# Patient Record
Sex: FEMALE | Race: WHITE | NOT HISPANIC OR LATINO | Employment: OTHER | URBAN - METROPOLITAN AREA
[De-identification: names, ages, dates, MRNs, and addresses within clinical notes are randomized per-mention and may not be internally consistent; named-entity substitution may affect disease eponyms.]

---

## 2017-08-17 ENCOUNTER — OFFICE VISIT (OUTPATIENT)
Dept: RETAIL CLINIC | Facility: CLINIC | Age: 62
End: 2017-08-17

## 2017-08-17 VITALS
SYSTOLIC BLOOD PRESSURE: 150 MMHG | TEMPERATURE: 98.9 F | DIASTOLIC BLOOD PRESSURE: 86 MMHG | HEART RATE: 83 BPM | OXYGEN SATURATION: 98 %

## 2017-08-17 DIAGNOSIS — J01.00 ACUTE NON-RECURRENT MAXILLARY SINUSITIS: Primary | ICD-10-CM

## 2017-08-17 PROCEDURE — 99203 OFFICE O/P NEW LOW 30 MIN: CPT | Performed by: NURSE PRACTITIONER

## 2017-08-17 RX ORDER — ANASTROZOLE 1 MG/1
TABLET ORAL DAILY
COMMUNITY
End: 2018-12-14

## 2017-08-17 RX ORDER — GLIPIZIDE 5 MG/1
5 TABLET ORAL
COMMUNITY
End: 2023-03-25

## 2017-08-17 RX ORDER — AMOXICILLIN AND CLAVULANATE POTASSIUM 875; 125 MG/1; MG/1
1 TABLET, FILM COATED ORAL 2 TIMES DAILY
Qty: 20 TABLET | Refills: 0 | Status: SHIPPED | OUTPATIENT
Start: 2017-08-17 | End: 2017-08-27

## 2017-08-17 NOTE — PROGRESS NOTES
Subjective   Patient ID: Lyubov Middleton is a 62 y.o. female presents with   Chief Complaint   Patient presents with   • Cough   • URI     x 1.5 weeks   • Sore Throat   • Headache   • ear congestion       Cough   This is a new problem. The current episode started 1 to 4 weeks ago. The problem has been unchanged. The problem occurs constantly. The cough is productive of purulent sputum. Associated symptoms include headaches, nasal congestion, postnasal drip, rhinorrhea and a sore throat. Pertinent negatives include no chest pain, chills, ear congestion, ear pain, fever, hemoptysis, rash, shortness of breath, sweats or wheezing. Nothing aggravates the symptoms. Treatments tried: TYLENOL COLD AND SINUS. The treatment provided mild relief.   URI    Associated symptoms include coughing, headaches, rhinorrhea and a sore throat. Pertinent negatives include no chest pain, diarrhea, ear pain, nausea, rash, sneezing, vomiting or wheezing.   Sore Throat    Associated symptoms include coughing and headaches. Pertinent negatives include no diarrhea, ear discharge, ear pain, shortness of breath or vomiting.   Headache    Associated symptoms include coughing, rhinorrhea, sinus pressure and a sore throat. Pertinent negatives include no dizziness, ear pain, fever, nausea or vomiting.   PATIENT REPORTS SYMPTOMS X 7-10 DAYS.      No Known Allergies    The following portions of the patient's history were reviewed and updated as appropriate: allergies, current medications, past family history, past medical history, past social history, past surgical history and problem list.      Review of Systems   Constitutional: Positive for fatigue. Negative for chills and fever.   HENT: Positive for postnasal drip, rhinorrhea, sinus pressure and sore throat. Negative for ear discharge, ear pain and sneezing.    Respiratory: Positive for cough. Negative for hemoptysis, chest tightness, shortness of breath and wheezing.    Cardiovascular: Negative  for chest pain.   Gastrointestinal: Negative for diarrhea, nausea and vomiting.   Skin: Negative for rash.   Neurological: Positive for headaches. Negative for dizziness and light-headedness.       Objective     Vitals:    08/17/17 0836   BP: 150/86   Pulse: 83   Temp: 98.9 °F (37.2 °C)   SpO2: 98%         Physical Exam   Constitutional: She is oriented to person, place, and time. She appears well-developed and well-nourished. She does not appear ill. No distress.   HENT:   Head: Normocephalic.   Right Ear: Hearing, tympanic membrane, external ear and ear canal normal.   Left Ear: Hearing, tympanic membrane, external ear and ear canal normal.   Nose: Mucosal edema and rhinorrhea present. No sinus tenderness. Right sinus exhibits maxillary sinus tenderness. Right sinus exhibits no frontal sinus tenderness. Left sinus exhibits maxillary sinus tenderness. Left sinus exhibits no frontal sinus tenderness.   Mouth/Throat: Uvula is midline and mucous membranes are normal. No trismus in the jaw. No uvula swelling. Posterior oropharyngeal erythema present. Tonsils are 2+ on the right. Tonsils are 2+ on the left. No tonsillar exudate.   Eyes: Conjunctivae are normal.   Sclera white.   Neck: No tracheal deviation present.   Cardiovascular: Normal rate, regular rhythm, S1 normal, S2 normal and normal heart sounds.    Pulmonary/Chest: Effort normal and breath sounds normal. No accessory muscle usage. No respiratory distress.   Abdominal: Soft. Bowel sounds are normal. There is no hepatosplenomegaly. There is no tenderness.   Lymphadenopathy:     She has no cervical adenopathy.   Neurological: She is alert and oriented to person, place, and time.   Skin: Skin is warm and dry.   Vitals reviewed.        Lyubov was seen today for cough, uri, sore throat, headache and ear congestion.    Diagnoses and all orders for this visit:    Acute non-recurrent maxillary sinusitis  -     amoxicillin-clavulanate (AUGMENTIN) 875-125 MG per  tablet; Take 1 tablet by mouth 2 (Two) Times a Day for 10 days.      RX AS PRESCRIBED.  OTC FLONASE PER PACKAGE INSTRUCTIONS.  PUSH FLUIDS.  FOLLOW UP WITH PCP AS SCHEDULED NEXT WEEK.  TO URGENT CARE OR ER FOR WORSENING SYMPTOMS SUCH AS SHORTNESS OF BREATH, CHEST PAIN, HIGH FEVER OR THE LIKE.   Follow-up with Primary Care Physician in 48-72 hours if these symptoms worsen or fail to improve as anticipated. Patient verbalizes understanding.

## 2017-08-17 NOTE — PATIENT INSTRUCTIONS
Sinusitis, Adult  Sinusitis is soreness and inflammation of your sinuses. Sinuses are hollow spaces in the bones around your face. They are located:  · Around your eyes.  · In the middle of your forehead.  · Behind your nose.  · In your cheekbones.  Your sinuses and nasal passages are lined with a stringy fluid (mucus). Mucus normally drains out of your sinuses. When your nasal tissues get inflamed or swollen, the mucus can get trapped or blocked so air cannot flow through your sinuses. This lets bacteria, viruses, and funguses grow, and that leads to infection.  HOME CARE  Medicines  · Take, use, or apply over-the-counter and prescription medicines only as told by your doctor. These may include nasal sprays.  · If you were prescribed an antibiotic medicine, take it as told by your doctor. Do not stop taking the antibiotic even if you start to feel better.  Hydrate and Humidify  · Drink enough water to keep your pee (urine) clear or pale yellow.  · Use a cool mist humidifier to keep the humidity level in your home above 50%.  · Breathe in steam for 10-15 minutes, 3-4 times a day or as told by your doctor. You can do this in the bathroom while a hot shower is running.  · Try not to spend time in cool or dry air.  Rest  · Rest as much as possible.    · Sleep with your head raised (elevated).  · Make sure to get enough sleep each night.  General Instructions  · Put a warm, moist washcloth on your face 3-4 times a day or as told by your doctor. This will help with discomfort.  · Wash your hands often with soap and water. If there is no soap and water, use hand .  · Do not smoke. Avoid being around people who are smoking (secondhand smoke).  · Keep all follow-up visits as told by your doctor. This is important.  GET HELP IF:  · You have a fever.  · Your symptoms get worse.  · Your symptoms do not get better within 10 days.  GET HELP RIGHT AWAY IF:  · You have a very bad headache.  · You cannot stop throwing up  (vomiting).  · You have pain or swelling around your face or eyes.  · You have trouble seeing.  · You feel confused.  · Your neck is stiff.  · You have trouble breathing.     RX AS PRESCRIBED.  OTC FLONASE PER PACKAGE INSTRUCTIONS.  PUSH FLUIDS.  FOLLOW UP WITH PCP AS SCHEDULED NEXT WEEK.  TO URGENT CARE OR ER FOR WORSENING SYMPTOMS SUCH AS SHORTNESS OF BREATH, CHEST PAIN, HIGH FEVER OR THE LIKE.   This information is not intended to replace advice given to you by your health care provider. Make sure you discuss any questions you have with your health care provider.     Document Released: 06/05/2009 Document Revised: 04/10/2017 Document Reviewed: 10/12/2016  [employer] Interactive Patient Education ©2017 [employer]

## 2018-12-14 ENCOUNTER — OFFICE VISIT (OUTPATIENT)
Dept: RETAIL CLINIC | Facility: CLINIC | Age: 63
End: 2018-12-14

## 2018-12-14 VITALS
DIASTOLIC BLOOD PRESSURE: 72 MMHG | HEART RATE: 83 BPM | TEMPERATURE: 98.1 F | OXYGEN SATURATION: 96 % | SYSTOLIC BLOOD PRESSURE: 132 MMHG

## 2018-12-14 DIAGNOSIS — R31.29 OTHER MICROSCOPIC HEMATURIA: Primary | ICD-10-CM

## 2018-12-14 LAB
BILIRUB BLD-MCNC: NEGATIVE MG/DL
GLUCOSE UR STRIP-MCNC: NEGATIVE MG/DL
KETONES UR QL: NEGATIVE
LEUKOCYTE EST, POC: NEGATIVE
NITRITE UR-MCNC: NEGATIVE MG/ML
PH UR: 5.5 [PH] (ref 5–8)
PROT UR STRIP-MCNC: NEGATIVE MG/DL
RBC # UR STRIP: ABNORMAL /UL
SP GR UR: 1.03 (ref 1–1.03)
UROBILINOGEN UR QL: NORMAL

## 2018-12-14 PROCEDURE — 81003 URINALYSIS AUTO W/O SCOPE: CPT | Performed by: NURSE PRACTITIONER

## 2018-12-14 PROCEDURE — 99213 OFFICE O/P EST LOW 20 MIN: CPT | Performed by: NURSE PRACTITIONER

## 2018-12-14 RX ORDER — CITALOPRAM 10 MG/1
10 TABLET ORAL DAILY
Refills: 3 | Status: ON HOLD | COMMUNITY
Start: 2018-12-02

## 2018-12-14 RX ORDER — LISINOPRIL 20 MG/1
20 TABLET ORAL DAILY
Refills: 0 | Status: ON HOLD | COMMUNITY
Start: 2018-10-01 | End: 2020-12-17 | Stop reason: SDUPTHER

## 2018-12-14 RX ORDER — RIZATRIPTAN BENZOATE 10 MG/1
TABLET ORAL
Refills: 0 | COMMUNITY
Start: 2018-10-02 | End: 2023-03-25

## 2018-12-14 NOTE — PATIENT INSTRUCTIONS
Hematuria, Adult  Hematuria is blood in your urine. It can be caused by a bladder infection, kidney infection, prostate infection, kidney stone, or cancer of your urinary tract. Infections can usually be treated with medicine, and a kidney stone usually will pass through your urine. If neither of these is the cause of your hematuria, further workup to find out the reason may be needed.  It is very important that you tell your health care provider about any blood you see in your urine, even if the blood stops without treatment or happens without causing pain. Blood in your urine that happens and then stops and then happens again can be a symptom of a very serious condition. Also, pain is not a symptom in the initial stages of many urinary cancers.  Follow these instructions at home:  · Drink lots of fluid, 3-4 quarts a day. If you have been diagnosed with an infection, cranberry juice is especially recommended, in addition to large amounts of water.  · Avoid caffeine, tea, and carbonated beverages because they tend to irritate the bladder.  · Avoid alcohol because it may irritate the prostate.  · Take all medicines as directed by your health care provider.  · If you were prescribed an antibiotic medicine, finish it all even if you start to feel better.  · If you have been diagnosed with a kidney stone, follow your health care provider's instructions regarding straining your urine to catch the stone.  · Empty your bladder often. Avoid holding urine for long periods of time.  · After a bowel movement, women should cleanse front to back. Use each tissue only once.  · Empty your bladder before and after sexual intercourse if you are a female.  Contact a health care provider if:  · You develop back pain.  · You have a fever.  · You have a feeling of sickness in your stomach (nausea) or vomiting.  · Your symptoms are not better in 3 days. Return sooner if you are getting worse.  Get help right away if:  · You develop  severe vomiting and are unable to keep the medicine down.  · You develop severe back or abdominal pain despite taking your medicines.  · You begin passing a large amount of blood or clots in your urine.  · You feel extremely weak or faint, or you pass out.  This information is not intended to replace advice given to you by your health care provider. Make sure you discuss any questions you have with your health care provider.  Document Released: 12/18/2006 Document Revised: 05/25/2017 Document Reviewed: 08/18/2014  [employer] Interactive Patient Education © 2017 [employer]

## 2018-12-14 NOTE — PROGRESS NOTES
Subjective:     Lyubov Middleton is a 63 y.o.     Patient reports that she was seen in October at her PCP for a routine visit.  Her routine urinalysis showed that she had a UTI.  Although she is not having any symptoms after finishing her antibiotic and it being 2 months later, she is here today to make sure that her infection is gone.  She is reminded that this clinic is not a follow up clinic and she states that she cannot get in with her PCP again until 1/30/19.  She is still encouraged to follow up with her PCP from now on.        Urinary Tract Infection        Patient also reports that she is having pleurisy symptoms as well and is referred to an urgent care where she can be evaluated with a chest Xray today.    The following portions of the patient's history were reviewed and updated as appropriate: allergies, current medications, past family history, past medical history, past social history, past surgical history and problem list.      Review of Systems   Musculoskeletal: Positive for back pain (Patient reports that her pain is similar to when she had pleurisy).         Objective:    Vitals:    12/14/18 1021   BP: 132/72   BP Location: Right arm   Patient Position: Sitting   Cuff Size: Adult   Pulse: 83   Temp: 98.1 °F (36.7 °C)   SpO2: 96%       Physical Exam   Constitutional: She is oriented to person, place, and time. She appears well-developed and well-nourished.   HENT:   Head: Normocephalic and atraumatic.   Eyes: Conjunctivae are normal.   Cardiovascular: Normal rate, regular rhythm and normal heart sounds.   Pulmonary/Chest: Effort normal and breath sounds normal.   Abdominal: Soft. There is no tenderness. There is no CVA tenderness.   Neurological: She is alert and oriented to person, place, and time.   Skin: Skin is warm and dry.   Psychiatric: She has a normal mood and affect. Her behavior is normal. Judgment and thought content normal.   Vitals reviewed.        Lyubov was seen today for urinary  tract infection.    Diagnoses and all orders for this visit:    Other microscopic hematuria  -     POC Urinalysis Dipstick, Automated

## 2020-12-08 ENCOUNTER — HOSPITAL ENCOUNTER (INPATIENT)
Facility: HOSPITAL | Age: 65
LOS: 9 days | Discharge: HOME HEALTH | End: 2020-12-17
Attending: EMERGENCY MEDICINE | Admitting: HOSPITALIST

## 2020-12-08 ENCOUNTER — APPOINTMENT (OUTPATIENT)
Dept: CT IMAGING | Facility: HOSPITAL | Age: 65
End: 2020-12-08

## 2020-12-08 DIAGNOSIS — R73.9 HYPERGLYCEMIA: ICD-10-CM

## 2020-12-08 DIAGNOSIS — N15.1 RENAL ABSCESS: ICD-10-CM

## 2020-12-08 DIAGNOSIS — R10.30 LOWER ABDOMINAL PAIN: ICD-10-CM

## 2020-12-08 DIAGNOSIS — E87.6 HYPOKALEMIA: Primary | ICD-10-CM

## 2020-12-08 LAB
ALBUMIN SERPL-MCNC: 3.1 G/DL (ref 3.5–5.2)
ALBUMIN/GLOB SERPL: 0.8 G/DL
ALP SERPL-CCNC: 102 U/L (ref 39–117)
ALT SERPL W P-5'-P-CCNC: 6 U/L (ref 1–33)
ANION GAP SERPL CALCULATED.3IONS-SCNC: 11.8 MMOL/L (ref 5–15)
AST SERPL-CCNC: 10 U/L (ref 1–32)
B PARAPERT DNA SPEC QL NAA+PROBE: NOT DETECTED
B PERT DNA SPEC QL NAA+PROBE: NOT DETECTED
BACTERIA UR QL AUTO: ABNORMAL /HPF
BASOPHILS # BLD AUTO: 0.03 10*3/MM3 (ref 0–0.2)
BASOPHILS NFR BLD AUTO: 0.2 % (ref 0–1.5)
BILIRUB SERPL-MCNC: 0.4 MG/DL (ref 0–1.2)
BILIRUB UR QL STRIP: NEGATIVE
BUN SERPL-MCNC: 7 MG/DL (ref 8–23)
BUN/CREAT SERPL: 6 (ref 7–25)
C PNEUM DNA NPH QL NAA+NON-PROBE: NOT DETECTED
CALCIUM SPEC-SCNC: 7.9 MG/DL (ref 8.6–10.5)
CHLORIDE SERPL-SCNC: 86 MMOL/L (ref 98–107)
CLARITY UR: CLEAR
CO2 SERPL-SCNC: 33.2 MMOL/L (ref 22–29)
COLOR UR: YELLOW
CREAT SERPL-MCNC: 1.16 MG/DL (ref 0.57–1)
DEPRECATED RDW RBC AUTO: 37.2 FL (ref 37–54)
EOSINOPHIL # BLD AUTO: 0.04 10*3/MM3 (ref 0–0.4)
EOSINOPHIL NFR BLD AUTO: 0.3 % (ref 0.3–6.2)
ERYTHROCYTE [DISTWIDTH] IN BLOOD BY AUTOMATED COUNT: 12.5 % (ref 12.3–15.4)
FLUAV SUBTYP SPEC NAA+PROBE: NOT DETECTED
FLUBV RNA ISLT QL NAA+PROBE: NOT DETECTED
GFR SERPL CREATININE-BSD FRML MDRD: 47 ML/MIN/1.73
GLOBULIN UR ELPH-MCNC: 3.8 GM/DL
GLUCOSE BLDC GLUCOMTR-MCNC: 340 MG/DL (ref 70–130)
GLUCOSE SERPL-MCNC: 535 MG/DL (ref 65–99)
GLUCOSE UR STRIP-MCNC: ABNORMAL MG/DL
HADV DNA SPEC NAA+PROBE: NOT DETECTED
HCOV 229E RNA SPEC QL NAA+PROBE: NOT DETECTED
HCOV HKU1 RNA SPEC QL NAA+PROBE: NOT DETECTED
HCOV NL63 RNA SPEC QL NAA+PROBE: NOT DETECTED
HCOV OC43 RNA SPEC QL NAA+PROBE: NOT DETECTED
HCT VFR BLD AUTO: 30 % (ref 34–46.6)
HGB BLD-MCNC: 9.5 G/DL (ref 12–15.9)
HGB UR QL STRIP.AUTO: ABNORMAL
HMPV RNA NPH QL NAA+NON-PROBE: NOT DETECTED
HPIV1 RNA SPEC QL NAA+PROBE: NOT DETECTED
HPIV2 RNA SPEC QL NAA+PROBE: NOT DETECTED
HPIV3 RNA NPH QL NAA+PROBE: NOT DETECTED
HPIV4 P GENE NPH QL NAA+PROBE: NOT DETECTED
HYALINE CASTS UR QL AUTO: ABNORMAL /LPF
IMM GRANULOCYTES # BLD AUTO: 0.13 10*3/MM3 (ref 0–0.05)
IMM GRANULOCYTES NFR BLD AUTO: 0.8 % (ref 0–0.5)
KETONES UR QL STRIP: NEGATIVE
LEUKOCYTE ESTERASE UR QL STRIP.AUTO: NEGATIVE
LIPASE SERPL-CCNC: 26 U/L (ref 13–60)
LYMPHOCYTES # BLD AUTO: 1.01 10*3/MM3 (ref 0.7–3.1)
LYMPHOCYTES NFR BLD AUTO: 6.6 % (ref 19.6–45.3)
M PNEUMO IGG SER IA-ACNC: NOT DETECTED
MAGNESIUM SERPL-MCNC: 1 MG/DL (ref 1.6–2.4)
MCH RBC QN AUTO: 25.8 PG (ref 26.6–33)
MCHC RBC AUTO-ENTMCNC: 31.7 G/DL (ref 31.5–35.7)
MCV RBC AUTO: 81.5 FL (ref 79–97)
MONOCYTES # BLD AUTO: 0.91 10*3/MM3 (ref 0.1–0.9)
MONOCYTES NFR BLD AUTO: 5.9 % (ref 5–12)
NEUTROPHILS NFR BLD AUTO: 13.21 10*3/MM3 (ref 1.7–7)
NEUTROPHILS NFR BLD AUTO: 86.2 % (ref 42.7–76)
NITRITE UR QL STRIP: NEGATIVE
NRBC BLD AUTO-RTO: 0 /100 WBC (ref 0–0.2)
PH UR STRIP.AUTO: 6 [PH] (ref 5–8)
PLATELET # BLD AUTO: 380 10*3/MM3 (ref 140–450)
PMV BLD AUTO: 9.8 FL (ref 6–12)
POTASSIUM SERPL-SCNC: 2.1 MMOL/L (ref 3.5–5.2)
PROT SERPL-MCNC: 6.9 G/DL (ref 6–8.5)
PROT UR QL STRIP: ABNORMAL
RBC # BLD AUTO: 3.68 10*6/MM3 (ref 3.77–5.28)
RBC # UR: ABNORMAL /HPF
REF LAB TEST METHOD: ABNORMAL
RHINOVIRUS RNA SPEC NAA+PROBE: NOT DETECTED
RSV RNA NPH QL NAA+NON-PROBE: NOT DETECTED
SARS-COV-2 RNA NPH QL NAA+NON-PROBE: NOT DETECTED
SODIUM SERPL-SCNC: 131 MMOL/L (ref 136–145)
SP GR UR STRIP: 1.01 (ref 1–1.03)
SQUAMOUS #/AREA URNS HPF: ABNORMAL /HPF
UROBILINOGEN UR QL STRIP: ABNORMAL
WBC # BLD AUTO: 15.33 10*3/MM3 (ref 3.4–10.8)
WBC UR QL AUTO: ABNORMAL /HPF

## 2020-12-08 PROCEDURE — P9612 CATHETERIZE FOR URINE SPEC: HCPCS

## 2020-12-08 PROCEDURE — 25810000003 SODIUM CHLORIDE 0.9 % WITH KCL 20 MEQ 20-0.9 MEQ/L-% SOLUTION: Performed by: UROLOGY

## 2020-12-08 PROCEDURE — 83735 ASSAY OF MAGNESIUM: CPT | Performed by: INTERNAL MEDICINE

## 2020-12-08 PROCEDURE — 25010000002 CEFTRIAXONE PER 250 MG: Performed by: UROLOGY

## 2020-12-08 PROCEDURE — 85025 COMPLETE CBC W/AUTO DIFF WBC: CPT | Performed by: EMERGENCY MEDICINE

## 2020-12-08 PROCEDURE — 25810000003 SODIUM CHLORIDE 0.9 % WITH KCL 20 MEQ 20-0.9 MEQ/L-% SOLUTION: Performed by: INTERNAL MEDICINE

## 2020-12-08 PROCEDURE — 25010000002 CEFTRIAXONE PER 250 MG: Performed by: INTERNAL MEDICINE

## 2020-12-08 PROCEDURE — 99284 EMERGENCY DEPT VISIT MOD MDM: CPT

## 2020-12-08 PROCEDURE — G0378 HOSPITAL OBSERVATION PER HR: HCPCS

## 2020-12-08 PROCEDURE — 82962 GLUCOSE BLOOD TEST: CPT

## 2020-12-08 PROCEDURE — 25010000002 ENOXAPARIN PER 10 MG: Performed by: INTERNAL MEDICINE

## 2020-12-08 PROCEDURE — 83690 ASSAY OF LIPASE: CPT | Performed by: EMERGENCY MEDICINE

## 2020-12-08 PROCEDURE — 0202U NFCT DS 22 TRGT SARS-COV-2: CPT | Performed by: PHYSICIAN ASSISTANT

## 2020-12-08 PROCEDURE — 25010000002 IOPAMIDOL 61 % SOLUTION: Performed by: EMERGENCY MEDICINE

## 2020-12-08 PROCEDURE — 81001 URINALYSIS AUTO W/SCOPE: CPT | Performed by: EMERGENCY MEDICINE

## 2020-12-08 PROCEDURE — 74177 CT ABD & PELVIS W/CONTRAST: CPT

## 2020-12-08 PROCEDURE — 80053 COMPREHEN METABOLIC PANEL: CPT | Performed by: EMERGENCY MEDICINE

## 2020-12-08 RX ORDER — SODIUM CHLORIDE 0.9 % (FLUSH) 0.9 %
10 SYRINGE (ML) INJECTION AS NEEDED
Status: DISCONTINUED | OUTPATIENT
Start: 2020-12-08 | End: 2020-12-17 | Stop reason: HOSPADM

## 2020-12-08 RX ORDER — SODIUM CHLORIDE AND POTASSIUM CHLORIDE 150; 900 MG/100ML; MG/100ML
100 INJECTION, SOLUTION INTRAVENOUS CONTINUOUS
Status: DISCONTINUED | OUTPATIENT
Start: 2020-12-08 | End: 2020-12-13

## 2020-12-08 RX ORDER — POTASSIUM CHLORIDE 1.5 G/1.77G
40 POWDER, FOR SOLUTION ORAL AS NEEDED
Status: DISCONTINUED | OUTPATIENT
Start: 2020-12-08 | End: 2020-12-17 | Stop reason: HOSPADM

## 2020-12-08 RX ORDER — CITALOPRAM 10 MG/1
10 TABLET ORAL DAILY
Status: DISCONTINUED | OUTPATIENT
Start: 2020-12-09 | End: 2020-12-17 | Stop reason: HOSPADM

## 2020-12-08 RX ORDER — LISINOPRIL 20 MG/1
20 TABLET ORAL DAILY
Status: DISCONTINUED | OUTPATIENT
Start: 2020-12-09 | End: 2020-12-17 | Stop reason: HOSPADM

## 2020-12-08 RX ORDER — POTASSIUM CHLORIDE 750 MG/1
40 TABLET, FILM COATED, EXTENDED RELEASE ORAL AS NEEDED
Status: DISCONTINUED | OUTPATIENT
Start: 2020-12-08 | End: 2020-12-17 | Stop reason: HOSPADM

## 2020-12-08 RX ORDER — ACETAMINOPHEN 325 MG/1
650 TABLET ORAL EVERY 4 HOURS PRN
Status: DISCONTINUED | OUTPATIENT
Start: 2020-12-08 | End: 2020-12-17 | Stop reason: HOSPADM

## 2020-12-08 RX ORDER — ONDANSETRON 4 MG/1
4 TABLET, FILM COATED ORAL EVERY 6 HOURS PRN
Status: DISCONTINUED | OUTPATIENT
Start: 2020-12-08 | End: 2020-12-17 | Stop reason: HOSPADM

## 2020-12-08 RX ORDER — CEFTRIAXONE SODIUM 1 G/50ML
1 INJECTION, SOLUTION INTRAVENOUS EVERY 24 HOURS
Status: DISCONTINUED | OUTPATIENT
Start: 2020-12-08 | End: 2020-12-11

## 2020-12-08 RX ORDER — NITROGLYCERIN 0.4 MG/1
0.4 TABLET SUBLINGUAL
Status: DISCONTINUED | OUTPATIENT
Start: 2020-12-08 | End: 2020-12-17 | Stop reason: HOSPADM

## 2020-12-08 RX ORDER — POTASSIUM CHLORIDE 7.45 MG/ML
10 INJECTION INTRAVENOUS
Status: DISCONTINUED | OUTPATIENT
Start: 2020-12-08 | End: 2020-12-17 | Stop reason: HOSPADM

## 2020-12-08 RX ORDER — UREA 10 %
3 LOTION (ML) TOPICAL NIGHTLY PRN
Status: DISCONTINUED | OUTPATIENT
Start: 2020-12-08 | End: 2020-12-17 | Stop reason: HOSPADM

## 2020-12-08 RX ORDER — ONDANSETRON 2 MG/ML
4 INJECTION INTRAMUSCULAR; INTRAVENOUS EVERY 6 HOURS PRN
Status: DISCONTINUED | OUTPATIENT
Start: 2020-12-08 | End: 2020-12-17 | Stop reason: HOSPADM

## 2020-12-08 RX ADMIN — POTASSIUM CHLORIDE 40 MEQ: 750 TABLET, EXTENDED RELEASE ORAL at 16:52

## 2020-12-08 RX ADMIN — IOPAMIDOL 100 ML: 612 INJECTION, SOLUTION INTRAVENOUS at 18:33

## 2020-12-08 RX ADMIN — CEFTRIAXONE SODIUM 1 G: 1 INJECTION, SOLUTION INTRAVENOUS at 22:31

## 2020-12-08 RX ADMIN — SODIUM CHLORIDE 1000 ML: 9 INJECTION, SOLUTION INTRAVENOUS at 16:14

## 2020-12-08 RX ADMIN — POTASSIUM CHLORIDE AND SODIUM CHLORIDE 100 ML/HR: 900; 150 INJECTION, SOLUTION INTRAVENOUS at 22:30

## 2020-12-08 RX ADMIN — ENOXAPARIN SODIUM 40 MG: 40 INJECTION SUBCUTANEOUS at 22:30

## 2020-12-08 RX ADMIN — SODIUM CHLORIDE, PRESERVATIVE FREE 10 ML: 5 INJECTION INTRAVENOUS at 15:06

## 2020-12-08 NOTE — ED TRIAGE NOTES
Has abd pain and burn c voiding x a few weeks.  Took advil today.  She reports she's been losing her balance    Patient was placed in face mask during first look triage.  Patient was wearing a face mask throughout encounter.  I wore personal protective equipment throughout the encounter.  Hand hygiene was performed before and after patient encounter.

## 2020-12-08 NOTE — ED PROVIDER NOTES
EMERGENCY DEPARTMENT ENCOUNTER    Room Number: 37/37  Date seen: 12/8/2020  Time seen: 14:30 EST  PCP: Ana Huitron MD    Spoken Language:  English  Language interpretation services not needed     CHIEF COMPLAINT: Abdominal pain    Primary Care Provider: Ana Huitron MD    HPI: Lyubov Middleton is a 65 y.o. female with PMH of DM presenting to the ED for evaluation of abdominal pain. The history is being obtained by the patient and by review of the medical chart.  The patient states that she has had dysuria over the past 2 weeks with an onset of lower abdominal pain over the past week.  The patient states that she has had multiple UTIs in the past, and that the symptoms feel similar.  She denies any hematuria, but does complain of increased urinary frequency.  She complains of nausea but denies vomiting, fever, cough, sore throat, chest pain, shortness of breath, loss of taste or loss of smell.  She denies any recent sick contacts.  The patient has chronic diarrhea which is unchanged in nature.  Nothing makes the abdominal pain and dysuria better or worse.  She rates her current abdominal discomfort as 7/10.    MEDICAL RECORD REVIEW:  Reviewed in [employer].     PAST MEDICAL HISTORY  Past Medical History:   Diagnosis Date   • Cancer (CMS/HCC)     left breast removed 2012   • Type 2 diabetes mellitus (CMS/HCC)        PAST SURGICAL HISTORY  Past Surgical History:   Procedure Laterality Date   • ABDOMINAL SURGERY     • BREAST SURGERY Left 2012    removed due to cancer   • CHOLECYSTECTOMY     • HERNIA REPAIR      mesh removed       FAMILY HISTORY  Family History   Problem Relation Age of Onset   • Diabetes Mother    • Lung disease Father    • Cancer Father        SOCIAL HISTORY  Social History     Socioeconomic History   • Marital status: Unknown     Spouse name: Not on file   • Number of children: Not on file   • Years of education: Not on file   • Highest education level: Not on file   Tobacco Use   • Smoking status: Former  Smoker     Quit date: 1997     Years since quittin.3   • Smokeless tobacco: Never Used   Substance and Sexual Activity   • Alcohol use: No   • Drug use: No   • Sexual activity: Never       CURRENT MEDICATIONS  Prior to Admission medications    Medication Sig Start Date End Date Taking? Authorizing Provider   citalopram (CeleXA) 10 MG tablet Take 10 mg by mouth Daily. 18   Pancho Carpenter MD   glipiZIDE (GLUCOTROL) 5 MG tablet Take 5 mg by mouth 2 (Two) Times a Day Before Meals.    Pancho Carpenter MD   lisinopril (PRINIVIL,ZESTRIL) 20 MG tablet Take 20 mg by mouth Daily. 10/1/18   Pancho Carpenter MD   metFORMIN (GLUCOPHAGE) 500 MG tablet Take 500 mg by mouth 2 (Two) Times a Day With Meals.    Pancho Carpenter MD   rizatriptan (MAXALT) 10 MG tablet TAKE 1 TABLET BY MOUTH EVERY DAY AS NEEDED (MAX OF 2 PER 24 HOURS) 10/2/18   Pancho Carpenter MD       ALLERGIES  Patient has no known allergies.    REVIEW OF SYSTEMS  All systems reviewed and negative except for those discussed in HPI.     PHYSICAL EXAM  ED Triage Vitals [20 1412]   Temp Heart Rate Resp BP SpO2   99.6 °F (37.6 °C) 86 16 -- 96 %      Temp src Heart Rate Source Patient Position BP Location FiO2 (%)   Tympanic Monitor -- -- --       Physical Exam  Constitutional:       Appearance: Normal appearance.   HENT:      Head: Normocephalic and atraumatic.      Mouth/Throat:      Mouth: Mucous membranes are moist.   Eyes:      Extraocular Movements: Extraocular movements intact.      Pupils: Pupils are equal, round, and reactive to light.   Neck:      Musculoskeletal: Normal range of motion.   Cardiovascular:      Rate and Rhythm: Normal rate and regular rhythm.   Pulmonary:      Effort: Pulmonary effort is normal.      Breath sounds: Normal breath sounds.   Abdominal:      General: There is no distension.      Palpations: Abdomen is soft.      Tenderness: There is abdominal tenderness in the right lower quadrant,  suprapubic area and left lower quadrant.   Skin:     General: Skin is warm and dry.   Neurological:      General: No focal deficit present.      Mental Status: She is alert and oriented to person, place, and time.   Psychiatric:         Mood and Affect: Mood normal.         Behavior: Behavior normal.         Thought Content: Thought content normal.         Judgment: Judgment normal.         PROCEDURES  Procedures  None    LABS  Recent Results (from the past 24 hour(s))   Comprehensive Metabolic Panel    Collection Time: 12/08/20  3:06 PM    Specimen: Blood   Result Value Ref Range    Glucose 535 (C) 65 - 99 mg/dL    BUN 7 (L) 8 - 23 mg/dL    Creatinine 1.16 (H) 0.57 - 1.00 mg/dL    Sodium 131 (L) 136 - 145 mmol/L    Potassium 2.1 (C) 3.5 - 5.2 mmol/L    Chloride 86 (L) 98 - 107 mmol/L    CO2 33.2 (H) 22.0 - 29.0 mmol/L    Calcium 7.9 (L) 8.6 - 10.5 mg/dL    Total Protein 6.9 6.0 - 8.5 g/dL    Albumin 3.10 (L) 3.50 - 5.20 g/dL    ALT (SGPT) 6 1 - 33 U/L    AST (SGOT) 10 1 - 32 U/L    Alkaline Phosphatase 102 39 - 117 U/L    Total Bilirubin 0.4 0.0 - 1.2 mg/dL    eGFR Non African Amer 47 (L) >60 mL/min/1.73    Globulin 3.8 gm/dL    A/G Ratio 0.8 g/dL    BUN/Creatinine Ratio 6.0 (L) 7.0 - 25.0    Anion Gap 11.8 5.0 - 15.0 mmol/L   Lipase    Collection Time: 12/08/20  3:06 PM    Specimen: Blood   Result Value Ref Range    Lipase 26 13 - 60 U/L   Urinalysis With Microscopic If Indicated (No Culture) - Urine, Catheter    Collection Time: 12/08/20  3:06 PM    Specimen: Urine, Catheter   Result Value Ref Range    Color, UA Yellow Yellow, Straw    Appearance, UA Clear Clear    pH, UA 6.0 5.0 - 8.0    Specific Gravity, UA 1.010 1.005 - 1.030    Glucose, UA >=1000 mg/dL (3+) (A) Negative    Ketones, UA Negative Negative    Bilirubin, UA Negative Negative    Blood, UA Small (1+) (A) Negative    Protein, UA 30 mg/dL (1+) (A) Negative    Leuk Esterase, UA Negative Negative    Nitrite, UA Negative Negative    Urobilinogen, UA 0.2  E.U./dL 0.2 - 1.0 E.U./dL   CBC Auto Differential    Collection Time: 12/08/20  3:06 PM    Specimen: Blood   Result Value Ref Range    WBC 15.33 (H) 3.40 - 10.80 10*3/mm3    RBC 3.68 (L) 3.77 - 5.28 10*6/mm3    Hemoglobin 9.5 (L) 12.0 - 15.9 g/dL    Hematocrit 30.0 (L) 34.0 - 46.6 %    MCV 81.5 79.0 - 97.0 fL    MCH 25.8 (L) 26.6 - 33.0 pg    MCHC 31.7 31.5 - 35.7 g/dL    RDW 12.5 12.3 - 15.4 %    RDW-SD 37.2 37.0 - 54.0 fl    MPV 9.8 6.0 - 12.0 fL    Platelets 380 140 - 450 10*3/mm3    Neutrophil % 86.2 (H) 42.7 - 76.0 %    Lymphocyte % 6.6 (L) 19.6 - 45.3 %    Monocyte % 5.9 5.0 - 12.0 %    Eosinophil % 0.3 0.3 - 6.2 %    Basophil % 0.2 0.0 - 1.5 %    Immature Grans % 0.8 (H) 0.0 - 0.5 %    Neutrophils, Absolute 13.21 (H) 1.70 - 7.00 10*3/mm3    Lymphocytes, Absolute 1.01 0.70 - 3.10 10*3/mm3    Monocytes, Absolute 0.91 (H) 0.10 - 0.90 10*3/mm3    Eosinophils, Absolute 0.04 0.00 - 0.40 10*3/mm3    Basophils, Absolute 0.03 0.00 - 0.20 10*3/mm3    Immature Grans, Absolute 0.13 (H) 0.00 - 0.05 10*3/mm3    nRBC 0.0 0.0 - 0.2 /100 WBC       RADIOLOGY  CT Abdomen Pelvis With Contrast    (Results Pending)       MEDICATIONS GIVEN IN THE ER  Medications   sodium chloride 0.9 % flush 10 mL (10 mL Intravenous Given 12/8/20 1506)   potassium chloride (K-DUR,KLOR-CON) ER tablet 40 mEq (has no administration in time range)     Or   potassium chloride (KLOR-CON) packet 40 mEq (has no administration in time range)     Or   potassium chloride 10 mEq in 100 mL IVPB (has no administration in time range)   sodium chloride 0.9 % bolus 1,000 mL (1,000 mL Intravenous New Bag 12/8/20 1614)       MEDICAL DECISION MAKING, CONSULTS AND PROGRESS NOTES  All labs and all radiology studies were have been viewed and interpreted by me.  Discussion below represents my analysis of pertinent findings related to patient's condition, differential diagnosis, treatment plan and final disposition.    Differential diagnosis includes but is not limited  to:  - Hepatobiliary pathology such as cholecystitis, cholangitis, and symptomatic cholelithiasis  - Pancreatitis  - Dyspepsia  - Small bowel obstruction  - Appendicitis  - Diverticulitis  - UTI including pyelonephritis  - Ureteral stone  - Zoster  - Colitis, including infectious and ischemic  - Atypical ACS    PPE: The patient was placed in a face mask in first look. Patient was wearing facemask when I entered the room and throughout our encounter. I wore full protective equipment throughout this patient encounter including a face mask, eye shield and gloves. Hand hygiene was performed before donning protective equipment and after removal when leaving the room.    AS OF 16:50 EST VITALS:    BP - 156/84  HR - 76  TEMP - 99.6 °F (37.6 °C) (Tympanic)  O2 SATS - 94%    ED Course as of Dec 08 1650   Tue Dec 08, 2020   1640 I discussed the patient's overall care with Dr. Gonzalez with A.  She is agreeable to admission and requests a telemetry bed.    [AR]      ED Course User Index  [AR] Margaret Bernal PA     Based on the patient's lab findings and presenting symptoms, the doctor and I feel it is appropriate to admit the patient for further management, evaluation, and treatment.  I have discussed this with the admitting team.  I have also discussed this with the patient/family.  They are in agreement with admission.      DIAGNOSIS   Diagnosis Plan   1. Hypokalemia     2. Hyperglycemia     3. Lower abdominal pain         DISPOSITION  ED Disposition     ED Disposition Condition Comment    Decision to Admit  Level of Care: Telemetry [5]   Diagnosis: Hypokalemia [558538]   Admitting Physician: ANDREW GONZALEZ [[id number]]   Attending Physician: ANDREW GONZALEZ [[id number]]          Provider Attestation:  I personally reviewed the past medical history, past surgical history, social history, family history, current medications and allergies as they appear in the chart. I reviewed the patient's history, physical,  lab/imaging results and overall care with Dr. Michele who is in agreement with the patient's treatment plan.    EMR Dragon/Transcription disclaimer:  Some of this encounter note is an electronic transcription/translation of spoken language to printed text. The electronic translation of spoken language may permit erroneous, or at times, nonsensical words or phrases to be inadvertently transcribed; Although I have reviewed the note for such errors, some may still exist.    Provider note signed by:         Margaret Bernal PA  12/08/20 [id number]

## 2020-12-08 NOTE — ED PROVIDER NOTES
MD ATTESTATION NOTE    The NEREIDA and I have discussed this patient's history, physical exam, and treatment plan.  I have reviewed the documentation and personally had a face to face interaction with the patient. I affirm the documentation and agree with the treatment and plan.  The attached note describes my personal findings.      History  65-year-old female presents with lower abdominal pain over the past 1 week.  Patient has a history of non-insulin-dependent diabetes.  Primary care provider is Dr. Huitron.    Physical Exam  Vital Signs reviewed  Alert, Well Appearing in NAD  Abdomen-mild bilateral lower abdominal tenderness without rebound or guarding    Disposition  I discussed evaluation and treatment of this patient with ANSELMO Bernal.  Patient has several abnormalities that need to be addressed.  She is fairly hypokalemic and we will replace both IV and orally.  She has hyperglycemia with glucose greater than 500, will hold insulin until her potassium is improved.  Also having lower abdominal pain with an elevated white count, will get CT scan of the abdomen for further assessment.  Disposition-Will contact A about admission.     Mayur Michele MD  12/08/20 3540

## 2020-12-09 ENCOUNTER — ANESTHESIA (OUTPATIENT)
Dept: PERIOP | Facility: HOSPITAL | Age: 65
End: 2020-12-09

## 2020-12-09 ENCOUNTER — ANESTHESIA EVENT (OUTPATIENT)
Dept: PERIOP | Facility: HOSPITAL | Age: 65
End: 2020-12-09

## 2020-12-09 ENCOUNTER — APPOINTMENT (OUTPATIENT)
Dept: GENERAL RADIOLOGY | Facility: HOSPITAL | Age: 65
End: 2020-12-09

## 2020-12-09 ENCOUNTER — APPOINTMENT (OUTPATIENT)
Dept: CT IMAGING | Facility: HOSPITAL | Age: 65
End: 2020-12-09

## 2020-12-09 PROBLEM — N20.0 RENAL STONE: Status: ACTIVE | Noted: 2020-12-09

## 2020-12-09 LAB
ANION GAP SERPL CALCULATED.3IONS-SCNC: 14.8 MMOL/L (ref 5–15)
ANION GAP SERPL CALCULATED.3IONS-SCNC: 8.5 MMOL/L (ref 5–15)
APTT PPP: 33.7 SECONDS (ref 22.7–35.4)
BUN SERPL-MCNC: 5 MG/DL (ref 8–23)
BUN SERPL-MCNC: 6 MG/DL (ref 8–23)
BUN/CREAT SERPL: 5.2 (ref 7–25)
BUN/CREAT SERPL: 5.8 (ref 7–25)
CALCIUM SPEC-SCNC: 6.9 MG/DL (ref 8.6–10.5)
CALCIUM SPEC-SCNC: 8.2 MG/DL (ref 8.6–10.5)
CHLORIDE SERPL-SCNC: 95 MMOL/L (ref 98–107)
CHLORIDE SERPL-SCNC: 98 MMOL/L (ref 98–107)
CO2 SERPL-SCNC: 27.2 MMOL/L (ref 22–29)
CO2 SERPL-SCNC: 29.5 MMOL/L (ref 22–29)
CREAT SERPL-MCNC: 0.86 MG/DL (ref 0.57–1)
CREAT SERPL-MCNC: 1.15 MG/DL (ref 0.57–1)
D-LACTATE SERPL-SCNC: 0.9 MMOL/L (ref 0.5–2)
DEPRECATED RDW RBC AUTO: 38.1 FL (ref 37–54)
DEPRECATED RDW RBC AUTO: 40 FL (ref 37–54)
ERYTHROCYTE [DISTWIDTH] IN BLOOD BY AUTOMATED COUNT: 12.8 % (ref 12.3–15.4)
ERYTHROCYTE [DISTWIDTH] IN BLOOD BY AUTOMATED COUNT: 13 % (ref 12.3–15.4)
GFR SERPL CREATININE-BSD FRML MDRD: 48 ML/MIN/1.73
GFR SERPL CREATININE-BSD FRML MDRD: 66 ML/MIN/1.73
GLUCOSE BLDC GLUCOMTR-MCNC: 214 MG/DL (ref 70–130)
GLUCOSE BLDC GLUCOMTR-MCNC: 234 MG/DL (ref 70–130)
GLUCOSE BLDC GLUCOMTR-MCNC: 303 MG/DL (ref 70–130)
GLUCOSE BLDC GLUCOMTR-MCNC: 361 MG/DL (ref 70–130)
GLUCOSE SERPL-MCNC: 242 MG/DL (ref 65–99)
GLUCOSE SERPL-MCNC: 273 MG/DL (ref 65–99)
HBA1C MFR BLD: 13.7 % (ref 4.8–5.6)
HCT VFR BLD AUTO: 25.6 % (ref 34–46.6)
HCT VFR BLD AUTO: 33.8 % (ref 34–46.6)
HGB BLD-MCNC: 10.5 G/DL (ref 12–15.9)
HGB BLD-MCNC: 8.3 G/DL (ref 12–15.9)
INR PPP: 1.35 (ref 0.9–1.1)
MAGNESIUM SERPL-MCNC: 1.1 MG/DL (ref 1.6–2.4)
MCH RBC QN AUTO: 26.1 PG (ref 26.6–33)
MCH RBC QN AUTO: 26.3 PG (ref 26.6–33)
MCHC RBC AUTO-ENTMCNC: 31.1 G/DL (ref 31.5–35.7)
MCHC RBC AUTO-ENTMCNC: 32.4 G/DL (ref 31.5–35.7)
MCV RBC AUTO: 81 FL (ref 79–97)
MCV RBC AUTO: 83.9 FL (ref 79–97)
PLATELET # BLD AUTO: 318 10*3/MM3 (ref 140–450)
PLATELET # BLD AUTO: 487 10*3/MM3 (ref 140–450)
PMV BLD AUTO: 10 FL (ref 6–12)
PMV BLD AUTO: 10 FL (ref 6–12)
POTASSIUM SERPL-SCNC: 2.8 MMOL/L (ref 3.5–5.2)
POTASSIUM SERPL-SCNC: 3.2 MMOL/L (ref 3.5–5.2)
PROTHROMBIN TIME: 16.5 SECONDS (ref 11.7–14.2)
RBC # BLD AUTO: 3.16 10*6/MM3 (ref 3.77–5.28)
RBC # BLD AUTO: 4.03 10*6/MM3 (ref 3.77–5.28)
SODIUM SERPL-SCNC: 136 MMOL/L (ref 136–145)
SODIUM SERPL-SCNC: 137 MMOL/L (ref 136–145)
WBC # BLD AUTO: 12.99 10*3/MM3 (ref 3.4–10.8)
WBC # BLD AUTO: 14 10*3/MM3 (ref 3.4–10.8)

## 2020-12-09 PROCEDURE — 25010000002 ONDANSETRON PER 1 MG: Performed by: NURSE ANESTHETIST, CERTIFIED REGISTERED

## 2020-12-09 PROCEDURE — BT1D1ZZ FLUOROSCOPY OF RIGHT KIDNEY, URETER AND BLADDER USING LOW OSMOLAR CONTRAST: ICD-10-PCS | Performed by: UROLOGY

## 2020-12-09 PROCEDURE — 80048 BASIC METABOLIC PNL TOTAL CA: CPT | Performed by: INTERNAL MEDICINE

## 2020-12-09 PROCEDURE — 83735 ASSAY OF MAGNESIUM: CPT | Performed by: PSYCHIATRY & NEUROLOGY

## 2020-12-09 PROCEDURE — 97110 THERAPEUTIC EXERCISES: CPT

## 2020-12-09 PROCEDURE — 85027 COMPLETE CBC AUTOMATED: CPT | Performed by: INTERNAL MEDICINE

## 2020-12-09 PROCEDURE — C1758 CATHETER, URETERAL: HCPCS | Performed by: UROLOGY

## 2020-12-09 PROCEDURE — 83036 HEMOGLOBIN GLYCOSYLATED A1C: CPT | Performed by: HOSPITALIST

## 2020-12-09 PROCEDURE — 25010000002 CEFTRIAXONE PER 250 MG: Performed by: UROLOGY

## 2020-12-09 PROCEDURE — 25010000002 ONDANSETRON PER 1 MG: Performed by: INTERNAL MEDICINE

## 2020-12-09 PROCEDURE — 0T768DZ DILATION OF RIGHT URETER WITH INTRALUMINAL DEVICE, VIA NATURAL OR ARTIFICIAL OPENING ENDOSCOPIC: ICD-10-PCS | Performed by: UROLOGY

## 2020-12-09 PROCEDURE — 0 IOTHALAMATE 60 % SOLUTION: Performed by: UROLOGY

## 2020-12-09 PROCEDURE — C2617 STENT, NON-COR, TEM W/O DEL: HCPCS | Performed by: UROLOGY

## 2020-12-09 PROCEDURE — 25010000002 HYDROMORPHONE PER 4 MG: Performed by: NURSE ANESTHETIST, CERTIFIED REGISTERED

## 2020-12-09 PROCEDURE — 0TC08ZZ EXTIRPATION OF MATTER FROM RIGHT KIDNEY, VIA NATURAL OR ARTIFICIAL OPENING ENDOSCOPIC: ICD-10-PCS | Performed by: UROLOGY

## 2020-12-09 PROCEDURE — 85610 PROTHROMBIN TIME: CPT | Performed by: ANESTHESIOLOGY

## 2020-12-09 PROCEDURE — 25010000002 FENTANYL CITRATE (PF) 100 MCG/2ML SOLUTION: Performed by: NURSE ANESTHETIST, CERTIFIED REGISTERED

## 2020-12-09 PROCEDURE — 25010000002 LEVOFLOXACIN PER 250 MG: Performed by: NURSE ANESTHETIST, CERTIFIED REGISTERED

## 2020-12-09 PROCEDURE — 25010000002 ONDANSETRON PER 1 MG: Performed by: UROLOGY

## 2020-12-09 PROCEDURE — 25010000002 PROPOFOL 10 MG/ML EMULSION: Performed by: NURSE ANESTHETIST, CERTIFIED REGISTERED

## 2020-12-09 PROCEDURE — 80048 BASIC METABOLIC PNL TOTAL CA: CPT | Performed by: ANESTHESIOLOGY

## 2020-12-09 PROCEDURE — 25810000003 SODIUM CHLORIDE 0.9 % WITH KCL 20 MEQ 20-0.9 MEQ/L-% SOLUTION: Performed by: UROLOGY

## 2020-12-09 PROCEDURE — 70450 CT HEAD/BRAIN W/O DYE: CPT

## 2020-12-09 PROCEDURE — 63710000001 INSULIN LISPRO (HUMAN) PER 5 UNITS: Performed by: HOSPITALIST

## 2020-12-09 PROCEDURE — 83605 ASSAY OF LACTIC ACID: CPT | Performed by: NURSE PRACTITIONER

## 2020-12-09 PROCEDURE — 25810000003 SODIUM CHLORIDE 0.9 % WITH KCL 20 MEQ 20-0.9 MEQ/L-% SOLUTION: Performed by: INTERNAL MEDICINE

## 2020-12-09 PROCEDURE — 74420 UROGRAPHY RTRGR +-KUB: CPT

## 2020-12-09 PROCEDURE — 82962 GLUCOSE BLOOD TEST: CPT

## 2020-12-09 PROCEDURE — 85730 THROMBOPLASTIN TIME PARTIAL: CPT | Performed by: ANESTHESIOLOGY

## 2020-12-09 PROCEDURE — 97162 PT EVAL MOD COMPLEX 30 MIN: CPT

## 2020-12-09 PROCEDURE — C1769 GUIDE WIRE: HCPCS | Performed by: UROLOGY

## 2020-12-09 PROCEDURE — 87040 BLOOD CULTURE FOR BACTERIA: CPT | Performed by: UROLOGY

## 2020-12-09 PROCEDURE — 25010000002 MAGNESIUM SULFATE IN D5W 1G/100ML (PREMIX) 1-5 GM/100ML-% SOLUTION: Performed by: PSYCHIATRY & NEUROLOGY

## 2020-12-09 PROCEDURE — 99255 IP/OBS CONSLTJ NEW/EST HI 80: CPT | Performed by: PSYCHIATRY & NEUROLOGY

## 2020-12-09 PROCEDURE — 85027 COMPLETE CBC AUTOMATED: CPT | Performed by: ANESTHESIOLOGY

## 2020-12-09 PROCEDURE — 63710000001 INSULIN LISPRO (HUMAN) PER 5 UNITS: Performed by: UROLOGY

## 2020-12-09 DEVICE — URETERAL STENT
Type: IMPLANTABLE DEVICE | Status: FUNCTIONAL
Brand: PERCUFLEX™ PLUS

## 2020-12-09 RX ORDER — FENTANYL CITRATE 50 UG/ML
50 INJECTION, SOLUTION INTRAMUSCULAR; INTRAVENOUS
Status: DISCONTINUED | OUTPATIENT
Start: 2020-12-09 | End: 2020-12-09 | Stop reason: HOSPADM

## 2020-12-09 RX ORDER — DEXTROSE MONOHYDRATE 25 G/50ML
25 INJECTION, SOLUTION INTRAVENOUS
Status: DISCONTINUED | OUTPATIENT
Start: 2020-12-09 | End: 2020-12-17 | Stop reason: HOSPADM

## 2020-12-09 RX ORDER — PROPOFOL 10 MG/ML
VIAL (ML) INTRAVENOUS AS NEEDED
Status: DISCONTINUED | OUTPATIENT
Start: 2020-12-09 | End: 2020-12-09 | Stop reason: SURG

## 2020-12-09 RX ORDER — OXYCODONE AND ACETAMINOPHEN 7.5; 325 MG/1; MG/1
1 TABLET ORAL ONCE AS NEEDED
Status: DISCONTINUED | OUTPATIENT
Start: 2020-12-09 | End: 2020-12-09 | Stop reason: HOSPADM

## 2020-12-09 RX ORDER — MAGNESIUM HYDROXIDE 1200 MG/15ML
LIQUID ORAL AS NEEDED
Status: DISCONTINUED | OUTPATIENT
Start: 2020-12-09 | End: 2020-12-09 | Stop reason: HOSPADM

## 2020-12-09 RX ORDER — NALOXONE HCL 0.4 MG/ML
0.2 VIAL (ML) INJECTION AS NEEDED
Status: DISCONTINUED | OUTPATIENT
Start: 2020-12-09 | End: 2020-12-09 | Stop reason: HOSPADM

## 2020-12-09 RX ORDER — LIDOCAINE HYDROCHLORIDE 20 MG/ML
INJECTION, SOLUTION INFILTRATION; PERINEURAL AS NEEDED
Status: DISCONTINUED | OUTPATIENT
Start: 2020-12-09 | End: 2020-12-09 | Stop reason: SURG

## 2020-12-09 RX ORDER — PROMETHAZINE HYDROCHLORIDE 25 MG/1
25 SUPPOSITORY RECTAL ONCE AS NEEDED
Status: DISCONTINUED | OUTPATIENT
Start: 2020-12-09 | End: 2020-12-09 | Stop reason: HOSPADM

## 2020-12-09 RX ORDER — EPHEDRINE SULFATE 50 MG/ML
5 INJECTION, SOLUTION INTRAVENOUS ONCE AS NEEDED
Status: DISCONTINUED | OUTPATIENT
Start: 2020-12-09 | End: 2020-12-09 | Stop reason: HOSPADM

## 2020-12-09 RX ORDER — HYDROCODONE BITARTRATE AND ACETAMINOPHEN 7.5; 325 MG/1; MG/1
1 TABLET ORAL ONCE AS NEEDED
Status: DISCONTINUED | OUTPATIENT
Start: 2020-12-09 | End: 2020-12-09 | Stop reason: HOSPADM

## 2020-12-09 RX ORDER — MAGNESIUM SULFATE 1 G/100ML
4 INJECTION INTRAVENOUS ONCE
Status: COMPLETED | OUTPATIENT
Start: 2020-12-09 | End: 2020-12-09

## 2020-12-09 RX ORDER — SODIUM CHLORIDE 0.9 % (FLUSH) 0.9 %
3 SYRINGE (ML) INJECTION EVERY 12 HOURS SCHEDULED
Status: DISCONTINUED | OUTPATIENT
Start: 2020-12-09 | End: 2020-12-09 | Stop reason: HOSPADM

## 2020-12-09 RX ORDER — DIPHENHYDRAMINE HCL 25 MG
25 CAPSULE ORAL
Status: DISCONTINUED | OUTPATIENT
Start: 2020-12-09 | End: 2020-12-09 | Stop reason: HOSPADM

## 2020-12-09 RX ORDER — FLUMAZENIL 0.1 MG/ML
0.2 INJECTION INTRAVENOUS AS NEEDED
Status: DISCONTINUED | OUTPATIENT
Start: 2020-12-09 | End: 2020-12-09 | Stop reason: HOSPADM

## 2020-12-09 RX ORDER — ONDANSETRON 2 MG/ML
INJECTION INTRAMUSCULAR; INTRAVENOUS AS NEEDED
Status: DISCONTINUED | OUTPATIENT
Start: 2020-12-09 | End: 2020-12-09 | Stop reason: SURG

## 2020-12-09 RX ORDER — ONDANSETRON 2 MG/ML
4 INJECTION INTRAMUSCULAR; INTRAVENOUS ONCE AS NEEDED
Status: DISCONTINUED | OUTPATIENT
Start: 2020-12-09 | End: 2020-12-09 | Stop reason: HOSPADM

## 2020-12-09 RX ORDER — HYDROMORPHONE HCL 110MG/55ML
PATIENT CONTROLLED ANALGESIA SYRINGE INTRAVENOUS AS NEEDED
Status: DISCONTINUED | OUTPATIENT
Start: 2020-12-09 | End: 2020-12-09 | Stop reason: SURG

## 2020-12-09 RX ORDER — LEVOFLOXACIN 5 MG/ML
INJECTION, SOLUTION INTRAVENOUS AS NEEDED
Status: DISCONTINUED | OUTPATIENT
Start: 2020-12-09 | End: 2020-12-09 | Stop reason: SURG

## 2020-12-09 RX ORDER — HYDROMORPHONE HYDROCHLORIDE 1 MG/ML
0.5 INJECTION, SOLUTION INTRAMUSCULAR; INTRAVENOUS; SUBCUTANEOUS
Status: DISCONTINUED | OUTPATIENT
Start: 2020-12-09 | End: 2020-12-09 | Stop reason: HOSPADM

## 2020-12-09 RX ORDER — FAMOTIDINE 10 MG/ML
20 INJECTION, SOLUTION INTRAVENOUS ONCE
Status: COMPLETED | OUTPATIENT
Start: 2020-12-09 | End: 2020-12-09

## 2020-12-09 RX ORDER — PROMETHAZINE HYDROCHLORIDE 25 MG/1
25 TABLET ORAL ONCE AS NEEDED
Status: DISCONTINUED | OUTPATIENT
Start: 2020-12-09 | End: 2020-12-09 | Stop reason: HOSPADM

## 2020-12-09 RX ORDER — LABETALOL HYDROCHLORIDE 5 MG/ML
5 INJECTION, SOLUTION INTRAVENOUS
Status: DISCONTINUED | OUTPATIENT
Start: 2020-12-09 | End: 2020-12-09 | Stop reason: HOSPADM

## 2020-12-09 RX ORDER — SODIUM CHLORIDE 0.9 % (FLUSH) 0.9 %
3-10 SYRINGE (ML) INJECTION AS NEEDED
Status: DISCONTINUED | OUTPATIENT
Start: 2020-12-09 | End: 2020-12-09 | Stop reason: HOSPADM

## 2020-12-09 RX ORDER — NICOTINE POLACRILEX 4 MG
15 LOZENGE BUCCAL
Status: DISCONTINUED | OUTPATIENT
Start: 2020-12-09 | End: 2020-12-17 | Stop reason: HOSPADM

## 2020-12-09 RX ORDER — SODIUM CHLORIDE, SODIUM LACTATE, POTASSIUM CHLORIDE, CALCIUM CHLORIDE 600; 310; 30; 20 MG/100ML; MG/100ML; MG/100ML; MG/100ML
9 INJECTION, SOLUTION INTRAVENOUS CONTINUOUS
Status: DISCONTINUED | OUTPATIENT
Start: 2020-12-09 | End: 2020-12-10

## 2020-12-09 RX ORDER — LIDOCAINE HYDROCHLORIDE 10 MG/ML
0.5 INJECTION, SOLUTION EPIDURAL; INFILTRATION; INTRACAUDAL; PERINEURAL ONCE AS NEEDED
Status: DISCONTINUED | OUTPATIENT
Start: 2020-12-09 | End: 2020-12-09 | Stop reason: HOSPADM

## 2020-12-09 RX ORDER — DIPHENHYDRAMINE HYDROCHLORIDE 50 MG/ML
12.5 INJECTION INTRAMUSCULAR; INTRAVENOUS
Status: DISCONTINUED | OUTPATIENT
Start: 2020-12-09 | End: 2020-12-09 | Stop reason: HOSPADM

## 2020-12-09 RX ORDER — MIDAZOLAM HYDROCHLORIDE 1 MG/ML
1 INJECTION INTRAMUSCULAR; INTRAVENOUS
Status: DISCONTINUED | OUTPATIENT
Start: 2020-12-09 | End: 2020-12-09 | Stop reason: HOSPADM

## 2020-12-09 RX ORDER — FENTANYL CITRATE 50 UG/ML
INJECTION, SOLUTION INTRAMUSCULAR; INTRAVENOUS AS NEEDED
Status: DISCONTINUED | OUTPATIENT
Start: 2020-12-09 | End: 2020-12-09 | Stop reason: SURG

## 2020-12-09 RX ADMIN — POTASSIUM CHLORIDE AND SODIUM CHLORIDE 100 ML/HR: 900; 150 INJECTION, SOLUTION INTRAVENOUS at 09:25

## 2020-12-09 RX ADMIN — LIDOCAINE HYDROCHLORIDE 100 MG: 20 INJECTION, SOLUTION INFILTRATION; PERINEURAL at 13:14

## 2020-12-09 RX ADMIN — POTASSIUM CHLORIDE 40 MEQ: 750 TABLET, EXTENDED RELEASE ORAL at 08:07

## 2020-12-09 RX ADMIN — CEFTRIAXONE SODIUM 1 G: 1 INJECTION, SOLUTION INTRAVENOUS at 23:56

## 2020-12-09 RX ADMIN — FAMOTIDINE 20 MG: 10 INJECTION INTRAVENOUS at 12:44

## 2020-12-09 RX ADMIN — CITALOPRAM 10 MG: 10 TABLET, FILM COATED ORAL at 08:07

## 2020-12-09 RX ADMIN — ONDANSETRON 4 MG: 2 INJECTION INTRAMUSCULAR; INTRAVENOUS at 08:07

## 2020-12-09 RX ADMIN — PROPOFOL 50 MG: 10 INJECTION, EMULSION INTRAVENOUS at 13:15

## 2020-12-09 RX ADMIN — FENTANYL CITRATE 50 MCG: 50 INJECTION, SOLUTION INTRAMUSCULAR; INTRAVENOUS at 14:55

## 2020-12-09 RX ADMIN — HYDROMORPHONE HYDROCHLORIDE 0.5 MG: 2 INJECTION, SOLUTION INTRAMUSCULAR; INTRAVENOUS; SUBCUTANEOUS at 13:53

## 2020-12-09 RX ADMIN — POTASSIUM CHLORIDE 40 MEQ: 750 TABLET, EXTENDED RELEASE ORAL at 21:21

## 2020-12-09 RX ADMIN — MAGNESIUM SULFATE IN DEXTROSE 4 G: 10 INJECTION, SOLUTION INTRAVENOUS at 17:11

## 2020-12-09 RX ADMIN — PROPOFOL 150 MG: 10 INJECTION, EMULSION INTRAVENOUS at 13:14

## 2020-12-09 RX ADMIN — INSULIN LISPRO 7 UNITS: 100 INJECTION, SOLUTION INTRAVENOUS; SUBCUTANEOUS at 08:51

## 2020-12-09 RX ADMIN — HYDROMORPHONE HYDROCHLORIDE 0.5 MG: 2 INJECTION, SOLUTION INTRAMUSCULAR; INTRAVENOUS; SUBCUTANEOUS at 14:23

## 2020-12-09 RX ADMIN — FENTANYL CITRATE 50 MCG: 50 INJECTION INTRAMUSCULAR; INTRAVENOUS at 13:45

## 2020-12-09 RX ADMIN — SODIUM CHLORIDE, POTASSIUM CHLORIDE, SODIUM LACTATE AND CALCIUM CHLORIDE 9 ML/HR: 600; 310; 30; 20 INJECTION, SOLUTION INTRAVENOUS at 12:46

## 2020-12-09 RX ADMIN — FENTANYL CITRATE 50 MCG: 50 INJECTION INTRAMUSCULAR; INTRAVENOUS at 13:19

## 2020-12-09 RX ADMIN — ONDANSETRON HYDROCHLORIDE 4 MG: 2 SOLUTION INTRAMUSCULAR; INTRAVENOUS at 14:23

## 2020-12-09 RX ADMIN — LABETALOL HYDROCHLORIDE 5 MG: 5 INJECTION, SOLUTION INTRAVENOUS at 15:05

## 2020-12-09 RX ADMIN — POTASSIUM CHLORIDE 40 MEQ: 750 TABLET, EXTENDED RELEASE ORAL at 11:25

## 2020-12-09 RX ADMIN — LEVOFLOXACIN 500 MG: 500 INJECTION, SOLUTION INTRAVENOUS at 13:27

## 2020-12-09 RX ADMIN — LISINOPRIL 20 MG: 20 TABLET ORAL at 08:07

## 2020-12-09 NOTE — PROGRESS NOTES
Continued Stay Note  [facility]     Patient Name: Lyubov Middleton  MRN: [id number]  Today's Date: 12/9/2020    Admit Date: 12/8/2020    Discharge Plan     Row Name 12/09/20 0955       Plan    Plan  Plan home with family.   ZIGGY Hay RN    Patient/Family in Agreement with Plan  yes    Plan Comments  FACE SHEET VERIFIED.  Spoke with pt at bedside.  Pt's PCP is Dr. Ana Huitron.  Pt lives in a single story house with sons ( Ryan Middleton  [phone]) and Jose Carlos.  Pt is independent with ADLs.   Pt has a quad cane, glucometer, and grab bar for home use.  Pt gets prescriptions at [facility]  ([facility]).  Pt denies any issues affording medications.  Pt is not current with HH.   Pt has not been in SNF.  Pt denies any discharge needs.  Pt states family can transport her home at discharge.  Plan home with family.   ZIGGY Hay RN        Discharge Codes    No documentation.             Gretta Hay RN

## 2020-12-09 NOTE — PROGRESS NOTES
"Adult Nutrition  Assessment/PES    Patient Name:  Lyubov Middleton  YOB: 1955  MRN: [id number]  Admit Date:  12/8/2020    Assessment Date:  12/9/2020    Comments:  Nutrition assessment triggered by MST score of 4 per nurse admission screen.  Admitted with hypokalemia.  Abdominal pain, burning with urination.  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  Plans for cystoscopy with ureteral stent placement today.    Currently NPO for planned procedure.    RD to follow as diet is advanced.    Reason for Assessment     Row Name 12/09/20 1121          Reason for Assessment    Reason For Assessment  identified at risk by screening criteria     Diagnosis  cancer diagnosis/related complications;diabetes diagnosis/complications;renal disease;hematological/related complications;cardiac disease breast CA, DM2, kidney stone, anemia, HTN; adm with hypokalemia, abd pain, burning with urination     Identified At Risk by Screening Criteria  MST SCORE 2+;reduced oral intake over the last month;unintentional loss of 10 lbs or more in the past 2 mos         Nutrition/Diet History     Row Name 12/09/20 1122          Nutrition/Diet History    Typical Food/Fluid Intake  currently NPO, plans for cystoscopy with ureteral stent placement today         Anthropometrics     Row Name 12/09/20 1122 12/09/20 0620       Anthropometrics    Height  162.6 cm (64.02\")  --    Weight  --  68.5 kg (151 lb 1.6 oz)       Admit Weight    Admit Weight  -- 151# 12/9  --       Ideal Body Weight (IBW)    Ideal Body Weight (IBW) (kg)  55.04  --       Body Mass Index (BMI)    BMI Assessment  BMI 25-29.9: overweight 25.86  --        Labs/Tests/Procedures/Meds     Row Name 12/09/20 1123          Labs/Procedures/Meds    Lab Results Reviewed  reviewed, pertinent     Lab Results Comments  Gluc, K, BUN, Ca, HgbA1c 13.7, WBC, Hgb, Hct        Diagnostic Tests/Procedures    Diagnostic " "Test/Procedure Reviewed  reviewed, pertinent     Diagnostic Test/Procedures Comments  plans for cystoscopy with ureteral stent placement today        Medications    Pertinent Medications Reviewed  reviewed, pertinent     Pertinent Medications Comments  humalog, IVFs         Physical Findings     Row Name 12/09/20 1124          Physical Findings    Overall Physical Appearance  overweight     Skin  edema B=19, intact; mild edema         Estimated/Assessed Needs     Row Name 12/09/20 1131 12/09/20 1122       Calculation Measurements    Weight Used For Calculations  68.5 kg (151 lb 0.2 oz)  --    Height  --  162.6 cm (64.02\")       Estimated/Assessed Needs       KCAL/KG    KCAL/KG  20 Kcal/Kg (kcal);25 Kcal/Kg (kcal)  --    20 Kcal/Kg (kcal)  1370  --    25 Kcal/Kg (kcal)  1712.5  --       Protein Requirements    Weight Used For Protein Calculations  68.5 kg (151 lb 0.2 oz)  --    Est Protein Requirement Amount (gms/kg)  1.0 gm protein  --    Estimated Protein Requirements (gms/day)  68.5  --       Fluid Requirements    Fluid Requirements (mL/day)  1713  --        Nutrition Prescription Ordered     Row Name 12/09/20 1132          Nutrition Prescription PO    Current PO Diet  NPO         Problem/Interventions:  Problem 1     Row Name 12/09/20 1132          Nutrition Diagnoses Problem 1    Problem 1  Inadequate Intake/Infusion     Inadequate Intake Type  Oral     Macronutrient  Kcal;Protein     Etiology (related to)  MNT for Treatment/Condition     Signs/Symptoms (evidenced by)  NPO         Intervention Goal     Row Name 12/09/20 1132          Intervention Goal    General  Maintain nutrition;Disease management/therapy;Meet nutritional needs for age/condition     PO  Initiate feeding     Weight  No significant weight loss         Nutrition Intervention     Row Name 12/09/20 1132          Nutrition Intervention    RD/Tech Action  Follow Tx progress;Care plan reviewd;Await begin PO         Education/Evaluation     Row Name " 12/09/20 1132          Education    Education  Will Instruct as appropriate        Monitor/Evaluation    Monitor  Per protocol;I&O;Pertinent labs;Weight;Symptoms           Electronically signed by:  Juliann Mendez RD  12/09/20 11:33 EST

## 2020-12-09 NOTE — H&P
HISTORY AND PHYSICAL   [facility]        Patient Identification:  Name: Lyubov Middleton  Age: 65 y.o.  Sex: female  :  1955  MRN: [id number]                     Primary Care Physician: Ana Huitron MD    Chief Complaint:  65 year old female who presented to the emergency room with abdominal pain which started two weeks ago; she has had burning with urination and frequency so she thought that she has a uti which she has had in the past; she denies fever or chills; no sick contacts;   History of Present Illness:   As above    Past Medical History:  Past Medical History:   Diagnosis Date   • Cancer (CMS/HCC)     left breast removed    • Type 2 diabetes mellitus (CMS/HCC)      Past Surgical History:  Past Surgical History:   Procedure Laterality Date   • ABDOMINAL SURGERY     • BREAST SURGERY Left     removed due to cancer   • CHOLECYSTECTOMY     • HERNIA REPAIR      mesh removed      Home Meds:  Medications Prior to Admission   Medication Sig Dispense Refill Last Dose   • citalopram (CeleXA) 10 MG tablet Take 10 mg by mouth Daily.  3    • glipiZIDE (GLUCOTROL) 5 MG tablet Take 5 mg by mouth 2 (Two) Times a Day Before Meals.      • lisinopril (PRINIVIL,ZESTRIL) 20 MG tablet Take 20 mg by mouth Daily.  0    • metFORMIN (GLUCOPHAGE) 500 MG tablet Take 500 mg by mouth 2 (Two) Times a Day With Meals.      • rizatriptan (MAXALT) 10 MG tablet TAKE 1 TABLET BY MOUTH EVERY DAY AS NEEDED (MAX OF 2 PER 24 HOURS)  0        Allergies:  No Known Allergies  Immunizations:    There is no immunization history on file for this patient.  Social History:   Social History     Social History Narrative   • Not on file     Social History     Socioeconomic History   • Marital status: Unknown     Spouse name: Not on file   • Number of children: Not on file   • Years of education: Not on file   • Highest education level: Not on file   Tobacco Use   • Smoking status: Former Smoker     Quit date: 1997      "Years since quittin.3   • Smokeless tobacco: Never Used   Substance and Sexual Activity   • Alcohol use: No   • Drug use: No   • Sexual activity: Never       Family History:  Family History   Problem Relation Age of Onset   • Diabetes Mother    • Lung disease Father    • Cancer Father         Review of Systems  See history of present illness and past medical history.  Patient denies headache, dizziness, syncope, falls, trauma, change in vision, change in hearing, change in taste, changes in weight, changes in appetite, focal weakness, numbness, or paresthesia.  Patient denies chest pain, palpitations, dyspnea, orthopnea, PND, cough, sinus pressure, rhinorrhea, epistaxis, hemoptysis, nausea, vomiting,hematemesis, diarrhea, constipation or hematchezia.  Denies cold or heat intolerance, polydipsia, polyuria, polyphagia. Denies hematuria, pyuria, dysuria, hesitancy, frequency or urgency. Denies consumption of raw and under cooked meats foods or change in water source.  Denies fever, chills, sweats, night sweats.  Denies missing any routine medications. Remainder of ROS is negative.    Objective:  T Max 24 hrs: Temp (24hrs), Av.6 °F (37.6 °C), Min:99.6 °F (37.6 °C), Max:99.6 °F (37.6 °C)    Vitals Ranges:   Temp:  [99.6 °F (37.6 °C)] 99.6 °F (37.6 °C)  Heart Rate:  [76-86] 78  Resp:  [16] 16  BP: (145-165)/(73-85) 165/85      Exam:  /85   Pulse 78   Temp 99.6 °F (37.6 °C) (Tympanic)   Resp 16   Ht 162.6 cm (64\")   Wt 64.9 kg (143 lb)   SpO2 96%   BMI 24.55 kg/m²     General Appearance:    Alert, cooperative, no distress, appears stated age   Head:    Normocephalic, without obvious abnormality, atraumatic   Eyes:    PERRL, conjunctivae/corneas clear, EOM's intact, both eyes   Ears:    Normal external ear canals, both ears   Nose:   Nares normal, septum midline, mucosa normal, no drainage    or sinus tenderness   Throat:   Lips, mucosa, and tongue normal   Neck:   Supple, symmetrical, trachea midline, " no adenopathy;     thyroid:  no enlargement/tenderness/nodules; no carotid    bruit or JVD   Back:     Symmetric, no curvature, ROM normal, no CVA tenderness   Lungs:     Decreased breath sounds bilaterally, respirations unlabored   Chest Wall:    No tenderness or deformity    Heart:    Regular rate and rhythm, S1 and S2 normal, no murmur, rub   or gallop   Abdomen:     Soft, distended, bowel sounds active all four quadrants,     no masses, no hepatomegaly, no splenomegaly   Extremities:   Extremities normal, atraumatic, no cyanosis or edema   Pulses:   2+ and symmetric all extremities   Skin:   Skin color, texture, turgor normal, no rashes or lesions   Lymph nodes:   Cervical, supraclavicular, and axillary nodes normal   Neurologic:   CNII-XII intact, normal strength, sensation intact throughout      .    Data Review:  Labs in chart were reviewed.  WBC   Date Value Ref Range Status   12/08/2020 15.33 (H) 3.40 - 10.80 10*3/mm3 Final     Hemoglobin   Date Value Ref Range Status   12/08/2020 9.5 (L) 12.0 - 15.9 g/dL Final     Hematocrit   Date Value Ref Range Status   12/08/2020 30.0 (L) 34.0 - 46.6 % Final     Platelets   Date Value Ref Range Status   12/08/2020 380 140 - 450 10*3/mm3 Final     Sodium   Date Value Ref Range Status   12/08/2020 131 (L) 136 - 145 mmol/L Final     Potassium   Date Value Ref Range Status   12/08/2020 2.1 (C) 3.5 - 5.2 mmol/L Final     Chloride   Date Value Ref Range Status   12/08/2020 86 (L) 98 - 107 mmol/L Final     CO2   Date Value Ref Range Status   12/08/2020 33.2 (H) 22.0 - 29.0 mmol/L Final     BUN   Date Value Ref Range Status   12/08/2020 7 (L) 8 - 23 mg/dL Final     Creatinine   Date Value Ref Range Status   12/08/2020 1.16 (H) 0.57 - 1.00 mg/dL Final     Glucose   Date Value Ref Range Status   12/08/2020 535 (C) 65 - 99 mg/dL Final     Calcium   Date Value Ref Range Status   12/08/2020 7.9 (L) 8.6 - 10.5 mg/dL Final     AST (SGOT)   Date Value Ref Range Status   12/08/2020 10  1 - 32 U/L Final     ALT (SGPT)   Date Value Ref Range Status   12/08/2020 6 1 - 33 U/L Final     Alkaline Phosphatase   Date Value Ref Range Status   12/08/2020 102 39 - 117 U/L Final     No results found for: APTT, INR             Imaging Results (All)     Procedure Component Value Units Date/Time    CT Abdomen Pelvis With Contrast [[id number]] Collected: 12/08/20 1938     Updated: 12/08/20 1943    Narrative:      CT ABDOMEN AND PELVIS WITH IV CONTRAST     HISTORY: 65 year old female with abdominal pain, dysuria over the past 2  weeks. History of multiple UTIs.     TECHNIQUE:  CT includes axial imaging from the lung bases to the  trochanters with intravenous contrast and without use of oral contrast.  Data reconstructed in coronal and sagittal planes.     COMPARISON: None     FINDINGS: There is a large multilobular peripherally enhancing, complex  collection centered below the right kidney within the right abdomen.  This collection contacts the anterolateral right psoas muscle and  contacts and appears to partially invade the right lateral abdominal  musculature measuring 11 x 10.3 cm axial dimension and extending 12.5 cm  craniocaudal dimension. Between this collection and the right kidney  there is a greater degree of enhancement. This appears more solid in  appearance suspected to represent a mass. There is indentation of the  adjacent inferior, lateral right renal cortex. 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 Low-density appears to  arise from the renal cortex. There is no evidence for extension into the  renal pelvis or involvement of the urothelium. Delayed phase imaging  demonstrates no extension of contrast into this mass or the low-density  below the right kidney. There is a posterior right renal cyst that  measures approximately 3 cm diameter. Right renal calyceal/infundibular  stone measures 1 cm. There is also a 0.8 cm right renal stone and a 3 mm  right renal calyceal  stone. A 1 cm left renal low-density lesion is most  likely a cyst though difficult to definitively characterize.     There is stranding/inflammation surrounding this mass/collection within  the right abdomen and there is displacement of adjacent bowel loops. The  splenic size is normal. The adrenal glands appear normal. There is mild  diffuse fat infiltration of the liver. Pancreas appears within normal  limits. There are several subcentimeter retroperitoneal lymph nodes  without enlargement. Lung bases appear clear.     There is diastases of the rectus sheath with herniation of fat and bowel  loops into the right anterior abdomen and pelvic wall. Fluid-filled  small bowel loops are present which contain air-fluid levels and this  may be associated with a mild ileus. There is no dilatation to suggest  obstruction. Within the midline, infraumbilical abdominal wall just deep  to the rectus abdominis muscle curvature there is a low-density mass or  collection that measures 3.2 x 1.7 cm. This could represent a chronic  postsurgical collection. Bone windows demonstrate no osseous lesion.     FINDINGS:   1. Large multilobular right abdominal mass/collection measures  approximately 11 x 10.3 x 12.5 cm. This appears to arise from the right  kidney and at the upper margin of this lesion there is masslike  thickening suspicious for renal cell carcinoma with complex adjacent  collection or abscess with apparent invasion of the right lateral  abdominal wall. Urologic consultation recommended. Biopsy or attempt at  CT-guided drainage could be performed under CT guidance if indicated.  2. Right renal calyceal infundibular stones without hydronephrosis.  Right renal cysts. A 1 cm left renal low-density lesion is most likely a  cyst though difficult to definitely characterize.  3. 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 There is an infraumbilical  collection or  low-density lesion along the deep margin of the rectus  abdominis musculature. This could represent a small chronic seroma  associated with previous surgery.     Discussed with ANSELMO Sutton, in the emergency department 12/08/2020  at 7:10 PM.     Radiation dose reduction techniques were utilized, including automated  exposure control and exposure modulation based on body size.     This report was finalized on 12/8/2020 7:40 PM by Dr. Hawk Bonilla M.D.           Patient Active Problem List   Diagnosis Code   • Hypokalemia E87.6       Assessment:    Hypokalemia  renal mass  uti  Diabetes  Acute kidney injury  Leukocytosis  hyponatremia    Plan:  Will ask id to see her  Ask urology to see her as well  Continue antibiotics  Replace potassium and hydrate  Trend sodium and wbc  Monitor hgb  D.w patient    Pita Gonzalez MD  12/8/2020  20:14 EST

## 2020-12-09 NOTE — PAYOR COMM NOTE
"Lyubov Middleton (65 y.o. Female)     ATTN: INITIAL CLINICALS FOR INPATIENT ADMIT: [id number]    PT WAS IN OBSERVATION , STATUS CHANGED TO INPATIENT     PLEASE REPLY TO [facility] DEPT: ELVIA DALAL RN/CCP; [phone],  [phone]        Date of Birth Social Security Number Address Home Phone MRN    1955  [address] [id number] [phone] [id number]    Tenriism Marital Status          Unknown Unknown       Admission Date Admission Type Admitting Provider Attending Provider Department, Room/Bed    20 Emergency Pita Gonzalez MD Richards, Stephen J, MD [facility] MAIN OR, [facility] OR/MAIN OR    Discharge Date Discharge Disposition Discharge Destination                       Attending Provider: Harris Perez MD    Allergies: No Known Allergies    Isolation: None   Infection: None   Code Status: CPR    Ht: 162.6 cm (64.02\")   Wt: 68.5 kg (151 lb 1.6 oz)    Admission Cmt: None   Principal Problem: None                Active Insurance as of 2020     Primary Coverage     Payor Plan Insurance Group Employer/Plan Group    [employer] BLUE CROSS AN[employer] BLUE CROSS BLUE SHIELD PPO [id number]     Payor Plan Address Payor Plan Phone Number Payor Plan Fax Number Effective Dates    [address] [phone]  2020 - None Entered    [place] [address]       Subscriber Name Subscriber Birth Date Member ID       LYUBOV MIDDLETON 1955 [id number]                 Emergency Contacts      (Rel.) Home Phone Work Phone Mobile Phone    Keven Middleton Jr (Son) [phone] -- --               History & Physical      Pita Gonzalez MD at 20          HISTORY AND PHYSICAL   [facility]        Patient Identification:  Name: Lyubov Middleton  Age: 65 y.o.  Sex: female  :  1955  MRN: [id number]                     Primary Care Physician: Ana Huitron MD    Chief Complaint:  65 year old female who " presented to the emergency room with abdominal pain which started two weeks ago; she has had burning with urination and frequency so she thought that she has a uti which she has had in the past; she denies fever or chills; no sick contacts;   History of Present Illness:   As above    Past Medical History:  Past Medical History:   Diagnosis Date   • Cancer (CMS/HCC)     left breast removed    • Type 2 diabetes mellitus (CMS/HCC)      Past Surgical History:  Past Surgical History:   Procedure Laterality Date   • ABDOMINAL SURGERY     • BREAST SURGERY Left     removed due to cancer   • CHOLECYSTECTOMY     • HERNIA REPAIR      mesh removed      Home Meds:  Medications Prior to Admission   Medication Sig Dispense Refill Last Dose   • citalopram (CeleXA) 10 MG tablet Take 10 mg by mouth Daily.  3    • glipiZIDE (GLUCOTROL) 5 MG tablet Take 5 mg by mouth 2 (Two) Times a Day Before Meals.      • lisinopril (PRINIVIL,ZESTRIL) 20 MG tablet Take 20 mg by mouth Daily.  0    • metFORMIN (GLUCOPHAGE) 500 MG tablet Take 500 mg by mouth 2 (Two) Times a Day With Meals.      • rizatriptan (MAXALT) 10 MG tablet TAKE 1 TABLET BY MOUTH EVERY DAY AS NEEDED (MAX OF 2 PER 24 HOURS)  0        Allergies:  No Known Allergies  Immunizations:    There is no immunization history on file for this patient.  Social History:   Social History     Social History Narrative   • Not on file     Social History     Socioeconomic History   • Marital status: Unknown     Spouse name: Not on file   • Number of children: Not on file   • Years of education: Not on file   • Highest education level: Not on file   Tobacco Use   • Smoking status: Former Smoker     Quit date: 1997     Years since quittin.3   • Smokeless tobacco: Never Used   Substance and Sexual Activity   • Alcohol use: No   • Drug use: No   • Sexual activity: Never       Family History:  Family History   Problem Relation Age of Onset   • Diabetes Mother    • Lung disease Father   "  • Cancer Father         Review of Systems  See history of present illness and past medical history.  Patient denies headache, dizziness, syncope, falls, trauma, change in vision, change in hearing, change in taste, changes in weight, changes in appetite, focal weakness, numbness, or paresthesia.  Patient denies chest pain, palpitations, dyspnea, orthopnea, PND, cough, sinus pressure, rhinorrhea, epistaxis, hemoptysis, nausea, vomiting,hematemesis, diarrhea, constipation or hematchezia.  Denies cold or heat intolerance, polydipsia, polyuria, polyphagia. Denies hematuria, pyuria, dysuria, hesitancy, frequency or urgency. Denies consumption of raw and under cooked meats foods or change in water source.  Denies fever, chills, sweats, night sweats.  Denies missing any routine medications. Remainder of ROS is negative.    Objective:  T Max 24 hrs: Temp (24hrs), Av.6 °F (37.6 °C), Min:99.6 °F (37.6 °C), Max:99.6 °F (37.6 °C)    Vitals Ranges:   Temp:  [99.6 °F (37.6 °C)] 99.6 °F (37.6 °C)  Heart Rate:  [76-86] 78  Resp:  [16] 16  BP: (145-165)/(73-85) 165/85      Exam:  /85   Pulse 78   Temp 99.6 °F (37.6 °C) (Tympanic)   Resp 16   Ht 162.6 cm (64\")   Wt 64.9 kg (143 lb)   SpO2 96%   BMI 24.55 kg/m²     General Appearance:    Alert, cooperative, no distress, appears stated age   Head:    Normocephalic, without obvious abnormality, atraumatic   Eyes:    PERRL, conjunctivae/corneas clear, EOM's intact, both eyes   Ears:    Normal external ear canals, both ears   Nose:   Nares normal, septum midline, mucosa normal, no drainage    or sinus tenderness   Throat:   Lips, mucosa, and tongue normal   Neck:   Supple, symmetrical, trachea midline, no adenopathy;     thyroid:  no enlargement/tenderness/nodules; no carotid    bruit or JVD   Back:     Symmetric, no curvature, ROM normal, no CVA tenderness   Lungs:     Decreased breath sounds bilaterally, respirations unlabored   Chest Wall:    No tenderness or " deformity    Heart:    Regular rate and rhythm, S1 and S2 normal, no murmur, rub   or gallop   Abdomen:     Soft, distended, bowel sounds active all four quadrants,     no masses, no hepatomegaly, no splenomegaly   Extremities:   Extremities normal, atraumatic, no cyanosis or edema   Pulses:   2+ and symmetric all extremities   Skin:   Skin color, texture, turgor normal, no rashes or lesions   Lymph nodes:   Cervical, supraclavicular, and axillary nodes normal   Neurologic:   CNII-XII intact, normal strength, sensation intact throughout      .    Data Review:  Labs in chart were reviewed.  WBC   Date Value Ref Range Status   12/08/2020 15.33 (H) 3.40 - 10.80 10*3/mm3 Final     Hemoglobin   Date Value Ref Range Status   12/08/2020 9.5 (L) 12.0 - 15.9 g/dL Final     Hematocrit   Date Value Ref Range Status   12/08/2020 30.0 (L) 34.0 - 46.6 % Final     Platelets   Date Value Ref Range Status   12/08/2020 380 140 - 450 10*3/mm3 Final     Sodium   Date Value Ref Range Status   12/08/2020 131 (L) 136 - 145 mmol/L Final     Potassium   Date Value Ref Range Status   12/08/2020 2.1 (C) 3.5 - 5.2 mmol/L Final     Chloride   Date Value Ref Range Status   12/08/2020 86 (L) 98 - 107 mmol/L Final     CO2   Date Value Ref Range Status   12/08/2020 33.2 (H) 22.0 - 29.0 mmol/L Final     BUN   Date Value Ref Range Status   12/08/2020 7 (L) 8 - 23 mg/dL Final     Creatinine   Date Value Ref Range Status   12/08/2020 1.16 (H) 0.57 - 1.00 mg/dL Final     Glucose   Date Value Ref Range Status   12/08/2020 535 (C) 65 - 99 mg/dL Final     Calcium   Date Value Ref Range Status   12/08/2020 7.9 (L) 8.6 - 10.5 mg/dL Final     AST (SGOT)   Date Value Ref Range Status   12/08/2020 10 1 - 32 U/L Final     ALT (SGPT)   Date Value Ref Range Status   12/08/2020 6 1 - 33 U/L Final     Alkaline Phosphatase   Date Value Ref Range Status   12/08/2020 102 39 - 117 U/L Final     No results found for: APTT, INR             Imaging Results (All)      Procedure Component Value Units Date/Time    CT Abdomen Pelvis With Contrast [[id number]] Collected: 12/08/20 1938     Updated: 12/08/20 1943    Narrative:      CT ABDOMEN AND PELVIS WITH IV CONTRAST     HISTORY: 65 year old female with abdominal pain, dysuria over the past 2  weeks. History of multiple UTIs.     TECHNIQUE:  CT includes axial imaging from the lung bases to the  trochanters with intravenous contrast and without use of oral contrast.  Data reconstructed in coronal and sagittal planes.     COMPARISON: None     FINDINGS: There is a large multilobular peripherally enhancing, complex  collection centered below the right kidney within the right abdomen.  This collection contacts the anterolateral right psoas muscle and  contacts and appears to partially invade the right lateral abdominal  musculature measuring 11 x 10.3 cm axial dimension and extending 12.5 cm  craniocaudal dimension. Between this collection and the right kidney  there is a greater degree of enhancement. This appears more solid in  appearance suspected to represent a mass. There is indentation of the  adjacent inferior, lateral right renal cortex. 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 Low-density appears to  arise from the renal cortex. There is no evidence for extension into the  renal pelvis or involvement of the urothelium. Delayed phase imaging  demonstrates no extension of contrast into this mass or the low-density  below the right kidney. There is a posterior right renal cyst that  measures approximately 3 cm diameter. Right renal calyceal/infundibular  stone measures 1 cm. There is also a 0.8 cm right renal stone and a 3 mm  right renal calyceal stone. A 1 cm left renal low-density lesion is most  likely a cyst though difficult to definitively characterize.     There is stranding/inflammation surrounding this mass/collection within  the right abdomen and there is displacement of adjacent bowel loops.  The  splenic size is normal. The adrenal glands appear normal. There is mild  diffuse fat infiltration of the liver. Pancreas appears within normal  limits. There are several subcentimeter retroperitoneal lymph nodes  without enlargement. Lung bases appear clear.     There is diastases of the rectus sheath with herniation of fat and bowel  loops into the right anterior abdomen and pelvic wall. Fluid-filled  small bowel loops are present which contain air-fluid levels and this  may be associated with a mild ileus. There is no dilatation to suggest  obstruction. Within the midline, infraumbilical abdominal wall just deep  to the rectus abdominis muscle curvature there is a low-density mass or  collection that measures 3.2 x 1.7 cm. This could represent a chronic  postsurgical collection. Bone windows demonstrate no osseous lesion.     FINDINGS:   1. Large multilobular right abdominal mass/collection measures  approximately 11 x 10.3 x 12.5 cm. This appears to arise from the right  kidney and at the upper margin of this lesion there is masslike  thickening suspicious for renal cell carcinoma with complex adjacent  collection or abscess with apparent invasion of the right lateral  abdominal wall. Urologic consultation recommended. Biopsy or attempt at  CT-guided drainage could be performed under CT guidance if indicated.  2. Right renal calyceal infundibular stones without hydronephrosis.  Right renal cysts. A 1 cm left renal low-density lesion is most likely a  cyst though difficult to definitely characterize.  3. 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 There is an infraumbilical  collection or low-density lesion along the deep margin of the rectus  abdominis musculature. This could represent a small chronic seroma  associated with previous surgery.     Discussed with ANSELMO Sutton, in the emergency department 12/08/2020  at 7:10 PM.     Radiation  dose reduction techniques were utilized, including automated  exposure control and exposure modulation based on body size.     This report was finalized on 12/8/2020 7:40 PM by Dr. Hawk Bonilla M.D.           Patient Active Problem List   Diagnosis Code   • Hypokalemia E87.6       Assessment:    Hypokalemia  renal mass  uti  Diabetes  Acute kidney injury  Leukocytosis  hyponatremia    Plan:  Will ask id to see her  Ask urology to see her as well  Continue antibiotics  Replace potassium and hydrate  Trend sodium and wbc  Monitor hgb  D.w patient    Pita Gonzalez MD  12/8/2020  20:14 EST      Electronically signed by Pita Gonzalez MD at 12/08/20 2020          Emergency Department Notes      Degonda, Janet, RN at 12/08/20 1411        Has abd pain and burn c voiding x a few weeks.  Took advil today.  She reports she's been losing her balance    Patient was placed in face mask during first look triage.  Patient was wearing a face mask throughout encounter.  I wore personal protective equipment throughout the encounter.  Hand hygiene was performed before and after patient encounter.         Electronically signed by Degonda, Janet, RN at 12/08/20 1413     Margaret Bernal PA at 12/08/20 1430          EMERGENCY DEPARTMENT ENCOUNTER    Room Number: 37/37  Date seen: 12/8/2020  Time seen: 14:30 EST  PCP: Ana Huitron MD    Spoken Language:  English  Language interpretation services not needed     CHIEF COMPLAINT: Abdominal pain    Primary Care Provider: Ana Huitron MD    HPI: Lyubov Middleton is a 65 y.o. female with PMH of DM presenting to the ED for evaluation of abdominal pain. The history is being obtained by the patient and by review of the medical chart.  The patient states that she has had dysuria over the past 2 weeks with an onset of lower abdominal pain over the past week.  The patient states that she has had multiple UTIs in the past, and that the symptoms feel similar.  She denies any  hematuria, but does complain of increased urinary frequency.  She complains of nausea but denies vomiting, fever, cough, sore throat, chest pain, shortness of breath, loss of taste or loss of smell.  She denies any recent sick contacts.  The patient has chronic diarrhea which is unchanged in nature.  Nothing makes the abdominal pain and dysuria better or worse.  She rates her current abdominal discomfort as 7/10.    MEDICAL RECORD REVIEW:  Reviewed in [email or web address].     PAST MEDICAL HISTORY  Past Medical History:   Diagnosis Date   • Cancer (CMS/HCC)     left breast removed    • Type 2 diabetes mellitus (CMS/HCC)        PAST SURGICAL HISTORY  Past Surgical History:   Procedure Laterality Date   • ABDOMINAL SURGERY     • BREAST SURGERY Left 2012    removed due to cancer   • CHOLECYSTECTOMY     • HERNIA REPAIR      mesh removed       FAMILY HISTORY  Family History   Problem Relation Age of Onset   • Diabetes Mother    • Lung disease Father    • Cancer Father        SOCIAL HISTORY  Social History     Socioeconomic History   • Marital status: Unknown     Spouse name: Not on file   • Number of children: Not on file   • Years of education: Not on file   • Highest education level: Not on file   Tobacco Use   • Smoking status: Former Smoker     Quit date: 1997     Years since quittin.3   • Smokeless tobacco: Never Used   Substance and Sexual Activity   • Alcohol use: No   • Drug use: No   • Sexual activity: Never       CURRENT MEDICATIONS  Prior to Admission medications    Medication Sig Start Date End Date Taking? Authorizing Provider   citalopram (CeleXA) 10 MG tablet Take 10 mg by mouth Daily. 18   Pancho Carpenter MD   glipiZIDE (GLUCOTROL) 5 MG tablet Take 5 mg by mouth 2 (Two) Times a Day Before Meals.    Pancho Carpenter MD   lisinopril (PRINIVIL,ZESTRIL) 20 MG tablet Take 20 mg by mouth Daily. 10/1/18   Pancho Carpenter MD   metFORMIN (GLUCOPHAGE) 500 MG tablet Take 500 mg by mouth 2 (Two)  Times a Day With Meals.    ProviderPancho MD   rizatriptan (MAXALT) 10 MG tablet TAKE 1 TABLET BY MOUTH EVERY DAY AS NEEDED (MAX OF 2 PER 24 HOURS) 10/2/18   ProviderPancho MD       ALLERGIES  Patient has no known allergies.    REVIEW OF SYSTEMS  All systems reviewed and negative except for those discussed in HPI.     PHYSICAL EXAM  ED Triage Vitals [12/08/20 1412]   Temp Heart Rate Resp BP SpO2   99.6 °F (37.6 °C) 86 16 -- 96 %      Temp src Heart Rate Source Patient Position BP Location FiO2 (%)   Tympanic Monitor -- -- --       Physical Exam  Constitutional:       Appearance: Normal appearance.   HENT:      Head: Normocephalic and atraumatic.      Mouth/Throat:      Mouth: Mucous membranes are moist.   Eyes:      Extraocular Movements: Extraocular movements intact.      Pupils: Pupils are equal, round, and reactive to light.   Neck:      Musculoskeletal: Normal range of motion.   Cardiovascular:      Rate and Rhythm: Normal rate and regular rhythm.   Pulmonary:      Effort: Pulmonary effort is normal.      Breath sounds: Normal breath sounds.   Abdominal:      General: There is no distension.      Palpations: Abdomen is soft.      Tenderness: There is abdominal tenderness in the right lower quadrant, suprapubic area and left lower quadrant.   Skin:     General: Skin is warm and dry.   Neurological:      General: No focal deficit present.      Mental Status: She is alert and oriented to person, place, and time.   Psychiatric:         Mood and Affect: Mood normal.         Behavior: Behavior normal.         Thought Content: Thought content normal.         Judgment: Judgment normal.         PROCEDURES  Procedures  None    LABS  Recent Results (from the past 24 hour(s))   Comprehensive Metabolic Panel    Collection Time: 12/08/20  3:06 PM    Specimen: Blood   Result Value Ref Range    Glucose 535 (C) 65 - 99 mg/dL    BUN 7 (L) 8 - 23 mg/dL    Creatinine 1.16 (H) 0.57 - 1.00 mg/dL    Sodium 131 (L) 136 -  145 mmol/L    Potassium 2.1 (C) 3.5 - 5.2 mmol/L    Chloride 86 (L) 98 - 107 mmol/L    CO2 33.2 (H) 22.0 - 29.0 mmol/L    Calcium 7.9 (L) 8.6 - 10.5 mg/dL    Total Protein 6.9 6.0 - 8.5 g/dL    Albumin 3.10 (L) 3.50 - 5.20 g/dL    ALT (SGPT) 6 1 - 33 U/L    AST (SGOT) 10 1 - 32 U/L    Alkaline Phosphatase 102 39 - 117 U/L    Total Bilirubin 0.4 0.0 - 1.2 mg/dL    eGFR Non African Amer 47 (L) >60 mL/min/1.73    Globulin 3.8 gm/dL    A/G Ratio 0.8 g/dL    BUN/Creatinine Ratio 6.0 (L) 7.0 - 25.0    Anion Gap 11.8 5.0 - 15.0 mmol/L   Lipase    Collection Time: 12/08/20  3:06 PM    Specimen: Blood   Result Value Ref Range    Lipase 26 13 - 60 U/L   Urinalysis With Microscopic If Indicated (No Culture) - Urine, Catheter    Collection Time: 12/08/20  3:06 PM    Specimen: Urine, Catheter   Result Value Ref Range    Color, UA Yellow Yellow, Straw    Appearance, UA Clear Clear    pH, UA 6.0 5.0 - 8.0    Specific Gravity, UA 1.010 1.005 - 1.030    Glucose, UA >=1000 mg/dL (3+) (A) Negative    Ketones, UA Negative Negative    Bilirubin, UA Negative Negative    Blood, UA Small (1+) (A) Negative    Protein, UA 30 mg/dL (1+) (A) Negative    Leuk Esterase, UA Negative Negative    Nitrite, UA Negative Negative    Urobilinogen, UA 0.2 E.U./dL 0.2 - 1.0 E.U./dL   CBC Auto Differential    Collection Time: 12/08/20  3:06 PM    Specimen: Blood   Result Value Ref Range    WBC 15.33 (H) 3.40 - 10.80 10*3/mm3    RBC 3.68 (L) 3.77 - 5.28 10*6/mm3    Hemoglobin 9.5 (L) 12.0 - 15.9 g/dL    Hematocrit 30.0 (L) 34.0 - 46.6 %    MCV 81.5 79.0 - 97.0 fL    MCH 25.8 (L) 26.6 - 33.0 pg    MCHC 31.7 31.5 - 35.7 g/dL    RDW 12.5 12.3 - 15.4 %    RDW-SD 37.2 37.0 - 54.0 fl    MPV 9.8 6.0 - 12.0 fL    Platelets 380 140 - 450 10*3/mm3    Neutrophil % 86.2 (H) 42.7 - 76.0 %    Lymphocyte % 6.6 (L) 19.6 - 45.3 %    Monocyte % 5.9 5.0 - 12.0 %    Eosinophil % 0.3 0.3 - 6.2 %    Basophil % 0.2 0.0 - 1.5 %    Immature Grans % 0.8 (H) 0.0 - 0.5 %     Neutrophils, Absolute 13.21 (H) 1.70 - 7.00 10*3/mm3    Lymphocytes, Absolute 1.01 0.70 - 3.10 10*3/mm3    Monocytes, Absolute 0.91 (H) 0.10 - 0.90 10*3/mm3    Eosinophils, Absolute 0.04 0.00 - 0.40 10*3/mm3    Basophils, Absolute 0.03 0.00 - 0.20 10*3/mm3    Immature Grans, Absolute 0.13 (H) 0.00 - 0.05 10*3/mm3    nRBC 0.0 0.0 - 0.2 /100 WBC       RADIOLOGY  CT Abdomen Pelvis With Contrast    (Results Pending)       MEDICATIONS GIVEN IN THE ER  Medications   sodium chloride 0.9 % flush 10 mL (10 mL Intravenous Given 12/8/20 1506)   potassium chloride (K-DUR,KLOR-CON) ER tablet 40 mEq (has no administration in time range)     Or   potassium chloride (KLOR-CON) packet 40 mEq (has no administration in time range)     Or   potassium chloride 10 mEq in 100 mL IVPB (has no administration in time range)   sodium chloride 0.9 % bolus 1,000 mL (1,000 mL Intravenous New Bag 12/8/20 1614)       MEDICAL DECISION MAKING, CONSULTS AND PROGRESS NOTES  All labs and all radiology studies were have been viewed and interpreted by me.  Discussion below represents my analysis of pertinent findings related to patient's condition, differential diagnosis, treatment plan and final disposition.    Differential diagnosis includes but is not limited to:  - Hepatobiliary pathology such as cholecystitis, cholangitis, and symptomatic cholelithiasis  - Pancreatitis  - Dyspepsia  - Small bowel obstruction  - Appendicitis  - Diverticulitis  - UTI including pyelonephritis  - Ureteral stone  - Zoster  - Colitis, including infectious and ischemic  - Atypical ACS    PPE: The patient was placed in a face mask in first look. Patient was wearing facemask when I entered the room and throughout our encounter. I wore full protective equipment throughout this patient encounter including a face mask, eye shield and gloves. Hand hygiene was performed before donning protective equipment and after removal when leaving the room.    AS OF 16:50 EST VITALS:    BP -  156/84  HR - 76  TEMP - 99.6 °F (37.6 °C) (Tympanic)  O2 SATS - 94%    ED Course as of Dec 08 1650   Tue Dec 08, 2020   1640 I discussed the patient's overall care with Dr. Gonzalez with A.  She is agreeable to admission and requests a telemetry bed.    [AR]      ED Course User Index  [AR] Margaret Bernal PA     Based on the patient's lab findings and presenting symptoms, the doctor and I feel it is appropriate to admit the patient for further management, evaluation, and treatment.  I have discussed this with the admitting team.  I have also discussed this with the patient/family.  They are in agreement with admission.      DIAGNOSIS   Diagnosis Plan   1. Hypokalemia     2. Hyperglycemia     3. Lower abdominal pain         DISPOSITION  ED Disposition     ED Disposition Condition Comment    Decision to Admit  Level of Care: Telemetry [5]   Diagnosis: Hypokalemia [267663]   Admitting Physician: ANDREW GONZALEZ [[id number]]   Attending Physician: ANDREW GONZALEZ [[id number]]          Provider Attestation:  I personally reviewed the past medical history, past surgical history, social history, family history, current medications and allergies as they appear in the chart. I reviewed the patient's history, physical, lab/imaging results and overall care with Dr. Michele who is in agreement with the patient's treatment plan.    EMR Dragon/Transcription disclaimer:  Some of this encounter note is an electronic transcription/translation of spoken language to printed text. The electronic translation of spoken language may permit erroneous, or at times, nonsensical words or phrases to be inadvertently transcribed; Although I have reviewed the note for such errors, some may still exist.    Provider note signed by:         Margaret Bernal PA  12/08/20 1650      Electronically signed by Margaret Bernal PA at 12/08/20 1650     Mayur Michele MD at 12/08/20 1615        MD ATTESTATION NOTE    The NEREIDA and I have  discussed this patient's history, physical exam, and treatment plan.  I have reviewed the documentation and personally had a face to face interaction with the patient. I affirm the documentation and agree with the treatment and plan.  The attached note describes my personal findings.      History  65-year-old female presents with lower abdominal pain over the past 1 week.  Patient has a history of non-insulin-dependent diabetes.  Primary care provider is Dr. Huitron.    Physical Exam  Vital Signs reviewed  Alert, Well Appearing in NAD  Abdomen-mild bilateral lower abdominal tenderness without rebound or guarding    Disposition  I discussed evaluation and treatment of this patient with ANSELMO Bernal.  Patient has several abnormalities that need to be addressed.  She is fairly hypokalemic and we will replace both IV and orally.  She has hyperglycemia with glucose greater than 500, will hold insulin until her potassium is improved.  Also having lower abdominal pain with an elevated white count, will get CT scan of the abdomen for further assessment.  Disposition-Will contact A about admission.     Mayur Michele MD  12/08/20 1625      Electronically signed by Mayur Michele MD at 12/08/20 5914       {Outbreak/Travel/Exposure Documentation......;  Question Available Choices Patient Response   Outbreak Screen: Do you currently have a new onset of the following symptoms?        Fever/Chils, Cough, Shortness of air, Loss of taste or smell, No, Unknown  No (12/08/20 2239)   Outbreak Screen: In the last 14 days, have you had contact with anyone who is ill, has show any of the symptoms listed above and/or has been diagnosis with the 2019 Novel Coronavirus? 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  Yes, No, Unknown              No (12/08/20 2239)   Outbreak Screen: Who was  notified?    Free text  (not recorded)   Travel Screen: Have you traveled in the last month? If so, to what country have you traveled? If [place] what state? Yes, No, Unknown  List of all countries  List of all States No (12/08/20 2239)  (not recorded)  (not recorded)   Infection Risk: Do you currently have the following symptoms?  (If cough is selected, the Tuberculosis Screen is performed.) Cough, Fever, Rash, No No (12/08/20 2239)   Tuberculosis Screen: Do you have any of the following Tuberculosis Risks?  · Have you lived or spent time with anyone who had or may have TB?  · Have you lived in or visited any of the following areas for more than one month: [place], [place], [place], [place] or [place], [place] or [place]?  · Do you have HIV/AIDS?  · Have you lived in or worked in a nursing home, homeless shelter, correctional facility, or substance abuse treatment facility?   · No    If Yes do you have any of the following symptoms? Yes responses display to the right    If Yes, symptoms listed are:  Cough greater than or equal to 3 weeks, Loss of appetite, Unexplained weight loss, Night sweats, Bloody sputum or hemoptysis, Hoarseness, Fever, Fatigue, Chest pain, No No (12/08/20 2239)  (not recorded)   Exposure Screen: Have you been exposed to any of these contagious diseases in the last month? Measles, Chickenpox, Meningitis, Pertussis, Whooping Cough, No No (12/08/20 2239)       Vital Signs (last day)     Date/Time   Temp   Temp src   Pulse   Resp   BP   Patient Position   SpO2    12/09/20 0812   99.7 (37.6)   Oral   --   --   154/69   Lying   --    12/09/20 0448   --   Oral   83   20   162/75   Lying   --    12/08/20 2314   99.1 (37.3)   Oral   --   22   165/80   Lying   93    12/08/20 2032   --   --   88   24   170/78   Lying   --    12/08/20 1625   --   --   78   --   165/85   --   96    12/08/20 1555   --   --   76   --   156/84   --   94    12/08/20 1525   --   --   76   --   145/73   --   96     12/08/20 1455   --   --   --   --   159/84   --   --    12/08/20 1453   --   --   --   --   159/84   Lying   --    12/08/20 1412   99.6 (37.6)   Tympanic   86   16   --   --   96              Oxygen Therapy (last day)     Date/Time   SpO2   Device (Oxygen Therapy)   Flow (L/min)   Oxygen Concentration (%)   ETCO2 (mmHg)    12/09/20 0810   --   room air   --   --   --    12/08/20 2314   93   --   --   --   --    12/08/20 2035   --   room air   --   --   --    12/08/20 2032   --   room air   --   --   --    12/08/20 1625   96   --   --   --   --    12/08/20 1555   94   --   --   --   --    12/08/20 1525   96   --   --   --   --    12/08/20 1412   96   room air   --   --   --              Intake & Output (last day)       12/08 0701 - 12/09 0700 12/09 0701 - 12/10 0700    P.O.  0    IV Piggyback 1000     Total Intake(mL/kg) 1000 (14.6) 0 (0)    Net +1000 0          Urine Unmeasured Occurrence 2 x     Stool Unmeasured Occurrence 1 x         Lines, Drains & Airways    Active LDAs     Name:   Placement date:   Placement time:   Site:   Days:    Peripheral IV 12/08/20 1505 Right Antecubital   12/08/20    1505    Antecubital   less than 1    Peripheral IV 12/08/20 1931 Right;Lateral Arm   12/08/20    1931    Arm   less than 1    Supraglottic Airway 4   12/09/20    1316 created via procedure documentation     less than 1                Lab Results (last 48 hours)     Procedure Component Value Units Date/Time    Blood Culture - Blood, Arm, Right [[id number]] Collected: 12/09/20 1059    Specimen: Blood from Arm, Right Updated: 12/09/20 1216    Blood Culture - Blood, Arm, Right [[id number]] Collected: 12/09/20 1015    Specimen: Blood from Arm, Right Updated: 12/09/20 1215    Hemoglobin A1c [[id number]]  (Abnormal) Collected: 12/09/20 0534    Specimen: Blood Updated: 12/09/20 1114     Hemoglobin A1C 13.70 %     Narrative:      Hemoglobin A1C Ranges:    Increased Risk for Diabetes  5.7% to 6.4%  Diabetes                     >=  6.5%  Diabetic Goal                < 7.0%    POC Glucose Once [[id number]]  (Abnormal) Collected: 12/09/20 1109    Specimen: Blood Updated: 12/09/20 1110     Glucose 214 mg/dL     POC Glucose Once [[id number]]  (Abnormal) Collected: 12/09/20 0751    Specimen: Blood Updated: 12/09/20 0753     Glucose 303 mg/dL     Basic Metabolic Panel [[id number]]  (Abnormal) Collected: 12/09/20 0534    Specimen: Blood Updated: 12/09/20 0653     Glucose 273 mg/dL      BUN 5 mg/dL      Creatinine 0.86 mg/dL      Sodium 136 mmol/L      Potassium 3.2 mmol/L      Chloride 98 mmol/L      CO2 29.5 mmol/L      Calcium 6.9 mg/dL      eGFR Non African Amer 66 mL/min/1.73      BUN/Creatinine Ratio 5.8     Anion Gap 8.5 mmol/L     Narrative:      GFR Normal >60  Chronic Kidney Disease <60  Kidney Failure <15      Lactic Acid, Plasma [[id number]]  (Normal) Collected: 12/09/20 0534    Specimen: Blood Updated: 12/09/20 0601     Lactate 0.9 mmol/L     CBC (No Diff) [[id number]]  (Abnormal) Collected: 12/09/20 0534    Specimen: Blood Updated: 12/09/20 0556     WBC 14.00 10*3/mm3      RBC 3.16 10*6/mm3      Hemoglobin 8.3 g/dL      Hematocrit 25.6 %      MCV 81.0 fL      MCH 26.3 pg      MCHC 32.4 g/dL      RDW 12.8 %      RDW-SD 38.1 fl      MPV 10.0 fL      Platelets 318 10*3/mm3     Magnesium [[id number]]  (Abnormal) Collected: 12/08/20 1506    Specimen: Blood Updated: 12/08/20 2034     Magnesium 1.0 mg/dL     POC Glucose Once [[id number]]  (Abnormal) Collected: 12/08/20 2022    Specimen: Blood Updated: 12/08/20 2023     Glucose 340 mg/dL     COVID PRE-OP / PRE-PROCEDURE SCREENING ORDER (NO ISOLATION) - Swab, Nasopharynx [[id number]]  (Normal) Collected: 12/08/20 1658    Specimen: Swab from Nasopharynx Updated: 12/08/20 1836    Narrative:      The following orders were created for panel order COVID PRE-OP / PRE-PROCEDURE SCREENING ORDER (NO ISOLATION) - Swab, Nasopharynx.  Procedure                               Abnormality         Status                      ---------                               -----------         ------                     Respiratory Panel PCR w/...[[id number]]  Normal              Final result                 Please view results for these tests on the individual orders.    Respiratory Panel PCR w/COVID-19(SARS-CoV-2) TIP/BOBBY/ANDREY/PAD/COR/MAD/HUMPHREY In-House, NP Swab in UTM/VTM, 3-4 HR TAT - Swab, Nasopharynx [[id number]]  (Normal) Collected: 12/08/20 1658    Specimen: Swab from Nasopharynx Updated: 12/08/20 1836     ADENOVIRUS, PCR Not Detected     Coronavirus 229E Not Detected     Coronavirus HKU1 Not Detected     Coronavirus NL63 Not Detected     Coronavirus OC43 Not Detected     COVID19 Not Detected     Human Metapneumovirus Not Detected     Human Rhinovirus/Enterovirus Not Detected     Influenza A PCR Not Detected     Influenza B PCR Not Detected     Parainfluenza Virus 1 Not Detected     Parainfluenza Virus 2 Not Detected     Parainfluenza Virus 3 Not Detected     Parainfluenza Virus 4 Not Detected     RSV, PCR Not Detected     Bordetella pertussis pcr Not Detected     Bordetella parapertussis PCR Not Detected     Chlamydophila pneumoniae PCR Not Detected     Mycoplasma pneumo by PCR Not Detected    Narrative:      Fact sheet for providers: https://docs.[employer]/wp-content/uploads/GUN7370-0637-UT2.1-EUA-Provider-Fact-Sheet-3.pdf    Fact sheet for patients: https://docs.[employer]/wp-content/uploads/BPC6968-0287-IZ0.1-EUA-Patient-Fact-Sheet-1.pdf    Test performed by PCR.    Urinalysis, Microscopic Only - Urine, Catheter [[id number]]  (Abnormal) Collected: 12/08/20 1506    Specimen: Urine, Catheter Updated: 12/08/20 1653     RBC, UA 13-20 /HPF      WBC, UA 3-5 /HPF      Bacteria, UA Trace /HPF      Squamous Epithelial Cells, UA 3-6 /HPF      Hyaline Casts, UA 0-2 /LPF      Methodology Manual Light Microscopy    Comprehensive Metabolic Panel [[id number]]  (Abnormal) Collected: 12/08/20 1506    Specimen: Blood Updated: 12/08/20 1604      Glucose 535 mg/dL      BUN 7 mg/dL      Creatinine 1.16 mg/dL      Sodium 131 mmol/L      Potassium 2.1 mmol/L      Chloride 86 mmol/L      CO2 33.2 mmol/L      Calcium 7.9 mg/dL      Total Protein 6.9 g/dL      Albumin 3.10 g/dL      ALT (SGPT) 6 U/L      AST (SGOT) 10 U/L      Alkaline Phosphatase 102 U/L      Total Bilirubin 0.4 mg/dL      eGFR Non African Amer 47 mL/min/1.73      Globulin 3.8 gm/dL      A/G Ratio 0.8 g/dL      BUN/Creatinine Ratio 6.0     Anion Gap 11.8 mmol/L     Narrative:      GFR Normal >60  Chronic Kidney Disease <60  Kidney Failure <15      Lipase [[id number]]  (Normal) Collected: 12/08/20 1506    Specimen: Blood Updated: 12/08/20 1550     Lipase 26 U/L     Urinalysis With Microscopic If Indicated (No Culture) - Urine, Catheter [[id number]]  (Abnormal) Collected: 12/08/20 1506    Specimen: Urine, Catheter Updated: 12/08/20 1540     Color, UA Yellow     Appearance, UA Clear     pH, UA 6.0     Specific Gravity, UA 1.010     Glucose, UA >=1000 mg/dL (3+)     Ketones, UA Negative     Bilirubin, UA Negative     Blood, UA Small (1+)     Protein, UA 30 mg/dL (1+)     Leuk Esterase, UA Negative     Nitrite, UA Negative     Urobilinogen, UA 0.2 E.U./dL    CBC & Differential [[id number]]  (Abnormal) Collected: 12/08/20 1506    Specimen: Blood Updated: 12/08/20 1519    Narrative:      The following orders were created for panel order CBC & Differential.  Procedure                               Abnormality         Status                     ---------                               -----------         ------                     CBC Auto Differential[[id number]]        Abnormal            Final result                 Please view results for these tests on the individual orders.    CBC Auto Differential [[id number]]  (Abnormal) Collected: 12/08/20 1506    Specimen: Blood Updated: 12/08/20 1519     WBC 15.33 10*3/mm3      RBC 3.68 10*6/mm3      Hemoglobin 9.5 g/dL      Hematocrit 30.0 %      MCV 81.5 fL       MCH 25.8 pg      MCHC 31.7 g/dL      RDW 12.5 %      RDW-SD 37.2 fl      MPV 9.8 fL      Platelets 380 10*3/mm3      Neutrophil % 86.2 %      Lymphocyte % 6.6 %      Monocyte % 5.9 %      Eosinophil % 0.3 %      Basophil % 0.2 %      Immature Grans % 0.8 %      Neutrophils, Absolute 13.21 10*3/mm3      Lymphocytes, Absolute 1.01 10*3/mm3      Monocytes, Absolute 0.91 10*3/mm3      Eosinophils, Absolute 0.04 10*3/mm3      Basophils, Absolute 0.03 10*3/mm3      Immature Grans, Absolute 0.13 10*3/mm3      nRBC 0.0 /100 WBC           Imaging Results (Last 48 Hours)     Procedure Component Value Units Date/Time    CT Abdomen Pelvis With Contrast [[id number]] Collected: 12/08/20 1938     Updated: 12/08/20 1943    Narrative:      CT ABDOMEN AND PELVIS WITH IV CONTRAST     HISTORY: 65 year old female with abdominal pain, dysuria over the past 2  weeks. History of multiple UTIs.     TECHNIQUE:  CT includes axial imaging from the lung bases to the  trochanters with intravenous contrast and without use of oral contrast.  Data reconstructed in coronal and sagittal planes.     COMPARISON: None     FINDINGS: There is a large multilobular peripherally enhancing, complex  collection centered below the right kidney within the right abdomen.  This collection contacts the anterolateral right psoas muscle and  contacts and appears to partially invade the right lateral abdominal  musculature measuring 11 x 10.3 cm axial dimension and extending 12.5 cm  craniocaudal dimension. Between this collection and the right kidney  there is a greater degree of enhancement. This appears more solid in  appearance suspected to represent a mass. There is indentation of the  adjacent inferior, lateral right renal cortex. 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 Low-density appears to  arise from the renal cortex. There is no evidence for extension into the  renal pelvis or involvement of the urothelium. Delayed phase  imaging  demonstrates no extension of contrast into this mass or the low-density  below the right kidney. There is a posterior right renal cyst that  measures approximately 3 cm diameter. Right renal calyceal/infundibular  stone measures 1 cm. There is also a 0.8 cm right renal stone and a 3 mm  right renal calyceal stone. A 1 cm left renal low-density lesion is most  likely a cyst though difficult to definitively characterize.     There is stranding/inflammation surrounding this mass/collection within  the right abdomen and there is displacement of adjacent bowel loops. The  splenic size is normal. The adrenal glands appear normal. There is mild  diffuse fat infiltration of the liver. Pancreas appears within normal  limits. There are several subcentimeter retroperitoneal lymph nodes  without enlargement. Lung bases appear clear.     There is diastases of the rectus sheath with herniation of fat and bowel  loops into the right anterior abdomen and pelvic wall. Fluid-filled  small bowel loops are present which contain air-fluid levels and this  may be associated with a mild ileus. There is no dilatation to suggest  obstruction. Within the midline, infraumbilical abdominal wall just deep  to the rectus abdominis muscle curvature there is a low-density mass or  collection that measures 3.2 x 1.7 cm. This could represent a chronic  postsurgical collection. Bone windows demonstrate no osseous lesion.     FINDINGS:   1. Large multilobular right abdominal mass/collection measures  approximately 11 x 10.3 x 12.5 cm. This appears to arise from the right  kidney and at the upper margin of this lesion there is masslike  thickening suspicious for renal cell carcinoma with complex adjacent  collection or abscess with apparent invasion of the right lateral  abdominal wall. Urologic consultation recommended. Biopsy or attempt at  CT-guided drainage could be performed under CT guidance if indicated.  2. Right renal calyceal  infundibular stones without hydronephrosis.  Right renal cysts. A 1 cm left renal low-density lesion is most likely a  cyst though difficult to definitely characterize.  3. 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 There is an infraumbilical  collection or low-density lesion along the deep margin of the rectus  abdominis musculature. This could represent a small chronic seroma  associated with previous surgery.     Discussed with ANSELMO Sutton, in the emergency department 12/08/2020  at 7:10 PM.     Radiation dose reduction techniques were utilized, including automated  exposure control and exposure modulation based on body size.     This report was finalized on 12/8/2020 7:40 PM by Dr. Hawk Bonilla M.D.             Orders (last 48 hrs)      Start     Ordered    12/09/20 1326  iothalamate (CONRAY) injection  As Needed      12/09/20 1326    12/09/20 1326  sterile water irrigation solution  As Needed      12/09/20 1327    12/09/20 1325  lidocaine (XYLOCAINE) 2 % jelly  As Needed      12/09/20 1326    12/09/20 1320  Inpatient Admission  Once      12/09/20 1319    12/09/20 1246  FL Retrograde Pyelogram In OR  1 Time Imaging      12/09/20 1245    12/09/20 1233  sodium chloride 0.9 % flush 3 mL  Every 12 Hours Scheduled      12/09/20 1231    12/09/20 1233  lactated ringers infusion  Continuous      12/09/20 1231    12/09/20 1233  famotidine (PEPCID) injection 20 mg  Once      12/09/20 1231    12/09/20 1232  Insert Peripheral IV  Once      12/09/20 1231    12/09/20 1232  Saline Lock & Maintain IV Access  Continuous      12/09/20 1231    12/09/20 1232  May take Beta Blocker from home with sip of water.  Once     Comments: Only applicable to patients on home Beta Blocker    12/09/20 1231    12/09/20 1232  Pulse Oximetry, Continuous  Continuous      12/09/20 1231    12/09/20 1232  POC Glucose Once  Once     Comments: For all diabetic patients. Notify  Anesthesiologist for blood sugar greater than 180 or less than 60..      12/09/20 1231    12/09/20 1231  sodium chloride 0.9 % flush 3-10 mL  As Needed      12/09/20 1231    12/09/20 1231  lidocaine PF 1% (XYLOCAINE) injection 0.5 mL  Once As Needed      12/09/20 1231    12/09/20 1231  fentaNYL citrate (PF) (SUBLIMAZE) injection 50 mcg  Every 10 Minutes PRN      12/09/20 1231    12/09/20 1231  midazolam (VERSED) injection 1 mg  Every 10 Minutes PRN      12/09/20 1231    12/09/20 1231  Pulse Oximetry, Continuous  Continuous PRN,   Status:  Canceled      12/09/20 1231    12/09/20 1200  [MAR Hold]  influenza vac split quad (FLUZONE,FLUARIX,AFLURIA,FLULAVAL) injection 0.5 mL  Once     (MAR Hold since Wed 12/9/2020 at 1155.Hold Reason: Unreviewed Transfer Orders.)    12/08/20 2255    12/09/20 1111  Place Sequential Compression Device  Once      12/09/20 1110    12/09/20 1111  Maintain Sequential Compression Device  Continuous      12/09/20 1110    12/09/20 1111  POC Glucose Once  Once      12/09/20 1109    12/09/20 1110  Transfer Patient  Once      12/09/20 1110    12/09/20 1100  POC Glucose 4x Daily AC & at Bedtime  4 Times Daily Before Meals & at Bedtime     Comments: If bedtime blood glucose is greater than 350 mg/dl, call MD.      12/09/20 0814    12/09/20 1017  Blood Culture - Blood, Arm, Right  Once      12/09/20 0736    12/09/20 0900  [MAR Hold]  citalopram (CeleXA) tablet 10 mg  Daily     (MAR Hold since Wed 12/9/2020 at 1155.Hold Reason: Unreviewed Transfer Orders.)    12/08/20 2013 12/09/20 0900  lisinopril (PRINIVIL,ZESTRIL) tablet 20 mg  Daily      12/08/20 2013    12/09/20 0900  [MAR Hold]  insulin lispro (humaLOG) injection 0-9 Units  3 Times Daily Before Meals     (MAR Hold since Wed 12/9/2020 at 1155.Hold Reason: Unreviewed Transfer Orders.)    12/09/20 0814    12/09/20 0837  Hold tonight's dose of lovenox.  Nursing Communication  Once     Comments: Hold tonight's dose of lovenox.    12/09/20 0836     12/09/20 0815  Do NOT Hold Basal or Correction Scale Insulin When Patient is NPO, Hold Scheduled Mealtime (Bolus) Insulin if NPO  Continuous      12/09/20 0814 12/09/20 0815  Follow [facility] Hypoglycemia Standing Orders For Blood Glucose Less Than 70 mg/dL  Until Discontinued     Comments: ALERT PATIENT - NOT NPO & CAN SAFELY SWALLOW  Administer 4 oz Fruit Juice OR 4 oz Regular Soda OR 8 oz Milk OR 15-30 grams (1 tube) of Glucose Gel.  Recheck Blood Glucose Approximately 15 Minutes After Ingestion, Repeat Treatment & Continue to Recheck Blood Sugar Approximately Every 15 Minutes Until Blood Glucose is 70 or Higher.  Once Blood Glucose is 70 or Higher & if It Will Be More Than 60 Minutes Until Next Meal, Provide Appropriate Snack (Including Carbohydrate Food) Based on Meal Plan Order. Give Meal Tray As Soon As Possible.    PATIENT HAS IV ACCESS - UNRESPONSIVE, NPO OR UNABLE TO SAFELY SWALLOW  Administer 25g (50ml) D50W IV Push.  Recheck Blood Glucose Approximately 15 Minutes After Administration, if Blood Glucose Remains Less Than 70, Repeat Treatment   Recheck Blood Glucose Approximately 15 Minutes After 2nd Administration, if Blood Glucose Remains Less Than 70 After 2nd Dose of D50W, Contact Provider for Further Treatment Orders & Consider Adding IVF With D5W for Maintenance    PATIENT WITHOUT IV ACCESS - UNRESPONSIVE, NPO OR UNABLE TO SAFELY SWALLOW  Administer 1mg Glucagon SQ & Establish IV Access.  Turn Patient on Side - Nausea / Vomiting May Occur.  Recheck Blood Glucose Approximately 15 Minutes After Administration.  If Blood Glucose Remains Less Than 70, Administer 25g D50W IV Push (50ml).  Recheck Blood Glucose Approximately 15 Minutes After Administration of D50W, if Blood Glucose Remains Less Than 70, Contact Provider for Further Treatment Orders & Consider Adding IVF With D5 for Maintenance    Document Event & Patient Response to Interventions in EMR, Document Medications on MAR  Notify Provider if  Hypoglycemia Treatment Needed    12/09/20 0814    12/09/20 0815  Hemoglobin A1c  Once      12/09/20 0814    12/09/20 0814  [MAR Hold]  dextrose (GLUTOSE) oral gel 15 g  Every 15 Minutes PRN     (MAR Hold since Wed 12/9/2020 at 1155.Hold Reason: Unreviewed Transfer Orders.)    12/09/20 0814    12/09/20 0814  [MAR Hold]  dextrose (D50W) 25 g/ 50mL Intravenous Solution 25 g  Every 15 Minutes PRN     (MAR Hold since Wed 12/9/2020 at 1155.Hold Reason: Unreviewed Transfer Orders.)    12/09/20 0814    12/09/20 0814  [MAR Hold]  glucagon (human recombinant) (GLUCAGEN DIAGNOSTIC) injection 1 mg  Every 15 Minutes PRN     (MAR Hold since Wed 12/9/2020 at 1155.Hold Reason: Unreviewed Transfer Orders.)    12/09/20 0814    12/09/20 0813  CT Guided Abscess Drain Peritoneal  1 Time Imaging      12/09/20 0707    12/09/20 0753  POC Glucose Once  Once      12/09/20 0751    12/09/20 0740  Urine Culture - Urine, Urine, Catheter  Once,   Status:  Canceled      12/09/20 0739    12/09/20 0737  Blood Culture - Blood, Arm, Right  Once     Comments: From a different site than #1.      12/09/20 0736    12/09/20 0736  Urinalysis With Culture If Indicated - Urine, Catheter  Once,   Status:  Canceled      12/09/20 0736    12/09/20 0707  US Guided Abscess Drain Abdomen/Pelvis  1 Time Imaging,   Status:  Canceled      12/09/20 0707    12/09/20 0706  NPO Diet  Diet Effective Now      12/09/20 0705    12/09/20 0600  Basic Metabolic Panel  Morning Draw      12/08/20 2012 12/09/20 0600  CBC (No Diff)  Morning Draw      12/08/20 2012 12/09/20 0153  Lactic Acid, Plasma  Once      12/09/20 0152    12/09/20 0000  Vital Signs  Every 4 Hours     Comments: Per per hospital policy    12/08/20 2012 12/09/20 0000  PT Consult: Eval & Treat as tolerated  Once      12/08/20 2012 12/08/20 2100  sodium chloride 0.9 % with KCl 20 mEq/L infusion  Continuous      12/08/20 2013 12/08/20 2100  enoxaparin (LOVENOX) syringe 40 mg  Nightly,   Status:   Discontinued      12/08/20 2017 12/08/20 2100  cefTRIAXone (ROCEPHIN) IVPB 1 g  Every 24 Hours      12/08/20 2020 12/08/20 2023  POC Glucose Once  Once      12/08/20 2022 12/08/20 2020  Magnesium  STAT      12/08/20 2019 12/08/20 2015  Inpatient Urology Consult  Once     Specialty:  Urology  Provider:  Enio Sierra MD    12/08/20 2014 12/08/20 2014  Patient Currently On Electrolyte Replacement Protocol - Please Refer to MAR for Protocol Details  Misc Nursing Order (Specify)  Daily     Comments: Patient Currently On Electrolyte Replacement Protocol - Please Refer to MAR for Protocol Details    12/08/20 2013 12/08/20 2013  potassium chloride (K-DUR,KLOR-CON) ER tablet 40 mEq  As Needed      12/08/20 2013 12/08/20 2013  potassium chloride (KLOR-CON) packet 40 mEq  As Needed      12/08/20 2013 12/08/20 2013  potassium chloride 10 mEq in 100 mL IVPB  Every 1 Hour PRN      12/08/20 2013 12/08/20 2013  Daily Weights  Daily      12/08/20 2012 12/08/20 2013  Diet Regular; Cardiac, Consistent Carbohydrate, Renal  Diet Effective Now,   Status:  Canceled      12/08/20 2012 12/08/20 2013  Inpatient Infectious Diseases Consult  Once     Specialty:  Infectious Diseases  Provider:  Tricia Plunkett MD    12/08/20 2012 12/08/20 2012  Code Status and Medical Interventions:  Continuous      12/08/20 2012 12/08/20 2012  Cardiac Monitoring  Continuous,   Status:  Canceled      12/08/20 2012 12/08/20 2012  Telemetry - Maintain IV Access  Continuous,   Status:  Canceled      12/08/20 2012 12/08/20 2012  Continuous Cardiac Monitoring  Continuous      12/08/20 2012 12/08/20 2012  May Be Off Telemetry for Tests  Continuous      12/08/20 2012 12/08/20 2012  ACLS Protocol For Life Threatening Dysrhythmias (Unless Code Status Indicates Otherwise)  Continuous      12/08/20 2012 12/08/20 2012  Notify Provider if ACLS Protocol Activated  Until Discontinued      12/08/20 2012 12/08/20  2012  Strict Intake & Output  Every Shift      12/08/20 2012 12/08/20 2011  [MAR Hold]  ondansetron (ZOFRAN) tablet 4 mg  Every 6 Hours PRN     (MAR Hold since Wed 12/9/2020 at 1155.Hold Reason: Unreviewed Transfer Orders.)    12/08/20 2012 12/08/20 2011  [MAR Hold]  ondansetron (ZOFRAN) injection 4 mg  Every 6 Hours PRN     (MAR Hold since Wed 12/9/2020 at 1155.Hold Reason: Unreviewed Transfer Orders.)    12/08/20 2012 12/08/20 2011  [MAR Hold]  melatonin tablet 3 mg  Nightly PRN     (MAR Hold since Wed 12/9/2020 at 1155.Hold Reason: Unreviewed Transfer Orders.)    12/08/20 2012 12/08/20 2011  Pharmacy to Dose enoxaparin (LOVENOX)  Continuous PRN,   Status:  Discontinued      12/08/20 2012 12/08/20 2011  Telemetry - Pulse Oximetry  Continuous PRN,   Status:  Canceled     Comments: If Patient Develops Unresponsiveness, Acute Dyspnea, Cyanosis or Suspected Hypoxemia Start Continuous Pulse Ox Monitoring, Apply Oxygen & Notify Provider    12/08/20 2012 12/08/20 2011  [MAR Hold]  nitroglycerin (NITROSTAT) SL tablet 0.4 mg  Every 5 Minutes PRN     (MAR Hold since Wed 12/9/2020 at 1155.Hold Reason: Unreviewed Transfer Orders.)    12/08/20 2012 12/08/20 2011  [MAR Hold]  acetaminophen (TYLENOL) tablet 650 mg  Every 4 Hours PRN     (MAR Hold since Wed 12/9/2020 at 1155.Hold Reason: Unreviewed Transfer Orders.)    12/08/20 2012 12/08/20 1833  iopamidol (ISOVUE-300) 61 % injection 100 mL  Once in Imaging      12/08/20 1831    12/08/20 1643  Initiate Observation Status  Once      12/08/20 1642    12/08/20 1635  COVID PRE-OP / PRE-PROCEDURE SCREENING ORDER (NO ISOLATION) - Swab, Nasopharynx  Once      12/08/20 1634    12/08/20 1635  Respiratory Panel PCR w/COVID-19(SARS-CoV-2) TIP/BOBBY/ANDREY/PAD/COR/MAD/HUMPHREY In-House, NP Swab in UTM/VTM, 3-4 HR TAT - Swab, Nasopharynx  PROCEDURE ONCE      12/08/20 1634    12/08/20 1624  COVID PRE-OP / PRE-PROCEDURE SCREENING ORDER (NO ISOLATION) - Swab, Nasopharynx  Once,    Status:  Canceled      12/08/20 1623    12/08/20 1624  COVID-19,BIOTAP, NP/OP SWAB IN TRANSPORT MEDIA OR SALINE 24-36 HR TAT - Swab, Nasopharynx  PROCEDURE ONCE,   Status:  Canceled      12/08/20 1623    12/08/20 1621  [facility] (on-call MD unless specified) Details  Once     Specialty:  Hospitalist  Provider:  (Not yet assigned)    12/08/20 1620    12/08/20 1619  CT Abdomen Pelvis With Contrast  1 Time Imaging      12/08/20 1619    12/08/20 1618  potassium chloride (K-DUR,KLOR-CON) ER tablet 40 mEq  As Needed      12/08/20 1618    12/08/20 1618  potassium chloride (KLOR-CON) packet 40 mEq  As Needed      12/08/20 1618    12/08/20 1618  potassium chloride 10 mEq in 100 mL IVPB  Every 1 Hour PRN      12/08/20 1618    12/08/20 1607  sodium chloride 0.9 % bolus 1,000 mL  Once      12/08/20 1605    12/08/20 1607  Vital Signs  Per Hospital Policy     Comments: Please include temperature    12/08/20 1606    12/08/20 1534  Urinalysis, Microscopic Only - Urine, Catheter  Once      12/08/20 1533    12/08/20 1430  Straight cath  Once      12/08/20 1429    12/08/20 1422  Monitor Blood Pressure  Per Hospital Policy      12/08/20 1421    12/08/20 1421  Insert peripheral IV  Once      12/08/20 1421    12/08/20 1421  CBC & Differential  Once      12/08/20 1421    12/08/20 1421  Comprehensive Metabolic Panel  Once      12/08/20 1421    12/08/20 1421  Lipase  Once      12/08/20 1421    12/08/20 1421  Urinalysis With Microscopic If Indicated (No Culture) - Urine, Catheter  Once      12/08/20 1421    12/08/20 1421  CBC Auto Differential  PROCEDURE ONCE      12/08/20 1421    12/08/20 1420  [MAR Hold]  sodium chloride 0.9 % flush 10 mL  As Needed     (MAR Hold since Wed 12/9/2020 at 1155.Hold Reason: Unreviewed Transfer Orders.)    12/08/20 1421    12/04/20 0000  linagliptin (TRADJENTA) 5 MG tablet tablet  Daily      12/08/20 1219    Unscheduled  Magnesium  As Needed      12/08/20 1618    Unscheduled  Oxygen Therapy- Nasal Cannula; Titrate  for SPO2: 90% - 95%  Continuous PRN     Comments: If Patient Develops Unresponsiveness, Acute Dyspnea, Cyanosis or Suspected Hypoxemia Start Continuous Pulse Ox Monitoring, Apply Oxygen & Notify Provider    12/08/20 2012    Unscheduled  ECG 12 Lead  As Needed     Comments: Nurse to Release if Patient Expericences Acute Chest Pain or Dysrhythmias    12/08/20 2012    Unscheduled  Potassium  As Needed     Comments: For Ventricular Arrhythmias      12/08/20 2012    Unscheduled  Magnesium  As Needed     Comments: For Ventricular Arrhythmias      12/08/20 2012    Unscheduled  Troponin  As Needed     Comments: For Chest Pain      12/08/20 2012    Unscheduled  Blood Gas, Arterial -  As Needed     Comments: Per O2 PolicyNotify Physician      12/08/20 2012    Unscheduled  Up with assistance  As Needed      12/08/20 2012    Unscheduled  Magnesium  As Needed      12/08/20 2013    Unscheduled  Potassium  As Needed      12/08/20 2013    Unscheduled  Vital Signs - Per Anesthesia Protocol  As Needed      12/09/20 1231    Unscheduled  Oxygen Therapy- Nasal Cannula; Titrate for SPO2: Per Policy  Continuous PRN      12/09/20 1231    Unscheduled  POC Glucose PRN  As Needed     Comments: For all diabetic patients. Notify Anesthesiologist for blood sugar > 180.      12/09/20 1231    Pending  Inpatient Nutrition Consult  Once     Provider:  (Not yet assigned)    Pending    Pending  Inpatient Diabetes Educator Consult  Once     Provider:  (Not yet assigned)    Pending    Signed and Held  Obtain Informed Consent  Once      Signed and Held    Signed and Held  Body Fluid Culture - Body Fluid, Retroperitoneum  Once      Signed and Held    Signed and Held  Non-gynecologic Cytology  Once      Signed and Held    Signed and Held  Creatinine, Body Fluid - Body Fluid, Retroperitoneum  Once      Signed and Held    Signed and Held  Obtain Informed Consent  Once      Signed and Held    Signed and Held  Oxygen Therapy- Nasal Cannula; Titrate for SPO2:  per policy  Continuous PRN      Signed and Held    Signed and Held  Pulse Oximetry, Continuous  Continuous      Signed and Held    Signed and Held  POC Glucose Once  Once     Comments: Post op glucose check on all diabetic patients...Notify Anesthesia if blood sugar is less than 80 mg/dL or greater than 220 mg/dL.      Signed and Held    Signed and Held  Vital signs every 5 minutes for 15 minutes, every 15 minutes thereafter.  Once      Signed and Held    Signed and Held  Call Anesthesiologist for additional IV Fluid bolus for Hypotension/Tachycardia  Until Discontinued      Signed and Held    Signed and Held  Notify Anesthesia of Any Acute Changes in Patient Condition  Until Discontinued      Signed and Held    Signed and Held  Notify Anesthesia for Unrelieved Pain  Until Discontinued      Signed and Held    Signed and Held  HYDROcodone-acetaminophen (NORCO) 7.5-325 MG per tablet 1 tablet  Once As Needed      Signed and Held    Signed and Held  HYDROmorphone (DILAUDID) injection 0.5 mg  Every 5 Minutes PRN      Signed and Held    Signed and Held  oxyCODONE-acetaminophen (PERCOCET) 7.5-325 MG per tablet 1 tablet  Once As Needed      Signed and Held    Signed and Held  fentaNYL citrate (PF) (SUBLIMAZE) injection 50 mcg  Every 5 Minutes PRN      Signed and Held    Signed and Held  naloxone (NARCAN) injection 0.2 mg  As Needed      Signed and Held    Signed and Held  flumazenil (ROMAZICON) injection 0.2 mg  As Needed      Signed and Held    Signed and Held  ondansetron (ZOFRAN) injection 4 mg  Once As Needed      Signed and Held    Signed and Held  promethazine (PHENERGAN) suppository 25 mg  Once As Needed      Signed and Held    Signed and Held  promethazine (PHENERGAN) tablet 25 mg  Once As Needed      Signed and Held    Signed and Held  ePHEDrine injection 5 mg  Once As Needed      Signed and Held    Signed and Held  diphenhydrAMINE (BENADRYL) injection 12.5 mg  Every 15 Minutes PRN      Signed and Held    Signed  "and Held  diphenhydrAMINE (BENADRYL) capsule 25 mg  Every 30 Minutes PRN      Signed and Held    Signed and Held  Once DC criteria to floor met, follow surgeon's orders.  Until Discontinued      Signed and Held    Signed and Held  Discharge patient from PACU when discharge criteria is met.  Until Discontinued      Signed and Held    Signed and Held  labetalol (NORMODYNE,TRANDATE) injection 5 mg  Every 5 Minutes PRN      Signed and Held                   Physician Progress Notes (last 48 hours) (Notes from 20 1350 through 20 1350)      Tricia Plunkett MD at 20 1223            Infectious Diseases Progress Note    Tricia Plunkett MD     [facility]  [place]: 0 days  Patient Identification:  Name: Lyubov Middleton  Age: 65 y.o.  Sex: female  :  1955  MRN: [id number]         Primary Care Physician: Ana Huitron MD            Subjective: Feeling better than yesterday.  Right-sided flank pain is much improved.  Denies any fever and chills.  Interval History: See consultation note.    Objective:    Scheduled Meds:cefTRIAXone, 1 g, Intravenous, Q24H  [MAR Hold] citalopram, 10 mg, Oral, Daily  [MAR Hold] influenza vaccine, 0.5 mL, Intramuscular, Once  [MAR Hold] insulin lispro, 0-9 Units, Subcutaneous, TID AC  lisinopril, 20 mg, Oral, Daily      Continuous Infusions:sodium chloride 0.9 % with KCl 20 mEq, 100 mL/hr, Last Rate: 100 mL/hr (20 0925)        Vital signs in last 24 hours:  Temp:  [99.1 °F (37.3 °C)-99.7 °F (37.6 °C)] 99.7 °F (37.6 °C)  Heart Rate:  [76-88] 83  Resp:  [16-24] 20  BP: (145-170)/(69-85) 154/69    Intake/Output:    Intake/Output Summary (Last 24 hours) at 2020 1223  Last data filed at 2020 0812  Gross per 24 hour   Intake 1000 ml   Output --   Net 1000 ml       Exam:  /69 (BP Location: Right arm, Patient Position: Lying)   Pulse 83   Temp 99.7 °F (37.6 °C) (Oral)   Resp 20   Ht 162.6 cm (64.02\")   Wt 68.5 kg (151 lb 1.6 oz)   SpO2 93%   BMI " 25.92 kg/m²   Patient is examined using the personal protective equipment as per guidelines from infection control for this particular patient as enacted.  Hand washing was performed before and after patient interaction.  General Appearance:    Alert, cooperative, no distress, AAOx3                          Head:    Normocephalic, without obvious abnormality, atraumatic                           Eyes:    PERRL, conjunctivae/corneas clear, EOM's intact, both eyes                         Throat:   Lips, tongue, gums normal; oral mucosa pink and moist                           Neck:   Supple, symmetrical, trachea midline, no JVD                         Lungs:    Clear to auscultation bilaterally, respirations unlabored                 Chest Wall:    No tenderness or deformity                          Heart:    Regular rate and rhythm, S1 and S2 normal                  Abdomen:     Soft, non-tender, bowel sounds active decreased discomfort in the right flank area                 extremities:   Extremities normal, atraumatic, no cyanosis or edema                        Pulses:   Pulses palpable in all extremities                            Skin:   Skin is warm and dry,  no rashes or palpable lesions                  Neurologic: Mostly nonfocal       Data Review:    I reviewed the patient's new clinical results.  Results from last 7 days   Lab Units 12/09/20  0534 12/08/20  1506   WBC 10*3/mm3 14.00* 15.33*   HEMOGLOBIN g/dL 8.3* 9.5*   PLATELETS 10*3/mm3 318 380     Results from last 7 days   Lab Units 12/09/20  0534 12/08/20  1506   SODIUM mmol/L 136 131*   POTASSIUM mmol/L 3.2* 2.1*   CHLORIDE mmol/L 98 86*   CO2 mmol/L 29.5* 33.2*   BUN mg/dL 5* 7*   CREATININE mg/dL 0.86 1.16*   CALCIUM mg/dL 6.9* 7.9*   GLUCOSE mg/dL 273* 535*     Microbiology Results (last 10 days)     Procedure Component Value - Date/Time    COVID PRE-OP / PRE-PROCEDURE SCREENING ORDER (NO ISOLATION) - Swab, Nasopharynx [[id number]]  (Normal)  Collected: 12/08/20 1658    Lab Status: Final result Specimen: Swab from Nasopharynx Updated: 12/08/20 1836    Narrative:      The following orders were created for panel order COVID PRE-OP / PRE-PROCEDURE SCREENING ORDER (NO ISOLATION) - Swab, Nasopharynx.  Procedure                               Abnormality         Status                     ---------                               -----------         ------                     Respiratory Panel PCR w/...[[id number]]  Normal              Final result                 Please view results for these tests on the individual orders.    Respiratory Panel PCR w/COVID-19(SARS-CoV-2) TIP/BOBBY/ANDREY/PAD/COR/MAD/HUMPHREY In-House, NP Swab in UTM/VTM, 3-4 HR TAT - Swab, Nasopharynx [[id number]]  (Normal) Collected: 12/08/20 1658    Lab Status: Final result Specimen: Swab from Nasopharynx Updated: 12/08/20 1836     ADENOVIRUS, PCR Not Detected     Coronavirus 229E Not Detected     Coronavirus HKU1 Not Detected     Coronavirus NL63 Not Detected     Coronavirus OC43 Not Detected     COVID19 Not Detected     Human Metapneumovirus Not Detected     Human Rhinovirus/Enterovirus Not Detected     Influenza A PCR Not Detected     Influenza B PCR Not Detected     Parainfluenza Virus 1 Not Detected     Parainfluenza Virus 2 Not Detected     Parainfluenza Virus 3 Not Detected     Parainfluenza Virus 4 Not Detected     RSV, PCR Not Detected     Bordetella pertussis pcr Not Detected     Bordetella parapertussis PCR Not Detected     Chlamydophila pneumoniae PCR Not Detected     Mycoplasma pneumo by PCR Not Detected    Narrative:      Fact sheet for providers: https://docs.[employer]/wp-content/uploads/UXE6428-1788-LZ5.1-EUA-Provider-Fact-Sheet-3.pdf    Fact sheet for patients: https://docs.[employer]/wp-content/uploads/IRG2223-4829-UA1.1-EUA-Patient-Fact-Sheet-1.pdf    Test performed by PCR.              Assessment:  1-probable complicated pyelonephritis with complicated abscess versus  xanthogranulomatous pyelonephritis  2-evolving malignancy  3-diabetes-poorly controlled with hyperglycemia  4-anemia  5-electrolyte imbalance and renal insufficiency.  6-history of hypertension           Recommendations/Discussions:  · Continue IV ceftriaxone.  · Urology evaluation and plans for intervention noted.  Hopefully intraoperative samples will be sent for Gram stain and cultures.    · Her urinalysis upon admission was bland and blood cultures were not drawn before antibiotics were initiated.  · Value blood culture in the situation of improved patient after antibiotic treatment is unclear I think the most important issue would be to get the intraoperative sample and send it for culture to find pathogen against which antibiotic can be tailored to.  Tricia Plunkett MD  2020  12:23 EST    Much of this encounter note is an electronic transcription/translation of spoken language to printed text. The electronic translation of spoken language may permit erroneous, or at times, nonsensical words or phrases to be inadvertently transcribed; Although I have reviewed the note for such errors, some may still exist      Electronically signed by Tricia Plunkett MD at 20 1226     Harris Perez MD at 20 1000            [phone]   LOS: 0 days     Name: Lyubov Middleton  Age/Sex: 65 y.o. female  :  1955        PCP: Ana Huitron MD  Chief Complaint   Patient presents with   • Abdominal Pain   • Difficulty Urinating      Subjective   She is feeling okay today.  Denies any flank pain back pain fevers or chills.  No suprapubic tenderness either.  Appetite is fair she is feeling hungry but has not eaten today awaiting possible surgery this afternoon.  General: No Fever or Chills, Cardiac: No Chest Pain or Palpitations, Resp: No Cough or SOA, GI: No Nausea, Vomiting, or Diarrhea and Other: No bleeding    cefTRIAXone, 1 g, Intravenous, Q24H  [MAR Hold] citalopram, 10 mg, Oral, Daily  [MAR Hold]  influenza vaccine, 0.5 mL, Intramuscular, Once  [MAR Hold] insulin lispro, 0-9 Units, Subcutaneous, TID AC  lisinopril, 20 mg, Oral, Daily  sodium chloride, 3 mL, Intravenous, Q12H      lactated ringers, 9 mL/hr, Last Rate: 9 mL/hr (12/09/20 1246)  sodium chloride 0.9 % with KCl 20 mEq, 100 mL/hr, Last Rate: 100 mL/hr (12/09/20 0925)        Objective   Vital Signs  Temp:  [99.1 °F (37.3 °C)-99.7 °F (37.6 °C)] 99.7 °F (37.6 °C)  Heart Rate:  [76-88] 83  Resp:  [16-24] 20  BP: (145-170)/(69-85) 154/69  Body mass index is 25.92 kg/m².    Intake/Output Summary (Last 24 hours) at 12/9/2020 1310  Last data filed at 12/9/2020 0812  Gross per 24 hour   Intake 1000 ml   Output --   Net 1000 ml       Physical Exam  Vitals signs and nursing note reviewed.   Constitutional:       Appearance: Normal appearance.   HENT:      Head: Normocephalic and atraumatic.   Cardiovascular:      Rate and Rhythm: Normal rate and regular rhythm.   Pulmonary:      Effort: Pulmonary effort is normal.      Breath sounds: Normal breath sounds.   Abdominal:      General: Abdomen is flat.      Palpations: Abdomen is soft.   Skin:     General: Skin is warm and dry.   Neurological:      General: No focal deficit present.      Mental Status: She is alert and oriented to person, place, and time.           Results Review:       I reviewed the patient's new clinical results.  Results from last 7 days   Lab Units 12/09/20  0534 12/08/20  1506   WBC 10*3/mm3 14.00* 15.33*   HEMOGLOBIN g/dL 8.3* 9.5*   PLATELETS 10*3/mm3 318 380     Results from last 7 days   Lab Units 12/09/20  0534 12/08/20  1506   SODIUM mmol/L 136 131*   POTASSIUM mmol/L 3.2* 2.1*   CHLORIDE mmol/L 98 86*   CO2 mmol/L 29.5* 33.2*   BUN mg/dL 5* 7*   CREATININE mg/dL 0.86 1.16*   CALCIUM mg/dL 6.9* 7.9*   MAGNESIUM mg/dL  --  1.0*   Estimated Creatinine Clearance: 62.8 mL/min (by C-G formula based on SCr of 0.86 mg/dL).      Assessment/Plan   Active Hospital Problems    Diagnosis  POA   •  Hypokalemia [E87.6]  Yes      Resolved Hospital Problems   No resolved problems to display.       PLAN  This is a 65-year-old female with a history of multiple urinary tract infections in the past hypertension and diabetes who presents to the hospital with urinary frequency and dysuria is found to have significant hypokalemia as well as a possible perinephric mass versus fluid collection  -Plan for operative intervention with cystoscopy and stent placement today  -Blood and urine cultures are  -Remains on Rocephin infectious diseases following and assisting with antibiotic management  -Appreciate urology's recommendations reviewed with the patient at the bedside today  -Replace potassium aggressively today  -Renal function improving  -Transfer to [facility]      Disposition  To be determined      Harris Perez MD  [facility]  20  13:10 EST            Electronically signed by Harris Perez MD at 20 1313          Consult Notes (last 48 hours) (Notes from 20 1350 through 20 1350)      Rohan Dooley Jr., MD at 20 0657      Consult Orders    1. Inpatient Urology Consult [[id number]] ordered by Pita Gonzalez MD at 20                      FIRST UROLOGY CONSULT      Patient Identification:  NAME:  Lyubov Middleton  Age:  65 y.o.   Sex:  female   :  1955   MRN:  [id number]       Chief complaint: Right flank pain    History of present illness:  This is a 65 year old female with a history of urolithiasis who underwent bilateral ureteroscopy approximately 1 year ago with Dr. Nicola Gomez. Her stents were removed, and she was lost to follow up. She reports that 2 weeks ago she developed dull right flank pain. The pain worsened and was associated nausea. She presented to the ER. CT demonstrates bilateral nephrolithiasis with a large right UPJ stone. Adjacent to the right kidney is a large fluid collection with rim enhancement,  concerning for malignancy or abscess.     Of note, she underwent a CT abd/pelvis in August 2019 at [facility] with bilateral ureteral and renal stones but no mention of a right perinephric fluid collection or mass. Unfortunately the images are not currently available for review.      Past medical history:  Past Medical History:   Diagnosis Date   • Cancer (CMS/HCC)     left breast removed 2012   • Type 2 diabetes mellitus (CMS/HCC)        Past surgical history:  Past Surgical History:   Procedure Laterality Date   • ABDOMINAL SURGERY     • BREAST SURGERY Left 2012    removed due to cancer   • CHOLECYSTECTOMY     • HERNIA REPAIR      mesh removed       Allergies:  Patient has no known allergies.    Home medications:  Medications Prior to Admission   Medication Sig Dispense Refill Last Dose   • citalopram (CeleXA) 10 MG tablet Take 10 mg by mouth Daily.  3    • glipiZIDE (GLUCOTROL) 5 MG tablet Take 5 mg by mouth 2 (Two) Times a Day Before Meals.      • linagliptin (TRADJENTA) 5 MG tablet tablet Take 5 mg by mouth Daily.      • lisinopril (PRINIVIL,ZESTRIL) 20 MG tablet Take 20 mg by mouth Daily.  0    • rizatriptan (MAXALT) 10 MG tablet TAKE 1 TABLET BY MOUTH EVERY DAY AS NEEDED (MAX OF 2 PER 24 HOURS)  0    • metFORMIN (GLUCOPHAGE) 500 MG tablet Take 500 mg by mouth 2 (Two) Times a Day With Meals.           Hospital medications:  cefTRIAXone, 1 g, Intravenous, Q24H  citalopram, 10 mg, Oral, Daily  enoxaparin, 40 mg, Subcutaneous, Nightly  influenza vaccine, 0.5 mL, Intramuscular, Once  lisinopril, 20 mg, Oral, Daily      sodium chloride 0.9 % with KCl 20 mEq, 100 mL/hr, Last Rate: 100 mL/hr (12/09/20 0626)      •  acetaminophen  •  melatonin  •  nitroglycerin  •  ondansetron **OR** ondansetron  •  potassium chloride **OR** potassium chloride **OR** potassium chloride  •  potassium chloride **OR** potassium chloride **OR** potassium chloride  •  [COMPLETED] Insert peripheral IV **AND** sodium  chloride    Family history:  Family History   Problem Relation Age of Onset   • Diabetes Mother    • Lung disease Father    • Cancer Father        Social history:  Social History     Tobacco Use   • Smoking status: Former Smoker     Quit date: 1997     Years since quittin.3   • Smokeless tobacco: Never Used   Substance Use Topics   • Alcohol use: No   • Drug use: No       Review of systems:      Positive for:  As per HPI  Negative for:  Fevers, chills, blurry vision, headaches, sore throat, hearing loss, enlarged cervical lymph nodes, chest pain, shortness of breath, palpitations, wheezing, diarrhea, constipation, hematochezia, depressed mood, anxiety, rash, joint pain, weakness, numbness.    Objective:  TMax 24 hours:   Temp (24hrs), Av.4 °F (37.4 °C), Min:99.1 °F (37.3 °C), Max:99.6 °F (37.6 °C)      Vitals Ranges:   Temp:  [99.1 °F (37.3 °C)-99.6 °F (37.6 °C)] 99.1 °F (37.3 °C)  Heart Rate:  [76-88] 83  Resp:  [16-24] 20  BP: (145-170)/(73-85) 162/75    Intake/Output Last 3 shifts:  No intake/output data recorded.     Physical Exam:    General Appearance:    Alert, cooperative, NAD   HEENT:    No trauma, pupils reactive, hearing intact   Back:     Mild right CVA tenderness   Lungs:     Respirations unlabored, no wheezing    Heart:    RRR, intact peripheral pulses   Abdomen:     Soft, ND, mild RUQ and RLQ tenderness, no masses, no guarding   :    Pelvic not performed, bladder nondistended and nontender   Extremities:   No edema, no deformity   Lymphatic:   No neck or groin LAD   Skin:   No bleeding, bruising or rashes   Neuro/Psych:   Orientation intact, mood/affect pleasant, no focal findings       Results review:   I reviewed the patient's new clinical results.    Data review:  Lab Results (last 24 hours)     Procedure Component Value Units Date/Time    Basic Metabolic Panel [[id number]]  (Abnormal) Collected: 20    Specimen: Blood Updated: 20 06     Glucose 273 mg/dL      BUN 5  mg/dL      Creatinine 0.86 mg/dL      Sodium 136 mmol/L      Potassium 3.2 mmol/L      Chloride 98 mmol/L      CO2 29.5 mmol/L      Calcium 6.9 mg/dL      eGFR Non African Amer 66 mL/min/1.73      BUN/Creatinine Ratio 5.8     Anion Gap 8.5 mmol/L     Narrative:      GFR Normal >60  Chronic Kidney Disease <60  Kidney Failure <15      Lactic Acid, Plasma [[id number]]  (Normal) Collected: 12/09/20 0534    Specimen: Blood Updated: 12/09/20 0601     Lactate 0.9 mmol/L     CBC (No Diff) [[id number]]  (Abnormal) Collected: 12/09/20 0534    Specimen: Blood Updated: 12/09/20 0556     WBC 14.00 10*3/mm3      RBC 3.16 10*6/mm3      Hemoglobin 8.3 g/dL      Hematocrit 25.6 %      MCV 81.0 fL      MCH 26.3 pg      MCHC 32.4 g/dL      RDW 12.8 %      RDW-SD 38.1 fl      MPV 10.0 fL      Platelets 318 10*3/mm3     Magnesium [[id number]]  (Abnormal) Collected: 12/08/20 1506    Specimen: Blood Updated: 12/08/20 2034     Magnesium 1.0 mg/dL     POC Glucose Once [[id number]]  (Abnormal) Collected: 12/08/20 2022    Specimen: Blood Updated: 12/08/20 2023     Glucose 340 mg/dL     COVID PRE-OP / PRE-PROCEDURE SCREENING ORDER (NO ISOLATION) - Swab, Nasopharynx [[id number]]  (Normal) Collected: 12/08/20 1658    Specimen: Swab from Nasopharynx Updated: 12/08/20 1836    Narrative:      The following orders were created for panel order COVID PRE-OP / PRE-PROCEDURE SCREENING ORDER (NO ISOLATION) - Swab, Nasopharynx.  Procedure                               Abnormality         Status                     ---------                               -----------         ------                     Respiratory Panel PCR w/...[[id number]]  Normal              Final result                 Please view results for these tests on the individual orders.    Respiratory Panel PCR w/COVID-19(SARS-CoV-2) TIP/BOBBY/ANDREY/PAD/COR/MAD/HUMPHREY In-House, NP Swab in [facility], 3-4 HR TAT - Swab, Nasopharynx [[id number]]  (Normal) Collected: 12/08/20 1658    Specimen: Swab from  Nasopharynx Updated: 12/08/20 1836     ADENOVIRUS, PCR Not Detected     Coronavirus 229E Not Detected     Coronavirus HKU1 Not Detected     Coronavirus NL63 Not Detected     Coronavirus OC43 Not Detected     COVID19 Not Detected     Human Metapneumovirus Not Detected     Human Rhinovirus/Enterovirus Not Detected     Influenza A PCR Not Detected     Influenza B PCR Not Detected     Parainfluenza Virus 1 Not Detected     Parainfluenza Virus 2 Not Detected     Parainfluenza Virus 3 Not Detected     Parainfluenza Virus 4 Not Detected     RSV, PCR Not Detected     Bordetella pertussis pcr Not Detected     Bordetella parapertussis PCR Not Detected     Chlamydophila pneumoniae PCR Not Detected     Mycoplasma pneumo by PCR Not Detected    Narrative:      Fact sheet for providers: https://docs.[employer]/wp-content/uploads/JKV7093-8013-JW5.1-EUA-Provider-Fact-Sheet-3.pdf    Fact sheet for patients: https://docs.[employer]/wp-content/uploads/DXF9307-2067-RQ1.1-EUA-Patient-Fact-Sheet-1.pdf    Test performed by PCR.    Urinalysis, Microscopic Only - Urine, Catheter [[id number]]  (Abnormal) Collected: 12/08/20 1506    Specimen: Urine, Catheter Updated: 12/08/20 1653     RBC, UA 13-20 /HPF      WBC, UA 3-5 /HPF      Bacteria, UA Trace /HPF      Squamous Epithelial Cells, UA 3-6 /HPF      Hyaline Casts, UA 0-2 /LPF      Methodology Manual Light Microscopy    Comprehensive Metabolic Panel [[id number]]  (Abnormal) Collected: 12/08/20 1506    Specimen: Blood Updated: 12/08/20 1604     Glucose 535 mg/dL      BUN 7 mg/dL      Creatinine 1.16 mg/dL      Sodium 131 mmol/L      Potassium 2.1 mmol/L      Chloride 86 mmol/L      CO2 33.2 mmol/L      Calcium 7.9 mg/dL      Total Protein 6.9 g/dL      Albumin 3.10 g/dL      ALT (SGPT) 6 U/L      AST (SGOT) 10 U/L      Alkaline Phosphatase 102 U/L      Total Bilirubin 0.4 mg/dL      eGFR Non African Amer 47 mL/min/1.73      Globulin 3.8 gm/dL      A/G Ratio 0.8 g/dL      BUN/Creatinine  Ratio 6.0     Anion Gap 11.8 mmol/L     Narrative:      GFR Normal >60  Chronic Kidney Disease <60  Kidney Failure <15      Lipase [[id number]]  (Normal) Collected: 12/08/20 1506    Specimen: Blood Updated: 12/08/20 1550     Lipase 26 U/L     Urinalysis With Microscopic If Indicated (No Culture) - Urine, Catheter [[id number]]  (Abnormal) Collected: 12/08/20 1506    Specimen: Urine, Catheter Updated: 12/08/20 1540     Color, UA Yellow     Appearance, UA Clear     pH, UA 6.0     Specific Gravity, UA 1.010     Glucose, UA >=1000 mg/dL (3+)     Ketones, UA Negative     Bilirubin, UA Negative     Blood, UA Small (1+)     Protein, UA 30 mg/dL (1+)     Leuk Esterase, UA Negative     Nitrite, UA Negative     Urobilinogen, UA 0.2 E.U./dL    CBC & Differential [[id number]]  (Abnormal) Collected: 12/08/20 1506    Specimen: Blood Updated: 12/08/20 1519    Narrative:      The following orders were created for panel order CBC & Differential.  Procedure                               Abnormality         Status                     ---------                               -----------         ------                     CBC Auto Differential[[id number]]        Abnormal            Final result                 Please view results for these tests on the individual orders.    CBC Auto Differential [[id number]]  (Abnormal) Collected: 12/08/20 1506    Specimen: Blood Updated: 12/08/20 1519     WBC 15.33 10*3/mm3      RBC 3.68 10*6/mm3      Hemoglobin 9.5 g/dL      Hematocrit 30.0 %      MCV 81.5 fL      MCH 25.8 pg      MCHC 31.7 g/dL      RDW 12.5 %      RDW-SD 37.2 fl      MPV 9.8 fL      Platelets 380 10*3/mm3      Neutrophil % 86.2 %      Lymphocyte % 6.6 %      Monocyte % 5.9 %      Eosinophil % 0.3 %      Basophil % 0.2 %      Immature Grans % 0.8 %      Neutrophils, Absolute 13.21 10*3/mm3      Lymphocytes, Absolute 1.01 10*3/mm3      Monocytes, Absolute 0.91 10*3/mm3      Eosinophils, Absolute 0.04 10*3/mm3      Basophils, Absolute 0.03  10*3/mm3      Immature Grans, Absolute 0.13 10*3/mm3      nRBC 0.0 /100 WBC            Imaging:  Imaging Results (Last 24 Hours)     Procedure Component Value Units Date/Time    CT Abdomen Pelvis With Contrast [[id number]] Collected: 12/08/20 1938     Updated: 12/08/20 1943    Narrative:      CT ABDOMEN AND PELVIS WITH IV CONTRAST     HISTORY: 65 year old female with abdominal pain, dysuria over the past 2  weeks. History of multiple UTIs.     TECHNIQUE:  CT includes axial imaging from the lung bases to the  trochanters with intravenous contrast and without use of oral contrast.  Data reconstructed in coronal and sagittal planes.     COMPARISON: None     FINDINGS: There is a large multilobular peripherally enhancing, complex  collection centered below the right kidney within the right abdomen.  This collection contacts the anterolateral right psoas muscle and  contacts and appears to partially invade the right lateral abdominal  musculature measuring 11 x 10.3 cm axial dimension and extending 12.5 cm  craniocaudal dimension. Between this collection and the right kidney  there is a greater degree of enhancement. This appears more solid in  appearance suspected to represent a mass. There is indentation of the  adjacent inferior, lateral right renal cortex. 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 Low-density appears to  arise from the renal cortex. There is no evidence for extension into the  renal pelvis or involvement of the urothelium. Delayed phase imaging  demonstrates no extension of contrast into this mass or the low-density  below the right kidney. There is a posterior right renal cyst that  measures approximately 3 cm diameter. Right renal calyceal/infundibular  stone measures 1 cm. There is also a 0.8 cm right renal stone and a 3 mm  right renal calyceal stone. A 1 cm left renal low-density lesion is most  likely a cyst though difficult to definitively characterize.     There is  stranding/inflammation surrounding this mass/collection within  the right abdomen and there is displacement of adjacent bowel loops. The  splenic size is normal. The adrenal glands appear normal. There is mild  diffuse fat infiltration of the liver. Pancreas appears within normal  limits. There are several subcentimeter retroperitoneal lymph nodes  without enlargement. Lung bases appear clear.     There is diastases of the rectus sheath with herniation of fat and bowel  loops into the right anterior abdomen and pelvic wall. Fluid-filled  small bowel loops are present which contain air-fluid levels and this  may be associated with a mild ileus. There is no dilatation to suggest  obstruction. Within the midline, infraumbilical abdominal wall just deep  to the rectus abdominis muscle curvature there is a low-density mass or  collection that measures 3.2 x 1.7 cm. This could represent a chronic  postsurgical collection. Bone windows demonstrate no osseous lesion.     FINDINGS:   1. Large multilobular right abdominal mass/collection measures  approximately 11 x 10.3 x 12.5 cm. This appears to arise from the right  kidney and at the upper margin of this lesion there is masslike  thickening suspicious for renal cell carcinoma with complex adjacent  collection or abscess with apparent invasion of the right lateral  abdominal wall. Urologic consultation recommended. Biopsy or attempt at  CT-guided drainage could be performed under CT guidance if indicated.  2. Right renal calyceal infundibular stones without hydronephrosis.  Right renal cysts. A 1 cm left renal low-density lesion is most likely a  cyst though difficult to definitely characterize.  3. 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 There is an infraumbilical  collection or low-density lesion along the deep margin of the rectus  abdominis musculature. This could represent a small chronic  seroma  associated with previous surgery.     Discussed with ANSELMO Sutton, in the emergency department 2020  at 7:10 PM.     Radiation dose reduction techniques were utilized, including automated  exposure control and exposure modulation based on body size.     This report was finalized on 2020 7:40 PM by Dr. Hawk Bonilla M.D.                Assessment:       Hypokalemia    Nephrolithiasis  Right perinephric mass versus fluid collection    Plan:     - On my review of her CT scan, there is a large right upper pole infundibular calculus with likely calyceal rupture and perinephric urinoma/abscess. Malignancy is less likely  - recommend drain placement by interventional radiology  - will attempt cystoscopy and ureteral stent placement  - blood culture, urine culture  - continue ceftriaxone  - NPO    Roahn Dooley Jr., MD  20  06:57 EST        Electronically signed by Rohan Dooley Jr., MD at 20 0705     Tricia Plunkett MD at 20 2120      Consult Orders    1. Inpatient Infectious Diseases Consult [[id number]] ordered by Pita Gonzalez MD at 20               CONSULT NOTE    Infectious Diseases - Tricia Laura MD  [facility]       Patient Identification:  Name: Lyubov Middleton  Age: 65 y.o.  Sex: female  :  1955  MRN: [id number]             Date of Consultation: 2020      Primary Care Physician: Ana Huitron MD                               Requesting Physician: Dr. Buckner  Reason for Consultation: Fever and UTI    Impression: Patient is a 65-year-old female with past medical history remarkable for diabetes, history of breast surgery in 2012 for breast cancer, was in her usual state of health until couple weeks ago when started having abdominal discomfort mainly involving the right side of her abdomen associated with generalized abdominal discomfort sense of ill health and frequent urination.  Patient has history of  kidney stone with history of  intervention including right ureteroscopy and removal of left stent and lithotripsy in November 2020.  With the symptoms of pelvic she may be having another kidney stone or urinary tract infection resulting in presentation to the emergency room where work-up revealed low-grade temperature along with CT scan showing evidence of complicated right-sided abdominal mass adjacent to the right kidney concerning for either evolving malignancy or complicated abscess.  Her urinalysis as mentioned was essentially unremarkable or bland.  Patient was noted to have white blood cell count of 15,000.  Patient is being admitted for further care and empiric Rocephin has been initiated.  Patient is very pleasant but does not give the fine details about her history as one would like in this complicated situation.  This presentation of abdominal discomfort with frequency and urgency of urination in the context of previous history of recurrent kidney stones and  intervention and evidence of abnormal mass in the vicinity of right kidney is concerning for:  1-probable complicated pyelonephritis with complicated abscess versus xanthogranulomatous pyelonephritis  2-evolving malignancy  3-diabetes-poorly controlled with hyperglycemia  4-anemia  5-electrolyte imbalance and renal insufficiency.  6-history of hypertension        Recommendations/Discussions: At this juncture agree with the care plan consisting of IV Rocephin with aggressive management of metabolic abnormalities electrolyte imbalance while awaiting opinion from urology service.  Antibiotics have been already started so follow blood culture at this juncture is unclear but would recommend performing blood culture if she spikes temperature.  Monitor her hemodynamics and periodically assess for evolving septic parameters for which appropriate intervention can be provided.  Once the etiology of the right perinephric mass is established then further  decision about whether to continue antibiotics or her treatment will be directed towards evolving malignancy can be made.  Thank you Dr. Perry for letting me be the part of your patient care please see above impression and recommendations.        History of Present Illness:   Patient is a 65-year-old female with past medical history remarkable for diabetes, history of breast surgery in 2012 for breast cancer, was in her usual state of health until couple weeks ago when started having abdominal discomfort mainly involving the right side of her abdomen associated with generalized abdominal discomfort sense of ill health and frequent urination.  Patient has history of kidney stone with history of  intervention including right ureteroscopy and removal of left stent and lithotripsy in November 2020.  With the symptoms of pelvic she may be having another kidney stone or urinary tract infection resulting in presentation to the emergency room where work-up revealed low-grade temperature along with CT scan showing evidence of complicated right-sided abdominal mass adjacent to the right kidney concerning for either evolving malignancy or complicated abscess.  Her urinalysis as mentioned was essentially unremarkable or bland.  Patient was noted to have white blood cell count of 15,000.  Patient is being admitted for further care and empiric Rocephin has been initiated.  Patient is very pleasant but does not give the fine details about her history as one would like in this complicated situation.      Past Medical History:  Past Medical History:   Diagnosis Date   • Cancer (CMS/HCC)     left breast removed 2012   • Type 2 diabetes mellitus (CMS/HCC)      Past Surgical History:  Past Surgical History:   Procedure Laterality Date   • ABDOMINAL SURGERY     • BREAST SURGERY Left 2012    removed due to cancer   • CHOLECYSTECTOMY     • HERNIA REPAIR      mesh removed      Home Meds:  Medications Prior to Admission   Medication Sig  Dispense Refill Last Dose   • citalopram (CeleXA) 10 MG tablet Take 10 mg by mouth Daily.  3    • glipiZIDE (GLUCOTROL) 5 MG tablet Take 5 mg by mouth 2 (Two) Times a Day Before Meals.      • lisinopril (PRINIVIL,ZESTRIL) 20 MG tablet Take 20 mg by mouth Daily.  0    • metFORMIN (GLUCOPHAGE) 500 MG tablet Take 500 mg by mouth 2 (Two) Times a Day With Meals.      • rizatriptan (MAXALT) 10 MG tablet TAKE 1 TABLET BY MOUTH EVERY DAY AS NEEDED (MAX OF 2 PER 24 HOURS)  0      Current Meds:     Current Facility-Administered Medications:   •  acetaminophen (TYLENOL) tablet 650 mg, 650 mg, Oral, Q4H PRN, Pita Gonzalez MD  •  cefTRIAXone (ROCEPHIN) IVPB 1 g, 1 g, Intravenous, Q24H, Pita Gonzalez MD  •  [START ON 12/9/2020] citalopram (CeleXA) tablet 10 mg, 10 mg, Oral, Daily, Pita Gonzalez MD  •  enoxaparin (LOVENOX) syringe 40 mg, 40 mg, Subcutaneous, Nightly, Pita Gonzalez MD  •  [START ON 12/9/2020] lisinopril (PRINIVIL,ZESTRIL) tablet 20 mg, 20 mg, Oral, Daily, Pita Gonzalez MD  •  melatonin tablet 3 mg, 3 mg, Oral, Nightly PRN, Pita Gonzalez MD  •  nitroglycerin (NITROSTAT) SL tablet 0.4 mg, 0.4 mg, Sublingual, Q5 Min PRN, Pita Gonzalez MD  •  ondansetron (ZOFRAN) tablet 4 mg, 4 mg, Oral, Q6H PRN **OR** ondansetron (ZOFRAN) injection 4 mg, 4 mg, Intravenous, Q6H PRN, Pita Gonzalez MD  •  potassium chloride (K-DUR,KLOR-CON) ER tablet 40 mEq, 40 mEq, Oral, PRN, 40 mEq at 12/08/20 1652 **OR** potassium chloride (KLOR-CON) packet 40 mEq, 40 mEq, Oral, PRN **OR** potassium chloride 10 mEq in 100 mL IVPB, 10 mEq, Intravenous, Q1H PRN, Margaret Bernal PA  •  potassium chloride (K-DUR,KLOR-CON) ER tablet 40 mEq, 40 mEq, Oral, PRN **OR** potassium chloride (KLOR-CON) packet 40 mEq, 40 mEq, Oral, PRN **OR** potassium chloride 10 mEq in 100 mL IVPB, 10 mEq, Intravenous, Q1H PRN, Pita Gonzalez MD  •  [COMPLETED] Insert peripheral IV, , , Once  "**AND** sodium chloride 0.9 % flush 10 mL, 10 mL, Intravenous, PRN, Damien Acuna II, MD, 10 mL at 20 1506  •  sodium chloride 0.9 % with KCl 20 mEq/L infusion, 100 mL/hr, Intravenous, Continuous, Pita Gonzalez MD  Allergies:  No Known Allergies  Social History:   Social History     Tobacco Use   • Smoking status: Former Smoker     Quit date: 1997     Years since quittin.3   • Smokeless tobacco: Never Used   Substance Use Topics   • Alcohol use: No      Family History:  Family History   Problem Relation Age of Onset   • Diabetes Mother    • Lung disease Father    • Cancer Father           Review of Systems  See history of present illness and past medical history.  Constitutional: Remarkable for generalized weakness denies any systemic fever and chills but admits to have night sweats.  Cardiovascular: Remarkable for no chest pain or shortness of breath  GI: Remarkable for preserved appetite denies any nausea vomiting or diarrhea  : Remarkable for frequency and urgency but denies any burning in urination does admit to have flank pain and lower abdominal pain.  Musculoskeletal: Remarkable for right-sided back pain.  Neurological: Remarkable for no loss of consciousness or continence.    Vitals:   /78 (BP Location: Right arm, Patient Position: Lying)   Pulse 88   Temp 99.6 °F (37.6 °C) (Tympanic)   Resp 24   Ht 162.6 cm (64\")   Wt 64.9 kg (143 lb)   SpO2 96%   BMI 24.55 kg/m²   I/O:     Intake/Output Summary (Last 24 hours) at 2020  Last data filed at 2020  Gross per 24 hour   Intake 1000 ml   Output --   Net 1000 ml     Exam:  Patient is examined using the personal protective equipment as per guidelines from infection control for this particular patient as enacted.  Hand washing was performed before and after patient interaction.  General Appearance:    Alert, cooperative, pleasant female who does not appear to be toxic or in any distress, appears " stated age   Head:    Normocephalic, without obvious abnormality, atraumatic   Eyes:    PERRL, conjunctivae/corneas clear, EOM's intact, both eyes   Ears:    Normal external ear canals, both ears   Nose:   Nares normal, septum midline, mucosa normal, no drainage    or sinus tenderness   Throat:   Lips, tongue, gums normal; oral mucosa pink and moist   Neck:   Supple, symmetrical, trachea midline, no adenopathy;     thyroid:  no enlargement/tenderness/nodules; no carotid    bruit or JVD   Back:     Symmetric, no curvature, ROM normal, mild right CVA tenderness   Lungs:     Clear to auscultation bilaterally, respirations unlabored   Chest Wall:    No tenderness or deformity    Heart:    Regular rate and rhythm, S1 and S2 normal, no murmur, rub   or gallop   Abdomen:    Soft, mild generalized tenderness with prominent tenderness in the right side of her abdomen   Extremities:   Extremities normal, atraumatic, no cyanosis or edema   Pulses:   Pulses palpable in all extremities; symmetric all extremities   Skin:   Skin color normal, Skin is warm and dry,  no rashes or palpable lesions   Neurologic:   CNII-XII intact, motor strength grossly intact, sensation grossly intact to light touch, no focal deficits noted       Data Review:    I reviewed the patient's new clinical results.  Results from last 7 days   Lab Units 12/08/20  1506   WBC 10*3/mm3 15.33*   HEMOGLOBIN g/dL 9.5*   PLATELETS 10*3/mm3 380     Results from last 7 days   Lab Units 12/08/20  1506   SODIUM mmol/L 131*   POTASSIUM mmol/L 2.1*   CHLORIDE mmol/L 86*   CO2 mmol/L 33.2*   BUN mg/dL 7*   CREATININE mg/dL 1.16*   CALCIUM mg/dL 7.9*   GLUCOSE mg/dL 535*     Microbiology Results (last 10 days)     Procedure Component Value - Date/Time    COVID PRE-OP / PRE-PROCEDURE SCREENING ORDER (NO ISOLATION) - Swab, Nasopharynx [[id number]]  (Normal) Collected: 12/08/20 1658    Lab Status: Final result Specimen: Swab from Nasopharynx Updated: 12/08/20 6817     Narrative:      The following orders were created for panel order COVID PRE-OP / PRE-PROCEDURE SCREENING ORDER (NO ISOLATION) - Swab, Nasopharynx.  Procedure                               Abnormality         Status                     ---------                               -----------         ------                     Respiratory Panel PCR w/...[[id number]]  Normal              Final result                 Please view results for these tests on the individual orders.    Respiratory Panel PCR w/COVID-19(SARS-CoV-2) TIP/BOBBY/ANDREY/PAD/COR/MAD/HUMPHREY In-House, NP Swab in UTM/VTM, 3-4 HR TAT - Swab, Nasopharynx [[id number]]  (Normal) Collected: 12/08/20 1658    Lab Status: Final result Specimen: Swab from Nasopharynx Updated: 12/08/20 1836     ADENOVIRUS, PCR Not Detected     Coronavirus 229E Not Detected     Coronavirus HKU1 Not Detected     Coronavirus NL63 Not Detected     Coronavirus OC43 Not Detected     COVID19 Not Detected     Human Metapneumovirus Not Detected     Human Rhinovirus/Enterovirus Not Detected     Influenza A PCR Not Detected     Influenza B PCR Not Detected     Parainfluenza Virus 1 Not Detected     Parainfluenza Virus 2 Not Detected     Parainfluenza Virus 3 Not Detected     Parainfluenza Virus 4 Not Detected     RSV, PCR Not Detected     Bordetella pertussis pcr Not Detected     Bordetella parapertussis PCR Not Detected     Chlamydophila pneumoniae PCR Not Detected     Mycoplasma pneumo by PCR Not Detected    Narrative:      Fact sheet for providers: https://docs.[employer]/wp-content/uploads/WVV9059-3386-PW4.1-EUA-Provider-Fact-Sheet-3.pdf    Fact sheet for patients: https://docs.[employer]/wp-content/uploads/VEB7156-8764-NC9.1-EUA-Patient-Fact-Sheet-1.pdf    Test performed by PCR.            Assessment:  Active Hospital Problems    Diagnosis  POA   • Hypokalemia [E87.6]  Yes      Resolved Hospital Problems   No resolved problems to display.         Plan:  See above  Tricia Plunkett,  MD   12/8/2020  21:20 EST    Much of this encounter note is an electronic transcription/translation of spoken language to printed text. The electronic translation of spoken language may permit erroneous, or at times, nonsensical words or phrases to be inadvertently transcribed; Although I have reviewed the note for such errors, some may still exist      Electronically signed by Tricia Plunkett MD at 12/09/20 0629

## 2020-12-09 NOTE — PLAN OF CARE
Goal Outcome Evaluation:  Plan of Care Reviewed With: patient     Outcome Summary: Patient admitted from the ED with hypokalemia and elevated Blood sugars, and UTI, will continue to monitor closely.  Problem: Adult Inpatient Plan of Care  Goal: Absence of Hospital-Acquired Illness or Injury  Intervention: Identify and Manage Fall Risk  Recent Flowsheet Documentation  Taken 12/9/2020 0200 by Tete Cao, RN  Safety Promotion/Fall Prevention:   activity supervised   safety round/check completed  Taken 12/9/2020 0000 by Tete Cao RN  Safety Promotion/Fall Prevention:   activity supervised   safety round/check completed  Taken 12/8/2020 2200 by Tete Cao, RN  Safety Promotion/Fall Prevention: safety round/check completed  Taken 12/8/2020 2035 by Tete Cao, RN  Safety Promotion/Fall Prevention:   activity supervised   safety round/check completed

## 2020-12-09 NOTE — CONSULTS
FIRST UROLOGY CONSULT      Patient Identification:  NAME:  Lyubov Middleton  Age:  65 y.o.   Sex:  female   :  1955   MRN:  [id number]       Chief complaint: Right flank pain    History of present illness:  This is a 65 year old female with a history of urolithiasis who underwent bilateral ureteroscopy approximately 1 year ago with Dr. Nicola Gomez. Her stents were removed, and she was lost to follow up. She reports that 2 weeks ago she developed dull right flank pain. The pain worsened and was associated nausea. She presented to the ER. CT demonstrates bilateral nephrolithiasis with a large right UPJ stone. Adjacent to the right kidney is a large fluid collection with rim enhancement, concerning for malignancy or abscess.     Of note, she underwent a CT abd/pelvis in 2019 at [facility] with bilateral ureteral and renal stones but no mention of a right perinephric fluid collection or mass. Unfortunately the images are not currently available for review.      Past medical history:  Past Medical History:   Diagnosis Date   • Cancer (CMS/HCC)     left breast removed    • Type 2 diabetes mellitus (CMS/HCC)        Past surgical history:  Past Surgical History:   Procedure Laterality Date   • ABDOMINAL SURGERY     • BREAST SURGERY Left     removed due to cancer   • CHOLECYSTECTOMY     • HERNIA REPAIR      mesh removed       Allergies:  Patient has no known allergies.    Home medications:  Medications Prior to Admission   Medication Sig Dispense Refill Last Dose   • citalopram (CeleXA) 10 MG tablet Take 10 mg by mouth Daily.  3    • glipiZIDE (GLUCOTROL) 5 MG tablet Take 5 mg by mouth 2 (Two) Times a Day Before Meals.      • linagliptin (TRADJENTA) 5 MG tablet tablet Take 5 mg by mouth Daily.      • lisinopril (PRINIVIL,ZESTRIL) 20 MG tablet Take 20 mg by mouth Daily.  0    • rizatriptan (MAXALT) 10 MG tablet TAKE 1 TABLET BY MOUTH EVERY DAY AS NEEDED (MAX OF 2 PER 24  HOURS)  0    • metFORMIN (GLUCOPHAGE) 500 MG tablet Take 500 mg by mouth 2 (Two) Times a Day With Meals.           Hospital medications:  cefTRIAXone, 1 g, Intravenous, Q24H  citalopram, 10 mg, Oral, Daily  enoxaparin, 40 mg, Subcutaneous, Nightly  influenza vaccine, 0.5 mL, Intramuscular, Once  lisinopril, 20 mg, Oral, Daily      sodium chloride 0.9 % with KCl 20 mEq, 100 mL/hr, Last Rate: 100 mL/hr (20 06)      •  acetaminophen  •  melatonin  •  nitroglycerin  •  ondansetron **OR** ondansetron  •  potassium chloride **OR** potassium chloride **OR** potassium chloride  •  potassium chloride **OR** potassium chloride **OR** potassium chloride  •  [COMPLETED] Insert peripheral IV **AND** sodium chloride    Family history:  Family History   Problem Relation Age of Onset   • Diabetes Mother    • Lung disease Father    • Cancer Father        Social history:  Social History     Tobacco Use   • Smoking status: Former Smoker     Quit date: 1997     Years since quittin.3   • Smokeless tobacco: Never Used   Substance Use Topics   • Alcohol use: No   • Drug use: No       Review of systems:      Positive for:  As per HPI  Negative for:  Fevers, chills, blurry vision, headaches, sore throat, hearing loss, enlarged cervical lymph nodes, chest pain, shortness of breath, palpitations, wheezing, diarrhea, constipation, hematochezia, depressed mood, anxiety, rash, joint pain, weakness, numbness.    Objective:  TMax 24 hours:   Temp (24hrs), Av.4 °F (37.4 °C), Min:99.1 °F (37.3 °C), Max:99.6 °F (37.6 °C)      Vitals Ranges:   Temp:  [99.1 °F (37.3 °C)-99.6 °F (37.6 °C)] 99.1 °F (37.3 °C)  Heart Rate:  [76-88] 83  Resp:  [16-24] 20  BP: (145-170)/(73-85) 162/75    Intake/Output Last 3 shifts:  No intake/output data recorded.     Physical Exam:    General Appearance:    Alert, cooperative, NAD   HEENT:    No trauma, pupils reactive, hearing intact   Back:     Mild right CVA tenderness   Lungs:     Respirations  unlabored, no wheezing    Heart:    RRR, intact peripheral pulses   Abdomen:     Soft, ND, mild RUQ and RLQ tenderness, no masses, no guarding   :    Pelvic not performed, bladder nondistended and nontender   Extremities:   No edema, no deformity   Lymphatic:   No neck or groin LAD   Skin:   No bleeding, bruising or rashes   Neuro/Psych:   Orientation intact, mood/affect pleasant, no focal findings       Results review:   I reviewed the patient's new clinical results.    Data review:  Lab Results (last 24 hours)     Procedure Component Value Units Date/Time    Basic Metabolic Panel [[id number]]  (Abnormal) Collected: 12/09/20 0534    Specimen: Blood Updated: 12/09/20 0653     Glucose 273 mg/dL      BUN 5 mg/dL      Creatinine 0.86 mg/dL      Sodium 136 mmol/L      Potassium 3.2 mmol/L      Chloride 98 mmol/L      CO2 29.5 mmol/L      Calcium 6.9 mg/dL      eGFR Non African Amer 66 mL/min/1.73      BUN/Creatinine Ratio 5.8     Anion Gap 8.5 mmol/L     Narrative:      GFR Normal >60  Chronic Kidney Disease <60  Kidney Failure <15      Lactic Acid, Plasma [[id number]]  (Normal) Collected: 12/09/20 0534    Specimen: Blood Updated: 12/09/20 0601     Lactate 0.9 mmol/L     CBC (No Diff) [[id number]]  (Abnormal) Collected: 12/09/20 0534    Specimen: Blood Updated: 12/09/20 0556     WBC 14.00 10*3/mm3      RBC 3.16 10*6/mm3      Hemoglobin 8.3 g/dL      Hematocrit 25.6 %      MCV 81.0 fL      MCH 26.3 pg      MCHC 32.4 g/dL      RDW 12.8 %      RDW-SD 38.1 fl      MPV 10.0 fL      Platelets 318 10*3/mm3     Magnesium [[id number]]  (Abnormal) Collected: 12/08/20 1506    Specimen: Blood Updated: 12/08/20 2034     Magnesium 1.0 mg/dL     POC Glucose Once [[id number]]  (Abnormal) Collected: 12/08/20 2022    Specimen: Blood Updated: 12/08/20 2023     Glucose 340 mg/dL     COVID PRE-OP / PRE-PROCEDURE SCREENING ORDER (NO ISOLATION) - Swab, Nasopharynx [[id number]]  (Normal) Collected: 12/08/20 1658    Specimen: Swab from Nasopharynx  Updated: 12/08/20 1836    Narrative:      The following orders were created for panel order COVID PRE-OP / PRE-PROCEDURE SCREENING ORDER (NO ISOLATION) - Swab, Nasopharynx.  Procedure                               Abnormality         Status                     ---------                               -----------         ------                     Respiratory Panel PCR w/...[[id number]]  Normal              Final result                 Please view results for these tests on the individual orders.    Respiratory Panel PCR w/COVID-19(SARS-CoV-2) TIP/BOBBY/ANDREY/PAD/COR/MAD/HUMPHREY In-House, NP Swab in UTM/VTM, 3-4 HR TAT - Swab, Nasopharynx [[id number]]  (Normal) Collected: 12/08/20 1658    Specimen: Swab from Nasopharynx Updated: 12/08/20 1836     ADENOVIRUS, PCR Not Detected     Coronavirus 229E Not Detected     Coronavirus HKU1 Not Detected     Coronavirus NL63 Not Detected     Coronavirus OC43 Not Detected     COVID19 Not Detected     Human Metapneumovirus Not Detected     Human Rhinovirus/Enterovirus Not Detected     Influenza A PCR Not Detected     Influenza B PCR Not Detected     Parainfluenza Virus 1 Not Detected     Parainfluenza Virus 2 Not Detected     Parainfluenza Virus 3 Not Detected     Parainfluenza Virus 4 Not Detected     RSV, PCR Not Detected     Bordetella pertussis pcr Not Detected     Bordetella parapertussis PCR Not Detected     Chlamydophila pneumoniae PCR Not Detected     Mycoplasma pneumo by PCR Not Detected    Narrative:      Fact sheet for providers: https://docs.[employer]/wp-content/uploads/GZJ6642-4797-WU9.1-EUA-Provider-Fact-Sheet-3.pdf    Fact sheet for patients: https://docs.[employer]/wp-content/uploads/HJR7254-2514-HN1.1-EUA-Patient-Fact-Sheet-1.pdf    Test performed by PCR.    Urinalysis, Microscopic Only - Urine, Catheter [[id number]]  (Abnormal) Collected: 12/08/20 1506    Specimen: Urine, Catheter Updated: 12/08/20 1653     RBC, UA 13-20 /HPF      WBC, UA 3-5 /HPF      Bacteria, UA  Trace /HPF      Squamous Epithelial Cells, UA 3-6 /HPF      Hyaline Casts, UA 0-2 /LPF      Methodology Manual Light Microscopy    Comprehensive Metabolic Panel [[id number]]  (Abnormal) Collected: 12/08/20 1506    Specimen: Blood Updated: 12/08/20 1604     Glucose 535 mg/dL      BUN 7 mg/dL      Creatinine 1.16 mg/dL      Sodium 131 mmol/L      Potassium 2.1 mmol/L      Chloride 86 mmol/L      CO2 33.2 mmol/L      Calcium 7.9 mg/dL      Total Protein 6.9 g/dL      Albumin 3.10 g/dL      ALT (SGPT) 6 U/L      AST (SGOT) 10 U/L      Alkaline Phosphatase 102 U/L      Total Bilirubin 0.4 mg/dL      eGFR Non African Amer 47 mL/min/1.73      Globulin 3.8 gm/dL      A/G Ratio 0.8 g/dL      BUN/Creatinine Ratio 6.0     Anion Gap 11.8 mmol/L     Narrative:      GFR Normal >60  Chronic Kidney Disease <60  Kidney Failure <15      Lipase [[id number]]  (Normal) Collected: 12/08/20 1506    Specimen: Blood Updated: 12/08/20 1550     Lipase 26 U/L     Urinalysis With Microscopic If Indicated (No Culture) - Urine, Catheter [[id number]]  (Abnormal) Collected: 12/08/20 1506    Specimen: Urine, Catheter Updated: 12/08/20 1540     Color, UA Yellow     Appearance, UA Clear     pH, UA 6.0     Specific Gravity, UA 1.010     Glucose, UA >=1000 mg/dL (3+)     Ketones, UA Negative     Bilirubin, UA Negative     Blood, UA Small (1+)     Protein, UA 30 mg/dL (1+)     Leuk Esterase, UA Negative     Nitrite, UA Negative     Urobilinogen, UA 0.2 E.U./dL    CBC & Differential [[id number]]  (Abnormal) Collected: 12/08/20 1506    Specimen: Blood Updated: 12/08/20 1517    Narrative:      The following orders were created for panel order CBC & Differential.  Procedure                               Abnormality         Status                     ---------                               -----------         ------                     CBC Auto Differential[[id number]]        Abnormal            Final result                 Please view results for these tests on  the individual orders.    CBC Auto Differential [[id number]]  (Abnormal) Collected: 12/08/20 1506    Specimen: Blood Updated: 12/08/20 1519     WBC 15.33 10*3/mm3      RBC 3.68 10*6/mm3      Hemoglobin 9.5 g/dL      Hematocrit 30.0 %      MCV 81.5 fL      MCH 25.8 pg      MCHC 31.7 g/dL      RDW 12.5 %      RDW-SD 37.2 fl      MPV 9.8 fL      Platelets 380 10*3/mm3      Neutrophil % 86.2 %      Lymphocyte % 6.6 %      Monocyte % 5.9 %      Eosinophil % 0.3 %      Basophil % 0.2 %      Immature Grans % 0.8 %      Neutrophils, Absolute 13.21 10*3/mm3      Lymphocytes, Absolute 1.01 10*3/mm3      Monocytes, Absolute 0.91 10*3/mm3      Eosinophils, Absolute 0.04 10*3/mm3      Basophils, Absolute 0.03 10*3/mm3      Immature Grans, Absolute 0.13 10*3/mm3      nRBC 0.0 /100 WBC            Imaging:  Imaging Results (Last 24 Hours)     Procedure Component Value Units Date/Time    CT Abdomen Pelvis With Contrast [[id number]] Collected: 12/08/20 1938     Updated: 12/08/20 1943    Narrative:      CT ABDOMEN AND PELVIS WITH IV CONTRAST     HISTORY: 65 year old female with abdominal pain, dysuria over the past 2  weeks. History of multiple UTIs.     TECHNIQUE:  CT includes axial imaging from the lung bases to the  trochanters with intravenous contrast and without use of oral contrast.  Data reconstructed in coronal and sagittal planes.     COMPARISON: None     FINDINGS: There is a large multilobular peripherally enhancing, complex  collection centered below the right kidney within the right abdomen.  This collection contacts the anterolateral right psoas muscle and  contacts and appears to partially invade the right lateral abdominal  musculature measuring 11 x 10.3 cm axial dimension and extending 12.5 cm  craniocaudal dimension. Between this collection and the right kidney  there is a greater degree of enhancement. This appears more solid in  appearance suspected to represent a mass. There is indentation of the  adjacent  inferior, lateral right renal cortex. 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 Low-density appears to  arise from the renal cortex. There is no evidence for extension into the  renal pelvis or involvement of the urothelium. Delayed phase imaging  demonstrates no extension of contrast into this mass or the low-density  below the right kidney. There is a posterior right renal cyst that  measures approximately 3 cm diameter. Right renal calyceal/infundibular  stone measures 1 cm. There is also a 0.8 cm right renal stone and a 3 mm  right renal calyceal stone. A 1 cm left renal low-density lesion is most  likely a cyst though difficult to definitively characterize.     There is stranding/inflammation surrounding this mass/collection within  the right abdomen and there is displacement of adjacent bowel loops. The  splenic size is normal. The adrenal glands appear normal. There is mild  diffuse fat infiltration of the liver. Pancreas appears within normal  limits. There are several subcentimeter retroperitoneal lymph nodes  without enlargement. Lung bases appear clear.     There is diastases of the rectus sheath with herniation of fat and bowel  loops into the right anterior abdomen and pelvic wall. Fluid-filled  small bowel loops are present which contain air-fluid levels and this  may be associated with a mild ileus. There is no dilatation to suggest  obstruction. Within the midline, infraumbilical abdominal wall just deep  to the rectus abdominis muscle curvature there is a low-density mass or  collection that measures 3.2 x 1.7 cm. This could represent a chronic  postsurgical collection. Bone windows demonstrate no osseous lesion.     FINDINGS:   1. Large multilobular right abdominal mass/collection measures  approximately 11 x 10.3 x 12.5 cm. This appears to arise from the right  kidney and at the upper margin of this lesion there is masslike  thickening suspicious for renal cell carcinoma  with complex adjacent  collection or abscess with apparent invasion of the right lateral  abdominal wall. Urologic consultation recommended. Biopsy or attempt at  CT-guided drainage could be performed under CT guidance if indicated.  2. Right renal calyceal infundibular stones without hydronephrosis.  Right renal cysts. A 1 cm left renal low-density lesion is most likely a  cyst though difficult to definitely characterize.  3. 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 There is an infraumbilical  collection or low-density lesion along the deep margin of the rectus  abdominis musculature. This could represent a small chronic seroma  associated with previous surgery.     Discussed with ANSELMO Sutton, in the emergency department 12/08/2020  at 7:10 PM.     Radiation dose reduction techniques were utilized, including automated  exposure control and exposure modulation based on body size.     This report was finalized on 12/8/2020 7:40 PM by Dr. Hawk Bonilla M.D.                Assessment:       Hypokalemia    Nephrolithiasis  Right perinephric mass versus fluid collection    Plan:     - On my review of her CT scan, there is a large right upper pole infundibular calculus with likely calyceal rupture and perinephric urinoma/abscess. Malignancy is less likely  - recommend drain placement by interventional radiology  - will attempt cystoscopy and ureteral stent placement  - blood culture, urine culture  - continue ceftriaxone  - NPO    Rohan Dooley Jr., MD  12/09/20  06:57 EST

## 2020-12-09 NOTE — CONSULTS
Neurology Consult Note    Referring Provider: Dr. Hudson  Reason for Consultation: seizure like activity  Consult called: 15:34    History of present illness:    The patient is a 65 year old woman who underwent cystoscopy, urinary stent placement and lithotripsy today.    After arrival in PACU she was alert and oriented. Around 3 pm she became rigid, less responsive and developed sonorous respirations. Oxygen saturation was maintained.  Blood pressure increased to 212/105.    After 1-2 minutes, she relaxed and breathing normalized. Slowly she has appeared more alert but is still non verbal.    Patient received propofol, fentanyl and dilaudid during procedure.    She received one dose Levaquin at 1:30 pm    On my arrival to unit I ordered stat head CT.    Past Medical History  Past Medical History:   Diagnosis Date   • Cancer (CMS/HCC)     left breast removed    • Type 2 diabetes mellitus (CMS/HCC)        Past Surgical History  Past Surgical History:   Procedure Laterality Date   • ABDOMINAL SURGERY     • BREAST SURGERY Left 2012    removed due to cancer   • CHOLECYSTECTOMY     • HERNIA REPAIR      mesh removed       Family History  Family History   Problem Relation Age of Onset   • Diabetes Mother    • Lung disease Father    • Cancer Father        No Known Allergies    Social History  Social History     Socioeconomic History   • Marital status: Unknown     Spouse name: Not on file   • Number of children: Not on file   • Years of education: Not on file   • Highest education level: Not on file   Tobacco Use   • Smoking status: Former Smoker     Quit date: 1997     Years since quittin.3   • Smokeless tobacco: Never Used   Substance and Sexual Activity   • Alcohol use: No   • Drug use: No   • Sexual activity: Never       Review of Systems   Unable to perform ROS: Mental status change       Medications  Scheduled Meds:cefTRIAXone, 1 g, Intravenous, Q24H  [MAR Hold] citalopram, 10 mg, Oral, Daily  [MAR Hold]  influenza vaccine, 0.5 mL, Intramuscular, Once  [MAR Hold] insulin lispro, 0-9 Units, Subcutaneous, TID AC  lisinopril, 20 mg, Oral, Daily      Continuous Infusions:lactated ringers, 9 mL/hr, Last Rate: 9 mL/hr (12/09/20 1246)  sodium chloride 0.9 % with KCl 20 mEq, 100 mL/hr, Last Rate: 100 mL/hr (12/09/20 0925)      PRN Meds:.•  [MAR Hold] acetaminophen  •  [MAR Hold] dextrose  •  [MAR Hold] dextrose  •  diphenhydrAMINE  •  diphenhydrAMINE  •  ePHEDrine  •  fentanyl  •  flumazenil  •  [MAR Hold] glucagon (human recombinant)  •  HYDROcodone-acetaminophen  •  HYDROmorphone  •  labetalol  •  [MAR Hold] melatonin  •  naloxone  •  [MAR Hold] nitroglycerin  •  [MAR Hold] ondansetron **OR** [MAR Hold] ondansetron  •  ondansetron  •  oxyCODONE-acetaminophen  •  potassium chloride **OR** potassium chloride **OR** potassium chloride  •  potassium chloride **OR** potassium chloride **OR** potassium chloride  •  promethazine **OR** promethazine  •  [COMPLETED] Insert peripheral IV **AND** [MAR Hold] sodium chloride    Vital Signs   Temp:  [99.1 °F (37.3 °C)-102.5 °F (39.2 °C)] 102.5 °F (39.2 °C)  Heart Rate:  [] 81  Resp:  [16-24] 16  BP: (123-212)/() 123/71    Examination:  Constitutional: Well-groomed, well-nourished  HENT:  normal  Eyes: Normal conjunctiva  CVS:  Regular rate and rhythm.  No murmurs.  Good peripheral perfusion.   Resp :   Non labored respirations  Musculoskeletal:  No signs of peripheral edema, normal range, no deformities  Skin:  No rash, normal turgor  Neurologic:   Obtunded   Upward gaze preference with some roving eye movements   Does not follow commands   Moving all extremities   Pupils reactive   Plantar responses equivocal  Psychiatric: No restlessness    Results Review:  Results from last 7 days   Lab Units 12/09/20  1527 12/09/20  0534 12/08/20  1506   WBC 10*3/mm3 12.99* 14.00* 15.33*   HEMOGLOBIN g/dL 10.5* 8.3* 9.5*   HEMATOCRIT % 33.8* 25.6* 30.0*   PLATELETS 10*3/mm3 487* 318 380         Results from last 7 days   Lab Units 12/09/20  1527 12/09/20  0534 12/08/20  1506   SODIUM mmol/L 137 136 131*   POTASSIUM mmol/L 2.8* 3.2* 2.1*   CHLORIDE mmol/L 95* 98 86*   CO2 mmol/L 27.2 29.5* 33.2*   BUN mg/dL 6* 5* 7*   CREATININE mg/dL 1.15* 0.86 1.16*   CALCIUM mg/dL 8.2* 6.9* 7.9*   BILIRUBIN mg/dL  --   --  0.4   ALK PHOS U/L  --   --  102   ALT (SGPT) U/L  --   --  6   AST (SGOT) U/L  --   --  10   GLUCOSE mg/dL 242* 273* 535*      Magnesium  this AM  1.1     Head CT shows no acute abomalities with moderate atrophy and chronic angiopathic changes  Images reviewed independently    Medical Decision Making and Recommendations  New onset seizure activity  Likely secondary to hypomagnesemia  Seizure could also have been triggered by accelerated HTN  Seizure is a rare side effect of Levaquin as well  Patient has spiked a fever associated with this, but unlikely to have CNS infection; peripheral cultures have been ordered.  Consider NMS    Replace magnesium stat  If not returning to baseline or has further seizures will pursue brain MRI to assess for PRES.   Hold off on routine seizure medication at this time.  Check CPK    I discussed these findings and my recommendations with nursing staff. Nursing given my cell phone to reach me directly.    I used full protective equipment while examining this patient.  This included N95 face mask, gloves and protective eyewear.  I washed my hands before entering the room and immediately upon leaving the room.  Patient was wearing a surgical mask.    Vanessa Nicholas MD  12/09/20   4:30 pm

## 2020-12-09 NOTE — ANESTHESIA PREPROCEDURE EVALUATION
Anesthesia Evaluation     Patient summary reviewed and Nursing notes reviewed   NPO Solid Status: > 8 hours  NPO Liquid Status: > 8 hours           Airway   Mallampati: II  Neck ROM: full  No difficulty expected  Dental - normal exam     Pulmonary     breath sounds clear to auscultation  Cardiovascular     Rhythm: regular        Neuro/Psych  GI/Hepatic/Renal/Endo    (+)   diabetes mellitus,     Musculoskeletal     Abdominal    Substance History      OB/GYN          Other                        Anesthesia Plan    ASA 2     general     intravenous induction     Anesthetic plan, all risks, benefits, and alternatives have been provided, discussed and informed consent has been obtained with: patient.

## 2020-12-09 NOTE — OP NOTE
Operative Report     TIP [facility] OR    Patient: Lyubov Middleton  Age:      65 y.o.  :     1955  Sex:      female    Medical Record:  [id number]    Date of Operation/Procedure:  2020    Pre-op Diagnosis:   Right renal stone, perinephric fluid collection    Post-Op Diagnosis Codes:   Same    Pre-operative Diagnosis Free Text:  * No pre-op diagnosis entered *     Name of Operation/Procedure:  Procedure(s) and Anesthesia Type:     * CYSTOSCOPY, RIGHT RETROGRADE PYELOGRAM, URETEROSCOPY, LASER LITHOTRIPSY, RIGHT URETERAL STENT PLACEMENT - General    Findings/Complications:  No hydronephrosis. On retrograde pyelogram, two infundibula were identified, but it appeared that there was a portion of the kidney blocked by an infundibular stone. Thus, a flexible ureteroscope was passed into the right kidney. A small dimple was seen in the renal pelvis with visible calcification below the mucosa. I used a holmium laser to fragment the calcification and found a large burden of calcifications in a calyx. A 6x28 JJ stent was placed.    Description of procedure: The patient was taken to the OR and placed under GA in lithotomy position.  Prepped and draped in sterile fashion.  The 21 Fr cystoscope was introduced and pan-cystoscopy was performed.  No tumors or stones were seen. A Sensor wire was passed into the right ureter. A 5 Fr open ended catheter was passed into the ureter. A retorgrade pyelgoram was then performed with findings as above. The Sensor wire was replaced. A flexible ureteroscope was passed over the wire into the right kidney. The renal pelvis showed a small amount of calcification under the mucosa. These were lasered into multiple fragments. I was able to make it into the calyx and passed a Sensor wire through the scope. A 6 Fr x 28 cm JJ stent was passed into the kidney into the proper anatomic position.    Estimated Blood Loss: minimal    Specimens: * No orders in the log *    Fluids/Drains: none    Rohan  Doyle Dooley Jr., MD  12/9/2020  14:24 EST

## 2020-12-09 NOTE — CONSULTS
CONSULT NOTE    Infectious Diseases - Tricia Laura MD  [facility]       Patient Identification:  Name: Lyubov Middleton  Age: 65 y.o.  Sex: female  :  1955  MRN: [id number]             Date of Consultation: 2020      Primary Care Physician: Ana Huitron MD                               Requesting Physician: Dr. Buckner  Reason for Consultation: Fever and UTI    Impression: Patient is a 65-year-old female with past medical history remarkable for diabetes, history of breast surgery in  for breast cancer, was in her usual state of health until couple weeks ago when started having abdominal discomfort mainly involving the right side of her abdomen associated with generalized abdominal discomfort sense of ill health and frequent urination.  Patient has history of kidney stone with history of  intervention including right ureteroscopy and removal of left stent and lithotripsy in 2020.  With the symptoms of pelvic she may be having another kidney stone or urinary tract infection resulting in presentation to the emergency room where work-up revealed low-grade temperature along with CT scan showing evidence of complicated right-sided abdominal mass adjacent to the right kidney concerning for either evolving malignancy or complicated abscess.  Her urinalysis as mentioned was essentially unremarkable or bland.  Patient was noted to have white blood cell count of 15,000.  Patient is being admitted for further care and empiric Rocephin has been initiated.  Patient is very pleasant but does not give the fine details about her history as one would like in this complicated situation.  This presentation of abdominal discomfort with frequency and urgency of urination in the context of previous history of recurrent kidney stones and  intervention and evidence of abnormal mass in the vicinity of right kidney is concerning for:  1-probable complicated pyelonephritis with complicated  abscess versus xanthogranulomatous pyelonephritis  2-evolving malignancy  3-diabetes-poorly controlled with hyperglycemia  4-anemia  5-electrolyte imbalance and renal insufficiency.  6-history of hypertension        Recommendations/Discussions: At this juncture agree with the care plan consisting of IV Rocephin with aggressive management of metabolic abnormalities electrolyte imbalance while awaiting opinion from urology service.  Antibiotics have been already started so follow blood culture at this juncture is unclear but would recommend performing blood culture if she spikes temperature.  Monitor her hemodynamics and periodically assess for evolving septic parameters for which appropriate intervention can be provided.  Once the etiology of the right perinephric mass is established then further decision about whether to continue antibiotics or her treatment will be directed towards evolving malignancy can be made.  Thank you Dr. Perry for letting me be the part of your patient care please see above impression and recommendations.        History of Present Illness:   Patient is a 65-year-old female with past medical history remarkable for diabetes, history of breast surgery in 2012 for breast cancer, was in her usual state of health until couple weeks ago when started having abdominal discomfort mainly involving the right side of her abdomen associated with generalized abdominal discomfort sense of ill health and frequent urination.  Patient has history of kidney stone with history of  intervention including right ureteroscopy and removal of left stent and lithotripsy in November 2020.  With the symptoms of pelvic she may be having another kidney stone or urinary tract infection resulting in presentation to the emergency room where work-up revealed low-grade temperature along with CT scan showing evidence of complicated right-sided abdominal mass adjacent to the right kidney concerning for either evolving  malignancy or complicated abscess.  Her urinalysis as mentioned was essentially unremarkable or bland.  Patient was noted to have white blood cell count of 15,000.  Patient is being admitted for further care and empiric Rocephin has been initiated.  Patient is very pleasant but does not give the fine details about her history as one would like in this complicated situation.      Past Medical History:  Past Medical History:   Diagnosis Date   • Cancer (CMS/HCC)     left breast removed 2012   • Type 2 diabetes mellitus (CMS/HCC)      Past Surgical History:  Past Surgical History:   Procedure Laterality Date   • ABDOMINAL SURGERY     • BREAST SURGERY Left 2012    removed due to cancer   • CHOLECYSTECTOMY     • HERNIA REPAIR      mesh removed      Home Meds:  Medications Prior to Admission   Medication Sig Dispense Refill Last Dose   • citalopram (CeleXA) 10 MG tablet Take 10 mg by mouth Daily.  3    • glipiZIDE (GLUCOTROL) 5 MG tablet Take 5 mg by mouth 2 (Two) Times a Day Before Meals.      • lisinopril (PRINIVIL,ZESTRIL) 20 MG tablet Take 20 mg by mouth Daily.  0    • metFORMIN (GLUCOPHAGE) 500 MG tablet Take 500 mg by mouth 2 (Two) Times a Day With Meals.      • rizatriptan (MAXALT) 10 MG tablet TAKE 1 TABLET BY MOUTH EVERY DAY AS NEEDED (MAX OF 2 PER 24 HOURS)  0      Current Meds:     Current Facility-Administered Medications:   •  acetaminophen (TYLENOL) tablet 650 mg, 650 mg, Oral, Q4H PRN, Pita Gonzalez MD  •  cefTRIAXone (ROCEPHIN) IVPB 1 g, 1 g, Intravenous, Q24H, Pita Gonzalez MD  •  [START ON 12/9/2020] citalopram (CeleXA) tablet 10 mg, 10 mg, Oral, Daily, Pita Gonzalez MD  •  enoxaparin (LOVENOX) syringe 40 mg, 40 mg, Subcutaneous, Nightly, Pita Gonzalez MD  •  [START ON 12/9/2020] lisinopril (PRINIVIL,ZESTRIL) tablet 20 mg, 20 mg, Oral, Daily, Pita Gonzalez MD  •  melatonin tablet 3 mg, 3 mg, Oral, Nightly PRN, Pita Gonzalez MD  •  nitroglycerin  (NITROSTAT) SL tablet 0.4 mg, 0.4 mg, Sublingual, Q5 Min PRN, Pita Gonzalez MD  •  ondansetron (ZOFRAN) tablet 4 mg, 4 mg, Oral, Q6H PRN **OR** ondansetron (ZOFRAN) injection 4 mg, 4 mg, Intravenous, Q6H PRN, Pita Gonzalez MD  •  potassium chloride (K-DUR,KLOR-CON) ER tablet 40 mEq, 40 mEq, Oral, PRN, 40 mEq at 20 1652 **OR** potassium chloride (KLOR-CON) packet 40 mEq, 40 mEq, Oral, PRN **OR** potassium chloride 10 mEq in 100 mL IVPB, 10 mEq, Intravenous, Q1H PRN, Margaret Bernal PA  •  potassium chloride (K-DUR,KLOR-CON) ER tablet 40 mEq, 40 mEq, Oral, PRN **OR** potassium chloride (KLOR-CON) packet 40 mEq, 40 mEq, Oral, PRN **OR** potassium chloride 10 mEq in 100 mL IVPB, 10 mEq, Intravenous, Q1H PRN, Pita Gonzalez MD  •  [COMPLETED] Insert peripheral IV, , , Once **AND** sodium chloride 0.9 % flush 10 mL, 10 mL, Intravenous, PRN, Damien Acuna II, MD, 10 mL at 20 1506  •  sodium chloride 0.9 % with KCl 20 mEq/L infusion, 100 mL/hr, Intravenous, Continuous, Pita Gonzalez MD  Allergies:  No Known Allergies  Social History:   Social History     Tobacco Use   • Smoking status: Former Smoker     Quit date: 1997     Years since quittin.3   • Smokeless tobacco: Never Used   Substance Use Topics   • Alcohol use: No      Family History:  Family History   Problem Relation Age of Onset   • Diabetes Mother    • Lung disease Father    • Cancer Father           Review of Systems  See history of present illness and past medical history.  Constitutional: Remarkable for generalized weakness denies any systemic fever and chills but admits to have night sweats.  Cardiovascular: Remarkable for no chest pain or shortness of breath  GI: Remarkable for preserved appetite denies any nausea vomiting or diarrhea  : Remarkable for frequency and urgency but denies any burning in urination does admit to have flank pain and lower abdominal pain.  Musculoskeletal:  "Remarkable for right-sided back pain.  Neurological: Remarkable for no loss of consciousness or continence.    Vitals:   /78 (BP Location: Right arm, Patient Position: Lying)   Pulse 88   Temp 99.6 °F (37.6 °C) (Tympanic)   Resp 24   Ht 162.6 cm (64\")   Wt 64.9 kg (143 lb)   SpO2 96%   BMI 24.55 kg/m²   I/O:     Intake/Output Summary (Last 24 hours) at 12/8/2020 2120  Last data filed at 12/8/2020 1924  Gross per 24 hour   Intake 1000 ml   Output --   Net 1000 ml     Exam:  Patient is examined using the personal protective equipment as per guidelines from infection control for this particular patient as enacted.  Hand washing was performed before and after patient interaction.  General Appearance:    Alert, cooperative, pleasant female who does not appear to be toxic or in any distress, appears stated age   Head:    Normocephalic, without obvious abnormality, atraumatic   Eyes:    PERRL, conjunctivae/corneas clear, EOM's intact, both eyes   Ears:    Normal external ear canals, both ears   Nose:   Nares normal, septum midline, mucosa normal, no drainage    or sinus tenderness   Throat:   Lips, tongue, gums normal; oral mucosa pink and moist   Neck:   Supple, symmetrical, trachea midline, no adenopathy;     thyroid:  no enlargement/tenderness/nodules; no carotid    bruit or JVD   Back:     Symmetric, no curvature, ROM normal, mild right CVA tenderness   Lungs:     Clear to auscultation bilaterally, respirations unlabored   Chest Wall:    No tenderness or deformity    Heart:    Regular rate and rhythm, S1 and S2 normal, no murmur, rub   or gallop   Abdomen:    Soft, mild generalized tenderness with prominent tenderness in the right side of her abdomen   Extremities:   Extremities normal, atraumatic, no cyanosis or edema   Pulses:   Pulses palpable in all extremities; symmetric all extremities   Skin:   Skin color normal, Skin is warm and dry,  no rashes or palpable lesions   Neurologic:   CNII-XII intact, " motor strength grossly intact, sensation grossly intact to light touch, no focal deficits noted       Data Review:    I reviewed the patient's new clinical results.  Results from last 7 days   Lab Units 12/08/20  1506   WBC 10*3/mm3 15.33*   HEMOGLOBIN g/dL 9.5*   PLATELETS 10*3/mm3 380     Results from last 7 days   Lab Units 12/08/20  1506   SODIUM mmol/L 131*   POTASSIUM mmol/L 2.1*   CHLORIDE mmol/L 86*   CO2 mmol/L 33.2*   BUN mg/dL 7*   CREATININE mg/dL 1.16*   CALCIUM mg/dL 7.9*   GLUCOSE mg/dL 535*     Microbiology Results (last 10 days)     Procedure Component Value - Date/Time    COVID PRE-OP / PRE-PROCEDURE SCREENING ORDER (NO ISOLATION) - Swab, Nasopharynx [[id number]]  (Normal) Collected: 12/08/20 1658    Lab Status: Final result Specimen: Swab from Nasopharynx Updated: 12/08/20 1836    Narrative:      The following orders were created for panel order COVID PRE-OP / PRE-PROCEDURE SCREENING ORDER (NO ISOLATION) - Swab, Nasopharynx.  Procedure                               Abnormality         Status                     ---------                               -----------         ------                     Respiratory Panel PCR w/...[[id number]]  Normal              Final result                 Please view results for these tests on the individual orders.    Respiratory Panel PCR w/COVID-19(SARS-CoV-2) TIP/BOBBY/ANDREY/PAD/COR/MAD/HUMPHREY In-House, NP Swab in UTM/VTM, 3-4 HR TAT - Swab, Nasopharynx [[id number]]  (Normal) Collected: 12/08/20 1658    Lab Status: Final result Specimen: Swab from Nasopharynx Updated: 12/08/20 1836     ADENOVIRUS, PCR Not Detected     Coronavirus 229E Not Detected     Coronavirus HKU1 Not Detected     Coronavirus NL63 Not Detected     Coronavirus OC43 Not Detected     COVID19 Not Detected     Human Metapneumovirus Not Detected     Human Rhinovirus/Enterovirus Not Detected     Influenza A PCR Not Detected     Influenza B PCR Not Detected     Parainfluenza Virus 1 Not Detected      Parainfluenza Virus 2 Not Detected     Parainfluenza Virus 3 Not Detected     Parainfluenza Virus 4 Not Detected     RSV, PCR Not Detected     Bordetella pertussis pcr Not Detected     Bordetella parapertussis PCR Not Detected     Chlamydophila pneumoniae PCR Not Detected     Mycoplasma pneumo by PCR Not Detected    Narrative:      Fact sheet for providers: https://docs.[employer]/wp-content/uploads/ANS2314-5820-OS8.1-EUA-Provider-Fact-Sheet-3.pdf    Fact sheet for patients: https://docs.[employer]/wp-content/uploads/ZGL1699-9658-IY2.1-EUA-Patient-Fact-Sheet-1.pdf    Test performed by PCR.            Assessment:  Active Hospital Problems    Diagnosis  POA   • Hypokalemia [E87.6]  Yes      Resolved Hospital Problems   No resolved problems to display.         Plan:  See above  Tricia Plunkett MD   12/8/2020  21:20 EST    Much of this encounter note is an electronic transcription/translation of spoken language to printed text. The electronic translation of spoken language may permit erroneous, or at times, nonsensical words or phrases to be inadvertently transcribed; Although I have reviewed the note for such errors, some may still exist

## 2020-12-09 NOTE — PLAN OF CARE
Goal Outcome Evaluation:  Plan of Care Reviewed With: patient  Progress: improving  Outcome Summary: Pt doing well and agreeable to PT this am. Pt admitted yesterday from home with hypokalemia, UTI, and elevated blood sugar. Pt is normally independent with mobility and ADLs. She does report using quad cane at times. Today, pt with no complaints. SHe appears to be at her baseline mobility level. She ambulated over 150 ft with SBA without using AD. No unsteadiness noted. Pt may continue to ambulate with nsg as needed. PT will sign off.  Patient was intermittently wearing a face mask during this therapy encounter. Therapist used appropriate personal protective equipment including eye protection, mask, and gloves.  Mask used was standard procedure mask. Appropriate PPE was worn during the entire therapy session. Hand hygiene was completed before and after therapy session. Patient is not in enhanced droplet precautions.

## 2020-12-09 NOTE — THERAPY DISCHARGE NOTE
Patient Name: Lyubov Middleton  : 1955    MRN: [id number]                              Today's Date: 2020       Admit Date: 2020    Visit Dx:     ICD-10-CM ICD-9-CM   1. Hypokalemia  E87.6 276.8   2. Hyperglycemia  R73.9 790.29   3. Lower abdominal pain  R10.30 789.09     Patient Active Problem List   Diagnosis   • Hypokalemia     Past Medical History:   Diagnosis Date   • Cancer (CMS/HCC)     left breast removed    • Type 2 diabetes mellitus (CMS/HCC)      Past Surgical History:   Procedure Laterality Date   • ABDOMINAL SURGERY     • BREAST SURGERY Left     removed due to cancer   • CHOLECYSTECTOMY     • HERNIA REPAIR      mesh removed     General Information     Row Name 20 1040          Physical Therapy Time and Intention    Document Type  evaluation;discharge evaluation/summary  -EJ     Mode of Treatment  physical therapy  -EJ     Row Name 20 1040          General Information    Patient Profile Reviewed  yes  -EJ     Prior Level of Function  independent:;ADL's;all household mobility;community mobility uses quad cane  -EJ     Existing Precautions/Restrictions  no known precautions/restrictions  -EJ     Barriers to Rehab  none identified  -EJ     Row Name 20 1040          Living Environment    Lives With  child(sade), adult  -EJ     Row Name 20 1040          Home Main Entrance    Number of Stairs, Main Entrance  none  -EJ     Row Name 20 1040          Stairs Within Home, Primary    Number of Stairs, Within Home, Primary  none  -EJ     Row Name 20 1040          Cognition    Orientation Status (Cognition)  oriented x 3  -EJ       User Key  (r) = Recorded By, (t) = Taken By, (c) = Cosigned By    Initials Name Provider Type    EJ Paulina Man, PT Physical Therapist        Mobility     Row Name 20 1042          Bed Mobility    Bed Mobility  bed mobility (all) activities  -EJ     All Activities, [place] (Bed Mobility)  independent  -EJ     Row  Name 12/09/20 1042          Sit-Stand Transfer    Sit-Stand [place] (Transfers)  supervision  -EJ     Row Name 12/09/20 1042          Gait/Stairs (Locomotion)    [place] Level (Gait)  standby assist  -EJ     Distance in Feet (Gait)  150  -EJ     Deviations/Abnormal Patterns (Gait)  erickson decreased;stride length decreased  -EJ     Comment (Gait/Stairs)  did not use AD, no LOB or unsteadiness noted  -EJ       User Key  (r) = Recorded By, (t) = Taken By, (c) = Cosigned By    Initials Name Provider Type    Paulina Lucas, PT Physical Therapist        Obj/Interventions     Row Name 12/09/20 1042          Range of Motion Comprehensive    General Range of Motion  no range of motion deficits identified  -EJ     Row Name 12/09/20 1042          Strength Comprehensive (MMT)    General Manual Muscle Testing (MMT) Assessment  no strength deficits identified  -EJ       User Key  (r) = Recorded By, (t) = Taken By, (c) = Cosigned By    Initials Name Provider Type    Paulina Lucas PT Physical Therapist        Goals/Plan    No documentation.       Clinical Impression     Row Name 12/09/20 1042          Plan of Care Review    Plan of Care Reviewed With  patient  -EJ     Progress  improving  -EJ     Outcome Summary  Pt doing well and agreeable to PT this am. Pt admitted yesterday from home with hypokalemia, UTI, and elevated blood sugar. Pt is normally independent with mobility and ADLs. She does report using quad cane at times. Today, pt with no complaints. SHe appears to be at her baseline mobility level. She ambulated over 150 ft with SBA without using AD. No unsteadiness noted. Pt may continue to ambulate with nsg as needed. PT will sign off.  -EJ     Row Name 12/09/20 1042          Therapy Assessment/Plan (PT)    Patient/Family Therapy Goals Statement (PT)  return home  -EJ     Criteria for Skilled Interventions Met (PT)  no  -EJ     Row Name 12/09/20 1042          Positioning and Restraints     Pre-Treatment Position  in bed  -EJ     Post Treatment Position  bed  -EJ     In Bed  notified nsg;supine;call light within reach;encouraged to call for assist  -EJ       User Key  (r) = Recorded By, (t) = Taken By, (c) = Cosigned By    Initials Name Provider Type    Paulina Lucas, PT Physical Therapist        Outcome Measures     Row Name 12/09/20 1044          How much help from another person do you currently need...    Turning from your back to your side while in flat bed without using bedrails?  4  -EJ     Moving from lying on back to sitting on the side of a flat bed without bedrails?  4  -EJ     Moving to and from a bed to a chair (including a wheelchair)?  4  -EJ     Standing up from a chair using your arms (e.g., wheelchair, bedside chair)?  4  -EJ     Climbing 3-5 steps with a railing?  3  -EJ     To walk in hospital room?  4  -EJ     AM-PAC 6 Clicks Score (PT)  23  -EJ     Row Name 12/09/20 1044          Functional Assessment    Outcome Measure Options  AM-PAC 6 Clicks Basic Mobility (PT)  -EJ       User Key  (r) = Recorded By, (t) = Taken By, (c) = Cosigned By    Initials Name Provider Type    Paulina Lucas, PT Physical Therapist          PT Recommendation and Plan     Plan of Care Reviewed With: patient  Progress: improving  Outcome Summary: Pt doing well and agreeable to PT this am. Pt admitted yesterday from home with hypokalemia, UTI, and elevated blood sugar. Pt is normally independent with mobility and ADLs. She does report using quad cane at times. Today, pt with no complaints. SHe appears to be at her baseline mobility level. She ambulated over 150 ft with SBA without using AD. No unsteadiness noted. Pt may continue to ambulate with nsg as needed. PT will sign off.     Time Calculation:   PT Charges     Row Name 12/09/20 1045             Time Calculation    Start Time  1030  -EJ      Stop Time  1042  -EJ      Time Calculation (min)  12 min  -EJ      PT Received On  12/09/20  -EJ          Time Calculation- PT    Total Timed Code Minutes- PT  8 minute(s)  -CHANELLE        User Key  (r) = Recorded By, (t) = Taken By, (c) = Cosigned By    Initials Name Provider Type    Paulina Lucas, PT Physical Therapist        Therapy Charges for Today     Code Description Service Date Service Provider Modifiers Qty    80709140694 HC PT EVAL MOD COMPLEXITY 2 12/9/2020 Paulina Man, PT GP 1    41834886770 HC PT THER PROC EA 15 MIN 12/9/2020 Paulina Man, PT GP 1          PT G-Codes  Outcome Measure Options: AM-PAC 6 Clicks Basic Mobility (PT)  AM-PAC 6 Clicks Score (PT): 23    PT Discharge Summary  Anticipated Discharge Disposition (PT): home with assist  Reason for Discharge: Independent, At baseline function  Discharge Destination: Home with assist    Paulina Man, PT  12/9/2020

## 2020-12-09 NOTE — ANESTHESIA POSTPROCEDURE EVALUATION
"Patient: Lyubov Middleton    Procedure Summary     Date: 12/09/20 Room / Location: [facility] OR  / [facility] MAIN OR    Anesthesia Start: 1306 Anesthesia Stop: 1437    Procedure: CYSTOSCOPY, RIGHT RETROGRADE PYELOGRAM, URETEROSCOPY, LASER LITHOTRIPSY, RIGHT URETERAL STENT PLACEMENT (Right ) Diagnosis:     Surgeon: Rohan Dooley Jr., MD Provider: Enio Hudson MD    Anesthesia Type: general ASA Status: 2          Anesthesia Type: general    Vitals  Vitals Value Taken Time   /68 12/09/20 1625   Temp 39.2 °C (102.5 °F) 12/09/20 1538   Pulse 97 12/09/20 1631   Resp 16 12/09/20 1555   SpO2 94 % 12/09/20 1631   Vitals shown include unvalidated device data.        Post Anesthesia Care and Evaluation    Patient location during evaluation: bedside  Patient participation: complete - patient participated  Level of consciousness: sleepy but conscious  Pain score: 0  Pain management: adequate  Airway patency: patent  Anesthetic complications: No anesthetic complications    Cardiovascular status: acceptable  Respiratory status: acceptable  Hydration status: acceptable    Comments: /99   Pulse 102   Temp (!) 39.2 °C (102.5 °F) (Oral)   Resp 16   Ht 162.6 cm (64.02\")   Wt 68.5 kg (151 lb 1.6 oz)   SpO2 95%   BMI 25.92 kg/m²         "

## 2020-12-09 NOTE — ANESTHESIA PROCEDURE NOTES
Airway  Urgency: elective    Date/Time: 12/9/2020 1:16 PM    General Information and Staff    Patient location during procedure: OR  Anesthesiologist: Enio Hudson MD  CRNA: Mara Fletcher CRNA    Indications and Patient Condition    Preoxygenated: yes  Mask difficulty assessment: 0 - not attempted    Final Airway Details  Final airway type: supraglottic airway      Successful airway: classic  Size 4    Number of attempts at approach: 1    Additional Comments  Pt to OR and positioned self to OR table. Monitors applied. PreO2: SIVI and easy insertion LMA. ETCO2 +, Equal and bilateral chest rise

## 2020-12-09 NOTE — PROGRESS NOTES
Pharmacy Consult: Enoxaparin Dosing  Patient: Lyubov Middleton  Admission Date:   MRN: [id number]  : 1955    Pharmacy consulted to dose per Dr. Gonzalez  Indication: VTE ppx    65 y.o. female 64.9 kg (143 lb)    Estimated Creatinine Clearance: 50.2 mL/min (A) (by C-G formula based on SCr of 1.16 mg/dL (H)).  Body mass index is 24.55 kg/m².    Creatinine   Date Value Ref Range Status   2020 1.16 (H) 0.57 - 1.00 mg/dL Final     Platelets   Date Value Ref Range Status   2020 380 140 - 450 10*3/mm3 Final     No results found for: INR    PLAN:    Will start Enoxaparin 40 mg sq Q 24 hr.  Current dose of enoxaparin is appropriate based on age, weight, and renal function.    Thank you for the consult.    Erica Almeida [facility]

## 2020-12-09 NOTE — PROGRESS NOTES
[phone]   LOS: 0 days     Name: Lyubov Middleton  Age/Sex: 65 y.o. female  :  1955        PCP: Ana Huitron MD  Chief Complaint   Patient presents with   • Abdominal Pain   • Difficulty Urinating      Subjective   She is feeling okay today.  Denies any flank pain back pain fevers or chills.  No suprapubic tenderness either.  Appetite is fair she is feeling hungry but has not eaten today awaiting possible surgery this afternoon.  General: No Fever or Chills, Cardiac: No Chest Pain or Palpitations, Resp: No Cough or SOA, GI: No Nausea, Vomiting, or Diarrhea and Other: No bleeding    cefTRIAXone, 1 g, Intravenous, Q24H  [MAR Hold] citalopram, 10 mg, Oral, Daily  [MAR Hold] influenza vaccine, 0.5 mL, Intramuscular, Once  [MAR Hold] insulin lispro, 0-9 Units, Subcutaneous, TID AC  lisinopril, 20 mg, Oral, Daily  sodium chloride, 3 mL, Intravenous, Q12H      lactated ringers, 9 mL/hr, Last Rate: 9 mL/hr (20 1246)  sodium chloride 0.9 % with KCl 20 mEq, 100 mL/hr, Last Rate: 100 mL/hr (20 0925)        Objective   Vital Signs  Temp:  [99.1 °F (37.3 °C)-99.7 °F (37.6 °C)] 99.7 °F (37.6 °C)  Heart Rate:  [76-88] 83  Resp:  [16-24] 20  BP: (145-170)/(69-85) 154/69  Body mass index is 25.92 kg/m².    Intake/Output Summary (Last 24 hours) at 2020 1310  Last data filed at 2020 0812  Gross per 24 hour   Intake 1000 ml   Output --   Net 1000 ml       Physical Exam  Vitals signs and nursing note reviewed.   Constitutional:       Appearance: Normal appearance.   HENT:      Head: Normocephalic and atraumatic.   Cardiovascular:      Rate and Rhythm: Normal rate and regular rhythm.   Pulmonary:      Effort: Pulmonary effort is normal.      Breath sounds: Normal breath sounds.   Abdominal:      General: Abdomen is flat.      Palpations: Abdomen is soft.   Skin:     General: Skin is warm and dry.   Neurological:      General: No focal deficit present.      Mental Status: She is alert and oriented to  person, place, and time.           Results Review:       I reviewed the patient's new clinical results.  Results from last 7 days   Lab Units 12/09/20  0534 12/08/20  1506   WBC 10*3/mm3 14.00* 15.33*   HEMOGLOBIN g/dL 8.3* 9.5*   PLATELETS 10*3/mm3 318 380     Results from last 7 days   Lab Units 12/09/20  0534 12/08/20  1506   SODIUM mmol/L 136 131*   POTASSIUM mmol/L 3.2* 2.1*   CHLORIDE mmol/L 98 86*   CO2 mmol/L 29.5* 33.2*   BUN mg/dL 5* 7*   CREATININE mg/dL 0.86 1.16*   CALCIUM mg/dL 6.9* 7.9*   MAGNESIUM mg/dL  --  1.0*   Estimated Creatinine Clearance: 62.8 mL/min (by C-G formula based on SCr of 0.86 mg/dL).      Assessment/Plan   Active Hospital Problems    Diagnosis  POA   • Hypokalemia [E87.6]  Yes      Resolved Hospital Problems   No resolved problems to display.       PLAN  This is a 65-year-old female with a history of multiple urinary tract infections in the past hypertension and diabetes who presents to the hospital with urinary frequency and dysuria is found to have significant hypokalemia as well as a possible perinephric mass versus fluid collection  -Plan for operative intervention with cystoscopy and stent placement today  -Blood and urine cultures are  -Remains on Rocephin infectious diseases following and assisting with antibiotic management  -Appreciate urology's recommendations reviewed with the patient at the bedside today  -Replace potassium aggressively today  -Renal function improving  -[facility]      Disposition  To be determined      Harris Perez MD  [facility]  12/09/20  13:10 EST

## 2020-12-10 ENCOUNTER — APPOINTMENT (OUTPATIENT)
Dept: CT IMAGING | Facility: HOSPITAL | Age: 65
End: 2020-12-10

## 2020-12-10 PROBLEM — N15.1 RENAL ABSCESS: Status: ACTIVE | Noted: 2020-12-10

## 2020-12-10 PROBLEM — E83.51 HYPOCALCEMIA: Status: ACTIVE | Noted: 2020-12-10

## 2020-12-10 PROBLEM — E83.42 HYPOMAGNESEMIA: Status: ACTIVE | Noted: 2020-12-10

## 2020-12-10 PROBLEM — N18.31 STAGE 3A CHRONIC KIDNEY DISEASE: Status: ACTIVE | Noted: 2020-12-10

## 2020-12-10 LAB
ANION GAP SERPL CALCULATED.3IONS-SCNC: 11.4 MMOL/L (ref 5–15)
BUN SERPL-MCNC: 11 MG/DL (ref 8–23)
BUN/CREAT SERPL: 9 (ref 7–25)
CALCIUM SPEC-SCNC: 7.2 MG/DL (ref 8.6–10.5)
CHLORIDE SERPL-SCNC: 95 MMOL/L (ref 98–107)
CK SERPL-CCNC: 69 U/L (ref 20–180)
CO2 SERPL-SCNC: 26.6 MMOL/L (ref 22–29)
CREAT SERPL-MCNC: 1.22 MG/DL (ref 0.57–1)
DEPRECATED RDW RBC AUTO: 37.9 FL (ref 37–54)
ERYTHROCYTE [DISTWIDTH] IN BLOOD BY AUTOMATED COUNT: 12.8 % (ref 12.3–15.4)
GFR SERPL CREATININE-BSD FRML MDRD: 44 ML/MIN/1.73
GLUCOSE BLDC GLUCOMTR-MCNC: 261 MG/DL (ref 70–130)
GLUCOSE BLDC GLUCOMTR-MCNC: 273 MG/DL (ref 70–130)
GLUCOSE BLDC GLUCOMTR-MCNC: 399 MG/DL (ref 70–130)
GLUCOSE SERPL-MCNC: 397 MG/DL (ref 65–99)
HCT VFR BLD AUTO: 29.9 % (ref 34–46.6)
HGB BLD-MCNC: 9.4 G/DL (ref 12–15.9)
MAGNESIUM SERPL-MCNC: 1.9 MG/DL (ref 1.6–2.4)
MCH RBC QN AUTO: 25.7 PG (ref 26.6–33)
MCHC RBC AUTO-ENTMCNC: 31.4 G/DL (ref 31.5–35.7)
MCV RBC AUTO: 81.7 FL (ref 79–97)
PLATELET # BLD AUTO: 338 10*3/MM3 (ref 140–450)
PMV BLD AUTO: 10.2 FL (ref 6–12)
POTASSIUM SERPL-SCNC: 3.4 MMOL/L (ref 3.5–5.2)
RBC # BLD AUTO: 3.66 10*6/MM3 (ref 3.77–5.28)
SODIUM SERPL-SCNC: 133 MMOL/L (ref 136–145)
WBC # BLD AUTO: 17.19 10*3/MM3 (ref 3.4–10.8)

## 2020-12-10 PROCEDURE — 25810000003 SODIUM CHLORIDE 0.9 % WITH KCL 20 MEQ 20-0.9 MEQ/L-% SOLUTION: Performed by: UROLOGY

## 2020-12-10 PROCEDURE — 63710000001 INSULIN LISPRO (HUMAN) PER 5 UNITS: Performed by: UROLOGY

## 2020-12-10 PROCEDURE — 25010000002 CEFTRIAXONE PER 250 MG: Performed by: UROLOGY

## 2020-12-10 PROCEDURE — 82550 ASSAY OF CK (CPK): CPT | Performed by: PSYCHIATRY & NEUROLOGY

## 2020-12-10 PROCEDURE — 80048 BASIC METABOLIC PNL TOTAL CA: CPT | Performed by: HOSPITALIST

## 2020-12-10 PROCEDURE — 88312 SPECIAL STAINS GROUP 1: CPT | Performed by: UROLOGY

## 2020-12-10 PROCEDURE — 85027 COMPLETE CBC AUTOMATED: CPT | Performed by: HOSPITALIST

## 2020-12-10 PROCEDURE — 87205 SMEAR GRAM STAIN: CPT | Performed by: UROLOGY

## 2020-12-10 PROCEDURE — 49406 IMAGE CATH FLUID PERI/RETRO: CPT

## 2020-12-10 PROCEDURE — 63710000001 INSULIN GLARGINE PER 5 UNITS: Performed by: HOSPITALIST

## 2020-12-10 PROCEDURE — C1729 CATH, DRAINAGE: HCPCS

## 2020-12-10 PROCEDURE — 0W9G3ZX DRAINAGE OF PERITONEAL CAVITY, PERCUTANEOUS APPROACH, DIAGNOSTIC: ICD-10-PCS | Performed by: RADIOLOGY

## 2020-12-10 PROCEDURE — 84132 ASSAY OF SERUM POTASSIUM: CPT | Performed by: HOSPITALIST

## 2020-12-10 PROCEDURE — 87186 SC STD MICRODIL/AGAR DIL: CPT | Performed by: UROLOGY

## 2020-12-10 PROCEDURE — 75989 ABSCESS DRAINAGE UNDER X-RAY: CPT

## 2020-12-10 PROCEDURE — 87181 SC STD AGAR DILUTION PER AGT: CPT | Performed by: UROLOGY

## 2020-12-10 PROCEDURE — 82962 GLUCOSE BLOOD TEST: CPT

## 2020-12-10 PROCEDURE — 99232 SBSQ HOSP IP/OBS MODERATE 35: CPT | Performed by: PSYCHIATRY & NEUROLOGY

## 2020-12-10 PROCEDURE — 25010000003 MAGNESIUM SULFATE 4 GM/100ML SOLUTION: Performed by: HOSPITALIST

## 2020-12-10 PROCEDURE — 88112 CYTOPATH CELL ENHANCE TECH: CPT | Performed by: UROLOGY

## 2020-12-10 PROCEDURE — 25010000003 LIDOCAINE 1 % SOLUTION: Performed by: RADIOLOGY

## 2020-12-10 PROCEDURE — 82570 ASSAY OF URINE CREATININE: CPT | Performed by: HOSPITALIST

## 2020-12-10 PROCEDURE — 83735 ASSAY OF MAGNESIUM: CPT | Performed by: HOSPITALIST

## 2020-12-10 PROCEDURE — 88305 TISSUE EXAM BY PATHOLOGIST: CPT | Performed by: UROLOGY

## 2020-12-10 PROCEDURE — 87070 CULTURE OTHR SPECIMN AEROBIC: CPT | Performed by: UROLOGY

## 2020-12-10 RX ORDER — MAGNESIUM SULFATE HEPTAHYDRATE 40 MG/ML
2 INJECTION, SOLUTION INTRAVENOUS AS NEEDED
Status: DISCONTINUED | OUTPATIENT
Start: 2020-12-10 | End: 2020-12-17 | Stop reason: HOSPADM

## 2020-12-10 RX ORDER — MAGNESIUM SULFATE HEPTAHYDRATE 40 MG/ML
4 INJECTION, SOLUTION INTRAVENOUS AS NEEDED
Status: DISCONTINUED | OUTPATIENT
Start: 2020-12-10 | End: 2020-12-17 | Stop reason: HOSPADM

## 2020-12-10 RX ORDER — LIDOCAINE HYDROCHLORIDE 10 MG/ML
20 INJECTION, SOLUTION INFILTRATION; PERINEURAL ONCE
Status: COMPLETED | OUTPATIENT
Start: 2020-12-10 | End: 2020-12-10

## 2020-12-10 RX ORDER — INSULIN GLARGINE 100 [IU]/ML
5 INJECTION, SOLUTION SUBCUTANEOUS NIGHTLY
Status: DISCONTINUED | OUTPATIENT
Start: 2020-12-10 | End: 2020-12-11

## 2020-12-10 RX ADMIN — POTASSIUM CHLORIDE 40 MEQ: 750 TABLET, EXTENDED RELEASE ORAL at 01:37

## 2020-12-10 RX ADMIN — INSULIN LISPRO 6 UNITS: 100 INJECTION, SOLUTION INTRAVENOUS; SUBCUTANEOUS at 17:13

## 2020-12-10 RX ADMIN — MAGNESIUM SULFATE HEPTAHYDRATE 4 G: 40 INJECTION, SOLUTION INTRAVENOUS at 13:40

## 2020-12-10 RX ADMIN — MAGNESIUM OXIDE 400 MG (241.3 MG MAGNESIUM) TABLET 400 MG: TABLET at 20:55

## 2020-12-10 RX ADMIN — POTASSIUM CHLORIDE 40 MEQ: 750 TABLET, EXTENDED RELEASE ORAL at 18:08

## 2020-12-10 RX ADMIN — LISINOPRIL 20 MG: 20 TABLET ORAL at 13:14

## 2020-12-10 RX ADMIN — CITALOPRAM 10 MG: 10 TABLET, FILM COATED ORAL at 13:14

## 2020-12-10 RX ADMIN — LIDOCAINE HYDROCHLORIDE 20 ML: 10 INJECTION, SOLUTION INFILTRATION; PERINEURAL at 12:01

## 2020-12-10 RX ADMIN — INSULIN LISPRO 8 UNITS: 100 INJECTION, SOLUTION INTRAVENOUS; SUBCUTANEOUS at 08:12

## 2020-12-10 RX ADMIN — POTASSIUM CHLORIDE 40 MEQ: 750 TABLET, EXTENDED RELEASE ORAL at 13:14

## 2020-12-10 RX ADMIN — MAGNESIUM OXIDE 400 MG (241.3 MG MAGNESIUM) TABLET 400 MG: TABLET at 13:14

## 2020-12-10 RX ADMIN — POTASSIUM CHLORIDE 40 MEQ: 750 TABLET, EXTENDED RELEASE ORAL at 04:52

## 2020-12-10 RX ADMIN — CEFTRIAXONE SODIUM 1 G: 1 INJECTION, SOLUTION INTRAVENOUS at 20:55

## 2020-12-10 RX ADMIN — POTASSIUM CHLORIDE AND SODIUM CHLORIDE 100 ML/HR: 900; 150 INJECTION, SOLUTION INTRAVENOUS at 01:37

## 2020-12-10 RX ADMIN — POTASSIUM CHLORIDE AND SODIUM CHLORIDE 100 ML/HR: 900; 150 INJECTION, SOLUTION INTRAVENOUS at 18:08

## 2020-12-10 RX ADMIN — INSULIN GLARGINE 5 UNITS: 100 INJECTION, SOLUTION SUBCUTANEOUS at 20:55

## 2020-12-10 NOTE — PROGRESS NOTES
"  Infectious Diseases Progress Note    Tricia Plunkett MD     [facility]  [place]: 1 day  Patient Identification:  Name: Lyubov Middleton  Age: 65 y.o.  Sex: female  :  1955  MRN: [id number]         Primary Care Physician: Ana Huitron MD            Subjective: Doing slightly better than yesterday after the procedure she developed fever spike of 1 or 2.5.  Interval History: See consultation note.  Seizure type activity after the procedure and was seen by neurology service.  Objective:    Scheduled Meds:cefTRIAXone, 1 g, Intravenous, Q24H  citalopram, 10 mg, Oral, Daily  influenza vaccine, 0.5 mL, Intramuscular, Once  insulin lispro, 0-9 Units, Subcutaneous, TID AC  lisinopril, 20 mg, Oral, Daily      Continuous Infusions:lactated ringers, 9 mL/hr, Last Rate: 9 mL/hr (20 1246)  sodium chloride 0.9 % with KCl 20 mEq, 100 mL/hr, Last Rate: 100 mL/hr (12/10/20 0137)        Vital signs in last 24 hours:  Temp:  [97.2 °F (36.2 °C)-102.5 °F (39.2 °C)] 97.8 °F (36.6 °C)  Heart Rate:  [] 84  Resp:  [16-18] 18  BP: (108-212)/() 150/70    Intake/Output:    Intake/Output Summary (Last 24 hours) at 12/10/2020 0706  Last data filed at 2020 1920  Gross per 24 hour   Intake 530 ml   Output 410 ml   Net 120 ml       Exam:  /70 (BP Location: Right arm, Patient Position: Lying)   Pulse 84   Temp 97.8 °F (36.6 °C) (Oral)   Resp 18   Ht 162.6 cm (64.02\")   Wt 69.6 kg (153 lb 8 oz)   SpO2 99%   BMI 26.34 kg/m²   Patient is examined using the personal protective equipment as per guidelines from infection control for this particular patient as enacted.  Hand washing was performed before and after patient interaction.  General Appearance:  Comfortable does not appear to be in any acute distress sitting in the chair.                          Head:    Normocephalic, without obvious abnormality, atraumatic                           Eyes:    PERRL, conjunctivae/corneas clear, EOM's intact, " both eyes                         Throat:   Lips, tongue, gums normal; oral mucosa pink and moist                           Neck:   Supple, symmetrical, trachea midline, no JVD                         Lungs:    Clear to auscultation bilaterally, respirations unlabored                 Chest Wall:    No tenderness or deformity                          Heart:    Regular rate and rhythm, S1 and S2 normal                  Abdomen:     Soft, non-tender, bowel sounds active,decreased discomfort in the right flank area                 extremities:   Extremities normal, atraumatic, no cyanosis or edema                        Pulses:   Pulses palpable in all extremities                            Skin:   Skin is warm and dry,  no rashes or palpable lesions                  Neurologic: Mostly nonfocal       Data Review:    I reviewed the patient's new clinical results.  Results from last 7 days   Lab Units 12/10/20  0540 12/09/20  1527 12/09/20  0534 12/08/20  1506   WBC 10*3/mm3 17.19* 12.99* 14.00* 15.33*   HEMOGLOBIN g/dL 9.4* 10.5* 8.3* 9.5*   PLATELETS 10*3/mm3 338 487* 318 380     Results from last 7 days   Lab Units 12/09/20  1527 12/09/20  0534 12/08/20  1506   SODIUM mmol/L 137 136 131*   POTASSIUM mmol/L 2.8* 3.2* 2.1*   CHLORIDE mmol/L 95* 98 86*   CO2 mmol/L 27.2 29.5* 33.2*   BUN mg/dL 6* 5* 7*   CREATININE mg/dL 1.15* 0.86 1.16*   CALCIUM mg/dL 8.2* 6.9* 7.9*   GLUCOSE mg/dL 242* 273* 535*     Microbiology Results (last 10 days)     Procedure Component Value - Date/Time    COVID PRE-OP / PRE-PROCEDURE SCREENING ORDER (NO ISOLATION) - Swab, Nasopharynx [[id number]]  (Normal) Collected: 12/08/20 1658    Lab Status: Final result Specimen: Swab from Nasopharynx Updated: 12/08/20 1836    Narrative:      The following orders were created for panel order COVID PRE-OP / PRE-PROCEDURE SCREENING ORDER (NO ISOLATION) - Swab, Nasopharynx.  Procedure                               Abnormality         Status                      ---------                               -----------         ------                     Respiratory Panel PCR w/...[[id number]]  Normal              Final result                 Please view results for these tests on the individual orders.    Respiratory Panel PCR w/COVID-19(SARS-CoV-2) TIP/BOBBY/ANDREY/PAD/COR/MAD/HUMPHREY In-House, NP Swab in UTM/VTM, 3-4 HR TAT - Swab, Nasopharynx [[id number]]  (Normal) Collected: 12/08/20 1658    Lab Status: Final result Specimen: Swab from Nasopharynx Updated: 12/08/20 1836     ADENOVIRUS, PCR Not Detected     Coronavirus 229E Not Detected     Coronavirus HKU1 Not Detected     Coronavirus NL63 Not Detected     Coronavirus OC43 Not Detected     COVID19 Not Detected     Human Metapneumovirus Not Detected     Human Rhinovirus/Enterovirus Not Detected     Influenza A PCR Not Detected     Influenza B PCR Not Detected     Parainfluenza Virus 1 Not Detected     Parainfluenza Virus 2 Not Detected     Parainfluenza Virus 3 Not Detected     Parainfluenza Virus 4 Not Detected     RSV, PCR Not Detected     Bordetella pertussis pcr Not Detected     Bordetella parapertussis PCR Not Detected     Chlamydophila pneumoniae PCR Not Detected     Mycoplasma pneumo by PCR Not Detected    Narrative:      Fact sheet for providers: https://docs.[employer]/wp-content/uploads/KCS7488-5954-NX4.1-EUA-Provider-Fact-Sheet-3.pdf    Fact sheet for patients: https://docs.[employer]/wp-content/uploads/CLO0151-0950-GS6.1-EUA-Patient-Fact-Sheet-1.pdf    Test performed by PCR.              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  6-history of hypertension  7-new onset seizure type activity.           Recommendations/Discussions:  · Continue ceftriaxone.  · Plans for interventional radiology assisted drainage of  the right renal mass perinephric abscess with plans for drainage culture to be sent for Gram stain and culture.  Tricia Plunkett MD  12/10/2020  07:06 EST    Much of this encounter note is an electronic transcription/translation of spoken language to printed text. The electronic translation of spoken language may permit erroneous, or at times, nonsensical words or phrases to be inadvertently transcribed; Although I have reviewed the note for such errors, some may still exist

## 2020-12-10 NOTE — PLAN OF CARE
Goal Outcome Evaluation:  Plan of Care Reviewed With: patient  Progress: no change  Outcome Summary: Patient  arrived  to  the  floor  from  OR   early  this  shift.  Drowsy  but  easily  aroused.  Patient    being  treated for  low  Magnesium  and  Potassium. Electrolyte    replacement  completed.   IV  fluids  of  NS  with  20KCL  infusing  at  100ml/hr.  Patient  NPO  for  ultrasound  guided  biopsy  of   abdomen  and  plevis  abscess  this   morning.  Consent  obtained  and  in  the  chart.     Voiding  well  but  urine   bloody  r/t  stent placement.  Denies  any  pain.   Blood  cultures  drawn.   Neurology  to  see  this am.  SR on the  monitor.   Nursing  will  continue to monitor.

## 2020-12-10 NOTE — PLAN OF CARE
Problem: Fall Injury Risk  Goal: Absence of Fall and Fall-Related Injury  12/10/2020 1759 by Gavin Horowitz RN  Outcome: Ongoing, Progressing  12/10/2020 1759 by Gavin Horowitz RN  Outcome: Ongoing, Progressing  Intervention: Identify and Manage Contributors to Fall Injury Risk  Recent Flowsheet Documentation  Taken 12/10/2020 1600 by Gavin Horowitz RN  Medication Review/Management: medications reviewed  Taken 12/10/2020 1400 by Gavin Horowitz RN  Medication Review/Management: medications reviewed  Taken 12/10/2020 1000 by Gavin Horowitz RN  Medication Review/Management: medications reviewed  Taken 12/10/2020 0800 by Gavin Horowitz RN  Medication Review/Management: medications reviewed  Intervention: Promote Injury-Free Environment  Recent Flowsheet Documentation  Taken 12/10/2020 1600 by Gavin Horowitz RN  Safety Promotion/Fall Prevention:   assistive device/personal items within reach   clutter free environment maintained   mobility aid in reach   lighting adjusted   nonskid shoes/slippers when out of bed   room organization consistent   safety round/check completed  Taken 12/10/2020 1400 by Gavin Horowitz RN  Safety Promotion/Fall Prevention:   assistive device/personal items within reach   clutter free environment maintained   lighting adjusted   muscle strengthening facilitated   nonskid shoes/slippers when out of bed   room organization consistent   safety round/check completed  Taken 12/10/2020 1200 by Gavin Horowitz RN  Safety Promotion/Fall Prevention: patient off unit  Taken 12/10/2020 1000 by Gavin Horowitz RN  Safety Promotion/Fall Prevention:   assistive device/personal items within reach   clutter free environment maintained   mobility aid in reach   nonskid shoes/slippers when out of bed   room organization consistent   safety round/check completed   lighting adjusted  Taken 12/10/2020 0800 by Gavin Horowitz RN  Safety Promotion/Fall Prevention:   clutter free environment maintained   patient off  unit   room organization consistent   safety round/check completed   mobility aid in reach   nonskid shoes/slippers when out of bed   assistive device/personal items within reach     Problem: Adult Inpatient Plan of Care  Goal: Plan of Care Review  12/10/2020 1759 by Gavin Horowitz, NINA  Outcome: Ongoing, Progressing  Flowsheets (Taken 12/10/2020 1759)  Progress: no change  Plan of Care Reviewed With: patient  Outcome Summary: pt. is A&Ox4 with VSS on monitor. pt. remains on 2L NC. Pt. is recieving electrolyte replacement per protocol. Will need lab recheck during evening shift. pt. has drain on right side of abdomen and has had good output of karo colored fluid. will continue to monitor.  12/10/2020 1759 by Gavin Horowitz, NINA  Outcome: Ongoing, Progressing  Flowsheets  Taken 12/10/2020 1759  Progress: no change  Plan of Care Reviewed With: patient  Outcome Summary: pt. is A&Ox4 with VSS on monitor. pt. remains on 2L NC. Pt. is recieving electrolyte replacement per protocol. Will need lab recheck during evening shift. pt. has drain on right side of abdomen and has had good output of karo colored fluid. will continue to monitor.  Taken 12/10/2020 1758  Plan of Care Reviewed With: patient  Goal: Patient-Specific Goal (Individualized)  12/10/2020 1759 by Gavin Horowitz, NINA  Outcome: Ongoing, Progressing  12/10/2020 1759 by Gavin Horowitz, NINA  Outcome: Ongoing, Progressing  Goal: Absence of Hospital-Acquired Illness or Injury  12/10/2020 1759 by Gavin Horowitz, RN  Outcome: Ongoing, Progressing  12/10/2020 1759 by Gavin Horowitz, NINA  Outcome: Ongoing, Progressing  Intervention: Identify and Manage Fall Risk  Recent Flowsheet Documentation  Taken 12/10/2020 1600 by Gavin Horowitz, RN  Safety Promotion/Fall Prevention:   assistive device/personal items within reach   clutter free environment maintained   mobility aid in reach   lighting adjusted   nonskid shoes/slippers when out of bed   room organization consistent   safety  round/check completed  Taken 12/10/2020 1400 by Gavin Horowitz RN  Safety Promotion/Fall Prevention:   assistive device/personal items within reach   clutter free environment maintained   lighting adjusted   muscle strengthening facilitated   nonskid shoes/slippers when out of bed   room organization consistent   safety round/check completed  Taken 12/10/2020 1200 by Gavin Horowitz RN  Safety Promotion/Fall Prevention: patient off unit  Taken 12/10/2020 1000 by Gavin Horowitz RN  Safety Promotion/Fall Prevention:   assistive device/personal items within reach   clutter free environment maintained   mobility aid in reach   nonskid shoes/slippers when out of bed   room organization consistent   safety round/check completed   lighting adjusted  Taken 12/10/2020 0800 by Gavin Horowitz RN  Safety Promotion/Fall Prevention:   clutter free environment maintained   patient off unit   room organization consistent   safety round/check completed   mobility aid in reach   nonskid shoes/slippers when out of bed   assistive device/personal items within reach  Intervention: Prevent Skin Injury  Recent Flowsheet Documentation  Taken 12/10/2020 0845 by Gavin Horowitz RN  Body Position: position changed independently  Intervention: Prevent Infection  Recent Flowsheet Documentation  Taken 12/10/2020 1400 by Gavin Horowitz RN  Infection Prevention:   environmental surveillance performed   hand hygiene promoted   personal protective equipment utilized   rest/sleep promoted   single patient room provided  Taken 12/10/2020 0800 by Gavin Horowitz RN  Infection Prevention:   personal protective equipment utilized   hand hygiene promoted   equipment surfaces disinfected   single patient room provided   rest/sleep promoted  Goal: Optimal Comfort and Wellbeing  12/10/2020 1759 by Gavin Horowitz RN  Outcome: Ongoing, Progressing  12/10/2020 1759 by Gavin Horowitz RN  Outcome: Ongoing, Progressing  Goal: Readiness for Transition of  Care  12/10/2020 1759 by Gavin Horowitz RN  Outcome: Ongoing, Progressing  12/10/2020 1759 by Gavin Horowitz RN  Outcome: Ongoing, Progressing     Problem: Diabetes Comorbidity  Goal: Blood Glucose Level Within Desired Range  12/10/2020 1759 by Gavin Horowitz RN  Outcome: Ongoing, Progressing  12/10/2020 1759 by Gavin Horowitz RN  Outcome: Ongoing, Progressing     Problem: Electrolyte Imbalance  Goal: Electrolyte Balance  12/10/2020 1759 by Gavin Horowitz RN  Outcome: Ongoing, Progressing  12/10/2020 1759 by Gavin Horowitz RN  Outcome: Ongoing, Progressing     Problem: Seizure, Active Management  Goal: Absence of Seizure/Seizure-Related Injury  12/10/2020 1759 by Gavin Horowitz RN  Outcome: Ongoing, Progressing  12/10/2020 1759 by Gavin Horowitz RN  Outcome: Ongoing, Progressing   Goal Outcome Evaluation:  Plan of Care Reviewed With: patient  Progress: no change  Outcome Summary: pt. is A&Ox4 with VSS on monitor. pt. remains on 2L NC. Pt. is recieving electrolyte replacement per protocol. Will need lab recheck during evening shift. pt. has drain on right side of abdomen and has had good output of karo colored fluid. will continue to monitor.

## 2020-12-10 NOTE — PROGRESS NOTES
[facility] Progress [facility]    Patient Name: Lyubov Middleton  Age: 65 y.o.  Sex: female  MRN: [id number]  Unit/Room: E663/1  Admission Date: 12/8/2020  Hospital Day #: 1    CC: flank pain    Subjective     Tmax 102.5 yesterday afternoon, now afebrile. No nausea or emesis. Denies hematuria or irritative voiding symptoms. NPO for procedure. Continues to have RLQ pain, although controlled.    Objective     Vitals:   Vitals:    12/09/20 2041 12/09/20 2315 12/10/20 0237 12/10/20 0438   BP: 112/71 123/71  150/70   BP Location: Right arm Right arm  Right arm   Patient Position: Lying Lying  Lying   Pulse: 70 67  84   Resp: 18 18  18   Temp: 97.7 °F (36.5 °C) 97.2 °F (36.2 °C)  97.8 °F (36.6 °C)   TempSrc: Oral Oral  Oral   SpO2: 98% 98%  99%   Weight:   69.6 kg (153 lb 8 oz)    Height:         Temp:  [97.2 °F (36.2 °C)-102.5 °F (39.2 °C)] 97.8 °F (36.6 °C)  Heart Rate:  [] 84  Resp:  [16-18] 18  BP: (108-212)/() 150/70    I/Os:   I/O last 3 completed shifts:  In: 1300 [I.V.:300; IV Piggyback:1000]  Out: 10 [Blood:10]    Physical Exam     General: no acute distress, non-toxic  Head: normocephalic, atraumatic  Eyes: extraocular muscles intact, lids/lashes normal  ENT: oral mucosa moist, nares patent  Neck: supple, trachea midline  CV: regular rate, sinus rhythm  Resp: equal chest rise, non-labored breathing.  Abd: soft non-distended, mild RLQ tenderness, no CVAT  Ext: warm  Skin: no rashes, cyanosis or lesions  Neuro: no obvious gross motor or sensory deficits  Psych: mentation appropriate, affect normal    Labs     Recent Results (from the past 24 hour(s))   POC Glucose Once    Collection Time: 12/09/20  7:51 AM    Specimen: Blood   Result Value Ref Range    Glucose 303 (H) 70 - 130 mg/dL   POC Glucose Once    Collection Time: 12/09/20 11:09 AM    Specimen: Blood   Result Value Ref Range    Glucose 214 (H) 70 - 130 mg/dL   POC Glucose Once    Collection Time: 12/09/20  2:53 PM    Specimen: Blood   Result Value  Ref Range    Glucose 234 (H) 70 - 130 mg/dL   CBC (No Diff)    Collection Time: 12/09/20  3:27 PM    Specimen: Arm, Right; Blood   Result Value Ref Range    WBC 12.99 (H) 3.40 - 10.80 10*3/mm3    RBC 4.03 3.77 - 5.28 10*6/mm3    Hemoglobin 10.5 (L) 12.0 - 15.9 g/dL    Hematocrit 33.8 (L) 34.0 - 46.6 %    MCV 83.9 79.0 - 97.0 fL    MCH 26.1 (L) 26.6 - 33.0 pg    MCHC 31.1 (L) 31.5 - 35.7 g/dL    RDW 13.0 12.3 - 15.4 %    RDW-SD 40.0 37.0 - 54.0 fl    MPV 10.0 6.0 - 12.0 fL    Platelets 487 (H) 140 - 450 10*3/mm3   Basic Metabolic Panel    Collection Time: 12/09/20  3:27 PM    Specimen: Arm, Right; Blood   Result Value Ref Range    Glucose 242 (H) 65 - 99 mg/dL    BUN 6 (L) 8 - 23 mg/dL    Creatinine 1.15 (H) 0.57 - 1.00 mg/dL    Sodium 137 136 - 145 mmol/L    Potassium 2.8 (L) 3.5 - 5.2 mmol/L    Chloride 95 (L) 98 - 107 mmol/L    CO2 27.2 22.0 - 29.0 mmol/L    Calcium 8.2 (L) 8.6 - 10.5 mg/dL    eGFR Non African Amer 48 (L) >60 mL/min/1.73    BUN/Creatinine Ratio 5.2 (L) 7.0 - 25.0    Anion Gap 14.8 5.0 - 15.0 mmol/L   Protime-INR    Collection Time: 12/09/20  3:27 PM    Specimen: Arm, Right; Blood   Result Value Ref Range    Protime 16.5 (H) 11.7 - 14.2 Seconds    INR 1.35 (H) 0.90 - 1.10   aPTT    Collection Time: 12/09/20  3:27 PM    Specimen: Arm, Right; Blood   Result Value Ref Range    PTT 33.7 22.7 - 35.4 seconds   Magnesium    Collection Time: 12/09/20  3:27 PM    Specimen: Arm, Right; Blood   Result Value Ref Range    Magnesium 1.1 (L) 1.6 - 2.4 mg/dL   POC Glucose Once    Collection Time: 12/09/20  9:28 PM    Specimen: Blood   Result Value Ref Range    Glucose 361 (H) 70 - 130 mg/dL   CBC (No Diff)    Collection Time: 12/10/20  5:40 AM    Specimen: Blood   Result Value Ref Range    WBC 17.19 (H) 3.40 - 10.80 10*3/mm3    RBC 3.66 (L) 3.77 - 5.28 10*6/mm3    Hemoglobin 9.4 (L) 12.0 - 15.9 g/dL    Hematocrit 29.9 (L) 34.0 - 46.6 %    MCV 81.7 79.0 - 97.0 fL    MCH 25.7 (L) 26.6 - 33.0 pg    MCHC 31.4 (L) 31.5  - 35.7 g/dL    RDW 12.8 12.3 - 15.4 %    RDW-SD 37.9 37.0 - 54.0 fl    MPV 10.2 6.0 - 12.0 fL    Platelets 338 140 - 450 10*3/mm3   POC Glucose Once    Collection Time: 12/10/20  6:09 AM    Specimen: Blood   Result Value Ref Range    Glucose 399 (H) 70 - 130 mg/dL       Medications     Scheduled: cefTRIAXone, 1 g, Intravenous, Q24H  citalopram, 10 mg, Oral, Daily  influenza vaccine, 0.5 mL, Intramuscular, Once  insulin lispro, 0-9 Units, Subcutaneous, TID AC  lisinopril, 20 mg, Oral, Daily      Infusion: lactated ringers, 9 mL/hr, Last Rate: 9 mL/hr (12/09/20 1246)  sodium chloride 0.9 % with KCl 20 mEq, 100 mL/hr, Last Rate: 100 mL/hr (12/10/20 0137)      PRN: acetaminophen, 650 mg, Q4H PRN  dextrose, 25 g, Q15 Min PRN  dextrose, 15 g, Q15 Min PRN  glucagon (human recombinant), 1 mg, Q15 Min PRN  melatonin, 3 mg, Nightly PRN  nitroglycerin, 0.4 mg, Q5 Min PRN  ondansetron, 4 mg, Q6H PRN    Or  ondansetron, 4 mg, Q6H PRN  potassium chloride, 40 mEq, PRN    Or  potassium chloride, 40 mEq, PRN    Or  potassium chloride, 10 mEq, Q1H PRN  potassium chloride, 40 mEq, PRN    Or  potassium chloride, 40 mEq, PRN    Or  potassium chloride, 10 mEq, Q1H PRN  sodium chloride, 10 mL, PRN        Radiology     Ct Head Without Contrast    Result Date: 12/9/2020  Atrophy and moderate vascular calcification is noted. There is no evidence of acute infarction or hemorrhage. Further evaluation could be performed with MRI examination of the brain, particularly if the patient has a history of new onset seizure activity. The above information was called to the patient's nurse at the time of the dictation. The patient's nurse is to immediately relay the information to the clinical service.    Radiation dose reduction techniques were utilized, including automated exposure control and exposure modulation based on body size.  This report was finalized on 12/9/2020 8:26 PM by Dr. Harris Sylvester M.D.        Assessment & Plan     Patient Active  Problem List   Diagnosis   • Hypokalemia   • Renal stone       Code:   Code Status and Medical Interventions:   Ordered at: 12/08/20 2012     Code Status:    CPR     Medical Interventions (Level of Support Prior to Arrest):    Full     Diet: NPO Diet    1 Day Post-Op  Procedure(s) (LRB):  CYSTOSCOPY, RIGHT RETROGRADE PYELOGRAM, URETEROSCOPY, LASER LITHOTRIPSY, RIGHT URETERAL STENT PLACEMENT (Right)    Nephrolithiasis  Right perinephric mass versus fluid collection    S/p R URS LL stent 12/9    - NPO for procedure. IR for drain - send for Cr & Cx.  - Rocephin per primary. F/u Bcx - pending  - Cr wnl yesterday. WBC 17 (13).  - PRN pain control    Any changes will be made upon review and co-signature of this note by the attending surgeon of record.    Alireza Chaidez MD  Pager: [phone]  Date: 12/10/20  Time: 06:27 EST

## 2020-12-10 NOTE — NURSING NOTE
"Diabetes Education  Assessment/Teaching    Patient Name:  Lyubov Middleton  YOB: 1955  MRN: [id number]  Admit Date:  12/8/2020      Assessment Date:  12/10/2020    Most Recent Value   General Information    Referral From:  MD lin. Meet with 66 y/o at bedside to initiate insulin instructions ahead of dc.    Height  162.6 cm (64.02\")   Height Method  Stated   Weight  69.6 kg (153 lb 8 oz)   Weight Method  Standing scale   Diabetes History   What type of diabetes do you have?  Type 2   Length of Diabetes Diagnosis  10 + years   Do you test your blood sugar at home?  no, not in the last ~4 wks per pt.    Have you had high blood sugar? (>140mg/dl)  yes -current a1C >13%.   Education Preferences   Barriers to Learning  -- cultural. pt reports her BG is usually ~150s. Also, pt verbalizes concern re insulin cost/copay.    Assessment Topics   Taking Medication - Assessment  Needs education. anticipate pt to dc home new to Lantus/insulin per Dr Perez' note.   Reducing Risk - Assessment  Needs education   Healthy Coping - Assessment  Competent   DM Goals   Contact Plan  Follow-up medical care            Most Recent Value   DM Education Needs   Meter  Has own   Medication  Insulin, Administration, Pen. Instruct pt on Lantus name/scheduling and use of syringe vs insulin pen. Demo correct use of insulin pen.    Reducing Risks  -- advise pt of risk for infections with high BGs.    Healthy Coping  Appropriate   Discharge Plan  Follow-up with PCP   Motivation  Moderate   Teaching Method  Explanation, Discussion, Demonstration, Teach back   Patient Response  Needs reinforcement      Other Comments:    Electronically signed by:  Petty Conte, RN, BSN, CDE   12/10/20 18:18 EST  "

## 2020-12-10 NOTE — PROGRESS NOTES
Neurology Progress Note    Reason for visit  Follow up for possible seizure    Interval History  Patient reports no issues. She remembers that she had seizure after procedure.  She reports she has never had seizure in past.  No history of head injury. She reports no alcohol use.    Medications:  ceftriaxone  celexa  pepcid  humalog  lisinopril    Review of Systems:   Review of Systems   Respiratory: Negative.    Cardiovascular: Negative.    Gastrointestinal: Negative.    Musculoskeletal: Negative.    Neurological: Negative.    Psychiatric/Behavioral: Negative.        Vital Signs  Temp:  [97.2 °F (36.2 °C)-102.5 °F (39.2 °C)] 98.3 °F (36.8 °C)  Heart Rate:  [] 73  Resp:  [16-18] 16  BP: (108-212)/() 125/67    Physical Exam:  Constitutional:  Appears comfortable  HEENT:  normal  CVS:  Regular rate and rhythm.    Musculoskeletal:  No signs of peripheral edema  Neurologic:   Alert oriented and fluent   EOMF   VFF   Normal power and coordination  Psychiatric: no anxiety     Results Review:    Glucose 397  BUN  11  Creatinine 1.22  Sodium 133  Potassium 3.4  Calcium 7.2  Magnesium 1.9  CPK  69    WBC  17.19  Hgb  9.4  Platelets 338    Medical Decision Making and Recommendations  New onset seizure  No recurrence  Secondary to hypomagnesemia and possible medication for procedure  Exam no focal and benign today.  No indication for further imaging of head.  No indication for EEG  Keep magnesium at least 2.0, start.oral supplement.  Serum calcium level also low, may need to replace also, will defer to hospitalist.    I used full protective equipment while examining this patient.  This included N95 face mask, gloves and protective eyewear.  I washed my hands before entering the room and immediately upon leaving the room.  Patient was wearing a surgical mask.       Vanessa Nicholas MD  12/10/20  09:49 EST

## 2020-12-10 NOTE — PROGRESS NOTES
[phone]   LOS: 1 day     Name: Lyubov Middleton  Age/Sex: 65 y.o. female  :  1955        PCP: Ana Huitron MD  Chief Complaint   Patient presents with   • Abdominal Pain   • Difficulty Urinating      Subjective   She is felling ok today Ox3 and only complains of being hungry; After surgery she had complications likely related to a seizure.  Neurology evaluated the patient in PACU.  [place] to be related to Magnesium and this was replaced last night.  Tolerated cysto and stent placement  General: No Fever or Chills, Cardiac: No Chest Pain or Palpitations, Resp: No Cough or SOA, GI: No Nausea, Vomiting, or Diarrhea and Other: No bleeding    cefTRIAXone, 1 g, Intravenous, Q24H  citalopram, 10 mg, Oral, Daily  influenza vaccine, 0.5 mL, Intramuscular, Once  insulin lispro, 0-9 Units, Subcutaneous, TID AC  lisinopril, 20 mg, Oral, Daily      sodium chloride 0.9 % with KCl 20 mEq, 100 mL/hr, Last Rate: 100 mL/hr (12/10/20 0137)        Objective   Vital Signs  Temp:  [97.2 °F (36.2 °C)-102.5 °F (39.2 °C)] 98.3 °F (36.8 °C)  Heart Rate:  [] 73  Resp:  [16-18] 16  BP: (108-212)/() 125/67  Body mass index is 26.34 kg/m².    Intake/Output Summary (Last 24 hours) at 12/10/2020 0929  Last data filed at 2020 1920  Gross per 24 hour   Intake 530 ml   Output 410 ml   Net 120 ml       Physical Exam  Vitals signs and nursing note reviewed.   Constitutional:       Appearance: Normal appearance.   HENT:      Head: Normocephalic and atraumatic.   Cardiovascular:      Rate and Rhythm: Normal rate and regular rhythm.   Pulmonary:      Effort: Pulmonary effort is normal.      Breath sounds: Normal breath sounds.   Abdominal:      General: Bowel sounds are normal.      Palpations: Abdomen is soft.   Skin:     General: Skin is warm and dry.   Neurological:      Mental Status: She is alert.           Results Review:       I reviewed the patient's new clinical results.  Results from last 7 days   Lab Units  12/10/20  0540 12/09/20  1527 12/09/20  0534 12/08/20  1506   WBC 10*3/mm3 17.19* 12.99* 14.00* 15.33*   HEMOGLOBIN g/dL 9.4* 10.5* 8.3* 9.5*   PLATELETS 10*3/mm3 338 487* 318 380     Results from last 7 days   Lab Units 12/10/20  0540 12/09/20  1527 12/09/20  0534 12/08/20  1506   SODIUM mmol/L 133* 137 136 131*   POTASSIUM mmol/L 3.4* 2.8* 3.2* 2.1*   CHLORIDE mmol/L 95* 95* 98 86*   CO2 mmol/L 26.6 27.2 29.5* 33.2*   BUN mg/dL 11 6* 5* 7*   CREATININE mg/dL 1.22* 1.15* 0.86 1.16*   CALCIUM mg/dL 7.2* 8.2* 6.9* 7.9*   MAGNESIUM mg/dL 1.9 1.1*  --  1.0*   Estimated Creatinine Clearance: 44.6 mL/min (A) (by C-G formula based on SCr of 1.22 mg/dL (H)).      Assessment/Plan   Active Hospital Problems    Diagnosis  POA   • Hypomagnesemia [E83.42]  Unknown   • Renal abscess [N15.1]  Unknown   • Hypocalcemia [E83.51]  Unknown   • Stage 3a chronic kidney disease [N18.31]  Yes   • Type 2 diabetes mellitus (CMS/[facility]) [E11.9]  Yes   • Renal stone [N20.0]  Unknown   • Hypokalemia [E87.6]  Yes      Resolved Hospital Problems   No resolved problems to display.       PLAN  This is a 65-year-old female with a history of multiple urinary tract infections in the past hypertension and diabetes who presents to the hospital with urinary frequency and dysuria is found to have significant hypokalemia as well as a possible perinephric mass versus fluid collection  -WBC up but not unexpected, urology and ID following remains on empiric antibiotics  -Magnesium missed yesterday AM and unfortunately we weren't notified of the low level.  Up to 1.9 after 4gm replacement  -Now on full electrolyte protocol  -Uncontrolled DM 2, A1c is 13.  She will require insulin at [place].  Will initiate basal insulin and oral meds will be continued at DC.  Needs DE prior to DC  -plan for CT guided drain placement today await cultures and sensitivities  -Appreciate neurology, ID and urology    Disposition  TBD      Harris Perez MD  [facility]  12/10/20  09:29 EST

## 2020-12-10 NOTE — NURSING NOTE
Talked  To  Germain  In  Lab,  They  Will  Try and  Get  Somebody  To  Come  And  Do   Blood  Culture.  Still  Pending  For  The  Start  On  Antibiotics.

## 2020-12-11 PROBLEM — A41.9 SEPSIS (HCC): Status: ACTIVE | Noted: 2020-12-11

## 2020-12-11 LAB
ALBUMIN SERPL-MCNC: 2.2 G/DL (ref 3.5–5.2)
ANION GAP SERPL CALCULATED.3IONS-SCNC: 10.2 MMOL/L (ref 5–15)
BACTERIA UR QL AUTO: ABNORMAL /HPF
BASOPHILS # BLD AUTO: 0.06 10*3/MM3 (ref 0–0.2)
BASOPHILS NFR BLD AUTO: 0.4 % (ref 0–1.5)
BILIRUB UR QL STRIP: NEGATIVE
BUN SERPL-MCNC: 11 MG/DL (ref 8–23)
BUN/CREAT SERPL: 10.8 (ref 7–25)
CALCIUM SPEC-SCNC: 7.8 MG/DL (ref 8.6–10.5)
CHLORIDE SERPL-SCNC: 104 MMOL/L (ref 98–107)
CLARITY UR: ABNORMAL
CO2 SERPL-SCNC: 22.8 MMOL/L (ref 22–29)
COLOR UR: YELLOW
CREAT FLD-MCNC: 0.9 MG/DL
CREAT SERPL-MCNC: 1.02 MG/DL (ref 0.57–1)
CYTO UR: NORMAL
DEPRECATED RDW RBC AUTO: 39 FL (ref 37–54)
EOSINOPHIL # BLD AUTO: 0.14 10*3/MM3 (ref 0–0.4)
EOSINOPHIL NFR BLD AUTO: 0.9 % (ref 0.3–6.2)
ERYTHROCYTE [DISTWIDTH] IN BLOOD BY AUTOMATED COUNT: 12.8 % (ref 12.3–15.4)
GFR SERPL CREATININE-BSD FRML MDRD: 55 ML/MIN/1.73
GLUCOSE BLDC GLUCOMTR-MCNC: 181 MG/DL (ref 70–130)
GLUCOSE BLDC GLUCOMTR-MCNC: 182 MG/DL (ref 70–130)
GLUCOSE BLDC GLUCOMTR-MCNC: 251 MG/DL (ref 70–130)
GLUCOSE BLDC GLUCOMTR-MCNC: 353 MG/DL (ref 70–130)
GLUCOSE SERPL-MCNC: 247 MG/DL (ref 65–99)
GLUCOSE UR STRIP-MCNC: ABNORMAL MG/DL
HCT VFR BLD AUTO: 31 % (ref 34–46.6)
HGB BLD-MCNC: 9.4 G/DL (ref 12–15.9)
HGB UR QL STRIP.AUTO: ABNORMAL
HYALINE CASTS UR QL AUTO: ABNORMAL /LPF
IMM GRANULOCYTES # BLD AUTO: 0.29 10*3/MM3 (ref 0–0.05)
IMM GRANULOCYTES NFR BLD AUTO: 1.8 % (ref 0–0.5)
KETONES UR QL STRIP: NEGATIVE
LAB AP CASE REPORT: NORMAL
LAB AP SPECIAL STAINS: NORMAL
LEUKOCYTE ESTERASE UR QL STRIP.AUTO: ABNORMAL
LYMPHOCYTES # BLD AUTO: 0.49 10*3/MM3 (ref 0.7–3.1)
LYMPHOCYTES NFR BLD AUTO: 3.1 % (ref 19.6–45.3)
MAGNESIUM SERPL-MCNC: 2.5 MG/DL (ref 1.6–2.4)
MCH RBC QN AUTO: 25.3 PG (ref 26.6–33)
MCHC RBC AUTO-ENTMCNC: 30.3 G/DL (ref 31.5–35.7)
MCV RBC AUTO: 83.3 FL (ref 79–97)
MONOCYTES # BLD AUTO: 0.66 10*3/MM3 (ref 0.1–0.9)
MONOCYTES NFR BLD AUTO: 4.2 % (ref 5–12)
NEUTROPHILS NFR BLD AUTO: 14.16 10*3/MM3 (ref 1.7–7)
NEUTROPHILS NFR BLD AUTO: 89.6 % (ref 42.7–76)
NITRITE UR QL STRIP: NEGATIVE
NRBC BLD AUTO-RTO: 0 /100 WBC (ref 0–0.2)
PATH REPORT.FINAL DX SPEC: NORMAL
PATH REPORT.GROSS SPEC: NORMAL
PH UR STRIP.AUTO: <=5 [PH] (ref 5–8)
PHOSPHATE SERPL-MCNC: 1.5 MG/DL (ref 2.5–4.5)
PHOSPHATE SERPL-MCNC: 2 MG/DL (ref 2.5–4.5)
PLATELET # BLD AUTO: 368 10*3/MM3 (ref 140–450)
PMV BLD AUTO: 9.8 FL (ref 6–12)
POTASSIUM SERPL-SCNC: 3.9 MMOL/L (ref 3.5–5.2)
POTASSIUM SERPL-SCNC: 4.2 MMOL/L (ref 3.5–5.2)
PROCALCITONIN SERPL-MCNC: 8.89 NG/ML (ref 0–0.25)
PROT UR QL STRIP: ABNORMAL
RBC # BLD AUTO: 3.72 10*6/MM3 (ref 3.77–5.28)
RBC # UR: ABNORMAL /HPF
REF LAB TEST METHOD: ABNORMAL
REF LAB TEST RESULTS: NORMAL
RENAL EPI CELLS #/AREA URNS HPF: ABNORMAL /HPF
SODIUM SERPL-SCNC: 137 MMOL/L (ref 136–145)
SP GR UR STRIP: 1.01 (ref 1–1.03)
SQUAMOUS #/AREA URNS HPF: ABNORMAL /HPF
UROBILINOGEN UR QL STRIP: ABNORMAL
WBC # BLD AUTO: 15.8 10*3/MM3 (ref 3.4–10.8)
WBC UR QL AUTO: ABNORMAL /HPF
YEAST URNS QL MICRO: ABNORMAL /HPF

## 2020-12-11 PROCEDURE — 83735 ASSAY OF MAGNESIUM: CPT | Performed by: PSYCHIATRY & NEUROLOGY

## 2020-12-11 PROCEDURE — 85025 COMPLETE CBC W/AUTO DIFF WBC: CPT | Performed by: HOSPITALIST

## 2020-12-11 PROCEDURE — 63710000001 INSULIN GLARGINE PER 5 UNITS: Performed by: HOSPITALIST

## 2020-12-11 PROCEDURE — 87040 BLOOD CULTURE FOR BACTERIA: CPT | Performed by: HOSPITALIST

## 2020-12-11 PROCEDURE — 81001 URINALYSIS AUTO W/SCOPE: CPT | Performed by: UROLOGY

## 2020-12-11 PROCEDURE — 25810000003 SODIUM CHLORIDE 0.9 % WITH KCL 20 MEQ 20-0.9 MEQ/L-% SOLUTION: Performed by: UROLOGY

## 2020-12-11 PROCEDURE — 82962 GLUCOSE BLOOD TEST: CPT

## 2020-12-11 PROCEDURE — 80069 RENAL FUNCTION PANEL: CPT | Performed by: HOSPITALIST

## 2020-12-11 PROCEDURE — 87086 URINE CULTURE/COLONY COUNT: CPT | Performed by: UROLOGY

## 2020-12-11 PROCEDURE — 84100 ASSAY OF PHOSPHORUS: CPT | Performed by: HOSPITALIST

## 2020-12-11 PROCEDURE — 99233 SBSQ HOSP IP/OBS HIGH 50: CPT | Performed by: NURSE PRACTITIONER

## 2020-12-11 PROCEDURE — 63710000001 INSULIN LISPRO (HUMAN) PER 5 UNITS: Performed by: UROLOGY

## 2020-12-11 PROCEDURE — 25010000002 MEROPENEM PER 100 MG: Performed by: HOSPITALIST

## 2020-12-11 PROCEDURE — 82570 ASSAY OF URINE CREATININE: CPT | Performed by: STUDENT IN AN ORGANIZED HEALTH CARE EDUCATION/TRAINING PROGRAM

## 2020-12-11 PROCEDURE — 84145 PROCALCITONIN (PCT): CPT | Performed by: HOSPITALIST

## 2020-12-11 RX ORDER — INSULIN GLARGINE 100 [IU]/ML
10 INJECTION, SOLUTION SUBCUTANEOUS NIGHTLY
Status: DISCONTINUED | OUTPATIENT
Start: 2020-12-11 | End: 2020-12-12

## 2020-12-11 RX ORDER — LABETALOL HYDROCHLORIDE 5 MG/ML
10 INJECTION, SOLUTION INTRAVENOUS EVERY 6 HOURS PRN
Status: DISCONTINUED | OUTPATIENT
Start: 2020-12-11 | End: 2020-12-17 | Stop reason: HOSPADM

## 2020-12-11 RX ORDER — ACETAMINOPHEN 650 MG/1
650 SUPPOSITORY RECTAL EVERY 6 HOURS PRN
Status: DISCONTINUED | OUTPATIENT
Start: 2020-12-11 | End: 2020-12-17 | Stop reason: HOSPADM

## 2020-12-11 RX ADMIN — MAGNESIUM OXIDE 400 MG (241.3 MG MAGNESIUM) TABLET 400 MG: TABLET at 22:10

## 2020-12-11 RX ADMIN — POTASSIUM CHLORIDE AND SODIUM CHLORIDE 100 ML/HR: 900; 150 INJECTION, SOLUTION INTRAVENOUS at 05:38

## 2020-12-11 RX ADMIN — MEROPENEM 1 G: 1 INJECTION INTRAVENOUS at 17:51

## 2020-12-11 RX ADMIN — LISINOPRIL 20 MG: 20 TABLET ORAL at 09:02

## 2020-12-11 RX ADMIN — Medication 2 PACKET: at 13:45

## 2020-12-11 RX ADMIN — POTASSIUM CHLORIDE AND SODIUM CHLORIDE 100 ML/HR: 900; 150 INJECTION, SOLUTION INTRAVENOUS at 15:49

## 2020-12-11 RX ADMIN — INSULIN GLARGINE 10 UNITS: 100 INJECTION, SOLUTION SUBCUTANEOUS at 22:10

## 2020-12-11 RX ADMIN — INSULIN LISPRO 6 UNITS: 100 INJECTION, SOLUTION INTRAVENOUS; SUBCUTANEOUS at 09:02

## 2020-12-11 RX ADMIN — LABETALOL HYDROCHLORIDE 10 MG: 5 INJECTION, SOLUTION INTRAVENOUS at 16:06

## 2020-12-11 RX ADMIN — MAGNESIUM OXIDE 400 MG (241.3 MG MAGNESIUM) TABLET 400 MG: TABLET at 09:02

## 2020-12-11 RX ADMIN — INSULIN LISPRO 2 UNITS: 100 INJECTION, SOLUTION INTRAVENOUS; SUBCUTANEOUS at 17:51

## 2020-12-11 RX ADMIN — INSULIN LISPRO 8 UNITS: 100 INJECTION, SOLUTION INTRAVENOUS; SUBCUTANEOUS at 12:08

## 2020-12-11 RX ADMIN — CITALOPRAM 10 MG: 10 TABLET, FILM COATED ORAL at 09:02

## 2020-12-11 RX ADMIN — ACETAMINOPHEN 650 MG: 325 TABLET, FILM COATED ORAL at 15:18

## 2020-12-11 NOTE — PROGRESS NOTES
Continued Stay Note  [facility]     Patient Name: Lyubov Middleton  MRN: [id number]  Today's Date: 12/11/2020    Admit Date: 12/8/2020    Discharge Plan     Row Name 12/11/20 8372       Plan    Plan  Plan home with family.  ZIGGY Hay RN    Patient/Family in Agreement with Plan  yes    Plan Comments  Attempted to speak to pt at bedside,   RN at bedside.  Pt unable to stay awake to discuss HH.  Pt stating she does not want HH.  Will attempt to speak with pt later when more alert.  Plan home with family.   ZIGGY Hay RN        Discharge Codes    No documentation.             Gretta Hay, RN

## 2020-12-11 NOTE — PLAN OF CARE
Goal Outcome Evaluation:  Plan of Care Reviewed With: patient  Progress: no change  Outcome Summary: Patient   in  bed  resting at this  time.  Up  to  the  bedside  commode  couple  of  times.  Alert  and  oriented.  Started  on  Lantus  this  shift  for   hyperglycemia.    Tolerated  well.     Drain  to  right  side  with  serous  drainage noted.      Repeat   potassium  was   3.9.     Continue  IV  ROcephin  for  UTI.  Patient  complaining  of  burning  and   urine  sent  for  UA  with  culture.   SR  on  monitor.   Nursing  will  continue  to  monitor.

## 2020-12-11 NOTE — PROGRESS NOTES
[facility] Progress [facility]    Patient Name: Lyubov Middleton  Age: 65 y.o.  Sex: female  MRN: [id number]  Unit/Room: [facility]/1  Admission Date: 12/8/2020  Hospital Day #: 2    CC: septic stone    Subjective     Afebrile - last fever 102.5 on 12/9 afternoon. HDS. IR placed drain yesterday with purulent output.    Objective     Vitals:   Vitals:    12/10/20 1929 12/10/20 1946 12/10/20 2314 12/11/20 0424   BP: 112/55  152/80    BP Location: Right arm  Right arm    Patient Position: Lying  Lying    Pulse: 80  85    Resp: 18  18    Temp:  98.6 °F (37 °C) 98.6 °F (37 °C)    TempSrc:   Oral    SpO2: 96%  96%    Weight:    71.4 kg (157 lb 8 oz)   Height:         Temp:  [98.3 °F (36.8 °C)-98.6 °F (37 °C)] 98.6 °F (37 °C)  Heart Rate:  [73-85] 85  Resp:  [16-18] 18  BP: (112-152)/(55-80) 152/80    I/Os:   I/O last 3 completed shifts:  In: 530 [P.O.:30; I.V.:500]  Out: 675 [Urine:500; Drains:165; Blood:10]    Physical Exam     General: no acute distress, non-toxic  Head: normocephalic, atraumatic  Eyes: extraocular muscles intact, lids/lashes normal  ENT: oral mucosa moist, nares patent  Neck: supple, trachea midline  CV: regular rate, sinus rhythm  Resp: equal chest rise, non-labored breathing.  Abd: soft non-distended, mild RLQ tenderness, no CVAT, PAUL drain purulent  Ext: warm  Skin: no rashes, cyanosis or lesions  Neuro: no obvious gross motor or sensory deficits  Psych: mentation appropriate, affect normal    Labs     Recent Results (from the past 24 hour(s))   POC Glucose Once    Collection Time: 12/10/20  6:09 AM    Specimen: Blood   Result Value Ref Range    Glucose 399 (H) 70 - 130 mg/dL   Body Fluid Culture - Body Fluid, Retroperitoneum    Collection Time: 12/10/20 12:05 PM    Specimen: Retroperitoneum; Body Fluid   Result Value Ref Range    Gram Stain Many (4+) WBCs seen     Gram Stain No organisms seen    POC Glucose Once    Collection Time: 12/10/20  4:38 PM    Specimen: Blood   Result Value Ref Range    Glucose 261  (H) 70 - 130 mg/dL   POC Glucose Once    Collection Time: 12/10/20  8:54 PM    Specimen: Blood   Result Value Ref Range    Glucose 273 (H) 70 - 130 mg/dL   Potassium    Collection Time: 12/10/20 11:03 PM    Specimen: Blood   Result Value Ref Range    Potassium 3.9 3.5 - 5.2 mmol/L   Urinalysis With Culture If Indicated - Urine, Clean Catch    Collection Time: 12/11/20  1:49 AM    Specimen: Urine, Clean Catch   Result Value Ref Range    Color, UA Yellow Yellow, Straw    Appearance, UA Cloudy (A) Clear    pH, UA <=5.0 5.0 - 8.0    Specific Gravity, UA 1.012 1.005 - 1.030    Glucose,  mg/dL (Trace) (A) Negative    Ketones, UA Negative Negative    Bilirubin, UA Negative Negative    Blood, UA Large (3+) (A) Negative    Protein,  mg/dL (2+) (A) Negative    Leuk Esterase, UA Moderate (2+) (A) Negative    Nitrite, UA Negative Negative    Urobilinogen, UA 0.2 E.U./dL 0.2 - 1.0 E.U./dL   Urinalysis, Microscopic Only - Urine, Clean Catch    Collection Time: 12/11/20  1:49 AM    Specimen: Urine, Clean Catch   Result Value Ref Range    RBC, UA Too Numerous to Count (A) None Seen, 0-2 /HPF    WBC, UA Too Numerous to Count (A) None Seen, 0-2 /HPF    Bacteria, UA 1+ (A) None Seen /HPF    Squamous Epithelial Cells, UA 3-6 (A) None Seen, 0-2 /HPF    Renal Epithelial Cells, UA 13-20 (A) 0 - 2 /HPF    Yeast, UA Large/3+ Budding Yeast None Seen /HPF    Hyaline Casts, UA None Seen None Seen /LPF    Methodology Manual Light Microscopy      @LABRCNT(WBC:3,HGB:3,PLT:3)@  @LABRCNT(NA:3,K:3,CO2CT:3,BUN:3,CREATININE:3)@  @LABRCNT(AST:3,ALT:3,GGT:3,ALKPHOS:3,BILITOT:3)@  @LABRCNT(INR:3,)@  @LABRCNT(troponiniult:3)@    Medications     Scheduled: cefTRIAXone, 1 g, Intravenous, Q24H  citalopram, 10 mg, Oral, Daily  influenza vaccine, 0.5 mL, Intramuscular, Once  insulin glargine, 5 Units, Subcutaneous, Nightly  insulin lispro, 0-9 Units, Subcutaneous, TID AC  lisinopril, 20 mg, Oral, Daily  magnesium oxide, 400 mg, Oral,  BID      Infusion: sodium chloride 0.9 % with KCl 20 mEq, 100 mL/hr, Last Rate: 100 mL/hr (12/11/20 0538)      PRN: acetaminophen, 650 mg, Q4H PRN  calcium gluconate, 1 g, PRN    And  calcium gluconate IVPB, 6 g, PRN  dextrose, 25 g, Q15 Min PRN  dextrose, 15 g, Q15 Min PRN  glucagon (human recombinant), 1 mg, Q15 Min PRN  magnesium sulfate, 2 g, PRN    Or  magnesium sulfate, 2 g, PRN    Or  magnesium sulfate, 4 g, PRN  melatonin, 3 mg, Nightly PRN  nitroglycerin, 0.4 mg, Q5 Min PRN  ondansetron, 4 mg, Q6H PRN    Or  ondansetron, 4 mg, Q6H PRN  potassium & sodium phosphates, 2 packet, Q6H PRN    Or  potassium & sodium phosphates, 2 packet, Q6H PRN  potassium chloride, 40 mEq, PRN    Or  potassium chloride, 40 mEq, PRN    Or  potassium chloride, 10 mEq, Q1H PRN  potassium chloride, 40 mEq, PRN    Or  potassium chloride, 40 mEq, PRN    Or  potassium chloride, 10 mEq, Q1H PRN  sodium chloride, 10 mL, PRN        Radiology     Ct Head Without Contrast    Result Date: 12/9/2020  Atrophy and moderate vascular calcification is noted. There is no evidence of acute infarction or hemorrhage. Further evaluation could be performed with MRI examination of the brain, particularly if the patient has a history of new onset seizure activity. The above information was called to the patient's nurse at the time of the dictation. The patient's nurse is to immediately relay the information to the clinical service.    Radiation dose reduction techniques were utilized, including automated exposure control and exposure modulation based on body size.  This report was finalized on 12/9/2020 8:26 PM by Dr. Harris Sylvester M.D.        Assessment & Plan     Patient Active Problem List   Diagnosis   • Hypokalemia   • Renal stone   • Hypomagnesemia   • Renal abscess   • Hypocalcemia   • Stage 3a chronic kidney disease   • Type 2 diabetes mellitus (CMS/HCC)       Code:   Code Status and Medical Interventions:   Ordered at: 12/08/20 2012     Code  Status:    CPR     Medical Interventions (Level of Support Prior to Arrest):    Full     Diet: Diet Regular; GI Soft    2 Days Post-Op  Procedure(s) (LRB):  CYSTOSCOPY, RIGHT RETROGRADE PYELOGRAM, URETEROSCOPY, LASER LITHOTRIPSY, RIGHT URETERAL STENT PLACEMENT (Right)    Nephrolithiasis  Right perinephric fluid collection     S/p R URS LL stent 12/9  S/p IR drain 12/10     - Continue IR drain. Monitor output. F/u Cx & Cr level.  - Rocephin per primary. Bcx NGTD.  - F/u am labs  - PRN pain control. Future laser lithotripsy.    Any changes will be made upon review and co-signature of this note by the attending surgeon of record.    Alireza Chaidez MD  Pager: [phone]  Date: 12/11/20  Time: 05:58 EST

## 2020-12-11 NOTE — PROGRESS NOTES
"Pharmacokinetic Consult - Meropenem Dosing  Lyubov Middleton is a 65 y.o. female who is on day 1 of 7 pharmacy to dose Meropenem for sepsis per Dr. Jones' request.   Other antimicrobials: none, prior Ceftriaxone    Dr. Perez noted that he was called back to bedside for neurologic changes this afternoon.  History of multiple urinary tract infections.    12/9 had * CYSTOSCOPY, RIGHT RETROGRADE PYELOGRAM, URETEROSCOPY, LASER LITHOTRIPSY, RIGHT URETERAL STENT PLACEMENT - General  12/10 CT-GUIDED ASPIRATION AND CATHETER PLACEMENT INTO RIGHT ABDOMINAL ABSCESS   12/11 PCT 8.89    Relevant clinical data and objective history reviewed:  162.6 cm (64.02\")  71.4 kg (157 lb 8 oz)  Body mass index is 27.02 kg/m².     She has a past medical history of Cancer (CMS/[facility]) and Type 2 diabetes mellitus (CMS/[facility]).    Allergies as of 12/08/2020   • (No Known Allergies)     Vital Signs (last 24 hours)       12/10 0700  -  12/11 0659 12/11 0700  -  12/11 1708   Most Recent    Temp (°F) 98.3 -  98.6    98.6 -  101     99.2 (37.3)    Heart Rate 73 -  85    87 -  94     94    Resp 16 -  18      18     18    /55 -  152/80    153/77 -  166/90     153/77    SpO2 (%) 96 -  100    94 -  95     94        Estimated Creatinine Clearance: 54 mL/min (A) (by C-G formula based on SCr of 1.02 mg/dL (H)).  Results from last 7 days   Lab Units 12/11/20  0559 12/10/20  0540 12/09/20  1527   CREATININE mg/dL 1.02* 1.22* 1.15*     Results from last 7 days   Lab Units 12/11/20  0938 12/10/20  0540 12/09/20  1527   WBC 10*3/mm3 15.80* 17.19* 12.99*     Baseline culture/source/susceptibility:   Microbiology Results (last 21 days)    Collected Updated Procedure Result Status    12/11/2020 1530 12/11/2020 1541 Blood Culture - Blood, Hand, Right [[id number]]   Blood from Hand, Right    In process Component Value   No component results              12/11/2020 1530 12/11/2020 1540 Blood Culture - Blood, Arm, Right [[id number]]   Blood from Arm, Right    In " process Component Value   No component results              12/11/2020 0149 12/11/2020 0316 Urine Culture - Urine, Urine, Clean Catch [[id number]]   Urine, Clean Catch    In process Component Value   No component results              12/10/2020 1205 12/11/2020 0630 Body Fluid Culture - Body Fluid, Retroperitoneum [[id number]]   Body Fluid from Retroperitoneum    Preliminary result Component Value   Body Fluid Culture Growth present, too young to evaluate P   Gram Stain Many (4+) WBCs seen P    No organisms seen P              12/09/2020 2328 12/11/2020 0015 Blood Culture - Blood, Arm, Left [[id number]]   Blood from Arm, Left    Preliminary result Component Value   Blood Culture No growth at 24 hours P              12/09/2020 2328 12/11/2020 0015 Blood Culture - Blood, Arm, Right [[id number]]   Blood from Arm, Right    Preliminary result Component Value   Blood Culture No growth at 24 hours P              12/09/2020 1059 12/11/2020 1230 Blood Culture - Blood, Arm, Right [[id number]]   Blood from Arm, Right    Preliminary result Component Value   Blood Culture No growth at 2 days P              12/09/2020 1015 12/11/2020 1230 Blood Culture - Blood, Arm, Right [[id number]]   Blood from Arm, Right    Preliminary result Component Value   Blood Culture No growth at 2 days P              12/08/2020 1658 12/08/2020 1836 COVID PRE-OP / PRE-PROCEDURE SCREENING ORDER (NO ISOLATION) - Swab, Nasopharynx [[id number]]    Swab from Nasopharynx    Final result Component Value   No component results              12/08/2020 1658 12/08/2020 1836 Respiratory Panel PCR w/COVID-19(SARS-CoV-2) TIP/BOBBY/ANDREY/PAD/COR/MAD/HUMPHREY In-House, NP Swab in UTM/VTM, 3-4 HR TAT - Swab, Nasopharynx [[id number]]    Swab from Nasopharynx    Final result Component Value   ADENOVIRUS, PCR Not Detected   Coronavirus 229E Not Detected   Coronavirus HKU1 Not Detected   Coronavirus NL63 Not Detected   Coronavirus OC43 Not Detected   COVID19 Not Detected   Human  Metapneumovirus Not Detected   Human Rhinovirus/Enterovirus Not Detected   Influenza A PCR Not Detected   Influenza B PCR Not Detected   Parainfluenza Virus 1 Not Detected   Parainfluenza Virus 2 Not Detected   Parainfluenza Virus 3 Not Detected   Parainfluenza Virus 4 Not Detected   RSV, PCR Not Detected   Bordetella pertussis pcr Not Detected   Bordetella parapertussis PCR Not Detected   Chlamydophila pneumoniae PCR Not Detected   Mycoplasma pneumo by PCR Not Detected                Assessment/Plan  Meropenem 1gm IV q8h as per [facility] dosing guidelines as estimated CrCl >50mL/min at this time    Pharmacy will continue to follow daily while on Meropenem and adjust as needed.     Thanks, Calin Leonard, PharmD, BCPS

## 2020-12-11 NOTE — PROGRESS NOTES
"DOS: 2020  NAME: Lyubov Middleton   : 1955  PCP: Ana Huitron MD  Chief Complaint   Patient presents with   • Abdominal Pain   • Difficulty Urinating     Neurology    Subjective: Patient admits to feeling very drowsy today, no recent pain meds per MAR. No headache or SOA. C/O of abdominal pain. No clinical seizures overnight. Pt seen in follow up today, however the problem is new to the examiner.      Objective:  Vital signs: /86 (BP Location: Right arm, Patient Position: Lying)   Pulse 87   Temp 98.6 °F (37 °C) (Oral)   Resp 18   Ht 162.6 cm (64.02\")   Wt 71.4 kg (157 lb 8 oz)   SpO2 95%   BMI 27.02 kg/m²      General appearance: Well developed, well nourished, drowsy, and cooperative.  HEENT: Normocephalic.   Neck and spine:  Normal alignment. Neck supple.   Cardiac: Regular rate and rhythm. No murmurs.   Peripheral Vasculature: Peripheral pulses are equal and symmetric.  Chest Exam: Clear to auscultation bilaterally, no wheezes, no rhonchi.  Extremities: Normal, no edema.   Skin: No rashes or birthmarks.     Higher integrative function: Drowsy, requires frequent stimuilation to stay awake. Oriented to month/year, but not day. States she's at [facility]. Naming intact. No dysarthria.    CN II: Normal visual fields.   CN III IV VI: Extraocular movements are full without nystagmus. Pupils are equal, round, and reactive to light.   CN V: Normal facial sensation.  CN VII: Facial movements are symmetric, no weakness.   CN VIII: Auditory acuity is normal.   CN IX & X: Symmetric palatal movement.   CN XI: Sternocleidomastoid and trapezius are normal. No weakness.   CN XII: The tongue is midline. No atrophy or fasciculations.   Motor: Normal muscle strength, bulk, and tone in upper and lower extremities. No fasciculations, rigidity, spasticity or abnormal movements.   Sensation: Normal light touch.  Station and gait: Deferred.   Muscle stretch reflexes:    Plantar reflexes are flexor " bilaterally.   Coordination: Finger to nose test showed no dysmetria.    Scheduled Meds:cefTRIAXone, 1 g, Intravenous, Q24H  citalopram, 10 mg, Oral, Daily  influenza vaccine, 0.5 mL, Intramuscular, Once  insulin glargine, 5 Units, Subcutaneous, Nightly  insulin lispro, 0-9 Units, Subcutaneous, TID AC  lisinopril, 20 mg, Oral, Daily  magnesium oxide, 400 mg, Oral, BID      Continuous Infusions:sodium chloride 0.9 % with KCl 20 mEq, 100 mL/hr, Last Rate: 100 mL/hr (12/11/20 0538)      PRN Meds:.•  acetaminophen  •  calcium gluconate **AND** calcium gluconate IVPB **AND** Calcium  •  dextrose  •  dextrose  •  glucagon (human recombinant)  •  magnesium sulfate **OR** magnesium sulfate **OR** magnesium sulfate  •  melatonin  •  nitroglycerin  •  ondansetron **OR** ondansetron  •  potassium & sodium phosphates **OR** potassium & sodium phosphates  •  potassium chloride **OR** potassium chloride **OR** potassium chloride  •  potassium chloride **OR** potassium chloride **OR** potassium chloride  •  [COMPLETED] Insert peripheral IV **AND** sodium chloride    Laboratory results:  Lab Results   Component Value Date    GLUCOSE 397 (H) 12/10/2020    CALCIUM 7.2 (L) 12/10/2020     (L) 12/10/2020    K 3.9 12/10/2020    CO2 26.6 12/10/2020    CL 95 (L) 12/10/2020    BUN 11 12/10/2020    CREATININE 1.22 (H) 12/10/2020    EGFRIFNONA 44 (L) 12/10/2020    BCR 9.0 12/10/2020    ANIONGAP 11.4 12/10/2020     Lab Results   Component Value Date    WBC 17.19 (H) 12/10/2020    HGB 9.4 (L) 12/10/2020    HCT 29.9 (L) 12/10/2020    MCV 81.7 12/10/2020     12/10/2020     No results found for: CHOL  No results found for: HDL  No results found for: LDL  No results found for: TRIG  Results from last 7 days   Lab 12/09/20  0534   HEMOGLOBIN A1C 13.70*      Review and interpretation of imaging: CT head images viewed by me, no acute findings seen.  CT HEAD WITHOUT CONTRAST     HISTORY: Mental status changes. Seizure.     COMPARISON:  None.     FINDINGS: The study is hampered by patient motion. There is  mild-to-moderate atrophy.  Mild-to-moderate vascular calcification is  noted. There is no evidence of intracranial hemorrhage, hydrocephalus or  of abnormal extra-axial fluid. No focal area of decreased attenuation to  suggest acute infarction is identified. Moderate vascular calcification  is appreciated.     IMPRESSION:  Atrophy and moderate vascular calcification is noted. There  is no evidence of acute infarction or hemorrhage. Further evaluation  could be performed with MRI examination of the brain, particularly if  the patient has a history of new onset seizure activity. The above  information was called to the patient's nurse at the time of the  dictation. The patient's nurse is to immediately relay the information  to the clinical service.           Radiation dose reduction techniques were utilized, including automated  exposure control and exposure modulation based on body size.     This report was finalized on 12/9/2020 8:26 PM by Dr. Harris Sylvester M.D.       Impression:  This patient is a 65-year-old female with history of breast cancer status post left mastectomy 2012 and DMT2 who was admitted 12/8 with abdominal pain and dysuria, found to have bilateral nephrolithiasis with a large right UPJ stone as well as a large fluid collection adjacent to the right kidney concerning for malignancy or abscess. She underwent cystoscopy, urinary stent placement, and lithotripsy 12/9.      Neurology was consulted for new onset seizure.  After arrival to the PACU she was alert and oriented.  Around 3 PM she became rigid, less responsive, and developed sonorous respirations.  Oxygen saturation was maintained but blood pressure increased to 212/105.  After 1 to 2 minutes she relax and breathing was normalized and she slowly appeared to be more alert but remained nonverbal.  She had received propofol, fentanyl, and Dilaudid during the procedure and  received 1 dose of Levaquin at 1:30 PM.  She had 1 fever documented 102.5 on 12/9.    Work-up:  CT head: No acute findings.  Moderate small vessel disease noted.  Labs: Glucose 535 on admission, sodium 131, hemoglobin A1c 13.70%, white count 15 on admission, H&H 9.5/30, UA with 1+ bacteria, too numerous to count WBCs and RBCs, negative nitrites, 2+ leukocytes, culture pending.  Blood cultures pending.  Magnesium 1.0 on 12/8,2.5 today after replacement.  COVID-19 testing was negative on 12/8.  CK 69 on 12/10      Diagnoses:  · New onset seizure, felt most likely provoked/secondary to hypomagnesia however could have also been triggered by accelerated hypertension or as a rare side effect of Levaquin as well.  · Nephrolithiasis and right perinephric fluid collection s/p right URS LL stent 12/9 and IR drain 12/10      Plan:  Check EEG since drowsy today  Neurochecks/seizure precautions including no driving x 9- days  D/W Dr Nicholas. Will follow.     Addendum 1607: patient did not tolerate EEG earlier, was restless and somewhat agitated. RN messaged this afternoon to report restlessness, staring, SBP up to 170s, and temp 101. I examined patient, RN states more alert now, but patient is oriented to location and situation, drowsy, mildly dysarthric, not aphasic, visual fields intact, moving all extremities symmetrically against gravity without drift. Suspect change in mental status most likely related to fever, which is felt to be related to renal/abdominal issues. Do not feel she needs further head imaging or LP at this time. D/W Dr Nicholas. Will follow.

## 2020-12-11 NOTE — PLAN OF CARE
Goal Outcome Evaluation:  Plan of Care Reviewed With: patient  Progress: declining  Outcome Summary: pt. is in bed resting at this time. Pt. was very drowsy for most of the day, and continues to sleep between care, however is now easier to wake and is following commands. The pt. was febrile, hypertensive, and tachycardic today. tylenol was given which relieved her fever. blood cultures performed with results in chart. perscribed IV medications given STAT as orders came in from attending doctor. PRN medication given per order parameters for BP.   Problem: Fall Injury Risk  Goal: Absence of Fall and Fall-Related Injury  Outcome: Ongoing, Not Progressing  Intervention: Identify and Manage Contributors to Fall Injury Risk  Recent Flowsheet Documentation  Taken 12/11/2020 1800 by Gavin Horowitz RN  Medication Review/Management: medications reviewed  Taken 12/11/2020 1600 by Gavin Horowitz RN  Medication Review/Management: medications reviewed  Taken 12/11/2020 1200 by Gavin Horowitz RN  Medication Review/Management: medications reviewed  Taken 12/11/2020 1000 by Gavin Horowitz RN  Medication Review/Management: medications reviewed  Taken 12/11/2020 0800 by Gavin Horowitz RN  Medication Review/Management: medications reviewed  Intervention: Promote Injury-Free Environment  Recent Flowsheet Documentation  Taken 12/11/2020 1800 by Gavin Horowitz RN  Safety Promotion/Fall Prevention:   assistive device/personal items within reach   clutter free environment maintained   lighting adjusted   mobility aid in reach   nonskid shoes/slippers when out of bed   room organization consistent   safety round/check completed  Taken 12/11/2020 1600 by Gavin Horowitz, RN  Safety Promotion/Fall Prevention:   assistive device/personal items within reach   clutter free environment maintained   lighting adjusted   mobility aid in reach   nonskid shoes/slippers when out of bed   room organization consistent   safety round/check completed  Taken  12/11/2020 1400 by Gavin Horowitz RN  Safety Promotion/Fall Prevention: patient off unit  Taken 12/11/2020 1200 by Gavin Horowitz RN  Safety Promotion/Fall Prevention:   assistive device/personal items within reach   clutter free environment maintained   lighting adjusted   mobility aid in reach   nonskid shoes/slippers when out of bed   room organization consistent   safety round/check completed  Taken 12/11/2020 1000 by Gavin Horowitz RN  Safety Promotion/Fall Prevention:   assistive device/personal items within reach   clutter free environment maintained   lighting adjusted   mobility aid in reach   nonskid shoes/slippers when out of bed   room organization consistent   safety round/check completed  Taken 12/11/2020 0800 by Gavin Horowitz RN  Safety Promotion/Fall Prevention:   assistive device/personal items within reach   clutter free environment maintained   lighting adjusted   mobility aid in reach   nonskid shoes/slippers when out of bed   room organization consistent   safety round/check completed     Problem: Adult Inpatient Plan of Care  Goal: Plan of Care Review  Outcome: Ongoing, Not Progressing  Flowsheets (Taken 12/11/2020 1825)  Progress: declining  Plan of Care Reviewed With: patient  Outcome Summary: pt. is in bed resting at this time. Pt. was very drowsy for most of the day, and continues to sleep between care, however is now easier to wake and is following commands. The pt. was febrile, hypertensive, and tachycardic today. tylenol was give which relieved her fever. blood cultures performed with results in chart. perscribed IV medications given STAT as orders came in from attending doctor. Neuro was consulted and EEG was ordered, though pt. did not tolerate/comply with testing due to not following commands. Pt. VSS are currently stable at end of shift with perscribed fluids infusing. pt. has returned to being A&Ox3. Will continue to monitor.  Goal: Patient-Specific Goal (Individualized)  Outcome:  Ongoing, Not Progressing  Goal: Absence of Hospital-Acquired Illness or Injury  Outcome: Ongoing, Not Progressing  Intervention: Identify and Manage Fall Risk  Recent Flowsheet Documentation  Taken 12/11/2020 1800 by Gavin Horowitz RN  Safety Promotion/Fall Prevention:   assistive device/personal items within reach   clutter free environment maintained   lighting adjusted   mobility aid in reach   nonskid shoes/slippers when out of bed   room organization consistent   safety round/check completed  Taken 12/11/2020 1600 by Gavin Horowitz RN  Safety Promotion/Fall Prevention:   assistive device/personal items within reach   clutter free environment maintained   lighting adjusted   mobility aid in reach   nonskid shoes/slippers when out of bed   room organization consistent   safety round/check completed  Taken 12/11/2020 1400 by Gavin Horowitz, NINA  Safety Promotion/Fall Prevention: patient off unit  Taken 12/11/2020 1200 by Gavin Horowitz, NINA  Safety Promotion/Fall Prevention:   assistive device/personal items within reach   clutter free environment maintained   lighting adjusted   mobility aid in reach   nonskid shoes/slippers when out of bed   room organization consistent   safety round/check completed  Taken 12/11/2020 1000 by Gavin Horowitz RN  Safety Promotion/Fall Prevention:   assistive device/personal items within reach   clutter free environment maintained   lighting adjusted   mobility aid in reach   nonskid shoes/slippers when out of bed   room organization consistent   safety round/check completed  Taken 12/11/2020 0800 by Gavin Horowitz, NINA  Safety Promotion/Fall Prevention:   assistive device/personal items within reach   clutter free environment maintained   lighting adjusted   mobility aid in reach   nonskid shoes/slippers when out of bed   room organization consistent   safety round/check completed  Intervention: Prevent Skin Injury  Recent Flowsheet Documentation  Taken 12/11/2020 0800 by Gavin Horowitz,  RN  Body Position: position changed independently  Intervention: Prevent Infection  Recent Flowsheet Documentation  Taken 12/11/2020 1600 by Gavin Horowitz RN  Infection Prevention:   environmental surveillance performed   hand hygiene promoted   personal protective equipment utilized   rest/sleep promoted   single patient room provided  Taken 12/11/2020 1000 by Gavin Horowitz RN  Infection Prevention:   hand hygiene promoted   personal protective equipment utilized   rest/sleep promoted   environmental surveillance performed  Taken 12/11/2020 0800 by Gavin Horowitz RN  Infection Prevention:   environmental surveillance performed   hand hygiene promoted   personal protective equipment utilized   rest/sleep promoted   single patient room provided  Goal: Optimal Comfort and Wellbeing  Outcome: Ongoing, Not Progressing  Intervention: Provide Person-Centered Care  Recent Flowsheet Documentation  Taken 12/11/2020 0800 by Gavin Horowitz RN  Trust Relationship/Rapport:   choices provided   care explained   emotional support provided   empathic listening provided   questions answered   questions encouraged   reassurance provided   thoughts/feelings acknowledged  Goal: Readiness for Transition of Care  Outcome: Ongoing, Not Progressing     Problem: Diabetes Comorbidity  Goal: Blood Glucose Level Within Desired Range  Outcome: Ongoing, Not Progressing     Problem: Electrolyte Imbalance  Goal: Electrolyte Balance  Outcome: Ongoing, Not Progressing     Problem: Seizure, Active Management  Goal: Absence of Seizure/Seizure-Related Injury  Outcome: Ongoing, Not Progressing    Neuro was consulted and EEG was ordered, though pt. did not tolerate/comply with testing due to not following commands. Pt. VSS are currently stable at end of shift with perscribed fluids infusing. pt. has returned to being A&Ox3. Will continue to monitor.

## 2020-12-11 NOTE — PROGRESS NOTES
"  Infectious Diseases Progress Note    Tricia Plunkett MD     [facility]  [place]: 2 days  Patient Identification:  Name: Lyubov Middleton  Age: 65 y.o.  Sex: female  :  1955  MRN: [id number]         Primary Care Physician: Ana Huitron MD            Subjective: Appears ill and tired.  Interval History: See consultation note.  · Seizure type activity after the procedure and was seen by neurology service.  · Cystoscopy right retrograde pyelogram and ureteroscopy and laser lithotripsy and right ureteral stent placement on 2020  · Had percutaneous drainage of right perinephric fluid performed and drainage catheter in place on 12/10/2020  Objective:    Scheduled Meds:cefTRIAXone, 1 g, Intravenous, Q24H  citalopram, 10 mg, Oral, Daily  influenza vaccine, 0.5 mL, Intramuscular, Once  insulin glargine, 5 Units, Subcutaneous, Nightly  insulin lispro, 0-9 Units, Subcutaneous, TID AC  lisinopril, 20 mg, Oral, Daily  magnesium oxide, 400 mg, Oral, BID      Continuous Infusions:sodium chloride 0.9 % with KCl 20 mEq, 100 mL/hr, Last Rate: 100 mL/hr (20 0538)        Vital signs in last 24 hours:  Temp:  [98.6 °F (37 °C)] 98.6 °F (37 °C)  Heart Rate:  [80-87] 87  Resp:  [18] 18  BP: (112-159)/(55-86) 159/86    Intake/Output:    Intake/Output Summary (Last 24 hours) at 2020 1339  Last data filed at 2020 0921  Gross per 24 hour   Intake 420 ml   Output 695 ml   Net -275 ml       Exam:  /86 (BP Location: Right arm, Patient Position: Lying)   Pulse 87   Temp 98.6 °F (37 °C) (Oral)   Resp 18   Ht 162.6 cm (64.02\")   Wt 71.4 kg (157 lb 8 oz)   SpO2 95%   BMI 27.02 kg/m²   Patient is examined using the personal protective equipment as per guidelines from infection control for this particular patient as enacted.  Hand washing was performed before and after patient interaction.  General Appearance:  Comfortable does not appear to be in any acute distress sitting in the chair.             "              Head:    Normocephalic, without obvious abnormality, atraumatic                           Eyes:    PERRL, conjunctivae/corneas clear, EOM's intact, both eyes                         Throat:   Lips, tongue, gums normal; oral mucosa pink and moist                           Neck:   Supple, symmetrical, trachea midline, no JVD                         Lungs:    Clear to auscultation bilaterally, respirations unlabored                 Chest Wall:    No tenderness or deformity                          Heart:    Regular rate and rhythm, S1 and S2 normal                  Abdomen:     Soft, non-tender, bowel sounds active,decreased discomfort in the right flank area                 extremities:   Extremities normal, atraumatic, no cyanosis or edema                        Pulses:   Pulses palpable in all extremities                            Skin:   Skin is warm and dry,  no rashes or palpable lesions                  Neurologic: Mostly nonfocal       Data Review:    I reviewed the patient's new clinical results.  Results from last 7 days   Lab Units 12/11/20  0938 12/10/20  0540 12/09/20  1527 12/09/20  0534 12/08/20  1506   WBC 10*3/mm3 15.80* 17.19* 12.99* 14.00* 15.33*   HEMOGLOBIN g/dL 9.4* 9.4* 10.5* 8.3* 9.5*   PLATELETS 10*3/mm3 368 338 487* 318 380     Results from last 7 days   Lab Units 12/11/20  0559 12/10/20  2303 12/10/20  0540 12/09/20  1527 12/09/20  0534 12/08/20  1506   SODIUM mmol/L 137  --  133* 137 136 131*   POTASSIUM mmol/L 4.2 3.9 3.4* 2.8* 3.2* 2.1*   CHLORIDE mmol/L 104  --  95* 95* 98 86*   CO2 mmol/L 22.8  --  26.6 27.2 29.5* 33.2*   BUN mg/dL 11  --  11 6* 5* 7*   CREATININE mg/dL 1.02*  --  1.22* 1.15* 0.86 1.16*   CALCIUM mg/dL 7.8*  --  7.2* 8.2* 6.9* 7.9*   GLUCOSE mg/dL 247*  --  397* 242* 273* 535*     Microbiology Results (last 10 days)     Procedure Component Value - Date/Time    Body Fluid Culture - Body Fluid, Retroperitoneum [[id number]] Collected: 12/10/20 1205    Lab  Status: Preliminary result Specimen: Body Fluid from Retroperitoneum Updated: 12/11/20 0630     Body Fluid Culture Growth present, too young to evaluate     Gram Stain Many (4+) WBCs seen      No organisms seen    Blood Culture - Blood, Arm, Left [[id number]] Collected: 12/09/20 2328    Lab Status: Preliminary result Specimen: Blood from Arm, Left Updated: 12/11/20 0015     Blood Culture No growth at 24 hours    Blood Culture - Blood, Arm, Right [[id number]] Collected: 12/09/20 2328    Lab Status: Preliminary result Specimen: Blood from Arm, Right Updated: 12/11/20 0015     Blood Culture No growth at 24 hours    Blood Culture - Blood, Arm, Right [[id number]] Collected: 12/09/20 1059    Lab Status: Preliminary result Specimen: Blood from Arm, Right Updated: 12/11/20 1230     Blood Culture No growth at 2 days    Blood Culture - Blood, Arm, Right [[id number]] Collected: 12/09/20 1015    Lab Status: Preliminary result Specimen: Blood from Arm, Right Updated: 12/11/20 1230     Blood Culture No growth at 2 days    COVID PRE-OP / PRE-PROCEDURE SCREENING ORDER (NO ISOLATION) - Swab, Nasopharynx [[id number]]  (Normal) Collected: 12/08/20 1658    Lab Status: Final result Specimen: Swab from Nasopharynx Updated: 12/08/20 1836    Narrative:      The following orders were created for panel order COVID PRE-OP / PRE-PROCEDURE SCREENING ORDER (NO ISOLATION) - Swab, Nasopharynx.  Procedure                               Abnormality         Status                     ---------                               -----------         ------                     Respiratory Panel PCR w/...[[id number]]  Normal              Final result                 Please view results for these tests on the individual orders.    Respiratory Panel PCR w/COVID-19(SARS-CoV-2) TIP/BOBBY/ANDREY/PAD/COR/MAD/HUMPHREY In-House, NP Swab in [place], 3-4 HR TAT - Swab, Nasopharynx [[id number]]  (Normal) Collected: 12/08/20 1658    Lab Status: Final result Specimen: Swab from Nasopharynx  Updated: 12/08/20 1836     ADENOVIRUS, PCR Not Detected     Coronavirus 229E Not Detected     Coronavirus HKU1 Not Detected     Coronavirus NL63 Not Detected     Coronavirus OC43 Not Detected     COVID19 Not Detected     Human Metapneumovirus Not Detected     Human Rhinovirus/Enterovirus Not Detected     Influenza A PCR Not Detected     Influenza B PCR Not Detected     Parainfluenza Virus 1 Not Detected     Parainfluenza Virus 2 Not Detected     Parainfluenza Virus 3 Not Detected     Parainfluenza Virus 4 Not Detected     RSV, PCR Not Detected     Bordetella pertussis pcr Not Detected     Bordetella parapertussis PCR Not Detected     Chlamydophila pneumoniae PCR Not Detected     Mycoplasma pneumo by PCR Not Detected    Narrative:      Fact sheet for providers: https://docs.[employer]/wp-content/uploads/GUU3655-8863-DH4.1-EUA-Provider-Fact-Sheet-3.pdf    Fact sheet for patients: https://docs.[employer]/wp-content/uploads/ETU1388-2108-XH2.1-EUA-Patient-Fact-Sheet-1.pdf    Test performed by PCR.              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  6-history of hypertension  7-new onset seizure type activity.           Recommendations/Discussions:  · Discussed with Dr. Perez and plan is to broaden antibiotic spectrum while awaiting percutaneous drainage cultures to be available.  I think switching ceftriaxone to meropenem is reasonable.  · May eventually need definitive surgical intervention to address this potentially life-threatening septic process.    Tricia Plunkett MD  12/11/2020  13:39 EST    Much of this encounter note is an electronic transcription/translation of spoken language to printed text. The electronic translation of spoken language may permit erroneous, or at times, nonsensical words or  phrases to be inadvertently transcribed; Although I have reviewed the note for such errors, some may still exist

## 2020-12-11 NOTE — PROGRESS NOTES
Addendum:   Called back to bedside by nursing for neurologic changes. Throughout the afternoon she has become febrile tachycardic and has developed accelerated hypertension again. She was unable to complete her EEG secondary to encephalopathic symptoms. She could not be cooperative with exam. She was staring off into space and not really following commands. On urgent evaluation at the bedside she is alert and oriented x3 and is following commands now. I think we may be dealing with a sepsis syndrome. Her white blood cell count is up now she is febrile and tachycardic. Blood cultures have been ordered x2. We will also await a lactic acid and a procalcitonin. She is already on appropriate antibiotic therapy but may need to broaden if her lactic acid and pro calcitonin are both significantly elevated but it would not be out of the realm of possibility to develop some transient bacteremia especially after this drain was placed yesterday. Continue to monitor with seizure precautions. Will work to keep her systolic pressure less than 160 to prevent hypertensive encephalopathy and other hypertensive complications. As needed hydralazine is ordered plan discussed with nursing at the bedside. I also touch base with neurology to let them know about the neurologic changes that were conveyed to me. They also evaluated the patient at the bedside and she seems to be improving at this time. They will continue to follow along.    On exam she still looks pale and washed out. Respirations are even and nonlabored heart rates in the 90s and regular. Abdomen is soft and nontender. Drain site looks okay. No surrounding erythema or signs of a skin infection. Abdomen again is soft with good bowel sounds. She has no edema noted on exam.    35 minutes of critical care provided. Time includes preparation of documents, medical consultations, review of old records, ordering and review of labs/studies, discussion with staff and family and  direct bedside care. This time also excludes other care provided throughout the day. Critical care was necessary as patient was at high risk for life-threatening deterioration due to sepsis, CNS compromise, or possible metabolic crisis.      Harris Perez MD  16:01 EST

## 2020-12-11 NOTE — PROGRESS NOTES
[phone]   LOS: 2 days     Name: Lyubov Middleton  Age/Sex: 65 y.o. female  :  1955        PCP: Ana Huitron MD  Chief Complaint   Patient presents with   • Abdominal Pain   • Difficulty Urinating      Subjective   She is fairly sleepy today.  Denies new symptoms or complaints.  She feels a little nauseous but denies any vomiting.  General: No Fever or Chills, Cardiac: No Chest Pain or Palpitations, Resp: No Cough or SOA, GI: No Nausea, Vomiting, or Diarrhea and Other: No bleeding    cefTRIAXone, 1 g, Intravenous, Q24H  citalopram, 10 mg, Oral, Daily  influenza vaccine, 0.5 mL, Intramuscular, Once  insulin glargine, 5 Units, Subcutaneous, Nightly  insulin lispro, 0-9 Units, Subcutaneous, TID AC  lisinopril, 20 mg, Oral, Daily  magnesium oxide, 400 mg, Oral, BID      sodium chloride 0.9 % with KCl 20 mEq, 100 mL/hr, Last Rate: 100 mL/hr (20 0538)        Objective   Vital Signs  Temp:  [98.4 °F (36.9 °C)-98.6 °F (37 °C)] 98.6 °F (37 °C)  Heart Rate:  [80-87] 87  Resp:  [16-18] 18  BP: (112-159)/(55-86) 159/86  Body mass index is 27.02 kg/m².    Intake/Output Summary (Last 24 hours) at 2020 0907  Last data filed at 2020 0538  Gross per 24 hour   Intake 180 ml   Output 695 ml   Net -515 ml       Physical Exam  Vitals signs and nursing note reviewed.   Constitutional:       Comments: She looks pale and washed out.  She does not look good today.   HENT:      Head: Normocephalic and atraumatic.      Mouth/Throat:      Mouth: Mucous membranes are dry.   Cardiovascular:      Rate and Rhythm: Normal rate and regular rhythm.   Pulmonary:      Effort: Pulmonary effort is normal.      Breath sounds: Normal breath sounds.   Abdominal:      General: Bowel sounds are normal.      Palpations: Abdomen is soft.   Skin:     General: Skin is warm and dry.      Coloration: Skin is pale.   Neurological:      Mental Status: She is alert.           Results Review:       I reviewed the patient's new clinical  results.  Results from last 7 days   Lab Units 12/11/20  0938 12/10/20  0540 12/09/20  1527 12/09/20  0534 12/08/20  1506   WBC 10*3/mm3 15.80* 17.19* 12.99* 14.00* 15.33*   HEMOGLOBIN g/dL 9.4* 9.4* 10.5* 8.3* 9.5*   PLATELETS 10*3/mm3 368 338 487* 318 380     Results from last 7 days   Lab Units 12/11/20  0559 12/10/20  2303 12/10/20  0540 12/09/20  1527 12/09/20  0534 12/08/20  1506   SODIUM mmol/L 137  --  133* 137 136 131*   POTASSIUM mmol/L 4.2 3.9 3.4* 2.8* 3.2* 2.1*   CHLORIDE mmol/L 104  --  95* 95* 98 86*   CO2 mmol/L 22.8  --  26.6 27.2 29.5* 33.2*   BUN mg/dL 11  --  11 6* 5* 7*   CREATININE mg/dL 1.02*  --  1.22* 1.15* 0.86 1.16*   CALCIUM mg/dL 7.8*  --  7.2* 8.2* 6.9* 7.9*   MAGNESIUM mg/dL 2.5*  --  1.9 1.1*  --  1.0*   PHOSPHORUS mg/dL 1.5*  --   --   --   --   --    Estimated Creatinine Clearance: 45.2 mL/min (A) (by C-G formula based on SCr of 1.22 mg/dL (H)).      Assessment/Plan   Active Hospital Problems    Diagnosis  POA   • Hypomagnesemia [E83.42]  Unknown   • Renal abscess [N15.1]  Unknown   • Hypocalcemia [E83.51]  Unknown   • Stage 3a chronic kidney disease [N18.31]  Yes   • Type 2 diabetes mellitus (CMS/HCC) [E11.9]  Yes   • Renal stone [N20.0]  Unknown   • Hypokalemia [E87.6]  Yes      Resolved Hospital Problems   No resolved problems to display.       PLAN  This is a 65-year-old female with a history of multiple urinary tract infections in the past hypertension and diabetes who presents to the hospital with urinary frequency and dysuria is found to have significant hypokalemia as well as a possible perinephric mass versus fluid collection  -WBC up but not unexpected, urology and ID following remains on empiric antibiotics, cultures are pending  -Magnesium stable following replacement  -Now on full electrolyte protocol, plan to replace phosphorus today  -Uncontrolled DM 2, A1c is 13.  She will require insulin at DC.  Will increase basal insulin and continue to hold oral meds but will be  continued at DC.  Needs DE prior to DC  -plan for CT guided drain placement today await cultures and sensitivities  -Appreciate neurology, ID and urology      Disposition  TBD      Harris Perez MD  [facility]  12/11/20  09:07 EST

## 2020-12-12 LAB
ANION GAP SERPL CALCULATED.3IONS-SCNC: 9.9 MMOL/L (ref 5–15)
BACTERIA SPEC AEROBE CULT: ABNORMAL
BUN SERPL-MCNC: 8 MG/DL (ref 8–23)
BUN/CREAT SERPL: 7.9 (ref 7–25)
CALCIUM SPEC-SCNC: 7.8 MG/DL (ref 8.6–10.5)
CHLORIDE SERPL-SCNC: 103 MMOL/L (ref 98–107)
CO2 SERPL-SCNC: 22.1 MMOL/L (ref 22–29)
CREAT SERPL-MCNC: 1.01 MG/DL (ref 0.57–1)
D-LACTATE SERPL-SCNC: 0.9 MMOL/L (ref 0.5–2)
DEPRECATED RDW RBC AUTO: 37.8 FL (ref 37–54)
ERYTHROCYTE [DISTWIDTH] IN BLOOD BY AUTOMATED COUNT: 12.8 % (ref 12.3–15.4)
GFR SERPL CREATININE-BSD FRML MDRD: 55 ML/MIN/1.73
GLUCOSE BLDC GLUCOMTR-MCNC: 149 MG/DL (ref 70–130)
GLUCOSE BLDC GLUCOMTR-MCNC: 163 MG/DL (ref 70–130)
GLUCOSE BLDC GLUCOMTR-MCNC: 223 MG/DL (ref 70–130)
GLUCOSE SERPL-MCNC: 212 MG/DL (ref 65–99)
HCT VFR BLD AUTO: 25.7 % (ref 34–46.6)
HGB BLD-MCNC: 8.2 G/DL (ref 12–15.9)
MAGNESIUM SERPL-MCNC: 1.6 MG/DL (ref 1.6–2.4)
MCH RBC QN AUTO: 25.8 PG (ref 26.6–33)
MCHC RBC AUTO-ENTMCNC: 31.9 G/DL (ref 31.5–35.7)
MCV RBC AUTO: 80.8 FL (ref 79–97)
PHOSPHATE SERPL-MCNC: 2.4 MG/DL (ref 2.5–4.5)
PLATELET # BLD AUTO: 329 10*3/MM3 (ref 140–450)
PMV BLD AUTO: 9.6 FL (ref 6–12)
POTASSIUM SERPL-SCNC: 3.8 MMOL/L (ref 3.5–5.2)
RBC # BLD AUTO: 3.18 10*6/MM3 (ref 3.77–5.28)
SODIUM SERPL-SCNC: 135 MMOL/L (ref 136–145)
URATE SERPL-MCNC: 2 MG/DL (ref 2.4–5.7)
WBC # BLD AUTO: 14.49 10*3/MM3 (ref 3.4–10.8)

## 2020-12-12 PROCEDURE — 99232 SBSQ HOSP IP/OBS MODERATE 35: CPT | Performed by: PSYCHIATRY & NEUROLOGY

## 2020-12-12 PROCEDURE — 85027 COMPLETE CBC AUTOMATED: CPT | Performed by: HOSPITALIST

## 2020-12-12 PROCEDURE — 84550 ASSAY OF BLOOD/URIC ACID: CPT | Performed by: HOSPITALIST

## 2020-12-12 PROCEDURE — 80048 BASIC METABOLIC PNL TOTAL CA: CPT | Performed by: HOSPITALIST

## 2020-12-12 PROCEDURE — 83605 ASSAY OF LACTIC ACID: CPT | Performed by: HOSPITALIST

## 2020-12-12 PROCEDURE — 25010000002 MAGNESIUM SULFATE 2 GM/50ML SOLUTION: Performed by: PSYCHIATRY & NEUROLOGY

## 2020-12-12 PROCEDURE — 25010000002 ONDANSETRON PER 1 MG: Performed by: UROLOGY

## 2020-12-12 PROCEDURE — 25010000002 MEROPENEM PER 100 MG: Performed by: HOSPITALIST

## 2020-12-12 PROCEDURE — 63710000001 INSULIN GLARGINE PER 5 UNITS: Performed by: HOSPITALIST

## 2020-12-12 PROCEDURE — 82962 GLUCOSE BLOOD TEST: CPT

## 2020-12-12 PROCEDURE — 83735 ASSAY OF MAGNESIUM: CPT | Performed by: HOSPITALIST

## 2020-12-12 PROCEDURE — 63710000001 INSULIN LISPRO (HUMAN) PER 5 UNITS: Performed by: UROLOGY

## 2020-12-12 PROCEDURE — 25810000003 SODIUM CHLORIDE 0.9 % WITH KCL 20 MEQ 20-0.9 MEQ/L-% SOLUTION: Performed by: UROLOGY

## 2020-12-12 PROCEDURE — 84100 ASSAY OF PHOSPHORUS: CPT | Performed by: HOSPITALIST

## 2020-12-12 RX ORDER — CARVEDILOL 12.5 MG/1
12.5 TABLET ORAL 2 TIMES DAILY WITH MEALS
Status: DISCONTINUED | OUTPATIENT
Start: 2020-12-12 | End: 2020-12-17 | Stop reason: HOSPADM

## 2020-12-12 RX ORDER — MAGNESIUM SULFATE HEPTAHYDRATE 40 MG/ML
2 INJECTION, SOLUTION INTRAVENOUS ONCE
Status: COMPLETED | OUTPATIENT
Start: 2020-12-12 | End: 2020-12-12

## 2020-12-12 RX ORDER — INSULIN GLARGINE 100 [IU]/ML
20 INJECTION, SOLUTION SUBCUTANEOUS NIGHTLY
Status: DISCONTINUED | OUTPATIENT
Start: 2020-12-12 | End: 2020-12-15

## 2020-12-12 RX ADMIN — ONDANSETRON 4 MG: 2 INJECTION INTRAMUSCULAR; INTRAVENOUS at 13:48

## 2020-12-12 RX ADMIN — POTASSIUM CHLORIDE AND SODIUM CHLORIDE 100 ML/HR: 900; 150 INJECTION, SOLUTION INTRAVENOUS at 22:23

## 2020-12-12 RX ADMIN — INSULIN GLARGINE 20 UNITS: 100 INJECTION, SOLUTION SUBCUTANEOUS at 22:31

## 2020-12-12 RX ADMIN — POTASSIUM CHLORIDE AND SODIUM CHLORIDE 100 ML/HR: 900; 150 INJECTION, SOLUTION INTRAVENOUS at 03:07

## 2020-12-12 RX ADMIN — MEROPENEM 1 G: 1 INJECTION INTRAVENOUS at 10:08

## 2020-12-12 RX ADMIN — POTASSIUM CHLORIDE AND SODIUM CHLORIDE 100 ML/HR: 900; 150 INJECTION, SOLUTION INTRAVENOUS at 11:58

## 2020-12-12 RX ADMIN — CARVEDILOL 12.5 MG: 12.5 TABLET, FILM COATED ORAL at 11:58

## 2020-12-12 RX ADMIN — INSULIN LISPRO 4 UNITS: 100 INJECTION, SOLUTION INTRAVENOUS; SUBCUTANEOUS at 12:01

## 2020-12-12 RX ADMIN — MEROPENEM 1 G: 1 INJECTION INTRAVENOUS at 02:00

## 2020-12-12 RX ADMIN — INSULIN LISPRO 4 UNITS: 100 INJECTION, SOLUTION INTRAVENOUS; SUBCUTANEOUS at 08:52

## 2020-12-12 RX ADMIN — MEROPENEM 1 G: 1 INJECTION INTRAVENOUS at 18:37

## 2020-12-12 RX ADMIN — CITALOPRAM 10 MG: 10 TABLET, FILM COATED ORAL at 08:52

## 2020-12-12 RX ADMIN — MAGNESIUM OXIDE 400 MG (241.3 MG MAGNESIUM) TABLET 400 MG: TABLET at 08:52

## 2020-12-12 RX ADMIN — LISINOPRIL 20 MG: 20 TABLET ORAL at 08:52

## 2020-12-12 RX ADMIN — MAGNESIUM OXIDE 400 MG (241.3 MG MAGNESIUM) TABLET 400 MG: TABLET at 22:24

## 2020-12-12 RX ADMIN — MAGNESIUM SULFATE HEPTAHYDRATE 2 G: 40 INJECTION, SOLUTION INTRAVENOUS at 13:48

## 2020-12-12 NOTE — PROGRESS NOTES
Neurology Progress Note    Reason for visit  Follow up for seizure    Interval History  Patient has had no further seizures.  Mental status has improved overnight per nursing.      Medications:  Ceftriaxone  coreg  celexa  pepcid  humalog  Lisinopril  meropenem    Review of Systems:   Review of Systems   Respiratory: Negative.    Cardiovascular: Negative.    Gastrointestinal: Positive for nausea and vomiting.   Neurological: Negative.    Psychiatric/Behavioral: Negative.        Vital Signs  Temp:  [98.2 °F (36.8 °C)-101 °F (38.3 °C)] 98.5 °F (36.9 °C)  Heart Rate:  [90-95] 91  Resp:  [16-18] 17  BP: (128-180)/(77-95) 175/84    Physical Exam:  Constitutional:  Appears uncomfortable with emesis at end of visit  HEENT:  normal  CVS:  Regular rate and rhythm.    Musculoskeletal:  No signs of peripheral edema  Neurologic:   Alert oriented and fluent   EOMF   VFF   Normal power and coordination  Psychiatric: no anxiety     Results Review:    Glucose 212  BUN  8  Creatinine 1.01  Sodium 135  Potassium 3.8  Calcium 7.8  Magnesium 1.6  WBC  14.49  Hgb  8.2  Platelets 329    Urine culture   yeast  Retroperitoneal fluid culture Yeast    Blood cultures negative    Medical Decision Making and Recommendations  New onset seizure  Situational secondary to low magnesium and medications for procedure  No recurrence  Magnesium low normal, will replace with additional IV supplement as well as oral doses, recheck in AM  Has been started on meropenem which increases risk of additional seizure    Will continue to follow    I used full protective equipment while examining this patient.  This included N95 face mask, gloves and protective eyewear.  I washed my hands before entering the room and immediately upon leaving the room.  Patient was wearing a surgical mask.       Vanessa Nicholas MD  12/12/20  10:43 EST

## 2020-12-12 NOTE — PLAN OF CARE
Goal Outcome Evaluation:  Plan of Care Reviewed With: patient  Progress: no change  Outcome Summary: VSS. Telemetry monitor, SR. Confused, disoriented to time. Nausea, IV zofran given. Minimal PO intake. Very weak, q2 turn. IV fluids, IV mag given, IV merrem given as ordered. Will continue to monitor.

## 2020-12-12 NOTE — PROGRESS NOTES
"DOS: 2020  NAME: Lyubov Middleton   : 1955  PCP: Ana Huitron MD  Chief Complaint   Patient presents with   • Abdominal Pain   • Difficulty Urinating   Patient seen in follow-up today; new to me          Subjective: Patient reports that she feels somewhat better today; nausea since improved. She denies any other complaints and/or concerns on my exam.    No family at bedside.    Objective:  Vital signs: /84 (BP Location: Right arm, Patient Position: Lying)   Pulse 95   Temp 98.5 °F (36.9 °C) (Oral)   Resp 17   Ht 162.6 cm (64.02\")   Wt 73.4 kg (161 lb 14.4 oz)   SpO2 91%   BMI 27.78 kg/m²       HEENT: Normocephalic, atraumatic   COR: RRR  Resp: Even and unlabored  Extremities: Equal pulses, nondistal embolization    Neurological:   MS: AO. Language normal. No neglect. Higher integrative function normal  CN: II-XII normal  Motor: 5/5, normal tone  Reflexes: Toes downgoing   Sensory: Intact  Coordination: Normal    Laboratory results:  Lab Results   Component Value Date    GLUCOSE 212 (H) 2020    CALCIUM 7.8 (L) 2020     (L) 2020    K 3.8 2020    CO2 22.1 2020     2020    BUN 8 2020    CREATININE 1.01 (H) 2020    EGFRIFNONA 55 (L) 2020    BCR 7.9 2020    ANIONGAP 9.9 2020     Lab Results   Component Value Date    WBC 14.49 (H) 2020    HGB 8.2 (L) 2020    HCT 25.7 (L) 2020    MCV 80.8 2020     2020     No results found for: CHOL  No results found for: HDL  No results found for: LDL  No results found for: TRIG  Results from last 7 days   Lab 20  0534   HEMOGLOBIN A1C 13.70*          Blood Culture   Date Value Ref Range Status   2020 No growth at 2 days  Preliminary   2020 No growth at 2 days  Preliminary   2020 No growth at 2 days  Preliminary   2020 No growth at 2 days  Preliminary       Review and interpretation of imaging:  CT HEAD WITHOUT " CONTRAST     HISTORY: Mental status changes. Seizure.     COMPARISON: None.     FINDINGS: The study is hampered by patient motion. There is  mild-to-moderate atrophy.  Mild-to-moderate vascular calcification is  noted. There is no evidence of intracranial hemorrhage, hydrocephalus or  of abnormal extra-axial fluid. No focal area of decreased attenuation to  suggest acute infarction is identified. Moderate vascular calcification  is appreciated.     IMPRESSION:  Atrophy and moderate vascular calcification is noted. There  is no evidence of acute infarction or hemorrhage. Further evaluation  could be performed with MRI examination of the brain, particularly if  the patient has a history of new onset seizure activity. The above  information was called to the patient's nurse at the time of the  dictation. The patient's nurse is to immediately relay the information  to the clinical service.           Radiation dose reduction techniques were utilized, including automated  exposure control and exposure modulation based on body size.     This report was finalized on 12/9/2020 8:26 PM by Dr. Harris Sylvester M.D.    Impression:  1. New onset seizure activity; etiology thought to be secondary to hypomagnesemia (1.1) versus accelerated HTN and/or Levaquin (although rare).  2.  Nephrolithiasis with right perinephritic fluid collection status post URS/stent placement 12/9 and IR drain 12/10.      Comment: CT the head shows no acute abnormalities with evidence of moderate atrophy and chronic angiopathic changes.    Work-up:  CT head: No acute findings.  Moderate small vessel disease noted.  Labs: Glucose 535 on admission, sodium 131, hemoglobin A1c 13.70%, white count 15 on admission, H&H 9.5/30, UA with 1+ bacteria, too numerous to count WBCs and RBCs, negative nitrites, 2+ leukocytes, culture pending.  Blood cultures pending.  Magnesium 1.1 after event; today 1.8.  COVID-19 testing was negative on 12/8.  CK 69 on 12/10.           Plan:  Continue Mag Ox as written   No further neurology workup indicated. We will sign off and see again upon request.    Seizure Precautions: Patient is not to drive for at least 3 months until cleared by a physician, operate heavy machinery, take tub baths, swim unattended, climb heights, or perform any other activities that can bring harm to himself/herself or others during an episode of altered awareness.      Tania Coto, APRN

## 2020-12-12 NOTE — PROGRESS NOTES
CC: I am tired    S/p R urs/ll/stent 12/9  Abscess S/p IR drain 12/10    Patient lethargic, resting in bed  NAD  Soft abdomen  PAUL 15cc, slight blood, yellow overall  Voiding spontaneously    WBC 14.49 (15.8)  Hgb 8.2 (9.4)  Cr 1.01    Blood clt no results from 12/11  UCS 12/12 - yeast    Plan:  Defer to ID for UCS treatment    Outpatient stone treatment in future  Will be d/c home with drain

## 2020-12-12 NOTE — PROGRESS NOTES
[phone]   LOS: 3 days     Name: Lyubov Middleton  Age/Sex: 65 y.o. female  :  1955        PCP: Ana Huitron MD  Chief Complaint   Patient presents with   • Abdominal Pain   • Difficulty Urinating      Subjective   She is fairly sleepy today.  Denies new symptoms or complaints.  She feels a little nauseous but denies any vomiting.  General: No Fever or Chills, Cardiac: No Chest Pain or Palpitations, Resp: No Cough or SOA, GI: No Nausea, Vomiting, or Diarrhea and Other: No bleeding    citalopram, 10 mg, Oral, Daily  influenza vaccine, 0.5 mL, Intramuscular, Once  insulin glargine, 10 Units, Subcutaneous, Nightly  insulin lispro, 0-9 Units, Subcutaneous, TID AC  lisinopril, 20 mg, Oral, Daily  magnesium oxide, 400 mg, Oral, BID  magnesium sulfate, 2 g, Intravenous, Once  meropenem, 1 g, Intravenous, Q8H      Pharmacy Consult - Pharmacy to dose,   sodium chloride 0.9 % with KCl 20 mEq, 100 mL/hr, Last Rate: 100 mL/hr (20 0307)        Objective   Vital Signs  Temp:  [98.2 °F (36.8 °C)-101 °F (38.3 °C)] 98.5 °F (36.9 °C)  Heart Rate:  [90-95] 91  Resp:  [16-18] 17  BP: (128-180)/(77-95) 175/84  Body mass index is 27.78 kg/m².    Intake/Output Summary (Last 24 hours) at 2020 1026  Last data filed at 2020 0628  Gross per 24 hour   Intake 0 ml   Output 15 ml   Net -15 ml       Physical Exam  Vitals signs and nursing note reviewed.   Constitutional:       Comments: She looks pale and washed out.  She does not look good today.   HENT:      Head: Normocephalic and atraumatic.      Mouth/Throat:      Mouth: Mucous membranes are dry.   Cardiovascular:      Rate and Rhythm: Normal rate and regular rhythm.   Pulmonary:      Effort: Pulmonary effort is normal.      Breath sounds: Normal breath sounds.   Abdominal:      General: Bowel sounds are normal.      Palpations: Abdomen is soft.   Skin:     General: Skin is warm and dry.      Coloration: Skin is pale.   Neurological:      Mental Status: She  is alert.           Results Review:       I reviewed the patient's new clinical results.  Results from last 7 days   Lab Units 12/12/20  0550 12/11/20  0938 12/10/20  0540 12/09/20  1527 12/09/20  0534 12/08/20  1506   WBC 10*3/mm3 14.49* 15.80* 17.19* 12.99* 14.00* 15.33*   HEMOGLOBIN g/dL 8.2* 9.4* 9.4* 10.5* 8.3* 9.5*   PLATELETS 10*3/mm3 329 368 338 487* 318 380     Results from last 7 days   Lab Units 12/12/20  0550 12/11/20  2113 12/11/20  0559 12/10/20  2303 12/10/20  0540 12/09/20  1527 12/09/20  0534 12/08/20  1506   SODIUM mmol/L 135*  --  137  --  133* 137 136 131*   POTASSIUM mmol/L 3.8  --  4.2 3.9 3.4* 2.8* 3.2* 2.1*   CHLORIDE mmol/L 103  --  104  --  95* 95* 98 86*   CO2 mmol/L 22.1  --  22.8  --  26.6 27.2 29.5* 33.2*   BUN mg/dL 8  --  11  --  11 6* 5* 7*   CREATININE mg/dL 1.01*  --  1.02*  --  1.22* 1.15* 0.86 1.16*   CALCIUM mg/dL 7.8*  --  7.8*  --  7.2* 8.2* 6.9* 7.9*   MAGNESIUM mg/dL 1.6  --  2.5*  --  1.9 1.1*  --  1.0*   PHOSPHORUS mg/dL  --  2.0* 1.5*  --   --   --   --   --    Estimated Creatinine Clearance: 55.3 mL/min (A) (by C-G formula based on SCr of 1.01 mg/dL (H)).    Lab Results   Component Value Date    HGBA1C 13.70 (H) 12/09/2020     Glucose   Date/Time Value Ref Range Status   12/11/2020 2131 182 (H) 70 - 130 mg/dL Final   12/11/2020 1707 181 (H) 70 - 130 mg/dL Final   12/11/2020 1127 353 (H) 70 - 130 mg/dL Final   12/11/2020 0628 251 (H) 70 - 130 mg/dL Final   12/10/2020 2054 273 (H) 70 - 130 mg/dL Final   12/10/2020 1638 261 (H) 70 - 130 mg/dL Final   12/10/2020 0609 399 (H) 70 - 130 mg/dL Final   12/09/2020 2128 361 (H) 70 - 130 mg/dL Final      Assessment/Plan   Active Hospital Problems    Diagnosis  POA   • Sepsis (CMS/HCC) [A41.9]  No   • Hypomagnesemia [E83.42]  Unknown   • Renal abscess [N15.1]  Unknown   • Hypocalcemia [E83.51]  Unknown   • Stage 3a chronic kidney disease [N18.31]  Yes   • Type 2 diabetes mellitus (CMS/HCC) [E11.9]  Yes   • Renal stone [N20.0]   Unknown   • Hypokalemia [E87.6]  Yes      Resolved Hospital Problems   No resolved problems to display.       PLAN  This is a 65-year-old female with a history of multiple urinary tract infections in the past hypertension and diabetes who presents to the hospital with urinary frequency and dysuria is found to have significant hypokalemia as well as a possible perinephric mass versus fluid collection  -WBC up but not unexpected, urology and ID following remains on empiric antibiotics, broadened to Merrem yesterday.  Culture from fluid and urin growing yest.  Should we add coverage for this?  I was just in the urine would likely ignore but given how long and drawn out this issue is may need to consider.  Will discuss with ID when they round  -Magnesium down planned replacement  -Now on full electrolyte protocol, plan to replace phosphorus today  -Continue seizure precautions, Neuro changes yesterday more likely related to sepsis, appreciate neuro input, may also be a component of hypertensive encephalopathy prn hydralazine to keep sys <160.  Add Coreg today   -Uncontrolled DM 2, A1c is 13.  She will require insulin at DC.  Will increase basal insulin and continue to hold oral meds but will be continued at DC.  Needs DE prior to DC  -given consistent elevations will increase basal insulin further today  -drain in place, only 15cc of output recorded yesterday.  Around 1.5L total out from drain  -Appreciate neurology, ID and urology assistance    Disposition  TBD      Harris Perez MD  [facility]  12/12/20  10:26 EST

## 2020-12-13 LAB
ALBUMIN SERPL-MCNC: 2.1 G/DL (ref 3.5–5.2)
ANION GAP SERPL CALCULATED.3IONS-SCNC: 9.6 MMOL/L (ref 5–15)
BUN SERPL-MCNC: 10 MG/DL (ref 8–23)
BUN/CREAT SERPL: 9 (ref 7–25)
CALCIUM SPEC-SCNC: 7.8 MG/DL (ref 8.6–10.5)
CHLORIDE SERPL-SCNC: 104 MMOL/L (ref 98–107)
CO2 SERPL-SCNC: 22.4 MMOL/L (ref 22–29)
CREAT SERPL-MCNC: 1.11 MG/DL (ref 0.57–1)
DEPRECATED RDW RBC AUTO: 39.6 FL (ref 37–54)
ERYTHROCYTE [DISTWIDTH] IN BLOOD BY AUTOMATED COUNT: 13.1 % (ref 12.3–15.4)
GFR SERPL CREATININE-BSD FRML MDRD: 49 ML/MIN/1.73
GLUCOSE BLDC GLUCOMTR-MCNC: 104 MG/DL (ref 70–130)
GLUCOSE BLDC GLUCOMTR-MCNC: 133 MG/DL (ref 70–130)
GLUCOSE BLDC GLUCOMTR-MCNC: 172 MG/DL (ref 70–130)
GLUCOSE BLDC GLUCOMTR-MCNC: 178 MG/DL (ref 70–130)
GLUCOSE SERPL-MCNC: 163 MG/DL (ref 65–99)
HCT VFR BLD AUTO: 30.9 % (ref 34–46.6)
HGB BLD-MCNC: 9.6 G/DL (ref 12–15.9)
MAGNESIUM SERPL-MCNC: 1.8 MG/DL (ref 1.6–2.4)
MCH RBC QN AUTO: 25.4 PG (ref 26.6–33)
MCHC RBC AUTO-ENTMCNC: 31.1 G/DL (ref 31.5–35.7)
MCV RBC AUTO: 81.7 FL (ref 79–97)
PHOSPHATE SERPL-MCNC: 2.9 MG/DL (ref 2.5–4.5)
PLATELET # BLD AUTO: 346 10*3/MM3 (ref 140–450)
PMV BLD AUTO: 9.4 FL (ref 6–12)
POTASSIUM SERPL-SCNC: 3.5 MMOL/L (ref 3.5–5.2)
RBC # BLD AUTO: 3.78 10*6/MM3 (ref 3.77–5.28)
SODIUM SERPL-SCNC: 136 MMOL/L (ref 136–145)
URATE SERPL-MCNC: 2.2 MG/DL (ref 2.4–5.7)
WBC # BLD AUTO: 16.86 10*3/MM3 (ref 3.4–10.8)

## 2020-12-13 PROCEDURE — 80069 RENAL FUNCTION PANEL: CPT | Performed by: HOSPITALIST

## 2020-12-13 PROCEDURE — 97161 PT EVAL LOW COMPLEX 20 MIN: CPT

## 2020-12-13 PROCEDURE — 83735 ASSAY OF MAGNESIUM: CPT | Performed by: HOSPITALIST

## 2020-12-13 PROCEDURE — 84550 ASSAY OF BLOOD/URIC ACID: CPT | Performed by: HOSPITALIST

## 2020-12-13 PROCEDURE — 63710000001 INSULIN LISPRO (HUMAN) PER 5 UNITS: Performed by: UROLOGY

## 2020-12-13 PROCEDURE — 82962 GLUCOSE BLOOD TEST: CPT

## 2020-12-13 PROCEDURE — 63710000001 INSULIN GLARGINE PER 5 UNITS: Performed by: HOSPITALIST

## 2020-12-13 PROCEDURE — 97116 GAIT TRAINING THERAPY: CPT

## 2020-12-13 PROCEDURE — 25010000002 MEROPENEM PER 100 MG: Performed by: HOSPITALIST

## 2020-12-13 PROCEDURE — 25010000002 MICAFUNGIN SODIUM 100 MG RECONSTITUTED SOLUTION: Performed by: INTERNAL MEDICINE

## 2020-12-13 PROCEDURE — 25010000002 ONDANSETRON PER 1 MG: Performed by: UROLOGY

## 2020-12-13 PROCEDURE — 85027 COMPLETE CBC AUTOMATED: CPT | Performed by: HOSPITALIST

## 2020-12-13 PROCEDURE — 25810000003 SODIUM CHLORIDE 0.9 % WITH KCL 20 MEQ 20-0.9 MEQ/L-% SOLUTION: Performed by: UROLOGY

## 2020-12-13 PROCEDURE — 99232 SBSQ HOSP IP/OBS MODERATE 35: CPT | Performed by: NURSE PRACTITIONER

## 2020-12-13 RX ORDER — LOPERAMIDE HYDROCHLORIDE 2 MG/1
2 CAPSULE ORAL ONCE
Status: COMPLETED | OUTPATIENT
Start: 2020-12-13 | End: 2020-12-13

## 2020-12-13 RX ADMIN — MEROPENEM 1 G: 1 INJECTION INTRAVENOUS at 09:57

## 2020-12-13 RX ADMIN — MICAFUNGIN SODIUM 100 MG: 100 INJECTION, POWDER, LYOPHILIZED, FOR SOLUTION INTRAVENOUS at 02:21

## 2020-12-13 RX ADMIN — POTASSIUM CHLORIDE 40 MEQ: 750 TABLET, EXTENDED RELEASE ORAL at 18:11

## 2020-12-13 RX ADMIN — POTASSIUM CHLORIDE AND SODIUM CHLORIDE 100 ML/HR: 900; 150 INJECTION, SOLUTION INTRAVENOUS at 08:42

## 2020-12-13 RX ADMIN — CARVEDILOL 12.5 MG: 12.5 TABLET, FILM COATED ORAL at 18:06

## 2020-12-13 RX ADMIN — LABETALOL HYDROCHLORIDE 10 MG: 5 INJECTION, SOLUTION INTRAVENOUS at 08:37

## 2020-12-13 RX ADMIN — INSULIN LISPRO 2 UNITS: 100 INJECTION, SOLUTION INTRAVENOUS; SUBCUTANEOUS at 08:34

## 2020-12-13 RX ADMIN — LABETALOL HYDROCHLORIDE 10 MG: 5 INJECTION, SOLUTION INTRAVENOUS at 14:21

## 2020-12-13 RX ADMIN — CARVEDILOL 12.5 MG: 12.5 TABLET, FILM COATED ORAL at 08:32

## 2020-12-13 RX ADMIN — ONDANSETRON 4 MG: 2 INJECTION INTRAMUSCULAR; INTRAVENOUS at 08:41

## 2020-12-13 RX ADMIN — MEROPENEM 1 G: 1 INJECTION INTRAVENOUS at 18:06

## 2020-12-13 RX ADMIN — INSULIN GLARGINE 20 UNITS: 100 INJECTION, SOLUTION SUBCUTANEOUS at 22:31

## 2020-12-13 RX ADMIN — MAGNESIUM OXIDE 400 MG (241.3 MG MAGNESIUM) TABLET 400 MG: TABLET at 08:32

## 2020-12-13 RX ADMIN — MAGNESIUM OXIDE 400 MG (241.3 MG MAGNESIUM) TABLET 400 MG: TABLET at 22:31

## 2020-12-13 RX ADMIN — POTASSIUM CHLORIDE 40 MEQ: 750 TABLET, EXTENDED RELEASE ORAL at 22:32

## 2020-12-13 RX ADMIN — CITALOPRAM 10 MG: 10 TABLET, FILM COATED ORAL at 08:32

## 2020-12-13 RX ADMIN — MEROPENEM 1 G: 1 INJECTION INTRAVENOUS at 02:23

## 2020-12-13 RX ADMIN — LISINOPRIL 20 MG: 20 TABLET ORAL at 08:32

## 2020-12-13 RX ADMIN — LOPERAMIDE HYDROCHLORIDE 2 MG: 2 CAPSULE ORAL at 15:22

## 2020-12-13 RX ADMIN — INSULIN LISPRO 2 UNITS: 100 INJECTION, SOLUTION INTRAVENOUS; SUBCUTANEOUS at 12:59

## 2020-12-13 NOTE — PROGRESS NOTES
"  Infectious Diseases Progress Note    Tricia Plunkett MD     [facility]  [place]: 3 days  Patient Identification:  Name: Lyubov Middleton  Age: 65 y.o.  Sex: female  :  1955  MRN: [id number]         Primary Care Physician: Ana Huitron MD            Subjective: Feels tired and weak..  Interval History: See consultation note.  · Seizure type activity after the procedure and was seen by neurology service.  · Cystoscopy right retrograde pyelogram and ureteroscopy and laser lithotripsy and right ureteral stent placement on 2020  · Had percutaneous drainage of right perinephric fluid performed and drainage catheter in place on 12/10/2020  Objective:    Scheduled Meds:carvedilol, 12.5 mg, Oral, BID With Meals  citalopram, 10 mg, Oral, Daily  influenza vaccine, 0.5 mL, Intramuscular, Once  insulin glargine, 20 Units, Subcutaneous, Nightly  insulin lispro, 0-9 Units, Subcutaneous, TID AC  lisinopril, 20 mg, Oral, Daily  magnesium oxide, 400 mg, Oral, BID  meropenem, 1 g, Intravenous, Q8H  micafungin (MYCAMINE) IV, 100 mg, Intravenous, Q24H      Continuous Infusions:sodium chloride 0.9 % with KCl 20 mEq, 100 mL/hr, Last Rate: 100 mL/hr (203)        Vital signs in last 24 hours:  Temp:  [98.1 °F (36.7 °C)-99.3 °F (37.4 °C)] 98.5 °F (36.9 °C)  Heart Rate:  [72-98] 77  Resp:  [16-18] 18  BP: (167-188)/(80-95) 188/93    Intake/Output:    Intake/Output Summary (Last 24 hours) at 2020 2242  Last data filed at 2020 1842  Gross per 24 hour   Intake 300 ml   Output 25 ml   Net 275 ml       Exam:  BP (!) 188/93 (BP Location: Right arm, Patient Position: Lying) Comment: informed nurse  Pulse 77   Temp 98.5 °F (36.9 °C) (Oral)   Resp 18   Ht 162.6 cm (64.02\")   Wt 73.4 kg (161 lb 14.4 oz)   SpO2 99%   BMI 27.78 kg/m²   Patient is examined using the personal protective equipment as per guidelines from infection control for this particular patient as enacted.  Hand washing was performed " before and after patient interaction.  General Appearance:  Comfortable does not appear to be in any acute distress sitting in the chair.                          Head:    Normocephalic, without obvious abnormality, atraumatic                           Eyes:    PERRL, conjunctivae/corneas clear, EOM's intact, both eyes                         Throat:   Lips, tongue, gums normal; oral mucosa pink and moist                           Neck:   Supple, symmetrical, trachea midline, no JVD                         Lungs:    Clear to auscultation bilaterally, respirations unlabored                 Chest Wall:    No tenderness or deformity                          Heart:    Regular rate and rhythm, S1 and S2 normal                  Abdomen:     Soft, non-tender, bowel sounds active,decreased discomfort in the right flank area                 extremities:   Extremities normal, atraumatic, no cyanosis or edema                        Pulses:   Pulses palpable in all extremities                            Skin:   Skin is warm and dry,  no rashes or palpable lesions                  Neurologic: Mostly nonfocal       Data Review:    I reviewed the patient's new clinical results.  Results from last 7 days   Lab Units 12/12/20  0550 12/11/20  0938 12/10/20  0540 12/09/20  1527 12/09/20  0534 12/08/20  1506   WBC 10*3/mm3 14.49* 15.80* 17.19* 12.99* 14.00* 15.33*   HEMOGLOBIN g/dL 8.2* 9.4* 9.4* 10.5* 8.3* 9.5*   PLATELETS 10*3/mm3 329 368 338 487* 318 380     Results from last 7 days   Lab Units 12/12/20  0550 12/11/20  0559 12/10/20  2303 12/10/20  0540 12/09/20  1527 12/09/20  0534 12/08/20  1506   SODIUM mmol/L 135* 137  --  133* 137 136 131*   POTASSIUM mmol/L 3.8 4.2 3.9 3.4* 2.8* 3.2* 2.1*   CHLORIDE mmol/L 103 104  --  95* 95* 98 86*   CO2 mmol/L 22.1 22.8  --  26.6 27.2 29.5* 33.2*   BUN mg/dL 8 11  --  11 6* 5* 7*   CREATININE mg/dL 1.01* 1.02*  --  1.22* 1.15* 0.86 1.16*   CALCIUM mg/dL 7.8* 7.8*  --  7.2* 8.2* 6.9* 7.9*    GLUCOSE mg/dL 212* 247*  --  397* 242* 273* 535*     Microbiology Results (last 10 days)     Procedure Component Value - Date/Time    Blood Culture - Blood, Hand, Right [[id number]] Collected: 12/11/20 1530    Lab Status: Preliminary result Specimen: Blood from Hand, Right Updated: 12/12/20 1545     Blood Culture No growth at 24 hours    Blood Culture - Blood, Arm, Right [[id number]] Collected: 12/11/20 1530    Lab Status: Preliminary result Specimen: Blood from Arm, Right Updated: 12/12/20 1545     Blood Culture No growth at 24 hours    Urine Culture - Urine, Urine, Clean Catch [[id number]]  (Abnormal) Collected: 12/11/20 0149    Lab Status: Final result Specimen: Urine, Clean Catch Updated: 12/12/20 0943     Urine Culture Yeast isolated    Body Fluid Culture - Body Fluid, Retroperitoneum [[id number]]  (Abnormal) Collected: 12/10/20 1205    Lab Status: Preliminary result Specimen: Body Fluid from Retroperitoneum Updated: 12/12/20 0907     Body Fluid Culture Light growth (2+) Yeast isolated     Gram Stain Many (4+) WBCs seen      No organisms seen    Blood Culture - Blood, Arm, Left [[id number]] Collected: 12/09/20 2328    Lab Status: Preliminary result Specimen: Blood from Arm, Left Updated: 12/12/20 0015     Blood Culture No growth at 2 days    Blood Culture - Blood, Arm, Right [[id number]] Collected: 12/09/20 2328    Lab Status: Preliminary result Specimen: Blood from Arm, Right Updated: 12/12/20 0015     Blood Culture No growth at 2 days    Blood Culture - Blood, Arm, Right [[id number]] Collected: 12/09/20 1059    Lab Status: Preliminary result Specimen: Blood from Arm, Right Updated: 12/12/20 1230     Blood Culture No growth at 3 days    Blood Culture - Blood, Arm, Right [[id number]] Collected: 12/09/20 1015    Lab Status: Preliminary result Specimen: Blood from Arm, Right Updated: 12/12/20 1230     Blood Culture No growth at 3 days    COVID PRE-OP / PRE-PROCEDURE SCREENING ORDER (NO ISOLATION) - Swab, Nasopharynx  [[id number]]  (Normal) Collected: 12/08/20 1658    Lab Status: Final result Specimen: Swab from Nasopharynx Updated: 12/08/20 1836    Narrative:      The following orders were created for panel order COVID PRE-OP / PRE-PROCEDURE SCREENING ORDER (NO ISOLATION) - Swab, Nasopharynx.  Procedure                               Abnormality         Status                     ---------                               -----------         ------                     Respiratory Panel PCR w/...[[id number]]  Normal              Final result                 Please view results for these tests on the individual orders.    Respiratory Panel PCR w/COVID-19(SARS-CoV-2) TIP/BOBBY/ANDREY/PAD/COR/MAD/HUMPHREY In-House, NP Swab in UTM/VTM, 3-4 HR TAT - Swab, Nasopharynx [[id number]]  (Normal) Collected: 12/08/20 1658    Lab Status: Final result Specimen: Swab from Nasopharynx Updated: 12/08/20 1836     ADENOVIRUS, PCR Not Detected     Coronavirus 229E Not Detected     Coronavirus HKU1 Not Detected     Coronavirus NL63 Not Detected     Coronavirus OC43 Not Detected     COVID19 Not Detected     Human Metapneumovirus Not Detected     Human Rhinovirus/Enterovirus Not Detected     Influenza A PCR Not Detected     Influenza B PCR Not Detected     Parainfluenza Virus 1 Not Detected     Parainfluenza Virus 2 Not Detected     Parainfluenza Virus 3 Not Detected     Parainfluenza Virus 4 Not Detected     RSV, PCR Not Detected     Bordetella pertussis pcr Not Detected     Bordetella parapertussis PCR Not Detected     Chlamydophila pneumoniae PCR Not Detected     Mycoplasma pneumo by PCR Not Detected    Narrative:      Fact sheet for providers: https://docs.[employer]/wp-content/uploads/EAO7609-7647-SN9.1-EUA-Provider-Fact-Sheet-3.pdf    Fact sheet for patients: https://docs.[employer]/wp-content/uploads/BMY1213-7704-NQ7.1-EUA-Patient-Fact-Sheet-1.pdf    Test performed by PCR.              Assessment:  1-probable complicated pyelonephritis with complicated  abscess versus xanthogranulomatous pyelonephritis  2-evolving malignancy  3-diabetes-poorly controlled with hyperglycemia  4-anemia  5-electrolyte imbalance and renal insufficiency.  6-history of hypertension  7-new onset seizure type activity.           Recommendations/Discussions:  · Yeast is significant and will add micafungin to her regimen until the final culture is available.  · We will need to discuss with urology service about intent of definitive intervention regarding the right perinephric mass/abscess to avoid her being metabolically depleted because of the persistent focus of infection which may not be able to amendable to medical treatment only.    Tricia Plunkett MD  12/12/2020  22:42 EST    Much of this encounter note is an electronic transcription/translation of spoken language to printed text. The electronic translation of spoken language may permit erroneous, or at times, nonsensical words or phrases to be inadvertently transcribed; Although I have reviewed the note for such errors, some may still exist

## 2020-12-13 NOTE — PROGRESS NOTES
[phone]   LOS: 4 days     Name: Lyubov Middleton  Age/Sex: 65 y.o. female  :  1955        PCP: Ana Huitron MD  Chief Complaint   Patient presents with   • Abdominal Pain   • Difficulty Urinating      Subjective   She is fairly sleepy today.  Denies new symptoms or complaints.  She feels a little nauseous but denies any vomiting.    General: No Fever or Chills, Cardiac: No Chest Pain or Palpitations, Resp: No Cough or SOA, GI: No Nausea, Vomiting, or Diarrhea and Other: No bleeding    carvedilol, 12.5 mg, Oral, BID With Meals  citalopram, 10 mg, Oral, Daily  influenza vaccine, 0.5 mL, Intramuscular, Once  insulin glargine, 20 Units, Subcutaneous, Nightly  insulin lispro, 0-9 Units, Subcutaneous, TID AC  lisinopril, 20 mg, Oral, Daily  magnesium oxide, 400 mg, Oral, BID  meropenem, 1 g, Intravenous, Q8H  micafungin (MYCAMINE) IV, 100 mg, Intravenous, Q24H      sodium chloride 0.9 % with KCl 20 mEq, 100 mL/hr, Last Rate: 100 mL/hr (20 0842)        Objective   Vital Signs  Temp:  [98.1 °F (36.7 °C)-99 °F (37.2 °C)] 98.4 °F (36.9 °C)  Heart Rate:  [72-98] 77  Resp:  [18] 18  BP: (173-195)/(80-94) 190/94  Body mass index is 29.56 kg/m².    Intake/Output Summary (Last 24 hours) at 2020 0942  Last data filed at 2020 0939  Gross per 24 hour   Intake 320 ml   Output 25 ml   Net 295 ml       Physical Exam  Vitals signs and nursing note reviewed.   Constitutional:       Comments: She looks pale and washed out.  She does not look good today.   HENT:      Head: Normocephalic and atraumatic.      Mouth/Throat:      Mouth: Mucous membranes are dry.   Cardiovascular:      Rate and Rhythm: Normal rate and regular rhythm.   Pulmonary:      Effort: Pulmonary effort is normal.      Breath sounds: Normal breath sounds.   Abdominal:      General: Bowel sounds are normal.      Palpations: Abdomen is soft.   Skin:     General: Skin is warm and dry.      Coloration: Skin is pale.   Neurological:      Mental  Status: She is alert.           Results Review:       I reviewed the patient's new clinical results.  Results from last 7 days   Lab Units 12/13/20  0535 12/12/20  0550 12/11/20  0938 12/10/20  0540 12/09/20  1527 12/09/20  0534 12/08/20  1506   WBC 10*3/mm3 16.86* 14.49* 15.80* 17.19* 12.99* 14.00* 15.33*   HEMOGLOBIN g/dL 9.6* 8.2* 9.4* 9.4* 10.5* 8.3* 9.5*   PLATELETS 10*3/mm3 346 329 368 338 487* 318 380     Results from last 7 days   Lab Units 12/13/20  0535 12/12/20  0550 12/11/20  2113 12/11/20  0559 12/10/20  2303 12/10/20  0540 12/09/20  1527 12/09/20  0534 12/08/20  1506   SODIUM mmol/L 136 135*  --  137  --  133* 137 136 131*   POTASSIUM mmol/L 3.5 3.8  --  4.2 3.9 3.4* 2.8* 3.2* 2.1*   CHLORIDE mmol/L 104 103  --  104  --  95* 95* 98 86*   CO2 mmol/L 22.4 22.1  --  22.8  --  26.6 27.2 29.5* 33.2*   BUN mg/dL 10 8  --  11  --  11 6* 5* 7*   CREATININE mg/dL 1.11* 1.01*  --  1.02*  --  1.22* 1.15* 0.86 1.16*   CALCIUM mg/dL 7.8* 7.8*  --  7.8*  --  7.2* 8.2* 6.9* 7.9*   MAGNESIUM mg/dL 1.8 1.6  --  2.5*  --  1.9 1.1*  --  1.0*   PHOSPHORUS mg/dL 2.9 2.4* 2.0* 1.5*  --   --   --   --   --    Estimated Creatinine Clearance: 51.8 mL/min (A) (by C-G formula based on SCr of 1.11 mg/dL (H)).    Lab Results   Component Value Date    HGBA1C 13.70 (H) 12/09/2020     Glucose   Date/Time Value Ref Range Status   12/13/2020 0609 172 (H) 70 - 130 mg/dL Final   12/12/2020 2112 163 (H) 70 - 130 mg/dL Final   12/12/2020 1638 149 (H) 70 - 130 mg/dL Final   12/12/2020 1125 223 (H) 70 - 130 mg/dL Final   12/11/2020 2131 182 (H) 70 - 130 mg/dL Final   12/11/2020 1707 181 (H) 70 - 130 mg/dL Final   12/11/2020 1127 353 (H) 70 - 130 mg/dL Final   12/11/2020 0628 251 (H) 70 - 130 mg/dL Final      Assessment/Plan   Active Hospital Problems    Diagnosis  POA   • Sepsis (CMS/[facility]) [A41.9]  No   • Hypomagnesemia [E83.42]  Unknown   • Renal abscess [N15.1]  Unknown   • Hypocalcemia [E83.51]  Unknown   • Stage 3a chronic kidney disease  [N18.31]  Yes   • Type 2 diabetes mellitus (CMS/[facility]) [E11.9]  Yes   • Renal stone [N20.0]  Unknown   • Hypokalemia [E87.6]  Yes      Resolved Hospital Problems   No resolved problems to display.       PLAN  This is a 65-year-old female with a history of multiple urinary tract infections in the past hypertension and diabetes who presents to the hospital with urinary frequency and dysuria is found to have significant hypokalemia as well as a possible perinephric mass versus fluid collection  -Now on meropenem and micafungin.  Infectious disease managing antibiotic therapy  -Magnesium down planned replacement  -Now on full electrolyte protocol, plan to replace phosphorus today  -Continue seizure precautions, Neuro changes yesterday more likely related to sepsis, appreciate neuro input, may also be a component of hypertensive encephalopathy prn hydralazine to keep sys <160.  Coreg added yesterday continue as needed blood pressure medications.  DC IV fluids today  -Uncontrolled DM 2, A1c is 13.  She will require insulin at DC.  Will increase basal insulin and continue to hold oral meds but will be continued at DC.  Needs DE prior to DC  -Blood sugars are acceptable today continue current basal insulin  -drain in place, only 25cc of output recorded yesterday.  Around 1.5L total out from drain  -Appreciate neurology, ID and urology assistance    Disposition  TBD      Harris Perez MD  [facility]  12/13/20  09:42 EST

## 2020-12-13 NOTE — PLAN OF CARE
Goal Outcome Evaluation:  Plan of Care Reviewed With: patient  Progress: improving  Outcome Summary: VSS. Telemetry monitor, SR. Confused to time. Up with assist, encouraging to sit in chair and use bathroom. Better PO intake. IV fluids stopped. Diarrhea, imodium given. Will continue to monitor.

## 2020-12-13 NOTE — THERAPY EVALUATION
Patient Name: Lyubov Middleton  : 1955    MRN: [id number]                              Today's Date: 2020       Admit Date: 2020    Visit Dx:     ICD-10-CM ICD-9-CM   1. Hypokalemia  E87.6 276.8   2. Hyperglycemia  R73.9 790.29   3. Lower abdominal pain  R10.30 789.09     Patient Active Problem List   Diagnosis   • Hypokalemia   • Renal stone   • Hypomagnesemia   • Renal abscess   • Hypocalcemia   • Stage 3a chronic kidney disease   • Type 2 diabetes mellitus (CMS/HCC)   • Sepsis (CMS/HCC)     Past Medical History:   Diagnosis Date   • Cancer (CMS/HCC)     left breast removed    • Type 2 diabetes mellitus (CMS/HCC)      Past Surgical History:   Procedure Laterality Date   • ABDOMINAL SURGERY     • BREAST SURGERY Left     removed due to cancer   • CHOLECYSTECTOMY     • CYSTOSCOPY W/ URETERAL STENT PLACEMENT Right 2020    Procedure: CYSTOSCOPY, RIGHT RETROGRADE PYELOGRAM, URETEROSCOPY, LASER LITHOTRIPSY, RIGHT URETERAL STENT PLACEMENT;  Surgeon: Rohan Dooley Jr., MD;  Location: [facility];  Service: Urology;  Laterality: Right;   • HERNIA REPAIR      mesh removed     General Information     Row Name 20 1520          Physical Therapy Time and Intention    Document Type  evaluation  -AE     Mode of Treatment  individual therapy;physical therapy  -AE     Row Name 20 1520          General Information    Patient Profile Reviewed  yes  -AE     Prior Level of Function  independent:  -AE     Existing Precautions/Restrictions  fall  -AE     Barriers to Rehab  none identified  -AE     Row Name 20 1520          Living Environment    Lives With  child(sade), adult  -AE     Row Name 20 1520          Home Main Entrance    Number of Stairs, Main Entrance  one  -AE     Row Name 20 1520          Stairs Within Home, Primary    Number of Stairs, Within Home, Primary  none  -AE     Row Name 20 1520          Cognition    Orientation Status (Cognition)  oriented  x 3  -AE     Row Name 12/13/20 1520          Safety Issues, Functional Mobility    Safety Issues Affecting Function (Mobility)  insight into deficits/self-awareness  -AE     Impairments Affecting Function (Mobility)  endurance/activity tolerance  -AE       User Key  (r) = Recorded By, (t) = Taken By, (c) = Cosigned By    Initials Name Provider Type    AE Hayley Moreno PT Physical Therapist        Mobility     Row Name 12/13/20 1521          Bed Mobility    Bed Mobility  bed mobility (all) activities  -AE     All Activities, Cherry (Bed Mobility)  contact guard assist  -AE     Assistive Device (Bed Mobility)  bed rails;head of bed elevated  -AE     Comment (Bed Mobility)  HOB elevated, use of bed rails to L side of bed  -AE     Row Name 12/13/20 1521          Transfers    Comment (Transfers)  CGA all transfers with FWW  -AE     Row Name 12/13/20 1521          Bed-Chair Transfer    Bed-Chair Cherry (Transfers)  contact guard  -AE     Assistive Device (Bed-Chair Transfers)  walker, front-wheeled  -AE     Row Name 12/13/20 1521          Sit-Stand Transfer    Sit-Stand Cherry (Transfers)  contact guard  -AE     Assistive Device (Sit-Stand Transfers)  walker, front-wheeled  -AE     Row Name 12/13/20 1521          Gait/Stairs (Locomotion)    Cherry Level (Gait)  contact guard  -AE     Assistive Device (Gait)  walker, front-wheeled  -AE     Distance in Feet (Gait)  150ft  -AE     Deviations/Abnormal Patterns (Gait)  erickson decreased;stride length decreased  -AE     Bilateral Gait Deviations  forward flexed posture;heel strike decreased  -AE       User Key  (r) = Recorded By, (t) = Taken By, (c) = Cosigned By    Initials Name Provider Type    AE Hayley Moreno PT Physical Therapist        Obj/Interventions     Row Name 12/13/20 1522          Range of Motion Comprehensive    Comment, General Range of Motion  BLE WFL  -AE     Row Name 12/13/20 1522          Strength Comprehensive (MMT)     Comment, General Manual Muscle Testing (MMT) Assessment  Generalized BLE MMT grossly 4+/5  -AE       User Key  (r) = Recorded By, (t) = Taken By, (c) = Cosigned By    Initials Name Provider Type    AE Hayley Moreno PT Physical Therapist        Goals/Plan     Row Name 12/13/20 1523          Transfer Goal 1 (PT)    Activity/Assistive Device (Transfer Goal 1, PT)  transfers, all  -AE     [place] Level/Cues Needed (Transfer Goal 1, PT)  supervision required  -AE     Time Frame (Transfer Goal 1, PT)  1 week  -AE     Progress/Outcome (Transfer Goal 1, PT)  continuing progress toward goal  -AE     Row Name 12/13/20 1523          Gait Training Goal 1 (PT)    Activity/Assistive Device (Gait Training Goal 1, PT)  gait (walking locomotion);assistive device use  -AE     [place] Level (Gait Training Goal 1, PT)  standby assist  -AE     Distance (Gait Training Goal 1, PT)  160ft  -AE     Time Frame (Gait Training Goal 1, PT)  1 week  -AE     Progress/Outcome (Gait Training Goal 1, PT)  continuing progress toward goal  -AE       User Key  (r) = Recorded By, (t) = Taken By, (c) = Cosigned By    Initials Name Provider Type    AE Hayley Moreno PT Physical Therapist        Clinical Impression     Row Name 12/13/20 1522          Pain    Additional Documentation  Pain Scale: Numbers Pre/Post-Treatment (Group)  -AE     [facility] Name 12/13/20 1522          Pain Scale: Numbers Pre/Post-Treatment    Pretreatment Pain Rating  0/10 - no pain  -AE     Posttreatment Pain Rating  0/10 - no pain  -AE     Pain Intervention(s)  Repositioned;Ambulation/increased activity;Rest  -AE     Row Name 12/13/20 1524          Plan of Care Review    Plan of Care Reviewed With  patient  -AE     Row Name 12/13/20 1521          Therapy Assessment/Plan (PT)    Patient/Family Therapy Goals Statement (PT)  Pt plans to return home  -AE     Rehab Potential (PT)  good, to achieve stated therapy goals  -AE     Criteria for Skilled Interventions Met (PT)   yes;meets criteria  -AE     Row Name 12/13/20 1522          Positioning and Restraints    Pre-Treatment Position  in bed  -AE     Post Treatment Position  bed  -AE     In Bed  supine;call light within reach;encouraged to call for assist;exit alarm on  -AE       User Key  (r) = Recorded By, (t) = Taken By, (c) = Cosigned By    Initials Name Provider Type    Hayley Ng PT Physical Therapist        Outcome Measures     Row Name 12/13/20 1524          How much help from another person do you currently need...    Turning from your back to your side while in flat bed without using bedrails?  4  -AE     Moving from lying on back to sitting on the side of a flat bed without bedrails?  3  -AE     Moving to and from a bed to a chair (including a wheelchair)?  3  -AE     Standing up from a chair using your arms (e.g., wheelchair, bedside chair)?  3  -AE     Climbing 3-5 steps with a railing?  3  -AE     To walk in hospital room?  3  -AE     AM-PAC 6 Clicks Score (PT)  19  -AE     Row Name 12/13/20 1524          Functional Assessment    Outcome Measure Options  AM-PAC 6 Clicks Basic Mobility (PT)  -AE       User Key  (r) = Recorded By, (t) = Taken By, (c) = Cosigned By    Initials Name Provider Type    Hayley Ng PT Physical Therapist          PT Recommendation and Plan  Planned Therapy Interventions (PT): balance training, bed mobility training, gait training, home exercise program, motor coordination training, neuromuscular re-education, patient/family education, postural re-education, ROM (range of motion), stair training, strengthening, stretching, transfer training  Plan of Care Reviewed With: patient     Time Calculation:   PT Charges     Row Name 12/13/20 1531             Time Calculation    Start Time  1430  -AE      Stop Time  1500  -AE      Time Calculation (min)  30 min  -AE      PT Received On  12/13/20  -AE      PT - Next Appointment  12/14/20  -AE      PT Goal Re-Cert Due Date  12/20/20  -AE          Time Calculation- PT    Total Timed Code Minutes- PT  20 minute(s)  -AE        User Key  (r) = Recorded By, (t) = Taken By, (c) = Cosigned By    Initials Name Provider Type    AE Hayley Moreno, RICH Physical Therapist        Therapy Charges for Today     Code Description Service Date Service Provider Modifiers Qty    58566559604 HC PT EVAL LOW COMPLEXITY 2 12/13/2020 Hayley Moreno, PT GP 1    20984314118 HC GAIT TRAINING EA 15 MIN 12/13/2020 Hayley Moreno, PT GP 1    [id number]  PT THER SUPP EA 15 MIN 12/13/2020 Hayley Moreno, PT GP 2          PT G-Codes  Outcome Measure Options: AM-PAC 6 Clicks Basic Mobility (PT)  AM-PAC 6 Clicks Score (PT): 19    Hayley Moreno PT  12/13/2020

## 2020-12-13 NOTE — PROGRESS NOTES
S/p R urs/ll/stent 12/9  Abscess S/p IR drain 12/10    CC: I feel better    Patient alert and oriented x3  NAD  Soft abdomen  Drain, sanguinous  Wearing a brief, incontinent    Afebrile, BP elevated  Cr 1.11  WBC 16.8  H6b 9.6    Plan:  Micafungin per ID  Continue Merrem  Will follow    Keep drain  Outpatient treatment for stone in future

## 2020-12-13 NOTE — PLAN OF CARE
"Pt is a 65-year-old female with a history of multiple urinary tract infections in the past hypertension and diabetes who presents to the hospital with urinary frequency and dysuria is found to have significant hypokalemia as well as a possible perinephric mass versus fluid collection. PT signed off on patient 12/9/20 due to being at independent baseline requiring no AD to ambulate 150ft around nursing station. Re-consulted due to functional decline, RN states patient has been total care, incontinent in bed, requiring turns q2 hrs.  Patient A&O x 4 during evaluation, states she lives with sons in [facility] with 1 step to enter. Reports being independent at baseline, however used a quad cane ~2x/week whenever she felt \"weak\". Pt states she was able to walk longer distance such as throughout grocery stores and out to the mailbox.  Today patient was CGA for bed mobility, transfers, and gait up to 150ft. Pt did have mild balance deficits and poor navigation of FWW often running into objects. Educated patient on importance of mobility, being up in chair for all meals, and ambulating 2-3x/day with RN staff. Pt verbalized understanding. DC recs include home with assist and HHPT to follow, may need FWW for DC.   Problem: Adult Inpatient Plan of Care  Goal: Plan of Care Review  Outcome: Ongoing, Progressing  Flowsheets (Taken 12/13/2020 1522)  Plan of Care Reviewed With: patient   Goal Outcome Evaluation:  Plan of Care Reviewed With: patient  Progress: no change     "

## 2020-12-14 ENCOUNTER — APPOINTMENT (OUTPATIENT)
Dept: CT IMAGING | Facility: HOSPITAL | Age: 65
End: 2020-12-14

## 2020-12-14 LAB
ALBUMIN SERPL-MCNC: 2 G/DL (ref 3.5–5.2)
ANION GAP SERPL CALCULATED.3IONS-SCNC: 7.6 MMOL/L (ref 5–15)
BACTERIA SPEC AEROBE CULT: NORMAL
BACTERIA SPEC AEROBE CULT: NORMAL
BUN SERPL-MCNC: 10 MG/DL (ref 8–23)
BUN/CREAT SERPL: 9.8 (ref 7–25)
C DIFF TOX GENS STL QL NAA+PROBE: NEGATIVE
CALCIUM SPEC-SCNC: 7.7 MG/DL (ref 8.6–10.5)
CHLORIDE SERPL-SCNC: 105 MMOL/L (ref 98–107)
CO2 SERPL-SCNC: 23.4 MMOL/L (ref 22–29)
CREAT SERPL-MCNC: 1.02 MG/DL (ref 0.57–1)
DEPRECATED RDW RBC AUTO: 38.8 FL (ref 37–54)
ERYTHROCYTE [DISTWIDTH] IN BLOOD BY AUTOMATED COUNT: 13.2 % (ref 12.3–15.4)
FERRITIN SERPL-MCNC: 649 NG/ML (ref 13–150)
GFR SERPL CREATININE-BSD FRML MDRD: 55 ML/MIN/1.73
GLUCOSE BLDC GLUCOMTR-MCNC: 105 MG/DL (ref 70–130)
GLUCOSE BLDC GLUCOMTR-MCNC: 114 MG/DL (ref 70–130)
GLUCOSE BLDC GLUCOMTR-MCNC: 163 MG/DL (ref 70–130)
GLUCOSE BLDC GLUCOMTR-MCNC: 96 MG/DL (ref 70–130)
GLUCOSE SERPL-MCNC: 119 MG/DL (ref 65–99)
HCT VFR BLD AUTO: 25.2 % (ref 34–46.6)
HGB BLD-MCNC: 8.1 G/DL (ref 12–15.9)
IRON 24H UR-MRATE: 16 MCG/DL (ref 37–145)
IRON SATN MFR SERPL: 11 % (ref 20–50)
MAGNESIUM SERPL-MCNC: 1.6 MG/DL (ref 1.6–2.4)
MCH RBC QN AUTO: 26 PG (ref 26.6–33)
MCHC RBC AUTO-ENTMCNC: 32.1 G/DL (ref 31.5–35.7)
MCV RBC AUTO: 80.8 FL (ref 79–97)
PHOSPHATE SERPL-MCNC: 2.8 MG/DL (ref 2.5–4.5)
PLATELET # BLD AUTO: 285 10*3/MM3 (ref 140–450)
PMV BLD AUTO: 9.8 FL (ref 6–12)
POTASSIUM SERPL-SCNC: 4 MMOL/L (ref 3.5–5.2)
RBC # BLD AUTO: 3.12 10*6/MM3 (ref 3.77–5.28)
SODIUM SERPL-SCNC: 136 MMOL/L (ref 136–145)
TIBC SERPL-MCNC: 145 MCG/DL (ref 298–536)
TRANSFERRIN SERPL-MCNC: 97 MG/DL (ref 200–360)
URATE SERPL-MCNC: 2.4 MG/DL (ref 2.4–5.7)
WBC # BLD AUTO: 14.44 10*3/MM3 (ref 3.4–10.8)

## 2020-12-14 PROCEDURE — 74178 CT ABD&PLV WO CNTR FLWD CNTR: CPT

## 2020-12-14 PROCEDURE — 83735 ASSAY OF MAGNESIUM: CPT | Performed by: HOSPITALIST

## 2020-12-14 PROCEDURE — 25010000002 MAGNESIUM SULFATE 2 GM/50ML SOLUTION: Performed by: HOSPITALIST

## 2020-12-14 PROCEDURE — 80069 RENAL FUNCTION PANEL: CPT | Performed by: HOSPITALIST

## 2020-12-14 PROCEDURE — 85027 COMPLETE CBC AUTOMATED: CPT | Performed by: HOSPITALIST

## 2020-12-14 PROCEDURE — 63710000001 INSULIN LISPRO (HUMAN) PER 5 UNITS: Performed by: UROLOGY

## 2020-12-14 PROCEDURE — 25010000002 IOPAMIDOL 61 % SOLUTION: Performed by: STUDENT IN AN ORGANIZED HEALTH CARE EDUCATION/TRAINING PROGRAM

## 2020-12-14 PROCEDURE — 82728 ASSAY OF FERRITIN: CPT | Performed by: STUDENT IN AN ORGANIZED HEALTH CARE EDUCATION/TRAINING PROGRAM

## 2020-12-14 PROCEDURE — 63710000001 INSULIN GLARGINE PER 5 UNITS: Performed by: HOSPITALIST

## 2020-12-14 PROCEDURE — 87493 C DIFF AMPLIFIED PROBE: CPT | Performed by: STUDENT IN AN ORGANIZED HEALTH CARE EDUCATION/TRAINING PROGRAM

## 2020-12-14 PROCEDURE — 84466 ASSAY OF TRANSFERRIN: CPT | Performed by: STUDENT IN AN ORGANIZED HEALTH CARE EDUCATION/TRAINING PROGRAM

## 2020-12-14 PROCEDURE — 25010000002 MEROPENEM PER 100 MG: Performed by: HOSPITALIST

## 2020-12-14 PROCEDURE — 83540 ASSAY OF IRON: CPT | Performed by: STUDENT IN AN ORGANIZED HEALTH CARE EDUCATION/TRAINING PROGRAM

## 2020-12-14 PROCEDURE — 25010000002 MICAFUNGIN SODIUM 100 MG RECONSTITUTED SOLUTION: Performed by: INTERNAL MEDICINE

## 2020-12-14 PROCEDURE — 82962 GLUCOSE BLOOD TEST: CPT

## 2020-12-14 PROCEDURE — 84550 ASSAY OF BLOOD/URIC ACID: CPT | Performed by: HOSPITALIST

## 2020-12-14 RX ADMIN — MAGNESIUM OXIDE 400 MG (241.3 MG MAGNESIUM) TABLET 400 MG: TABLET at 21:19

## 2020-12-14 RX ADMIN — LISINOPRIL 20 MG: 20 TABLET ORAL at 08:30

## 2020-12-14 RX ADMIN — IOPAMIDOL 85 ML: 612 INJECTION, SOLUTION INTRAVENOUS at 09:45

## 2020-12-14 RX ADMIN — MICAFUNGIN SODIUM 100 MG: 100 INJECTION, POWDER, LYOPHILIZED, FOR SOLUTION INTRAVENOUS at 00:39

## 2020-12-14 RX ADMIN — MEROPENEM 1 G: 1 INJECTION INTRAVENOUS at 09:49

## 2020-12-14 RX ADMIN — CARVEDILOL 12.5 MG: 12.5 TABLET, FILM COATED ORAL at 17:43

## 2020-12-14 RX ADMIN — MEROPENEM 1 G: 1 INJECTION INTRAVENOUS at 17:44

## 2020-12-14 RX ADMIN — MICAFUNGIN SODIUM 100 MG: 100 INJECTION, POWDER, LYOPHILIZED, FOR SOLUTION INTRAVENOUS at 23:51

## 2020-12-14 RX ADMIN — MAGNESIUM SULFATE HEPTAHYDRATE 4 G: 40 INJECTION, SOLUTION INTRAVENOUS at 13:24

## 2020-12-14 RX ADMIN — INSULIN LISPRO 2 UNITS: 100 INJECTION, SOLUTION INTRAVENOUS; SUBCUTANEOUS at 12:30

## 2020-12-14 RX ADMIN — INSULIN GLARGINE 20 UNITS: 100 INJECTION, SOLUTION SUBCUTANEOUS at 21:19

## 2020-12-14 RX ADMIN — MAGNESIUM OXIDE 400 MG (241.3 MG MAGNESIUM) TABLET 400 MG: TABLET at 08:30

## 2020-12-14 RX ADMIN — CARVEDILOL 12.5 MG: 12.5 TABLET, FILM COATED ORAL at 08:30

## 2020-12-14 RX ADMIN — CITALOPRAM 10 MG: 10 TABLET, FILM COATED ORAL at 08:31

## 2020-12-14 RX ADMIN — MEROPENEM 1 G: 1 INJECTION INTRAVENOUS at 01:59

## 2020-12-14 NOTE — PROGRESS NOTES
Name: Lyubov Middleton ADMIT: 2020   : 1955  PCP: Ana Huitron MD    MRN: [id number] LOS: 5 days   AGE/SEX: 65 y.o. female  ROOM: [facility]     Subjective   Subjective   No events overnight. Pt without complaint. Only 10 cc out from drain yesterday       Objective   Objective   Vital Signs  Temp:  [97.6 °F (36.4 °C)-98.7 °F (37.1 °C)] 97.6 °F (36.4 °C)  Heart Rate:  [64-68] 65  Resp:  [18] 18  BP: (135-178)/(65-89) 145/76  SpO2:  [94 %-97 %] 97 %  on   ;   Device (Oxygen Therapy): room air  Body mass index is 30.3 kg/m².  Physical Exam  Constitutional:       General: She is not in acute distress.  HENT:      Head: Normocephalic and atraumatic.   Eyes:      General: No scleral icterus.     Extraocular Movements: Extraocular movements intact.   Cardiovascular:      Rate and Rhythm: Normal rate and regular rhythm.      Heart sounds: Normal heart sounds. No murmur. No friction rub. No gallop.    Pulmonary:      Effort: Pulmonary effort is normal.      Breath sounds: Normal breath sounds.   Abdominal:      General: Bowel sounds are normal. There is no distension.      Palpations: Abdomen is soft.      Tenderness: There is no abdominal tenderness. There is no guarding or rebound.      Comments: Right lower quadrant drain   Musculoskeletal:         General: No swelling.      Right lower leg: No edema.      Left lower leg: No edema.   Neurological:      Mental Status: She is alert and oriented to person, place, and time.   Psychiatric:         Mood and Affect: Mood normal.         Behavior: Behavior normal.         Results Review     I reviewed the patient's new clinical results.  Results from last 7 days   Lab Units 20  0546 20  0535 20  0550 20  0938   WBC 10*3/mm3 14.44* 16.86* 14.49* 15.80*   HEMOGLOBIN g/dL 8.1* 9.6* 8.2* 9.4*   PLATELETS 10*3/mm3 285 346 329 368     Results from last 7 days   Lab Units 20  0546 20  0535 20  0550 20  0559   SODIUM mmol/L  136 136 135* 137   POTASSIUM mmol/L 4.0 3.5 3.8 4.2   CHLORIDE mmol/L 105 104 103 104   CO2 mmol/L 23.4 22.4 22.1 22.8   BUN mg/dL 10 10 8 11   CREATININE mg/dL 1.02* 1.11* 1.01* 1.02*   GLUCOSE mg/dL 119* 163* 212* 247*   Estimated Creatinine Clearance: 57.1 mL/min (A) (by C-G formula based on SCr of 1.02 mg/dL (H)).  Results from last 7 days   Lab Units 12/14/20  0546 12/13/20  0535 12/11/20  0559 12/08/20  1506   ALBUMIN g/dL 2.00* 2.10* 2.20* 3.10*   BILIRUBIN mg/dL  --   --   --  0.4   ALK PHOS U/L  --   --   --  102   AST (SGOT) U/L  --   --   --  10   ALT (SGPT) U/L  --   --   --  6     Results from last 7 days   Lab Units 12/14/20  0546 12/13/20  0535 12/12/20  0550 12/11/20  2113 12/11/20  0559  12/08/20  1506   CALCIUM mg/dL 7.7* 7.8* 7.8*  --  7.8*   < > 7.9*   ALBUMIN g/dL 2.00* 2.10*  --   --  2.20*  --  3.10*   MAGNESIUM mg/dL 1.6 1.8 1.6  --  2.5*   < > 1.0*   PHOSPHORUS mg/dL 2.8 2.9 2.4* 2.0* 1.5*   < >  --     < > = values in this interval not displayed.     Results from last 7 days   Lab Units 12/12/20  0550 12/11/20  1530 12/09/20  0534   PROCALCITONIN ng/mL  --  8.89*  --    LACTATE mmol/L 0.9  --  0.9     COVID19   Date Value Ref Range Status   12/08/2020 Not Detected Not Detected - Ref. Range Final     Glucose   Date/Time Value Ref Range Status   12/14/2020 1131 163 (H) 70 - 130 mg/dL Final   12/14/2020 0604 114 70 - 130 mg/dL Final   12/13/2020 2013 133 (H) 70 - 130 mg/dL Final   12/13/2020 1651 104 70 - 130 mg/dL Final   12/13/2020 1104 178 (H) 70 - 130 mg/dL Final   12/13/2020 0609 172 (H) 70 - 130 mg/dL Final   12/12/2020 2112 163 (H) 70 - 130 mg/dL Final       CT Abdomen Pelvis With & Without Contrast  Narrative: CT ABDOMEN AND PELVIS WITH AND WITHOUT CONTRAST     HISTORY: Follow-up perinephric fluid collection drainage.     TECHNIQUE: Noncontrast axial images of the abdomen and pelvis were  obtained followed by administration of intravenous contrast and imaging  of the abdomen and pelvis  in the nephrographic and delayed phase.  Coronal and sagittal reformats were obtained.     COMPARISON: CT abdomen and pelvis from 12/08/2020.     FINDINGS:A percutaneous drain is seen within a previously demonstrated  right perinephric fluid collection with near complete resolution of the  fluid component. Enhancing soft tissue component/wall is seen along the  lateral aspect of the mid and lower pole of the kidney. There is also a  right ureteric stent appropriately placed with distal tip within the  bladder and proximally within the left anterior collecting system and a  malrotated kidney. There was previously a large calculus in this region  measuring up to 1 cm that is possibly smaller and displaced now  measuring 7 mm. There are also nonobstructing calculi within the lower  pole of the left kidney, image 69. Extensive perinephric stranding is  demonstrated. On the delayed images, no extravasation of contrast is  identified.     There are bilateral small layering pleural effusions with bibasilar  atelectasis. Liver and spleen are unchanged. Pancreas is normal without  ductal dilatation. Common bile duct is dilated, that likely represents  benign biliary ectasia. Bilateral adrenal glands are normal. There are  small retroperitoneal lymph nodes identified with the largest lymph node  at the level of the aortic bifurcation measuring up to 1.3 cm in short  axis dimension. This has grown in the interim from prior imaging where  it measured 8 mm. The urinary bladder is partially distended, small  amount of nondependent air likely related to catheterization. The uterus  is anteverted. No abnormal adnexal mass is seen. There is a large  right-sided ventral hernia containing small and large bowel loops. There  is an ileocolic anastomosis present. Air-fluid levels are seen  throughout the colon most suggestive of a mild ileus. Again demonstrated  is a small low-density region along the left rectus abdominis in  the  paramidline region measuring 2.5 x 1.8 cm, unchanged from prior imaging  and may represent a chronic postsurgical collection/seroma. Attention on  follow-up is recommended.     Impression: 1. Interim placement of a right ureteral stent, as well as a  percutaneous drain within a right perinephric collection with near  complete resolution of the collection. Enhancing soft tissue seen along  the lateral aspect of the mid and lower pole and anterior to the right  kidney may represent enhancing granulation tissue. Multiple mildly  prominent retroperitoneal lymph nodes at least a couple of which have  enlarged in the interim from prior imaging, the largest measuring 1.3 cm  at the level of the bifurcation. These may be reactive however continued  follow-up is recommended to rule out malignancy.  2. Mild colonic ileus.   3. Bilateral small layering pleural effusions with bibasilar  atelectasis.     Radiation dose reduction techniques were utilized, including automated  exposure control and exposure modulation based on body size.          Scheduled Medications  carvedilol, 12.5 mg, Oral, BID With Meals  citalopram, 10 mg, Oral, Daily  influenza vaccine, 0.5 mL, Intramuscular, Once  insulin glargine, 20 Units, Subcutaneous, Nightly  insulin lispro, 0-9 Units, Subcutaneous, TID AC  lisinopril, 20 mg, Oral, Daily  magnesium oxide, 400 mg, Oral, BID  meropenem, 1 g, Intravenous, Q8H  micafungin (MYCAMINE) IV, 100 mg, Intravenous, Q24H    Infusions   Diet  Diet Regular; GI Soft       Assessment/Plan     Active Hospital Problems    Diagnosis  POA   • Sepsis (CMS/HCC) [A41.9]  No   • Hypomagnesemia [E83.42]  Unknown   • Renal abscess [N15.1]  Unknown   • Hypocalcemia [E83.51]  Unknown   • Stage 3a chronic kidney disease [N18.31]  Yes   • Type 2 diabetes mellitus (CMS/HCC) [E11.9]  Yes   • Renal stone [N20.0]  Unknown   • Hypokalemia [E87.6]  Yes      Resolved Hospital Problems   No resolved problems to display.     Pt is new  to me. Notes, labs, imaging reviewed.    65 y.o. female admitted with Renal abscess. Has history of multiple UTIs in the past. Presented with urinary frequency and dysuria and found to have complicated pyleonephritis with fungal abscess vs. renal mass. Also had significant hypokalemia and hypomagnesemia.  Course has been complicated by new seizure most likely from hypomagnesemia. Underwent cystoscopy and right retrograde pyelogram and ureteroscopy and laser lithotripsy and right ureteral stent placement on 12/9. Had CT guided percutaneous drainage of right perinephric fluid on 12/10 with drain left in place.    Complicated pyelonephritis with fungal abscess, poa-ID and urology are consulted. On Micafungin and merem per ID. Urology obtaining CT abd/pelvis to reevaluate abscess/mass.     New onset seizure activity most likely from hypomagnesemia, poa-neurology was consulted. No new antiepileptics recommended. Only magnesium replacement.     Hypokalemia, poa    Hypomagnesemia, poa-mag to be replaced today    Hypophosphatemia, poa    Anemia, poa-check b12, folate, iron studies    DM2, uncontrolled on glipizide and metformin at home-a1c is 13. Started on basal insulin here (and should go home on insulin) and oral meds are held currently, but will be restarted at discharge. Needs diabetic education prior to discharge. Glucose adequately controlled today.  History of left breast cancer s/p mastectomy in 2012  Essential hypertension on lisinopril at home. Coreg added here.  Migraines on prn rizatriptan at home    · SCDs for DVT prophylaxis.  · Full code.  · Discussed with patient.      Bunny Hannah MD  [facility]  12/14/20  13:18 EST    Patient was wearing facemask when I entered the room and throughout our encounter.  I wore protective equipment throughout this patient encounter including a face mask, gloves and protective eyewear.  Hand hygiene was performed before donning protective equipment and  after removal when leaving the room.

## 2020-12-14 NOTE — PLAN OF CARE
Goal Outcome Evaluation:  Plan of Care Reviewed With: patient  Progress: no change  Outcome Summary: Pt denies pain and SOA. A&O to self, place, and situation. Medications administered per orders. UP with assist to BSC. Pt's having episodes of incontinence. BM this shift. VSS. No s/s of distress at this time. Will continue to monitor.

## 2020-12-14 NOTE — PLAN OF CARE
Goal Outcome Evaluation:  Plan of Care Reviewed With: patient  Progress: improving  Outcome Summary: VSS. Telemetry monitor, SR. Alert and oriented x4, room air. Up with standby assist. Diarrhea, c diff negative. PAUL drain. Magnesium replaced. Will continue to monitor.

## 2020-12-14 NOTE — PROGRESS NOTES
Continued Stay Note  [facility]     Patient Name: Lyubov Middleton  MRN: [id number]  Today's Date: 12/14/2020    Admit Date: 12/8/2020    Discharge Plan     Row Name 12/14/20 1159       Plan    Plan  Plan home with family.   ZIGGY Hay RN    Provided Post Acute Provider List?  Yes    Post Acute Provider List  Home Health    Provided Post Acute Provider Quality & Resource List?  Yes    Post Acute Provider Quality and Resource List  Home Health    Delivered To  Patient    Method of Delivery  In person    Patient/Family in Agreement with Plan  yes    Plan Comments  Spoke to pt at bedside.   Pt provided a HH list and CMS Compare for HH agencies. Follow for HH agency choice.   Plan home with family and HH if needed.   ZIGGY Hay RN        Discharge Codes    No documentation.             Gretta Hay RN

## 2020-12-14 NOTE — NURSING NOTE
"Diabetes Education  Assessment/Teaching    Patient Name:  Lyubov Middleton  YOB: 1955  MRN: [id number]  Admit Date:  12/8/2020      Assessment Date:  12/14/2020    Most Recent Value   General Information    Referral From:  MD order. Attempt to f/u with 66 y/o at bedside to reinforce insulin teaching. Pt defers at this time.    Height  162.6 cm (64.02\")   Height Method  Stated   Weight  80.1 kg (176 lb 9.6 oz)   Weight Method  Bed scale   Diabetes History   What type of diabetes do you have?  Type 2   Length of Diabetes Diagnosis  10 + years   Do you test your blood sugar at home?  no   Have you had high blood sugar? (>140mg/dl)  yes -current a1C >13%.   Education Preferences   Barriers to Learning  -- acuity. pt continues w/PAUL drain.    Assessment Topics   Taking Medication - Assessment  Needs education. anticipate pt to dc home new to Lantus/insulin per MD notes.   Reducing Risk - Assessment  Needs education   Healthy Coping - Assessment  Competent   DM Goals   Contact Plan  Follow-up medical care with PCP.            Most Recent Value   DM Education Needs   Meter  Has own   Medication  Lantus Insulin   Healthy Coping  Appropriate   Discharge Plan  Follow-up with PCP   Motivation  Moderate   Teaching Method  Explanation, Discussion   Patient Response  Needs reinforcement      Other Comments:  Ask pt if she has been able to give own insulin injection w/RN. Pt has not, and she does not want to practice today. Pt agrees to try tomorrow.   Electronically signed by:  Petty Conte, RN,  BSN, CDE  12/14/20 16:20 EST  "

## 2020-12-14 NOTE — PROGRESS NOTES
"   LOS: 5 days   Patient Care Team:  Ana Huitron MD as PCP - General (Family Medicine)      Subjective   Interval History: No pain, no fevers overnight.     Objective     ROS   12 POINT NEG ROS PERTINENT IN HPI      Vital Signs  Temp:  [98 °F (36.7 °C)-98.7 °F (37.1 °C)] 98.3 °F (36.8 °C)  Heart Rate:  [65-77] 68  Resp:  [18] 18  BP: (135-195)/(60-94) 135/65      Intake/Output Summary (Last 24 hours) at 12/14/2020 0653  Last data filed at 12/13/2020 1859  Gross per 24 hour   Intake 540 ml   Output 10 ml   Net 530 ml       Flowsheet Rows      First Filed Value   Admission Height  162.6 cm (64\") Documented at 12/08/2020 1453   Admission Weight  64.9 kg (143 lb) Documented at 12/08/2020 1453          Physical Exam:     General aakash: alert, cooperative, oriented  Abd soft, nondistended  Skin:  Skin color, texture, turgor, normal no rashes        Results Review:     I reviewed the patient's new clinical results.  Lab Results (all)     Procedure Component Value Units Date/Time    Body Fluid Culture - Body Fluid, Retroperitoneum [[id number]]  (Abnormal)  (Susceptibility) Collected: 12/10/20 1205    Specimen: Body Fluid from Retroperitoneum Updated: 12/14/20 0643     Body Fluid Culture Light growth (2+) Candida glabrata     Gram Stain Many (4+) WBCs seen      No organisms seen    Susceptibility      Candida glabrata     MARKO     Micafungin Susceptible                    CBC (No Diff) [[id number]]  (Abnormal) Collected: 12/14/20 0546    Specimen: Blood Updated: 12/14/20 0642     WBC 14.44 10*3/mm3      RBC 3.12 10*6/mm3      Hemoglobin 8.1 g/dL      Hematocrit 25.2 %      MCV 80.8 fL      MCH 26.0 pg      MCHC 32.1 g/dL      RDW 13.2 %      RDW-SD 38.8 fl      MPV 9.8 fL      Platelets 285 10*3/mm3     Renal Function Panel [[id number]] Collected: 12/14/20 0546    Specimen: Blood Updated: 12/14/20 0637    Uric Acid [[id number]] Collected: 12/14/20 0546    Specimen: Blood Updated: 12/14/20 0637    Magnesium [[id number]] Collected: " 12/14/20 0546    Specimen: Blood Updated: 12/14/20 0637    POC Glucose Once [[id number]]  (Normal) Collected: 12/14/20 0604    Specimen: Blood Updated: 12/14/20 0610     Glucose 114 mg/dL     Blood Culture - Blood, Arm, Left [[id number]] Collected: 12/09/20 2328    Specimen: Blood from Arm, Left Updated: 12/14/20 0015     Blood Culture No growth at 4 days    Blood Culture - Blood, Arm, Right [[id number]] Collected: 12/09/20 2328    Specimen: Blood from Arm, Right Updated: 12/14/20 0015     Blood Culture No growth at 4 days    POC Glucose Once [[id number]]  (Abnormal) Collected: 12/13/20 2013    Specimen: Blood Updated: 12/13/20 2015     Glucose 133 mg/dL     POC Glucose Once [[id number]]  (Normal) Collected: 12/13/20 1651    Specimen: Blood Updated: 12/13/20 1653     Glucose 104 mg/dL     Blood Culture - Blood, Hand, Right [[id number]] Collected: 12/11/20 1530    Specimen: Blood from Hand, Right Updated: 12/13/20 1545     Blood Culture No growth at 2 days    Blood Culture - Blood, Arm, Right [[id number]] Collected: 12/11/20 1530    Specimen: Blood from Arm, Right Updated: 12/13/20 1545     Blood Culture No growth at 2 days    Blood Culture - Blood, Arm, Right [[id number]] Collected: 12/09/20 1059    Specimen: Blood from Arm, Right Updated: 12/13/20 1216     Blood Culture No growth at 4 days    Blood Culture - Blood, Arm, Right [[id number]] Collected: 12/09/20 1015    Specimen: Blood from Arm, Right Updated: 12/13/20 1216     Blood Culture No growth at 4 days    POC Glucose Once [[id number]]  (Abnormal) Collected: 12/13/20 1104    Specimen: Blood Updated: 12/13/20 1107     Glucose 178 mg/dL     Renal Function Panel [[id number]]  (Abnormal) Collected: 12/13/20 0535    Specimen: Blood Updated: 12/13/20 0634     Glucose 163 mg/dL      BUN 10 mg/dL      Creatinine 1.11 mg/dL      Sodium 136 mmol/L      Potassium 3.5 mmol/L      Chloride 104 mmol/L      CO2 22.4 mmol/L      Calcium 7.8 mg/dL      Albumin 2.10 g/dL      Phosphorus  2.9 mg/dL      Anion Gap 9.6 mmol/L      BUN/Creatinine Ratio 9.0     eGFR Non African Amer 49 mL/min/1.73     Narrative:      GFR Normal >60  Chronic Kidney Disease <60  Kidney Failure <15      Uric Acid [[id number]]  (Abnormal) Collected: 12/13/20 0535    Specimen: Blood Updated: 12/13/20 0632     Uric Acid 2.2 mg/dL     Magnesium [[id number]]  (Normal) Collected: 12/13/20 0535    Specimen: Blood Updated: 12/13/20 0632     Magnesium 1.8 mg/dL     CBC (No Diff) [[id number]]  (Abnormal) Collected: 12/13/20 0535    Specimen: Blood Updated: 12/13/20 0631     WBC 16.86 10*3/mm3      RBC 3.78 10*6/mm3      Hemoglobin 9.6 g/dL      Hematocrit 30.9 %      MCV 81.7 fL      MCH 25.4 pg      MCHC 31.1 g/dL      RDW 13.1 %      RDW-SD 39.6 fl      MPV 9.4 fL      Platelets 346 10*3/mm3     POC Glucose Once [[id number]]  (Abnormal) Collected: 12/13/20 0609    Specimen: Blood Updated: 12/13/20 0611     Glucose 172 mg/dL     POC Glucose Once [[id number]]  (Abnormal) Collected: 12/12/20 2112    Specimen: Blood Updated: 12/12/20 2113     Glucose 163 mg/dL     POC Glucose Once [[id number]]  (Abnormal) Collected: 12/12/20 1638    Specimen: Blood Updated: 12/12/20 1640     Glucose 149 mg/dL     POC Glucose Once [[id number]]  (Abnormal) Collected: 12/12/20 1125    Specimen: Blood Updated: 12/12/20 1132     Glucose 223 mg/dL     Phosphorus [[id number]]  (Abnormal) Collected: 12/12/20 0550    Specimen: Blood Updated: 12/12/20 1041     Phosphorus 2.4 mg/dL     Urine Culture - Urine, Urine, Clean Catch [[id number]]  (Abnormal) Collected: 12/11/20 0149    Specimen: Urine, Clean Catch Updated: 12/12/20 0943     Urine Culture Yeast isolated    Magnesium [[id number]]  (Normal) Collected: 12/12/20 0550    Specimen: Blood Updated: 12/12/20 0709     Magnesium 1.6 mg/dL     Basic Metabolic Panel [[id number]]  (Abnormal) Collected: 12/12/20 0550    Specimen: Blood Updated: 12/12/20 0652     Glucose 212 mg/dL      BUN 8 mg/dL      Creatinine 1.01 mg/dL       Sodium 135 mmol/L      Potassium 3.8 mmol/L      Chloride 103 mmol/L      CO2 22.1 mmol/L      Calcium 7.8 mg/dL      eGFR Non African Amer 55 mL/min/1.73      BUN/Creatinine Ratio 7.9     Anion Gap 9.9 mmol/L     Narrative:      GFR Normal >60  Chronic Kidney Disease <60  Kidney Failure <15      Uric Acid [[id number]]  (Abnormal) Collected: 12/12/20 0550    Specimen: Blood Updated: 12/12/20 0652     Uric Acid 2.0 mg/dL     Lactic Acid, Plasma [[id number]]  (Normal) Collected: 12/12/20 0550    Specimen: Blood Updated: 12/12/20 0651     Lactate 0.9 mmol/L     CBC (No Diff) [[id number]]  (Abnormal) Collected: 12/12/20 0550    Specimen: Blood Updated: 12/12/20 0635     WBC 14.49 10*3/mm3      RBC 3.18 10*6/mm3      Hemoglobin 8.2 g/dL      Hematocrit 25.7 %      MCV 80.8 fL      MCH 25.8 pg      MCHC 31.9 g/dL      RDW 12.8 %      RDW-SD 37.8 fl      MPV 9.6 fL      Platelets 329 10*3/mm3     Phosphorus [[id number]]  (Abnormal) Collected: 12/11/20 2113    Specimen: Blood Updated: 12/11/20 2150     Phosphorus 2.0 mg/dL     POC Glucose Once [[id number]]  (Abnormal) Collected: 12/11/20 2131    Specimen: Blood Updated: 12/11/20 2133     Glucose 182 mg/dL     POC Glucose Once [[id number]]  (Abnormal) Collected: 12/11/20 1707    Specimen: Blood Updated: 12/11/20 1708     Glucose 181 mg/dL     Procalcitonin [[id number]]  (Abnormal) Collected: 12/11/20 1530    Specimen: Blood Updated: 12/11/20 1628     Procalcitonin 8.89 ng/mL     Narrative:      As a Marker for Sepsis (Non-Neonates):   1. <0.5 ng/mL represents a low risk of severe sepsis and/or septic shock.  1. >2 ng/mL represents a high risk of severe sepsis and/or septic shock.    As a Marker for Lower Respiratory Tract Infections that require antibiotic therapy:  PCT on Admission     Antibiotic Therapy             6-12 Hrs later  > 0.5                Strongly Recommended            >0.25 - <0.5         Recommended  0.1 - 0.25           Discouraged                    "Remeasure/reassess PCT  <0.1                 Strongly Discouraged          Remeasure/reassess PCT      As 28 day mortality risk marker: \"Change in Procalcitonin Result\" (> 80 % or <=80 %) if Day 0 (or Day 1) and Day 4 values are available. Refer to [email or web address]-pct-calculator.[email or web address]/   Change in PCT <=80 %   A decrease of PCT levels below or equal to 80 % defines a positive change in PCT test result representing a higher risk for 28-day all-cause mortality of patients diagnosed with severe sepsis or septic shock.  Change in PCT > 80 %   A decrease of PCT levels of more than 80 % defines a negative change in PCT result representing a lower risk for 28-day all-cause mortality of patients diagnosed with severe sepsis or septic shock.                Results may be falsely decreased if patient taking Biotin.     Non-gynecologic Cytology [[id number]] Collected: 12/10/20 1205    Specimen: Body Fluid from Retroperitoneum Updated: 12/11/20 1583     Case Report --     Non-gynecologic Cytology                          Case: [id number]                                  Authorizing Provider:  Rohan Dooley Jr.,  Collected:           12/10/2020 12:05 PM                                 MD                                                                           Ordering Location:     [facility]  Received:            12/10/2020 02:15 PM                                 [address]                                                                       Pathologist:           Lindy Britton MD                                                    Specimen:    Retroperitoneum, Right perinephric fluid from abscess drain                                 Final Diagnosis --     1. Perinephric Fluid, Right, Abscess Drain:   A. Negative for malignant cells.   B. Acute inflammation, histiocytes and scattered fungal yeast.       Gross Description --     1 syringe received containing a total of 2 ml of cloudy, white fluid. 1 " thin prep & cellblock. No remaining fluid.       Microscopic Description --     Histiocytes with degenerative changes, and acute inflammation.    Screened by BRISA Lyons.         Special Stains --     Utilizing appropriate controls, GMS and PAS stains are performed on sections from the cell block. GMS and PAS highlight scattered fungal yeast forms. Definitive speciation is not able performed at this time, recommend correlation with fungal culture.      Creatinine, Body Fluid - Body Fluid, Retroperitoneum [[id number]] Collected: 12/11/20 1339    Specimen: Body Fluid from Retroperitoneum Updated: 12/11/20 1443     Creatinine, Fluid 0.9 mg/dL     Narrative:      No Reference Ranges Established.  This test was developed, it performance characteristics determined and judged suitable for clinical purposes by [facility].  It has not been cleared or approved by the FDA.  The laboratory is regulated under CLIA as qualified to perform high-complexity testing.     POC Glucose Once [[id number]]  (Abnormal) Collected: 12/11/20 1127    Specimen: Blood Updated: 12/11/20 1134     Glucose 353 mg/dL     CBC & Differential [[id number]]  (Abnormal) Collected: 12/11/20 0938    Specimen: Blood Updated: 12/11/20 0951    Narrative:      The following orders were created for panel order CBC & Differential.  Procedure                               Abnormality         Status                     ---------                               -----------         ------                     CBC Auto Differential[[id number]]        Abnormal            Final result                 Please view results for these tests on the individual orders.    CBC Auto Differential [[id number]]  (Abnormal) Collected: 12/11/20 0938    Specimen: Blood Updated: 12/11/20 0951     WBC 15.80 10*3/mm3      RBC 3.72 10*6/mm3      Hemoglobin 9.4 g/dL      Hematocrit 31.0 %      MCV 83.3 fL      MCH 25.3 pg      MCHC 30.3 g/dL      RDW 12.8 %      RDW-SD 39.0 fl       MPV 9.8 fL      Platelets 368 10*3/mm3      Neutrophil % 89.6 %      Lymphocyte % 3.1 %      Monocyte % 4.2 %      Eosinophil % 0.9 %      Basophil % 0.4 %      Immature Grans % 1.8 %      Neutrophils, Absolute 14.16 10*3/mm3      Lymphocytes, Absolute 0.49 10*3/mm3      Monocytes, Absolute 0.66 10*3/mm3      Eosinophils, Absolute 0.14 10*3/mm3      Basophils, Absolute 0.06 10*3/mm3      Immature Grans, Absolute 0.29 10*3/mm3      nRBC 0.0 /100 WBC     Renal Function Panel [[id number]]  (Abnormal) Collected: 12/11/20 0559    Specimen: Blood Updated: 12/11/20 0947     Glucose 247 mg/dL      BUN 11 mg/dL      Creatinine 1.02 mg/dL      Sodium 137 mmol/L      Potassium 4.2 mmol/L      Chloride 104 mmol/L      CO2 22.8 mmol/L      Calcium 7.8 mg/dL      Albumin 2.20 g/dL      Phosphorus 1.5 mg/dL      Anion Gap 10.2 mmol/L      BUN/Creatinine Ratio 10.8     eGFR Non African Amer 55 mL/min/1.73     Narrative:      GFR Normal >60  Chronic Kidney Disease <60  Kidney Failure <15      Magnesium [[id number]]  (Abnormal) Collected: 12/11/20 0559    Specimen: Blood Updated: 12/11/20 0729     Magnesium 2.5 mg/dL     POC Glucose Once [[id number]]  (Abnormal) Collected: 12/11/20 0628    Specimen: Blood Updated: 12/11/20 0629     Glucose 251 mg/dL     Fluid Creatinine - Miscellaneous Test [[id number]] Collected: 12/10/20 1339    Specimen: Blood from Retroperitoneum Updated: 12/11/20 0615     Miscellaneous Lab Test Result See attached report    Narrative:      See attached report from [facility]       Urinalysis With Culture If Indicated - Urine, Clean Catch [[id number]]  (Abnormal) Collected: 12/11/20 0149    Specimen: Urine, Clean Catch Updated: 12/11/20 0316     Color, UA Yellow     Appearance, UA Cloudy     pH, UA <=5.0     Specific Gravity, UA 1.012     Glucose,  mg/dL (Trace)     Ketones, UA Negative     Bilirubin, UA Negative     Blood, UA Large (3+)     Protein,  mg/dL (2+)     Leuk Esterase, UA Moderate (2+)      Nitrite, UA Negative     Urobilinogen, UA 0.2 E.U./dL    Urinalysis, Microscopic Only - Urine, Clean Catch [[id number]]  (Abnormal) Collected: 12/11/20 0149    Specimen: Urine, Clean Catch Updated: 12/11/20 0316     RBC, UA Too Numerous to Count /HPF      WBC, UA Too Numerous to Count /HPF      Bacteria, UA 1+ /HPF      Squamous Epithelial Cells, UA 3-6 /HPF      Renal Epithelial Cells, UA 13-20 /HPF      Yeast, UA Large/3+ Budding Yeast /HPF      Hyaline Casts, UA None Seen /LPF      Methodology Manual Light Microscopy    Potassium [[id number]]  (Normal) Collected: 12/10/20 2303    Specimen: Blood Updated: 12/11/20 0002     Potassium 3.9 mmol/L     POC Glucose Once [[id number]]  (Abnormal) Collected: 12/10/20 2054    Specimen: Blood Updated: 12/10/20 2056     Glucose 273 mg/dL     POC Glucose Once [[id number]]  (Abnormal) Collected: 12/10/20 1638    Specimen: Blood Updated: 12/10/20 1640     Glucose 261 mg/dL     Basic Metabolic Panel [[id number]]  (Abnormal) Collected: 12/10/20 0540    Specimen: Blood Updated: 12/10/20 0715     Glucose 397 mg/dL      BUN 11 mg/dL      Creatinine 1.22 mg/dL      Sodium 133 mmol/L      Potassium 3.4 mmol/L      Chloride 95 mmol/L      CO2 26.6 mmol/L      Calcium 7.2 mg/dL      eGFR Non African Amer 44 mL/min/1.73      BUN/Creatinine Ratio 9.0     Anion Gap 11.4 mmol/L     Narrative:      GFR Normal >60  Chronic Kidney Disease <60  Kidney Failure <15      Magnesium [[id number]]  (Normal) Collected: 12/10/20 0540    Specimen: Blood Updated: 12/10/20 0715     Magnesium 1.9 mg/dL     CK [[id number]]  (Normal) Collected: 12/10/20 0540    Specimen: Blood Updated: 12/10/20 0654     Creatine Kinase 69 U/L     CBC (No Diff) [[id number]]  (Abnormal) Collected: 12/10/20 0540    Specimen: Blood Updated: 12/10/20 0626     WBC 17.19 10*3/mm3      RBC 3.66 10*6/mm3      Hemoglobin 9.4 g/dL      Hematocrit 29.9 %      MCV 81.7 fL      MCH 25.7 pg      MCHC 31.4 g/dL      RDW 12.8 %      RDW-SD 37.9 fl       MPV 10.2 fL      Platelets 338 10*3/mm3     POC Glucose Once [[id number]]  (Abnormal) Collected: 12/10/20 0609    Specimen: Blood Updated: 12/10/20 0610     Glucose 399 mg/dL     POC Glucose Once [[id number]]  (Abnormal) Collected: 12/09/20 2128    Specimen: Blood Updated: 12/09/20 2129     Glucose 361 mg/dL     Magnesium [[id number]]  (Abnormal) Collected: 12/09/20 1527    Specimen: Blood from Arm, Right Updated: 12/09/20 1640     Magnesium 1.1 mg/dL     Basic Metabolic Panel [[id number]]  (Abnormal) Collected: 12/09/20 1527    Specimen: Blood from Arm, Right Updated: 12/09/20 1609     Glucose 242 mg/dL      BUN 6 mg/dL      Creatinine 1.15 mg/dL      Sodium 137 mmol/L      Potassium 2.8 mmol/L      Chloride 95 mmol/L      CO2 27.2 mmol/L      Calcium 8.2 mg/dL      eGFR Non African Amer 48 mL/min/1.73      BUN/Creatinine Ratio 5.2     Anion Gap 14.8 mmol/L     Narrative:      GFR Normal >60  Chronic Kidney Disease <60  Kidney Failure <15      Protime-INR [[id number]]  (Abnormal) Collected: 12/09/20 1527    Specimen: Blood from Arm, Right Updated: 12/09/20 1602     Protime 16.5 Seconds      INR 1.35    aPTT [[id number]]  (Normal) Collected: 12/09/20 1527    Specimen: Blood from Arm, Right Updated: 12/09/20 1602     PTT 33.7 seconds     CBC (No Diff) [[id number]]  (Abnormal) Collected: 12/09/20 1527    Specimen: Blood from Arm, Right Updated: 12/09/20 1602     WBC 12.99 10*3/mm3      RBC 4.03 10*6/mm3      Hemoglobin 10.5 g/dL      Hematocrit 33.8 %      MCV 83.9 fL      MCH 26.1 pg      MCHC 31.1 g/dL      RDW 13.0 %      RDW-SD 40.0 fl      MPV 10.0 fL      Platelets 487 10*3/mm3     POC Glucose Once [[id number]]  (Abnormal) Collected: 12/09/20 1453    Specimen: Blood Updated: 12/09/20 1455     Glucose 234 mg/dL     Hemoglobin A1c [[id number]]  (Abnormal) Collected: 12/09/20 0534    Specimen: Blood Updated: 12/09/20 1114     Hemoglobin A1C 13.70 %     Narrative:      Hemoglobin A1C Ranges:    Increased Risk for Diabetes   5.7% to 6.4%  Diabetes                     >= 6.5%  Diabetic Goal                < 7.0%    POC Glucose Once [[id number]]  (Abnormal) Collected: 12/09/20 1109    Specimen: Blood Updated: 12/09/20 1110     Glucose 214 mg/dL     POC Glucose Once [[id number]]  (Abnormal) Collected: 12/09/20 0751    Specimen: Blood Updated: 12/09/20 0753     Glucose 303 mg/dL     Basic Metabolic Panel [[id number]]  (Abnormal) Collected: 12/09/20 0534    Specimen: Blood Updated: 12/09/20 0653     Glucose 273 mg/dL      BUN 5 mg/dL      Creatinine 0.86 mg/dL      Sodium 136 mmol/L      Potassium 3.2 mmol/L      Chloride 98 mmol/L      CO2 29.5 mmol/L      Calcium 6.9 mg/dL      eGFR Non African Amer 66 mL/min/1.73      BUN/Creatinine Ratio 5.8     Anion Gap 8.5 mmol/L     Narrative:      GFR Normal >60  Chronic Kidney Disease <60  Kidney Failure <15      Lactic Acid, Plasma [[id number]]  (Normal) Collected: 12/09/20 0534    Specimen: Blood Updated: 12/09/20 0601     Lactate 0.9 mmol/L     CBC (No Diff) [[id number]]  (Abnormal) Collected: 12/09/20 0534    Specimen: Blood Updated: 12/09/20 0556     WBC 14.00 10*3/mm3      RBC 3.16 10*6/mm3      Hemoglobin 8.3 g/dL      Hematocrit 25.6 %      MCV 81.0 fL      MCH 26.3 pg      MCHC 32.4 g/dL      RDW 12.8 %      RDW-SD 38.1 fl      MPV 10.0 fL      Platelets 318 10*3/mm3     Magnesium [[id number]]  (Abnormal) Collected: 12/08/20 1506    Specimen: Blood Updated: 12/08/20 2034     Magnesium 1.0 mg/dL     POC Glucose Once [[id number]]  (Abnormal) Collected: 12/08/20 2022    Specimen: Blood Updated: 12/08/20 2023     Glucose 340 mg/dL     COVID PRE-OP / PRE-PROCEDURE SCREENING ORDER (NO ISOLATION) - Swab, Nasopharynx [[id number]]  (Normal) Collected: 12/08/20 1658    Specimen: Swab from Nasopharynx Updated: 12/08/20 1836    Narrative:      The following orders were created for panel order COVID PRE-OP / PRE-PROCEDURE SCREENING ORDER (NO ISOLATION) - Swab, Nasopharynx.  Procedure                                Abnormality         Status                     ---------                               -----------         ------                     Respiratory Panel PCR w/...[[id number]]  Normal              Final result                 Please view results for these tests on the individual orders.    Respiratory Panel PCR w/COVID-19(SARS-CoV-2) TIP/BOBBY/ANDREY/PAD/COR/MAD/HUMPHREY In-House, NP Swab in UTM/VTM, 3-4 HR TAT - Swab, Nasopharynx [[id number]]  (Normal) Collected: 12/08/20 1658    Specimen: Swab from Nasopharynx Updated: 12/08/20 1836     ADENOVIRUS, PCR Not Detected     Coronavirus 229E Not Detected     Coronavirus HKU1 Not Detected     Coronavirus NL63 Not Detected     Coronavirus OC43 Not Detected     COVID19 Not Detected     Human Metapneumovirus Not Detected     Human Rhinovirus/Enterovirus Not Detected     Influenza A PCR Not Detected     Influenza B PCR Not Detected     Parainfluenza Virus 1 Not Detected     Parainfluenza Virus 2 Not Detected     Parainfluenza Virus 3 Not Detected     Parainfluenza Virus 4 Not Detected     RSV, PCR Not Detected     Bordetella pertussis pcr Not Detected     Bordetella parapertussis PCR Not Detected     Chlamydophila pneumoniae PCR Not Detected     Mycoplasma pneumo by PCR Not Detected    Narrative:      Fact sheet for providers: https://docs.[employer]/wp-content/uploads/XFV9231-1140-ZP1.1-EUA-Provider-Fact-Sheet-3.pdf    Fact sheet for patients: https://docs.[employer]/wp-content/uploads/ICI0278-9009-JV2.1-EUA-Patient-Fact-Sheet-1.pdf    Test performed by PCR.    Urinalysis, Microscopic Only - Urine, Catheter [[id number]]  (Abnormal) Collected: 12/08/20 1506    Specimen: Urine, Catheter Updated: 12/08/20 1653     RBC, UA 13-20 /HPF      WBC, UA 3-5 /HPF      Bacteria, UA Trace /HPF      Squamous Epithelial Cells, UA 3-6 /HPF      Hyaline Casts, UA 0-2 /LPF      Methodology Manual Light Microscopy    Comprehensive Metabolic Panel [[id number]]  (Abnormal) Collected: 12/08/20 1506     Specimen: Blood Updated: 12/08/20 1604     Glucose 535 mg/dL      BUN 7 mg/dL      Creatinine 1.16 mg/dL      Sodium 131 mmol/L      Potassium 2.1 mmol/L      Chloride 86 mmol/L      CO2 33.2 mmol/L      Calcium 7.9 mg/dL      Total Protein 6.9 g/dL      Albumin 3.10 g/dL      ALT (SGPT) 6 U/L      AST (SGOT) 10 U/L      Alkaline Phosphatase 102 U/L      Total Bilirubin 0.4 mg/dL      eGFR Non African Amer 47 mL/min/1.73      Globulin 3.8 gm/dL      A/G Ratio 0.8 g/dL      BUN/Creatinine Ratio 6.0     Anion Gap 11.8 mmol/L     Narrative:      GFR Normal >60  Chronic Kidney Disease <60  Kidney Failure <15      Lipase [[id number]]  (Normal) Collected: 12/08/20 1506    Specimen: Blood Updated: 12/08/20 1550     Lipase 26 U/L     Urinalysis With Microscopic If Indicated (No Culture) - Urine, Catheter [[id number]]  (Abnormal) Collected: 12/08/20 1506    Specimen: Urine, Catheter Updated: 12/08/20 1540     Color, UA Yellow     Appearance, UA Clear     pH, UA 6.0     Specific Gravity, UA 1.010     Glucose, UA >=1000 mg/dL (3+)     Ketones, UA Negative     Bilirubin, UA Negative     Blood, UA Small (1+)     Protein, UA 30 mg/dL (1+)     Leuk Esterase, UA Negative     Nitrite, UA Negative     Urobilinogen, UA 0.2 E.U./dL    CBC & Differential [[id number]]  (Abnormal) Collected: 12/08/20 1506    Specimen: Blood Updated: 12/08/20 1519    Narrative:      The following orders were created for panel order CBC & Differential.  Procedure                               Abnormality         Status                     ---------                               -----------         ------                     CBC Auto Differential[[id number]]        Abnormal            Final result                 Please view results for these tests on the individual orders.    CBC Auto Differential [[id number]]  (Abnormal) Collected: 12/08/20 1506    Specimen: Blood Updated: 12/08/20 1519     WBC 15.33 10*3/mm3      RBC 3.68 10*6/mm3      Hemoglobin 9.5 g/dL       Hematocrit 30.0 %      MCV 81.5 fL      MCH 25.8 pg      MCHC 31.7 g/dL      RDW 12.5 %      RDW-SD 37.2 fl      MPV 9.8 fL      Platelets 380 10*3/mm3      Neutrophil % 86.2 %      Lymphocyte % 6.6 %      Monocyte % 5.9 %      Eosinophil % 0.3 %      Basophil % 0.2 %      Immature Grans % 0.8 %      Neutrophils, Absolute 13.21 10*3/mm3      Lymphocytes, Absolute 1.01 10*3/mm3      Monocytes, Absolute 0.91 10*3/mm3      Eosinophils, Absolute 0.04 10*3/mm3      Basophils, Absolute 0.03 10*3/mm3      Immature Grans, Absolute 0.13 10*3/mm3      nRBC 0.0 /100 WBC           Imaging Results (All)     Procedure Component Value Units Date/Time    CT Guided Abscess Drain Peritoneal [[id number]] Collected: 12/10/20 1444     Updated: 12/10/20 1509    Narrative:      CT-GUIDED ASPIRATION AND CATHETER PLACEMENT INTO RIGHT ABDOMINAL ABSCESS  12/10/2020     HISTORY: Right perinephric fluid collection.     After signed informed consent was obtained patient was prepped and  draped in the supine position. Lidocaine was used for local anesthesia.     18-gauge needle was introduced into the heterogeneous fluid collection  in the right perinephric region. Purulent fluid was visualized.     0.18 wire was passed and this was followed by placement of an 8 Tajik  pigtail catheter into the fluid collection. Approximately 320 cc of  purulent fluid was removed. Confirmatory images were obtained.     The catheter was left in place and connected to suction bulb drainage. A  fluid sample was sent to the laboratory for evaluation.     Patient tolerated the procedure well with no complications.     Radiation dose reduction techniques were utilized, including automated  exposure control and exposure modulation based on body size.     This report was finalized on 12/10/2020 3:06 PM by Dr. Sahil Fabian M.D.       CT Head Without Contrast [[id number]] Collected: 12/09/20 1717     Updated: 12/09/20 2029    Narrative:      CT HEAD WITHOUT CONTRAST      HISTORY: Mental status changes. Seizure.     COMPARISON: None.     FINDINGS: The study is hampered by patient motion. There is  mild-to-moderate atrophy.  Mild-to-moderate vascular calcification is  noted. There is no evidence of intracranial hemorrhage, hydrocephalus or  of abnormal extra-axial fluid. No focal area of decreased attenuation to  suggest acute infarction is identified. Moderate vascular calcification  is appreciated.       Impression:      Atrophy and moderate vascular calcification is noted. There  is no evidence of acute infarction or hemorrhage. Further evaluation  could be performed with MRI examination of the brain, particularly if  the patient has a history of new onset seizure activity. The above  information was called to the patient's nurse at the time of the  dictation. The patient's nurse is to immediately relay the information  to the clinical service.           Radiation dose reduction techniques were utilized, including automated  exposure control and exposure modulation based on body size.     This report was finalized on 12/9/2020 8:26 PM by Dr. Harris Sylvester M.D.       FL Retrograde Pyelogram In OR [[id number]] Collected: 12/09/20 1521     Updated: 12/09/20 1529    Narrative:      RETROGRADE PYELOGRAM     HISTORY: Right ureteral stone and large right perinephric fluid  collection extending inferiorly noted on yesterday's CT scan.     FINDINGS: Imaging in the operating room shows contrast partially filling  the right ureter and intrarenal collecting system and no definite  obstructing stone is identified. There is some distortion of the  intrarenal collecting system in part related to a large perinephric  fluid collection and soft tissue changes as noted on yesterday's CT  scan. The CT scan appearance also raises the concern of underlying  neoplasm and should be correlated with the operative findings as well as  follow-up CT scan. Please also see the operative report.     6 images  were obtained and the fluoroscopy time measures 9 seconds.     This report was finalized on 12/9/2020 3:26 PM by Dr. Tam Perez M.D.       CT Abdomen Pelvis With Contrast [[id number]] Collected: 12/08/20 1938     Updated: 12/08/20 1943    Narrative:      CT ABDOMEN AND PELVIS WITH IV CONTRAST     HISTORY: 65 year old female with abdominal pain, dysuria over the past 2  weeks. History of multiple UTIs.     TECHNIQUE:  CT includes axial imaging from the lung bases to the  trochanters with intravenous contrast and without use of oral contrast.  Data reconstructed in coronal and sagittal planes.     COMPARISON: None     FINDINGS: There is a large multilobular peripherally enhancing, complex  collection centered below the right kidney within the right abdomen.  This collection contacts the anterolateral right psoas muscle and  contacts and appears to partially invade the right lateral abdominal  musculature measuring 11 x 10.3 cm axial dimension and extending 12.5 cm  craniocaudal dimension. Between this collection and the right kidney  there is a greater degree of enhancement. This appears more solid in  appearance suspected to represent a mass. There is indentation of the  adjacent inferior, lateral right renal cortex. 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 Low-density appears to  arise from the renal cortex. There is no evidence for extension into the  renal pelvis or involvement of the urothelium. Delayed phase imaging  demonstrates no extension of contrast into this mass or the low-density  below the right kidney. There is a posterior right renal cyst that  measures approximately 3 cm diameter. Right renal calyceal/infundibular  stone measures 1 cm. There is also a 0.8 cm right renal stone and a 3 mm  right renal calyceal stone. A 1 cm left renal low-density lesion is most  likely a cyst though difficult to definitively characterize.     There is stranding/inflammation surrounding  this mass/collection within  the right abdomen and there is displacement of adjacent bowel loops. The  splenic size is normal. The adrenal glands appear normal. There is mild  diffuse fat infiltration of the liver. Pancreas appears within normal  limits. There are several subcentimeter retroperitoneal lymph nodes  without enlargement. Lung bases appear clear.     There is diastases of the rectus sheath with herniation of fat and bowel  loops into the right anterior abdomen and pelvic wall. Fluid-filled  small bowel loops are present which contain air-fluid levels and this  may be associated with a mild ileus. There is no dilatation to suggest  obstruction. Within the midline, infraumbilical abdominal wall just deep  to the rectus abdominis muscle curvature there is a low-density mass or  collection that measures 3.2 x 1.7 cm. This could represent a chronic  postsurgical collection. Bone windows demonstrate no osseous lesion.     FINDINGS:   1. Large multilobular right abdominal mass/collection measures  approximately 11 x 10.3 x 12.5 cm. This appears to arise from the right  kidney and at the upper margin of this lesion there is masslike  thickening suspicious for renal cell carcinoma with complex adjacent  collection or abscess with apparent invasion of the right lateral  abdominal wall. Urologic consultation recommended. Biopsy or attempt at  CT-guided drainage could be performed under CT guidance if indicated.  2. Right renal calyceal infundibular stones without hydronephrosis.  Right renal cysts. A 1 cm left renal low-density lesion is most likely a  cyst though difficult to definitely characterize.  3. 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 There is an infraumbilical  collection or low-density lesion along the deep margin of the rectus  abdominis musculature. This could represent a small chronic seroma  associated with previous surgery.      Discussed with ANSELMO Sutton, in the emergency department 12/08/2020  at 7:10 PM.     Radiation dose reduction techniques were utilized, including automated  exposure control and exposure modulation based on body size.     This report was finalized on 12/8/2020 7:40 PM by Dr. Hawk Bonilla M.D.             Medication Review:   Current Facility-Administered Medications   Medication Dose Route Frequency Provider Last Rate Last Admin   • acetaminophen (TYLENOL) suppository 650 mg  650 mg Rectal Q6H PRN Harris Perez MD       • acetaminophen (TYLENOL) tablet 650 mg  650 mg Oral Q4H PRN Rohan Dooley Jr., MD   650 mg at 12/11/20 1518   • calcium gluconate 1 g/100 mL (10 mg/mL) NS IVPB (VTB)  1 g Intravenous PRN Harris Perez MD        And   • calcium gluconate 6 g in sodium chloride 0.9 % 500 mL IVPB  6 g Intravenous PRN Harris Perez MD       • carvedilol (COREG) tablet 12.5 mg  12.5 mg Oral BID With Meals Harris Perez MD   12.5 mg at 12/13/20 1806   • citalopram (CeleXA) tablet 10 mg  10 mg Oral Daily Rohan Dooley Jr., MD   10 mg at 12/13/20 0832   • dextrose (D50W) 25 g/ 50mL Intravenous Solution 25 g  25 g Intravenous Q15 Min PRN Rohan Dooley Jr., MD       • dextrose (GLUTOSE) oral gel 15 g  15 g Oral Q15 Min PRN Rohan Dooley Jr., MD       • glucagon (human recombinant) (GLUCAGEN DIAGNOSTIC) injection 1 mg  1 mg Subcutaneous Q15 Min PRN Rohan Dooley Jr., MD       • influenza vac split quad (FLUZONE,FLUARIX,AFLURIA,FLULAVAL) injection 0.5 mL  0.5 mL Intramuscular Once Rohan Dooley Jr., MD       • insulin glargine (LANTUS) injection 20 Units  20 Units Subcutaneous Nightly Harris Perez MD   20 Units at 12/13/20 2231   • insulin lispro (humaLOG) injection 0-9 Units  0-9 Units Subcutaneous TID AC Rohan Dooley Jr., MD   2 Units at 12/13/20 1259   • labetalol (NORMODYNE,TRANDATE) injection 10 mg  10 mg Intravenous Q6H PRN  Harris Perez MD   10 mg at 12/13/20 1421   • lisinopril (PRINIVIL,ZESTRIL) tablet 20 mg  20 mg Oral Daily Rohan Dooley Jr., MD   20 mg at 12/13/20 0832   • magnesium oxide (MAG-OX) tablet 400 mg  400 mg Oral BID Vanessa Nicholas MD   400 mg at 12/13/20 2231   • Magnesium Sulfate 2 gram Bolus, followed by 8 gram infusion (total Mg dose 10 grams)- Mg less than or equal to 1mg/dL  2 g Intravenous PRN Harris Perez MD        Or   • Magnesium Sulfate 2 gram / 50mL Infusion (GIVE X 3 BAGS TO EQUAL 6GM TOTAL DOSE) - Mg 1.1 - 1.5 mg/dl  2 g Intravenous PRN Harris Perez MD        Or   • Magnesium Sulfate 4 gram infusion- Mg 1.6-1.9 mg/dL  4 g Intravenous PRN Harris Perez MD 25 mL/hr at 12/10/20 1340 4 g at 12/10/20 1340   • melatonin tablet 3 mg  3 mg Oral Nightly PRN Rohan Dooley Jr., MD       • meropenem (MERREM) 1 g/100 mL 0.9% NS VTB (mbp)  1 g Intravenous Q8H Harris Perez MD   1 g at 12/14/20 0159   • micafungin 100 mg/100 mL 0.9% NS IVPB (mbp)  100 mg Intravenous Q24H Tricia Plunkett MD   100 mg at 12/14/20 0039   • nitroglycerin (NITROSTAT) SL tablet 0.4 mg  0.4 mg Sublingual Q5 Min PRN Rohan Dooley Jr., MD       • ondansetron (ZOFRAN) tablet 4 mg  4 mg Oral Q6H PRN Rohan Dooley Jr., MD        Or   • ondansetron (ZOFRAN) injection 4 mg  4 mg Intravenous Q6H PRN Rohan Dooley Jr., MD   4 mg at 12/13/20 0841   • potassium & sodium phosphates (PHOS-NAK) 280-160-250 MG packet - for Phosphorus less than 1.25 mg/dL  2 packet Oral Q6H PRN Harris Perez MD        Or   • potassium & sodium phosphates (PHOS-NAK) 280-160-250 MG packet - for Phosphorus 1.25 - 2.5 mg/dL  2 packet Oral Q6H PRN Harris Perez MD   2 packet at 12/11/20 1345   • potassium chloride (K-DUR,KLOR-CON) ER tablet 40 mEq  40 mEq Oral PRN Rohan Dooley Jr., MD   40 mEq at 12/13/20 2232    Or   • potassium chloride (KLOR-CON) packet 40 mEq  40 mEq Oral PRN  Rohan Dooley Jr., MD        Or   • potassium chloride 10 mEq in 100 mL IVPB  10 mEq Intravenous Q1H PRN Rohan Dooley Jr., MD       • potassium chloride (K-DUR,KLOR-CON) ER tablet 40 mEq  40 mEq Oral PRN Rohan Dooley Jr., MD   40 mEq at 12/10/20 1808    Or   • potassium chloride (KLOR-CON) packet 40 mEq  40 mEq Oral PRN Rohan Dooley Jr., MD        Or   • potassium chloride 10 mEq in 100 mL IVPB  10 mEq Intravenous Q1H PRN Rohan Dooley Jr., MD       • sodium chloride 0.9 % flush 10 mL  10 mL Intravenous PRN Rohan Dooley Jr., MD   10 mL at 12/08/20 1506       Current Facility-Administered Medications:   •  acetaminophen (TYLENOL) suppository 650 mg, 650 mg, Rectal, Q6H PRN, Harris Perez MD  •  acetaminophen (TYLENOL) tablet 650 mg, 650 mg, Oral, Q4H PRN, Rohan Dooley Jr., MD, 650 mg at 12/11/20 1518  •  calcium gluconate 1 g/100 mL (10 mg/mL) NS IVPB (VTB), 1 g, Intravenous, PRN **AND** calcium gluconate 6 g in sodium chloride 0.9 % 500 mL IVPB, 6 g, Intravenous, PRN **AND** Calcium, , , PRN, Harris Perez MD  •  carvedilol (COREG) tablet 12.5 mg, 12.5 mg, Oral, BID With Meals, Harris Perez MD, 12.5 mg at 12/13/20 1806  •  citalopram (CeleXA) tablet 10 mg, 10 mg, Oral, Daily, Rohan Dooley Jr., MD, 10 mg at 12/13/20 0832  •  dextrose (D50W) 25 g/ 50mL Intravenous Solution 25 g, 25 g, Intravenous, Q15 Min PRN, Rohan Dooley Jr., MD  •  dextrose (GLUTOSE) oral gel 15 g, 15 g, Oral, Q15 Min PRN, Rohan Dooley Jr., MD  •  glucagon (human recombinant) (GLUCAGEN DIAGNOSTIC) injection 1 mg, 1 mg, Subcutaneous, Q15 Min PRN, Rohan Dooley Jr., MD  •  influenza vac split quad (FLUZONE,FLUARIX,AFLURIA,FLULAVAL) injection 0.5 mL, 0.5 mL, Intramuscular, Once, Rohan Dooley Jr., MD  •  insulin glargine (LANTUS) injection 20 Units, 20 Units, Subcutaneous, Nightly, Harris Perez MD, 20 Units at 12/13/20 8049  •   insulin lispro (humaLOG) injection 0-9 Units, 0-9 Units, Subcutaneous, TID AC, Rohan Dooley Jr., MD, 2 Units at 12/13/20 1259  •  labetalol (NORMODYNE,TRANDATE) injection 10 mg, 10 mg, Intravenous, Q6H PRN, Harris Perez MD, 10 mg at 12/13/20 1421  •  lisinopril (PRINIVIL,ZESTRIL) tablet 20 mg, 20 mg, Oral, Daily, Rohan Dooley Jr., MD, 20 mg at 12/13/20 0832  •  magnesium oxide (MAG-OX) tablet 400 mg, 400 mg, Oral, BID, Vanessa Nicholas MD, 400 mg at 12/13/20 2231  •  Magnesium Sulfate 2 gram Bolus, followed by 8 gram infusion (total Mg dose 10 grams)- Mg less than or equal to 1mg/dL, 2 g, Intravenous, PRN **OR** Magnesium Sulfate 2 gram / 50mL Infusion (GIVE X 3 BAGS TO EQUAL 6GM TOTAL DOSE) - Mg 1.1 - 1.5 mg/dl, 2 g, Intravenous, PRN **OR** Magnesium Sulfate 4 gram infusion- Mg 1.6-1.9 mg/dL, 4 g, Intravenous, PRN, Harris Perez MD, Last Rate: 25 mL/hr at 12/10/20 1340, 4 g at 12/10/20 1340  •  melatonin tablet 3 mg, 3 mg, Oral, Nightly PRN, Rohan Dooley Jr., MD  •  meropenem (MERREM) 1 g/100 mL 0.9% NS VTB (mbp), 1 g, Intravenous, Q8H, Harris Perez MD, 1 g at 12/14/20 0159  •  micafungin 100 mg/100 mL 0.9% NS IVPB (mbp), 100 mg, Intravenous, Q24H, Tricia Plunkett MD, 100 mg at 12/14/20 0039  •  nitroglycerin (NITROSTAT) SL tablet 0.4 mg, 0.4 mg, Sublingual, Q5 Min PRN, Rohan Dooley Jr., MD  •  ondansetron (ZOFRAN) tablet 4 mg, 4 mg, Oral, Q6H PRN **OR** ondansetron (ZOFRAN) injection 4 mg, 4 mg, Intravenous, Q6H PRN, Rohan Dooley Jr., MD, 4 mg at 12/13/20 0841  •  potassium & sodium phosphates (PHOS-NAK) 280-160-250 MG packet - for Phosphorus less than 1.25 mg/dL, 2 packet, Oral, Q6H PRN **OR** potassium & sodium phosphates (PHOS-NAK) 280-160-250 MG packet - for Phosphorus 1.25 - 2.5 mg/dL, 2 packet, Oral, Q6H PRN, Harris Perez MD, 2 packet at 12/11/20 1345  •  potassium chloride (K-DUR,KLOR-CON) ER tablet 40 mEq, 40 mEq, Oral, PRN, 40 mEq at  12/13/20 2232 **OR** potassium chloride (KLOR-CON) packet 40 mEq, 40 mEq, Oral, PRN **OR** potassium chloride 10 mEq in 100 mL IVPB, 10 mEq, Intravenous, Q1H PRN, Rohan Dooley Jr., MD  •  potassium chloride (K-DUR,KLOR-CON) ER tablet 40 mEq, 40 mEq, Oral, PRN, 40 mEq at 12/10/20 1808 **OR** potassium chloride (KLOR-CON) packet 40 mEq, 40 mEq, Oral, PRN **OR** potassium chloride 10 mEq in 100 mL IVPB, 10 mEq, Intravenous, Q1H PRN, Rohan Dooley Jr., MD  •  [COMPLETED] Insert peripheral IV, , , Once **AND** sodium chloride 0.9 % flush 10 mL, 10 mL, Intravenous, PRN, Rohan Dooley Jr., MD, 10 mL at 12/08/20 1506  Medications Discontinued During This Encounter   Medication Reason   • Pharmacy to Dose enoxaparin (LOVENOX)    • enoxaparin (LOVENOX) syringe 40 mg    • sterile water irrigation solution Patient Discharge   • iothalamate (CONRAY) injection Patient Discharge   • lidocaine (XYLOCAINE) 2 % jelly Patient Discharge   • sodium chloride 0.9 % flush 3 mL Patient Transfer   • sodium chloride 0.9 % flush 3-10 mL Patient Transfer   • lidocaine PF 1% (XYLOCAINE) injection 0.5 mL Patient Transfer   • fentaNYL citrate (PF) (SUBLIMAZE) injection 50 mcg Patient Transfer   • midazolam (VERSED) injection 1 mg Patient Transfer   • HYDROcodone-acetaminophen (NORCO) 7.5-325 MG per tablet 1 tablet Patient Transfer   • HYDROmorphone (DILAUDID) injection 0.5 mg Patient Transfer   • oxyCODONE-acetaminophen (PERCOCET) 7.5-325 MG per tablet 1 tablet Patient Transfer   • fentaNYL citrate (PF) (SUBLIMAZE) injection 50 mcg Patient Transfer   • naloxone (NARCAN) injection 0.2 mg Patient Transfer   • flumazenil (ROMAZICON) injection 0.2 mg Patient Transfer   • ondansetron (ZOFRAN) injection 4 mg Patient Transfer   • promethazine (PHENERGAN) suppository 25 mg Patient Transfer   • promethazine (PHENERGAN) tablet 25 mg Patient Transfer   • ePHEDrine injection 5 mg Patient Transfer   • diphenhydrAMINE (BENADRYL) injection  12.5 mg Patient Transfer   • diphenhydrAMINE (BENADRYL) capsule 25 mg Patient Transfer   • labetalol (NORMODYNE,TRANDATE) injection 5 mg Patient Transfer   • lactated ringers infusion    • insulin glargine (LANTUS) injection 5 Units    • cefTRIAXone (ROCEPHIN) IVPB 1 g    • insulin glargine (LANTUS) injection 10 Units    • Pharmacy Consult - Pharmacy to dose    • sodium chloride 0.9 % with KCl 20 mEq/L infusion        Assessment/Plan   Pyelonephritis      Hypokalemia    Renal stone    Hypomagnesemia    Renal abscess    Hypocalcemia    Stage 3a chronic kidney disease    Type 2 diabetes mellitus (CMS/HCC)    Sepsis (CMS/HCC)        Plan   - continue perinephric drain  - recommend repeat CT scan to ensure adequate drainage  - if her infection fails to resolve with adequate drainage and antimicrobials, may require radical nephrectomy    Rohan Dooley Jr., MD  12/14/20  06:53 EST

## 2020-12-14 NOTE — PROGRESS NOTES
"  Infectious Diseases Progress Note    Tricia Plunkett MD     [facility]  [place]: 4 days  Patient Identification:  Name: Lyubov Middleton  Age: 65 y.o.  Sex: female  :  1955  MRN: [id number]         Primary Care Physician: Ana Huitron MD            Subjective: feeling better  Interval History: See consultation note.  · Seizure type activity after the procedure and was seen by neurology service.  · Cystoscopy right retrograde pyelogram and ureteroscopy and laser lithotripsy and right ureteral stent placement on 2020  · Had percutaneous drainage of right perinephric fluid performed and drainage catheter in place on 12/10/2020  Objective:    Scheduled Meds:carvedilol, 12.5 mg, Oral, BID With Meals  citalopram, 10 mg, Oral, Daily  influenza vaccine, 0.5 mL, Intramuscular, Once  insulin glargine, 20 Units, Subcutaneous, Nightly  insulin lispro, 0-9 Units, Subcutaneous, TID AC  lisinopril, 20 mg, Oral, Daily  magnesium oxide, 400 mg, Oral, BID  meropenem, 1 g, Intravenous, Q8H  micafungin (MYCAMINE) IV, 100 mg, Intravenous, Q24H      Continuous Infusions:     Vital signs in last 24 hours:  Temp:  [98 °F (36.7 °C)-99 °F (37.2 °C)] 98 °F (36.7 °C)  Heart Rate:  [65-79] 67  Resp:  [18] 18  BP: (158-195)/(60-94) 158/83    Intake/Output:    Intake/Output Summary (Last 24 hours) at 20208  Last data filed at 2020 1859  Gross per 24 hour   Intake 540 ml   Output 10 ml   Net 530 ml       Exam:  /83 (BP Location: Right arm, Patient Position: Lying)   Pulse 67   Temp 98 °F (36.7 °C) (Oral)   Resp 18   Ht 162.6 cm (64.02\")   Wt 78.2 kg (172 lb 4.8 oz)   SpO2 94%   BMI 29.56 kg/m²   Patient is examined using the personal protective equipment as per guidelines from infection control for this particular patient as enacted.  Hand washing was performed before and after patient interaction.  General Appearance:  Comfortable does not appear to be in any acute distress sitting in the " chair.                          Head:    Normocephalic, without obvious abnormality, atraumatic                           Eyes:    PERRL, conjunctivae/corneas clear, EOM's intact, both eyes                         Throat:   Lips, tongue, gums normal; oral mucosa pink and moist                           Neck:   Supple, symmetrical, trachea midline, no JVD                         Lungs:    Clear to auscultation bilaterally, respirations unlabored                 Chest Wall:    No tenderness or deformity                          Heart:    Regular rate and rhythm, S1 and S2 normal                  Abdomen:     Soft, non-tender, bowel sounds active,decreased discomfort in the right flank area                 extremities:   Extremities normal, atraumatic, no cyanosis or edema                        Pulses:   Pulses palpable in all extremities                            Skin:   Skin is warm and dry,  no rashes or palpable lesions                  Neurologic: Mostly nonfocal       Data Review:    I reviewed the patient's new clinical results.  Results from last 7 days   Lab Units 12/13/20  0535 12/12/20  0550 12/11/20  0938 12/10/20  0540 12/09/20  1527 12/09/20  0534 12/08/20  1506   WBC 10*3/mm3 16.86* 14.49* 15.80* 17.19* 12.99* 14.00* 15.33*   HEMOGLOBIN g/dL 9.6* 8.2* 9.4* 9.4* 10.5* 8.3* 9.5*   PLATELETS 10*3/mm3 346 329 368 338 487* 318 380     Results from last 7 days   Lab Units 12/13/20  0535 12/12/20  0550 12/11/20  0559 12/10/20  2303 12/10/20  0540 12/09/20  1527 12/09/20  0534 12/08/20  1506   SODIUM mmol/L 136 135* 137  --  133* 137 136 131*   POTASSIUM mmol/L 3.5 3.8 4.2 3.9 3.4* 2.8* 3.2* 2.1*   CHLORIDE mmol/L 104 103 104  --  95* 95* 98 86*   CO2 mmol/L 22.4 22.1 22.8  --  26.6 27.2 29.5* 33.2*   BUN mg/dL 10 8 11  --  11 6* 5* 7*   CREATININE mg/dL 1.11* 1.01* 1.02*  --  1.22* 1.15* 0.86 1.16*   CALCIUM mg/dL 7.8* 7.8* 7.8*  --  7.2* 8.2* 6.9* 7.9*   GLUCOSE mg/dL 163* 212* 247*  --  397* 242* 273* 535*      Microbiology Results (last 10 days)     Procedure Component Value - Date/Time    Blood Culture - Blood, Hand, Right [[id number]] Collected: 12/11/20 1530    Lab Status: Preliminary result Specimen: Blood from Hand, Right Updated: 12/13/20 1545     Blood Culture No growth at 2 days    Blood Culture - Blood, Arm, Right [[id number]] Collected: 12/11/20 1530    Lab Status: Preliminary result Specimen: Blood from Arm, Right Updated: 12/13/20 1545     Blood Culture No growth at 2 days    Urine Culture - Urine, Urine, Clean Catch [[id number]]  (Abnormal) Collected: 12/11/20 0149    Lab Status: Final result Specimen: Urine, Clean Catch Updated: 12/12/20 0943     Urine Culture Yeast isolated    Body Fluid Culture - Body Fluid, Retroperitoneum [[id number]]  (Abnormal)  (Susceptibility) Collected: 12/10/20 1205    Lab Status: Preliminary result Specimen: Body Fluid from Retroperitoneum Updated: 12/13/20 0947     Body Fluid Culture Light growth (2+) Candida glabrata     Gram Stain Many (4+) WBCs seen      No organisms seen    Susceptibility      Candida glabrata     MARKO     Micafungin Susceptible                    Blood Culture - Blood, Arm, Left [[id number]] Collected: 12/09/20 2328    Lab Status: Preliminary result Specimen: Blood from Arm, Left Updated: 12/13/20 0015     Blood Culture No growth at 3 days    Blood Culture - Blood, Arm, Right [[id number]] Collected: 12/09/20 2328    Lab Status: Preliminary result Specimen: Blood from Arm, Right Updated: 12/13/20 0015     Blood Culture No growth at 3 days    Blood Culture - Blood, Arm, Right [[id number]] Collected: 12/09/20 1059    Lab Status: Preliminary result Specimen: Blood from Arm, Right Updated: 12/13/20 1216     Blood Culture No growth at 4 days    Blood Culture - Blood, Arm, Right [[id number]] Collected: 12/09/20 1015    Lab Status: Preliminary result Specimen: Blood from Arm, Right Updated: 12/13/20 1216     Blood Culture No growth at 4 days    COVID PRE-OP /  PRE-PROCEDURE SCREENING ORDER (NO ISOLATION) - Swab, Nasopharynx [[id number]]  (Normal) Collected: 12/08/20 1658    Lab Status: Final result Specimen: Swab from Nasopharynx Updated: 12/08/20 1836    Narrative:      The following orders were created for panel order COVID PRE-OP / PRE-PROCEDURE SCREENING ORDER (NO ISOLATION) - Swab, Nasopharynx.  Procedure                               Abnormality         Status                     ---------                               -----------         ------                     Respiratory Panel PCR w/...[[id number]]  Normal              Final result                 Please view results for these tests on the individual orders.    Respiratory Panel PCR w/COVID-19(SARS-CoV-2) TIP/BOBBY/ANDREY/PAD/COR/MAD/HUMPHREY In-House, NP Swab in UTM/VTM, 3-4 HR TAT - Swab, Nasopharynx [[id number]]  (Normal) Collected: 12/08/20 1658    Lab Status: Final result Specimen: Swab from Nasopharynx Updated: 12/08/20 1836     ADENOVIRUS, PCR Not Detected     Coronavirus 229E Not Detected     Coronavirus HKU1 Not Detected     Coronavirus NL63 Not Detected     Coronavirus OC43 Not Detected     COVID19 Not Detected     Human Metapneumovirus Not Detected     Human Rhinovirus/Enterovirus Not Detected     Influenza A PCR Not Detected     Influenza B PCR Not Detected     Parainfluenza Virus 1 Not Detected     Parainfluenza Virus 2 Not Detected     Parainfluenza Virus 3 Not Detected     Parainfluenza Virus 4 Not Detected     RSV, PCR Not Detected     Bordetella pertussis pcr Not Detected     Bordetella parapertussis PCR Not Detected     Chlamydophila pneumoniae PCR Not Detected     Mycoplasma pneumo by PCR Not Detected    Narrative:      Fact sheet for providers: https://docs.[employer]/wp-content/uploads/ZWJ4809-6312-II0.1-EUA-Provider-Fact-Sheet-3.pdf    Fact sheet for patients: https://docs.[employer]/wp-content/uploads/GRX6121-9754-QH9.1-EUA-Patient-Fact-Sheet-1.pdf    Test performed by PCR.               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  6-history of hypertension  7-new onset seizure type activity.           Recommendations/Discussions:  · Continue present treatment  · We will need to discuss with urology service about intent of definitive intervention regarding the right perinephric mass/abscess to avoid her being metabolically depleted because of the persistent focus of infection which may not be able to amendable to medical treatment only.    Tricia Plunkett MD  12/13/2020  21:38 EST    Much of this encounter note is an electronic transcription/translation of spoken language to printed text. The electronic translation of spoken language may permit erroneous, or at times, nonsensical words or phrases to be inadvertently transcribed; Although I have reviewed the note for such errors, some may still exist

## 2020-12-15 LAB
ANION GAP SERPL CALCULATED.3IONS-SCNC: 8.7 MMOL/L (ref 5–15)
BACTERIA FLD CULT: ABNORMAL
BACTERIA SPEC AEROBE CULT: NORMAL
BACTERIA SPEC AEROBE CULT: NORMAL
BUN SERPL-MCNC: 9 MG/DL (ref 8–23)
BUN/CREAT SERPL: 8.6 (ref 7–25)
CALCIUM SPEC-SCNC: 8.2 MG/DL (ref 8.6–10.5)
CHLORIDE SERPL-SCNC: 104 MMOL/L (ref 98–107)
CO2 SERPL-SCNC: 24.3 MMOL/L (ref 22–29)
CREAT SERPL-MCNC: 1.05 MG/DL (ref 0.57–1)
DEPRECATED RDW RBC AUTO: 38.1 FL (ref 37–54)
ERYTHROCYTE [DISTWIDTH] IN BLOOD BY AUTOMATED COUNT: 13.1 % (ref 12.3–15.4)
FOLATE SERPL-MCNC: 6.2 NG/ML (ref 4.78–24.2)
GFR SERPL CREATININE-BSD FRML MDRD: 53 ML/MIN/1.73
GLUCOSE BLDC GLUCOMTR-MCNC: 116 MG/DL (ref 70–130)
GLUCOSE BLDC GLUCOMTR-MCNC: 155 MG/DL (ref 70–130)
GLUCOSE BLDC GLUCOMTR-MCNC: 167 MG/DL (ref 70–130)
GLUCOSE BLDC GLUCOMTR-MCNC: 75 MG/DL (ref 70–130)
GLUCOSE SERPL-MCNC: 73 MG/DL (ref 65–99)
GRAM STN SPEC: ABNORMAL
GRAM STN SPEC: ABNORMAL
HCT VFR BLD AUTO: 29.5 % (ref 34–46.6)
HGB BLD-MCNC: 9.2 G/DL (ref 12–15.9)
MAGNESIUM SERPL-MCNC: 2 MG/DL (ref 1.6–2.4)
MCH RBC QN AUTO: 25.1 PG (ref 26.6–33)
MCHC RBC AUTO-ENTMCNC: 31.2 G/DL (ref 31.5–35.7)
MCV RBC AUTO: 80.4 FL (ref 79–97)
PHOSPHATE SERPL-MCNC: 3.1 MG/DL (ref 2.5–4.5)
PLATELET # BLD AUTO: 397 10*3/MM3 (ref 140–450)
PMV BLD AUTO: 9.7 FL (ref 6–12)
POTASSIUM SERPL-SCNC: 3.7 MMOL/L (ref 3.5–5.2)
RBC # BLD AUTO: 3.67 10*6/MM3 (ref 3.77–5.28)
SODIUM SERPL-SCNC: 137 MMOL/L (ref 136–145)
VIT B12 BLD-MCNC: 1252 PG/ML (ref 211–946)
WBC # BLD AUTO: 15.26 10*3/MM3 (ref 3.4–10.8)

## 2020-12-15 PROCEDURE — 25010000002 ONDANSETRON PER 1 MG: Performed by: UROLOGY

## 2020-12-15 PROCEDURE — 85027 COMPLETE CBC AUTOMATED: CPT | Performed by: STUDENT IN AN ORGANIZED HEALTH CARE EDUCATION/TRAINING PROGRAM

## 2020-12-15 PROCEDURE — 82962 GLUCOSE BLOOD TEST: CPT

## 2020-12-15 PROCEDURE — 84100 ASSAY OF PHOSPHORUS: CPT | Performed by: STUDENT IN AN ORGANIZED HEALTH CARE EDUCATION/TRAINING PROGRAM

## 2020-12-15 PROCEDURE — 80048 BASIC METABOLIC PNL TOTAL CA: CPT | Performed by: STUDENT IN AN ORGANIZED HEALTH CARE EDUCATION/TRAINING PROGRAM

## 2020-12-15 PROCEDURE — 63710000001 INSULIN GLARGINE PER 5 UNITS: Performed by: STUDENT IN AN ORGANIZED HEALTH CARE EDUCATION/TRAINING PROGRAM

## 2020-12-15 PROCEDURE — C1751 CATH, INF, PER/CENT/MIDLINE: HCPCS

## 2020-12-15 PROCEDURE — 82607 VITAMIN B-12: CPT | Performed by: STUDENT IN AN ORGANIZED HEALTH CARE EDUCATION/TRAINING PROGRAM

## 2020-12-15 PROCEDURE — 25010000002 MEROPENEM PER 100 MG: Performed by: HOSPITALIST

## 2020-12-15 PROCEDURE — 63710000001 INSULIN LISPRO (HUMAN) PER 5 UNITS: Performed by: UROLOGY

## 2020-12-15 PROCEDURE — 02HV33Z INSERTION OF INFUSION DEVICE INTO SUPERIOR VENA CAVA, PERCUTANEOUS APPROACH: ICD-10-PCS | Performed by: STUDENT IN AN ORGANIZED HEALTH CARE EDUCATION/TRAINING PROGRAM

## 2020-12-15 PROCEDURE — 97530 THERAPEUTIC ACTIVITIES: CPT

## 2020-12-15 PROCEDURE — 83735 ASSAY OF MAGNESIUM: CPT | Performed by: INTERNAL MEDICINE

## 2020-12-15 PROCEDURE — 82746 ASSAY OF FOLIC ACID SERUM: CPT | Performed by: STUDENT IN AN ORGANIZED HEALTH CARE EDUCATION/TRAINING PROGRAM

## 2020-12-15 RX ORDER — INSULIN GLARGINE 100 [IU]/ML
16 INJECTION, SOLUTION SUBCUTANEOUS NIGHTLY
Status: DISCONTINUED | OUTPATIENT
Start: 2020-12-15 | End: 2020-12-17 | Stop reason: HOSPADM

## 2020-12-15 RX ORDER — SODIUM CHLORIDE 0.9 % (FLUSH) 0.9 %
10 SYRINGE (ML) INJECTION EVERY 12 HOURS SCHEDULED
Status: DISCONTINUED | OUTPATIENT
Start: 2020-12-15 | End: 2020-12-17 | Stop reason: HOSPADM

## 2020-12-15 RX ORDER — SODIUM CHLORIDE 0.9 % (FLUSH) 0.9 %
10 SYRINGE (ML) INJECTION AS NEEDED
Status: DISCONTINUED | OUTPATIENT
Start: 2020-12-15 | End: 2020-12-17 | Stop reason: HOSPADM

## 2020-12-15 RX ORDER — SODIUM CHLORIDE 0.9 % (FLUSH) 0.9 %
20 SYRINGE (ML) INJECTION AS NEEDED
Status: DISCONTINUED | OUTPATIENT
Start: 2020-12-15 | End: 2020-12-17 | Stop reason: HOSPADM

## 2020-12-15 RX ADMIN — LABETALOL HYDROCHLORIDE 10 MG: 5 INJECTION, SOLUTION INTRAVENOUS at 20:37

## 2020-12-15 RX ADMIN — LISINOPRIL 20 MG: 20 TABLET ORAL at 08:26

## 2020-12-15 RX ADMIN — MAGNESIUM OXIDE 400 MG (241.3 MG MAGNESIUM) TABLET 400 MG: TABLET at 08:26

## 2020-12-15 RX ADMIN — MAGNESIUM OXIDE 400 MG (241.3 MG MAGNESIUM) TABLET 400 MG: TABLET at 20:38

## 2020-12-15 RX ADMIN — SODIUM CHLORIDE, PRESERVATIVE FREE 10 ML: 5 INJECTION INTRAVENOUS at 20:38

## 2020-12-15 RX ADMIN — ONDANSETRON 4 MG: 2 INJECTION INTRAMUSCULAR; INTRAVENOUS at 20:37

## 2020-12-15 RX ADMIN — CITALOPRAM 10 MG: 10 TABLET, FILM COATED ORAL at 08:26

## 2020-12-15 RX ADMIN — CARVEDILOL 12.5 MG: 12.5 TABLET, FILM COATED ORAL at 17:12

## 2020-12-15 RX ADMIN — INSULIN GLARGINE 16 UNITS: 100 INJECTION, SOLUTION SUBCUTANEOUS at 20:45

## 2020-12-15 RX ADMIN — SODIUM CHLORIDE, PRESERVATIVE FREE 10 ML: 5 INJECTION INTRAVENOUS at 08:26

## 2020-12-15 RX ADMIN — CARVEDILOL 12.5 MG: 12.5 TABLET, FILM COATED ORAL at 08:26

## 2020-12-15 RX ADMIN — INSULIN LISPRO 2 UNITS: 100 INJECTION, SOLUTION INTRAVENOUS; SUBCUTANEOUS at 17:12

## 2020-12-15 RX ADMIN — ONDANSETRON 4 MG: 2 INJECTION INTRAMUSCULAR; INTRAVENOUS at 12:18

## 2020-12-15 RX ADMIN — MEROPENEM 1 G: 1 INJECTION INTRAVENOUS at 02:40

## 2020-12-15 NOTE — DISCHARGE PLACEMENT REQUEST
"Lyubov Middleton (65 y.o. Female)     Date of Birth Social Security Number Address Home Phone MRN    1955  [address] [address] [phone] [id number]    [employer] Marital Status          Unknown Unknown       Admission Date Admission Type Admitting Provider Attending Provider Department, Room/Bed    12/8/20 Emergency Harris Perez MD Snyder, Perry, MD [address], [address]/1    Discharge Date Discharge Disposition Discharge Destination                       Attending Provider: Bunny Hannah MD    Allergies: No Known Allergies    Isolation: Spore   Infection: C.difficile (rule out) (12/14/20)   Code Status: CPR    Ht: 162.6 cm (64.02\")   Wt: 75.1 kg (165 lb 9.6 oz)    Admission Cmt: None   Principal Problem: Renal abscess [N15.1]                 Active Insurance as of 12/8/2020     Primary Coverage     Payor Plan Insurance Group Employer/Plan Group    [id number]     Payor Plan Address Payor Plan Phone Number Payor Plan Fax Number Effective Dates    [address] [phone]  7/1/2020 - None Entered    [facility] [id number]       Subscriber Name Subscriber Birth Date Member ID       LYUBOV MIDDLETON 1955 [id number]                 Emergency Contacts      (Rel.) Home Phone Work Phone Mobile Phone    Emi GroveKeven (Son) [phone] -- --              "

## 2020-12-15 NOTE — SIGNIFICANT NOTE
12/15/20 1640   PICC Single Lumen 12/15/20 Right Brachial   Placement Date/Time: 12/15/20 1626   Hand Hygiene Completed: Yes  Size (Fr): 4  Description (optional): (c) power picc  Length (cm): (c) 41 cm  Orientation: Right  Location: Brachial  Site Prep: Chlorhexidine isopropyl alcohol  All 5 Sterile Barriers ...   Site Assessment Clean;Dry;Intact   #1 Lumen Status Blood return noted;Capped;Flushed;Normal saline locked   Length alesia (cm) 41 cm  (1 cm external)   Line Care Connections checked and tightened   Extremity Circumference (cm) 28 cm   Dressing Type Border Dressing;Securing device;Antimicrobial dressing/disc   Dressing Status Dry;Clean;Intact   Dressing Intervention New dressing   Liquid Adhesive Applied   Dressing Change Due 12/22/20   Indication/Daily Review of Necessity blood sampling;intravenous medication therapy   PICC Line tip in SVC per 3cg technology

## 2020-12-15 NOTE — PROGRESS NOTES
Continued Stay Note  [facility]     Patient Name: Lyubov Middleton  MRN: [id number]  Today's Date: 12/15/2020    Admit Date: 12/8/2020    Discharge Plan     Row Name 12/15/20 1045       Plan    Plan  Pan home with [facility] and family if accepted.   ZIGGY Hay RN    Patient/Family in Agreement with Plan  yes    Plan Comments  Spoke to pt at bedside.  Pt choice for HH is [facility].  Sena  ( 0813) called with referral.   Pt will need  4 weeks of IV Mycamine at 100 mg every 24 starting 12/13/2020.  Check weekly CBC CMP and call abnormal results to Dr. Plunkett at [phone].   Plan home with [facility] and family if accepted.   ZIGGY Hay RN        Discharge Codes    No documentation.             Gretta Hay, RN

## 2020-12-15 NOTE — THERAPY TREATMENT NOTE
Patient Name: Lyubov Middleton  : 1955    MRN: [id number]                              Today's Date: 12/15/2020       Admit Date: 2020    Visit Dx:     ICD-10-CM ICD-9-CM   1. Hypokalemia  E87.6 276.8   2. Hyperglycemia  R73.9 790.29   3. Lower abdominal pain  R10.30 789.09     Patient Active Problem List   Diagnosis   • Hypokalemia   • Renal stone   • Hypomagnesemia   • Renal abscess   • Hypocalcemia   • Stage 3a chronic kidney disease   • Type 2 diabetes mellitus (CMS/HCC)   • Sepsis (CMS/HCC)     Past Medical History:   Diagnosis Date   • Cancer (CMS/HCC)     left breast removed    • Type 2 diabetes mellitus (CMS/HCC)      Past Surgical History:   Procedure Laterality Date   • ABDOMINAL SURGERY     • BREAST SURGERY Left     removed due to cancer   • CHOLECYSTECTOMY     • CYSTOSCOPY W/ URETERAL STENT PLACEMENT Right 2020    Procedure: CYSTOSCOPY, RIGHT RETROGRADE PYELOGRAM, URETEROSCOPY, LASER LITHOTRIPSY, RIGHT URETERAL STENT PLACEMENT;  Surgeon: Rohan Dooley Jr., MD;  Location: [facility];  Service: Urology;  Laterality: Right;   • HERNIA REPAIR      mesh removed     General Information     Row Name 12/15/20 1530          Physical Therapy Time and Intention    Document Type  therapy note (daily note)  -PH     Mode of Treatment  physical therapy  -     Row Name 12/15/20 1530          Safety Issues, Functional Mobility    Impairments Affecting Function (Mobility)  endurance/activity tolerance;balance  -PH       User Key  (r) = Recorded By, (t) = Taken By, (c) = Cosigned By    Initials Name Provider Type    PH Tete Antunez PTA Physical Therapy Assistant        Mobility     Row Name 12/15/20 1530          Bed Mobility    Bed Mobility  bed mobility (all) activities  -PH     All Activities, [place] (Bed Mobility)  standby assist;verbal cues  -PH     Assistive Device (Bed Mobility)  bed rails;head of bed elevated  -PH     Row Name 12/15/20 1530          Sit-Stand  Transfer    Sit-Stand Rock (Transfers)  contact guard;verbal cues  -PH     Assistive Device (Sit-Stand Transfers)  other (see comments) no AD  -PH     Row Name 12/15/20 1530          Gait/Stairs (Locomotion)    Rock Level (Gait)  contact guard;verbal cues  -PH     Assistive Device (Gait)  walker, front-wheeled;other (see comments) no AD  -PH     Distance in Feet (Gait)  180' w/ fww then 180' w/o fww and mild unsteadiness noted  -PH     Deviations/Abnormal Patterns (Gait)  erickson decreased;stride length decreased  -PH     Bilateral Gait Deviations  forward flexed posture;heel strike decreased  -PH     Comment (Gait/Stairs)  Pt occasionally takes a side step to correct ball w/ mild unsteadiness  -PH       User Key  (r) = Recorded By, (t) = Taken By, (c) = Cosigned By    Initials Name Provider Type    PH Tete Antunez PTA Physical Therapy Assistant        Obj/Interventions     Row Name 12/15/20 1534          Motor Skills    Therapeutic Exercise  other (see comments) BLE HEP: AP, LAQ, seated march; all x 10 reps  -PH       User Key  (r) = Recorded By, (t) = Taken By, (c) = Cosigned By    Initials Name Provider Type    PH Tete Antunez, LONG Physical Therapy Assistant        Goals/Plan    No documentation.       Clinical Impression     Row Name 12/15/20 7204          Pain    Additional Documentation  Pain Scale: Numbers Pre/Post-Treatment (Group)  -PH     Row Name 12/15/20 1534          Pain Scale: Numbers Pre/Post-Treatment    Pretreatment Pain Rating  0/10 - no pain  -PH     Posttreatment Pain Rating  0/10 - no pain  -PH     Row Name 12/15/20 3064          Plan of Care Review    Plan of Care Reviewed With  patient  -PH     Progress  improving  -PH     Outcome Summary  Pt improving w/ amb of 180' w/ fww then 180' w/ no AD req CGA. Pt exhibits occasional unsteadiness w/ side steps to correct / no LOB. Pt will prog as pt alpesh.  -PH     Row Name 12/15/20 7649          Positioning and  Restraints    Pre-Treatment Position  in bed  -PH     Post Treatment Position  bed  -PH     In Bed  fowlers;call light within reach;encouraged to call for assist;exit alarm on  -PH       User Key  (r) = Recorded By, (t) = Taken By, (c) = Cosigned By    Initials Name Provider Type     Tete Antunez PTA Physical Therapy Assistant        Outcome Measures     Row Name 12/15/20 1535          How much help from another person do you currently need...    Turning from your back to your side while in flat bed without using bedrails?  4  -PH     Moving from lying on back to sitting on the side of a flat bed without bedrails?  4  -PH     Moving to and from a bed to a chair (including a wheelchair)?  4  -PH     Standing up from a chair using your arms (e.g., wheelchair, bedside chair)?  4  -PH     Climbing 3-5 steps with a railing?  3  -PH     To walk in hospital room?  3  -PH     AM-PAC 6 Clicks Score (PT)  22  -PH     Row Name 12/15/20 1538 12/15/20 1535       Functional Assessment    Outcome Measure Options  AM-PAC 6 Clicks Basic Mobility (PT)  -PH  AM-PAC 6 Clicks Basic Mobility (PT)  -PH      User Key  (r) = Recorded By, (t) = Taken By, (c) = Cosigned By    Initials Name Provider Type     Tete Antunez PTA Physical Therapy Assistant        Physical Therapy Education                 Title: PT OT SLP Therapies (Done)     Topic: Physical Therapy (Done)     Point: Mobility training (Done)     Learning Progress Summary           Patient Acceptance, E,D, VU,DU by  at 12/15/2020 1536                   Point: Home exercise program (Done)     Learning Progress Summary           Patient Acceptance, E,D, VU,DU by  at 12/15/2020 1536                   Point: Body mechanics (Done)     Learning Progress Summary           Patient Acceptance, E,D, VU,DU by  at 12/15/2020 1536                   Point: Precautions (Done)     Learning Progress Summary           Patient Acceptance, E,D, VU,DU by  at 12/15/2020  1536                               User Key     Initials Effective Dates Name Provider Type Discipline     08/20/19 -  Tete Antunez PTA Physical Therapy Assistant PT              PT Recommendation and Plan     Plan of Care Reviewed With: patient  Progress: improving  Outcome Summary: Pt improving w/ amb of 180' w/ fww then 180' w/ no AD req CGA. Pt exhibits occasional unsteadiness w/ side steps to correct / no LOB. Pt will prog as pt alpesh.     Time Calculation:   PT Charges     Row Name 12/15/20 1537             Time Calculation    Start Time  1356  -PH      Stop Time  1414  -PH      Time Calculation (min)  18 min  -PH      PT Received On  12/15/20  -PH      PT - Next Appointment  12/17/20  -PH        User Key  (r) = Recorded By, (t) = Taken By, (c) = Cosigned By    Initials Name Provider Type     Tete Antunez PTA Physical Therapy Assistant        Therapy Charges for Today     Code Description Service Date Service Provider Modifiers Qty    [id number] HC PT THERAPEUTIC ACT EA 15 MIN 12/15/2020 Tete Antunez PTA GP 1          PT G-Codes  Outcome Measure Options: AM-PAC 6 Clicks Basic Mobility (PT)  AM-PAC 6 Clicks Score (PT): 22    Tete Antunez PTA  12/15/2020

## 2020-12-15 NOTE — PROGRESS NOTES
"  Infectious Diseases Progress Note    Tricia Plunkett MD     [facility]  Los: 5 days  Patient Identification:  Name: Lyubov Middleton  Age: 65 y.o.  Sex: female  :  1955  MRN: [id number]         Primary Care Physician: Ana Huitron MD            Subjective:feeling ok  Interval History: See consultation note.  · Seizure type activity after the procedure and was seen by neurology service.  · Cystoscopy right retrograde pyelogram and ureteroscopy and laser lithotripsy and right ureteral stent placement on 2020  · Had percutaneous drainage of right perinephric fluid performed and drainage catheter in place on 12/10/2020  Objective:    Scheduled Meds:carvedilol, 12.5 mg, Oral, BID With Meals  citalopram, 10 mg, Oral, Daily  influenza vaccine, 0.5 mL, Intramuscular, Once  insulin glargine, 20 Units, Subcutaneous, Nightly  insulin lispro, 0-9 Units, Subcutaneous, TID AC  lisinopril, 20 mg, Oral, Daily  magnesium oxide, 400 mg, Oral, BID  meropenem, 1 g, Intravenous, Q8H  micafungin (MYCAMINE) IV, 100 mg, Intravenous, Q24H      Continuous Infusions:     Vital signs in last 24 hours:  Temp:  [97.4 °F (36.3 °C)-98.5 °F (36.9 °C)] 97.4 °F (36.3 °C)  Heart Rate:  [61-68] 61  Resp:  [16-18] 16  BP: (135-157)/(65-88) 147/76    Intake/Output:    Intake/Output Summary (Last 24 hours) at 2020 7245  Last data filed at 2020 1848  Gross per 24 hour   Intake 350 ml   Output 20 ml   Net 330 ml       Exam:  /76 (BP Location: Right arm, Patient Position: Lying)   Pulse 61   Temp 97.4 °F (36.3 °C) (Oral)   Resp 16   Ht 162.6 cm (64.02\")   Wt 80.1 kg (176 lb 9.6 oz)   SpO2 96%   BMI 30.30 kg/m²   Patient is examined using the personal protective equipment as per guidelines from infection control for this particular patient as enacted.  Hand washing was performed before and after patient interaction.  General Appearance:  Comfortable does not appear to be in any acute distress sitting in the " chair.                          Head:    Normocephalic, without obvious abnormality, atraumatic                           Eyes:    PERRL, conjunctivae/corneas clear, EOM's intact, both eyes                         Throat:   Lips, tongue, gums normal; oral mucosa pink and moist                           Neck:   Supple, symmetrical, trachea midline, no JVD                         Lungs:    Clear to auscultation bilaterally, respirations unlabored                 Chest Wall:    No tenderness or deformity                          Heart:    Regular rate and rhythm, S1 and S2 normal                  Abdomen:     Soft, non-tender, bowel sounds active,decreased discomfort in the right flank area                 extremities:   Extremities normal, atraumatic, no cyanosis or edema                        Pulses:   Pulses palpable in all extremities                            Skin:   Skin is warm and dry,  no rashes or palpable lesions                  Neurologic: Mostly nonfocal       Data Review:    I reviewed the patient's new clinical results.  Results from last 7 days   Lab Units 12/14/20  0546 12/13/20  0535 12/12/20  0550 12/11/20  0938 12/10/20  0540 12/09/20  1527 12/09/20  0534   WBC 10*3/mm3 14.44* 16.86* 14.49* 15.80* 17.19* 12.99* 14.00*   HEMOGLOBIN g/dL 8.1* 9.6* 8.2* 9.4* 9.4* 10.5* 8.3*   PLATELETS 10*3/mm3 285 346 329 368 338 487* 318     Results from last 7 days   Lab Units 12/14/20  0546 12/13/20  0535 12/12/20  0550 12/11/20  0559 12/10/20  2303 12/10/20  0540 12/09/20  1527 12/09/20  0534   SODIUM mmol/L 136 136 135* 137  --  133* 137 136   POTASSIUM mmol/L 4.0 3.5 3.8 4.2 3.9 3.4* 2.8* 3.2*   CHLORIDE mmol/L 105 104 103 104  --  95* 95* 98   CO2 mmol/L 23.4 22.4 22.1 22.8  --  26.6 27.2 29.5*   BUN mg/dL 10 10 8 11  --  11 6* 5*   CREATININE mg/dL 1.02* 1.11* 1.01* 1.02*  --  1.22* 1.15* 0.86   CALCIUM mg/dL 7.7* 7.8* 7.8* 7.8*  --  7.2* 8.2* 6.9*   GLUCOSE mg/dL 119* 163* 212* 247*  --  397* 242* 273*      Microbiology Results (last 10 days)     Procedure Component Value - Date/Time    Clostridium Difficile Toxin - Stool, Per Rectum [[id number]]  (Normal) Collected: 12/14/20 1625    Lab Status: Final result Specimen: Stool from Per Rectum Updated: 12/14/20 1749    Narrative:      The following orders were created for panel order Clostridium Difficile Toxin - Stool, Per Rectum.  Procedure                               Abnormality         Status                     ---------                               -----------         ------                     Clostridium Difficile To...[[id number]]  Normal              Final result                 Please view results for these tests on the individual orders.    Clostridium Difficile Toxin, PCR - Stool, Per Rectum [[id number]]  (Normal) Collected: 12/14/20 1625    Lab Status: Final result Specimen: Stool from Per Rectum Updated: 12/14/20 1749     C. Difficile Toxins by PCR Negative    Blood Culture - Blood, Hand, Right [[id number]] Collected: 12/11/20 1530    Lab Status: Preliminary result Specimen: Blood from Hand, Right Updated: 12/14/20 1545     Blood Culture No growth at 3 days    Blood Culture - Blood, Arm, Right [[id number]] Collected: 12/11/20 1530    Lab Status: Preliminary result Specimen: Blood from Arm, Right Updated: 12/14/20 1545     Blood Culture No growth at 3 days    Urine Culture - Urine, Urine, Clean Catch [[id number]]  (Abnormal) Collected: 12/11/20 0149    Lab Status: Final result Specimen: Urine, Clean Catch Updated: 12/12/20 0943     Urine Culture Yeast isolated    Body Fluid Culture - Body Fluid, Retroperitoneum [[id number]]  (Abnormal)  (Susceptibility) Collected: 12/10/20 1205    Lab Status: Preliminary result Specimen: Body Fluid from Retroperitoneum Updated: 12/14/20 0643     Body Fluid Culture Light growth (2+) Candida glabrata     Gram Stain Many (4+) WBCs seen      No organisms seen    Susceptibility      Candida glabrata     MARKO     Micafungin  Susceptible                    Blood Culture - Blood, Arm, Left [[id number]] Collected: 12/09/20 2328    Lab Status: Preliminary result Specimen: Blood from Arm, Left Updated: 12/14/20 0015     Blood Culture No growth at 4 days    Blood Culture - Blood, Arm, Right [[id number]] Collected: 12/09/20 2328    Lab Status: Preliminary result Specimen: Blood from Arm, Right Updated: 12/14/20 0015     Blood Culture No growth at 4 days    Blood Culture - Blood, Arm, Right [[id number]] Collected: 12/09/20 1059    Lab Status: Final result Specimen: Blood from Arm, Right Updated: 12/14/20 1230     Blood Culture No growth at 5 days    Blood Culture - Blood, Arm, Right [[id number]] Collected: 12/09/20 1015    Lab Status: Final result Specimen: Blood from Arm, Right Updated: 12/14/20 1230     Blood Culture No growth at 5 days    COVID PRE-OP / PRE-PROCEDURE SCREENING ORDER (NO ISOLATION) - Swab, Nasopharynx [[id number]]  (Normal) Collected: 12/08/20 1658    Lab Status: Final result Specimen: Swab from Nasopharynx Updated: 12/08/20 1836    Narrative:      The following orders were created for panel order COVID PRE-OP / PRE-PROCEDURE SCREENING ORDER (NO ISOLATION) - Swab, Nasopharynx.  Procedure                               Abnormality         Status                     ---------                               -----------         ------                     Respiratory Panel PCR w/...[[id number]]  Normal              Final result                 Please view results for these tests on the individual orders.    Respiratory Panel PCR w/COVID-19(SARS-CoV-2) TIP/BOBBY/ANDREY/PAD/COR/MAD/HUMPHREY In-House, NP Swab in UTM/VTM, 3-4 HR TAT - Swab, Nasopharynx [[id number]]  (Normal) Collected: 12/08/20 1658    Lab Status: Final result Specimen: Swab from Nasopharynx Updated: 12/08/20 1836     ADENOVIRUS, PCR Not Detected     Coronavirus 229E Not Detected     Coronavirus HKU1 Not Detected     Coronavirus NL63 Not Detected     Coronavirus OC43 Not Detected      COVID19 Not Detected     Human Metapneumovirus Not Detected     Human Rhinovirus/Enterovirus Not Detected     Influenza A PCR Not Detected     Influenza B PCR Not Detected     Parainfluenza Virus 1 Not Detected     Parainfluenza Virus 2 Not Detected     Parainfluenza Virus 3 Not Detected     Parainfluenza Virus 4 Not Detected     RSV, PCR Not Detected     Bordetella pertussis pcr Not Detected     Bordetella parapertussis PCR Not Detected     Chlamydophila pneumoniae PCR Not Detected     Mycoplasma pneumo by PCR Not Detected    Narrative:      Fact sheet for providers: https://docs.[employer]/wp-content/uploads/EXX0764-7496-XG6.1-EUA-Provider-Fact-Sheet-3.pdf    Fact sheet for patients: https://docs.[employer]/wp-content/uploads/ZJW8100-1516-KU9.1-EUA-Patient-Fact-Sheet-1.pdf    Test performed by PCR.              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  6-history of hypertension  7-new onset seizure type activity.           Recommendations/Discussions:  · Continue present treatment  · We will need to discuss with urology service about intent of definitive intervention regarding the right perinephric mass/abscess to avoid her being metabolically depleted because of the persistent focus of infection which may not be able to amendable to medical treatment only.    Tricia Plunkett MD  12/14/2020  23:55 EST    Much of this encounter note is an electronic transcription/translation of spoken language to printed text. The electronic translation of spoken language may permit erroneous, or at times, nonsensical words or phrases to be inadvertently transcribed; Although I have reviewed the note for such errors, some may still exist

## 2020-12-15 NOTE — PROGRESS NOTES
"  Infectious Diseases Progress Note    Tricia Plunkett MD     [facility]  Los: 6 days  Patient Identification:  Name: Lyubov Middleton  Age: 65 y.o.  Sex: female  :  1955  MRN: [id number]         Primary Care Physician: Ana Huitron MD            Subjective: Feeling much better.  Interval History: See consultation note.  · Seizure type activity after the procedure and was seen by neurology service.  · Cystoscopy right retrograde pyelogram and ureteroscopy and laser lithotripsy and right ureteral stent placement on 2020  · Had percutaneous drainage of right perinephric fluid performed and drainage catheter in place on 12/10/2020  Objective:    Scheduled Meds:carvedilol, 12.5 mg, Oral, BID With Meals  citalopram, 10 mg, Oral, Daily  influenza vaccine, 0.5 mL, Intramuscular, Once  insulin glargine, 20 Units, Subcutaneous, Nightly  insulin lispro, 0-9 Units, Subcutaneous, TID AC  lisinopril, 20 mg, Oral, Daily  magnesium oxide, 400 mg, Oral, BID  micafungin (MYCAMINE) IV, 100 mg, Intravenous, Q24H      Continuous Infusions:     Vital signs in last 24 hours:  Temp:  [97.4 °F (36.3 °C)-98.5 °F (36.9 °C)] 97.9 °F (36.6 °C)  Heart Rate:  [56-67] 56  Resp:  [16-18] 18  BP: (140-160)/(76-88) 160/81    Intake/Output:    Intake/Output Summary (Last 24 hours) at 12/15/2020 1034  Last data filed at 12/15/2020 0621  Gross per 24 hour   Intake 480 ml   Output 28 ml   Net 452 ml       Exam:  /81 (BP Location: Right arm, Patient Position: Lying)   Pulse 56   Temp 97.9 °F (36.6 °C) (Oral)   Resp 18   Ht 162.6 cm (64.02\")   Wt 75.1 kg (165 lb 9.6 oz)   SpO2 96%   BMI 28.41 kg/m²   Patient is examined using the personal protective equipment as per guidelines from infection control for this particular patient as enacted.  Hand washing was performed before and after patient interaction.  General Appearance:  Comfortable does not appear to be in any acute distress sitting in the chair.                 "          Head:    Normocephalic, without obvious abnormality, atraumatic                           Eyes:    PERRL, conjunctivae/corneas clear, EOM's intact, both eyes                         Throat:   Lips, tongue, gums normal; oral mucosa pink and moist                           Neck:   Supple, symmetrical, trachea midline, no JVD                         Lungs:    Clear to auscultation bilaterally, respirations unlabored                 Chest Wall:    No tenderness or deformity                          Heart:    Regular rate and rhythm, S1 and S2 normal                  Abdomen:     Soft, non-tender, bowel sounds active,decreased discomfort in the right flank area                 extremities:   Extremities normal, atraumatic, no cyanosis or edema                        Pulses:   Pulses palpable in all extremities                            Skin:   Skin is warm and dry,  no rashes or palpable lesions                  Neurologic: Mostly nonfocal       Data Review:    I reviewed the patient's new clinical results.  Results from last 7 days   Lab Units 12/15/20  0633 12/14/20  0546 12/13/20  0535 12/12/20  0550 12/11/20  0938 12/10/20  0540 12/09/20  1527   WBC 10*3/mm3 15.26* 14.44* 16.86* 14.49* 15.80* 17.19* 12.99*   HEMOGLOBIN g/dL 9.2* 8.1* 9.6* 8.2* 9.4* 9.4* 10.5*   PLATELETS 10*3/mm3 397 285 346 329 368 338 487*     Results from last 7 days   Lab Units 12/15/20  0633 12/14/20  0546 12/13/20  0535 12/12/20  0550 12/11/20  0559 12/10/20  2303 12/10/20  0540 12/09/20  1527   SODIUM mmol/L 137 136 136 135* 137  --  133* 137   POTASSIUM mmol/L 3.7 4.0 3.5 3.8 4.2 3.9 3.4* 2.8*   CHLORIDE mmol/L 104 105 104 103 104  --  95* 95*   CO2 mmol/L 24.3 23.4 22.4 22.1 22.8  --  26.6 27.2   BUN mg/dL 9 10 10 8 11  --  11 6*   CREATININE mg/dL 1.05* 1.02* 1.11* 1.01* 1.02*  --  1.22* 1.15*   CALCIUM mg/dL 8.2* 7.7* 7.8* 7.8* 7.8*  --  7.2* 8.2*   GLUCOSE mg/dL 73 119* 163* 212* 247*  --  397* 242*     Microbiology Results  (last 10 days)     Procedure Component Value - Date/Time    Clostridium Difficile Toxin - Stool, Per Rectum [[id number]]  (Normal) Collected: 12/14/20 1625    Lab Status: Final result Specimen: Stool from Per Rectum Updated: 12/14/20 1749    Narrative:      The following orders were created for panel order Clostridium Difficile Toxin - Stool, Per Rectum.  Procedure                               Abnormality         Status                     ---------                               -----------         ------                     Clostridium Difficile To...[[id number]]  Normal              Final result                 Please view results for these tests on the individual orders.    Clostridium Difficile Toxin, PCR - Stool, Per Rectum [[id number]]  (Normal) Collected: 12/14/20 1625    Lab Status: Final result Specimen: Stool from Per Rectum Updated: 12/14/20 1749     C. Difficile Toxins by PCR Negative    Blood Culture - Blood, Hand, Right [[id number]] Collected: 12/11/20 1530    Lab Status: Preliminary result Specimen: Blood from Hand, Right Updated: 12/14/20 1545     Blood Culture No growth at 3 days    Blood Culture - Blood, Arm, Right [[id number]] Collected: 12/11/20 1530    Lab Status: Preliminary result Specimen: Blood from Arm, Right Updated: 12/14/20 1545     Blood Culture No growth at 3 days    Urine Culture - Urine, Urine, Clean Catch [[id number]]  (Abnormal) Collected: 12/11/20 0149    Lab Status: Final result Specimen: Urine, Clean Catch Updated: 12/12/20 0943     Urine Culture Yeast isolated    Body Fluid Culture - Body Fluid, Retroperitoneum [[id number]]  (Abnormal)  (Susceptibility) Collected: 12/10/20 1205    Lab Status: Final result Specimen: Body Fluid from Retroperitoneum Updated: 12/15/20 0637     Body Fluid Culture Light growth (2+) Candida glabrata     Gram Stain Many (4+) WBCs seen      No organisms seen    Susceptibility      Candida glabrata     MARKO Not Specified     Fluconazole  Susceptible      Micafungin Susceptible                       Blood Culture - Blood, Arm, Left [[id number]] Collected: 12/09/20 2328    Lab Status: Final result Specimen: Blood from Arm, Left Updated: 12/15/20 0015     Blood Culture No growth at 5 days    Blood Culture - Blood, Arm, Right [[id number]] Collected: 12/09/20 2328    Lab Status: Final result Specimen: Blood from Arm, Right Updated: 12/15/20 0015     Blood Culture No growth at 5 days    Blood Culture - Blood, Arm, Right [[id number]] Collected: 12/09/20 1059    Lab Status: Final result Specimen: Blood from Arm, Right Updated: 12/14/20 1230     Blood Culture No growth at 5 days    Blood Culture - Blood, Arm, Right [[id number]] Collected: 12/09/20 1015    Lab Status: Final result Specimen: Blood from Arm, Right Updated: 12/14/20 1230     Blood Culture No growth at 5 days    COVID PRE-OP / PRE-PROCEDURE SCREENING ORDER (NO ISOLATION) - Swab, Nasopharynx [[id number]]  (Normal) Collected: 12/08/20 1658    Lab Status: Final result Specimen: Swab from Nasopharynx Updated: 12/08/20 1836    Narrative:      The following orders were created for panel order COVID PRE-OP / PRE-PROCEDURE SCREENING ORDER (NO ISOLATION) - Swab, Nasopharynx.  Procedure                               Abnormality         Status                     ---------                               -----------         ------                     Respiratory Panel PCR w/...[[id number]]  Normal              Final result                 Please view results for these tests on the individual orders.    Respiratory Panel PCR w/COVID-19(SARS-CoV-2) TIP/BOBBY/ANDREY/PAD/COR/MAD/HUMPHREY In-House, NP Swab in UTM/VTM, 3-4 HR TAT - Swab, Nasopharynx [[id number]]  (Normal) Collected: 12/08/20 1658    Lab Status: Final result Specimen: Swab from Nasopharynx Updated: 12/08/20 1836     ADENOVIRUS, PCR Not Detected     Coronavirus 229E Not Detected     Coronavirus HKU1 Not Detected     Coronavirus NL63 Not Detected     Coronavirus OC43 Not Detected      COVID19 Not Detected     Human Metapneumovirus Not Detected     Human Rhinovirus/Enterovirus Not Detected     Influenza A PCR Not Detected     Influenza B PCR Not Detected     Parainfluenza Virus 1 Not Detected     Parainfluenza Virus 2 Not Detected     Parainfluenza Virus 3 Not Detected     Parainfluenza Virus 4 Not Detected     RSV, PCR Not Detected     Bordetella pertussis pcr Not Detected     Bordetella parapertussis PCR Not Detected     Chlamydophila pneumoniae PCR Not Detected     Mycoplasma pneumo by PCR Not Detected    Narrative:      Fact sheet for providers: https://docs.[employer]/wp-content/uploads/VNC3455-2542-IG5.1-EUA-Provider-Fact-Sheet-3.pdf    Fact sheet for patients: https://docs.[employer]/wp-content/uploads/ZPF4557-0740-AL1.1-EUA-Patient-Fact-Sheet-1.pdf    Test performed by PCR.        Ct Abdomen Pelvis With & Without Contrast    Result Date: 12/14/2020  1. Interim placement of a right ureteral stent, as well as a percutaneous drain within a right perinephric collection with near complete resolution of the collection. Enhancing soft tissue seen along the lateral aspect of the mid and lower pole and anterior to the right kidney may represent enhancing granulation tissue. Multiple mildly prominent retroperitoneal lymph nodes at least a couple of which have enlarged in the interim from prior imaging, the largest measuring 1.3 cm at the level of the bifurcation. These may be reactive however continued follow-up is recommended to rule out malignancy. 2. Mild colonic ileus. 3. Bilateral small layering pleural effusions with bibasilar atelectasis.  Radiation dose reduction techniques were utilized, including automated exposure control and exposure modulation based on body size.       Ct Head Without Contrast    Result Date: 12/9/2020  Atrophy and moderate vascular calcification is noted. There is no evidence of acute infarction or hemorrhage. Further evaluation could be performed with MRI  examination of the brain, particularly if the patient has a history of new onset seizure activity. The above information was called to the patient's nurse at the time of the dictation. The patient's nurse is to immediately relay the information to the clinical service.    Radiation dose reduction techniques were utilized, including automated exposure control and exposure modulation based on body size.  This report was finalized on 12/9/2020 8:26 PM by Dr. Harris Sylvester M.D.            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  CT scan after percutaneous drainage showed significant improvement.  2-evolving malignancy  3-diabetes-poorly controlled with hyperglycemia  4-anemia  5-electrolyte imbalance and renal insufficiency.  6-history of hypertension  7-new onset seizure type activity.           Recommendations/Discussions:  · Urology services recommendations after the CT scan of the abdomen pelvis performed last evening reviewed.  · DC meropenem  · Arrange for PICC line  · Would recommend 4 weeks of IV Mycamine with periodic assessments with every 2-week CT scans to document progression and improvement.  · Plans for intervention in terms of ureteroscopy with laser lithotripsy in 2 weeks per urology noted.  · See CCP orders.    Tricia Plunkett MD  12/15/2020  10:34 EST    Much of this encounter note is an electronic transcription/translation of spoken language to printed text. The electronic translation of spoken language may permit erroneous, or at times, nonsensical words or phrases to be inadvertently transcribed; Although I have reviewed the note for such errors, some may still exist

## 2020-12-15 NOTE — PROGRESS NOTES
"Adult Nutrition  Assessment/PES    Patient Name:  Lyubov Middleton  YOB: 1955  MRN: [id number]  Admit Date:  12/8/2020    Assessment Date:  12/15/2020    Comments:  Nutrition follow up.  S/p cystoscopy with ureteral stent placement 12/9.  S/p drainage of R perinephric fluid 12/10.  Needs 4 weeks of IV mycamine.  Plans for home with HH.    Eating well, % at meals.    RD to continue to follow.    RD working remotely due to covid-19 pandemic. Assessment completed based on review of electronic medical record. RD may be reached via secure chat or email.     Reason for Assessment     Row Name 12/15/20 1214          Reason for Assessment    Reason For Assessment  follow-up protocol         Nutrition/Diet History     Row Name 12/15/20 1214          Nutrition/Diet History    Typical Food/Fluid Intake  eating % at meals         Anthropometrics     Row Name 12/15/20 1214          Anthropometrics    Height  162.6 cm (64.02\")        Admit Weight    Admit Weight  -- 165# 12/15        Ideal Body Weight (IBW)    Ideal Body Weight (IBW) (kg)  55.04        Body Mass Index (BMI)    BMI Assessment  BMI 25-29.9: overweight 28.36         Labs/Tests/Procedures/Meds     Row Name 12/15/20 1215          Labs/Procedures/Meds    Lab Results Reviewed  reviewed, pertinent     Lab Results Comments  Creat, Ca, GFR, WBC, Hgb, Hct        Diagnostic Tests/Procedures    Diagnostic Test/Procedure Reviewed  reviewed, pertinent     Diagnostic Test/Procedures Comments  s/p cystoscopy with ureteral stent placement 12/9; s/p drainage of R perinephric fluid 12/10        Medications    Pertinent Medications Reviewed  reviewed, pertinent     Pertinent Medications Comments  lantus, humalog, MagOx         Physical Findings     Row Name 12/15/20 1217          Physical Findings    Overall Physical Appearance  overweight     Gastrointestinal  diarrhea     Tubes  other (see comments) PAUL drain     Skin  edema;other (see comments) B=19, " "scab, bruised; trace edema         Estimated/Assessed Needs     Row Name 12/15/20 1214          Calculation Measurements    Height  162.6 cm (64.02\")         Nutrition Prescription Ordered     Row Name 12/15/20 1220          Nutrition Prescription PO    Current PO Diet  Regular     Common Modifiers  GI Soft/[place]         Evaluation of Received Nutrient/Fluid Intake     Row Name 12/15/20 1220          PO Evaluation    Number of Meals  2     % PO Intake           Problem/Interventions:    Problem 2     Row Name 12/15/20 1221          Nutrition Diagnoses Problem 2    Problem 2  Nutrition Appropriate for Condition at this Time     Etiology (related to)  MNT for Treatment/Condition     Signs/Symptoms (evidenced by)  PO Intake     Percent (%) intake recorded  88 %     Over number of meals  2         Intervention Goal     Row Name 12/15/20 1221          Intervention Goal    General  Maintain nutrition;Reduce/improve symptoms;Disease management/therapy     PO  Maintain intake     Weight  No significant weight loss         Nutrition Intervention     Row Name 12/15/20 1222          Nutrition Intervention    RD/Tech Action  Follow Tx progress;Care plan reviewd         Education/Evaluation     Row Name 12/15/20 1222          Education    Education  Will Instruct as appropriate        Monitor/Evaluation    Monitor  Per protocol;PO intake;Pertinent labs;Weight;Skin status;Symptoms           Electronically signed by:  Juliann Mendez RD  12/15/20 12:22 EST  "

## 2020-12-15 NOTE — PLAN OF CARE
Goal Outcome Evaluation:  Plan of Care Reviewed With: patient  Progress: no change  Outcome Summary: Patient is alert ad oriented. VSS. Denies pain. Up with standby assist. IV micafungin and merrem given. Drain with output as charted. Magnesium to be redrawn this AM. No acute events overnight. Will continue to monitor.

## 2020-12-15 NOTE — PLAN OF CARE
Problem: Fall Injury Risk  Goal: Absence of Fall and Fall-Related Injury  Outcome: Ongoing, Progressing  Intervention: Promote Injury-Free Environment  Recent Flowsheet Documentation  Taken 12/15/2020 1600 by Radha Parkinson RN  Safety Promotion/Fall Prevention:   fall prevention program maintained   safety round/check completed  Taken 12/15/2020 1410 by Radha Parkinson RN  Safety Promotion/Fall Prevention:   fall prevention program maintained   safety round/check completed  Taken 12/15/2020 1200 by Radha Parkinson RN  Safety Promotion/Fall Prevention:   fall prevention program maintained   safety round/check completed     Problem: Adult Inpatient Plan of Care  Goal: Plan of Care Review  Outcome: Ongoing, Progressing  Flowsheets (Taken 12/15/2020 1756)  Outcome Summary: PICC line placed. Drain CDI. IV abx per order. Denies pain or nausea.  Resting at present.  Goal: Absence of Hospital-Acquired Illness or Injury  Intervention: Identify and Manage Fall Risk  Recent Flowsheet Documentation  Taken 12/15/2020 1600 by Radha Parkinson RN  Safety Promotion/Fall Prevention:   fall prevention program maintained   safety round/check completed  Taken 12/15/2020 1410 by Radha Parkinson RN  Safety Promotion/Fall Prevention:   fall prevention program maintained   safety round/check completed  Taken 12/15/2020 1200 by Radha Parkinson RN  Safety Promotion/Fall Prevention:   fall prevention program maintained   safety round/check completed     Problem: Diabetes Comorbidity  Goal: Blood Glucose Level Within Desired Range  Outcome: Ongoing, Progressing     Problem: Electrolyte Imbalance  Goal: Electrolyte Balance  Outcome: Ongoing, Progressing     Problem: Seizure, Active Management  Goal: Absence of Seizure/Seizure-Related Injury  Outcome: Ongoing, Progressing     Problem: Skin Injury Risk Increased  Goal: Skin Health and Integrity  Outcome: Ongoing, Progressing  Intervention: Optimize Skin Protection  Recent  Flowsheet Documentation  Taken 12/15/2020 1410 by Radha Parkinson, RN  Pressure Reduction Techniques:   frequent weight shift encouraged   weight shift assistance provided  Pressure Reduction Devices: alternating pressure pump (ADD)   Goal Outcome Evaluation:  Plan of Care Reviewed With: patient  Progress: improving  Outcome Summary: PICC line placed. Drain CDI. IV abx per order. Denies pain or nausea.  Resting at present.

## 2020-12-15 NOTE — PROGRESS NOTES
Perinephric fungal abscess, obstructed infundibulum now s/p lithotripsy to overcome the blockage of the infundibulum and stent placement    CT findings reviewed. Marked decrease in the size of the perinephric abscess. Obstructed calyx now open to drainage with stent in good position.    Plan:  - recommend antimicrobial treatment for fungal infection per ID  - no indication for nephrectomy at this time  - will plan ureteroscopy and laser lithotripsy in 2 weeks

## 2020-12-15 NOTE — PROGRESS NOTES
Name: Lyubov Middleton ADMIT: 2020   : 1955  PCP: Ana Huitron MD    MRN: [id number] LOS: 6 days   AGE/SEX: 65 y.o. female  ROOM: [facility]     Subjective   Subjective     No events overnight. Pt feels well today. 28 cc from drain yesterday. CT doesn't show a mass, so urology  Recommends abx/antifungals per ID with ureteroscopy and laser lithotripsy in 2 weeks.       Objective   Objective   Vital Signs  Temp:  [97.4 °F (36.3 °C)-98.5 °F (36.9 °C)] 97.9 °F (36.6 °C)  Heart Rate:  [56-67] 56  Resp:  [16-18] 18  BP: (140-160)/(76-88) 160/81  SpO2:  [95 %-96 %] 96 %  on   ;   Device (Oxygen Therapy): room air  Body mass index is 28.41 kg/m².  Physical Exam  Constitutional:       General: She is not in acute distress.  HENT:      Head: Normocephalic and atraumatic.   Cardiovascular:      Rate and Rhythm: Normal rate and regular rhythm.      Heart sounds: Normal heart sounds. No murmur. No friction rub. No gallop.    Pulmonary:      Effort: Pulmonary effort is normal.      Breath sounds: Normal breath sounds.   Abdominal:      General: Bowel sounds are normal. There is no distension.      Palpations: Abdomen is soft.      Tenderness: There is no abdominal tenderness. There is no guarding or rebound.      Comments: Right lower quadrant drain   Musculoskeletal:         General: No swelling.      Right lower leg: No edema.      Left lower leg: No edema.   Neurological:      Mental Status: She is alert.   Psychiatric:         Mood and Affect: Mood normal.         Behavior: Behavior normal.         Results Review     I reviewed the patient's new clinical results.  Results from last 7 days   Lab Units 12/15/20  0633 20  0546 20  0535 20  0550   WBC 10*3/mm3 15.26* 14.44* 16.86* 14.49*   HEMOGLOBIN g/dL 9.2* 8.1* 9.6* 8.2*   PLATELETS 10*3/mm3 397 285 346 329     Results from last 7 days   Lab Units 12/15/20  0633 20  0546 20  0535 20  0550   SODIUM mmol/L 137 136 136 135*    POTASSIUM mmol/L 3.7 4.0 3.5 3.8   CHLORIDE mmol/L 104 105 104 103   CO2 mmol/L 24.3 23.4 22.4 22.1   BUN mg/dL 9 10 10 8   CREATININE mg/dL 1.05* 1.02* 1.11* 1.01*   GLUCOSE mg/dL 73 119* 163* 212*   Estimated Creatinine Clearance: 53.7 mL/min (A) (by C-G formula based on SCr of 1.05 mg/dL (H)).  Results from last 7 days   Lab Units 12/14/20  0546 12/13/20  0535 12/11/20  0559 12/08/20  1506   ALBUMIN g/dL 2.00* 2.10* 2.20* 3.10*   BILIRUBIN mg/dL  --   --   --  0.4   ALK PHOS U/L  --   --   --  102   AST (SGOT) U/L  --   --   --  10   ALT (SGPT) U/L  --   --   --  6     Results from last 7 days   Lab Units 12/15/20  0633 12/14/20  0546 12/13/20  0535 12/12/20  0550  12/11/20  0559  12/08/20  1506   CALCIUM mg/dL 8.2* 7.7* 7.8* 7.8*  --  7.8*   < > 7.9*   ALBUMIN g/dL  --  2.00* 2.10*  --   --  2.20*  --  3.10*   MAGNESIUM mg/dL 2.0 1.6 1.8 1.6  --  2.5*   < > 1.0*   PHOSPHORUS mg/dL 3.1 2.8 2.9 2.4*   < > 1.5*  --   --     < > = values in this interval not displayed.     Results from last 7 days   Lab Units 12/12/20  0550 12/11/20  1530 12/09/20  0534   PROCALCITONIN ng/mL  --  8.89*  --    LACTATE mmol/L 0.9  --  0.9     COVID19   Date Value Ref Range Status   12/08/2020 Not Detected Not Detected - Ref. Range Final     Glucose   Date/Time Value Ref Range Status   12/15/2020 1137 116 70 - 130 mg/dL Final   12/15/2020 0619 75 70 - 130 mg/dL Final   12/14/2020 2044 105 70 - 130 mg/dL Final   12/14/2020 1651 96 70 - 130 mg/dL Final   12/14/2020 1131 163 (H) 70 - 130 mg/dL Final   12/14/2020 0604 114 70 - 130 mg/dL Final   12/13/2020 2013 133 (H) 70 - 130 mg/dL Final       CT Abdomen Pelvis With & Without Contrast  Narrative: CT ABDOMEN AND PELVIS WITH AND WITHOUT CONTRAST     HISTORY: Follow-up perinephric fluid collection drainage.     TECHNIQUE: Noncontrast axial images of the abdomen and pelvis were  obtained followed by administration of intravenous contrast and imaging  of the abdomen and pelvis in the  nephrographic and delayed phase.  Coronal and sagittal reformats were obtained.     COMPARISON: CT abdomen and pelvis from 12/08/2020.     FINDINGS:A percutaneous drain is seen within a previously demonstrated  right perinephric fluid collection with near complete resolution of the  fluid component. Enhancing soft tissue component/wall is seen along the  lateral aspect of the mid and lower pole of the kidney. There is also a  right ureteric stent appropriately placed with distal tip within the  bladder and proximally within the left anterior collecting system and a  malrotated kidney. There was previously a large calculus in this region  measuring up to 1 cm that is possibly smaller and displaced now  measuring 7 mm. There are also nonobstructing calculi within the lower  pole of the left kidney, image 69. Extensive perinephric stranding is  demonstrated. On the delayed images, no extravasation of contrast is  identified.     There are bilateral small layering pleural effusions with bibasilar  atelectasis. Liver and spleen are unchanged. Pancreas is normal without  ductal dilatation. Common bile duct is dilated, that likely represents  benign biliary ectasia. Bilateral adrenal glands are normal. There are  small retroperitoneal lymph nodes identified with the largest lymph node  at the level of the aortic bifurcation measuring up to 1.3 cm in short  axis dimension. This has grown in the interim from prior imaging where  it measured 8 mm. The urinary bladder is partially distended, small  amount of nondependent air likely related to catheterization. The uterus  is anteverted. No abnormal adnexal mass is seen. There is a large  right-sided ventral hernia containing small and large bowel loops. There  is an ileocolic anastomosis present. Air-fluid levels are seen  throughout the colon most suggestive of a mild ileus. Again demonstrated  is a small low-density region along the left rectus abdominis in the  paramidline  region measuring 2.5 x 1.8 cm, unchanged from prior imaging  and may represent a chronic postsurgical collection/seroma. Attention on  follow-up is recommended.     Impression: 1. Interim placement of a right ureteral stent, as well as a  percutaneous drain within a right perinephric collection with near  complete resolution of the collection. Enhancing soft tissue seen along  the lateral aspect of the mid and lower pole and anterior to the right  kidney may represent enhancing granulation tissue. Multiple mildly  prominent retroperitoneal lymph nodes at least a couple of which have  enlarged in the interim from prior imaging, the largest measuring 1.3 cm  at the level of the bifurcation. These may be reactive however continued  follow-up is recommended to rule out malignancy.  2. Mild colonic ileus.   3. Bilateral small layering pleural effusions with bibasilar  atelectasis.     Radiation dose reduction techniques were utilized, including automated  exposure control and exposure modulation based on body size.          Scheduled Medications  carvedilol, 12.5 mg, Oral, BID With Meals  citalopram, 10 mg, Oral, Daily  influenza vaccine, 0.5 mL, Intramuscular, Once  insulin glargine, 20 Units, Subcutaneous, Nightly  insulin lispro, 0-9 Units, Subcutaneous, TID AC  lisinopril, 20 mg, Oral, Daily  magnesium oxide, 400 mg, Oral, BID  micafungin (MYCAMINE) IV, 100 mg, Intravenous, Q24H    Infusions   Diet  Diet Regular; GI Soft       Assessment/Plan     Active Hospital Problems    Diagnosis  POA   • **Renal abscess [N15.1]  Yes   • Sepsis (CMS/HCC) [A41.9]  No   • Hypomagnesemia [E83.42]  Yes   • Hypocalcemia [E83.51]  Yes   • Stage 3a chronic kidney disease [N18.31]  Yes   • Type 2 diabetes mellitus (CMS/HCC) [E11.9]  Yes   • Renal stone [N20.0]  Yes   • Hypokalemia [E87.6]  Yes      Resolved Hospital Problems   No resolved problems to display.     65 y.o. female admitted with Renal abscess. Has history of multiple UTIs in  the past. Presented with urinary frequency and dysuria and found to have complicated pyleonephritis with fungal abscess vs. renal mass. Also had significant hypokalemia and hypomagnesemia.  Course has been complicated by new seizure most likely from hypomagnesemia. Underwent cystoscopy and right retrograde pyelogram and ureteroscopy and laser lithotripsy and right ureteral stent placement on 12/9. Had CT guided percutaneous drainage of right perinephric fluid on 12/10 with drain left in place.    Complicated pyelonephritis with fungal abscess, poa-ID and urology are consulted. Repeat CT abd/pelvis shows near complete resolution of the abscess, so urology recommends antimicrobial treatment with plan for ureteroscopy and laser lithotripsy in 2 weeks. ID has narrowed antibiotics to micafungin alone and recommends 4 weeks of iv therapy with every 2 week CT scans to document progression and improvement.    New onset seizure activity most likely from hypomagnesemia, poa-neurology was consulted. No new antiepileptics recommended. On magnesium replacement.     Hypokalemia, poa    Hypomagnesemia, poa    Hypophosphatemia, poa    DM2, uncontrolled on glipizide and metformin at home-a1c is 13. Started on basal insulin here (and should go home on insulin) and oral meds are held currently, but will be restarted at discharge. Needs diabetic education prior to discharge. Will consult diabetic educator today. Glucose control is a little too tight here, so will decrease basal insulin by 20%.  Anemia of chronic disease  History of left breast cancer s/p mastectomy in 2012  Essential hypertension on lisinopril at home. Coreg added here.  Migraines on prn rizatriptan at home    · SCDs for DVT prophylaxis.  · Full code.  · Discussed with patient.    Okay for discharge once has met with diabetic educator and outpatient CT scans and iv antimicrobials have been set up.       Bunny Hannah MD  [facility]  12/15/20  14:19 EST    Patient was wearing facemask when I entered the room and throughout our encounter.  I wore protective equipment throughout this patient encounter including a face mask, gloves and protective eyewear.  Hand hygiene was performed before donning protective equipment and after removal when leaving the room.

## 2020-12-16 PROBLEM — E83.42 HYPOMAGNESEMIA: Status: RESOLVED | Noted: 2020-12-10 | Resolved: 2020-12-16

## 2020-12-16 PROBLEM — A41.9 SEPSIS (HCC): Status: RESOLVED | Noted: 2020-12-11 | Resolved: 2020-12-16

## 2020-12-16 LAB
ANION GAP SERPL CALCULATED.3IONS-SCNC: 6.7 MMOL/L (ref 5–15)
BACTERIA SPEC AEROBE CULT: NORMAL
BACTERIA SPEC AEROBE CULT: NORMAL
BUN SERPL-MCNC: 6 MG/DL (ref 8–23)
BUN/CREAT SERPL: 6.1 (ref 7–25)
CALCIUM SPEC-SCNC: 7.9 MG/DL (ref 8.6–10.5)
CHLORIDE SERPL-SCNC: 104 MMOL/L (ref 98–107)
CO2 SERPL-SCNC: 27.3 MMOL/L (ref 22–29)
CREAT SERPL-MCNC: 0.99 MG/DL (ref 0.57–1)
DEPRECATED RDW RBC AUTO: 38.2 FL (ref 37–54)
ERYTHROCYTE [DISTWIDTH] IN BLOOD BY AUTOMATED COUNT: 13.3 % (ref 12.3–15.4)
GFR SERPL CREATININE-BSD FRML MDRD: 56 ML/MIN/1.73
GLUCOSE BLDC GLUCOMTR-MCNC: 119 MG/DL (ref 70–130)
GLUCOSE BLDC GLUCOMTR-MCNC: 122 MG/DL (ref 70–130)
GLUCOSE BLDC GLUCOMTR-MCNC: 132 MG/DL (ref 70–130)
GLUCOSE BLDC GLUCOMTR-MCNC: 188 MG/DL (ref 70–130)
GLUCOSE SERPL-MCNC: 70 MG/DL (ref 65–99)
HCT VFR BLD AUTO: 27.3 % (ref 34–46.6)
HGB BLD-MCNC: 8.8 G/DL (ref 12–15.9)
MCH RBC QN AUTO: 25.9 PG (ref 26.6–33)
MCHC RBC AUTO-ENTMCNC: 32.2 G/DL (ref 31.5–35.7)
MCV RBC AUTO: 80.3 FL (ref 79–97)
PLATELET # BLD AUTO: 375 10*3/MM3 (ref 140–450)
PMV BLD AUTO: 9.4 FL (ref 6–12)
POTASSIUM SERPL-SCNC: 3.3 MMOL/L (ref 3.5–5.2)
POTASSIUM SERPL-SCNC: 4 MMOL/L (ref 3.5–5.2)
RBC # BLD AUTO: 3.4 10*6/MM3 (ref 3.77–5.28)
SODIUM SERPL-SCNC: 138 MMOL/L (ref 136–145)
WBC # BLD AUTO: 14.3 10*3/MM3 (ref 3.4–10.8)

## 2020-12-16 PROCEDURE — 25010000002 MICAFUNGIN SODIUM 100 MG RECONSTITUTED SOLUTION: Performed by: INTERNAL MEDICINE

## 2020-12-16 PROCEDURE — 84132 ASSAY OF SERUM POTASSIUM: CPT | Performed by: STUDENT IN AN ORGANIZED HEALTH CARE EDUCATION/TRAINING PROGRAM

## 2020-12-16 PROCEDURE — 82962 GLUCOSE BLOOD TEST: CPT

## 2020-12-16 PROCEDURE — 85027 COMPLETE CBC AUTOMATED: CPT | Performed by: STUDENT IN AN ORGANIZED HEALTH CARE EDUCATION/TRAINING PROGRAM

## 2020-12-16 PROCEDURE — 80048 BASIC METABOLIC PNL TOTAL CA: CPT | Performed by: STUDENT IN AN ORGANIZED HEALTH CARE EDUCATION/TRAINING PROGRAM

## 2020-12-16 PROCEDURE — 63710000001 INSULIN GLARGINE PER 5 UNITS: Performed by: STUDENT IN AN ORGANIZED HEALTH CARE EDUCATION/TRAINING PROGRAM

## 2020-12-16 PROCEDURE — 63710000001 INSULIN LISPRO (HUMAN) PER 5 UNITS: Performed by: UROLOGY

## 2020-12-16 RX ORDER — CARVEDILOL 12.5 MG/1
12.5 TABLET ORAL 2 TIMES DAILY WITH MEALS
Qty: 60 TABLET | Refills: 0 | OUTPATIENT
Start: 2020-12-16

## 2020-12-16 RX ORDER — HYDRALAZINE HYDROCHLORIDE 25 MG/1
25 TABLET, FILM COATED ORAL ONCE
Status: COMPLETED | OUTPATIENT
Start: 2020-12-16 | End: 2020-12-16

## 2020-12-16 RX ORDER — ACETAMINOPHEN 325 MG/1
650 TABLET ORAL EVERY 4 HOURS PRN
Start: 2020-12-16

## 2020-12-16 RX ORDER — BLOOD SUGAR DIAGNOSTIC
1 STRIP MISCELLANEOUS NIGHTLY
Qty: 30 EACH | Refills: 0 | OUTPATIENT
Start: 2020-12-16

## 2020-12-16 RX ADMIN — POTASSIUM CHLORIDE 40 MEQ: 750 TABLET, EXTENDED RELEASE ORAL at 09:19

## 2020-12-16 RX ADMIN — LISINOPRIL 20 MG: 20 TABLET ORAL at 09:19

## 2020-12-16 RX ADMIN — LABETALOL HYDROCHLORIDE 10 MG: 5 INJECTION, SOLUTION INTRAVENOUS at 20:38

## 2020-12-16 RX ADMIN — POTASSIUM CHLORIDE 40 MEQ: 750 TABLET, EXTENDED RELEASE ORAL at 14:30

## 2020-12-16 RX ADMIN — MAGNESIUM OXIDE 400 MG (241.3 MG MAGNESIUM) TABLET 400 MG: TABLET at 09:19

## 2020-12-16 RX ADMIN — CITALOPRAM 10 MG: 10 TABLET, FILM COATED ORAL at 09:19

## 2020-12-16 RX ADMIN — MICAFUNGIN SODIUM 100 MG: 100 INJECTION, POWDER, LYOPHILIZED, FOR SOLUTION INTRAVENOUS at 17:26

## 2020-12-16 RX ADMIN — CARVEDILOL 12.5 MG: 12.5 TABLET, FILM COATED ORAL at 17:24

## 2020-12-16 RX ADMIN — INSULIN LISPRO 2 UNITS: 100 INJECTION, SOLUTION INTRAVENOUS; SUBCUTANEOUS at 17:24

## 2020-12-16 RX ADMIN — HYDRALAZINE HYDROCHLORIDE 25 MG: 25 TABLET, FILM COATED ORAL at 05:49

## 2020-12-16 RX ADMIN — MAGNESIUM OXIDE 400 MG (241.3 MG MAGNESIUM) TABLET 400 MG: TABLET at 20:38

## 2020-12-16 RX ADMIN — LABETALOL HYDROCHLORIDE 10 MG: 5 INJECTION, SOLUTION INTRAVENOUS at 04:18

## 2020-12-16 RX ADMIN — INSULIN GLARGINE 16 UNITS: 100 INJECTION, SOLUTION SUBCUTANEOUS at 20:39

## 2020-12-16 RX ADMIN — CARVEDILOL 12.5 MG: 12.5 TABLET, FILM COATED ORAL at 09:19

## 2020-12-16 RX ADMIN — MICAFUNGIN SODIUM 100 MG: 100 INJECTION, POWDER, LYOPHILIZED, FOR SOLUTION INTRAVENOUS at 00:02

## 2020-12-16 NOTE — DISCHARGE SUMMARY
Patient Name: Lyubov Middleton  : 1955  MRN: [id number]    Date of Admission: 2020  Date of Discharge:  2020  Primary Care Physician: Ana Huitron MD      Chief Complaint:   Abdominal Pain and Difficulty Urinating      Discharge Diagnoses     Active Hospital Problems    Diagnosis  POA   • **Renal abscess [N15.1]  Yes   • Sepsis (CMS/HCC) [A41.9]  No   • Hypomagnesemia [E83.42]  Yes   • Hypocalcemia [E83.51]  Yes   • Stage 3a chronic kidney disease [N18.31]  Yes   • Type 2 diabetes mellitus (CMS/HCC) [E11.9]  Yes   • Renal stone [N20.0]  Yes   • Hypokalemia [E87.6]  Yes      Resolved Hospital Problems   No resolved problems to display.        Hospital Course     Ms. Middleton is a 65 y.o. female former smoker with a history of CKD3, DM2, breast cancer and frequent UTIs who presented to [facility] initially complaining of abdominal pain as well as urinary symptoms.  Please see the admitting history and physical for further details.  She was found to have complicated pyelonephritis with fungal abscess versus renal mass. She also had several electrolyte abnormalities with low potassium and magnesium. She was seen in consultation by Urology as well as infectious disease.   Urology was consulted and she underwent cystoscopy and right retrograde pyelogram and ureteroscopy and laser lithotripsy with right ureteral stent placement on  with Dr. Dooley as CT scan on admission showed large right upper pole infundibular calculus with likely calyceal rupture and perinephric urinoma/abscess. She also had a drain placed by IR on 12/10. She was treated with antibiotics and repeat CT imaging from  showed near complete resolution of the abscess. Urology has recommended to continue the percutaneous drain and plans for ureteroscopy and laser lithotripsy in 2 weeks.    Unfortunately, after having her procedure on  she had a new onset of seizure activity and Neurology was consulted. CT  head showed no acute findings and her symptoms were felt secondary to low magnesium. She is now on schedule magnesium replacement and Neurology has signed off with no further work up planned. She will have her potassium replaced prior to discharge as it is at 3.3. Otherwise, her electrolyte abnormalities have resolved by the day of discharge. Seizure Precautions: Patient is not to drive for at least 3 months until cleared by a physician, operate heavy machinery, take tub baths, swim unattended, climb heights, or perform any other activities that can bring harm to himself/herself or others during an episode of altered awareness.   For antibiotic coverage, Infectious disease followed. Operative cultures were positive for candida glabrata with no evidence of gram negative rods. Meropenem was discontinued and IV mycamine (100 mg daily from 12/13/20) will be continued for 4 weeks with periodic assessments with every 2 week CT scans to document progression/improvement. PICC line was placed yesterday and Dr. Plunkett would like weekly CBC, CMP with abnormal results called to him at [phone]. Home health is being set up for this reason.   The patient did have some elevated BP readings overnight. However, this occurred after Bps had to be taken on her lower extremities due to her PICC line and prior breast cancer. Coreg was already added for BP control during the stay and her last BP was 163/87 on the left leg. Given accuracy is in question, would recommend continued lisinopril and Coreg at discharge with follow up with her PCP in 1 week for monitoring.    On admission, she was found to have an A1c of 13 with prior use of glipizide and metformin. She was started on basal insulin here and her sugars have improved. She will need to be sent home on Lantus at 16 units nightly. Would recommend holding her oral agents with glipizide (to avoid hypoglycemia) as well as metformin given recent contrast and need for repeat CT scans in  2 weeks with contrast. The diabetes educator has met with her and she will need to check her sugars ACHS at home. She should follow up with her PCP in 1-2 weeks for further monitoring and keep a log of her sugars home.     Day of Discharge     HPI:   On the day of discharge, she has tolerated a diet and denies any pain, nausea or vomiting. She denies any chest pain, dyspnea, cough, fever or chills. She will go home with her PICC line and home health for further care. She needs to follow up with ID as well as Urology as advised above.    Physical Exam:  Temp:  [97.6 °F (36.4 °C)-98.3 °F (36.8 °C)] 97.7 °F (36.5 °C)  Heart Rate:  [53-66] 61  Resp:  [18] 18  BP: (157-210)/(69-95) 191/82  Body mass index is 27.9 kg/m².     Physical Exam  Vitals signs and nursing note reviewed.   Constitutional:       General: She is not in acute distress.     Appearance: Normal appearance. She is not toxic-appearing.   HENT:      Head: Normocephalic and atraumatic.      Right Ear: External ear normal.      Left Ear: External ear normal.      Nose: Nose normal.   Eyes:      General:         Right eye: No discharge.         Left eye: No discharge.      Conjunctiva/sclera: Conjunctivae normal.   Neck:      Musculoskeletal: Normal range of motion and neck supple.   Cardiovascular:      Rate and Rhythm: Normal rate and regular rhythm.      Pulses: Normal pulses.      Heart sounds: Normal heart sounds.   Pulmonary:      Effort: Pulmonary effort is normal. No respiratory distress.      Breath sounds: Normal breath sounds.   Abdominal:      General: Bowel sounds are normal. There is no distension.      Palpations: Abdomen is soft.      Tenderness: There is no abdominal tenderness.   Musculoskeletal: Normal range of motion.         General: No swelling.   Skin:     General: Skin is warm and dry.      Findings: No bruising.      Comments: Right sided abdominal drain in place   Neurological:      Mental Status: She is alert and oriented to person,  place, and time.      Sensory: No sensory deficit.      Motor: Weakness present.      Coordination: Coordination normal.   Psychiatric:         Mood and Affect: Mood normal.         Behavior: Behavior normal.       Consultants     Consult Orders (all) (From admission, onward)     Start     Ordered    12/15/20 1037  IV TEAM - Consult for PICC Line (IV Team to Determine Number of Lumens)  Once     Provider:  (Not yet assigned)    12/15/20 1037    12/10/20 1206  Inpatient Diabetes Educator Consult  Once     Provider:  (Not yet assigned)    12/10/20 1205    12/09/20 1652  Inpatient Neurology Consult General  STAT     Specialty:  Neurology  Provider:  Vanessa Nicholas MD    12/09/20 1652    12/08/20 2015  Inpatient Urology Consult  Once     Specialty:  Urology  Provider:  Enio Sierra MD    12/08/20 2014 12/08/20 2013  Inpatient Infectious Diseases Consult  Once     Specialty:  Infectious Diseases  Provider:  Tricia Plunkett MD    12/08/20 2012                Procedures     CYSTOSCOPY, RIGHT RETROGRADE PYELOGRAM, URETEROSCOPY, LASER LITHOTRIPSY, RIGHT URETERAL STENT PLACEMENT    Imaging Results (All)     Procedure Component Value Units Date/Time    CT Abdomen Pelvis With & Without Contrast [[id number]] Collected: 12/14/20 0935     Updated: 12/15/20 1448    Narrative:      CT ABDOMEN AND PELVIS WITH AND WITHOUT CONTRAST     HISTORY: Follow-up perinephric fluid collection drainage.     TECHNIQUE: Noncontrast axial images of the abdomen and pelvis were  obtained followed by administration of intravenous contrast and imaging  of the abdomen and pelvis in the nephrographic and delayed phase.  Coronal and sagittal reformats were obtained.     COMPARISON: CT abdomen and pelvis from 12/08/2020.     Findings: a percutaneous drain is seen within a previously demonstrated  right perinephric fluid collection with near complete resolution of the  fluid component. Enhancing soft tissue component/wall is seen along  the  lateral aspect of the mid and lower pole of the kidney. There is also a  right ureteric stent appropriately placed with distal tip within the  bladder and proximally within the left anterior collecting system and a  malrotated kidney. There was previously a large calculus in this region  measuring up to 1 cm that is possibly smaller and displaced now  measuring 7 mm. There are also nonobstructing calculi within the lower  pole of the left kidney, image 69. Extensive perinephric stranding is  demonstrated. On the delayed images, no extravasation of contrast is  identified.     There are bilateral small layering pleural effusions with bibasilar  atelectasis. Liver and spleen are unchanged. Pancreas is normal without  ductal dilatation. Common bile duct is dilated, that likely represents  benign biliary ectasia. Bilateral adrenal glands are normal. There are  small retroperitoneal lymph nodes identified with the largest lymph node  at the level of the aortic bifurcation measuring up to 1.3 cm in short  axis dimension. This has grown in the interim from prior imaging where  it measured 8 mm. The urinary bladder is partially distended, small  amount of nondependent air likely related to catheterization. The uterus  is anteverted. No abnormal adnexal mass is seen. There is a large  right-sided ventral hernia containing small and large bowel loops. There  is an ileocolic anastomosis present. Air-fluid levels are seen  throughout the colon most suggestive of a mild ileus. Again demonstrated  is a small low-density region along the left rectus abdominis in the  paramidline region measuring 2.5 x 1.8 cm, unchanged from prior imaging  and may represent a chronic postsurgical collection/seroma. Attention on  follow-up is recommended.       Impression:      1. Interim placement of a right ureteral stent, as well as a  percutaneous drain within a right perinephric collection with near  complete resolution of the collection.  Enhancing soft tissue seen along  the lateral aspect of the mid and lower pole and anterior to the right  kidney may represent enhancing granulation tissue. Multiple mildly  prominent retroperitoneal lymph nodes at least a couple of which have  enlarged in the interim from prior imaging, the largest measuring 1.3 cm  at the level of the bifurcation. These may be reactive however continued  follow-up is recommended to rule out malignancy.  2. Mild colonic ileus.   3. Bilateral small layering pleural effusions with bibasilar  atelectasis.     Radiation dose reduction techniques were utilized, including automated  exposure control and exposure modulation based on body size.     This report was finalized on 12/15/2020 2:45 PM by Dr. Brianne Amadro M.D.       CT Guided Abscess Drain Peritoneal [[id number]] Collected: 12/10/20 1444     Updated: 12/10/20 1509    Narrative:      CT-GUIDED ASPIRATION AND CATHETER PLACEMENT INTO RIGHT ABDOMINAL ABSCESS  12/10/2020     HISTORY: Right perinephric fluid collection.     After signed informed consent was obtained patient was prepped and  draped in the supine position. Lidocaine was used for local anesthesia.     18-gauge needle was introduced into the heterogeneous fluid collection  in the right perinephric region. Purulent fluid was visualized.     0.18 wire was passed and this was followed by placement of an 8 [place]  pigtail catheter into the fluid collection. Approximately 320 cc of  purulent fluid was removed. Confirmatory images were obtained.     The catheter was left in place and connected to suction bulb drainage. A  fluid sample was sent to the laboratory for evaluation.     Patient tolerated the procedure well with no complications.     Radiation dose reduction techniques were utilized, including automated  exposure control and exposure modulation based on body size.     This report was finalized on 12/10/2020 3:06 PM by Dr. Sahil Fabian M.D.       CT Head  Without Contrast [[id number]] Collected: 12/09/20 1717     Updated: 12/09/20 2029    Narrative:      CT HEAD WITHOUT CONTRAST     HISTORY: Mental status changes. Seizure.     COMPARISON: None.     FINDINGS: The study is hampered by patient motion. There is  mild-to-moderate atrophy.  Mild-to-moderate vascular calcification is  noted. There is no evidence of intracranial hemorrhage, hydrocephalus or  of abnormal extra-axial fluid. No focal area of decreased attenuation to  suggest acute infarction is identified. Moderate vascular calcification  is appreciated.       Impression:      Atrophy and moderate vascular calcification is noted. There  is no evidence of acute infarction or hemorrhage. Further evaluation  could be performed with MRI examination of the brain, particularly if  the patient has a history of new onset seizure activity. The above  information was called to the patient's nurse at the time of the  dictation. The patient's nurse is to immediately relay the information  to the clinical service.           Radiation dose reduction techniques were utilized, including automated  exposure control and exposure modulation based on body size.     This report was finalized on 12/9/2020 8:26 PM by Dr. Harris Sylvester M.D.       FL Retrograde Pyelogram In OR [[id number]] Collected: 12/09/20 1521     Updated: 12/09/20 1529    Narrative:      RETROGRADE PYELOGRAM     HISTORY: Right ureteral stone and large right perinephric fluid  collection extending inferiorly noted on yesterday's CT scan.     FINDINGS: Imaging in the operating room shows contrast partially filling  the right ureter and intrarenal collecting system and no definite  obstructing stone is identified. There is some distortion of the  intrarenal collecting system in part related to a large perinephric  fluid collection and soft tissue changes as noted on yesterday's CT  scan. The CT scan appearance also raises the concern of underlying  neoplasm and  should be correlated with the operative findings as well as  follow-up CT scan. Please also see the operative report.     6 images were obtained and the fluoroscopy time measures 9 seconds.     This report was finalized on 12/9/2020 3:26 PM by Dr. Tam Perez M.D.       CT Abdomen Pelvis With Contrast [[id number]] Collected: 12/08/20 1938     Updated: 12/08/20 1943    Narrative:      CT ABDOMEN AND PELVIS WITH IV CONTRAST     HISTORY: 65 year old female with abdominal pain, dysuria over the past 2  weeks. History of multiple UTIs.     TECHNIQUE:  CT includes axial imaging from the lung bases to the  trochanters with intravenous contrast and without use of oral contrast.  Data reconstructed in coronal and sagittal planes.     COMPARISON: None     FINDINGS: There is a large multilobular peripherally enhancing, complex  collection centered below the right kidney within the right abdomen.  This collection contacts the anterolateral right psoas muscle and  contacts and appears to partially invade the right lateral abdominal  musculature measuring 11 x 10.3 cm axial dimension and extending 12.5 cm  craniocaudal dimension. Between this collection and the right kidney  there is a greater degree of enhancement. This appears more solid in  appearance suspected to represent a mass. There is indentation of the  adjacent inferior, lateral right renal cortex. 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 Low-density appears to  arise from the renal cortex. There is no evidence for extension into the  renal pelvis or involvement of the urothelium. Delayed phase imaging  demonstrates no extension of contrast into this mass or the low-density  below the right kidney. There is a posterior right renal cyst that  measures approximately 3 cm diameter. Right renal calyceal/infundibular  stone measures 1 cm. There is also a 0.8 cm right renal stone and a 3 mm  right renal calyceal stone. A 1 cm left renal  low-density lesion is most  likely a cyst though difficult to definitively characterize.     There is stranding/inflammation surrounding this mass/collection within  the right abdomen and there is displacement of adjacent bowel loops. The  splenic size is normal. The adrenal glands appear normal. There is mild  diffuse fat infiltration of the liver. Pancreas appears within normal  limits. There are several subcentimeter retroperitoneal lymph nodes  without enlargement. Lung bases appear clear.     There is diastases of the rectus sheath with herniation of fat and bowel  loops into the right anterior abdomen and pelvic wall. Fluid-filled  small bowel loops are present which contain air-fluid levels and this  may be associated with a mild ileus. There is no dilatation to suggest  obstruction. Within the midline, infraumbilical abdominal wall just deep  to the rectus abdominis muscle curvature there is a low-density mass or  collection that measures 3.2 x 1.7 cm. This could represent a chronic  postsurgical collection. Bone windows demonstrate no osseous lesion.     FINDINGS:   1. Large multilobular right abdominal mass/collection measures  approximately 11 x 10.3 x 12.5 cm. This appears to arise from the right  kidney and at the upper margin of this lesion there is masslike  thickening suspicious for renal cell carcinoma with complex adjacent  collection or abscess with apparent invasion of the right lateral  abdominal wall. Urologic consultation recommended. Biopsy or attempt at  CT-guided drainage could be performed under CT guidance if indicated.  2. Right renal calyceal infundibular stones without hydronephrosis.  Right renal cysts. A 1 cm left renal low-density lesion is most likely a  cyst though difficult to definitely characterize.  3. 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 There is an infraumbilical  collection or low-density lesion along the  deep margin of the rectus  abdominis musculature. This could represent a small chronic seroma  associated with previous surgery.     Discussed with ANSELMO Sutton, in the emergency department 12/08/2020  at 7:10 PM.     Radiation dose reduction techniques were utilized, including automated  exposure control and exposure modulation based on body size.     This report was finalized on 12/8/2020 7:40 PM by Dr. Hawk Bonilla M.D.                     Pertinent Labs     Results from last 7 days   Lab Units 12/16/20  0425 12/15/20  0633 12/14/20  0546 12/13/20  0535   WBC 10*3/mm3 14.30* 15.26* 14.44* 16.86*   HEMOGLOBIN g/dL 8.8* 9.2* 8.1* 9.6*   PLATELETS 10*3/mm3 375 397 285 346     Results from last 7 days   Lab Units 12/16/20  0425 12/15/20  0633 12/14/20  0546 12/13/20  0535   SODIUM mmol/L 138 137 136 136   POTASSIUM mmol/L 3.3* 3.7 4.0 3.5   CHLORIDE mmol/L 104 104 105 104   CO2 mmol/L 27.3 24.3 23.4 22.4   BUN mg/dL 6* 9 10 10   CREATININE mg/dL 0.99 1.05* 1.02* 1.11*   GLUCOSE mg/dL 70 73 119* 163*   Estimated Creatinine Clearance: 56.5 mL/min (by C-G formula based on SCr of 0.99 mg/dL).  Results from last 7 days   Lab Units 12/14/20 0546 12/13/20  0535 12/11/20  0559   ALBUMIN g/dL 2.00* 2.10* 2.20*     Results from last 7 days   Lab Units 12/16/20  0425 12/15/20  0633 12/14/20  0546 12/13/20  0535 12/12/20  0550  12/11/20  0559   CALCIUM mg/dL 7.9* 8.2* 7.7* 7.8* 7.8*  --  7.8*   ALBUMIN g/dL  --   --  2.00* 2.10*  --   --  2.20*   MAGNESIUM mg/dL  --  2.0 1.6 1.8 1.6  --  2.5*   PHOSPHORUS mg/dL  --  3.1 2.8 2.9 2.4*   < > 1.5*    < > = values in this interval not displayed.       Results from last 7 days   Lab Units 12/10/20  0540   CK TOTAL U/L 69     Results from last 7 days   Lab Units 12/14/20  0546   URIC ACID mg/dL 2.4         Invalid input(s): LDLCALC  Results from last 7 days   Lab Units 12/11/20  1530 12/11/20  0149 12/10/20  1205 12/09/20  2328 12/09/20  1059   BLOODCX  No growth at 4 days   No growth at 4 days  --   --  No growth at 5 days  No growth at 5 days No growth at 5 days   BODYFLDCX   --   --  Light growth (2+) Candida glabrata*  --   --    URINECX   --  Yeast isolated*  --   --   --        Test Results Pending at Discharge   None  Pending Labs     Order Current Status    Blood Culture - Blood, Arm, Right Preliminary result    Blood Culture - Blood, Hand, Right Preliminary result          Discharge Details        Discharge Medications      ASK your doctor about these medications      Instructions Start Date   citalopram 10 MG tablet  Commonly known as: CeleXA   10 mg, Oral, Daily      glipizide 5 MG tablet  Commonly known as: GLUCOTROL   5 mg, Oral, 2 Times Daily Before Meals      linagliptin 5 MG tablet tablet  Commonly known as: TRADJENTA   5 mg, Oral, Daily      lisinopril 20 MG tablet  Commonly known as: PRINIVIL,ZESTRIL   20 mg, Oral, Daily      metFORMIN 500 MG tablet  Commonly known as: GLUCOPHAGE   500 mg, Oral, 2 Times Daily With Meals      rizatriptan 10 MG tablet  Commonly known as: MAXALT   TAKE 1 TABLET BY MOUTH EVERY DAY AS NEEDED (MAX OF 2 PER 24 HOURS)             No Known Allergies      Discharge Disposition:      Discharge Diet:  Diet Order   Procedures   • Diet Regular; GI Soft       Discharge Activity:       CODE STATUS:    Code Status and Medical Interventions:   Ordered at: 12/08/20 2012     Code Status:    CPR     Medical Interventions (Level of Support Prior to Arrest):    Full       No future appointments.    Time Spent on Discharge:  Greater than 30 minutes      Electronically signed by BECKY Adames, 12/16/2020, 10:34 EST

## 2020-12-16 NOTE — NURSING NOTE
Pt's BP elevated during shift. PRN labetalol administered x2, HR over 60 each administration. BP with no improvement. RN called and spoke with BECKY Hill, see orders. RN explained that pt;s BP can only be taken on her bilateral legs d/t PICC in the right arm and history of left mastectomy. Will continue to monitor.

## 2020-12-16 NOTE — NURSING NOTE
"Diabetes Education  Assessment/Teaching    Patient Name:  Lyubov Middleton  YOB: 1955  MRN: [id number]  Admit Date:  12/8/2020      Assessment Date:  12/16/2020    Most Recent Value   General Information    Referral From:  MD lin. [facility]. Second f/u with 64 y/o at bedside to continue insulin teaching.    Height  162.6 cm (64.02\")   Height Method  Stated   Weight  73.8 kg (162 lb 9.6 oz)   Weight Method  Standing scale   Diabetes History   What type of diabetes do you have?  Type 2   Length of Diabetes Diagnosis  10 + years   Do you test your blood sugar at home?  no   Have you had high blood sugar? (>140mg/dl)  yes -a1C on admission >13%.   Education Preferences   Barriers to Learning  -- pt initially appeared frustrated or angry stating noone has talked with her about taking insulin at home. Attempt to explain to pt that there are multiple names/brands of long acting insulin. \"I don't care what kind of insulin,\" it is pt snapped.    Assessment Topics   Taking Medication - Assessment  Needs education. anticipate pt to dc new to Lantus/insulin per MD notes.    Problem Solving - Assessment  Needs education   Healthy Coping - Assessment  Competent   DM Goals   Contact Plan  Follow-up medical care with primary care.             Most Recent Value   DM Education Needs   Meter  Has own   Frequency of Testing  2 times a day [or frequency per MD.]   Medication  Insulin, Administration with syringe. review with pt Lantus name/axn/frequency and how to draw up insulin from a vial.    Problem Solving  Hypoglycemia, Symptoms, Treatment   Healthy Coping  Appropriate   Discharge Plan  Home, Follow-up with MD   Motivation  Moderate   Teaching Method  Explanation, Discussion, Demonstration, Handouts, Teach back   Patient Response  Needs reinforcement. Pt returned correct demo for insulin injection (with NS.)      Other Comments:  Talk with bedside RN about DM ed progress and need for reinforcement.   Electronically " signed by:  Petty Conte, RN, BSN, CDE  12/16/20 12:41 EST

## 2020-12-16 NOTE — PROGRESS NOTES
"   LOS: 7 days   Patient Care Team:  Ana Huitron MD as PCP - General (Family Medicine)      Subjective   Interval History: Comfortable.     Objective     ROS   12 POINT NEG ROS PERTINENT IN HPI      Vital Signs  Temp:  [97.6 °F (36.4 °C)-98.3 °F (36.8 °C)] 97.7 °F (36.5 °C)  Heart Rate:  [53-66] 59  Resp:  [18] 18  BP: (157-197)/(69-95) 197/90      Intake/Output Summary (Last 24 hours) at 12/16/2020 0659  Last data filed at 12/16/2020 0004  Gross per 24 hour   Intake 1160 ml   Output 5 ml   Net 1155 ml       Flowsheet Rows      First Filed Value   Admission Height  162.6 cm (64\") Documented at 12/08/2020 1453   Admission Weight  64.9 kg (143 lb) Documented at 12/08/2020 1453          Physical Exam:     General aakash: alert, cooperative, oriented  Scant drainage from retroperitoneal drain  Skin:  Skin color, texture, turgor, normal no rashes        Results Review:     I reviewed the patient's new clinical results.  Lab Results (all)     Procedure Component Value Units Date/Time    POC Glucose Once [[id number]]  (Normal) Collected: 12/16/20 0610    Specimen: Blood Updated: 12/16/20 0611     Glucose 122 mg/dL     Basic Metabolic Panel [[id number]]  (Abnormal) Collected: 12/16/20 0425    Specimen: Blood Updated: 12/16/20 0519     Glucose 70 mg/dL      BUN 6 mg/dL      Creatinine 0.99 mg/dL      Sodium 138 mmol/L      Potassium 3.3 mmol/L      Chloride 104 mmol/L      CO2 27.3 mmol/L      Calcium 7.9 mg/dL      eGFR Non African Amer 56 mL/min/1.73      BUN/Creatinine Ratio 6.1     Anion Gap 6.7 mmol/L     Narrative:      GFR Normal >60  Chronic Kidney Disease <60  Kidney Failure <15      CBC (No Diff) [[id number]]  (Abnormal) Collected: 12/16/20 0425    Specimen: Blood Updated: 12/16/20 0452     WBC 14.30 10*3/mm3      RBC 3.40 10*6/mm3      Hemoglobin 8.8 g/dL      Hematocrit 27.3 %      MCV 80.3 fL      MCH 25.9 pg      MCHC 32.2 g/dL      RDW 13.3 %      RDW-SD 38.2 fl      MPV 9.4 fL      Platelets 375 10*3/mm3     POC " Glucose Once [[id number]]  (Abnormal) Collected: 12/15/20 2033    Specimen: Blood Updated: 12/15/20 2034     Glucose 155 mg/dL     POC Glucose Once [[id number]]  (Abnormal) Collected: 12/15/20 1703    Specimen: Blood Updated: 12/15/20 1706     Glucose 167 mg/dL     Blood Culture - Blood, Hand, Right [[id number]] Collected: 12/11/20 1530    Specimen: Blood from Hand, Right Updated: 12/15/20 1545     Blood Culture No growth at 4 days    Blood Culture - Blood, Arm, Right [[id number]] Collected: 12/11/20 1530    Specimen: Blood from Arm, Right Updated: 12/15/20 1545     Blood Culture No growth at 4 days    POC Glucose Once [[id number]]  (Normal) Collected: 12/15/20 1137    Specimen: Blood Updated: 12/15/20 1153     Glucose 116 mg/dL     Vitamin B12 [[id number]]  (Abnormal) Collected: 12/15/20 0633    Specimen: Blood Updated: 12/15/20 0755     Vitamin B-12 1,252 pg/mL     Narrative:      Results may be falsely increased if patient taking Biotin.      Folate [[id number]]  (Normal) Collected: 12/15/20 0633    Specimen: Blood Updated: 12/15/20 0755     Folate 6.20 ng/mL     Narrative:      Results may be falsely increased if patient taking Biotin.      Magnesium [[id number]]  (Normal) Collected: 12/15/20 0633    Specimen: Blood Updated: 12/15/20 0753     Magnesium 2.0 mg/dL     Basic Metabolic Panel [[id number]]  (Abnormal) Collected: 12/15/20 0633    Specimen: Blood Updated: 12/15/20 0741     Glucose 73 mg/dL      BUN 9 mg/dL      Creatinine 1.05 mg/dL      Sodium 137 mmol/L      Potassium 3.7 mmol/L      Chloride 104 mmol/L      CO2 24.3 mmol/L      Calcium 8.2 mg/dL      eGFR Non African Amer 53 mL/min/1.73      BUN/Creatinine Ratio 8.6     Anion Gap 8.7 mmol/L     Narrative:      GFR Normal >60  Chronic Kidney Disease <60  Kidney Failure <15      Phosphorus [[id number]]  (Normal) Collected: 12/15/20 0633    Specimen: Blood Updated: 12/15/20 0741     Phosphorus 3.1 mg/dL     CBC (No Diff) [[id number]]  (Abnormal) Collected:  12/15/20 0633    Specimen: Blood Updated: 12/15/20 0727     WBC 15.26 10*3/mm3      RBC 3.67 10*6/mm3      Hemoglobin 9.2 g/dL      Hematocrit 29.5 %      MCV 80.4 fL      MCH 25.1 pg      MCHC 31.2 g/dL      RDW 13.1 %      RDW-SD 38.1 fl      MPV 9.7 fL      Platelets 397 10*3/mm3     Body Fluid Culture - Body Fluid, Retroperitoneum [[id number]]  (Abnormal)  (Susceptibility) Collected: 12/10/20 1205    Specimen: Body Fluid from Retroperitoneum Updated: 12/15/20 0637     Body Fluid Culture Light growth (2+) Candida glabrata     Gram Stain Many (4+) WBCs seen      No organisms seen    Susceptibility      Candida glabrata     MARKO Not Specified     Fluconazole  Susceptible     Micafungin Susceptible                       POC Glucose Once [[id number]]  (Normal) Collected: 12/15/20 0619    Specimen: Blood Updated: 12/15/20 0622     Glucose 75 mg/dL     Blood Culture - Blood, Arm, Left [[id number]] Collected: 12/09/20 2328    Specimen: Blood from Arm, Left Updated: 12/15/20 0015     Blood Culture No growth at 5 days    Blood Culture - Blood, Arm, Right [[id number]] Collected: 12/09/20 2328    Specimen: Blood from Arm, Right Updated: 12/15/20 0015     Blood Culture No growth at 5 days    POC Glucose Once [[id number]]  (Normal) Collected: 12/14/20 2044    Specimen: Blood Updated: 12/14/20 2046     Glucose 105 mg/dL     Clostridium Difficile Toxin - Stool, Per Rectum [[id number]]  (Normal) Collected: 12/14/20 1625    Specimen: Stool from Per Rectum Updated: 12/14/20 1749    Narrative:      The following orders were created for panel order Clostridium Difficile Toxin - Stool, Per Rectum.  Procedure                               Abnormality         Status                     ---------                               -----------         ------                     Clostridium Difficile To...[[id number]]  Normal              Final result                 Please view results for these tests on the individual orders.    Clostridium  Difficile Toxin, PCR - Stool, Per Rectum [[id number]]  (Normal) Collected: 12/14/20 1625    Specimen: Stool from Per Rectum Updated: 12/14/20 1749     C. Difficile Toxins by PCR Negative    POC Glucose Once [[id number]]  (Normal) Collected: 12/14/20 1651    Specimen: Blood Updated: 12/14/20 1654     Glucose 96 mg/dL     Iron Profile [[id number]]  (Abnormal) Collected: 12/14/20 0546    Specimen: Blood Updated: 12/14/20 1422     Iron 16 mcg/dL      Iron Saturation 11 %      Transferrin 97 mg/dL      TIBC 145 mcg/dL     Ferritin [[id number]]  (Abnormal) Collected: 12/14/20 0546    Specimen: Blood Updated: 12/14/20 1416     Ferritin 649.00 ng/mL     Narrative:      Results may be falsely decreased if patient taking Biotin.      Blood Culture - Blood, Arm, Right [[id number]] Collected: 12/09/20 1059    Specimen: Blood from Arm, Right Updated: 12/14/20 1230     Blood Culture No growth at 5 days    Blood Culture - Blood, Arm, Right [[id number]] Collected: 12/09/20 1015    Specimen: Blood from Arm, Right Updated: 12/14/20 1230     Blood Culture No growth at 5 days    POC Glucose Once [[id number]]  (Abnormal) Collected: 12/14/20 1131    Specimen: Blood Updated: 12/14/20 1142     Glucose 163 mg/dL     Renal Function Panel [[id number]]  (Abnormal) Collected: 12/14/20 0546    Specimen: Blood Updated: 12/14/20 0712     Glucose 119 mg/dL      BUN 10 mg/dL      Creatinine 1.02 mg/dL      Sodium 136 mmol/L      Potassium 4.0 mmol/L      Chloride 105 mmol/L      CO2 23.4 mmol/L      Calcium 7.7 mg/dL      Albumin 2.00 g/dL      Phosphorus 2.8 mg/dL      Anion Gap 7.6 mmol/L      BUN/Creatinine Ratio 9.8     eGFR Non African Amer 55 mL/min/1.73     Narrative:      GFR Normal >60  Chronic Kidney Disease <60  Kidney Failure <15      Uric Acid [[id number]]  (Normal) Collected: 12/14/20 0546    Specimen: Blood Updated: 12/14/20 0712     Uric Acid 2.4 mg/dL     Magnesium [[id number]]  (Normal) Collected: 12/14/20 0546    Specimen: Blood Updated:  12/14/20 0712     Magnesium 1.6 mg/dL     CBC (No Diff) [[id number]]  (Abnormal) Collected: 12/14/20 0546    Specimen: Blood Updated: 12/14/20 0642     WBC 14.44 10*3/mm3      RBC 3.12 10*6/mm3      Hemoglobin 8.1 g/dL      Hematocrit 25.2 %      MCV 80.8 fL      MCH 26.0 pg      MCHC 32.1 g/dL      RDW 13.2 %      RDW-SD 38.8 fl      MPV 9.8 fL      Platelets 285 10*3/mm3     POC Glucose Once [[id number]]  (Normal) Collected: 12/14/20 0604    Specimen: Blood Updated: 12/14/20 0610     Glucose 114 mg/dL     POC Glucose Once [[id number]]  (Abnormal) Collected: 12/13/20 2013    Specimen: Blood Updated: 12/13/20 2015     Glucose 133 mg/dL     POC Glucose Once [[id number]]  (Normal) Collected: 12/13/20 1651    Specimen: Blood Updated: 12/13/20 1653     Glucose 104 mg/dL     POC Glucose Once [[id number]]  (Abnormal) Collected: 12/13/20 1104    Specimen: Blood Updated: 12/13/20 1107     Glucose 178 mg/dL     Renal Function Panel [[id number]]  (Abnormal) Collected: 12/13/20 0535    Specimen: Blood Updated: 12/13/20 0634     Glucose 163 mg/dL      BUN 10 mg/dL      Creatinine 1.11 mg/dL      Sodium 136 mmol/L      Potassium 3.5 mmol/L      Chloride 104 mmol/L      CO2 22.4 mmol/L      Calcium 7.8 mg/dL      Albumin 2.10 g/dL      Phosphorus 2.9 mg/dL      Anion Gap 9.6 mmol/L      BUN/Creatinine Ratio 9.0     eGFR Non African Amer 49 mL/min/1.73     Narrative:      GFR Normal >60  Chronic Kidney Disease <60  Kidney Failure <15      Uric Acid [[id number]]  (Abnormal) Collected: 12/13/20 0535    Specimen: Blood Updated: 12/13/20 0632     Uric Acid 2.2 mg/dL     Magnesium [[id number]]  (Normal) Collected: 12/13/20 0535    Specimen: Blood Updated: 12/13/20 0632     Magnesium 1.8 mg/dL     CBC (No Diff) [[id number]]  (Abnormal) Collected: 12/13/20 0535    Specimen: Blood Updated: 12/13/20 0631     WBC 16.86 10*3/mm3      RBC 3.78 10*6/mm3      Hemoglobin 9.6 g/dL      Hematocrit 30.9 %      MCV 81.7 fL      MCH 25.4 pg      MCHC 31.1  g/dL      RDW 13.1 %      RDW-SD 39.6 fl      MPV 9.4 fL      Platelets 346 10*3/mm3     POC Glucose Once [[id number]]  (Abnormal) Collected: 12/13/20 0609    Specimen: Blood Updated: 12/13/20 0611     Glucose 172 mg/dL     POC Glucose Once [[id number]]  (Abnormal) Collected: 12/12/20 2112    Specimen: Blood Updated: 12/12/20 2113     Glucose 163 mg/dL     POC Glucose Once [[id number]]  (Abnormal) Collected: 12/12/20 1638    Specimen: Blood Updated: 12/12/20 1640     Glucose 149 mg/dL     POC Glucose Once [[id number]]  (Abnormal) Collected: 12/12/20 1125    Specimen: Blood Updated: 12/12/20 1132     Glucose 223 mg/dL     Phosphorus [[id number]]  (Abnormal) Collected: 12/12/20 0550    Specimen: Blood Updated: 12/12/20 1041     Phosphorus 2.4 mg/dL     Urine Culture - Urine, Urine, Clean Catch [[id number]]  (Abnormal) Collected: 12/11/20 0149    Specimen: Urine, Clean Catch Updated: 12/12/20 0943     Urine Culture Yeast isolated    Magnesium [[id number]]  (Normal) Collected: 12/12/20 0550    Specimen: Blood Updated: 12/12/20 0709     Magnesium 1.6 mg/dL     Basic Metabolic Panel [[id number]]  (Abnormal) Collected: 12/12/20 0550    Specimen: Blood Updated: 12/12/20 0652     Glucose 212 mg/dL      BUN 8 mg/dL      Creatinine 1.01 mg/dL      Sodium 135 mmol/L      Potassium 3.8 mmol/L      Chloride 103 mmol/L      CO2 22.1 mmol/L      Calcium 7.8 mg/dL      eGFR Non African Amer 55 mL/min/1.73      BUN/Creatinine Ratio 7.9     Anion Gap 9.9 mmol/L     Narrative:      GFR Normal >60  Chronic Kidney Disease <60  Kidney Failure <15      Uric Acid [[id number]]  (Abnormal) Collected: 12/12/20 0550    Specimen: Blood Updated: 12/12/20 0652     Uric Acid 2.0 mg/dL     Lactic Acid, Plasma [[id number]]  (Normal) Collected: 12/12/20 0550    Specimen: Blood Updated: 12/12/20 0651     Lactate 0.9 mmol/L     CBC (No Diff) [[id number]]  (Abnormal) Collected: 12/12/20 0550    Specimen: Blood Updated: 12/12/20 0635     WBC 14.49 10*3/mm3      RBC  "3.18 10*6/mm3      Hemoglobin 8.2 g/dL      Hematocrit 25.7 %      MCV 80.8 fL      MCH 25.8 pg      MCHC 31.9 g/dL      RDW 12.8 %      RDW-SD 37.8 fl      MPV 9.6 fL      Platelets 329 10*3/mm3     Phosphorus [[id number]]  (Abnormal) Collected: 12/11/20 2113    Specimen: Blood Updated: 12/11/20 2150     Phosphorus 2.0 mg/dL     POC Glucose Once [[id number]]  (Abnormal) Collected: 12/11/20 2131    Specimen: Blood Updated: 12/11/20 2133     Glucose 182 mg/dL     POC Glucose Once [[id number]]  (Abnormal) Collected: 12/11/20 1707    Specimen: Blood Updated: 12/11/20 1708     Glucose 181 mg/dL     Procalcitonin [[id number]]  (Abnormal) Collected: 12/11/20 1530    Specimen: Blood Updated: 12/11/20 1628     Procalcitonin 8.89 ng/mL     Narrative:      As a Marker for Sepsis (Non-Neonates):   1. <0.5 ng/mL represents a low risk of severe sepsis and/or septic shock.  1. >2 ng/mL represents a high risk of severe sepsis and/or septic shock.    As a Marker for Lower Respiratory Tract Infections that require antibiotic therapy:  PCT on Admission     Antibiotic Therapy             6-12 Hrs later  > 0.5                Strongly Recommended            >0.25 - <0.5         Recommended  0.1 - 0.25           Discouraged                   Remeasure/reassess PCT  <0.1                 Strongly Discouraged          Remeasure/reassess PCT      As 28 day mortality risk marker: \"Change in Procalcitonin Result\" (> 80 % or <=80 %) if Day 0 (or Day 1) and Day 4 values are available. Refer to [email or web address]   Change in PCT <=80 %   A decrease of PCT levels below or equal to 80 % defines a positive change in PCT test result representing a higher risk for 28-day all-cause mortality of patients diagnosed with severe sepsis or septic shock.  Change in PCT > 80 %   A decrease of PCT levels of more than 80 % defines a negative change in PCT result representing a lower risk for 28-day all-cause mortality of patients diagnosed with " severe sepsis or septic shock.                Results may be falsely decreased if patient taking Biotin.     Non-gynecologic Cytology [[id number]] Collected: 12/10/20 1205    Specimen: Body Fluid from Retroperitoneum Updated: 12/11/20 1443     Case Report --     Non-gynecologic Cytology                          Case: [id number]                                  Authorizing Provider:  Rohan Dooley Jr.,  Collected:           12/10/2020 12:05 PM                                 MD                                                                           Ordering Location:     [facility]  Received:            12/10/2020 02:15 PM                                 [address]                                                                       Pathologist:           Lindy Britton MD                                                    Specimen:    Retroperitoneum, Right perinephric fluid from abscess drain                                 Final Diagnosis --     1. Perinephric Fluid, Right, Abscess Drain:   A. Negative for malignant cells.   B. Acute inflammation, histiocytes and scattered fungal yeast.       Gross Description --     1 syringe received containing a total of 2 ml of cloudy, white fluid. 1 thin prep & cellblock. No remaining fluid.       Microscopic Description --     Histiocytes with degenerative changes, and acute inflammation.    Screened by BRISA Lyons.         Special Stains --     Utilizing appropriate controls, GMS and PAS stains are performed on sections from the cell block. GMS and PAS highlight scattered fungal yeast forms. Definitive speciation is not able performed at this time, recommend correlation with fungal culture.      Creatinine, Body Fluid - Body Fluid, Retroperitoneum [[id number]] Collected: 12/11/20 1339    Specimen: Body Fluid from Retroperitoneum Updated: 12/11/20 1443     Creatinine, Fluid 0.9 mg/dL     Narrative:      No Reference Ranges Established.  This test was  developed, it performance characteristics determined and judged suitable for clinical purposes by [facility].  It has not been cleared or approved by the FDA.  The laboratory is regulated under CLIA as qualified to perform high-complexity testing.     POC Glucose Once [[id number]]  (Abnormal) Collected: 12/11/20 1127    Specimen: Blood Updated: 12/11/20 1134     Glucose 353 mg/dL     CBC & Differential [[id number]]  (Abnormal) Collected: 12/11/20 0938    Specimen: Blood Updated: 12/11/20 0951    Narrative:      The following orders were created for panel order CBC & Differential.  Procedure                               Abnormality         Status                     ---------                               -----------         ------                     CBC Auto Differential[[id number]]        Abnormal            Final result                 Please view results for these tests on the individual orders.    CBC Auto Differential [[id number]]  (Abnormal) Collected: 12/11/20 0938    Specimen: Blood Updated: 12/11/20 0951     WBC 15.80 10*3/mm3      RBC 3.72 10*6/mm3      Hemoglobin 9.4 g/dL      Hematocrit 31.0 %      MCV 83.3 fL      MCH 25.3 pg      MCHC 30.3 g/dL      RDW 12.8 %      RDW-SD 39.0 fl      MPV 9.8 fL      Platelets 368 10*3/mm3      Neutrophil % 89.6 %      Lymphocyte % 3.1 %      Monocyte % 4.2 %      Eosinophil % 0.9 %      Basophil % 0.4 %      Immature Grans % 1.8 %      Neutrophils, Absolute 14.16 10*3/mm3      Lymphocytes, Absolute 0.49 10*3/mm3      Monocytes, Absolute 0.66 10*3/mm3      Eosinophils, Absolute 0.14 10*3/mm3      Basophils, Absolute 0.06 10*3/mm3      Immature Grans, Absolute 0.29 10*3/mm3      nRBC 0.0 /100 WBC     Renal Function Panel [[id number]]  (Abnormal) Collected: 12/11/20 0559    Specimen: Blood Updated: 12/11/20 0947     Glucose 247 mg/dL      BUN 11 mg/dL      Creatinine 1.02 mg/dL      Sodium 137 mmol/L      Potassium 4.2 mmol/L      Chloride 104  mmol/L      CO2 22.8 mmol/L      Calcium 7.8 mg/dL      Albumin 2.20 g/dL      Phosphorus 1.5 mg/dL      Anion Gap 10.2 mmol/L      BUN/Creatinine Ratio 10.8     eGFR Non African Amer 55 mL/min/1.73     Narrative:      GFR Normal >60  Chronic Kidney Disease <60  Kidney Failure <15      Magnesium [[id number]]  (Abnormal) Collected: 12/11/20 0559    Specimen: Blood Updated: 12/11/20 0729     Magnesium 2.5 mg/dL     POC Glucose Once [[id number]]  (Abnormal) Collected: 12/11/20 0628    Specimen: Blood Updated: 12/11/20 0629     Glucose 251 mg/dL     Fluid Creatinine - Miscellaneous Test [[id number]] Collected: 12/10/20 1339    Specimen: Blood from Retroperitoneum Updated: 12/11/20 0615     Miscellaneous Lab Test Result See attached report    Narrative:      See attached report from [facility]       Urinalysis With Culture If Indicated - Urine, Clean Catch [[id number]]  (Abnormal) Collected: 12/11/20 0149    Specimen: Urine, Clean Catch Updated: 12/11/20 0316     Color, UA Yellow     Appearance, UA Cloudy     pH, UA <=5.0     Specific Gravity, UA 1.012     Glucose,  mg/dL (Trace)     Ketones, UA Negative     Bilirubin, UA Negative     Blood, UA Large (3+)     Protein,  mg/dL (2+)     Leuk Esterase, UA Moderate (2+)     Nitrite, UA Negative     Urobilinogen, UA 0.2 E.U./dL    Urinalysis, Microscopic Only - Urine, Clean Catch [[id number]]  (Abnormal) Collected: 12/11/20 0149    Specimen: Urine, Clean Catch Updated: 12/11/20 0316     RBC, UA Too Numerous to Count /HPF      WBC, UA Too Numerous to Count /HPF      Bacteria, UA 1+ /HPF      Squamous Epithelial Cells, UA 3-6 /HPF      Renal Epithelial Cells, UA 13-20 /HPF      Yeast, UA Large/3+ Budding Yeast /HPF      Hyaline Casts, UA None Seen /LPF      Methodology Manual Light Microscopy    Potassium [[id number]]  (Normal) Collected: 12/10/20 2303    Specimen: Blood Updated: 12/11/20 0002     Potassium 3.9 mmol/L     POC Glucose Once [[id number]]  (Abnormal)  Collected: 12/10/20 2054    Specimen: Blood Updated: 12/10/20 2056     Glucose 273 mg/dL     POC Glucose Once [[id number]]  (Abnormal) Collected: 12/10/20 1638    Specimen: Blood Updated: 12/10/20 1640     Glucose 261 mg/dL     Basic Metabolic Panel [[id number]]  (Abnormal) Collected: 12/10/20 0540    Specimen: Blood Updated: 12/10/20 0715     Glucose 397 mg/dL      BUN 11 mg/dL      Creatinine 1.22 mg/dL      Sodium 133 mmol/L      Potassium 3.4 mmol/L      Chloride 95 mmol/L      CO2 26.6 mmol/L      Calcium 7.2 mg/dL      eGFR Non African Amer 44 mL/min/1.73      BUN/Creatinine Ratio 9.0     Anion Gap 11.4 mmol/L     Narrative:      GFR Normal >60  Chronic Kidney Disease <60  Kidney Failure <15      Magnesium [[id number]]  (Normal) Collected: 12/10/20 0540    Specimen: Blood Updated: 12/10/20 0715     Magnesium 1.9 mg/dL     CK [[id number]]  (Normal) Collected: 12/10/20 0540    Specimen: Blood Updated: 12/10/20 0654     Creatine Kinase 69 U/L     CBC (No Diff) [[id number]]  (Abnormal) Collected: 12/10/20 0540    Specimen: Blood Updated: 12/10/20 0626     WBC 17.19 10*3/mm3      RBC 3.66 10*6/mm3      Hemoglobin 9.4 g/dL      Hematocrit 29.9 %      MCV 81.7 fL      MCH 25.7 pg      MCHC 31.4 g/dL      RDW 12.8 %      RDW-SD 37.9 fl      MPV 10.2 fL      Platelets 338 10*3/mm3     POC Glucose Once [[id number]]  (Abnormal) Collected: 12/10/20 0609    Specimen: Blood Updated: 12/10/20 0610     Glucose 399 mg/dL     POC Glucose Once [[id number]]  (Abnormal) Collected: 12/09/20 2128    Specimen: Blood Updated: 12/09/20 2129     Glucose 361 mg/dL     Magnesium [[id number]]  (Abnormal) Collected: 12/09/20 1527    Specimen: Blood from Arm, Right Updated: 12/09/20 1640     Magnesium 1.1 mg/dL     Basic Metabolic Panel [[id number]]  (Abnormal) Collected: 12/09/20 1527    Specimen: Blood from Arm, Right Updated: 12/09/20 1609     Glucose 242 mg/dL      BUN 6 mg/dL      Creatinine 1.15 mg/dL      Sodium 137 mmol/L      Potassium 2.8  mmol/L      Chloride 95 mmol/L      CO2 27.2 mmol/L      Calcium 8.2 mg/dL      eGFR Non African Amer 48 mL/min/1.73      BUN/Creatinine Ratio 5.2     Anion Gap 14.8 mmol/L     Narrative:      GFR Normal >60  Chronic Kidney Disease <60  Kidney Failure <15      Protime-INR [[id number]]  (Abnormal) Collected: 12/09/20 1527    Specimen: Blood from Arm, Right Updated: 12/09/20 1602     Protime 16.5 Seconds      INR 1.35    aPTT [[id number]]  (Normal) Collected: 12/09/20 1527    Specimen: Blood from Arm, Right Updated: 12/09/20 1602     PTT 33.7 seconds     CBC (No Diff) [[id number]]  (Abnormal) Collected: 12/09/20 1527    Specimen: Blood from Arm, Right Updated: 12/09/20 1602     WBC 12.99 10*3/mm3      RBC 4.03 10*6/mm3      Hemoglobin 10.5 g/dL      Hematocrit 33.8 %      MCV 83.9 fL      MCH 26.1 pg      MCHC 31.1 g/dL      RDW 13.0 %      RDW-SD 40.0 fl      MPV 10.0 fL      Platelets 487 10*3/mm3     POC Glucose Once [[id number]]  (Abnormal) Collected: 12/09/20 1453    Specimen: Blood Updated: 12/09/20 1455     Glucose 234 mg/dL     Hemoglobin A1c [[id number]]  (Abnormal) Collected: 12/09/20 0534    Specimen: Blood Updated: 12/09/20 1114     Hemoglobin A1C 13.70 %     Narrative:      Hemoglobin A1C Ranges:    Increased Risk for Diabetes  5.7% to 6.4%  Diabetes                     >= 6.5%  Diabetic Goal                < 7.0%    POC Glucose Once [[id number]]  (Abnormal) Collected: 12/09/20 1109    Specimen: Blood Updated: 12/09/20 1110     Glucose 214 mg/dL     POC Glucose Once [[id number]]  (Abnormal) Collected: 12/09/20 0751    Specimen: Blood Updated: 12/09/20 0753     Glucose 303 mg/dL     Basic Metabolic Panel [[id number]]  (Abnormal) Collected: 12/09/20 0534    Specimen: Blood Updated: 12/09/20 0653     Glucose 273 mg/dL      BUN 5 mg/dL      Creatinine 0.86 mg/dL      Sodium 136 mmol/L      Potassium 3.2 mmol/L      Chloride 98 mmol/L      CO2 29.5 mmol/L      Calcium 6.9 mg/dL      eGFR Non  Amer 66  mL/min/1.73      BUN/Creatinine Ratio 5.8     Anion Gap 8.5 mmol/L     Narrative:      GFR Normal >60  Chronic Kidney Disease <60  Kidney Failure <15      Lactic Acid, Plasma [[id number]]  (Normal) Collected: 12/09/20 0534    Specimen: Blood Updated: 12/09/20 0601     Lactate 0.9 mmol/L     CBC (No Diff) [[id number]]  (Abnormal) Collected: 12/09/20 0534    Specimen: Blood Updated: 12/09/20 0556     WBC 14.00 10*3/mm3      RBC 3.16 10*6/mm3      Hemoglobin 8.3 g/dL      Hematocrit 25.6 %      MCV 81.0 fL      MCH 26.3 pg      MCHC 32.4 g/dL      RDW 12.8 %      RDW-SD 38.1 fl      MPV 10.0 fL      Platelets 318 10*3/mm3     Magnesium [[id number]]  (Abnormal) Collected: 12/08/20 1506    Specimen: Blood Updated: 12/08/20 2034     Magnesium 1.0 mg/dL     POC Glucose Once [[id number]]  (Abnormal) Collected: 12/08/20 2022    Specimen: Blood Updated: 12/08/20 2023     Glucose 340 mg/dL     COVID PRE-OP / PRE-PROCEDURE SCREENING ORDER (NO ISOLATION) - Swab, Nasopharynx [[id number]]  (Normal) Collected: 12/08/20 1658    Specimen: Swab from Nasopharynx Updated: 12/08/20 1836    Narrative:      The following orders were created for panel order COVID PRE-OP / PRE-PROCEDURE SCREENING ORDER (NO ISOLATION) - Swab, Nasopharynx.  Procedure                               Abnormality         Status                     ---------                               -----------         ------                     Respiratory Panel PCR w/...[[id number]]  Normal              Final result                 Please view results for these tests on the individual orders.    Respiratory Panel PCR w/COVID-19(SARS-CoV-2) TIP/BOBBY/ANDREY/PAD/COR/MAD/HUMPHREY In-House, NP Swab in [facility], 3-4 HR TAT - Swab, Nasopharynx [[id number]]  (Normal) Collected: 12/08/20 1658    Specimen: Swab from Nasopharynx Updated: 12/08/20 1836     ADENOVIRUS, PCR Not Detected     Coronavirus 229E Not Detected     Coronavirus HKU1 Not Detected     Coronavirus NL63 Not Detected     Coronavirus  OC43 Not Detected     COVID19 Not Detected     Human Metapneumovirus Not Detected     Human Rhinovirus/Enterovirus Not Detected     Influenza A PCR Not Detected     Influenza B PCR Not Detected     Parainfluenza Virus 1 Not Detected     Parainfluenza Virus 2 Not Detected     Parainfluenza Virus 3 Not Detected     Parainfluenza Virus 4 Not Detected     RSV, PCR Not Detected     Bordetella pertussis pcr Not Detected     Bordetella parapertussis PCR Not Detected     Chlamydophila pneumoniae PCR Not Detected     Mycoplasma pneumo by PCR Not Detected    Narrative:      Fact sheet for providers: https://docs.[employer]/wp-content/uploads/ERO0049-7311-WT1.1-EUA-Provider-Fact-Sheet-3.pdf    Fact sheet for patients: https://docs.[employer]/wp-content/uploads/VUV2718-5065-DY1.1-EUA-Patient-Fact-Sheet-1.pdf    Test performed by PCR.    Urinalysis, Microscopic Only - Urine, Catheter [[id number]]  (Abnormal) Collected: 12/08/20 1506    Specimen: Urine, Catheter Updated: 12/08/20 1653     RBC, UA 13-20 /HPF      WBC, UA 3-5 /HPF      Bacteria, UA Trace /HPF      Squamous Epithelial Cells, UA 3-6 /HPF      Hyaline Casts, UA 0-2 /LPF      Methodology Manual Light Microscopy    Comprehensive Metabolic Panel [[id number]]  (Abnormal) Collected: 12/08/20 1506    Specimen: Blood Updated: 12/08/20 1604     Glucose 535 mg/dL      BUN 7 mg/dL      Creatinine 1.16 mg/dL      Sodium 131 mmol/L      Potassium 2.1 mmol/L      Chloride 86 mmol/L      CO2 33.2 mmol/L      Calcium 7.9 mg/dL      Total Protein 6.9 g/dL      Albumin 3.10 g/dL      ALT (SGPT) 6 U/L      AST (SGOT) 10 U/L      Alkaline Phosphatase 102 U/L      Total Bilirubin 0.4 mg/dL      eGFR Non African Amer 47 mL/min/1.73      Globulin 3.8 gm/dL      A/G Ratio 0.8 g/dL      BUN/Creatinine Ratio 6.0     Anion Gap 11.8 mmol/L     Narrative:      GFR Normal >60  Chronic Kidney Disease <60  Kidney Failure <15      Lipase [[id number]]  (Normal) Collected: 12/08/20 1507     Specimen: Blood Updated: 12/08/20 1550     Lipase 26 U/L     Urinalysis With Microscopic If Indicated (No Culture) - Urine, Catheter [[id number]]  (Abnormal) Collected: 12/08/20 1506    Specimen: Urine, Catheter Updated: 12/08/20 1540     Color, UA Yellow     Appearance, UA Clear     pH, UA 6.0     Specific Gravity, UA 1.010     Glucose, UA >=1000 mg/dL (3+)     Ketones, UA Negative     Bilirubin, UA Negative     Blood, UA Small (1+)     Protein, UA 30 mg/dL (1+)     Leuk Esterase, UA Negative     Nitrite, UA Negative     Urobilinogen, UA 0.2 E.U./dL    CBC & Differential [[id number]]  (Abnormal) Collected: 12/08/20 1506    Specimen: Blood Updated: 12/08/20 1519    Narrative:      The following orders were created for panel order CBC & Differential.  Procedure                               Abnormality         Status                     ---------                               -----------         ------                     CBC Auto Differential[[id number]]        Abnormal            Final result                 Please view results for these tests on the individual orders.    CBC Auto Differential [[id number]]  (Abnormal) Collected: 12/08/20 1506    Specimen: Blood Updated: 12/08/20 1519     WBC 15.33 10*3/mm3      RBC 3.68 10*6/mm3      Hemoglobin 9.5 g/dL      Hematocrit 30.0 %      MCV 81.5 fL      MCH 25.8 pg      MCHC 31.7 g/dL      RDW 12.5 %      RDW-SD 37.2 fl      MPV 9.8 fL      Platelets 380 10*3/mm3      Neutrophil % 86.2 %      Lymphocyte % 6.6 %      Monocyte % 5.9 %      Eosinophil % 0.3 %      Basophil % 0.2 %      Immature Grans % 0.8 %      Neutrophils, Absolute 13.21 10*3/mm3      Lymphocytes, Absolute 1.01 10*3/mm3      Monocytes, Absolute 0.91 10*3/mm3      Eosinophils, Absolute 0.04 10*3/mm3      Basophils, Absolute 0.03 10*3/mm3      Immature Grans, Absolute 0.13 10*3/mm3      nRBC 0.0 /100 WBC           Imaging Results (All)     Procedure Component Value Units Date/Time    CT Abdomen Pelvis With &  Without Contrast [[id number]] Collected: 12/14/20 0935     Updated: 12/15/20 1448    Narrative:      CT ABDOMEN AND PELVIS WITH AND WITHOUT CONTRAST     HISTORY: Follow-up perinephric fluid collection drainage.     TECHNIQUE: Noncontrast axial images of the abdomen and pelvis were  obtained followed by administration of intravenous contrast and imaging  of the abdomen and pelvis in the nephrographic and delayed phase.  Coronal and sagittal reformats were obtained.     COMPARISON: CT abdomen and pelvis from 12/08/2020.     Findings: a percutaneous drain is seen within a previously demonstrated  right perinephric fluid collection with near complete resolution of the  fluid component. Enhancing soft tissue component/wall is seen along the  lateral aspect of the mid and lower pole of the kidney. There is also a  right ureteric stent appropriately placed with distal tip within the  bladder and proximally within the left anterior collecting system and a  malrotated kidney. There was previously a large calculus in this region  measuring up to 1 cm that is possibly smaller and displaced now  measuring 7 mm. There are also nonobstructing calculi within the lower  pole of the left kidney, image 69. Extensive perinephric stranding is  demonstrated. On the delayed images, no extravasation of contrast is  identified.     There are bilateral small layering pleural effusions with bibasilar  atelectasis. Liver and spleen are unchanged. Pancreas is normal without  ductal dilatation. Common bile duct is dilated, that likely represents  benign biliary ectasia. Bilateral adrenal glands are normal. There are  small retroperitoneal lymph nodes identified with the largest lymph node  at the level of the aortic bifurcation measuring up to 1.3 cm in short  axis dimension. This has grown in the interim from prior imaging where  it measured 8 mm. The urinary bladder is partially distended, small  amount of nondependent air likely related to  catheterization. The uterus  is anteverted. No abnormal adnexal mass is seen. There is a large  right-sided ventral hernia containing small and large bowel loops. There  is an ileocolic anastomosis present. Air-fluid levels are seen  throughout the colon most suggestive of a mild ileus. Again demonstrated  is a small low-density region along the left rectus abdominis in the  paramidline region measuring 2.5 x 1.8 cm, unchanged from prior imaging  and may represent a chronic postsurgical collection/seroma. Attention on  follow-up is recommended.       Impression:      1. Interim placement of a right ureteral stent, as well as a  percutaneous drain within a right perinephric collection with near  complete resolution of the collection. Enhancing soft tissue seen along  the lateral aspect of the mid and lower pole and anterior to the right  kidney may represent enhancing granulation tissue. Multiple mildly  prominent retroperitoneal lymph nodes at least a couple of which have  enlarged in the interim from prior imaging, the largest measuring 1.3 cm  at the level of the bifurcation. These may be reactive however continued  follow-up is recommended to rule out malignancy.  2. Mild colonic ileus.   3. Bilateral small layering pleural effusions with bibasilar  atelectasis.     Radiation dose reduction techniques were utilized, including automated  exposure control and exposure modulation based on body size.     This report was finalized on 12/15/2020 2:45 PM by Dr. Brianne Amador M.D.       CT Guided Abscess Drain Peritoneal [[id number]] Collected: 12/10/20 1444     Updated: 12/10/20 1509    Narrative:      CT-GUIDED ASPIRATION AND CATHETER PLACEMENT INTO RIGHT ABDOMINAL ABSCESS  12/10/2020     HISTORY: Right perinephric fluid collection.     After signed informed consent was obtained patient was prepped and  draped in the supine position. Lidocaine was used for local anesthesia.     18-gauge needle was introduced into the  heterogeneous fluid collection  in the right perinephric region. Purulent fluid was visualized.     0.18 wire was passed and this was followed by placement of an 8 Danish  pigtail catheter into the fluid collection. Approximately 320 cc of  purulent fluid was removed. Confirmatory images were obtained.     The catheter was left in place and connected to suction bulb drainage. A  fluid sample was sent to the laboratory for evaluation.     Patient tolerated the procedure well with no complications.     Radiation dose reduction techniques were utilized, including automated  exposure control and exposure modulation based on body size.     This report was finalized on 12/10/2020 3:06 PM by Dr. Sahil Fabian M.D.       CT Head Without Contrast [[id number]] Collected: 12/09/20 1717     Updated: 12/09/20 2029    Narrative:      CT HEAD WITHOUT CONTRAST     HISTORY: Mental status changes. Seizure.     COMPARISON: None.     FINDINGS: The study is hampered by patient motion. There is  mild-to-moderate atrophy.  Mild-to-moderate vascular calcification is  noted. There is no evidence of intracranial hemorrhage, hydrocephalus or  of abnormal extra-axial fluid. No focal area of decreased attenuation to  suggest acute infarction is identified. Moderate vascular calcification  is appreciated.       Impression:      Atrophy and moderate vascular calcification is noted. There  is no evidence of acute infarction or hemorrhage. Further evaluation  could be performed with MRI examination of the brain, particularly if  the patient has a history of new onset seizure activity. The above  information was called to the patient's nurse at the time of the  dictation. The patient's nurse is to immediately relay the information  to the clinical service.           Radiation dose reduction techniques were utilized, including automated  exposure control and exposure modulation based on body size.     This report was finalized on 12/9/2020 8:26  PM by Dr. Harris Sylvester M.D.       FL Retrograde Pyelogram In OR [[id number]] Collected: 12/09/20 1521     Updated: 12/09/20 1529    Narrative:      RETROGRADE PYELOGRAM     HISTORY: Right ureteral stone and large right perinephric fluid  collection extending inferiorly noted on yesterday's CT scan.     FINDINGS: Imaging in the operating room shows contrast partially filling  the right ureter and intrarenal collecting system and no definite  obstructing stone is identified. There is some distortion of the  intrarenal collecting system in part related to a large perinephric  fluid collection and soft tissue changes as noted on yesterday's CT  scan. The CT scan appearance also raises the concern of underlying  neoplasm and should be correlated with the operative findings as well as  follow-up CT scan. Please also see the operative report.     6 images were obtained and the fluoroscopy time measures 9 seconds.     This report was finalized on 12/9/2020 3:26 PM by Dr. Tam Perez M.D.       CT Abdomen Pelvis With Contrast [[id number]] Collected: 12/08/20 1938     Updated: 12/08/20 1943    Narrative:      CT ABDOMEN AND PELVIS WITH IV CONTRAST     HISTORY: 65 year old female with abdominal pain, dysuria over the past 2  weeks. History of multiple UTIs.     TECHNIQUE:  CT includes axial imaging from the lung bases to the  trochanters with intravenous contrast and without use of oral contrast.  Data reconstructed in coronal and sagittal planes.     COMPARISON: None     FINDINGS: There is a large multilobular peripherally enhancing, complex  collection centered below the right kidney within the right abdomen.  This collection contacts the anterolateral right psoas muscle and  contacts and appears to partially invade the right lateral abdominal  musculature measuring 11 x 10.3 cm axial dimension and extending 12.5 cm  craniocaudal dimension. Between this collection and the right kidney  there is a greater degree of  enhancement. This appears more solid in  appearance suspected to represent a mass. There is indentation of the  adjacent inferior, lateral right renal cortex. 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 Low-density appears to  arise from the renal cortex. There is no evidence for extension into the  renal pelvis or involvement of the urothelium. Delayed phase imaging  demonstrates no extension of contrast into this mass or the low-density  below the right kidney. There is a posterior right renal cyst that  measures approximately 3 cm diameter. Right renal calyceal/infundibular  stone measures 1 cm. There is also a 0.8 cm right renal stone and a 3 mm  right renal calyceal stone. A 1 cm left renal low-density lesion is most  likely a cyst though difficult to definitively characterize.     There is stranding/inflammation surrounding this mass/collection within  the right abdomen and there is displacement of adjacent bowel loops. The  splenic size is normal. The adrenal glands appear normal. There is mild  diffuse fat infiltration of the liver. Pancreas appears within normal  limits. There are several subcentimeter retroperitoneal lymph nodes  without enlargement. Lung bases appear clear.     There is diastases of the rectus sheath with herniation of fat and bowel  loops into the right anterior abdomen and pelvic wall. Fluid-filled  small bowel loops are present which contain air-fluid levels and this  may be associated with a mild ileus. There is no dilatation to suggest  obstruction. Within the midline, infraumbilical abdominal wall just deep  to the rectus abdominis muscle curvature there is a low-density mass or  collection that measures 3.2 x 1.7 cm. This could represent a chronic  postsurgical collection. Bone windows demonstrate no osseous lesion.     FINDINGS:   1. Large multilobular right abdominal mass/collection measures  approximately 11 x 10.3 x 12.5 cm. This appears to arise from the  right  kidney and at the upper margin of this lesion there is masslike  thickening suspicious for renal cell carcinoma with complex adjacent  collection or abscess with apparent invasion of the right lateral  abdominal wall. Urologic consultation recommended. Biopsy or attempt at  CT-guided drainage could be performed under CT guidance if indicated.  2. Right renal calyceal infundibular stones without hydronephrosis.  Right renal cysts. A 1 cm left renal low-density lesion is most likely a  cyst though difficult to definitely characterize.  3. 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 There is an infraumbilical  collection or low-density lesion along the deep margin of the rectus  abdominis musculature. This could represent a small chronic seroma  associated with previous surgery.     Discussed with ANSELMO Sutton, in the emergency department 12/08/2020  at 7:10 PM.     Radiation dose reduction techniques were utilized, including automated  exposure control and exposure modulation based on body size.     This report was finalized on 12/8/2020 7:40 PM by Dr. Hawk Bonilla M.D.             Medication Review:   Current Facility-Administered Medications   Medication Dose Route Frequency Provider Last Rate Last Admin   • acetaminophen (TYLENOL) suppository 650 mg  650 mg Rectal Q6H PRN Harris Perez MD       • acetaminophen (TYLENOL) tablet 650 mg  650 mg Oral Q4H PRN Rohan Dooley Jr., MD   650 mg at 12/11/20 1518   • calcium gluconate 1 g/100 mL (10 mg/mL) NS IVPB (VTB)  1 g Intravenous PRN Harris Perez MD        And   • calcium gluconate 6 g in sodium chloride 0.9 % 500 mL IVPB  6 g Intravenous PRN Harris Perez MD       • carvedilol (COREG) tablet 12.5 mg  12.5 mg Oral BID With Meals Harris Perez MD   12.5 mg at 12/15/20 1712   • citalopram (CeleXA) tablet 10 mg  10 mg Oral Daily Rohan Dooley Jr., MD    10 mg at 12/15/20 0826   • dextrose (D50W) 25 g/ 50mL Intravenous Solution 25 g  25 g Intravenous Q15 Min PRN Rohan Dooley Jr., MD       • dextrose (GLUTOSE) oral gel 15 g  15 g Oral Q15 Min PRN Rohan Dooley Jr., MD       • glucagon (human recombinant) (GLUCAGEN DIAGNOSTIC) injection 1 mg  1 mg Subcutaneous Q15 Min PRN Rohan Dooley Jr., MD       • influenza vac split quad (FLUZONE,FLUARIX,AFLURIA,FLULAVAL) injection 0.5 mL  0.5 mL Intramuscular Once Rohan Dooley Jr., MD       • insulin glargine (LANTUS) injection 16 Units  16 Units Subcutaneous Nightly Bunny Hannah MD   16 Units at 12/15/20 2045   • insulin lispro (humaLOG) injection 0-9 Units  0-9 Units Subcutaneous TID AC Rohan Dooley Jr., MD   2 Units at 12/15/20 1712   • labetalol (NORMODYNE,TRANDATE) injection 10 mg  10 mg Intravenous Q6H PRN Harris Perez MD   10 mg at 12/16/20 0418   • lisinopril (PRINIVIL,ZESTRIL) tablet 20 mg  20 mg Oral Daily Rohan Dooley Jr., MD   20 mg at 12/15/20 0826   • magnesium oxide (MAG-OX) tablet 400 mg  400 mg Oral BID Vanessa Nicholas MD   400 mg at 12/15/20 2038   • Magnesium Sulfate 2 gram Bolus, followed by 8 gram infusion (total Mg dose 10 grams)- Mg less than or equal to 1mg/dL  2 g Intravenous PRN Harris Perez MD        Or   • Magnesium Sulfate 2 gram / 50mL Infusion (GIVE X 3 BAGS TO EQUAL 6GM TOTAL DOSE) - Mg 1.1 - 1.5 mg/dl  2 g Intravenous PRN Harris Perez MD        Or   • Magnesium Sulfate 4 gram infusion- Mg 1.6-1.9 mg/dL  4 g Intravenous PRN Harris Perez MD 25 mL/hr at 12/14/20 1324 4 g at 12/14/20 1324   • melatonin tablet 3 mg  3 mg Oral Nightly PRN Rohan Dooley Jr., MD       • micafungin 100 mg/100 mL 0.9% NS IVPB (mbp)  100 mg Intravenous Q24H Tricia Plunkett MD   100 mg at 12/16/20 0002   • nitroglycerin (NITROSTAT) SL tablet 0.4 mg  0.4 mg Sublingual Q5 Min PRN Rohan Dooley Jr., MD       • ondansetron (ZOFRAN)  tablet 4 mg  4 mg Oral Q6H PRN Rohan Dooley Jr., MD        Or   • ondansetron (ZOFRAN) injection 4 mg  4 mg Intravenous Q6H PRN Rohan Dooley Jr., MD   4 mg at 12/15/20 2037   • potassium & sodium phosphates (PHOS-NAK) 280-160-250 MG packet - for Phosphorus less than 1.25 mg/dL  2 packet Oral Q6H PRN Harris Perez MD        Or   • potassium & sodium phosphates (PHOS-NAK) 280-160-250 MG packet - for Phosphorus 1.25 - 2.5 mg/dL  2 packet Oral Q6H PRN Harris Perez MD   2 packet at 12/11/20 1345   • potassium chloride (K-DUR,KLOR-CON) ER tablet 40 mEq  40 mEq Oral PRN Rohan Dooley Jr., MD   40 mEq at 12/13/20 2232    Or   • potassium chloride (KLOR-CON) packet 40 mEq  40 mEq Oral PRN Rohan Dooley Jr., MD        Or   • potassium chloride 10 mEq in 100 mL IVPB  10 mEq Intravenous Q1H PRN Rohan Dooley Jr., MD       • potassium chloride (K-DUR,KLOR-CON) ER tablet 40 mEq  40 mEq Oral PRN Rohan Dooley Jr., MD   40 mEq at 12/10/20 1808    Or   • potassium chloride (KLOR-CON) packet 40 mEq  40 mEq Oral PRN Rohan Dooley Jr., MD        Or   • potassium chloride 10 mEq in 100 mL IVPB  10 mEq Intravenous Q1H PRN Rohan Dooley Jr., MD       • sodium chloride 0.9 % flush 10 mL  10 mL Intravenous PRN Rohan Dooley Jr., MD   10 mL at 12/15/20 0826   • sodium chloride 0.9 % flush 10 mL  10 mL Intravenous Q12H Tricia Plunkett MD   10 mL at 12/15/20 2038   • sodium chloride 0.9 % flush 10 mL  10 mL Intravenous PRN Tricia Plunkett MD       • sodium chloride 0.9 % flush 20 mL  20 mL Intravenous PRN Tricia Plunkett MD           Current Facility-Administered Medications:   •  acetaminophen (TYLENOL) suppository 650 mg, 650 mg, Rectal, Q6H PRN, Harris Perez MD  •  acetaminophen (TYLENOL) tablet 650 mg, 650 mg, Oral, Q4H PRN, Rohan Dooley Jr., MD, 650 mg at 12/11/20 1518  •  calcium gluconate 1 g/100 mL (10 mg/mL) NS IVPB (VTB), 1 g, Intravenous, PRN  **AND** calcium gluconate 6 g in sodium chloride 0.9 % 500 mL IVPB, 6 g, Intravenous, PRN **AND** Calcium, , , PRN, Harris Perez MD  •  carvedilol (COREG) tablet 12.5 mg, 12.5 mg, Oral, BID With Meals, Harris Perez MD, 12.5 mg at 12/15/20 1712  •  citalopram (CeleXA) tablet 10 mg, 10 mg, Oral, Daily, Rohan Dooley Jr., MD, 10 mg at 12/15/20 0826  •  dextrose (D50W) 25 g/ 50mL Intravenous Solution 25 g, 25 g, Intravenous, Q15 Min PRN, Rohan Dooley Jr., MD  •  dextrose (GLUTOSE) oral gel 15 g, 15 g, Oral, Q15 Min PRN, Rohan Dooley Jr., MD  •  glucagon (human recombinant) (GLUCAGEN DIAGNOSTIC) injection 1 mg, 1 mg, Subcutaneous, Q15 Min PRN, Rohan Dooley Jr., MD  •  influenza vac split quad (FLUZONE,FLUARIX,AFLURIA,FLULAVAL) injection 0.5 mL, 0.5 mL, Intramuscular, Once, Rohan Dooley Jr., MD  •  insulin glargine (LANTUS) injection 16 Units, 16 Units, Subcutaneous, Nightly, Bunny Hannah MD, 16 Units at 12/15/20 2045  •  insulin lispro (humaLOG) injection 0-9 Units, 0-9 Units, Subcutaneous, TID AC, Rohan Dooley Jr., MD, 2 Units at 12/15/20 1712  •  labetalol (NORMODYNE,TRANDATE) injection 10 mg, 10 mg, Intravenous, Q6H PRN, Harris Perez MD, 10 mg at 12/16/20 0418  •  lisinopril (PRINIVIL,ZESTRIL) tablet 20 mg, 20 mg, Oral, Daily, Rohan Dooley Jr., MD, 20 mg at 12/15/20 0826  •  magnesium oxide (MAG-OX) tablet 400 mg, 400 mg, Oral, BID, Vanessa Nicholas MD, 400 mg at 12/15/20 2038  •  Magnesium Sulfate 2 gram Bolus, followed by 8 gram infusion (total Mg dose 10 grams)- Mg less than or equal to 1mg/dL, 2 g, Intravenous, PRN **OR** Magnesium Sulfate 2 gram / 50mL Infusion (GIVE X 3 BAGS TO EQUAL 6GM TOTAL DOSE) - Mg 1.1 - 1.5 mg/dl, 2 g, Intravenous, PRN **OR** Magnesium Sulfate 4 gram infusion- Mg 1.6-1.9 mg/dL, 4 g, Intravenous, PRN, Harris Perez MD, Last Rate: 25 mL/hr at 12/14/20 1324, 4 g at 12/14/20 1324  •  melatonin tablet 3  mg, 3 mg, Oral, Nightly PRN, Rohan Dooley Jr., MD  •  micafungin 100 mg/100 mL 0.9% NS IVPB (mbp), 100 mg, Intravenous, Q24H, Tricia Plunkett MD, 100 mg at 12/16/20 0002  •  nitroglycerin (NITROSTAT) SL tablet 0.4 mg, 0.4 mg, Sublingual, Q5 Min PRN, Rohan Dooley Jr., MD  •  ondansetron (ZOFRAN) tablet 4 mg, 4 mg, Oral, Q6H PRN **OR** ondansetron (ZOFRAN) injection 4 mg, 4 mg, Intravenous, Q6H PRN, Rohan Dooley Jr., MD, 4 mg at 12/15/20 2037  •  potassium & sodium phosphates (PHOS-NAK) 280-160-250 MG packet - for Phosphorus less than 1.25 mg/dL, 2 packet, Oral, Q6H PRN **OR** potassium & sodium phosphates (PHOS-NAK) 280-160-250 MG packet - for Phosphorus 1.25 - 2.5 mg/dL, 2 packet, Oral, Q6H PRN, Harris Perez MD, 2 packet at 12/11/20 1345  •  potassium chloride (K-DUR,KLOR-CON) ER tablet 40 mEq, 40 mEq, Oral, PRN, 40 mEq at 12/13/20 2232 **OR** potassium chloride (KLOR-CON) packet 40 mEq, 40 mEq, Oral, PRN **OR** potassium chloride 10 mEq in 100 mL IVPB, 10 mEq, Intravenous, Q1H PRN, Rohan Dooley Jr., MD  •  potassium chloride (K-DUR,KLOR-CON) ER tablet 40 mEq, 40 mEq, Oral, PRN, 40 mEq at 12/10/20 1808 **OR** potassium chloride (KLOR-CON) packet 40 mEq, 40 mEq, Oral, PRN **OR** potassium chloride 10 mEq in 100 mL IVPB, 10 mEq, Intravenous, Q1H PRN, Rohan Dooley Jr., MD  •  [COMPLETED] Insert peripheral IV, , , Once **AND** sodium chloride 0.9 % flush 10 mL, 10 mL, Intravenous, PRN, Rohan Dooley Jr., MD, 10 mL at 12/15/20 0826  •  sodium chloride 0.9 % flush 10 mL, 10 mL, Intravenous, Q12H, Tricia Plunkett MD, 10 mL at 12/15/20 2038  •  sodium chloride 0.9 % flush 10 mL, 10 mL, Intravenous, PRN, Tricia Plunkett MD  •  sodium chloride 0.9 % flush 20 mL, 20 mL, Intravenous, PRN, Tricia Plunkett MD  Medications Discontinued During This Encounter   Medication Reason   • Pharmacy to Dose enoxaparin (LOVENOX)    • enoxaparin (LOVENOX) syringe 40 mg    • sterile water  irrigation solution Patient Discharge   • iothalamate (CONRAY) injection Patient Discharge   • lidocaine (XYLOCAINE) 2 % jelly Patient Discharge   • sodium chloride 0.9 % flush 3 mL Patient Transfer   • sodium chloride 0.9 % flush 3-10 mL Patient Transfer   • lidocaine PF 1% (XYLOCAINE) injection 0.5 mL Patient Transfer   • fentaNYL citrate (PF) (SUBLIMAZE) injection 50 mcg Patient Transfer   • midazolam (VERSED) injection 1 mg Patient Transfer   • HYDROcodone-acetaminophen (NORCO) 7.5-325 MG per tablet 1 tablet Patient Transfer   • HYDROmorphone (DILAUDID) injection 0.5 mg Patient Transfer   • oxyCODONE-acetaminophen (PERCOCET) 7.5-325 MG per tablet 1 tablet Patient Transfer   • fentaNYL citrate (PF) (SUBLIMAZE) injection 50 mcg Patient Transfer   • naloxone (NARCAN) injection 0.2 mg Patient Transfer   • flumazenil (ROMAZICON) injection 0.2 mg Patient Transfer   • ondansetron (ZOFRAN) injection 4 mg Patient Transfer   • promethazine (PHENERGAN) suppository 25 mg Patient Transfer   • promethazine (PHENERGAN) tablet 25 mg Patient Transfer   • ePHEDrine injection 5 mg Patient Transfer   • diphenhydrAMINE (BENADRYL) injection 12.5 mg Patient Transfer   • diphenhydrAMINE (BENADRYL) capsule 25 mg Patient Transfer   • labetalol (NORMODYNE,TRANDATE) injection 5 mg Patient Transfer   • lactated ringers infusion    • insulin glargine (LANTUS) injection 5 Units    • cefTRIAXone (ROCEPHIN) IVPB 1 g    • insulin glargine (LANTUS) injection 10 Units    • Pharmacy Consult - Pharmacy to dose    • sodium chloride 0.9 % with KCl 20 mEq/L infusion    • meropenem (MERREM) 1 g/100 mL 0.9% NS VTB (mbp)    • insulin glargine (LANTUS) injection 20 Units        Assessment/Plan         Renal abscess    Hypokalemia    Renal stone    Hypomagnesemia    Hypocalcemia    Stage 3a chronic kidney disease    Type 2 diabetes mellitus (CMS/HCC)    Sepsis (CMS/HCC)        Plan   - continue percutaneous drain  - antifungal x 4 weeks via PICC line  -  improved glucose control  - plan for uscope and laser lithotripsy in 2 weeks    Rohan Dooley Jr., MD  12/16/20  06:59 EST

## 2020-12-16 NOTE — PLAN OF CARE
Goal Outcome Evaluation:  Plan of Care Reviewed With: patient  Progress: no change  Outcome Summary: Pt denies pain and SOA. Pt medicated for nausea once with relief. Pt states the nausea if from moving around. PRN BP medication administered for elevated BP, asymptomatic. Self-turning in bed. Up wtih assist to the bathroom. Rest of VSS. No s/s of distress at this time. Will continue to monitor.

## 2020-12-16 NOTE — PLAN OF CARE
Problem: Fall Injury Risk  Goal: Absence of Fall and Fall-Related Injury  Outcome: Ongoing, Progressing  Intervention: Promote Injury-Free Environment  Recent Flowsheet Documentation  Taken 12/16/2020 1614 by Radha Parkinson RN  Safety Promotion/Fall Prevention:   fall prevention program maintained   safety round/check completed  Taken 12/16/2020 1430 by Radha Parkinson RN  Safety Promotion/Fall Prevention:   fall prevention program maintained   safety round/check completed  Taken 12/16/2020 1214 by Radha Parkinson RN  Safety Promotion/Fall Prevention:   fall prevention program maintained   safety round/check completed  Taken 12/16/2020 1035 by Radha Parkinson RN  Safety Promotion/Fall Prevention:   fall prevention program maintained   safety round/check completed  Taken 12/16/2020 0840 by Radha Parkinson RN  Safety Promotion/Fall Prevention:   fall prevention program maintained   safety round/check completed     Problem: Adult Inpatient Plan of Care  Goal: Plan of Care Review  Outcome: Ongoing, Progressing  Flowsheets (Taken 12/16/2020 1624)  Plan of Care Reviewed With: patient  Outcome Summary: Denies pain. R flank drain CDI. PICC to RUE CDI. Vinayak diet. Loose stools. K replaced. Prob d/c in am.  Goal: Absence of Hospital-Acquired Illness or Injury  Intervention: Identify and Manage Fall Risk  Recent Flowsheet Documentation  Taken 12/16/2020 1614 by Radha Parkinson RN  Safety Promotion/Fall Prevention:   fall prevention program maintained   safety round/check completed  Taken 12/16/2020 1430 by Radha Parkinson RN  Safety Promotion/Fall Prevention:   fall prevention program maintained   safety round/check completed  Taken 12/16/2020 1214 by Radha Parkinson RN  Safety Promotion/Fall Prevention:   fall prevention program maintained   safety round/check completed  Taken 12/16/2020 1035 by Radha Parkinson RN  Safety Promotion/Fall Prevention:   fall prevention program maintained   safety  round/check completed  Taken 12/16/2020 0840 by Radha Parkinson, RN  Safety Promotion/Fall Prevention:   fall prevention program maintained   safety round/check completed     Problem: Diabetes Comorbidity  Goal: Blood Glucose Level Within Desired Range  Outcome: Ongoing, Progressing     Problem: Electrolyte Imbalance  Goal: Electrolyte Balance  Outcome: Ongoing, Progressing     Problem: Seizure, Active Management  Goal: Absence of Seizure/Seizure-Related Injury  Outcome: Ongoing, Progressing     Problem: Skin Injury Risk Increased  Goal: Skin Health and Integrity  Outcome: Ongoing, Progressing  Intervention: Optimize Skin Protection  Recent Flowsheet Documentation  Taken 12/16/2020 0840 by Radha Parkinson, RN  Pressure Reduction Techniques:   frequent weight shift encouraged   weight shift assistance provided   Goal Outcome Evaluation:  Plan of Care Reviewed With: patient  Progress: no change  Outcome Summary: Denies pain. R flank drain CDI. PICC to RUE CDI. Vinayak diet. Loose stools. K replaced. Prob d/c in am.

## 2020-12-16 NOTE — PROGRESS NOTES
Name: Lyubov Middleton ADMIT: 2020   : 1955  PCP: Ana Huitron MD    MRN: [id number] LOS: 7 days   AGE/SEX: 65 y.o. female  ROOM: [facility]     Subjective   Subjective   Resting in bed. Denies any new complaints. Ready to go home. Tolerating a diet. No nausea or vomiting. No dysuria. No chest pain or dyspnea.      Objective   Objective   Vital Signs  Temp:  [97.6 °F (36.4 °C)-98.3 °F (36.8 °C)] 97.7 °F (36.5 °C)  Heart Rate:  [53-66] 61  Resp:  [18] 18  BP: (157-210)/(69-95) 191/82  SpO2:  [92 %-97 %] 97 %  on   ;   Device (Oxygen Therapy): room air  Body mass index is 27.9 kg/m².     Physical Exam  Vitals signs and nursing note reviewed.   Constitutional:       General: She is not in acute distress.     Appearance: Normal appearance. She is not toxic-appearing.   HENT:      Head: Normocephalic and atraumatic.      Right Ear: External ear normal.      Left Ear: External ear normal.      Nose: Nose normal.   Eyes:      General:         Right eye: No discharge.         Left eye: No discharge.      Conjunctiva/sclera: Conjunctivae normal.   Neck:      Musculoskeletal: Normal range of motion and neck supple.   Cardiovascular:      Rate and Rhythm: Normal rate and regular rhythm.      Pulses: Normal pulses.      Heart sounds: Normal heart sounds.   Pulmonary:      Effort: Pulmonary effort is normal. No respiratory distress.      Breath sounds: Normal breath sounds.   Abdominal:      General: Bowel sounds are normal. There is no distension.      Palpations: Abdomen is soft.      Tenderness: There is no abdominal tenderness.   Musculoskeletal: Normal range of motion.         General: No swelling.   Skin:     General: Skin is warm and dry.      Findings: No bruising.      Comments: Right sided abdominal drain in place   Neurological:      Mental Status: She is alert and oriented to person, place, and time.      Sensory: No sensory deficit.      Motor: Weakness present.      Coordination: Coordination normal.    Psychiatric:         Mood and Affect: Mood normal.         Behavior: Behavior normal.        Results Review:       I reviewed the patient's new clinical results.  Results from last 7 days   Lab Units 12/16/20  0425 12/15/20  0633 12/14/20  0546 12/13/20  0535   WBC 10*3/mm3 14.30* 15.26* 14.44* 16.86*   HEMOGLOBIN g/dL 8.8* 9.2* 8.1* 9.6*   PLATELETS 10*3/mm3 375 397 285 346     Results from last 7 days   Lab Units 12/16/20  0425 12/15/20  0633 12/14/20  0546 12/13/20  0535   SODIUM mmol/L 138 137 136 136   POTASSIUM mmol/L 3.3* 3.7 4.0 3.5   CHLORIDE mmol/L 104 104 105 104   CO2 mmol/L 27.3 24.3 23.4 22.4   BUN mg/dL 6* 9 10 10   CREATININE mg/dL 0.99 1.05* 1.02* 1.11*   GLUCOSE mg/dL 70 73 119* 163*   Estimated Creatinine Clearance: 56.5 mL/min (by C-G formula based on SCr of 0.99 mg/dL).  Results from last 7 days   Lab Units 12/14/20  0546 12/13/20  0535 12/11/20  0559   ALBUMIN g/dL 2.00* 2.10* 2.20*     Results from last 7 days   Lab Units 12/16/20  0425 12/15/20  0633 12/14/20  0546 12/13/20  0535 12/12/20  0550  12/11/20  0559   CALCIUM mg/dL 7.9* 8.2* 7.7* 7.8* 7.8*  --  7.8*   ALBUMIN g/dL  --   --  2.00* 2.10*  --   --  2.20*   MAGNESIUM mg/dL  --  2.0 1.6 1.8 1.6  --  2.5*   PHOSPHORUS mg/dL  --  3.1 2.8 2.9 2.4*   < > 1.5*    < > = values in this interval not displayed.     Results from last 7 days   Lab Units 12/12/20  0550 12/11/20  1530   PROCALCITONIN ng/mL  --  8.89*   LACTATE mmol/L 0.9  --      Glucose   Date/Time Value Ref Range Status   12/16/2020 1133 132 (H) 70 - 130 mg/dL Final   12/16/2020 0610 122 70 - 130 mg/dL Final   12/15/2020 2033 155 (H) 70 - 130 mg/dL Final   12/15/2020 1703 167 (H) 70 - 130 mg/dL Final   12/15/2020 1137 116 70 - 130 mg/dL Final   12/15/2020 0619 75 70 - 130 mg/dL Final   12/14/2020 2044 105 70 - 130 mg/dL Final       carvedilol, 12.5 mg, Oral, BID With Meals  citalopram, 10 mg, Oral, Daily  influenza vaccine, 0.5 mL, Intramuscular, Once  insulin glargine, 16  Units, Subcutaneous, Nightly  insulin lispro, 0-9 Units, Subcutaneous, TID AC  lisinopril, 20 mg, Oral, Daily  magnesium oxide, 400 mg, Oral, BID  micafungin (MYCAMINE) IV, 100 mg, Intravenous, Q24H  sodium chloride, 10 mL, Intravenous, Q12H       Diet Regular; GI Soft       Assessment/Plan     Active Hospital Problems    Diagnosis  POA   • **Renal abscess [N15.1]  Yes   • Hypocalcemia [E83.51]  Yes   • Stage 3a chronic kidney disease [N18.31]  Yes   • Type 2 diabetes mellitus (CMS/HCC) [E11.9]  Yes   • Renal stone [N20.0]  Yes   • Hypokalemia [E87.6]  Yes      Resolved Hospital Problems    Diagnosis Date Resolved POA   • Sepsis (CMS/HCC) [A41.9] 12/16/2020 No   • Hypomagnesemia [E83.42] 12/16/2020 Yes     65 y.o. female admitted with Renal abscess. Has history of multiple UTIs in the past. Presented with urinary frequency and dysuria and found to have complicated pyleonephritis with fungal abscess vs. renal mass. Also had significant hypokalemia and hypomagnesemia.  Course has been complicated by new seizure most likely from hypomagnesemia. Underwent cystoscopy and right retrograde pyelogram and ureteroscopy and laser lithotripsy and right ureteral stent placement on 12/9. Had CT guided percutaneous drainage of right perinephric fluid on 12/10 with drain left in place.     Complicated pyelonephritis with fungal abscess, poa-ID and urology are consulted. Repeat CT abd/pelvis shows near complete resolution of the abscess, so urology recommends antimicrobial treatment with plan for ureteroscopy and laser lithotripsy in 2 weeks. ID has narrowed antibiotics to micafungin alone and recommends 4 weeks of iv therapy with every 2 week CT scans to document progression and improvement. Drain still in place- continue at discharge? Nursing to clarify with Urology.     New onset seizure activity most likely from hypomagnesemia, poa-neurology was consulted. No new antiepileptics recommended. On magnesium replacement.       Hypokalemia, poa- replacement protocol ordered.     Hypomagnesemia, poa- resolved     Hypophosphatemia, poa- resolved     DM2, uncontrolled on glipizide and metformin at home-a1c is 13. Started on basal insulin here (and should go home on insulin) and oral meds are held currently and may  Need to continue to hold given lower trends today. DM educator has seen. Lantus reduced yesterday by 20%. Fasting sugar this morning 122. Will keep the same today.    Anemia of chronic disease- hemoglobin stable.    History of left breast cancer s/p mastectomy in 2012    Essential hypertension on lisinopril at home. Coreg added here. BP elevated overnight in the 190s/90s. However, this is due to BP cuff readings on lower extremities given no BP in arm with prior breast cancer and PICC line. Prior readings were controlled when taken on arms. Repeat while in room showed /87 on LLE. Will continue to monitor and adjust as needed.     Migraines on prn rizatriptan at home     · SCDs for DVT prophylaxis.  · Full code.  · Discussed with patient, nursing staff and Dr. Hannah.     Plans for discharge home tomorrow with HH.      BECKY Adames  [facility]  12/16/20  14:28 EST

## 2020-12-16 NOTE — PROGRESS NOTES
Continued Stay Note  [facility]     Patient Name: Lyubov Middleton  MRN: [id number]  Today's Date: 12/16/2020    Admit Date: 12/8/2020    Discharge Plan     Row Name 12/16/20 1409       Plan    Plan  Plan home with [facility] .  ZIGGY Hay RN    Patient/Family in Agreement with Plan  yes    Plan Comments Pt will need  4 weeks of IV Mycamine at 100 mg every 24 starting 12/13/2020.  Check weekly CBC CMP and call abnormal results to Dr. Plunkett at [phone].   Per Sena ( [facility])  pt's cost for antibiotics- Mycamine is the rest of her deductible of $250 and $40/day.  Discussed with pt at bedside and pt is agreeable to paying the cost for the antibiotic.   Sena  ( [phone]) with [facility] notified.   Plan home with [facility].   ZIGGY Hay RN        Discharge Codes    No documentation.             Gretta Hay, RN

## 2020-12-16 NOTE — PROGRESS NOTES
"  Infectious Diseases Progress Note    Tricia Plunkett MD     [facility]  [place]: 7 days  Patient Identification:  Name: Lyubov Middleton  Age: 65 y.o.  Sex: female  :  1955  MRN: [id number]         Primary Care Physician: Ana Huitron MD            Subjective: Doing well feeling better..  Interval History: See consultation note.  · Seizure type activity after the procedure and was seen by neurology service.  · Cystoscopy right retrograde pyelogram and ureteroscopy and laser lithotripsy and right ureteral stent placement on 2020  · Had percutaneous drainage of right perinephric fluid performed and drainage catheter in place on 12/10/2020  Objective:    Scheduled Meds:carvedilol, 12.5 mg, Oral, BID With Meals  citalopram, 10 mg, Oral, Daily  influenza vaccine, 0.5 mL, Intramuscular, Once  insulin glargine, 16 Units, Subcutaneous, Nightly  insulin lispro, 0-9 Units, Subcutaneous, TID AC  lisinopril, 20 mg, Oral, Daily  magnesium oxide, 400 mg, Oral, BID  micafungin (MYCAMINE) IV, 100 mg, Intravenous, Q24H  sodium chloride, 10 mL, Intravenous, Q12H      Continuous Infusions:     Vital signs in last 24 hours:  Temp:  [97.6 °F (36.4 °C)-98.3 °F (36.8 °C)] 97.7 °F (36.5 °C)  Heart Rate:  [53-66] 57  Resp:  [18] 18  BP: (157-210)/(69-95) 210/95    Intake/Output:    Intake/Output Summary (Last 24 hours) at 2020 0831  Last data filed at 2020 0004  Gross per 24 hour   Intake 880 ml   Output 5 ml   Net 875 ml       Exam:  BP (!) 210/95 (BP Location: Left leg, Patient Position: Lying)   Pulse 57   Temp 97.7 °F (36.5 °C) (Oral)   Resp 18   Ht 162.6 cm (64.02\")   Wt 73.8 kg (162 lb 9.6 oz)   SpO2 97%   BMI 27.90 kg/m²   Patient is examined using the personal protective equipment as per guidelines from infection control for this particular patient as enacted.  Hand washing was performed before and after patient interaction.  General Appearance:  Appears well has better color                "           Head:    Normocephalic, without obvious abnormality, atraumatic                           Eyes:    PERRL, conjunctivae/corneas clear, EOM's intact, both eyes                         Throat:   Lips, tongue, gums normal; oral mucosa pink and moist                           Neck:   Supple, symmetrical, trachea midline, no JVD                         Lungs:    Clear to auscultation bilaterally, respirations unlabored                 Chest Wall:    No tenderness or deformity                          Heart:    Regular rate and rhythm, S1 and S2 normal                  Abdomen:     Soft, non-tender, bowel sounds active,decreased discomfort in the right flank area                 extremities:   Extremities normal, atraumatic, no cyanosis or edema, PICC line in place                        Pulses:   Pulses palpable in all extremities                            Skin:   Skin is warm and dry,  no rashes or palpable lesions                  Neurologic: Mostly nonfocal       Data Review:    I reviewed the patient's new clinical results.  Results from last 7 days   Lab Units 12/16/20  0425 12/15/20  0633 12/14/20  0546 12/13/20  0535 12/12/20  0550 12/11/20  0938 12/10/20  0540   WBC 10*3/mm3 14.30* 15.26* 14.44* 16.86* 14.49* 15.80* 17.19*   HEMOGLOBIN g/dL 8.8* 9.2* 8.1* 9.6* 8.2* 9.4* 9.4*   PLATELETS 10*3/mm3 375 397 285 346 329 368 338     Results from last 7 days   Lab Units 12/16/20  0425 12/15/20  0633 12/14/20  0546 12/13/20  0535 12/12/20  0550 12/11/20  0559 12/10/20  2303 12/10/20  0540   SODIUM mmol/L 138 137 136 136 135* 137  --  133*   POTASSIUM mmol/L 3.3* 3.7 4.0 3.5 3.8 4.2 3.9 3.4*   CHLORIDE mmol/L 104 104 105 104 103 104  --  95*   CO2 mmol/L 27.3 24.3 23.4 22.4 22.1 22.8  --  26.6   BUN mg/dL 6* 9 10 10 8 11  --  11   CREATININE mg/dL 0.99 1.05* 1.02* 1.11* 1.01* 1.02*  --  1.22*   CALCIUM mg/dL 7.9* 8.2* 7.7* 7.8* 7.8* 7.8*  --  7.2*   GLUCOSE mg/dL 70 73 119* 163* 212* 247*  --  397*      Microbiology Results (last 10 days)     Procedure Component Value - Date/Time    Clostridium Difficile Toxin - Stool, Per Rectum [[id number]]  (Normal) Collected: 12/14/20 1625    Lab Status: Final result Specimen: Stool from Per Rectum Updated: 12/14/20 1749    Narrative:      The following orders were created for panel order Clostridium Difficile Toxin - Stool, Per Rectum.  Procedure                               Abnormality         Status                     ---------                               -----------         ------                     Clostridium Difficile To...[[id number]]  Normal              Final result                 Please view results for these tests on the individual orders.    Clostridium Difficile Toxin, PCR - Stool, Per Rectum [[id number]]  (Normal) Collected: 12/14/20 1625    Lab Status: Final result Specimen: Stool from Per Rectum Updated: 12/14/20 1749     C. Difficile Toxins by PCR Negative    Blood Culture - Blood, Hand, Right [[id number]] Collected: 12/11/20 1530    Lab Status: Preliminary result Specimen: Blood from Hand, Right Updated: 12/15/20 1545     Blood Culture No growth at 4 days    Blood Culture - Blood, Arm, Right [[id number]] Collected: 12/11/20 1530    Lab Status: Preliminary result Specimen: Blood from Arm, Right Updated: 12/15/20 1545     Blood Culture No growth at 4 days    Urine Culture - Urine, Urine, Clean Catch [[id number]]  (Abnormal) Collected: 12/11/20 0149    Lab Status: Final result Specimen: Urine, Clean Catch Updated: 12/12/20 0943     Urine Culture Yeast isolated    Body Fluid Culture - Body Fluid, Retroperitoneum [[id number]]  (Abnormal)  (Susceptibility) Collected: 12/10/20 1205    Lab Status: Final result Specimen: Body Fluid from Retroperitoneum Updated: 12/15/20 0637     Body Fluid Culture Light growth (2+) Candida glabrata     Gram Stain Many (4+) WBCs seen      No organisms seen    Susceptibility      Candida glabrata     MARKO Not Specified     Fluconazole   Susceptible     Micafungin Susceptible                       Blood Culture - Blood, Arm, Left [[id number]] Collected: 12/09/20 2328    Lab Status: Final result Specimen: Blood from Arm, Left Updated: 12/15/20 0015     Blood Culture No growth at 5 days    Blood Culture - Blood, Arm, Right [[id number]] Collected: 12/09/20 2328    Lab Status: Final result Specimen: Blood from Arm, Right Updated: 12/15/20 0015     Blood Culture No growth at 5 days    Blood Culture - Blood, Arm, Right [[id number]] Collected: 12/09/20 1059    Lab Status: Final result Specimen: Blood from Arm, Right Updated: 12/14/20 1230     Blood Culture No growth at 5 days    Blood Culture - Blood, Arm, Right [[id number]] Collected: 12/09/20 1015    Lab Status: Final result Specimen: Blood from Arm, Right Updated: 12/14/20 1230     Blood Culture No growth at 5 days    COVID PRE-OP / PRE-PROCEDURE SCREENING ORDER (NO ISOLATION) - Swab, Nasopharynx [[id number]]  (Normal) Collected: 12/08/20 1658    Lab Status: Final result Specimen: Swab from Nasopharynx Updated: 12/08/20 1836    Narrative:      The following orders were created for panel order COVID PRE-OP / PRE-PROCEDURE SCREENING ORDER (NO ISOLATION) - Swab, Nasopharynx.  Procedure                               Abnormality         Status                     ---------                               -----------         ------                     Respiratory Panel PCR w/...[[id number]]  Normal              Final result                 Please view results for these tests on the individual orders.    Respiratory Panel PCR w/COVID-19(SARS-CoV-2) TIP/BOBBY/ANDREY/PAD/COR/MAD/HUMPHREY In-House, NP Swab in UTM/VTM, 3-4 HR TAT - Swab, Nasopharynx [[id number]]  (Normal) Collected: 12/08/20 1658    Lab Status: Final result Specimen: Swab from Nasopharynx Updated: 12/08/20 1836     ADENOVIRUS, PCR Not Detected     Coronavirus 229E Not Detected     Coronavirus HKU1 Not Detected     Coronavirus NL63 Not Detected     Coronavirus OC43  Not Detected     COVID19 Not Detected     Human Metapneumovirus Not Detected     Human Rhinovirus/Enterovirus Not Detected     Influenza A PCR Not Detected     Influenza B PCR Not Detected     Parainfluenza Virus 1 Not Detected     Parainfluenza Virus 2 Not Detected     Parainfluenza Virus 3 Not Detected     Parainfluenza Virus 4 Not Detected     RSV, PCR Not Detected     Bordetella pertussis pcr Not Detected     Bordetella parapertussis PCR Not Detected     Chlamydophila pneumoniae PCR Not Detected     Mycoplasma pneumo by PCR Not Detected    Narrative:      Fact sheet for providers: https://docs.[employer]/wp-content/uploads/RIQ4482-6852-XT9.1-EUA-Provider-Fact-Sheet-3.pdf    Fact sheet for patients: https://docs.[employer]/wp-content/uploads/XFF5380-1718-YD2.1-EUA-Patient-Fact-Sheet-1.pdf    Test performed by PCR.        Ct Abdomen Pelvis With & Without Contrast    Result Date: 12/15/2020  1. Interim placement of a right ureteral stent, as well as a percutaneous drain within a right perinephric collection with near complete resolution of the collection. Enhancing soft tissue seen along the lateral aspect of the mid and lower pole and anterior to the right kidney may represent enhancing granulation tissue. Multiple mildly prominent retroperitoneal lymph nodes at least a couple of which have enlarged in the interim from prior imaging, the largest measuring 1.3 cm at the level of the bifurcation. These may be reactive however continued follow-up is recommended to rule out malignancy. 2. Mild colonic ileus. 3. Bilateral small layering pleural effusions with bibasilar atelectasis.  Radiation dose reduction techniques were utilized, including automated exposure control and exposure modulation based on body size.  This report was finalized on 12/15/2020 2:45 PM by Dr. Brianne Amador M.D.      Ct Head Without Contrast    Result Date: 12/9/2020  Atrophy and moderate vascular calcification is noted. There is no  evidence of acute infarction or hemorrhage. Further evaluation could be performed with MRI examination of the brain, particularly if the patient has a history of new onset seizure activity. The above information was called to the patient's nurse at the time of the dictation. The patient's nurse is to immediately relay the information to the clinical service.    Radiation dose reduction techniques were utilized, including automated exposure control and exposure modulation based on body size.  This report was finalized on 12/9/2020 8:26 PM by Dr. Harris Sylvester M.D.            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  CT scan after percutaneous drainage showed significant improvement.  2-evolving malignancy  3-diabetes-poorly controlled with hyperglycemia  4-anemia  5-electrolyte imbalance and renal insufficiency.  6-history of hypertension  7-new onset seizure type activity.           Recommendations/Discussions:  · Urology services recommendations after the CT scan of the abdomen pelvis performed last evening reviewed.  · Would recommend 4 weeks of IV Mycamine with periodic assessments with every 2-week CT scans to document progression and improvement.  · Plans for intervention in terms of ureteroscopy with laser lithotripsy in 2 weeks per urology noted.  · See CCP orders.    Tricia Plunkett MD  12/16/2020  08:31 EST    Much of this encounter note is an electronic transcription/translation of spoken language to printed text. The electronic translation of spoken language may permit erroneous, or at times, nonsensical words or phrases to be inadvertently transcribed; Although I have reviewed the note for such errors, some may still exist

## 2020-12-17 VITALS
TEMPERATURE: 98.1 F | SYSTOLIC BLOOD PRESSURE: 160 MMHG | DIASTOLIC BLOOD PRESSURE: 80 MMHG | HEART RATE: 72 BPM | OXYGEN SATURATION: 98 % | BODY MASS INDEX: 27.66 KG/M2 | HEIGHT: 64 IN | WEIGHT: 162.04 LBS | RESPIRATION RATE: 16 BRPM

## 2020-12-17 PROBLEM — E83.51 HYPOCALCEMIA: Status: RESOLVED | Noted: 2020-12-10 | Resolved: 2020-12-17

## 2020-12-17 PROBLEM — E87.6 HYPOKALEMIA: Status: RESOLVED | Noted: 2020-12-08 | Resolved: 2020-12-17

## 2020-12-17 LAB
ANION GAP SERPL CALCULATED.3IONS-SCNC: 5.4 MMOL/L (ref 5–15)
BUN SERPL-MCNC: 6 MG/DL (ref 8–23)
BUN/CREAT SERPL: 6.9 (ref 7–25)
CALCIUM SPEC-SCNC: 8.4 MG/DL (ref 8.6–10.5)
CHLORIDE SERPL-SCNC: 104 MMOL/L (ref 98–107)
CO2 SERPL-SCNC: 30.6 MMOL/L (ref 22–29)
CREAT SERPL-MCNC: 0.87 MG/DL (ref 0.57–1)
DEPRECATED RDW RBC AUTO: 37.1 FL (ref 37–54)
ERYTHROCYTE [DISTWIDTH] IN BLOOD BY AUTOMATED COUNT: 13.1 % (ref 12.3–15.4)
GFR SERPL CREATININE-BSD FRML MDRD: 66 ML/MIN/1.73
GLUCOSE BLDC GLUCOMTR-MCNC: 120 MG/DL (ref 70–130)
GLUCOSE BLDC GLUCOMTR-MCNC: 218 MG/DL (ref 70–130)
GLUCOSE BLDC GLUCOMTR-MCNC: 82 MG/DL (ref 70–130)
GLUCOSE SERPL-MCNC: 84 MG/DL (ref 65–99)
HCT VFR BLD AUTO: 29.6 % (ref 34–46.6)
HGB BLD-MCNC: 9.2 G/DL (ref 12–15.9)
MCH RBC QN AUTO: 24.7 PG (ref 26.6–33)
MCHC RBC AUTO-ENTMCNC: 31.1 G/DL (ref 31.5–35.7)
MCV RBC AUTO: 79.4 FL (ref 79–97)
PLATELET # BLD AUTO: 322 10*3/MM3 (ref 140–450)
PMV BLD AUTO: 9.3 FL (ref 6–12)
POTASSIUM SERPL-SCNC: 4 MMOL/L (ref 3.5–5.2)
RBC # BLD AUTO: 3.73 10*6/MM3 (ref 3.77–5.28)
SODIUM SERPL-SCNC: 140 MMOL/L (ref 136–145)
WBC # BLD AUTO: 12.63 10*3/MM3 (ref 3.4–10.8)

## 2020-12-17 PROCEDURE — 25010000002 MICAFUNGIN SODIUM 100 MG RECONSTITUTED SOLUTION: Performed by: INTERNAL MEDICINE

## 2020-12-17 PROCEDURE — 80048 BASIC METABOLIC PNL TOTAL CA: CPT | Performed by: NURSE PRACTITIONER

## 2020-12-17 PROCEDURE — 85027 COMPLETE CBC AUTOMATED: CPT | Performed by: NURSE PRACTITIONER

## 2020-12-17 PROCEDURE — 63710000001 INSULIN LISPRO (HUMAN) PER 5 UNITS: Performed by: UROLOGY

## 2020-12-17 PROCEDURE — 82962 GLUCOSE BLOOD TEST: CPT

## 2020-12-17 RX ORDER — INSULIN GLARGINE 100 [IU]/ML
16 INJECTION, SOLUTION SUBCUTANEOUS NIGHTLY
Qty: 4.96 ML | Refills: 0 | Status: SHIPPED | OUTPATIENT
Start: 2020-12-17 | End: 2023-03-25

## 2020-12-17 RX ORDER — BLOOD SUGAR DIAGNOSTIC
STRIP MISCELLANEOUS
Qty: 100 EACH | Refills: 12 | Status: SHIPPED | OUTPATIENT
Start: 2020-12-17 | End: 2023-03-25

## 2020-12-17 RX ORDER — CALCIUM CARB/VITAMIN D3/VIT K1 500-100-40
TABLET,CHEWABLE ORAL
Qty: 100 EACH | Refills: 1 | Status: SHIPPED | OUTPATIENT
Start: 2020-12-17 | End: 2023-03-25

## 2020-12-17 RX ORDER — LISINOPRIL 20 MG/1
40 TABLET ORAL DAILY
Qty: 62 TABLET | Refills: 0 | Status: SHIPPED | OUTPATIENT
Start: 2020-12-17 | End: 2023-03-25

## 2020-12-17 RX ORDER — LANCETS 30 GAUGE
EACH MISCELLANEOUS
Qty: 100 EACH | Refills: 12 | Status: SHIPPED | OUTPATIENT
Start: 2020-12-17 | End: 2023-03-25

## 2020-12-17 RX ORDER — CARVEDILOL 12.5 MG/1
12.5 TABLET ORAL 2 TIMES DAILY WITH MEALS
Qty: 62 TABLET | Refills: 0 | Status: SHIPPED | OUTPATIENT
Start: 2020-12-17 | End: 2023-03-25

## 2020-12-17 RX ORDER — BLOOD-GLUCOSE METER
KIT MISCELLANEOUS
Qty: 1 EACH | Refills: 0 | Status: SHIPPED | OUTPATIENT
Start: 2020-12-17 | End: 2023-03-25

## 2020-12-17 RX ADMIN — MAGNESIUM OXIDE 400 MG (241.3 MG MAGNESIUM) TABLET 400 MG: TABLET at 09:29

## 2020-12-17 RX ADMIN — SODIUM CHLORIDE, PRESERVATIVE FREE 10 ML: 5 INJECTION INTRAVENOUS at 09:30

## 2020-12-17 RX ADMIN — CITALOPRAM 10 MG: 10 TABLET, FILM COATED ORAL at 09:29

## 2020-12-17 RX ADMIN — LABETALOL HYDROCHLORIDE 10 MG: 5 INJECTION, SOLUTION INTRAVENOUS at 06:30

## 2020-12-17 RX ADMIN — CARVEDILOL 12.5 MG: 12.5 TABLET, FILM COATED ORAL at 09:29

## 2020-12-17 RX ADMIN — LISINOPRIL 20 MG: 20 TABLET ORAL at 09:29

## 2020-12-17 RX ADMIN — MICAFUNGIN SODIUM 100 MG: 100 INJECTION, POWDER, LYOPHILIZED, FOR SOLUTION INTRAVENOUS at 12:22

## 2020-12-17 RX ADMIN — CARVEDILOL 12.5 MG: 12.5 TABLET, FILM COATED ORAL at 17:15

## 2020-12-17 RX ADMIN — INSULIN LISPRO 4 UNITS: 100 INJECTION, SOLUTION INTRAVENOUS; SUBCUTANEOUS at 17:15

## 2020-12-17 NOTE — NURSING NOTE
"Diabetes Education  Assessment/Teaching    Patient Name:  Lyubov Middleton  YOB: 1955  MRN: [id number]  Admit Date:  12/8/2020      Assessment Date:  12/17/2020    Most Recent Value   General Information    Referral From:  MD order. Briefly f/u with 66 y/o at bedside to assess pt for questions r/t insulin teaching.    Height  162.6 cm (64.02\")   Height Method  Stated   Weight  73.5 kg (162 lb 0.6 oz)   Weight Method  Standing scale   Diabetes History   What type of diabetes do you have?  Type 2   Length of Diabetes Diagnosis  10 + years   Do you test your blood sugar at home?  no   Have you had high blood sugar? (>140mg/dl)  yes   Education Preferences   Barriers to Learning     Assessment Topics   Taking Medication - Assessment  Needs education/Review. anticipate pt to dc new to Lantus/insulin per MD notes. Pt reports she has successfully self-administered her insulin today x2.   Healthy Coping - Assessment  Competent   DM Goals   Contact Plan  Follow-up medical care with primary care.            Most Recent Value   DM Education Needs   Meter  Has own   Medication  Provide pt co-pay card for Lantus and explain how to activate it. Encourage pt to activate card prior to picking up rx at pharmacy.    Healthy Coping  Appropriate   Discharge Plan  Home -with home health for IV abx.    Teaching Method  Explanation, Discussion   Patient Response  Verbalized understanding      Other Comments:    Electronically signed by:  Petty Conte, RN, BSN, CDE   12/17/20 17:36 EST  "

## 2020-12-17 NOTE — PLAN OF CARE
Goal Outcome Evaluation:  Plan of Care Reviewed With: patient  Progress: improving  Outcome Summary: VSS. Telemetry monitor, SR. Alert and oriented x4, room air, up ad cody. PICC line dressing c/d/i. Awaiting medications to be discharged.

## 2020-12-17 NOTE — PLAN OF CARE
Goal Outcome Evaluation:  Plan of Care Reviewed With: patient  Progress: improving  Outcome Summary: Patient is alert and oriented. BPs still elevated overnight- BPs having to be taken in legs. NP aware of BP trends since we began taking them in lower extremities. PRN labetolol given. Patient up with assistance to BR. Self-administered insulin last night. No acute distress. overnight. Probable d/c home today. Will continue to monitor.

## 2020-12-17 NOTE — PROGRESS NOTES
Case Management Discharge Note      Final Note: Pt discharged home with [facility] and [facility].   ZIGGY Hya RN    Provided Post Acute Provider List?: Yes  Post Acute Provider List: Home Health  Provided Post Acute Provider Quality & Resource List?: Yes  Post Acute Provider Quality and Resource List: Home Health  Delivered To: Patient  Method of Delivery: In person    Selected Continued Care - Admitted Since 12/8/2020     Destination    No services have been selected for the patient.              Durable Medical Equipment    No services have been selected for the patient.              Dialysis/Infusion Coordination complete    Service Provider Selected Services Address Phone Fax Patient Preferred    [facility] [address], [facility] [address] [phone] --          Home Medical Care Coordination complete    Service Provider Selected Services Address Phone Fax Patient Preferred    [facility] [place]  [facility] [address] [id number] [phone] --          Therapy    No services have been selected for the patient.              Community Resources    No services have been selected for the patient.                  Transportation Services  Private: Car    Final Discharge Disposition Code: 06 - home with home health care

## 2020-12-17 NOTE — DISCHARGE SUMMARY
Patient Name: Lyubov Middleton  : 1955  MRN: [id number]    Date of Admission: 2020  Date of Discharge:  2020  Primary Care Physician: Ana Huitron MD      Chief Complaint:   Abdominal Pain and Difficulty Urinating      Discharge Diagnoses     Active Hospital Problems    Diagnosis  POA   • **Renal abscess [N15.1]  Yes   • Stage 3a chronic kidney disease [N18.31]  Yes   • Type 2 diabetes mellitus (CMS/HCC) [E11.9]  Yes   • Renal stone [N20.0]  Yes      Resolved Hospital Problems    Diagnosis Date Resolved POA   • Sepsis (CMS/HCC) [A41.9] 2020 No   • Hypomagnesemia [E83.42] 2020 Yes   • Hypocalcemia [E83.51] 2020 Yes   • Hypokalemia [E87.6] 2020 Yes        Hospital Course     Ms. Middleton is a 65 y.o. female former smoker with a history of CKD3, DM2, breast cancer and frequent UTIs who presented to [facility] initially complaining of abdominal pain as well as urinary symptoms.  Please see the admitting history and physical for further details.  She was found to have complicated pyelonephritis with fungal abscess versus renal mass. She also had several electrolyte abnormalities with low potassium and magnesium. She was seen in consultation by Urology, neurology, as well as infectious disease.              Urology was consulted and she underwent cystoscopy and right retrograde pyelogram and ureteroscopy and laser lithotripsy with right ureteral stent placement on  with Dr. oDoley as CT scan on admission showed large right upper pole infundibular calculus with likely calyceal rupture and perinephric urinoma/abscess. She also had a drain placed by IR on 12/10. She was treated with antibiotics and repeat CT imaging from  showed near complete resolution of the abscess. Urology has recommended to continue the percutaneous drain and plans for ureteroscopy and laser lithotripsy in 2 weeks.               Unfortunately, after having her procedure on  she  had a new onset of seizure activity and Neurology was consulted. CT head showed no acute findings and her symptoms were felt secondary to low magnesium. She is now on schedule magnesium replacement and Neurology has signed off with no further work up planned.  Otherwise, her electrolyte abnormalities have resolved by the day of discharge. Seizure Precautions: Patient is not to drive for at least 3 months until cleared by a physician, operate heavy machinery, take tub baths, swim unattended, climb heights, or perform any other activities that can bring harm to himself/herself or others during an episode of altered awareness.               For antibiotic coverage, Infectious disease followed. Operative cultures were positive for candida glabrata with no evidence of gram negative rods. Meropenem was discontinued and IV mycamine (100 mg daily from 12/13/20) will be continued for 4 weeks with periodic assessments with every 2 week CT scans to document progression/improvement. PICC line was placed yesterday and Dr. Plunkett would like weekly CBC, CMP with abnormal results called to him at [phone]. Home health is being set up for this reason.   A couple of days prior to discharge, pt's blood pressure readings were very elevated, however this occurred after blood pressure was being taken on lower extremities due to her PICC line and prior breast cancer. Blood pressure readings taking in upper extremities consistently ranged from 140s-160s systolic. Coreg had already been added during the day for blood pressure and could not be titrated further due to HR in the 50s/60s, so her lisinopril was increased on the day of discharge. She will need follow up labs to monitor her creatinine and potassium.               On admission, she was found to have an A1c of 13 with prior use of glipizide, linagliptin, and metformin. She was started on basal insulin here and her sugars have improved. She was discharged on lantus 16 units  nightly in addition to her oral medications which she can continue. The diabetes educator has met with her and she will need to check her sugars ACHS at home. She should follow up with her PCP in 1-2 weeks for further monitoring and keep a log of her sugars home.     Day of Discharge     Subjective:  Pt doing very well this morning with no complaints. She's excited about leaving. She has minimal drainage from her PAUL drain.     Physical Exam:  Temp:  [98 °F (36.7 °C)-98.1 °F (36.7 °C)] 98.1 °F (36.7 °C)  Heart Rate:  [53-68] 62  Resp:  [16] 16  BP: (181-204)/(82-97) 189/85  Body mass index is 27.8 kg/m².  Physical Exam  Constitutional:       General: She is not in acute distress.  HENT:      Head: Normocephalic and atraumatic.   Cardiovascular:      Rate and Rhythm: Normal rate and regular rhythm.      Heart sounds: Normal heart sounds. No murmur. No friction rub. No gallop.    Pulmonary:      Effort: Pulmonary effort is normal.      Breath sounds: Normal breath sounds.   Abdominal:      General: Bowel sounds are normal. There is no distension.      Palpations: Abdomen is soft.      Tenderness: There is no abdominal tenderness. There is no guarding.      Hernia: No hernia is present.   Neurological:      Mental Status: She is alert.   Psychiatric:         Mood and Affect: Mood normal.         Behavior: Behavior normal.         Consultants     Consult Orders (all) (From admission, onward)     Start     Ordered    12/15/20 1429  Inpatient Diabetes Educator Consult  Once     Comments: Not on insulin at home, but a1c is 13. Started on basal here. Need's teaching on administration, monitoring, etc.   Provider:  (Not yet assigned)    12/15/20 1428    12/15/20 1037  IV TEAM - Consult for PICC Line (IV Team to Determine Number of Lumens)  Once     Provider:  (Not yet assigned)    12/15/20 1037    12/15/20 1037  Inpatient Case Management  Consult  Once     Comments: Please arrange for 4 weeks of IV Mycamine at  100 mg every 24 starting 12/13/2020.  Check weekly CBC CMP and call abnormal results to Dr. Plunkett at [phone].    Diagnosis: Rhina glabrata right perinephric abscess with with calculus obstruction and pyelonephritis status post percutaneous drainage.   Provider:  (Not yet assigned)    12/15/20 1037    12/10/20 1206  Inpatient Diabetes Educator Consult  Once     Provider:  (Not yet assigned)    12/10/20 1205    12/09/20 1652  Inpatient Neurology Consult General  STAT     Specialty:  Neurology  Provider:  Vanessa Nicholas MD    12/09/20 1652    12/08/20 2015  Inpatient Urology Consult  Once     Specialty:  Urology  Provider:  Enio Sierra MD    12/08/20 2014 12/08/20 2013  Inpatient Infectious Diseases Consult  Once     Specialty:  Infectious Diseases  Provider:  Tricia Plunkett MD    12/08/20 2012 12/08/20 1621  [facility] (on-call MD unless specified) Details  Once     Specialty:  Hospitalist  Provider:  (Not yet assigned)    12/08/20 1620              Procedures     Imaging Results (All)     Procedure Component Value Units Date/Time    CT Abdomen Pelvis With & Without Contrast [[id number]] Collected: 12/14/20 0935     Updated: 12/15/20 1448    Narrative:      CT ABDOMEN AND PELVIS WITH AND WITHOUT CONTRAST     HISTORY: Follow-up perinephric fluid collection drainage.     TECHNIQUE: Noncontrast axial images of the abdomen and pelvis were  obtained followed by administration of intravenous contrast and imaging  of the abdomen and pelvis in the nephrographic and delayed phase.  Coronal and sagittal reformats were obtained.     COMPARISON: CT abdomen and pelvis from 12/08/2020.     Findings: a percutaneous drain is seen within a previously demonstrated  right perinephric fluid collection with near complete resolution of the  fluid component. Enhancing soft tissue component/wall is seen along the  lateral aspect of the mid and lower pole of the kidney. There is also a  right ureteric stent appropriately  placed with distal tip within the  bladder and proximally within the left anterior collecting system and a  malrotated kidney. There was previously a large calculus in this region  measuring up to 1 cm that is possibly smaller and displaced now  measuring 7 mm. There are also nonobstructing calculi within the lower  pole of the left kidney, image 69. Extensive perinephric stranding is  demonstrated. On the delayed images, no extravasation of contrast is  identified.     There are bilateral small layering pleural effusions with bibasilar  atelectasis. Liver and spleen are unchanged. Pancreas is normal without  ductal dilatation. Common bile duct is dilated, that likely represents  benign biliary ectasia. Bilateral adrenal glands are normal. There are  small retroperitoneal lymph nodes identified with the largest lymph node  at the level of the aortic bifurcation measuring up to 1.3 cm in short  axis dimension. This has grown in the interim from prior imaging where  it measured 8 mm. The urinary bladder is partially distended, small  amount of nondependent air likely related to catheterization. The uterus  is anteverted. No abnormal adnexal mass is seen. There is a large  right-sided ventral hernia containing small and large bowel loops. There  is an ileocolic anastomosis present. Air-fluid levels are seen  throughout the colon most suggestive of a mild ileus. Again demonstrated  is a small low-density region along the left rectus abdominis in the  paramidline region measuring 2.5 x 1.8 cm, unchanged from prior imaging  and may represent a chronic postsurgical collection/seroma. Attention on  follow-up is recommended.       Impression:      1. Interim placement of a right ureteral stent, as well as a  percutaneous drain within a right perinephric collection with near  complete resolution of the collection. Enhancing soft tissue seen along  the lateral aspect of the mid and lower pole and anterior to the right  kidney  may represent enhancing granulation tissue. Multiple mildly  prominent retroperitoneal lymph nodes at least a couple of which have  enlarged in the interim from prior imaging, the largest measuring 1.3 cm  at the level of the bifurcation. These may be reactive however continued  follow-up is recommended to rule out malignancy.  2. Mild colonic ileus.   3. Bilateral small layering pleural effusions with bibasilar  atelectasis.     Radiation dose reduction techniques were utilized, including automated  exposure control and exposure modulation based on body size.     This report was finalized on 12/15/2020 2:45 PM by Dr. Brianne Amador M.D.       CT Guided Abscess Drain Peritoneal [[id number]] Collected: 12/10/20 1444     Updated: 12/10/20 1509    Narrative:      CT-GUIDED ASPIRATION AND CATHETER PLACEMENT INTO RIGHT ABDOMINAL ABSCESS  12/10/2020     HISTORY: Right perinephric fluid collection.     After signed informed consent was obtained patient was prepped and  draped in the supine position. Lidocaine was used for local anesthesia.     18-gauge needle was introduced into the heterogeneous fluid collection  in the right perinephric region. Purulent fluid was visualized.     0.18 wire was passed and this was followed by placement of an 8 [place]  pigtail catheter into the fluid collection. Approximately 320 cc of  purulent fluid was removed. Confirmatory images were obtained.     The catheter was left in place and connected to suction bulb drainage. A  fluid sample was sent to the laboratory for evaluation.     Patient tolerated the procedure well with no complications.     Radiation dose reduction techniques were utilized, including automated  exposure control and exposure modulation based on body size.     This report was finalized on 12/10/2020 3:06 PM by Dr. Sahil Fabian M.D.       CT Head Without Contrast [[id number]] Collected: 12/09/20 1717     Updated: 12/09/20 2029    Narrative:      CT HEAD WITHOUT  CONTRAST     HISTORY: Mental status changes. Seizure.     COMPARISON: None.     FINDINGS: The study is hampered by patient motion. There is  mild-to-moderate atrophy.  Mild-to-moderate vascular calcification is  noted. There is no evidence of intracranial hemorrhage, hydrocephalus or  of abnormal extra-axial fluid. No focal area of decreased attenuation to  suggest acute infarction is identified. Moderate vascular calcification  is appreciated.       Impression:      Atrophy and moderate vascular calcification is noted. There  is no evidence of acute infarction or hemorrhage. Further evaluation  could be performed with MRI examination of the brain, particularly if  the patient has a history of new onset seizure activity. The above  information was called to the patient's nurse at the time of the  dictation. The patient's nurse is to immediately relay the information  to the clinical service.           Radiation dose reduction techniques were utilized, including automated  exposure control and exposure modulation based on body size.     This report was finalized on 12/9/2020 8:26 PM by Dr. Harris Sylvester M.D.       FL Retrograde Pyelogram In OR [[id number]] Collected: 12/09/20 1521     Updated: 12/09/20 1529    Narrative:      RETROGRADE PYELOGRAM     HISTORY: Right ureteral stone and large right perinephric fluid  collection extending inferiorly noted on yesterday's CT scan.     FINDINGS: Imaging in the operating room shows contrast partially filling  the right ureter and intrarenal collecting system and no definite  obstructing stone is identified. There is some distortion of the  intrarenal collecting system in part related to a large perinephric  fluid collection and soft tissue changes as noted on yesterday's CT  scan. The CT scan appearance also raises the concern of underlying  neoplasm and should be correlated with the operative findings as well as  follow-up CT scan. Please also see the operative report.      6 images were obtained and the fluoroscopy time measures 9 seconds.     This report was finalized on 12/9/2020 3:26 PM by Dr. Tam Perez M.D.       CT Abdomen Pelvis With Contrast [[id number]] Collected: 12/08/20 1938     Updated: 12/08/20 1943    Narrative:      CT ABDOMEN AND PELVIS WITH IV CONTRAST     HISTORY: 65 year old female with abdominal pain, dysuria over the past 2  weeks. History of multiple UTIs.     TECHNIQUE:  CT includes axial imaging from the lung bases to the  trochanters with intravenous contrast and without use of oral contrast.  Data reconstructed in coronal and sagittal planes.     COMPARISON: None     FINDINGS: There is a large multilobular peripherally enhancing, complex  collection centered below the right kidney within the right abdomen.  This collection contacts the anterolateral right psoas muscle and  contacts and appears to partially invade the right lateral abdominal  musculature measuring 11 x 10.3 cm axial dimension and extending 12.5 cm  craniocaudal dimension. Between this collection and the right kidney  there is a greater degree of enhancement. This appears more solid in  appearance suspected to represent a mass. There is indentation of the  adjacent inferior, lateral right renal cortex. 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 Low-density appears to  arise from the renal cortex. There is no evidence for extension into the  renal pelvis or involvement of the urothelium. Delayed phase imaging  demonstrates no extension of contrast into this mass or the low-density  below the right kidney. There is a posterior right renal cyst that  measures approximately 3 cm diameter. Right renal calyceal/infundibular  stone measures 1 cm. There is also a 0.8 cm right renal stone and a 3 mm  right renal calyceal stone. A 1 cm left renal low-density lesion is most  likely a cyst though difficult to definitively characterize.     There is stranding/inflammation  surrounding this mass/collection within  the right abdomen and there is displacement of adjacent bowel loops. The  splenic size is normal. The adrenal glands appear normal. There is mild  diffuse fat infiltration of the liver. Pancreas appears within normal  limits. There are several subcentimeter retroperitoneal lymph nodes  without enlargement. Lung bases appear clear.     There is diastases of the rectus sheath with herniation of fat and bowel  loops into the right anterior abdomen and pelvic wall. Fluid-filled  small bowel loops are present which contain air-fluid levels and this  may be associated with a mild ileus. There is no dilatation to suggest  obstruction. Within the midline, infraumbilical abdominal wall just deep  to the rectus abdominis muscle curvature there is a low-density mass or  collection that measures 3.2 x 1.7 cm. This could represent a chronic  postsurgical collection. Bone windows demonstrate no osseous lesion.     FINDINGS:   1. Large multilobular right abdominal mass/collection measures  approximately 11 x 10.3 x 12.5 cm. This appears to arise from the right  kidney and at the upper margin of this lesion there is masslike  thickening suspicious for renal cell carcinoma with complex adjacent  collection or abscess with apparent invasion of the right lateral  abdominal wall. Urologic consultation recommended. Biopsy or attempt at  CT-guided drainage could be performed under CT guidance if indicated.  2. Right renal calyceal infundibular stones without hydronephrosis.  Right renal cysts. A 1 cm left renal low-density lesion is most likely a  cyst though difficult to definitely characterize.  3. 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 There is an infraumbilical  collection or low-density lesion along the deep margin of the rectus  abdominis musculature. This could represent a small chronic seroma  associated with previous  surgery.     Discussed with ANSELMO Sutton, in the emergency department 12/08/2020  at 7:10 PM.     Radiation dose reduction techniques were utilized, including automated  exposure control and exposure modulation based on body size.     This report was finalized on 12/8/2020 7:40 PM by Dr. Hawk Bonilla M.D.             Pertinent Labs     Results from last 7 days   Lab Units 12/17/20  0625 12/16/20 0425 12/15/20  0633 12/14/20  0546   WBC 10*3/mm3 12.63* 14.30* 15.26* 14.44*   HEMOGLOBIN g/dL 9.2* 8.8* 9.2* 8.1*   PLATELETS 10*3/mm3 322 375 397 285     Results from last 7 days   Lab Units 12/17/20 0625 12/16/20  1835 12/16/20  0425 12/15/20  0633 12/14/20  0546   SODIUM mmol/L 140  --  138 137 136   POTASSIUM mmol/L 4.0 4.0 3.3* 3.7 4.0   CHLORIDE mmol/L 104  --  104 104 105   CO2 mmol/L 30.6*  --  27.3 24.3 23.4   BUN mg/dL 6*  --  6* 9 10   CREATININE mg/dL 0.87  --  0.99 1.05* 1.02*   GLUCOSE mg/dL 84  --  70 73 119*   Estimated Creatinine Clearance: 64.1 mL/min (by C-G formula based on SCr of 0.87 mg/dL).  Results from last 7 days   Lab Units 12/14/20  0546 12/13/20  0535 12/11/20  0559   ALBUMIN g/dL 2.00* 2.10* 2.20*     Results from last 7 days   Lab Units 12/17/20  0625 12/16/20  0425 12/15/20  0633 12/14/20  0546 12/13/20  0535 12/12/20  0550  12/11/20  0559   CALCIUM mg/dL 8.4* 7.9* 8.2* 7.7* 7.8* 7.8*  --  7.8*   ALBUMIN g/dL  --   --   --  2.00* 2.10*  --   --  2.20*   MAGNESIUM mg/dL  --   --  2.0 1.6 1.8 1.6  --  2.5*   PHOSPHORUS mg/dL  --   --  3.1 2.8 2.9 2.4*   < > 1.5*    < > = values in this interval not displayed.         Results from last 7 days   Lab Units 12/14/20  0546   URIC ACID mg/dL 2.4         Invalid input(s): LDLCALC  Results from last 7 days   Lab Units 12/11/20  1530 12/11/20  0149   BLOODCX  No growth at 5 days  No growth at 5 days  --    URINECX   --  Yeast isolated*       Test Results Pending at Discharge       Discharge Details        Discharge Medications      New  "Medications      Instructions Start Date   carvedilol 12.5 MG tablet  Commonly known as: COREG   12.5 mg, Oral, 2 Times Daily With Meals      insulin glargine 100 UNIT/ML injection  Commonly known as: LANTUS   16 Units, Subcutaneous, Nightly      Insulin Syringe 31G X 5/16\" 0.3 ML misc   Use to inject insulin SQ daily      magnesium oxide 400 tablet tablet  Commonly known as: MAG-OX   400 mg, Oral, 2 Times Daily      micafungin  Commonly known as: MYCAMINE   100 mg, Intravenous, Every 24 Hours   Start Date: December 18, 2020        Changes to Medications      Instructions Start Date   lisinopril 20 MG tablet  Commonly known as: PRINIVIL,ZESTRIL  What changed: how much to take   40 mg, Oral, Daily         Continue These Medications      Instructions Start Date   citalopram 10 MG tablet  Commonly known as: CeleXA   10 mg, Oral, Daily      glipizide 5 MG tablet  Commonly known as: GLUCOTROL   5 mg, Oral, 2 Times Daily Before Meals      linagliptin 5 MG tablet tablet  Commonly known as: TRADJENTA   5 mg, Oral, Daily      metFORMIN 500 MG tablet  Commonly known as: GLUCOPHAGE   500 mg, Oral, 2 Times Daily With Meals      rizatriptan 10 MG tablet  Commonly known as: MAXALT   TAKE 1 TABLET BY MOUTH EVERY DAY AS NEEDED (MAX OF 2 PER 24 HOURS)             No Known Allergies      Discharge Disposition:  Home or Self Care    Discharge Diet:  Diet Order   Procedures   • Diet Regular; GI Soft       Discharge Activity:   Activity Instructions     Driving Restrictions      Type of Restriction:  Driving  Bathing  Other       Driving Restrictions: No Driving    Explain Other Restrictions: Seizure Precautions: Patient is not to drive for at least 3 months until cleared by a physician, operate heavy machinery, take tub baths, swim unattended, climb heights, or perform any other activities that can bring harm to himself/herself or others dur    Bathing Restrictions: No Tub Bath          CODE STATUS:    Code Status and Medical " Interventions:   Ordered at: 12/08/20 2012     Code Status:    CPR     Medical Interventions (Level of Support Prior to Arrest):    Full       No future appointments.  Additional Instructions for the Follow-ups that You Need to Schedule     Ambulatory Referral to Home Health   As directed      Face to Face Visit Date: 12/17/2020    Follow-up provider for Plan of Care?: I treated the patient in an acute care facility and will not continue treatment after discharge.    Follow-up provider: FLAVIA CANTU [[id number]]    Reason/Clinical Findings: debility. iv antibiotics, indwelling renal abscess drain    Describe mobility limitations that make leaving home difficult: long hosptialization, indwelling PAUL drain    Nursing/Therapeutic Services Requested: Skilled Nursing Physical Therapy    Skilled nursing orders: Medication education Infusion therapy PICC line care/instruction    Frequency: 1 Week 1         Call MD With Problems / Concerns   As directed      Call your primary doctor if you're experiencing worsening abdominal pain, nausea, vomiting, increased or purulent drainage from your abdominal drain, fevers, sweats, chills    Order Comments: Call your primary doctor if you're experiencing worsening abdominal pain, nausea, vomiting, increased or purulent drainage from your abdominal drain, fevers, sweats, chills          Discharge Follow-up with PCP   As directed       Currently Documented PCP:    Flavia Cantu MD    PCP Phone Number:    [phone]     Follow Up Details: within a week of discharge         Discharge Follow-up with Specialty: infectious disease; 2 Weeks   As directed      Specialty: infectious disease    Follow Up: 2 Weeks    Follow Up Details: monitoring course of micafungin for perinephric abscess         Discharge Follow-up with Specified Provider: neurology; 3 Months   As directed      To: neurology    Follow Up: 3 Months    Follow Up Details: new onset seizure while in the hospital attributed to  hypomagnesemia         Discharge Follow-up with Specified Provider: urology; 2 Weeks   As directed      To: urology    Follow Up: 2 Weeks    Follow Up Details: plan for uscope and laser lithotripsy in 2 weeks         CT Abdomen Pelvis Without Contrast  (Every 2 Weeks)  Dec 17, 2021      Will Oral Contrast be needed for this procedure?: No            Contact information for follow-up providers     Flavia Cantu MD .    Specialty: Family Medicine  Why: within a week of discharge  Contact information:  [address]  [facility] [id number] [phone]                   Contact information for after-discharge care     Dialysis/Infusion     [facility] .    Service: Infusion and IV Therapy  Contact information:  [address]  Formerly [facility] [address] [phone]                 Home Medical Care     [facility] .    Service: Home Health Services  Contact information:  [address]  [place] [address] [phone]                             Additional Instructions for the Follow-ups that You Need to Schedule     Ambulatory Referral to Home Health   As directed      Face to Face Visit Date: 12/17/2020    Follow-up provider for Plan of Care?: I treated the patient in an acute care facility and will not continue treatment after discharge.    Follow-up provider: FLAVIA CANTU [[id number]]    Reason/Clinical Findings: debility. iv antibiotics, indwelling renal abscess drain    Describe mobility limitations that make leaving home difficult: long hosptialization, indwelling PAUL drain    Nursing/Therapeutic Services Requested: Skilled Nursing Physical Therapy    Skilled nursing orders: Medication education Infusion therapy PICC line care/instruction    Frequency: 1 Week 1         Call MD With Problems / Concerns   As directed      Call your primary doctor if you're experiencing worsening abdominal pain, nausea, vomiting, increased or purulent drainage from  your abdominal drain, fevers, sweats, chills    Order Comments: Call your primary doctor if you're experiencing worsening abdominal pain, nausea, vomiting, increased or purulent drainage from your abdominal drain, fevers, sweats, chills          Discharge Follow-up with PCP   As directed       Currently Documented PCP:    Ana Huitron MD    PCP Phone Number:    [phone]     Follow Up Details: within a week of discharge         Discharge Follow-up with Specialty: infectious disease; 2 Weeks   As directed      Specialty: infectious disease    Follow Up: 2 Weeks    Follow Up Details: monitoring course of micafungin for perinephric abscess         Discharge Follow-up with Specified Provider: neurology; 3 Months   As directed      To: neurology    Follow Up: 3 Months    Follow Up Details: new onset seizure while in the hospital attributed to hypomagnesemia         Discharge Follow-up with Specified Provider: urology; 2 Weeks   As directed      To: urology    Follow Up: 2 Weeks    Follow Up Details: plan for uscope and laser lithotripsy in 2 weeks         CT Abdomen Pelvis Without Contrast  (Every 2 Weeks)  Dec 17, 2021      Will Oral Contrast be needed for this procedure?: No           Time Spent on Discharge:  Greater than 30 minutes      Bunny Hannah MD  [facility]  12/17/20  14:59 EST

## 2020-12-17 NOTE — PROGRESS NOTES
"  Infectious Diseases Progress Note    Tricia Plunkett MD     [facility]  [place]: 8 days  Patient Identification:  Name: Lyubov Middleton  Age: 65 y.o.  Sex: female  :  1955  MRN: [id number]         Primary Care Physician: Ana Huitron MD            Subjective:feeling better  Interval History: See consultation note.  · Seizure type activity after the procedure and was seen by neurology service.  · Cystoscopy right retrograde pyelogram and ureteroscopy and laser lithotripsy and right ureteral stent placement on 2020  · Had percutaneous drainage of right perinephric fluid performed and drainage catheter in place on 12/10/2020  Objective:    Scheduled Meds:carvedilol, 12.5 mg, Oral, BID With Meals  citalopram, 10 mg, Oral, Daily  influenza vaccine, 0.5 mL, Intramuscular, Once  insulin glargine, 16 Units, Subcutaneous, Nightly  insulin lispro, 0-9 Units, Subcutaneous, TID AC  lisinopril, 20 mg, Oral, Daily  magnesium oxide, 400 mg, Oral, BID  micafungin (MYCAMINE) IV, 100 mg, Intravenous, Q24H  sodium chloride, 10 mL, Intravenous, Q12H      Continuous Infusions:     Vital signs in last 24 hours:  Temp:  [98 °F (36.7 °C)-98.1 °F (36.7 °C)] 98.1 °F (36.7 °C)  Heart Rate:  [53-72] 72  Resp:  [16] 16  BP: (160-204)/(80-97) 160/80    Intake/Output:  No intake or output data in the 24 hours ending 20 1734    Exam:  /80 (BP Location: Right leg, Patient Position: Lying)   Pulse 72   Temp 98.1 °F (36.7 °C) (Oral)   Resp 16   Ht 162.6 cm (64.02\")   Wt 73.5 kg (162 lb 0.6 oz)   SpO2 98%   BMI 27.80 kg/m²   Patient is examined using the personal protective equipment as per guidelines from infection control for this particular patient as enacted.  Hand washing was performed before and after patient interaction.  General Appearance:  Appears well has better color                          Head:    Normocephalic, without obvious abnormality, atraumatic                           Eyes:    PERRL, " conjunctivae/corneas clear, EOM's intact, both eyes                         Throat:   Lips, tongue, gums normal; oral mucosa pink and moist                           Neck:   Supple, symmetrical, trachea midline, no JVD                         Lungs:    Clear to auscultation bilaterally, respirations unlabored                 Chest Wall:    No tenderness or deformity                          Heart:    Regular rate and rhythm, S1 and S2 normal                  Abdomen:     Soft, non-tender, bowel sounds active,decreased discomfort in the right flank area                 extremities:   Extremities normal, atraumatic, no cyanosis or edema, PICC line in place                        Pulses:   Pulses palpable in all extremities                            Skin:   Skin is warm and dry,  no rashes or palpable lesions                  Neurologic: Mostly nonfocal       Data Review:    I reviewed the patient's new clinical results.  Results from last 7 days   Lab Units 12/17/20  0625 12/16/20  0425 12/15/20  0633 12/14/20  0546 12/13/20  0535 12/12/20  0550 12/11/20  0938   WBC 10*3/mm3 12.63* 14.30* 15.26* 14.44* 16.86* 14.49* 15.80*   HEMOGLOBIN g/dL 9.2* 8.8* 9.2* 8.1* 9.6* 8.2* 9.4*   PLATELETS 10*3/mm3 322 375 397 285 346 329 368     Results from last 7 days   Lab Units 12/17/20  0625 12/16/20  1835 12/16/20  0425 12/15/20  0633 12/14/20  0546 12/13/20  0535 12/12/20  0550 12/11/20  0559   SODIUM mmol/L 140  --  138 137 136 136 135* 137   POTASSIUM mmol/L 4.0 4.0 3.3* 3.7 4.0 3.5 3.8 4.2   CHLORIDE mmol/L 104  --  104 104 105 104 103 104   CO2 mmol/L 30.6*  --  27.3 24.3 23.4 22.4 22.1 22.8   BUN mg/dL 6*  --  6* 9 10 10 8 11   CREATININE mg/dL 0.87  --  0.99 1.05* 1.02* 1.11* 1.01* 1.02*   CALCIUM mg/dL 8.4*  --  7.9* 8.2* 7.7* 7.8* 7.8* 7.8*   GLUCOSE mg/dL 84  --  70 73 119* 163* 212* 247*     Microbiology Results (last 10 days)     Procedure Component Value - Date/Time    Clostridium Difficile Toxin - Stool, Per Rectum  [[id number]]  (Normal) Collected: 12/14/20 1625    Lab Status: Final result Specimen: Stool from Per Rectum Updated: 12/14/20 1749    Narrative:      The following orders were created for panel order Clostridium Difficile Toxin - Stool, Per Rectum.  Procedure                               Abnormality         Status                     ---------                               -----------         ------                     Clostridium Difficile To...[[id number]]  Normal              Final result                 Please view results for these tests on the individual orders.    Clostridium Difficile Toxin, PCR - Stool, Per Rectum [[id number]]  (Normal) Collected: 12/14/20 1625    Lab Status: Final result Specimen: Stool from Per Rectum Updated: 12/14/20 1749     C. Difficile Toxins by PCR Negative    Blood Culture - Blood, Hand, Right [[id number]] Collected: 12/11/20 1530    Lab Status: Final result Specimen: Blood from Hand, Right Updated: 12/16/20 1546     Blood Culture No growth at 5 days    Blood Culture - Blood, Arm, Right [[id number]] Collected: 12/11/20 1530    Lab Status: Final result Specimen: Blood from Arm, Right Updated: 12/16/20 1546     Blood Culture No growth at 5 days    Urine Culture - Urine, Urine, Clean Catch [[id number]]  (Abnormal) Collected: 12/11/20 0149    Lab Status: Final result Specimen: Urine, Clean Catch Updated: 12/12/20 0943     Urine Culture Yeast isolated    Body Fluid Culture - Body Fluid, Retroperitoneum [[id number]]  (Abnormal)  (Susceptibility) Collected: 12/10/20 1205    Lab Status: Final result Specimen: Body Fluid from Retroperitoneum Updated: 12/15/20 0637     Body Fluid Culture Light growth (2+) Candida glabrata     Gram Stain Many (4+) WBCs seen      No organisms seen    Susceptibility      Candida glabrata     MARKO Not Specified     Fluconazole  Susceptible     Micafungin Susceptible                       Blood Culture - Blood, Arm, Left [[id number]] Collected: 12/09/20 2328    Lab  Status: Final result Specimen: Blood from Arm, Left Updated: 12/15/20 0015     Blood Culture No growth at 5 days    Blood Culture - Blood, Arm, Right [[id number]] Collected: 12/09/20 2328    Lab Status: Final result Specimen: Blood from Arm, Right Updated: 12/15/20 0015     Blood Culture No growth at 5 days    Blood Culture - Blood, Arm, Right [[id number]] Collected: 12/09/20 1059    Lab Status: Final result Specimen: Blood from Arm, Right Updated: 12/14/20 1230     Blood Culture No growth at 5 days    Blood Culture - Blood, Arm, Right [[id number]] Collected: 12/09/20 1015    Lab Status: Final result Specimen: Blood from Arm, Right Updated: 12/14/20 1230     Blood Culture No growth at 5 days    COVID PRE-OP / PRE-PROCEDURE SCREENING ORDER (NO ISOLATION) - Swab, Nasopharynx [[id number]]  (Normal) Collected: 12/08/20 1658    Lab Status: Final result Specimen: Swab from Nasopharynx Updated: 12/08/20 1836    Narrative:      The following orders were created for panel order COVID PRE-OP / PRE-PROCEDURE SCREENING ORDER (NO ISOLATION) - Swab, Nasopharynx.  Procedure                               Abnormality         Status                     ---------                               -----------         ------                     Respiratory Panel PCR w/...[[id number]]  Normal              Final result                 Please view results for these tests on the individual orders.    Respiratory Panel PCR w/COVID-19(SARS-CoV-2) TIP/BOBBY/ANDREY/PAD/COR/MAD/HUMPHREY In-House, NP Swab in UTM/[facility], 3-4 HR TAT - Swab, Nasopharynx [[id number]]  (Normal) Collected: 12/08/20 1658    Lab Status: Final result Specimen: Swab from Nasopharynx Updated: 12/08/20 1836     ADENOVIRUS, PCR Not Detected     Coronavirus 229E Not Detected     Coronavirus HKU1 Not Detected     Coronavirus NL63 Not Detected     Coronavirus OC43 Not Detected     COVID19 Not Detected     Human Metapneumovirus Not Detected     Human Rhinovirus/Enterovirus Not Detected     Influenza A  PCR Not Detected     Influenza B PCR Not Detected     Parainfluenza Virus 1 Not Detected     Parainfluenza Virus 2 Not Detected     Parainfluenza Virus 3 Not Detected     Parainfluenza Virus 4 Not Detected     RSV, PCR Not Detected     Bordetella pertussis pcr Not Detected     Bordetella parapertussis PCR Not Detected     Chlamydophila pneumoniae PCR Not Detected     Mycoplasma pneumo by PCR Not Detected    Narrative:      Fact sheet for providers: https://docs."[employer]/wp-content/uploads/DIM4707-7486-TG7.1-EUA-Provider-Fact-Sheet-3.pdf    Fact sheet for patients: https://docs."[employer]/wp-content/uploads/UHK7327-9488-MJ6.1-EUA-Patient-Fact-Sheet-1.pdf    Test performed by PCR.        Ct Abdomen Pelvis With & Without Contrast    Result Date: 12/15/2020  1. Interim placement of a right ureteral stent, as well as a percutaneous drain within a right perinephric collection with near complete resolution of the collection. Enhancing soft tissue seen along the lateral aspect of the mid and lower pole and anterior to the right kidney may represent enhancing granulation tissue. Multiple mildly prominent retroperitoneal lymph nodes at least a couple of which have enlarged in the interim from prior imaging, the largest measuring 1.3 cm at the level of the bifurcation. These may be reactive however continued follow-up is recommended to rule out malignancy. 2. Mild colonic ileus. 3. Bilateral small layering pleural effusions with bibasilar atelectasis.  Radiation dose reduction techniques were utilized, including automated exposure control and exposure modulation based on body size.  This report was finalized on 12/15/2020 2:45 PM by Dr. Brianne Amador M.D.      Ct Head Without Contrast    Result Date: 12/9/2020  Atrophy and moderate vascular calcification is noted. There is no evidence of acute infarction or hemorrhage. Further evaluation could be performed with MRI examination of the brain, particularly if the  patient has a history of new onset seizure activity. The above information was called to the patient's nurse at the time of the dictation. The patient's nurse is to immediately relay the information to the clinical service.    Radiation dose reduction techniques were utilized, including automated exposure control and exposure modulation based on body size.  This report was finalized on 12/9/2020 8:26 PM by Dr. Harris Sylvester M.D.            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  CT scan after percutaneous drainage showed significant improvement.  2-evolving malignancy  3-diabetes-poorly controlled with hyperglycemia  4-anemia  5-electrolyte imbalance and renal insufficiency.  6-history of hypertension  7-new onset seizure type activity.           Recommendations/Discussions:  · Urology services recommendations after the CT scan of the abdomen pelvis performed last evening reviewed.  · Would recommend 4 weeks of IV Mycamine with periodic assessments with every 2-week CT scans to document progression and improvement.  · Plans for intervention in terms of ureteroscopy with laser lithotripsy in 2 weeks per urology noted.  · See CCP orders.    Tricia Plunkett MD  12/17/2020  17:34 EST    Much of this encounter note is an electronic transcription/translation of spoken language to printed text. The electronic translation of spoken language may permit erroneous, or at times, nonsensical words or phrases to be inadvertently transcribed; Although I have reviewed the note for such errors, some may still exist  Delayed entry

## 2020-12-17 NOTE — PROGRESS NOTES
[facility] Progress [facility]    Patient Name: Lyubov Middleton  Age: 65 y.o.  Sex: female  MRN: [id number]  Unit/Room: E6/  Admission Date: 12/8/2020  Hospital Day #: 8    CC: renal abscess    Subjective     Afebrile HDS hyperTN. Pain well controlled. Voiding with no issues.    Objective     Vitals:   Vitals:    12/16/20 2355 12/17/20 0315 12/17/20 0610 12/17/20 0630   BP: (!) 187/97 (!) 194/97  (!) 204/97   BP Location: Right leg Right leg     Patient Position: Lying Lying     Pulse: 61 53  61   Resp: 16 16     Temp: 98 °F (36.7 °C) 98.1 °F (36.7 °C)     TempSrc: Oral Oral     SpO2: 96% 95%     Weight:   73.5 kg (162 lb 0.6 oz)    Height:         Temp:  [97.8 °F (36.6 °C)-98.1 °F (36.7 °C)] 98.1 °F (36.7 °C)  Heart Rate:  [53-68] 61  Resp:  [16-18] 16  BP: (181-210)/(70-97) 204/97    I/Os:   I/O last 3 completed shifts:  In: -   Out: 5 [Drains:5]    Physical Exam     General: no acute distress, non-toxic  Head: normocephalic, atraumatic  Eyes: extraocular muscles intact, lids/lashes normal  ENT: oral mucosa moist, nares patent  Neck: supple, trachea midline  CV: regular rate, sinus rhythm  Resp: equal chest rise, non-labored breathing.  Abd: soft non-distended, non-tender, drain serous no purulence  Ext: warm, no edema.  Skin: no rashes, cyanosis or lesions  Neuro: no obvious gross motor or sensory deficits  Psych: mentation appropriate, affect normal    Labs     Recent Results (from the past 24 hour(s))   POC Glucose Once    Collection Time: 12/16/20 11:33 AM    Specimen: Blood   Result Value Ref Range    Glucose 132 (H) 70 - 130 mg/dL   POC Glucose Once    Collection Time: 12/16/20  4:11 PM    Specimen: Blood   Result Value Ref Range    Glucose 188 (H) 70 - 130 mg/dL   Potassium    Collection Time: 12/16/20  6:35 PM    Specimen: Blood   Result Value Ref Range    Potassium 4.0 3.5 - 5.2 mmol/L   POC Glucose Once    Collection Time: 12/16/20  8:28 PM    Specimen: Blood   Result Value Ref Range    Glucose 119 70 -  130 mg/dL   POC Glucose Once    Collection Time: 12/17/20  5:58 AM    Specimen: Blood   Result Value Ref Range    Glucose 82 70 - 130 mg/dL   Basic Metabolic Panel    Collection Time: 12/17/20  6:25 AM    Specimen: Blood   Result Value Ref Range    Glucose 84 65 - 99 mg/dL    BUN 6 (L) 8 - 23 mg/dL    Creatinine 0.87 0.57 - 1.00 mg/dL    Sodium 140 136 - 145 mmol/L    Potassium 4.0 3.5 - 5.2 mmol/L    Chloride 104 98 - 107 mmol/L    CO2 30.6 (H) 22.0 - 29.0 mmol/L    Calcium 8.4 (L) 8.6 - 10.5 mg/dL    eGFR Non African Amer 66 >60 mL/min/1.73    BUN/Creatinine Ratio 6.9 (L) 7.0 - 25.0    Anion Gap 5.4 5.0 - 15.0 mmol/L   CBC (No Diff)    Collection Time: 12/17/20  6:25 AM    Specimen: Blood   Result Value Ref Range    WBC 12.63 (H) 3.40 - 10.80 10*3/mm3    RBC 3.73 (L) 3.77 - 5.28 10*6/mm3    Hemoglobin 9.2 (L) 12.0 - 15.9 g/dL    Hematocrit 29.6 (L) 34.0 - 46.6 %    MCV 79.4 79.0 - 97.0 fL    MCH 24.7 (L) 26.6 - 33.0 pg    MCHC 31.1 (L) 31.5 - 35.7 g/dL    RDW 13.1 12.3 - 15.4 %    RDW-SD 37.1 37.0 - 54.0 fl    MPV 9.3 6.0 - 12.0 fL    Platelets 322 140 - 450 10*3/mm3       Medications     Scheduled: carvedilol, 12.5 mg, Oral, BID With Meals  citalopram, 10 mg, Oral, Daily  influenza vaccine, 0.5 mL, Intramuscular, Once  insulin glargine, 16 Units, Subcutaneous, Nightly  insulin lispro, 0-9 Units, Subcutaneous, TID AC  lisinopril, 20 mg, Oral, Daily  magnesium oxide, 400 mg, Oral, BID  micafungin (MYCAMINE) IV, 100 mg, Intravenous, Q24H  sodium chloride, 10 mL, Intravenous, Q12H      Infusion:    PRN: acetaminophen, 650 mg, Q6H PRN  acetaminophen, 650 mg, Q4H PRN  calcium gluconate, 1 g, PRN    And  calcium gluconate IVPB, 6 g, PRN  dextrose, 25 g, Q15 Min PRN  dextrose, 15 g, Q15 Min PRN  glucagon (human recombinant), 1 mg, Q15 Min PRN  labetalol, 10 mg, Q6H PRN  magnesium sulfate, 2 g, PRN    Or  magnesium sulfate, 2 g, PRN    Or  magnesium sulfate, 4 g, PRN  melatonin, 3 mg, Nightly PRN  nitroglycerin, 0.4 mg,  Q5 Min PRN  ondansetron, 4 mg, Q6H PRN    Or  ondansetron, 4 mg, Q6H PRN  potassium & sodium phosphates, 2 packet, Q6H PRN    Or  potassium & sodium phosphates, 2 packet, Q6H PRN  potassium chloride, 40 mEq, PRN    Or  potassium chloride, 40 mEq, PRN    Or  potassium chloride, 10 mEq, Q1H PRN  potassium chloride, 40 mEq, PRN    Or  potassium chloride, 40 mEq, PRN    Or  potassium chloride, 10 mEq, Q1H PRN  sodium chloride, 10 mL, PRN  sodium chloride, 10 mL, PRN  sodium chloride, 20 mL, PRN        Radiology     Ct Abdomen Pelvis With & Without Contrast    Result Date: 12/15/2020  1. Interim placement of a right ureteral stent, as well as a percutaneous drain within a right perinephric collection with near complete resolution of the collection. Enhancing soft tissue seen along the lateral aspect of the mid and lower pole and anterior to the right kidney may represent enhancing granulation tissue. Multiple mildly prominent retroperitoneal lymph nodes at least a couple of which have enlarged in the interim from prior imaging, the largest measuring 1.3 cm at the level of the bifurcation. These may be reactive however continued follow-up is recommended to rule out malignancy. 2. Mild colonic ileus. 3. Bilateral small layering pleural effusions with bibasilar atelectasis.  Radiation dose reduction techniques were utilized, including automated exposure control and exposure modulation based on body size.  This report was finalized on 12/15/2020 2:45 PM by Dr. Brianne Amador M.D.      Ct Head Without Contrast    Result Date: 12/9/2020  Atrophy and moderate vascular calcification is noted. There is no evidence of acute infarction or hemorrhage. Further evaluation could be performed with MRI examination of the brain, particularly if the patient has a history of new onset seizure activity. The above information was called to the patient's nurse at the time of the dictation. The patient's nurse is to immediately relay the  information to the clinical service.    Radiation dose reduction techniques were utilized, including automated exposure control and exposure modulation based on body size.  This report was finalized on 12/9/2020 8:26 PM by Dr. Harris Sylvester M.D.        Assessment & Plan     Patient Active Problem List   Diagnosis   • Hypokalemia   • Renal stone   • Renal abscess   • Hypocalcemia   • Stage 3a chronic kidney disease   • Type 2 diabetes mellitus (CMS/[facility])       Code:   Code Status and Medical Interventions:   Ordered at: 12/08/20 2012     Code Status:    CPR     Medical Interventions (Level of Support Prior to Arrest):    Full     Diet: Diet Regular; GI Soft    8 Days Post-Op  Procedure(s) (LRB):  CYSTOSCOPY, RIGHT RETROGRADE PYELOGRAM, URETEROSCOPY, LASER LITHOTRIPSY, RIGHT URETERAL STENT PLACEMENT (Right)    Perinephric abscess    - continue percutaneous drain  - antifungal x 4 weeks via PICC line  - plan for uscope and laser lithotripsy in 2 weeks    Any changes will be made upon review and co-signature of this note by the attending surgeon of record.    Alireza Chaidez MD  Pager: [phone]  Date: 12/17/20  Time: 07:12 EST

## 2020-12-17 NOTE — PROGRESS NOTES
Continued Stay Note  [facility]     Patient Name: Lyubov Middleton  MRN: [id number]  Today's Date: 12/17/2020    Admit Date: 12/8/2020    Discharge Plan     Row Name 12/17/20 1526       Plan    Plan  Plan home with [facility].  ZIGGY Hay RN    Patient/Family in Agreement with Plan  yes    Plan Comments  Sena  ( [id number]) with [facility] called and notified of pt's discharge.   [facility] to deliver pt's antibiotic to pt's room prior to discharge.  Plan home with [facility].  ZIGGY Hay RN        Discharge Codes    No documentation.       Expected Discharge Date and Time     Expected Discharge Date Expected Discharge Time    Dec 17, 2020             Gretta Hay, RN

## 2020-12-18 ENCOUNTER — READMISSION MANAGEMENT (OUTPATIENT)
Dept: CALL CENTER | Facility: HOSPITAL | Age: 65
End: 2020-12-18

## 2020-12-18 NOTE — PAYOR COMM NOTE
"Lyubov Middleton (65 y.o. Female)     ATTN: DISCHARGE SUMMARY FOR REVIEW: [id number]     DEPT: [phone],  [phone]          Date of Birth Social Security Number Address Home Phone MRN    1955  [address] [facility] [address] [phone] [id number]    Shinto Marital Status          Unknown Unknown       Admission Date Admission Type Admitting Provider Attending Provider Department, Room/Bed    12/8/20 Emergency Harris Perez MD  [address], E663/1    Discharge Date Discharge Disposition Discharge Destination        12/17/2020 Home or Self Care              Attending Provider: (none)   Allergies: No Known Allergies    Isolation: None   Infection: None   Code Status: Prior    Ht: 162.6 cm (64.02\")   Wt: 73.5 kg (162 lb 0.6 oz)    Admission Cmt: None   Principal Problem: Renal abscess [N15.1]                 Active Insurance as of 12/8/2020     Primary Coverage     Payor Plan Insurance Group Employer/Plan Group    [employer] BLUE CROSS AN[employer]M BLUE CROSS BLUE SHIELD PPO [id number]     Payor Plan Address Payor Plan Phone Number Payor Plan Fax Number Effective Dates    [address] [phone]  7/1/2020 - None Entered    [place] [address]       Subscriber Name Subscriber Birth Date Member ID       LYUBOV MIDDLETON 1955 [id number]                 Emergency Contacts      (Rel.) Home Phone Work Phone Mobile Phone    Keven Middleton Jr (Son) [phone] -- --               Discharge Summary      Marcelle Khan APRN at 12/16/20 1034     Attestation signed by Bunny Hannah MD at 12/17/20 8538      Addendum: I have reviewed the history and plan as obtained by BECKY Cohen and preformed my own independent history adjust documentation as needed. I have personally examined the patient. My exam confirms her physical findings and I agree with the plan as listed above, with the addition to the following:     Please refer to my discharge " summary dated     Bunny Hannah MD  14:57 EST                       Patient Name: Lyubov Middleton  : 1955  MRN: [id number]    Date of Admission: 2020  Date of Discharge:  2020  Primary Care Physician: Ana Huitron MD      Chief Complaint:   Abdominal Pain and Difficulty Urinating      Discharge Diagnoses     Active Hospital Problems    Diagnosis  POA   • **Renal abscess [N15.1]  Yes   • Sepsis (CMS/HCC) [A41.9]  No   • Hypomagnesemia [E83.42]  Yes   • Hypocalcemia [E83.51]  Yes   • Stage 3a chronic kidney disease [N18.31]  Yes   • Type 2 diabetes mellitus (CMS/HCC) [E11.9]  Yes   • Renal stone [N20.0]  Yes   • Hypokalemia [E87.6]  Yes      Resolved Hospital Problems   No resolved problems to display.        Hospital Course     Ms. Middleton is a 65 y.o. female former smoker with a history of CKD3, DM2, breast cancer and frequent UTIs who [facility] of abdominal pain as well as urinary symptoms.  Please see the admitting history and physical for further details.  She was found to have complicated pyelonephritis with fungal abscess versus renal mass. She also had several electrolyte abnormalities with low potassium and magnesium. She was seen in consultation by Urology as well as infectious disease.   Urology was consulted and she underwent cystoscopy and right retrograde pyelogram and ureteroscopy and laser lithotripsy with right ureteral stent placement on  with Dr. Dooley as CT scan on admission showed large right upper pole infundibular calculus with likely calyceal rupture and perinephric urinoma/abscess. She also had a drain placed by IR on 12/10. She was treated with antibiotics and repeat CT imaging from  showed near complete resolution of the abscess. Urology has recommended to continue the percutaneous drain and plans for ureteroscopy and laser lithotripsy in 2 weeks.    Unfortunately, after having her procedure on  she  had a new onset of seizure activity and Neurology was consulted. CT head showed no acute findings and her symptoms were felt secondary to low magnesium. She is now on schedule magnesium replacement and Neurology has signed off with no further work up planned. She will have her potassium replaced prior to discharge as it is at 3.3. Otherwise, her electrolyte abnormalities have resolved by the day of discharge. Seizure Precautions: Patient is not to drive for at least 3 months until cleared by a physician, operate heavy machinery, take tub baths, swim unattended, climb heights, or perform any other activities that can bring harm to himself/herself or others during an episode of altered awareness.   For antibiotic coverage, Infectious disease followed. Operative cultures were positive for candida glabrata with no evidence of gram negative rods. Meropenem was discontinued and IV mycamine (100 mg daily from 12/13/20) will be continued for 4 weeks with periodic assessments with every 2 week CT scans to document progression/improvement. PICC line was placed yesterday and Dr. Plunkett would like weekly CBC, CMP with abnormal results called to him at [phone]. Home health is being set up for this reason.   The patient did have some elevated BP readings overnight. However, this occurred after Bps had to be taken on her lower extremities due to her PICC line and prior breast cancer. Coreg was already added for BP control during the stay and her last BP was 163/87 on the left leg. Given accuracy is in question, would recommend continued lisinopril and Coreg at discharge with follow up with her PCP in 1 week for monitoring.    On admission, she was found to have an A1c of 13 with prior use of glipizide and metformin. She was started on basal insulin here and her sugars have improved. She will need to be sent home on Lantus at 16 units nightly. Would recommend holding her oral agents with glipizide (to avoid hypoglycemia) as  well as metformin given recent contrast and need for repeat CT scans in 2 weeks with contrast. The diabetes educator has met with her and she will need to check her sugars ACHS at home. She should follow up with her PCP in 1-2 weeks for further monitoring and keep a log of her sugars home.     Day of Discharge     HPI:   On the day of discharge, she has tolerated a diet and denies any pain, nausea or vomiting. She denies any chest pain, dyspnea, cough, fever or chills. She will go home with her PICC line and home health for further care. She needs to follow up with ID as well as Urology as advised above.    Physical Exam:  Temp:  [97.6 °F (36.4 °C)-98.3 °F (36.8 °C)] 97.7 °F (36.5 °C)  Heart Rate:  [53-66] 61  Resp:  [18] 18  BP: (157-210)/(69-95) 191/82  Body mass index is 27.9 kg/m².     Physical Exam  Vitals signs and nursing note reviewed.   Constitutional:       General: She is not in acute distress.     Appearance: Normal appearance. She is not toxic-appearing.   HENT:      Head: Normocephalic and atraumatic.      Right Ear: External ear normal.      Left Ear: External ear normal.      Nose: Nose normal.   Eyes:      General:         Right eye: No discharge.         Left eye: No discharge.      Conjunctiva/sclera: Conjunctivae normal.   Neck:      Musculoskeletal: Normal range of motion and neck supple.   Cardiovascular:      Rate and Rhythm: Normal rate and regular rhythm.      Pulses: Normal pulses.      Heart sounds: Normal heart sounds.   Pulmonary:      Effort: Pulmonary effort is normal. No respiratory distress.      Breath sounds: Normal breath sounds.   Abdominal:      General: Bowel sounds are normal. There is no distension.      Palpations: Abdomen is soft.      Tenderness: There is no abdominal tenderness.   Musculoskeletal: Normal range of motion.         General: No swelling.   Skin:     General: Skin is warm and dry.      Findings: No bruising.      Comments: Right sided abdominal drain in place    Neurological:      Mental Status: She is alert and oriented to person, place, and time.      Sensory: No sensory deficit.      Motor: Weakness present.      Coordination: Coordination normal.   Psychiatric:         Mood and Affect: Mood normal.         Behavior: Behavior normal.       Consultants     Consult Orders (all) (From admission, onward)     Start     Ordered    12/15/20 1037  IV TEAM - Consult for PICC Line (IV Team to Determine Number of Lumens)  Once     Provider:  (Not yet assigned)    12/15/20 1037    12/10/20 1206  Inpatient Diabetes Educator Consult  Once     Provider:  (Not yet assigned)    12/10/20 1205    12/09/20 1652  Inpatient Neurology Consult General  STAT     Specialty:  Neurology  Provider:  Vanessa Nicholas MD    12/09/20 1652    12/08/20 2015  Inpatient Urology Consult  Once     Specialty:  Urology  Provider:  Enio Sierra MD    12/08/20 2014 12/08/20 2013  Inpatient Infectious Diseases Consult  Once     Specialty:  Infectious Diseases  Provider:  Tricia Plunkett MD    12/08/20 2012                Procedures     CYSTOSCOPY, RIGHT RETROGRADE PYELOGRAM, URETEROSCOPY, LASER LITHOTRIPSY, RIGHT URETERAL STENT PLACEMENT    Imaging Results (All)     Procedure Component Value Units Date/Time    CT Abdomen Pelvis With & Without Contrast [[id number]] Collected: 12/14/20 0935     Updated: 12/15/20 1448    Narrative:      CT ABDOMEN AND PELVIS WITH AND WITHOUT CONTRAST     HISTORY: Follow-up perinephric fluid collection drainage.     TECHNIQUE: Noncontrast axial images of the abdomen and pelvis were  obtained followed by administration of intravenous contrast and imaging  of the abdomen and pelvis in the nephrographic and delayed phase.  Coronal and sagittal reformats were obtained.     COMPARISON: CT abdomen and pelvis from 12/08/2020.     Findings: a percutaneous drain is seen within a previously demonstrated  right perinephric fluid collection with near complete resolution of  the  fluid component. Enhancing soft tissue component/wall is seen along the  lateral aspect of the mid and lower pole of the kidney. There is also a  right ureteric stent appropriately placed with distal tip within the  bladder and proximally within the left anterior collecting system and a  malrotated kidney. There was previously a large calculus in this region  measuring up to 1 cm that is possibly smaller and displaced now  measuring 7 mm. There are also nonobstructing calculi within the lower  pole of the left kidney, image 69. Extensive perinephric stranding is  demonstrated. On the delayed images, no extravasation of contrast is  identified.     There are bilateral small layering pleural effusions with bibasilar  atelectasis. Liver and spleen are unchanged. Pancreas is normal without  ductal dilatation. Common bile duct is dilated, that likely represents  benign biliary ectasia. Bilateral adrenal glands are normal. There are  small retroperitoneal lymph nodes identified with the largest lymph node  at the level of the aortic bifurcation measuring up to 1.3 cm in short  axis dimension. This has grown in the interim from prior imaging where  it measured 8 mm. The urinary bladder is partially distended, small  amount of nondependent air likely related to catheterization. The uterus  is anteverted. No abnormal adnexal mass is seen. There is a large  right-sided ventral hernia containing small and large bowel loops. There  is an ileocolic anastomosis present. Air-fluid levels are seen  throughout the colon most suggestive of a mild ileus. Again demonstrated  is a small low-density region along the left rectus abdominis in the  paramidline region measuring 2.5 x 1.8 cm, unchanged from prior imaging  and may represent a chronic postsurgical collection/seroma. Attention on  follow-up is recommended.       Impression:      1. Interim placement of a right ureteral stent, as well as a  percutaneous drain within a right  perinephric collection with near  complete resolution of the collection. Enhancing soft tissue seen along  the lateral aspect of the mid and lower pole and anterior to the right  kidney may represent enhancing granulation tissue. Multiple mildly  prominent retroperitoneal lymph nodes at least a couple of which have  enlarged in the interim from prior imaging, the largest measuring 1.3 cm  at the level of the bifurcation. These may be reactive however continued  follow-up is recommended to rule out malignancy.  2. Mild colonic ileus.   3. Bilateral small layering pleural effusions with bibasilar  atelectasis.     Radiation dose reduction techniques were utilized, including automated  exposure control and exposure modulation based on body size.     This report was finalized on 12/15/2020 2:45 PM by Dr. Brianne Amador M.D.       CT Guided Abscess Drain Peritoneal [[id number]] Collected: 12/10/20 1444     Updated: 12/10/20 1509    Narrative:      CT-GUIDED ASPIRATION AND CATHETER PLACEMENT INTO RIGHT ABDOMINAL ABSCESS  12/10/2020     HISTORY: Right perinephric fluid collection.     After signed informed consent was obtained patient was prepped and  draped in the supine position. Lidocaine was used for local anesthesia.     18-gauge needle was introduced into the heterogeneous fluid collection  in the right perinephric region. Purulent fluid was visualized.     0.18 wire was passed and this was followed by placement of an 8 [place] citizen  pigtail catheter into the fluid collection. Approximately 320 cc of  purulent fluid was removed. Confirmatory images were obtained.     The catheter was left in place and connected to suction bulb drainage. A  fluid sample was sent to the laboratory for evaluation.     Patient tolerated the procedure well with no complications.     Radiation dose reduction techniques were utilized, including automated  exposure control and exposure modulation based on body size.     This report was finalized  on 12/10/2020 3:06 PM by Dr. Sahil Fabian M.D.       CT Head Without Contrast [[id number]] Collected: 12/09/20 1717     Updated: 12/09/20 2029    Narrative:      CT HEAD WITHOUT CONTRAST     HISTORY: Mental status changes. Seizure.     COMPARISON: None.     FINDINGS: The study is hampered by patient motion. There is  mild-to-moderate atrophy.  Mild-to-moderate vascular calcification is  noted. There is no evidence of intracranial hemorrhage, hydrocephalus or  of abnormal extra-axial fluid. No focal area of decreased attenuation to  suggest acute infarction is identified. Moderate vascular calcification  is appreciated.       Impression:      Atrophy and moderate vascular calcification is noted. There  is no evidence of acute infarction or hemorrhage. Further evaluation  could be performed with MRI examination of the brain, particularly if  the patient has a history of new onset seizure activity. The above  information was called to the patient's nurse at the time of the  dictation. The patient's nurse is to immediately relay the information  to the clinical service.           Radiation dose reduction techniques were utilized, including automated  exposure control and exposure modulation based on body size.     This report was finalized on 12/9/2020 8:26 PM by Dr. Harris Sylvester M.D.       FL Retrograde Pyelogram In OR [[id number]] Collected: 12/09/20 1521     Updated: 12/09/20 1529    Narrative:      RETROGRADE PYELOGRAM     HISTORY: Right ureteral stone and large right perinephric fluid  collection extending inferiorly noted on yesterday's CT scan.     FINDINGS: Imaging in the operating room shows contrast partially filling  the right ureter and intrarenal collecting system and no definite  obstructing stone is identified. There is some distortion of the  intrarenal collecting system in part related to a large perinephric  fluid collection and soft tissue changes as noted on yesterday's CT  scan. The CT scan  appearance also raises the concern of underlying  neoplasm and should be correlated with the operative findings as well as  follow-up CT scan. Please also see the operative report.     6 images were obtained and the fluoroscopy time measures 9 seconds.     This report was finalized on 12/9/2020 3:26 PM by Dr. Tam Perez M.D.       CT Abdomen Pelvis With Contrast [[id number]] Collected: 12/08/20 1938     Updated: 12/08/20 1943    Narrative:      CT ABDOMEN AND PELVIS WITH IV CONTRAST     HISTORY: 65 year old female with abdominal pain, dysuria over the past 2  weeks. History of multiple UTIs.     TECHNIQUE:  CT includes axial imaging from the lung bases to the  trochanters with intravenous contrast and without use of oral contrast.  Data reconstructed in coronal and sagittal planes.     COMPARISON: None     FINDINGS: There is a large multilobular peripherally enhancing, complex  collection centered below the right kidney within the right abdomen.  This collection contacts the anterolateral right psoas muscle and  contacts and appears to partially invade the right lateral abdominal  musculature measuring 11 x 10.3 cm axial dimension and extending 12.5 cm  craniocaudal dimension. Between this collection and the right kidney  there is a greater degree of enhancement. This appears more solid in  appearance suspected to represent a mass. There is indentation of the  adjacent inferior, lateral right renal cortex. 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 Low-density appears to  arise from the renal cortex. There is no evidence for extension into the  renal pelvis or involvement of the urothelium. Delayed phase imaging  demonstrates no extension of contrast into this mass or the low-density  below the right kidney. There is a posterior right renal cyst that  measures approximately 3 cm diameter. Right renal calyceal/infundibular  stone measures 1 cm. There is also a 0.8 cm right renal stone and  a 3 mm  right renal calyceal stone. A 1 cm left renal low-density lesion is most  likely a cyst though difficult to definitively characterize.     There is stranding/inflammation surrounding this mass/collection within  the right abdomen and there is displacement of adjacent bowel loops. The  splenic size is normal. The adrenal glands appear normal. There is mild  diffuse fat infiltration of the liver. Pancreas appears within normal  limits. There are several subcentimeter retroperitoneal lymph nodes  without enlargement. Lung bases appear clear.     There is diastases of the rectus sheath with herniation of fat and bowel  loops into the right anterior abdomen and pelvic wall. Fluid-filled  small bowel loops are present which contain air-fluid levels and this  may be associated with a mild ileus. There is no dilatation to suggest  obstruction. Within the midline, infraumbilical abdominal wall just deep  to the rectus abdominis muscle curvature there is a low-density mass or  collection that measures 3.2 x 1.7 cm. This could represent a chronic  postsurgical collection. Bone windows demonstrate no osseous lesion.     FINDINGS:   1. Large multilobular right abdominal mass/collection measures  approximately 11 x 10.3 x 12.5 cm. This appears to arise from the right  kidney and at the upper margin of this lesion there is masslike  thickening suspicious for renal cell carcinoma with complex adjacent  collection or abscess with apparent invasion of the right lateral  abdominal wall. Urologic consultation recommended. Biopsy or attempt at  CT-guided drainage could be performed under CT guidance if indicated.  2. Right renal calyceal infundibular stones without hydronephrosis.  Right renal cysts. A 1 cm left renal low-density lesion is most likely a  cyst though difficult to definitely characterize.  3. 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 There is an  infraumbilical  collection or low-density lesion along the deep margin of the rectus  abdominis musculature. This could represent a small chronic seroma  associated with previous surgery.     Discussed with ANSELMO Sutton, in the emergency department 12/08/2020  at 7:10 PM.     Radiation dose reduction techniques were utilized, including automated  exposure control and exposure modulation based on body size.     This report was finalized on 12/8/2020 7:40 PM by Dr. Hawk Bonilla M.D.                     Pertinent Labs     Results from last 7 days   Lab Units 12/16/20  0425 12/15/20  0633 12/14/20  0546 12/13/20  0535   WBC 10*3/mm3 14.30* 15.26* 14.44* 16.86*   HEMOGLOBIN g/dL 8.8* 9.2* 8.1* 9.6*   PLATELETS 10*3/mm3 375 397 285 346     Results from last 7 days   Lab Units 12/16/20  0425 12/15/20  0633 12/14/20  0546 12/13/20  0535   SODIUM mmol/L 138 137 136 136   POTASSIUM mmol/L 3.3* 3.7 4.0 3.5   CHLORIDE mmol/L 104 104 105 104   CO2 mmol/L 27.3 24.3 23.4 22.4   BUN mg/dL 6* 9 10 10   CREATININE mg/dL 0.99 1.05* 1.02* 1.11*   GLUCOSE mg/dL 70 73 119* 163*   Estimated Creatinine Clearance: 56.5 mL/min (by C-G formula based on SCr of 0.99 mg/dL).  Results from last 7 days   Lab Units 12/14/20  0546 12/13/20  0535 12/11/20  0559   ALBUMIN g/dL 2.00* 2.10* 2.20*     Results from last 7 days   Lab Units 12/16/20  0425 12/15/20  0633 12/14/20  0546 12/13/20  0535 12/12/20  0550  12/11/20  0559   CALCIUM mg/dL 7.9* 8.2* 7.7* 7.8* 7.8*  --  7.8*   ALBUMIN g/dL  --   --  2.00* 2.10*  --   --  2.20*   MAGNESIUM mg/dL  --  2.0 1.6 1.8 1.6  --  2.5*   PHOSPHORUS mg/dL  --  3.1 2.8 2.9 2.4*   < > 1.5*    < > = values in this interval not displayed.       Results from last 7 days   Lab Units 12/10/20  0540   CK TOTAL U/L 69     Results from last 7 days   Lab Units 12/14/20  0546   URIC ACID mg/dL 2.4         Invalid input(s): LDLCALC  Results from last 7 days   Lab Units 12/11/20  1530 12/11/20  0149 12/10/20  1205  20  2328 20  1059   BLOODCX  No growth at 4 days  No growth at 4 days  --   --  No growth at 5 days  No growth at 5 days No growth at 5 days   BODYFLDCX   --   --  Light growth (2+) Candida glabrata*  --   --    URINECX   --  Yeast isolated*  --   --   --        Test Results Pending at Discharge   None  Pending Labs     Order Current Status    Blood Culture - Blood, Arm, Right Preliminary result    Blood Culture - Blood, Hand, Right Preliminary result          Discharge Details        Discharge Medications      ASK your doctor about these medications      Instructions Start Date   citalopram 10 MG tablet  Commonly known as: CeleXA   10 mg, Oral, Daily      glipizide 5 MG tablet  Commonly known as: GLUCOTROL   5 mg, Oral, 2 Times Daily Before Meals      linagliptin 5 MG tablet tablet  Commonly known as: TRADJENTA   5 mg, Oral, Daily      lisinopril 20 MG tablet  Commonly known as: PRINIVIL,ZESTRIL   20 mg, Oral, Daily      metFORMIN 500 MG tablet  Commonly known as: GLUCOPHAGE   500 mg, Oral, 2 Times Daily With Meals      rizatriptan 10 MG tablet  Commonly known as: MAXALT   TAKE 1 TABLET BY MOUTH EVERY DAY AS NEEDED (MAX OF 2 PER 24 HOURS)             No Known Allergies      Discharge Disposition:      Discharge Diet:  Diet Order   Procedures   • Diet Regular; GI Soft       Discharge Activity:       CODE STATUS:    Code Status and Medical Interventions:   Ordered at: 20     Code Status:    CPR     Medical Interventions (Level of Support Prior to Arrest):    Full       No future appointments.    Time Spent on Discharge:  Greater than 30 minutes      Electronically signed by BECKY Adames, 2020, 10:34 EST    Electronically signed by Bunny Hannah MD at 20 1459     Bunny Hannah MD at 20 1459              Patient Name: Lyubov Middleton  : 1955  MRN: [id number]    Date of Admission: 2020  Date of Discharge:  2020  Primary Care Physician: Ana Huitron,  MD      Chief Complaint:   Abdominal Pain and Difficulty Urinating      Discharge Diagnoses     Active Hospital Problems    Diagnosis  POA   • **Renal abscess [N15.1]  Yes   • Stage 3a chronic kidney disease [N18.31]  Yes   • Type 2 diabetes mellitus (CMS/HCC) [E11.9]  Yes   • Renal stone [N20.0]  Yes      Resolved Hospital Problems    Diagnosis Date Resolved POA   • Sepsis (CMS/HCC) [A41.9] 12/16/2020 No   • Hypomagnesemia [E83.42] 12/16/2020 Yes   • Hypocalcemia [E83.51] 12/17/2020 Yes   • Hypokalemia [E87.6] 12/17/2020 Yes        Hospital Course     Ms. Middleton is a 65 y.o. female former smoker with a history of CKD3, DM2, breast cancer and frequent UTIs who presented to [facility] initially complaining of abdominal pain as well as urinary symptoms.  Please see the admitting history and physical for further details.  She was found to have complicated pyelonephritis with fungal abscess versus renal mass. She also had several electrolyte abnormalities with low potassium and magnesium. She was seen in consultation by Urology, neurology, as well as infectious disease.              Urology was consulted and she underwent cystoscopy and right retrograde pyelogram and ureteroscopy and laser lithotripsy with right ureteral stent placement on 12/9 with Dr. Dooley as CT scan on admission showed large right upper pole infundibular calculus with likely calyceal rupture and perinephric urinoma/abscess. She also had a drain placed by IR on 12/10. She was treated with antibiotics and repeat CT imaging from 12/14 showed near complete resolution of the abscess. Urology has recommended to continue the percutaneous drain and plans for ureteroscopy and laser lithotripsy in 2 weeks.               Unfortunately, after having her procedure on 12/9 she had a new onset of seizure activity and Neurology was consulted. CT head showed no acute findings and her symptoms were felt secondary to low magnesium. She is now on  schedule magnesium replacement and Neurology has signed off with no further work up planned.  Otherwise, her electrolyte abnormalities have resolved by the day of discharge. Seizure Precautions: Patient is not to drive for at least 3 months until cleared by a physician, operate heavy machinery, take tub baths, swim unattended, climb heights, or perform any other activities that can bring harm to himself/herself or others during an episode of altered awareness.               For antibiotic coverage, Infectious disease followed. Operative cultures were positive for candida glabrata with no evidence of gram negative rods. Meropenem was discontinued and IV mycamine (100 mg daily from 12/13/20) will be continued for 4 weeks with periodic assessments with every 2 week CT scans to document progression/improvement. PICC line was placed yesterday and Dr. Plunkett would like weekly CBC, CMP with abnormal results called to him at [phone]. Home health is being set up for this reason.   A couple of days prior to discharge, pt's blood pressure readings were very elevated, however this occurred after blood pressure was being taken on lower extremities due to her PICC line and prior breast cancer. Blood pressure readings taking in upper extremities consistently ranged from 140s-160s systolic. Coreg had already been added during the day for blood pressure and could not be titrated further due to HR in the 50s/60s, so her lisinopril was increased on the day of discharge. She will need follow up labs to monitor her creatinine and potassium.               On admission, she was found to have an A1c of 13 with prior use of glipizide, linagliptin, and metformin. She was started on basal insulin here and her sugars have improved. She was discharged on lantus 16 units nightly in addition to her oral medications which she can continue. The diabetes educator has met with her and she will need to check her sugars ACHS at home. She should  follow up with her PCP in 1-2 weeks for further monitoring and keep a log of her sugars home.     Day of Discharge     Subjective:  Pt doing very well this morning with no complaints. She's excited about leaving. She has minimal drainage from her PAUL drain.     Physical Exam:  Temp:  [98 °F (36.7 °C)-98.1 °F (36.7 °C)] 98.1 °F (36.7 °C)  Heart Rate:  [53-68] 62  Resp:  [16] 16  BP: (181-204)/(82-97) 189/85  Body mass index is 27.8 kg/m².  Physical Exam  Constitutional:       General: She is not in acute distress.  HENT:      Head: Normocephalic and atraumatic.   Cardiovascular:      Rate and Rhythm: Normal rate and regular rhythm.      Heart sounds: Normal heart sounds. No murmur. No friction rub. No gallop.    Pulmonary:      Effort: Pulmonary effort is normal.      Breath sounds: Normal breath sounds.   Abdominal:      General: Bowel sounds are normal. There is no distension.      Palpations: Abdomen is soft.      Tenderness: There is no abdominal tenderness. There is no guarding.      Hernia: No hernia is present.   Neurological:      Mental Status: She is alert.   Psychiatric:         Mood and Affect: Mood normal.         Behavior: Behavior normal.         Consultants     Consult Orders (all) (From admission, onward)     Start     Ordered    12/15/20 1429  Inpatient Diabetes Educator Consult  Once     Comments: Not on insulin at home, but a1c is 13. Started on basal here. Need's teaching on administration, monitoring, etc.   Provider:  (Not yet assigned)    12/15/20 1428    12/15/20 1037  IV TEAM - Consult for PICC Line (IV Team to Determine Number of Lumens)  Once     Provider:  (Not yet assigned)    12/15/20 1037    12/15/20 1037  Inpatient Case Management  Consult  Once     Comments: Please arrange for 4 weeks of IV Mycamine at 100 mg every 24 starting 12/13/2020.  Check weekly CBC CMP and call abnormal results to Dr. Plunkett at [phone].    Diagnosis: Rhina glabrata right perinephric abscess  with with calculus obstruction and pyelonephritis status post percutaneous drainage.   Provider:  (Not yet assigned)    12/15/20 1037    12/10/20 1206  Inpatient Diabetes Educator Consult  Once     Provider:  (Not yet assigned)    12/10/20 1205    12/09/20 1652  Inpatient Neurology Consult General  STAT     Specialty:  Neurology  Provider:  Vanessa Nicholas MD    12/09/20 1652    12/08/20 2015  Inpatient Urology Consult  Once     Specialty:  Urology  Provider:  Enio Sierra MD    12/08/20 2014 12/08/20 2013  Inpatient Infectious Diseases Consult  Once     Specialty:  Infectious Diseases  Provider:  Tricia Plunkett MD    12/08/20 2012 12/08/20 1621  [facility] (on-call MD unless specified) Details  Once     Specialty:  Hospitalist  Provider:  (Not yet assigned)    12/08/20 1620              Procedures     Imaging Results (All)     Procedure Component Value Units Date/Time    CT Abdomen Pelvis With & Without Contrast [[id number]] Collected: 12/14/20 0935     Updated: 12/15/20 1448    Narrative:      CT ABDOMEN AND PELVIS WITH AND WITHOUT CONTRAST     HISTORY: Follow-up perinephric fluid collection drainage.     TECHNIQUE: Noncontrast axial images of the abdomen and pelvis were  obtained followed by administration of intravenous contrast and imaging  of the abdomen and pelvis in the nephrographic and delayed phase.  Coronal and sagittal reformats were obtained.     COMPARISON: CT abdomen and pelvis from 12/08/2020.     Findings: a percutaneous drain is seen within a previously demonstrated  right perinephric fluid collection with near complete resolution of the  fluid component. Enhancing soft tissue component/wall is seen along the  lateral aspect of the mid and lower pole of the kidney. There is also a  right ureteric stent appropriately placed with distal tip within the  bladder and proximally within the left anterior collecting system and a  malrotated kidney. There was previously a large calculus in this  region  measuring up to 1 cm that is possibly smaller and displaced now  measuring 7 mm. There are also nonobstructing calculi within the lower  pole of the left kidney, image 69. Extensive perinephric stranding is  demonstrated. On the delayed images, no extravasation of contrast is  identified.     There are bilateral small layering pleural effusions with bibasilar  atelectasis. Liver and spleen are unchanged. Pancreas is normal without  ductal dilatation. Common bile duct is dilated, that likely represents  benign biliary ectasia. Bilateral adrenal glands are normal. There are  small retroperitoneal lymph nodes identified with the largest lymph node  at the level of the aortic bifurcation measuring up to 1.3 cm in short  axis dimension. This has grown in the interim from prior imaging where  it measured 8 mm. The urinary bladder is partially distended, small  amount of nondependent air likely related to catheterization. The uterus  is anteverted. No abnormal adnexal mass is seen. There is a large  right-sided ventral hernia containing small and large bowel loops. There  is an ileocolic anastomosis present. Air-fluid levels are seen  throughout the colon most suggestive of a mild ileus. Again demonstrated  is a small low-density region along the left rectus abdominis in the  paramidline region measuring 2.5 x 1.8 cm, unchanged from prior imaging  and may represent a chronic postsurgical collection/seroma. Attention on  follow-up is recommended.       Impression:      1. Interim placement of a right ureteral stent, as well as a  percutaneous drain within a right perinephric collection with near  complete resolution of the collection. Enhancing soft tissue seen along  the lateral aspect of the mid and lower pole and anterior to the right  kidney may represent enhancing granulation tissue. Multiple mildly  prominent retroperitoneal lymph nodes at least a couple of which have  enlarged in the interim from prior  imaging, the largest measuring 1.3 cm  at the level of the bifurcation. These may be reactive however continued  follow-up is recommended to rule out malignancy.  2. Mild colonic ileus.   3. Bilateral small layering pleural effusions with bibasilar  atelectasis.     Radiation dose reduction techniques were utilized, including automated  exposure control and exposure modulation based on body size.     This report was finalized on 12/15/2020 2:45 PM by Dr. Brianne Amador M.D.       CT Guided Abscess Drain Peritoneal [[id number]] Collected: 12/10/20 1444     Updated: 12/10/20 1509    Narrative:      CT-GUIDED ASPIRATION AND CATHETER PLACEMENT INTO RIGHT ABDOMINAL ABSCESS  12/10/2020     HISTORY: Right perinephric fluid collection.     After signed informed consent was obtained patient was prepped and  draped in the supine position. Lidocaine was used for local anesthesia.     18-gauge needle was introduced into the heterogeneous fluid collection  in the right perinephric region. Purulent fluid was visualized.     0.18 wire was passed and this was followed by placement of an 8 [place]  pigtail catheter into the fluid collection. Approximately 320 cc of  purulent fluid was removed. Confirmatory images were obtained.     The catheter was left in place and connected to suction bulb drainage. A  fluid sample was sent to the laboratory for evaluation.     Patient tolerated the procedure well with no complications.     Radiation dose reduction techniques were utilized, including automated  exposure control and exposure modulation based on body size.     This report was finalized on 12/10/2020 3:06 PM by Dr. Sahil Fabian M.D.       CT Head Without Contrast [[id number]] Collected: 12/09/20 1717     Updated: 12/09/20 2029    Narrative:      CT HEAD WITHOUT CONTRAST     HISTORY: Mental status changes. Seizure.     COMPARISON: None.     FINDINGS: The study is hampered by patient motion. There is  mild-to-moderate atrophy.   Mild-to-moderate vascular calcification is  noted. There is no evidence of intracranial hemorrhage, hydrocephalus or  of abnormal extra-axial fluid. No focal area of decreased attenuation to  suggest acute infarction is identified. Moderate vascular calcification  is appreciated.       Impression:      Atrophy and moderate vascular calcification is noted. There  is no evidence of acute infarction or hemorrhage. Further evaluation  could be performed with MRI examination of the brain, particularly if  the patient has a history of new onset seizure activity. The above  information was called to the patient's nurse at the time of the  dictation. The patient's nurse is to immediately relay the information  to the clinical service.           Radiation dose reduction techniques were utilized, including automated  exposure control and exposure modulation based on body size.     This report was finalized on 12/9/2020 8:26 PM by Dr. Harris Sylvester M.D.       FL Retrograde Pyelogram In OR [[id number]] Collected: 12/09/20 1521     Updated: 12/09/20 1529    Narrative:      RETROGRADE PYELOGRAM     HISTORY: Right ureteral stone and large right perinephric fluid  collection extending inferiorly noted on yesterday's CT scan.     FINDINGS: Imaging in the operating room shows contrast partially filling  the right ureter and intrarenal collecting system and no definite  obstructing stone is identified. There is some distortion of the  intrarenal collecting system in part related to a large perinephric  fluid collection and soft tissue changes as noted on yesterday's CT  scan. The CT scan appearance also raises the concern of underlying  neoplasm and should be correlated with the operative findings as well as  follow-up CT scan. Please also see the operative report.     6 images were obtained and the fluoroscopy time measures 9 seconds.     This report was finalized on 12/9/2020 3:26 PM by Dr. Tam Perez M.D.       CT Abdomen  Pelvis With Contrast [[id number]] Collected: 12/08/20 1938     Updated: 12/08/20 1943    Narrative:      CT ABDOMEN AND PELVIS WITH IV CONTRAST     HISTORY: 65 year old female with abdominal pain, dysuria over the past 2  weeks. History of multiple UTIs.     TECHNIQUE:  CT includes axial imaging from the lung bases to the  trochanters with intravenous contrast and without use of oral contrast.  Data reconstructed in coronal and sagittal planes.     COMPARISON: None     FINDINGS: There is a large multilobular peripherally enhancing, complex  collection centered below the right kidney within the right abdomen.  This collection contacts the anterolateral right psoas muscle and  contacts and appears to partially invade the right lateral abdominal  musculature measuring 11 x 10.3 cm axial dimension and extending 12.5 cm  craniocaudal dimension. Between this collection and the right kidney  there is a greater degree of enhancement. This appears more solid in  appearance suspected to represent a mass. There is indentation of the  adjacent inferior, lateral right renal cortex. 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 Low-density appears to  arise from the renal cortex. There is no evidence for extension into the  renal pelvis or involvement of the urothelium. Delayed phase imaging  demonstrates no extension of contrast into this mass or the low-density  below the right kidney. There is a posterior right renal cyst that  measures approximately 3 cm diameter. Right renal calyceal/infundibular  stone measures 1 cm. There is also a 0.8 cm right renal stone and a 3 mm  right renal calyceal stone. A 1 cm left renal low-density lesion is most  likely a cyst though difficult to definitively characterize.     There is stranding/inflammation surrounding this mass/collection within  the right abdomen and there is displacement of adjacent bowel loops. The  splenic size is normal. The adrenal glands appear  normal. There is mild  diffuse fat infiltration of the liver. Pancreas appears within normal  limits. There are several subcentimeter retroperitoneal lymph nodes  without enlargement. Lung bases appear clear.     There is diastases of the rectus sheath with herniation of fat and bowel  loops into the right anterior abdomen and pelvic wall. Fluid-filled  small bowel loops are present which contain air-fluid levels and this  may be associated with a mild ileus. There is no dilatation to suggest  obstruction. Within the midline, infraumbilical abdominal wall just deep  to the rectus abdominis muscle curvature there is a low-density mass or  collection that measures 3.2 x 1.7 cm. This could represent a chronic  postsurgical collection. Bone windows demonstrate no osseous lesion.     FINDINGS:   1. Large multilobular right abdominal mass/collection measures  approximately 11 x 10.3 x 12.5 cm. This appears to arise from the right  kidney and at the upper margin of this lesion there is masslike  thickening suspicious for renal cell carcinoma with complex adjacent  collection or abscess with apparent invasion of the right lateral  abdominal wall. Urologic consultation recommended. Biopsy or attempt at  CT-guided drainage could be performed under CT guidance if indicated.  2. Right renal calyceal infundibular stones without hydronephrosis.  Right renal cysts. A 1 cm left renal low-density lesion is most likely a  cyst though difficult to definitely characterize.  3. 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 There is an infraumbilical  collection or low-density lesion along the deep margin of the rectus  abdominis musculature. This could represent a small chronic seroma  associated with previous surgery.     Discussed with ANSELMO Sutton, in the emergency department 12/08/2020  at 7:10 PM.     Radiation dose reduction techniques were utilized, including  automated  exposure control and exposure modulation based on body size.     This report was finalized on 12/8/2020 7:40 PM by Dr. Hawk Bonilla M.D.             Pertinent Labs     Results from last 7 days   Lab Units 12/17/20  0625 12/16/20  0425 12/15/20  0633 12/14/20  0546   WBC 10*3/mm3 12.63* 14.30* 15.26* 14.44*   HEMOGLOBIN g/dL 9.2* 8.8* 9.2* 8.1*   PLATELETS 10*3/mm3 322 375 397 285     Results from last 7 days   Lab Units 12/17/20  0625 12/16/20  1835 12/16/20  0425 12/15/20  0633 12/14/20  0546   SODIUM mmol/L 140  --  138 137 136   POTASSIUM mmol/L 4.0 4.0 3.3* 3.7 4.0   CHLORIDE mmol/L 104  --  104 104 105   CO2 mmol/L 30.6*  --  27.3 24.3 23.4   BUN mg/dL 6*  --  6* 9 10   CREATININE mg/dL 0.87  --  0.99 1.05* 1.02*   GLUCOSE mg/dL 84  --  70 73 119*   Estimated Creatinine Clearance: 64.1 mL/min (by C-G formula based on SCr of 0.87 mg/dL).  Results from last 7 days   Lab Units 12/14/20  0546 12/13/20  0535 12/11/20  0559   ALBUMIN g/dL 2.00* 2.10* 2.20*     Results from last 7 days   Lab Units 12/17/20  0625 12/16/20  0425 12/15/20  0633 12/14/20  0546 12/13/20  0535 12/12/20  0550  12/11/20  0559   CALCIUM mg/dL 8.4* 7.9* 8.2* 7.7* 7.8* 7.8*  --  7.8*   ALBUMIN g/dL  --   --   --  2.00* 2.10*  --   --  2.20*   MAGNESIUM mg/dL  --   --  2.0 1.6 1.8 1.6  --  2.5*   PHOSPHORUS mg/dL  --   --  3.1 2.8 2.9 2.4*   < > 1.5*    < > = values in this interval not displayed.         Results from last 7 days   Lab Units 12/14/20  0546   URIC ACID mg/dL 2.4         Invalid input(s): LDLCALC  Results from last 7 days   Lab Units 12/11/20  1530 12/11/20  0149   BLOODCX  No growth at 5 days  No growth at 5 days  --    URINECX   --  Yeast isolated*       Test Results Pending at Discharge       Discharge Details        Discharge Medications      New Medications      Instructions Start Date   carvedilol 12.5 MG tablet  Commonly known as: COREG   12.5 mg, Oral, 2 Times Daily With Meals      insulin glargine 100  "UNIT/ML injection  Commonly known as: LANTUS   16 Units, Subcutaneous, Nightly      Insulin Syringe 31G X 5/16\" 0.3 ML misc   Use to inject insulin SQ daily      magnesium oxide 400 tablet tablet  Commonly known as: MAG-OX   400 mg, Oral, 2 Times Daily      micafungin  Commonly known as: MYCAMINE   100 mg, Intravenous, Every 24 Hours   Start Date: December 18, 2020        Changes to Medications      Instructions Start Date   lisinopril 20 MG tablet  Commonly known as: PRINIVIL,ZESTRIL  What changed: how much to take   40 mg, Oral, Daily         Continue These Medications      Instructions Start Date   citalopram 10 MG tablet  Commonly known as: CeleXA   10 mg, Oral, Daily      glipizide 5 MG tablet  Commonly known as: GLUCOTROL   5 mg, Oral, 2 Times Daily Before Meals      linagliptin 5 MG tablet tablet  Commonly known as: TRADJENTA   5 mg, Oral, Daily      metFORMIN 500 MG tablet  Commonly known as: GLUCOPHAGE   500 mg, Oral, 2 Times Daily With Meals      rizatriptan 10 MG tablet  Commonly known as: MAXALT   TAKE 1 TABLET BY MOUTH EVERY DAY AS NEEDED (MAX OF 2 PER 24 HOURS)             No Known Allergies      Discharge Disposition:  Home or Self Care    Discharge Diet:  Diet Order   Procedures   • Diet Regular; GI Soft       Discharge Activity:   Activity Instructions     Driving Restrictions      Type of Restriction:  Driving  Bathing  Other       Driving Restrictions: No Driving    Explain Other Restrictions: Seizure Precautions: Patient is not to drive for at least 3 months until cleared by a physician, operate heavy machinery, take tub baths, swim unattended, climb heights, or perform any other activities that can bring harm to himself/herself or others dur    Bathing Restrictions: No Tub Bath          CODE STATUS:    Code Status and Medical Interventions:   Ordered at: 12/08/20 2012     Code Status:    CPR     Medical Interventions (Level of Support Prior to Arrest):    Full       No future " appointments.  Additional Instructions for the Follow-ups that You Need to Schedule     Ambulatory Referral to Home Health   As directed      Face to Face Visit Date: 12/17/2020    Follow-up provider for Plan of Care?: I treated the patient in an acute care facility and will not continue treatment after discharge.    Follow-up provider: FLAVIA CANTU [[id number]]    Reason/Clinical Findings: debility. iv antibiotics, indwelling renal abscess drain    Describe mobility limitations that make leaving home difficult: long hosptialization, indwelling PAUL drain    Nursing/Therapeutic Services Requested: Skilled Nursing Physical Therapy    Skilled nursing orders: Medication education Infusion therapy PICC line care/instruction    Frequency: 1 Week 1         Call MD With Problems / Concerns   As directed      Call your primary doctor if you're experiencing worsening abdominal pain, nausea, vomiting, increased or purulent drainage from your abdominal drain, fevers, sweats, chills    Order Comments: Call your primary doctor if you're experiencing worsening abdominal pain, nausea, vomiting, increased or purulent drainage from your abdominal drain, fevers, sweats, chills          Discharge Follow-up with PCP   As directed       Currently Documented PCP:    Flavia Cantu MD    PCP Phone Number:    [phone]     Follow Up Details: within a week of discharge         Discharge Follow-up with Specialty: infectious disease; 2 Weeks   As directed      Specialty: infectious disease    Follow Up: 2 Weeks    Follow Up Details: monitoring course of micafungin for perinephric abscess         Discharge Follow-up with Specified Provider: neurology; 3 Months   As directed      To: neurology    Follow Up: 3 Months    Follow Up Details: new onset seizure while in the hospital attributed to hypomagnesemia         Discharge Follow-up with Specified Provider: urology; 2 Weeks   As directed      To: urology    Follow Up: 2 Weeks    Follow Up Details:  plan for uscope and laser lithotripsy in 2 weeks         CT Abdomen Pelvis Without Contrast  (Every 2 Weeks)  Dec 17, 2021      Will Oral Contrast be needed for this procedure?: No            Contact information for follow-up providers     Flavia Cantu MD .    Specialty: Family Medicine  Why: within a week of discharge  Contact information:  [address]  [facility] [address]  [phone]                   Contact information for after-discharge care     Dialysis/Infusion     [facility] .    Service: Infusion and IV Therapy  Contact information:  [address]  [facility] [id number] [phone]                 Home Medical Care     [facility] [place] .    Service: Home Health Services  Contact information:  [address]  [facility] [address] [phone]                             Additional Instructions for the Follow-ups that You Need to Schedule     Ambulatory Referral to Home Health   As directed      Face to Face Visit Date: 12/17/2020    Follow-up provider for Plan of Care?: I treated the patient in an acute care facility and will not continue treatment after discharge.    Follow-up provider: FLAVIA CANTU [[id number]]    Reason/Clinical Findings: debility. iv antibiotics, indwelling renal abscess drain    Describe mobility limitations that make leaving home difficult: long hosptialization, indwelling PAUL drain    Nursing/Therapeutic Services Requested: Skilled Nursing Physical Therapy    Skilled nursing orders: Medication education Infusion therapy PICC line care/instruction    Frequency: 1 Week 1         Call MD With Problems / Concerns   As directed      Call your primary doctor if you're experiencing worsening abdominal pain, nausea, vomiting, increased or purulent drainage from your abdominal drain, fevers, sweats, chills    Order Comments: Call your primary doctor if you're experiencing worsening abdominal pain, nausea, vomiting,  increased or purulent drainage from your abdominal drain, fevers, sweats, chills          Discharge Follow-up with PCP   As directed       Currently Documented PCP:    Ana Huitron MD    PCP Phone Number:    [phone]     Follow Up Details: within a week of discharge         Discharge Follow-up with Specialty: infectious disease; 2 Weeks   As directed      Specialty: infectious disease    Follow Up: 2 Weeks    Follow Up Details: monitoring course of micafungin for perinephric abscess         Discharge Follow-up with Specified Provider: neurology; 3 Months   As directed      To: neurology    Follow Up: 3 Months    Follow Up Details: new onset seizure while in the hospital attributed to hypomagnesemia         Discharge Follow-up with Specified Provider: urology; 2 Weeks   As directed      To: urology    Follow Up: 2 Weeks    Follow Up Details: plan for uscope and laser lithotripsy in 2 weeks         CT Abdomen Pelvis Without Contrast  (Every 2 Weeks)  Dec 17, 2021      Will Oral Contrast be needed for this procedure?: No           Time Spent on Discharge:  Greater than 30 minutes      Bunny Hannah MD  [facility]  12/17/20  14:59 EST              Electronically signed by Bunny Hannah MD at 12/17/20 1551       Gretta Hay RN      Case Management   Progress Notes   Signed   Date of Service:  12/17/20 1618   Creation Time:  12/17/20 1618            Signed             Show:Clear all  []Manual[x]Template[]Copied    Added by:  [x]Gretta Hay RN    []Glenn for details  Case Management Discharge Note        Final Note: Pt discharged home with [facility] and [facility].   ZIGGY Hay RN     Provided Post Acute Provider List?: Yes  Post Acute Provider List: Home Health  Provided Post Acute Provider Quality & Resource List?: Yes  Post Acute Provider Quality and Resource List: Home Health  Delivered To: Patient  Method of Delivery: In person         Selected Continued  Care - Admitted Since 12/8/2020      Destination    No services have been selected for the patient.                 Durable Medical Equipment    No services have been selected for the patient.                           Dialysis/Infusion Coordination complete     Service Provider Selected Services Address Phone Fax Patient Preferred      [facility]  Infusion and IV Therapy [address], [facility] [address] [phone] --                       Home Medical Care Coordination complete               Service Provider Selected Services Address Phone Fax Patient Preferred      [facility] [place]  [facility] Services [address] [facility] [address] [address] [phone] --             Therapy    No services have been selected for the patient.                 Community Resources    No services have been selected for the patient.                      Transportation Services  Private: Car     Final Discharge Disposition Code: 06 - home with home health care

## 2020-12-18 NOTE — OUTREACH NOTE
Prep Survey      Responses   [facility] facility patient discharged from?  [facility]   Is LACE score < 7 ?  No   Eligibility  Readm Mgmt   Discharge diagnosis  Renal abscess /seizure   Does the patient have one of the following disease processes/diagnoses(primary or secondary)?  General Surgery   Does the patient have Home health ordered?  Yes   What is the Home health agency?   [facility]   Is there a DME ordered?  No   Prep survey completed?  Yes          Amanda Garcia RN

## 2020-12-21 ENCOUNTER — TRANSCRIBE ORDERS (OUTPATIENT)
Dept: SLEEP MEDICINE | Facility: HOSPITAL | Age: 65
End: 2020-12-21

## 2020-12-21 ENCOUNTER — LAB REQUISITION (OUTPATIENT)
Dept: LAB | Facility: HOSPITAL | Age: 65
End: 2020-12-21

## 2020-12-21 DIAGNOSIS — Z00.00 ENCOUNTER FOR GENERAL ADULT MEDICAL EXAMINATION WITHOUT ABNORMAL FINDINGS: ICD-10-CM

## 2020-12-21 DIAGNOSIS — Z01.818 OTHER SPECIFIED PRE-OPERATIVE EXAMINATION: Primary | ICD-10-CM

## 2020-12-21 LAB
ALBUMIN SERPL-MCNC: 2.8 G/DL (ref 3.5–5.2)
ALBUMIN/GLOB SERPL: 0.7 G/DL
ALP SERPL-CCNC: 103 U/L (ref 39–117)
ALT SERPL W P-5'-P-CCNC: 11 U/L (ref 1–33)
ANION GAP SERPL CALCULATED.3IONS-SCNC: 12.2 MMOL/L (ref 5–15)
AST SERPL-CCNC: 15 U/L (ref 1–32)
BILIRUB SERPL-MCNC: 0.4 MG/DL (ref 0–1.2)
BUN SERPL-MCNC: 6 MG/DL (ref 8–23)
BUN/CREAT SERPL: 5.9 (ref 7–25)
CALCIUM SPEC-SCNC: 7.9 MG/DL (ref 8.6–10.5)
CHLORIDE SERPL-SCNC: 106 MMOL/L (ref 98–107)
CO2 SERPL-SCNC: 23.8 MMOL/L (ref 22–29)
CREAT SERPL-MCNC: 1.01 MG/DL (ref 0.57–1)
DEPRECATED RDW RBC AUTO: 38.2 FL (ref 37–54)
ERYTHROCYTE [DISTWIDTH] IN BLOOD BY AUTOMATED COUNT: 13.6 % (ref 12.3–15.4)
GFR SERPL CREATININE-BSD FRML MDRD: 55 ML/MIN/1.73
GLOBULIN UR ELPH-MCNC: 4.1 GM/DL
GLUCOSE SERPL-MCNC: 148 MG/DL (ref 65–99)
HCT VFR BLD AUTO: 29.6 % (ref 34–46.6)
HGB BLD-MCNC: 9.3 G/DL (ref 12–15.9)
MCH RBC QN AUTO: 24.9 PG (ref 26.6–33)
MCHC RBC AUTO-ENTMCNC: 31.4 G/DL (ref 31.5–35.7)
MCV RBC AUTO: 79.1 FL (ref 79–97)
PLATELET # BLD AUTO: 276 10*3/MM3 (ref 140–450)
PMV BLD AUTO: 9.8 FL (ref 6–12)
POTASSIUM SERPL-SCNC: 3.4 MMOL/L (ref 3.5–5.2)
PROT SERPL-MCNC: 6.9 G/DL (ref 6–8.5)
RBC # BLD AUTO: 3.74 10*6/MM3 (ref 3.77–5.28)
SODIUM SERPL-SCNC: 142 MMOL/L (ref 136–145)
WBC # BLD AUTO: 11.33 10*3/MM3 (ref 3.4–10.8)

## 2020-12-21 PROCEDURE — 85027 COMPLETE CBC AUTOMATED: CPT | Performed by: INTERNAL MEDICINE

## 2020-12-21 PROCEDURE — 80053 COMPREHEN METABOLIC PANEL: CPT | Performed by: INTERNAL MEDICINE

## 2020-12-22 ENCOUNTER — READMISSION MANAGEMENT (OUTPATIENT)
Dept: CALL CENTER | Facility: HOSPITAL | Age: 65
End: 2020-12-22

## 2020-12-22 NOTE — OUTREACH NOTE
General Surgery Week 1 Survey      Responses   [facility] patient discharged from?  [facility]   Does the patient have one of the following disease processes/diagnoses(primary or secondary)?  General Surgery   Week 1 attempt successful?  Yes   Call start time  1500   Rescheduled  Rescheduled-pt requested          Lindy Fulton, RN

## 2020-12-23 ENCOUNTER — APPOINTMENT (OUTPATIENT)
Dept: PREADMISSION TESTING | Facility: HOSPITAL | Age: 65
End: 2020-12-23

## 2020-12-23 ENCOUNTER — READMISSION MANAGEMENT (OUTPATIENT)
Dept: CALL CENTER | Facility: HOSPITAL | Age: 65
End: 2020-12-23

## 2020-12-23 NOTE — OUTREACH NOTE
General Surgery Week 1 Survey      Responses   [facility] patient discharged from?  [place]   Does the patient have one of the following disease processes/diagnoses(primary or secondary)?  General Surgery   Week 1 attempt successful?  Yes   Rescheduled  Revoked          Mayur Simeon RN

## 2020-12-26 ENCOUNTER — APPOINTMENT (OUTPATIENT)
Dept: LAB | Facility: HOSPITAL | Age: 65
End: 2020-12-26

## 2020-12-28 ENCOUNTER — LAB REQUISITION (OUTPATIENT)
Dept: LAB | Facility: HOSPITAL | Age: 65
End: 2020-12-28

## 2020-12-28 DIAGNOSIS — N15.1 RENAL AND PERINEPHRIC ABSCESS: ICD-10-CM

## 2020-12-28 LAB
ALBUMIN SERPL-MCNC: 2.8 G/DL (ref 3.5–5.2)
ALBUMIN/GLOB SERPL: 0.7 G/DL
ALP SERPL-CCNC: 122 U/L (ref 39–117)
ALT SERPL W P-5'-P-CCNC: 11 U/L (ref 1–33)
ANION GAP SERPL CALCULATED.3IONS-SCNC: 12.5 MMOL/L (ref 5–15)
AST SERPL-CCNC: 11 U/L (ref 1–32)
BILIRUB SERPL-MCNC: 0.3 MG/DL (ref 0–1.2)
BUN SERPL-MCNC: 12 MG/DL (ref 8–23)
BUN/CREAT SERPL: 10.3 (ref 7–25)
CALCIUM SPEC-SCNC: 7.7 MG/DL (ref 8.6–10.5)
CHLORIDE SERPL-SCNC: 101 MMOL/L (ref 98–107)
CO2 SERPL-SCNC: 21.5 MMOL/L (ref 22–29)
CREAT SERPL-MCNC: 1.17 MG/DL (ref 0.57–1)
DEPRECATED RDW RBC AUTO: 45.9 FL (ref 37–54)
ERYTHROCYTE [DISTWIDTH] IN BLOOD BY AUTOMATED COUNT: 14.6 % (ref 12.3–15.4)
GFR SERPL CREATININE-BSD FRML MDRD: 47 ML/MIN/1.73
GLOBULIN UR ELPH-MCNC: 4.2 GM/DL
GLUCOSE SERPL-MCNC: 386 MG/DL (ref 65–99)
HCT VFR BLD AUTO: 27.2 % (ref 34–46.6)
HGB BLD-MCNC: 8.2 G/DL (ref 12–15.9)
MCH RBC QN AUTO: 25.5 PG (ref 26.6–33)
MCHC RBC AUTO-ENTMCNC: 30.1 G/DL (ref 31.5–35.7)
MCV RBC AUTO: 84.5 FL (ref 79–97)
PLATELET # BLD AUTO: 245 10*3/MM3 (ref 140–450)
PMV BLD AUTO: 10.3 FL (ref 6–12)
POTASSIUM SERPL-SCNC: 3.8 MMOL/L (ref 3.5–5.2)
PROT SERPL-MCNC: 7 G/DL (ref 6–8.5)
RBC # BLD AUTO: 3.22 10*6/MM3 (ref 3.77–5.28)
SODIUM SERPL-SCNC: 135 MMOL/L (ref 136–145)
WBC # BLD AUTO: 8.2 10*3/MM3 (ref 3.4–10.8)

## 2020-12-28 PROCEDURE — 80053 COMPREHEN METABOLIC PANEL: CPT | Performed by: INTERNAL MEDICINE

## 2020-12-28 PROCEDURE — 85027 COMPLETE CBC AUTOMATED: CPT | Performed by: INTERNAL MEDICINE

## 2021-01-04 ENCOUNTER — LAB REQUISITION (OUTPATIENT)
Dept: LAB | Facility: HOSPITAL | Age: 66
End: 2021-01-04

## 2021-01-04 DIAGNOSIS — Z00.00 ENCOUNTER FOR GENERAL ADULT MEDICAL EXAMINATION WITHOUT ABNORMAL FINDINGS: ICD-10-CM

## 2021-01-04 LAB
ALBUMIN SERPL-MCNC: 2.9 G/DL (ref 3.5–5.2)
ALBUMIN/GLOB SERPL: 0.7 G/DL
ALP SERPL-CCNC: 104 U/L (ref 39–117)
ALT SERPL W P-5'-P-CCNC: 9 U/L (ref 1–33)
ANION GAP SERPL CALCULATED.3IONS-SCNC: 13.3 MMOL/L (ref 5–15)
AST SERPL-CCNC: 13 U/L (ref 1–32)
BILIRUB SERPL-MCNC: 0.2 MG/DL (ref 0–1.2)
BUN SERPL-MCNC: 11 MG/DL (ref 8–23)
BUN/CREAT SERPL: 10 (ref 7–25)
CALCIUM SPEC-SCNC: 8.1 MG/DL (ref 8.6–10.5)
CHLORIDE SERPL-SCNC: 101 MMOL/L (ref 98–107)
CO2 SERPL-SCNC: 19.7 MMOL/L (ref 22–29)
CREAT SERPL-MCNC: 1.1 MG/DL (ref 0.57–1)
DEPRECATED RDW RBC AUTO: 45.4 FL (ref 37–54)
ERYTHROCYTE [DISTWIDTH] IN BLOOD BY AUTOMATED COUNT: 14.9 % (ref 12.3–15.4)
GFR SERPL CREATININE-BSD FRML MDRD: 50 ML/MIN/1.73
GLOBULIN UR ELPH-MCNC: 4 GM/DL
GLUCOSE SERPL-MCNC: 411 MG/DL (ref 65–99)
HCT VFR BLD AUTO: 26.6 % (ref 34–46.6)
HGB BLD-MCNC: 8.1 G/DL (ref 12–15.9)
MCH RBC QN AUTO: 25.5 PG (ref 26.6–33)
MCHC RBC AUTO-ENTMCNC: 30.5 G/DL (ref 31.5–35.7)
MCV RBC AUTO: 83.6 FL (ref 79–97)
PLATELET # BLD AUTO: 236 10*3/MM3 (ref 140–450)
PMV BLD AUTO: 10.5 FL (ref 6–12)
POTASSIUM SERPL-SCNC: 3.7 MMOL/L (ref 3.5–5.2)
PROT SERPL-MCNC: 6.9 G/DL (ref 6–8.5)
RBC # BLD AUTO: 3.18 10*6/MM3 (ref 3.77–5.28)
SODIUM SERPL-SCNC: 134 MMOL/L (ref 136–145)
WBC # BLD AUTO: 5.84 10*3/MM3 (ref 3.4–10.8)

## 2021-01-04 PROCEDURE — 80053 COMPREHEN METABOLIC PANEL: CPT | Performed by: INTERNAL MEDICINE

## 2021-01-04 PROCEDURE — 85027 COMPLETE CBC AUTOMATED: CPT | Performed by: INTERNAL MEDICINE

## 2021-01-13 ENCOUNTER — INPATIENT HOSPITAL (OUTPATIENT)
Dept: URBAN - METROPOLITAN AREA HOSPITAL 107 | Facility: HOSPITAL | Age: 66
End: 2021-01-13
Payer: COMMERCIAL

## 2021-01-13 DIAGNOSIS — K76.89 OTHER SPECIFIED DISEASES OF LIVER: ICD-10-CM

## 2021-01-13 DIAGNOSIS — A41.9 SEPSIS, UNSPECIFIED ORGANISM: ICD-10-CM

## 2021-01-13 DIAGNOSIS — R93.2 ABNORMAL FINDINGS ON DIAGNOSTIC IMAGING OF LIVER AND BILIARY: ICD-10-CM

## 2021-01-13 DIAGNOSIS — Z85.3 PERSONAL HISTORY OF MALIGNANT NEOPLASM OF BREAST: ICD-10-CM

## 2021-01-13 PROCEDURE — 99223 1ST HOSP IP/OBS HIGH 75: CPT | Performed by: INTERNAL MEDICINE

## 2021-01-14 ENCOUNTER — INPATIENT HOSPITAL (OUTPATIENT)
Dept: URBAN - METROPOLITAN AREA HOSPITAL 107 | Facility: HOSPITAL | Age: 66
End: 2021-01-14

## 2021-01-14 DIAGNOSIS — K76.9 LIVER DISEASE, UNSPECIFIED: ICD-10-CM

## 2021-01-14 DIAGNOSIS — I10 ESSENTIAL (PRIMARY) HYPERTENSION: ICD-10-CM

## 2021-01-14 DIAGNOSIS — A41.9 SEPSIS, UNSPECIFIED ORGANISM: ICD-10-CM

## 2021-01-14 DIAGNOSIS — E11.9 TYPE 2 DIABETES MELLITUS WITHOUT COMPLICATIONS: ICD-10-CM

## 2021-01-14 PROCEDURE — 99232 SBSQ HOSP IP/OBS MODERATE 35: CPT | Performed by: PHYSICIAN ASSISTANT

## 2021-01-15 ENCOUNTER — INPATIENT HOSPITAL (OUTPATIENT)
Dept: URBAN - METROPOLITAN AREA HOSPITAL 107 | Facility: HOSPITAL | Age: 66
End: 2021-01-15

## 2021-01-15 DIAGNOSIS — R16.0 HEPATOMEGALY, NOT ELSEWHERE CLASSIFIED: ICD-10-CM

## 2021-01-15 DIAGNOSIS — R93.3 ABNORMAL FINDINGS ON DIAGNOSTIC IMAGING OF OTHER PARTS OF DI: ICD-10-CM

## 2021-01-15 DIAGNOSIS — A41.9 SEPSIS, UNSPECIFIED ORGANISM: ICD-10-CM

## 2021-01-15 DIAGNOSIS — K92.0 HEMATEMESIS: ICD-10-CM

## 2021-01-15 DIAGNOSIS — R11.2 NAUSEA WITH VOMITING, UNSPECIFIED: ICD-10-CM

## 2021-01-15 DIAGNOSIS — K76.9 LIVER DISEASE, UNSPECIFIED: ICD-10-CM

## 2021-01-15 PROCEDURE — 99231 SBSQ HOSP IP/OBS SF/LOW 25: CPT | Performed by: NURSE PRACTITIONER

## 2021-01-16 ENCOUNTER — INPATIENT HOSPITAL (OUTPATIENT)
Dept: URBAN - METROPOLITAN AREA HOSPITAL 107 | Facility: HOSPITAL | Age: 66
End: 2021-01-16

## 2021-01-16 DIAGNOSIS — I10 ESSENTIAL (PRIMARY) HYPERTENSION: ICD-10-CM

## 2021-01-16 DIAGNOSIS — K76.9 LIVER DISEASE, UNSPECIFIED: ICD-10-CM

## 2021-01-16 DIAGNOSIS — E11.9 TYPE 2 DIABETES MELLITUS WITHOUT COMPLICATIONS: ICD-10-CM

## 2021-01-16 DIAGNOSIS — A41.9 SEPSIS, UNSPECIFIED ORGANISM: ICD-10-CM

## 2021-01-16 PROCEDURE — 99232 SBSQ HOSP IP/OBS MODERATE 35: CPT | Performed by: INTERNAL MEDICINE

## 2021-01-17 PROCEDURE — 99232 SBSQ HOSP IP/OBS MODERATE 35: CPT | Performed by: INTERNAL MEDICINE

## 2021-01-18 ENCOUNTER — INPATIENT HOSPITAL (OUTPATIENT)
Dept: URBAN - METROPOLITAN AREA HOSPITAL 107 | Facility: HOSPITAL | Age: 66
End: 2021-01-18

## 2021-01-18 DIAGNOSIS — R16.0 HEPATOMEGALY, NOT ELSEWHERE CLASSIFIED: ICD-10-CM

## 2021-01-18 DIAGNOSIS — N17.9 ACUTE KIDNEY FAILURE, UNSPECIFIED: ICD-10-CM

## 2021-01-18 DIAGNOSIS — E11.9 TYPE 2 DIABETES MELLITUS WITHOUT COMPLICATIONS: ICD-10-CM

## 2021-01-18 DIAGNOSIS — G93.41 METABOLIC ENCEPHALOPATHY: ICD-10-CM

## 2021-01-18 DIAGNOSIS — I10 ESSENTIAL (PRIMARY) HYPERTENSION: ICD-10-CM

## 2021-01-18 DIAGNOSIS — R19.7 DIARRHEA, UNSPECIFIED: ICD-10-CM

## 2021-01-18 DIAGNOSIS — A54.86: ICD-10-CM

## 2021-01-18 DIAGNOSIS — R11.0 NAUSEA: ICD-10-CM

## 2021-01-18 PROCEDURE — 99232 SBSQ HOSP IP/OBS MODERATE 35: CPT | Performed by: PHYSICIAN ASSISTANT

## 2021-01-19 PROCEDURE — 99232 SBSQ HOSP IP/OBS MODERATE 35: CPT | Performed by: PHYSICIAN ASSISTANT

## 2021-01-20 PROCEDURE — 99232 SBSQ HOSP IP/OBS MODERATE 35: CPT | Performed by: PHYSICIAN ASSISTANT

## 2021-01-21 PROCEDURE — 99231 SBSQ HOSP IP/OBS SF/LOW 25: CPT | Performed by: PHYSICIAN ASSISTANT

## 2023-02-11 ENCOUNTER — HOSPITAL ENCOUNTER (EMERGENCY)
Facility: HOSPITAL | Age: 68
Discharge: NURSING, REHAB OR LONG-TERM CARE | End: 2023-02-11
Attending: EMERGENCY MEDICINE | Admitting: EMERGENCY MEDICINE
Payer: MEDICARE

## 2023-02-11 ENCOUNTER — APPOINTMENT (OUTPATIENT)
Dept: GENERAL RADIOLOGY | Facility: HOSPITAL | Age: 68
End: 2023-02-11
Payer: MEDICARE

## 2023-02-11 VITALS
WEIGHT: 160 LBS | SYSTOLIC BLOOD PRESSURE: 139 MMHG | DIASTOLIC BLOOD PRESSURE: 74 MMHG | HEART RATE: 74 BPM | HEIGHT: 65 IN | BODY MASS INDEX: 26.66 KG/M2 | TEMPERATURE: 98.2 F | OXYGEN SATURATION: 98 % | RESPIRATION RATE: 16 BRPM

## 2023-02-11 DIAGNOSIS — Z43.1 ATTENTION TO G-TUBE: Primary | ICD-10-CM

## 2023-02-11 PROCEDURE — 99283 EMERGENCY DEPT VISIT LOW MDM: CPT

## 2023-02-11 PROCEDURE — 74018 RADEX ABDOMEN 1 VIEW: CPT

## 2023-02-11 NOTE — ED PROVIDER NOTES
"Subjective     History provided by:  Nursing home    History of Present Illness    · Chief complaint: G-tube pulled out    · Location: From abdominal wall.    · Quality/Severity: The patient pulled out her G-tube.    · Timing/Onset: About an hour prior to arrival.    · Modifying Factors: None    · Associated symptoms: None    · Narrative: The patient is a 67-year-old white female who is a resident at the nursing home.  She pulled her G-tube out.    Review of Systems  Past Medical History:   Diagnosis Date   • Cancer (CMS/HCC)     left breast removed    • Type 2 diabetes mellitus (CMS/HCC)      /74   Pulse 74   Temp 98.2 °F (36.8 °C)   Resp 16   Ht 165.1 cm (65\")   Wt 72.6 kg (160 lb)   SpO2 98%   BMI 26.63 kg/m²     Past Medical History:   Diagnosis Date   • Cancer (CMS/HCC)     left breast removed    • Type 2 diabetes mellitus (CMS/HCC)        Allergies   Allergen Reactions   • Artificial Saliva Unknown - High Severity       Past Surgical History:   Procedure Laterality Date   • ABDOMINAL SURGERY     • BREAST SURGERY Left     removed due to cancer   • CHOLECYSTECTOMY     • CYSTOSCOPY W/ URETERAL STENT PLACEMENT Right 2020    Procedure: CYSTOSCOPY, RIGHT RETROGRADE PYELOGRAM, URETEROSCOPY, LASER LITHOTRIPSY, RIGHT URETERAL STENT PLACEMENT;  Surgeon: Rohan Dooley Jr., MD;  Location: [facility];  Service: Urology;  Laterality: Right;   • HERNIA REPAIR      mesh removed       Family History   Problem Relation Age of Onset   • Diabetes Mother    • Lung disease Father    • Cancer Father        Social History     Socioeconomic History   • Marital status: Unknown   Tobacco Use   • Smoking status: Former     Types: Cigarettes     Quit date: 1997     Years since quittin.5   • Smokeless tobacco: Never   Substance and Sexual Activity   • Alcohol use: No   • Drug use: No   • Sexual activity: Never           Objective   Physical Exam  Vitals and nursing note reviewed. "   Constitutional:       General: She is not in acute distress.     Appearance: Normal appearance. She is not ill-appearing, toxic-appearing or diaphoretic.      Comments: The patient appears healthy in no acute distress.  Review of her vital signs: She is afebrile and all her vital signs are within normal limits.   Abdominal:      General: Bowel sounds are normal.      Palpations: Abdomen is soft.      Tenderness: There is no abdominal tenderness. There is no guarding.      Comments: G-tube site with slight amount of erythema around the edges consistent with irritation.  No jessica cellulitis.   Skin:     General: Skin is warm and dry.      Findings: No rash.   Neurological:      Mental Status: She is alert.   Psychiatric:         Mood and Affect: Mood normal.         Behavior: Behavior normal.         Procedures           ED Course  ED Course as of 02/11/23 2026   Sat Feb 11, 2023 1706 16:12 I replaced the patient's G-tube with a 20 Wolof G-tube without difficulty. [TP]   1706 KUB with Gastrografin was interpreted by me and the radiologist as well positioning of the G-tube. [TP]      ED Course User Index  [TP] Damien Ramirez MD                                           Medical Decision Making  Attention to G-tube (HCC): acute illness or injury  Amount and/or Complexity of Data Reviewed  Radiology: ordered. Decision-making details documented in ED Course.          Final diagnoses:   Attention to G-tube (HCC)       ED Disposition  ED Disposition     ED Disposition   Discharge    Condition   Stable    Comment   --             Ana Huitron MD  [address] [facility] [address]  [phone]    Go to   As scheduled         Medication List      No changes were made to your prescriptions during this visit.         Labs Reviewed - No data to display  XR Abdomen KUB   Final Result   Well-positioned G-tube.      Signer Name: Miguel Angel Meade MD    Signed: 2/11/2023 4:58 PM    Workstation Name: [email or web address]      [facility]             Medication List      No changes were made to your prescriptions during this visit.              Damien Ramirez MD  02/11/23 2026

## 2023-03-25 ENCOUNTER — APPOINTMENT (OUTPATIENT)
Dept: CT IMAGING | Facility: HOSPITAL | Age: 68
End: 2023-03-25
Payer: MEDICARE

## 2023-03-25 ENCOUNTER — HOSPITAL ENCOUNTER (EMERGENCY)
Facility: HOSPITAL | Age: 68
Discharge: HOME | End: 2023-03-25
Attending: EMERGENCY MEDICINE | Admitting: EMERGENCY MEDICINE
Payer: MEDICARE

## 2023-03-25 VITALS
OXYGEN SATURATION: 98 % | WEIGHT: 136.24 LBS | HEART RATE: 81 BPM | RESPIRATION RATE: 16 BRPM | TEMPERATURE: 97.7 F | DIASTOLIC BLOOD PRESSURE: 80 MMHG | HEIGHT: 63 IN | BODY MASS INDEX: 24.14 KG/M2 | SYSTOLIC BLOOD PRESSURE: 146 MMHG

## 2023-03-25 DIAGNOSIS — S02.2XXB OPEN FRACTURE OF NASAL BONE, INITIAL ENCOUNTER: Primary | ICD-10-CM

## 2023-03-25 DIAGNOSIS — S09.93XA FACIAL INJURY, INITIAL ENCOUNTER: ICD-10-CM
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
## 2023-03-25 DIAGNOSIS — S05.32XA LACERATION OF LEFT EYE, INITIAL ENCOUNTER: ICD-10-CM

## 2023-03-25 LAB
ALBUMIN SERPL-MCNC: 3.2 G/DL (ref 3.5–5.2)
ALBUMIN/GLOB SERPL: 0.9 G/DL
ALP SERPL-CCNC: 88 U/L (ref 39–117)
ALT SERPL W P-5'-P-CCNC: 22 U/L (ref 1–33)
ANION GAP SERPL CALCULATED.3IONS-SCNC: 12.3 MMOL/L (ref 5–15)
AST SERPL-CCNC: 20 U/L (ref 1–32)
BASOPHILS # BLD AUTO: 0.06 10*3/MM3 (ref 0–0.2)
BASOPHILS NFR BLD AUTO: 0.6 % (ref 0–1.5)
BILIRUB SERPL-MCNC: 0.3 MG/DL (ref 0–1.2)
BUN SERPL-MCNC: 58 MG/DL (ref 8–23)
BUN/CREAT SERPL: 28 (ref 7–25)
CALCIUM SPEC-SCNC: 8.9 MG/DL (ref 8.6–10.5)
CHLORIDE SERPL-SCNC: 118 MMOL/L (ref 98–107)
CO2 SERPL-SCNC: 18.7 MMOL/L (ref 22–29)
CREAT SERPL-MCNC: 2.07 MG/DL (ref 0.57–1)
DEPRECATED RDW RBC AUTO: 47.9 FL (ref 37–54)
EGFRCR SERPLBLD CKD-EPI 2021: 25.8 ML/MIN/1.73
EOSINOPHIL # BLD AUTO: 0.15 10*3/MM3 (ref 0–0.4)
EOSINOPHIL NFR BLD AUTO: 1.4 % (ref 0.3–6.2)
ERYTHROCYTE [DISTWIDTH] IN BLOOD BY AUTOMATED COUNT: 14.5 % (ref 12.3–15.4)
GLOBULIN UR ELPH-MCNC: 3.5 GM/DL
GLUCOSE SERPL-MCNC: 126 MG/DL (ref 65–99)
HCT VFR BLD AUTO: 30.8 % (ref 34–46.6)
HGB BLD-MCNC: 10.6 G/DL (ref 12–15.9)
IMM GRANULOCYTES # BLD AUTO: 0.02 10*3/MM3 (ref 0–0.05)
IMM GRANULOCYTES NFR BLD AUTO: 0.2 % (ref 0–0.5)
LYMPHOCYTES # BLD AUTO: 1.25 10*3/MM3 (ref 0.7–3.1)
LYMPHOCYTES NFR BLD AUTO: 12 % (ref 19.6–45.3)
MCH RBC QN AUTO: 31.5 PG (ref 26.6–33)
MCHC RBC AUTO-ENTMCNC: 34.4 G/DL (ref 31.5–35.7)
MCV RBC AUTO: 91.4 FL (ref 79–97)
MONOCYTES # BLD AUTO: 0.49 10*3/MM3 (ref 0.1–0.9)
MONOCYTES NFR BLD AUTO: 4.7 % (ref 5–12)
NEUTROPHILS NFR BLD AUTO: 8.49 10*3/MM3 (ref 1.7–7)
NEUTROPHILS NFR BLD AUTO: 81.1 % (ref 42.7–76)
NRBC BLD AUTO-RTO: 0 /100 WBC (ref 0–0.2)
PLATELET # BLD AUTO: 191 10*3/MM3 (ref 140–450)
PMV BLD AUTO: 9.8 FL (ref 6–12)
POTASSIUM SERPL-SCNC: 3.4 MMOL/L (ref 3.5–5.2)
PROT SERPL-MCNC: 6.7 G/DL (ref 6–8.5)
RBC # BLD AUTO: 3.37 10*6/MM3 (ref 3.77–5.28)
SODIUM SERPL-SCNC: 149 MMOL/L (ref 136–145)
WBC NRBC COR # BLD: 10.46 10*3/MM3 (ref 3.4–10.8)

## 2023-03-25 PROCEDURE — 99283 EMERGENCY DEPT VISIT LOW MDM: CPT

## 2023-03-25 PROCEDURE — 80053 COMPREHEN METABOLIC PANEL: CPT | Performed by: NURSE PRACTITIONER

## 2023-03-25 PROCEDURE — 85025 COMPLETE CBC W/AUTO DIFF WBC: CPT | Performed by: NURSE PRACTITIONER

## 2023-03-25 PROCEDURE — 70486 CT MAXILLOFACIAL W/O DYE: CPT

## 2023-03-25 PROCEDURE — 36415 COLL VENOUS BLD VENIPUNCTURE: CPT

## 2023-03-25 RX ORDER — HYDRALAZINE HYDROCHLORIDE 25 MG/1
25 TABLET, FILM COATED ORAL EVERY 12 HOURS
Status: ON HOLD | COMMUNITY

## 2023-03-25 RX ORDER — ANORECTAL OINTMENT 15.7; .44; 24; 20.6 G/100G; G/100G; G/100G; G/100G
1 OINTMENT TOPICAL 3 TIMES DAILY
Status: ON HOLD | COMMUNITY

## 2023-03-25 RX ORDER — FERROUS SULFATE 300 MG/5ML
300 LIQUID (ML) ORAL DAILY
Status: ON HOLD | COMMUNITY

## 2023-03-25 RX ORDER — DARBEPOETIN ALFA 25 UG/ML
12.5 SOLUTION INTRAVENOUS; SUBCUTANEOUS
Status: ON HOLD | COMMUNITY

## 2023-03-25 RX ORDER — ALENDRONATE SODIUM 70 MG/1
70 TABLET ORAL
Status: ON HOLD | COMMUNITY

## 2023-03-25 RX ORDER — LEVOTHYROXINE SODIUM 0.03 MG/1
12.5 TABLET ORAL
Status: ON HOLD | COMMUNITY

## 2023-03-25 RX ORDER — CHOLESTYRAMINE 4 G/9G
1 POWDER, FOR SUSPENSION ORAL 2 TIMES DAILY
Status: ON HOLD | COMMUNITY

## 2023-03-25 RX ORDER — DIBASIC SODIUM PHOSPHATE, MONOBASIC POTASSIUM PHOSPHATE AND MONOBASIC SODIUM PHOSPHATE 852; 155; 130 MG/1; MG/1; MG/1
1 TABLET ORAL 2 TIMES DAILY
Status: ON HOLD | COMMUNITY

## 2023-03-25 RX ORDER — LOSARTAN POTASSIUM 25 MG/1
25 TABLET ORAL DAILY
Status: ON HOLD | COMMUNITY

## 2023-03-25 RX ORDER — SODIUM BICARBONATE 650 MG/1
650 TABLET ORAL 2 TIMES DAILY
Status: ON HOLD | COMMUNITY

## 2023-03-25 RX ORDER — LACOSAMIDE 10 MG/ML
150 SOLUTION ORAL EVERY 12 HOURS SCHEDULED
COMMUNITY
End: 2023-04-03

## 2023-03-25 RX ORDER — ANASTROZOLE 1 MG/1
1 TABLET ORAL DAILY
Status: ON HOLD | COMMUNITY
Start: 2023-03-05

## 2023-03-25 RX ORDER — AMOXICILLIN AND CLAVULANATE POTASSIUM 250; 125 MG/1; MG/1
1 TABLET, FILM COATED ORAL 2 TIMES DAILY
Qty: 14 TABLET | Refills: 0 | Status: SHIPPED | OUTPATIENT
Start: 2023-03-25 | End: 2023-04-03

## 2023-03-25 RX ORDER — ACETAMINOPHEN 160 MG/5ML
320 SOLUTION ORAL DAILY
Status: ON HOLD | COMMUNITY

## 2023-03-25 RX ORDER — CARVEDILOL 25 MG/1
25 TABLET ORAL 2 TIMES DAILY
Status: ON HOLD | COMMUNITY

## 2023-03-25 RX ORDER — NIFEDIPINE 30 MG/1
30 TABLET, EXTENDED RELEASE ORAL DAILY
COMMUNITY
End: 2023-04-03

## 2023-03-25 NOTE — ED NOTES
Dried blood cleaned on bridge of nose and to left eye lid. Pts left eye is now swelling and some light bruising noted.

## 2023-03-25 NOTE — ED PROVIDER NOTES
"Patient is 68 y.o. year old female that presents to the ED for evaluation of a fall with facial lacerations    Physical Exam  The patient has small lacerations around the eye that do not violate the lid margin.  She also has a laceration at the bridge of her nose    ED Course:    /80 (BP Location: Right arm, Patient Position: Lying)   Pulse 81   Temp 97.7 °F (36.5 °C) (Oral)   Resp 16   Ht 160 cm (63\")   Wt 61.8 kg (136 lb 3.9 oz)   SpO2 98%   BMI 24.13 kg/m²   Results for orders placed or performed during the hospital encounter of 03/25/23   Comprehensive Metabolic Panel    Specimen: Blood   Result Value Ref Range    Glucose 126 (H) 65 - 99 mg/dL    BUN 58 (H) 8 - 23 mg/dL    Creatinine 2.07 (H) 0.57 - 1.00 mg/dL    Sodium 149 (H) 136 - 145 mmol/L    Potassium 3.4 (L) 3.5 - 5.2 mmol/L    Chloride 118 (H) 98 - 107 mmol/L    CO2 18.7 (L) 22.0 - 29.0 mmol/L    Calcium 8.9 8.6 - 10.5 mg/dL    Total Protein 6.7 6.0 - 8.5 g/dL    Albumin 3.2 (L) 3.5 - 5.2 g/dL    ALT (SGPT) 22 1 - 33 U/L    AST (SGOT) 20 1 - 32 U/L    Alkaline Phosphatase 88 39 - 117 U/L    Total Bilirubin 0.3 0.0 - 1.2 mg/dL    Globulin 3.5 gm/dL    A/G Ratio 0.9 g/dL    BUN/Creatinine Ratio 28.0 (H) 7.0 - 25.0    Anion Gap 12.3 5.0 - 15.0 mmol/L    eGFR 25.8 (L) >60.0 mL/min/1.73   CBC Auto Differential    Specimen: Blood   Result Value Ref Range    WBC 10.46 3.40 - 10.80 10*3/mm3    RBC 3.37 (L) 3.77 - 5.28 10*6/mm3    Hemoglobin 10.6 (L) 12.0 - 15.9 g/dL    Hematocrit 30.8 (L) 34.0 - 46.6 %    MCV 91.4 79.0 - 97.0 fL    MCH 31.5 26.6 - 33.0 pg    MCHC 34.4 31.5 - 35.7 g/dL    RDW 14.5 12.3 - 15.4 %    RDW-SD 47.9 37.0 - 54.0 fl    MPV 9.8 6.0 - 12.0 fL    Platelets 191 140 - 450 10*3/mm3    Neutrophil % 81.1 (H) 42.7 - 76.0 %    Lymphocyte % 12.0 (L) 19.6 - 45.3 %    Monocyte % 4.7 (L) 5.0 - 12.0 %    Eosinophil % 1.4 0.3 - 6.2 %    Basophil % 0.6 0.0 - 1.5 %    Immature Grans % 0.2 0.0 - 0.5 %    Neutrophils, Absolute 8.49 (H) 1.70 - " 7.00 10*3/mm3    Lymphocytes, Absolute 1.25 0.70 - 3.10 10*3/mm3    Monocytes, Absolute 0.49 0.10 - 0.90 10*3/mm3    Eosinophils, Absolute 0.15 0.00 - 0.40 10*3/mm3    Basophils, Absolute 0.06 0.00 - 0.20 10*3/mm3    Immature Grans, Absolute 0.02 0.00 - 0.05 10*3/mm3    nRBC 0.0 0.0 - 0.2 /100 WBC     Medications - No data to display  CT Facial Bones Without Contrast    Result Date: 3/25/2023  Narrative: PROCEDURE: CT FACIAL BONES WO CONTRAST  COMPARISON: None  INDICATIONS: facial trauma  PROTOCOL:   Standard imaging protocol performed    RADIATION:   DLP: 527.8 mGy*cm   Automated exposure control was utilized to minimize radiation dose.  TECHNIQUE: Axial images of the facial bones without contrast. Coronal and sagittal reformatted images were performed.  FINDINGS: There is a mildly comminuted nasal bone fracture.  No other facial bone fractures are identified.  There is mild mucosal thickening in the ethmoid sinuses.  There is a small air-fluid level in the left sphenoid sinus without evidence of significant mucosal thickening.  Questionable subtle fracture of the bony nasal septum.  There incidental middle leslie bullosa.  The ostiomeatal units are patent.  No orbital abnormalities are identified.  There is generalized atrophy in the visualized brain.  No orbital abnormalities are identified.  IMPRESSION: 1. Mildly comminuted nasal bone fracture.  2. Questionable subtle fracture of the bony nasal septum.   FRITZ CARMEN MD       Electronically Signed and Approved By: FRITZ CARMEN MD on 3/25/2023 at 12:46             US Renal Bilateral    Result Date: 3/10/2023  Narrative: REVIEWING YOUR TEST RESULTS IN [facility] IS NOT A SUBSTITUTE FOR DISCUSSING THOSE RESULTS WITH YOUR HEALTH CARE PROVIDER. PLEASE CONTACT YOUR PROVIDER VIA [facility] TO DISCUSS ANY QUESTIONS OR CONCERNS YOU MAY HAVE REGARDING THESE TEST RESULTS.  RADIOLOGY REPORT FACILITY:  [facility] UNIT/AGE/GENDER: ALINE  IN       AGE:68 Y          SEX:F PATIENT NAME/:  RIC LIEBERMAN K    1955 UNIT NUMBER:  [id number] ACCOUNT NUMBER:  [id number] ACCESSION NUMBER:  [id number] EXAMINATION: Renal sonogram DATE: 3/10/2023 10:37 AM ACCESSION: [id number] PROVIDED INDICATION: denita COMPARISON: None TECHNIQUE: Real-time, multiplanar grayscale and color flow ultrasonographic images were obtained through the kidneys and urinary bladder. FINDINGS: The kidneys demonstrate heterogeneously increased cortical echogenicity bilaterally. Both kidneys are normal in size, measuring 9.8 cm on the right and 12 cm on the left along their longitudinal axes. A 2.4 cm simple renal cyst is noted in the right renal upper pole. No hydronephrosis, masses, shadowing calculi, or perinephric fluid are identified. The urinary bladder is decompressed with Fernandez catheter in place. IMPRESSION: 1. Heterogeneously increased cortical echogenicity in both kidneys, consistent with underlying medical renal disease. 2. No hydronephrosis noted of either collecting system    Dictated by: Santana Rodrigues M.D. Images and Report reviewed and interpreted by: Santana Rodrigues M.D. <PS><Electronically signed by: Santana Rodrigues M.D.> 03/10/2023 1057 D: 03/10/2023 1053 T: 03/10/2023 1053    CT Head Wo Contrast    Result Date: 3/7/2023  Narrative: REVIEWING YOUR TEST RESULTS IN [facility] IS NOT A SUBSTITUTE FOR DISCUSSING THOSE RESULTS WITH YOUR HEALTH CARE PROVIDER. PLEASE CONTACT YOUR PROVIDER VIA [facility] TO DISCUSS ANY QUESTIONS OR CONCERNS YOU MAY HAVE REGARDING THESE TEST RESULTS.  RADIOLOGY REPORT FACILITY:  [facility] UNIT/AGE/GENDER: J.ED  ER      AGE:68 Y          SEX:F PATIENT NAME/:  RIC LIEBERMAN K    1955 UNIT NUMBER:  [id number] ACCOUNT NUMBER:  [id number] ACCESSION NUMBER:  [id number] CT HEAD WITHOUT CONTRAST CT CERVICAL SPINE WITHOUT CONTRAST Date: 2023 CLINICAL INDICATION: Fall, decreased level  of consciousness COMPARISON: February 6, 2022 TECHNIQUE: Multiple axial CT images of the head and cervical spine were obtained without IV contrast.  Dose reduction technique was utilized per ALARA protocol. FINDINGS: HEAD: There is parenchymal volume loss and chronic ischemic change of the periventricular white matter. No acute intracranial hemorrhage is identified. No mass effect or midline shift is seen. Ventricular size is prominent but this is unchanged and likely related to volume loss. There is no evidence for acute infarct. Visualized paranasal sinuses and left mastoid air cells are clear. There is partial opacification of the right mastoid air cells. There is a fracture of the right nasal bone. Scalp soft tissues are unremarkable. Bilateral orbits and globes are unremarkable. There is mild mucosal thickening of the left sphenoid sinus. Overall or nasal sinus mucosal thickening has improved. IMPRESSION: 1. Chronic changes with volume loss and chronic ischemic change of the periventricular white matter and no acute intracranial abnormality. 2. Nondisplaced right nasal bone fracture. 3. Partial opacification of the right mastoid air cells. CERVICAL SPINE: There is multilevel left greater than right facet arthropathy and partial fusion of the left facet joint at C2-C3. There is mild degenerative anterior listhesis at C3-C4 measuring 0.1 cm. There is spondylosis at C4-C5, C5-C6, and C6-C7 with disc space narrowing and spurring of the endplates and uncinate process spurring with mild to moderate neural foraminal stenosis. No acute fracture is seen. Prevertebral soft tissues are normal. There is mucosal thickening at the sphenoid sinus. Soft tissues of the neck reveal bilateral carotid calcification but are otherwise unremarkable. There is emphysema. Lung apices are grossly clear. IMPRESSION: 1. Cervical spondylosis C4-C7. 2. Multilevel facet arthropathy left greater than right and mild degenerative anterior  listhesis at C3-C4. 3. No fracture identified. Dictated by: Milind Miller M.D. Images and Report reviewed and interpreted by: Milind Miller M.D. <PS><Electronically signed by: Milind Miller M.D.> 2023 1153 D: 2023 1139 T: 2023 1139    CT Cervical Spine Wo Contrast    Result Date: 3/7/2023  Narrative: [facility] IS NOT A SUBSTITUTE FOR DISCUSSING THOSE RESULTS WITH YOUR HEALTH CARE PROVIDER. PLEASE CONTACT YOUR PROVIDER VIA [facility] TO DISCUSS ANY QUESTIONS OR CONCERNS YOU MAY HAVE REGARDING THESE TEST RESULTS.  RADIOLOGY REPORT FACILITY:  [facility] UNIT/AGE/GENDER: J.ED  ER      AGE:68 Y          SEX:F PATIENT NAME/:  RIC LIEBERMAN K    1955 UNIT NUMBER:  [id number] ACCOUNT NUMBER:  [id number] ACCESSION NUMBER:  [id number] CT HEAD WITHOUT CONTRAST CT CERVICAL SPINE WITHOUT CONTRAST Date: 2023 CLINICAL INDICATION: Fall, decreased level of consciousness COMPARISON: 2022 TECHNIQUE: Multiple axial CT images of the head and cervical spine were obtained without IV contrast.  Dose reduction technique was utilized per ALARA protocol. FINDINGS: HEAD: There is parenchymal volume loss and chronic ischemic change of the periventricular white matter. No acute intracranial hemorrhage is identified. No mass effect or midline shift is seen. Ventricular size is prominent but this is unchanged and likely related to volume loss. There is no evidence for acute infarct. Visualized paranasal sinuses and left mastoid air cells are clear. There is partial opacification of the right mastoid air cells. There is a fracture of the right nasal bone. Scalp soft tissues are unremarkable. Bilateral orbits and globes are unremarkable. There is mild mucosal thickening of the left sphenoid sinus. Overall or nasal sinus mucosal thickening has improved. IMPRESSION: 1. Chronic changes with volume loss and chronic  ischemic change of the periventricular white matter and no acute intracranial abnormality. 2. Nondisplaced right nasal bone fracture. 3. Partial opacification of the right mastoid air cells. CERVICAL SPINE: There is multilevel left greater than right facet arthropathy and partial fusion of the left facet joint at C2-C3. There is mild degenerative anterior listhesis at C3-C4 measuring 0.1 cm. There is spondylosis at C4-C5, C5-C6, and C6-C7 with disc space narrowing and spurring of the endplates and uncinate process spurring with mild to moderate neural foraminal stenosis. No acute fracture is seen. Prevertebral soft tissues are normal. There is mucosal thickening at the sphenoid sinus. Soft tissues of the neck reveal bilateral carotid calcification but are otherwise unremarkable. There is emphysema. Lung apices are grossly clear. IMPRESSION: 1. Cervical spondylosis C4-C7. 2. Multilevel facet arthropathy left greater than right and mild degenerative anterior listhesis at C3-C4. 3. No fracture identified. Dictated by: Milind Miller M.D. Images and Report reviewed and interpreted by: Milind Miller M.D. <PS><Electronically signed by: Milind Miller M.D.> 03/07/2023 1153 D: 03/07/2023 1139 T: 03/07/2023 1139      MDM:    Procedures      The case was discussed between the NEREIDA and myself. Patient  care including, but not limited to ordered imaging, medications, and lab results were reviewed. I then performed the substantive portion of the visit including all aspects of the medical decision making.        Girish Sales DO  09:35 EDT  03/27/23       Girish Sales DO  03/27/23 0936

## 2023-03-25 NOTE — ED PROVIDER NOTES
Time: 8:34 AM EDT  Date of encounter:  3/25/2023  Independent Historian/Clinical History and Information was obtained by:   Patient  Chief Complaint: Fall, facial laceration    History is limited by: N/A    History of Present Illness:  Patient is a 68 y.o. year old female who presents to the emergency department for evaluation of fall and left eye laceration.  Patient comes by EMS from [facility].  Patient states she was attempting to get a drink of water she lost her balance and fell striking a bedside table.  She denies any loss of consciousness.  She did sustain a laceration to her left eye and eyelid as well as a skin tear to her nose.  Denies any visual changes.    HPI    Patient Care Team  Primary Care Provider: Ana Huitron MD    Past Medical History:     Allergies   Allergen Reactions   • Artificial Saliva Unknown - High Severity     Past Medical History:   Diagnosis Date   • Cancer (HCC)     left breast removed 2012   • Type 2 diabetes mellitus (HCC)      Past Surgical History:   Procedure Laterality Date   • ABDOMINAL SURGERY     • BREAST SURGERY Left 2012    removed due to cancer   • CHOLECYSTECTOMY     • CYSTOSCOPY W/ URETERAL STENT PLACEMENT Right 12/09/2020    Procedure: CYSTOSCOPY, RIGHT RETROGRADE PYELOGRAM, URETEROSCOPY, LASER LITHOTRIPSY, RIGHT URETERAL STENT PLACEMENT;  Surgeon: Rohan Dooley Jr., MD;  Location: [facility];  Service: Urology;  Laterality: Right;   • GASTROSTOMY W/ FEEDING TUBE     • HERNIA REPAIR      mesh removed     Family History   Problem Relation Age of Onset   • Diabetes Mother    • Lung disease Father    • Cancer Father        Home Medications:  Prior to Admission medications    Medication Sig Start Date End Date Taking? Authorizing Provider   Alcohol Swabs (Alcohol Pads) 70 % pads Apply one alcohol swab to injection site of skin immediately prior to insulin injection.  Formulary Compliance Approval. 12/17/20   Bunny Hannah MD  "  carvedilol (COREG) 12.5 MG tablet Take 1 tablet by mouth 2 (Two) Times a Day With Meals for 31 days. 20  Bunny Hannah MD   citalopram (CeleXA) 10 MG tablet Take 10 mg by mouth Daily. 18   Pancho Carpenter MD   glipiZIDE (GLUCOTROL) 5 MG tablet Take 5 mg by mouth 2 (Two) Times a Day Before Meals.    Pancho Carpenter MD   glucose blood test strip Use to test blood glucose up to four times daily as needed.   Formulary Compliance Approval.  Diagnosis: Type 2 Diabetes - Insulin Dependent 20   Bunny Hannah MD   glucose monitor monitoring kit Use to test blood glucose up to four times daily as needed.   Formulary Compliance Approval.  Diagnosis: Type 2 Diabetes - Insulin Dependent 20   Bunny Hannah MD   insulin glargine (LANTUS) 100 UNIT/ML injection Inject 16 Units under the skin into the appropriate area as directed Every Night for 31 days. 20  Bunny Hannah MD   Insulin Syringe 31G X 5/16\" 0.3 ML misc Use to inject insulin SQ daily 20   Bunny Hannah MD   Lancets misc Use to test blood glucose up to four times daily as needed.   Formulary Compliance Approval.  Diagnosis: Type 2 Diabetes - Insulin Dependent 20   Bunny Hannah MD   linagliptin (TRADJENTA) 5 MG tablet tablet Take 5 mg by mouth Daily. 20   Pancho Carpenter MD   lisinopril (PRINIVIL,ZESTRIL) 20 MG tablet Take 2 tablets by mouth Daily for 31 days. 20  Bunny Hannah MD   metFORMIN (GLUCOPHAGE) 500 MG tablet Take 500 mg by mouth 2 (Two) Times a Day With Meals.    Pancho Carpenter MD   rizatriptan (MAXALT) 10 MG tablet TAKE 1 TABLET BY MOUTH EVERY DAY AS NEEDED (MAX OF 2 PER 24 HOURS) 10/2/18   Pancho Carpenter MD        Social History:   Social History     Tobacco Use   • Smoking status: Former     Types: Cigarettes     Quit date: 1997     Years since quittin.6   • Smokeless tobacco: Never   Substance Use Topics   • Alcohol use: No   • Drug " "use: No         Review of Systems:  Review of Systems   Constitutional: Negative for activity change, fatigue and fever.   HENT: Negative for facial swelling.         Skin tear to nose, eye lacerations   Eyes: Positive for pain and redness. Negative for discharge and visual disturbance.   Respiratory: Negative.    Cardiovascular: Negative.    Endocrine: Negative.    Genitourinary: Negative.    Skin: Positive for wound. Negative for color change.   Allergic/Immunologic: Negative.    Neurological: Negative for dizziness, weakness and headaches.   Hematological: Negative.    Psychiatric/Behavioral: Negative.         Physical Exam:  /80 (BP Location: Right arm, Patient Position: Lying)   Pulse 81   Temp 97.7 °F (36.5 °C) (Oral)   Resp 16   Ht 160 cm (63\")   Wt 61.8 kg (136 lb 3.9 oz)   SpO2 98%   BMI 24.13 kg/m²     Physical Exam  Vitals and nursing note reviewed.   Constitutional:       General: She is not in acute distress.     Appearance: Normal appearance. She is not ill-appearing.   HENT:      Head: Normocephalic.      Nose:      Comments: Skin tear noted to bridge of nose     Mouth/Throat:      Mouth: Mucous membranes are moist.      Pharynx: Oropharynx is clear.   Eyes:      General:         Left eye: No foreign body.      Extraocular Movements: Extraocular movements intact.      Right eye: Normal extraocular motion and no nystagmus.      Left eye: Normal extraocular motion and no nystagmus.      Conjunctiva/sclera:      Left eye: Hemorrhage present.      Pupils: Pupils are equal, round, and reactive to light.     Cardiovascular:      Rate and Rhythm: Normal rate and regular rhythm.      Pulses: Normal pulses.   Pulmonary:      Effort: Pulmonary effort is normal.      Breath sounds: Normal breath sounds.   Abdominal:      Palpations: Abdomen is soft.      Tenderness: There is no abdominal tenderness.   Musculoskeletal:         General: Normal range of motion.      Cervical back: Normal range of " motion and neck supple.   Skin:     General: Skin is warm and dry.      Capillary Refill: Capillary refill takes less than 2 seconds.   Neurological:      Mental Status: She is alert and oriented to person, place, and time. Mental status is at baseline.   Psychiatric:         Mood and Affect: Mood normal.         Behavior: Behavior normal.                  Procedures:  Laceration Repair    Date/Time: 3/25/2023 11:30 AM  Performed by: Christiana Puga APRN  Authorized by: Girish Sales DO     Consent:     Consent obtained:  Verbal    Consent given by:  Patient    Risks discussed:  Infection and pain  Universal protocol:     Procedure explained and questions answered to patient or proxy's satisfaction: yes      Patient identity confirmed:  Verbally with patient  Anesthesia:     Anesthesia method:  None  Laceration details:     Location:  Face    Face location:  Nose (left outer eye (canthus) and near left temple)    Length (cm):  0.5 (also, 2cm, 3cm to other areas of the left eye- nose skin tear irregular but approx 1 cm )  Exploration:     Limited defect created (wound extended): no      Hemostasis achieved with:  Direct pressure    Imaging outcome: foreign body not noted      Wound exploration: wound explored through full range of motion and entire depth of wound visualized      Wound extent: no underlying fracture noted      Contaminated: no    Treatment:     Area cleansed with:  Saline    Amount of cleaning:  Standard    Irrigation solution:  Sterile saline    Debridement:  None    Undermining:  None  Skin repair:     Repair method:  Steri-Strips and tissue adhesive    Number of Steri-Strips:  4  Approximation:     Approximation:  Close  Repair type:     Repair type:  Simple  Post-procedure details:     Procedure completion:  Tolerated well, no immediate complications          Medical Decision Making:      Comorbidities that affect care:    Diabetes    External Notes reviewed:    None      The following  orders were placed and all results were independently analyzed by me:  Orders Placed This Encounter   Procedures   • Laceration Repair   • CT Facial Bones Without Contrast   • Comprehensive Metabolic Panel   • CBC Auto Differential   • Ambulatory Referral to ENT (Otolaryngology)   • Ophthalmology (on-call MD unless specified)   • CBC & Differential       Medications Given in the Emergency Department:  Medications - No data to display     ED Course:    ED Course as of 03/25/23 1314   Sat Mar 25, 2023   0918 Spoke with on-call ophthalmologist at [facility], Dr. Chirinos, who has agreed to accept the patient as transfer at [facility] [AR]   1003 Spoke with attending, Dr. Patterson, in the emergency department to notify them of the patient transfer.  We will power share imaging studies once they are completed. [AR]   1035 Discussed patient with Dr. Sales, who will evaluate the patient with me as well.  Reevaluated injuries after patient was cleaned up.  No inner canthus laceration.  There were 3 lacerations to the outer left eye as well as the upper eyelid. [AR]   1300 CT of the facial bones without contrast completed.  There is a noted comminuted nasal bone fracture as well as and possible subtle fracture of the nasal septum.  I discussed these findings with the on-call ENT, Dr. Sierra.  He has no further recommendations at this time.  He states he will see the patient in the office early next week.  I will send the patient home with oral antibiotics. [AR]      ED Course User Index  [AR] Christiana Puga, BECKY       Labs:    Lab Results (last 24 hours)     Procedure Component Value Units Date/Time    CBC & Differential [[id number]]  (Abnormal) Collected: 03/25/23 1133    Specimen: Blood Updated: 03/25/23 1138    Narrative:      The following orders were created for panel order CBC & Differential.  Procedure                               Abnormality         Status                     ---------                                -----------         ------                     CBC Auto Differential[[id number]]        Abnormal            Final result                 Please view results for these tests on the individual orders.    Comprehensive Metabolic Panel [[id number]]  (Abnormal) Collected: 03/25/23 1133    Specimen: Blood Updated: 03/25/23 1202     Glucose 126 mg/dL      BUN 58 mg/dL      Creatinine 2.07 mg/dL      Sodium 149 mmol/L      Potassium 3.4 mmol/L      Chloride 118 mmol/L      CO2 18.7 mmol/L      Calcium 8.9 mg/dL      Total Protein 6.7 g/dL      Albumin 3.2 g/dL      ALT (SGPT) 22 U/L      AST (SGOT) 20 U/L      Alkaline Phosphatase 88 U/L      Total Bilirubin 0.3 mg/dL      Globulin 3.5 gm/dL      A/G Ratio 0.9 g/dL      BUN/Creatinine Ratio 28.0     Anion Gap 12.3 mmol/L      eGFR 25.8 mL/min/1.73     Narrative:      GFR Normal >60  Chronic Kidney Disease <60  Kidney Failure <15      CBC Auto Differential [[id number]]  (Abnormal) Collected: 03/25/23 1133    Specimen: Blood Updated: 03/25/23 1138     WBC 10.46 10*3/mm3      RBC 3.37 10*6/mm3      Hemoglobin 10.6 g/dL      Hematocrit 30.8 %      MCV 91.4 fL      MCH 31.5 pg      MCHC 34.4 g/dL      RDW 14.5 %      RDW-SD 47.9 fl      MPV 9.8 fL      Platelets 191 10*3/mm3      Neutrophil % 81.1 %      Lymphocyte % 12.0 %      Monocyte % 4.7 %      Eosinophil % 1.4 %      Basophil % 0.6 %      Immature Grans % 0.2 %      Neutrophils, Absolute 8.49 10*3/mm3      Lymphocytes, Absolute 1.25 10*3/mm3      Monocytes, Absolute 0.49 10*3/mm3      Eosinophils, Absolute 0.15 10*3/mm3      Basophils, Absolute 0.06 10*3/mm3      Immature Grans, Absolute 0.02 10*3/mm3      nRBC 0.0 /100 WBC            Imaging:    CT Facial Bones Without Contrast    Result Date: 3/25/2023  PROCEDURE: CT FACIAL BONES WO CONTRAST  COMPARISON: None  INDICATIONS: facial trauma  PROTOCOL:   Standard imaging protocol performed    RADIATION:   DLP: 527.8 mGy*cm   Automated exposure control was utilized to  minimize radiation dose.  TECHNIQUE: Axial images of the facial bones without contrast. Coronal and sagittal reformatted images were performed.  FINDINGS: There is a mildly comminuted nasal bone fracture.  No other facial bone fractures are identified.  There is mild mucosal thickening in the ethmoid sinuses.  There is a small air-fluid level in the left sphenoid sinus without evidence of significant mucosal thickening.  Questionable subtle fracture of the bony nasal septum.  There incidental middle leslie bullosa.  The ostiomeatal units are patent.  No orbital abnormalities are identified.  There is generalized atrophy in the visualized brain.  No orbital abnormalities are identified.  IMPRESSION: 1. Mildly comminuted nasal bone fracture.  2. Questionable subtle fracture of the bony nasal septum.   FRITZ CARMEN MD       Electronically Signed and Approved By: FRITZ CARMEN MD on 3/25/2023 at 12:46                 Differential Diagnosis and Discussion:    Laceration: Laceration was evaluated for arterial injury, ligamentous damage, and other neurovascular injury.  Trauma:  Differential diagnosis considered but not limited to were subarachnoid hemorrhage, intracranial bleeding, pneumothorax, cardiac contusion, lung contusion, intra-abdominal bleeding, and compartment syndrome of any extremity or other significant traumatic pathology    All labs were reviewed and interpreted by me.  CT scan radiology interpretation was reviewed by me.    MDM  Number of Diagnoses or Management Options  Facial injury, initial encounter  Fall, initial encounter  Laceration of left eye, initial encounter  Open fracture of nasal bone, initial encounter  Diagnosis management comments: The patient presented with a laceration in need of repair. See laceration repair note for details. The wound was irrigated with copious normal saline irrigation. NO sutures were used to approximate the wound edges, skin adhesive and steri strips used.  Tetanus not given, reports it is UTD. The patient tolerated the procedure well. Acute bleeding has ceased and the wound was approximated in the emergency department. Patient was counseled to keep the wound clean, dry, and out of the sun. Patient was counseled to change dressings daily. Patient was advised to return to the ED for worsening erythema, pain, swelling, fever, excessive drainage or signs of infection. They were counseled to follow up for suture removal as described in the discharge instructions. Patient verbalizes understanding and agrees to follow up as instructed.       Amount and/or Complexity of Data Reviewed  Clinical lab tests: reviewed and ordered  Tests in the radiology section of CPT®: reviewed and ordered  Decide to obtain previous medical records or to obtain history from someone other than the patient: yes  Review and summarize past medical records: yes    Risk of Complications, Morbidity, and/or Mortality  Presenting problems: moderate  Diagnostic procedures: moderate  Management options: moderate    Patient Progress  Patient progress: stable           Patient Care Considerations:    NARCOTICS: I considered prescribing opiate pain medication as an outpatient, however patient seems to have tolerable pain level      Consultants/Shared Management Plan:    I consulted with ER pharmacist regarding appropriate antibiotics for discharge   Consultant: I have discussed the case with Dr. Sierra, ENT who states patient can be discharged home and follow up in the clinic next week. No further recommendations at this time for treatment.     Social Determinants of Health:    Patient is a nursing home/assisted living resident and has reliable access to care.      Disposition and Care Coordination:    Discharged: The patient is suitable and stable for discharge with no need for consideration of observation or admission.    I have explained discharge medications and the need for follow up with the  patient/caretakers. This was also printed in the discharge instructions. Patient was discharged with the following medications and follow up:      Medication List      New Prescriptions    amoxicillin-clavulanate 250-125 MG per tablet  Commonly known as: AUGMENTIN  Take 1 tablet by mouth 2 (Two) Times a Day.           Where to Get Your Medications      These medications were sent to [facility]/pharmacy #[id number] - [place], [place] - [address] AT [facility] - [phone]  - [phone] FX  [facility] [address]    Phone: [phone]   · amoxicillin-clavulanate 250-125 MG per tablet      Andrew Sierra MD  [address] [address] [phone]      the office should call to make appointment    Ana Huitron MD  [address] [facility] [address] [phone]    Schedule an appointment as soon as possible for a visit       [facility] EMERGENCY ROOM  [address] [facility] [address] [phone]  Go to   As needed, If symptoms worsen       Final diagnoses:   Open fracture of nasal bone, initial encounter   Laceration of left eye, initial encounter   Fall, initial encounter   Facial injury, initial encounter        ED Disposition     ED Disposition   Discharge    Condition   Stable    Comment   --             This medical record created using voice recognition software.           Christiana Puga, APRN  03/25/23 0921

## 2023-03-25 NOTE — DISCHARGE INSTRUCTIONS
CT confirmed nasal fracture. Please apply ice as needed for swelling. You may have bruising that develops around your nose or eyes. Take the antibiotics as prescribed. You will follow up with ENT next week. Their office should call you for appointment.     The lacerations to the eye should be left dry until tomorrow as they were closed with use of skin adhesive. Please monitor for signs of infection. Follow up with family doctor for further issues. Return to the ER with new or worsening symptoms.

## 2023-03-25 NOTE — ED NOTES
Pt left to go back to [facility] nursing and rehab via EMS. IV was removed , discharge papers sent with EMS

## 2023-03-31 ENCOUNTER — TRANSCRIBE ORDERS (OUTPATIENT)
Dept: ADMINISTRATIVE | Facility: HOSPITAL | Age: 68
End: 2023-03-31
Payer: MEDICARE

## 2023-03-31 DIAGNOSIS — R13.19 ESOPHAGEAL DYSPHAGIA: Primary | ICD-10-CM

## 2023-04-03 ENCOUNTER — APPOINTMENT (OUTPATIENT)
Dept: NEUROLOGY | Facility: HOSPITAL | Age: 68
DRG: 371 | End: 2023-04-03
Payer: MEDICARE

## 2023-04-03 ENCOUNTER — HOSPITAL ENCOUNTER (INPATIENT)
Facility: HOSPITAL | Age: 68
LOS: 19 days | Discharge: NURSING, REHAB OR LONG-TERM CARE | DRG: 371 | End: 2023-04-22
Attending: STUDENT IN AN ORGANIZED HEALTH CARE EDUCATION/TRAINING PROGRAM | Admitting: FAMILY MEDICINE
Payer: MEDICARE

## 2023-04-03 ENCOUNTER — APPOINTMENT (OUTPATIENT)
Dept: CT IMAGING | Facility: HOSPITAL | Age: 68
DRG: 371 | End: 2023-04-03
Payer: MEDICARE

## 2023-04-03 ENCOUNTER — APPOINTMENT (OUTPATIENT)
Dept: GENERAL RADIOLOGY | Facility: HOSPITAL | Age: 68
DRG: 371 | End: 2023-04-03
Payer: MEDICARE

## 2023-04-03 DIAGNOSIS — R13.12 OROPHARYNGEAL DYSPHAGIA: ICD-10-CM

## 2023-04-03 DIAGNOSIS — G93.41 METABOLIC ENCEPHALOPATHY: Primary | ICD-10-CM

## 2023-04-03 DIAGNOSIS — Z78.9 DECREASED ACTIVITIES OF DAILY LIVING (ADL): ICD-10-CM

## 2023-04-03 DIAGNOSIS — G40.909 SEIZURE DISORDER: ICD-10-CM

## 2023-04-03 DIAGNOSIS — R26.2 DIFFICULTY IN WALKING: ICD-10-CM

## 2023-04-03 LAB
ALBUMIN SERPL-MCNC: 3.5 G/DL (ref 3.5–5.2)
ALBUMIN/GLOB SERPL: 0.8 G/DL
ALP SERPL-CCNC: 183 U/L (ref 39–117)
ALT SERPL W P-5'-P-CCNC: 46 U/L (ref 1–33)
ANION GAP SERPL CALCULATED.3IONS-SCNC: 15.3 MMOL/L (ref 5–15)
AST SERPL-CCNC: 31 U/L (ref 1–32)
BACTERIA UR QL AUTO: ABNORMAL /HPF
BASOPHILS # BLD AUTO: 0.06 10*3/MM3 (ref 0–0.2)
BASOPHILS NFR BLD AUTO: 0.6 % (ref 0–1.5)
BILIRUB SERPL-MCNC: 0.2 MG/DL (ref 0–1.2)
BILIRUB UR QL STRIP: NEGATIVE
BUN SERPL-MCNC: 92 MG/DL (ref 8–23)
BUN/CREAT SERPL: 41.1 (ref 7–25)
CALCIUM SPEC-SCNC: 9.7 MG/DL (ref 8.6–10.5)
CHLORIDE SERPL-SCNC: 111 MMOL/L (ref 98–107)
CLARITY UR: ABNORMAL
CO2 SERPL-SCNC: 20.7 MMOL/L (ref 22–29)
COLOR UR: YELLOW
CREAT SERPL-MCNC: 2.24 MG/DL (ref 0.57–1)
DEPRECATED RDW RBC AUTO: 45.1 FL (ref 37–54)
EGFRCR SERPLBLD CKD-EPI 2021: 23.5 ML/MIN/1.73
EOSINOPHIL # BLD AUTO: 0.36 10*3/MM3 (ref 0–0.4)
EOSINOPHIL NFR BLD AUTO: 3.4 % (ref 0.3–6.2)
ERYTHROCYTE [DISTWIDTH] IN BLOOD BY AUTOMATED COUNT: 14.3 % (ref 12.3–15.4)
GLOBULIN UR ELPH-MCNC: 4.3 GM/DL
GLUCOSE BLDC GLUCOMTR-MCNC: 192 MG/DL (ref 70–99)
GLUCOSE BLDC GLUCOMTR-MCNC: 278 MG/DL (ref 70–99)
GLUCOSE SERPL-MCNC: 285 MG/DL (ref 65–99)
GLUCOSE UR STRIP-MCNC: NEGATIVE MG/DL
HCT VFR BLD AUTO: 28.9 % (ref 34–46.6)
HGB BLD-MCNC: 10.3 G/DL (ref 12–15.9)
HGB UR QL STRIP.AUTO: ABNORMAL
HOLD SPECIMEN: NORMAL
HOLD SPECIMEN: NORMAL
HYALINE CASTS UR QL AUTO: ABNORMAL /LPF
IMM GRANULOCYTES # BLD AUTO: 0.04 10*3/MM3 (ref 0–0.05)
IMM GRANULOCYTES NFR BLD AUTO: 0.4 % (ref 0–0.5)
KETONES UR QL STRIP: NEGATIVE
LEUKOCYTE ESTERASE UR QL STRIP.AUTO: ABNORMAL
LYMPHOCYTES # BLD AUTO: 1.34 10*3/MM3 (ref 0.7–3.1)
LYMPHOCYTES NFR BLD AUTO: 12.7 % (ref 19.6–45.3)
MAGNESIUM SERPL-MCNC: 1.9 MG/DL (ref 1.6–2.4)
MCH RBC QN AUTO: 31.3 PG (ref 26.6–33)
MCHC RBC AUTO-ENTMCNC: 35.6 G/DL (ref 31.5–35.7)
MCV RBC AUTO: 87.8 FL (ref 79–97)
MONOCYTES # BLD AUTO: 0.61 10*3/MM3 (ref 0.1–0.9)
MONOCYTES NFR BLD AUTO: 5.8 % (ref 5–12)
NEUTROPHILS NFR BLD AUTO: 77.1 % (ref 42.7–76)
NEUTROPHILS NFR BLD AUTO: 8.1 10*3/MM3 (ref 1.7–7)
NITRITE UR QL STRIP: NEGATIVE
NRBC BLD AUTO-RTO: 0 /100 WBC (ref 0–0.2)
PH UR STRIP.AUTO: 5.5 [PH] (ref 5–8)
PHOSPHATE SERPL-MCNC: 3.9 MG/DL (ref 2.5–4.5)
PLATELET # BLD AUTO: 284 10*3/MM3 (ref 140–450)
PMV BLD AUTO: 10.7 FL (ref 6–12)
POTASSIUM SERPL-SCNC: 2.8 MMOL/L (ref 3.5–5.2)
PROT SERPL-MCNC: 7.8 G/DL (ref 6–8.5)
PROT UR QL STRIP: ABNORMAL
RBC # BLD AUTO: 3.29 10*6/MM3 (ref 3.77–5.28)
RBC # UR STRIP: ABNORMAL /HPF
REF LAB TEST METHOD: ABNORMAL
SODIUM SERPL-SCNC: 147 MMOL/L (ref 136–145)
SP GR UR STRIP: 1.01 (ref 1–1.03)
SQUAMOUS #/AREA URNS HPF: ABNORMAL /HPF
T4 FREE SERPL-MCNC: 1.25 NG/DL (ref 0.93–1.7)
TROPONIN T SERPL HS-MCNC: 58 NG/L
TSH SERPL DL<=0.05 MIU/L-ACNC: 4.29 UIU/ML (ref 0.27–4.2)
UROBILINOGEN UR QL STRIP: ABNORMAL
WBC # UR STRIP: ABNORMAL /HPF
WBC NRBC COR # BLD: 10.51 10*3/MM3 (ref 3.4–10.8)
WHOLE BLOOD HOLD COAG: NORMAL
WHOLE BLOOD HOLD SPECIMEN: NORMAL
YEAST URNS QL MICRO: ABNORMAL /HPF

## 2023-04-03 PROCEDURE — 84484 ASSAY OF TROPONIN QUANT: CPT

## 2023-04-03 PROCEDURE — 25010000002 CEFTRIAXONE PER 250 MG: Performed by: FAMILY MEDICINE

## 2023-04-03 PROCEDURE — 87077 CULTURE AEROBIC IDENTIFY: CPT

## 2023-04-03 PROCEDURE — 87186 SC STD MICRODIL/AGAR DIL: CPT

## 2023-04-03 PROCEDURE — 99285 EMERGENCY DEPT VISIT HI MDM: CPT

## 2023-04-03 PROCEDURE — 87086 URINE CULTURE/COLONY COUNT: CPT

## 2023-04-03 PROCEDURE — 84100 ASSAY OF PHOSPHORUS: CPT | Performed by: FAMILY MEDICINE

## 2023-04-03 PROCEDURE — 85025 COMPLETE CBC W/AUTO DIFF WBC: CPT

## 2023-04-03 PROCEDURE — 82962 GLUCOSE BLOOD TEST: CPT

## 2023-04-03 PROCEDURE — 99222 1ST HOSP IP/OBS MODERATE 55: CPT | Performed by: PSYCHIATRY & NEUROLOGY

## 2023-04-03 PROCEDURE — 99223 1ST HOSP IP/OBS HIGH 75: CPT | Performed by: FAMILY MEDICINE

## 2023-04-03 PROCEDURE — 0 POTASSIUM CHLORIDE 10 MEQ/100ML SOLUTION: Performed by: STUDENT IN AN ORGANIZED HEALTH CARE EDUCATION/TRAINING PROGRAM

## 2023-04-03 PROCEDURE — 81001 URINALYSIS AUTO W/SCOPE: CPT

## 2023-04-03 PROCEDURE — 93005 ELECTROCARDIOGRAM TRACING: CPT

## 2023-04-03 PROCEDURE — 93010 ELECTROCARDIOGRAM REPORT: CPT | Performed by: INTERNAL MEDICINE

## 2023-04-03 PROCEDURE — 84439 ASSAY OF FREE THYROXINE: CPT | Performed by: FAMILY MEDICINE

## 2023-04-03 PROCEDURE — 83735 ASSAY OF MAGNESIUM: CPT

## 2023-04-03 PROCEDURE — 84443 ASSAY THYROID STIM HORMONE: CPT | Performed by: FAMILY MEDICINE

## 2023-04-03 PROCEDURE — 95816 EEG AWAKE AND DROWSY: CPT

## 2023-04-03 PROCEDURE — P9612 CATHETERIZE FOR URINE SPEC: HCPCS

## 2023-04-03 PROCEDURE — 93005 ELECTROCARDIOGRAM TRACING: CPT | Performed by: STUDENT IN AN ORGANIZED HEALTH CARE EDUCATION/TRAINING PROGRAM

## 2023-04-03 PROCEDURE — 80053 COMPREHEN METABOLIC PANEL: CPT

## 2023-04-03 PROCEDURE — 80235 DRUG ASSAY LACOSAMIDE: CPT | Performed by: STUDENT IN AN ORGANIZED HEALTH CARE EDUCATION/TRAINING PROGRAM

## 2023-04-03 PROCEDURE — 70450 CT HEAD/BRAIN W/O DYE: CPT

## 2023-04-03 PROCEDURE — 71045 X-RAY EXAM CHEST 1 VIEW: CPT

## 2023-04-03 RX ORDER — SODIUM CHLORIDE 0.9 % (FLUSH) 0.9 %
10 SYRINGE (ML) INJECTION AS NEEDED
Status: DISCONTINUED | OUTPATIENT
Start: 2023-04-03 | End: 2023-04-22 | Stop reason: HOSPADM

## 2023-04-03 RX ORDER — CHOLESTYRAMINE 4 G/9G
1 POWDER, FOR SUSPENSION ORAL 2 TIMES DAILY
Status: DISCONTINUED | OUTPATIENT
Start: 2023-04-03 | End: 2023-04-04

## 2023-04-03 RX ORDER — BISACODYL 10 MG
10 SUPPOSITORY, RECTAL RECTAL DAILY PRN
Status: DISCONTINUED | OUTPATIENT
Start: 2023-04-03 | End: 2023-04-05

## 2023-04-03 RX ORDER — LACOSAMIDE 10 MG/ML
50 SOLUTION ORAL EVERY 12 HOURS SCHEDULED
Status: DISCONTINUED | OUTPATIENT
Start: 2023-04-04 | End: 2023-04-04

## 2023-04-03 RX ORDER — ONDANSETRON 4 MG/1
4 TABLET, FILM COATED ORAL EVERY 6 HOURS PRN
Status: DISCONTINUED | OUTPATIENT
Start: 2023-04-03 | End: 2023-04-03

## 2023-04-03 RX ORDER — BISACODYL 5 MG/1
5 TABLET, DELAYED RELEASE ORAL DAILY PRN
Status: DISCONTINUED | OUTPATIENT
Start: 2023-04-03 | End: 2023-04-04

## 2023-04-03 RX ORDER — NICOTINE POLACRILEX 4 MG
15 LOZENGE BUCCAL
Status: DISCONTINUED | OUTPATIENT
Start: 2023-04-03 | End: 2023-04-08

## 2023-04-03 RX ORDER — SACCHAROMYCES BOULARDII 250 MG
250 CAPSULE ORAL 2 TIMES DAILY
COMMUNITY

## 2023-04-03 RX ORDER — POTASSIUM CHLORIDE 7.45 MG/ML
10 INJECTION INTRAVENOUS
Status: DISPENSED | OUTPATIENT
Start: 2023-04-03 | End: 2023-04-03

## 2023-04-03 RX ORDER — SACCHAROMYCES BOULARDII 250 MG
250 CAPSULE ORAL 2 TIMES DAILY
Status: DISCONTINUED | OUTPATIENT
Start: 2023-04-03 | End: 2023-04-04

## 2023-04-03 RX ORDER — DEXTROSE MONOHYDRATE 25 G/50ML
25 INJECTION, SOLUTION INTRAVENOUS
Status: DISCONTINUED | OUTPATIENT
Start: 2023-04-03 | End: 2023-04-08

## 2023-04-03 RX ORDER — SODIUM CHLORIDE 450 MG/100ML
100 INJECTION, SOLUTION INTRAVENOUS CONTINUOUS
Status: DISCONTINUED | OUTPATIENT
Start: 2023-04-03 | End: 2023-04-05

## 2023-04-03 RX ORDER — ONDANSETRON 2 MG/ML
4 INJECTION INTRAMUSCULAR; INTRAVENOUS EVERY 6 HOURS PRN
Status: DISCONTINUED | OUTPATIENT
Start: 2023-04-03 | End: 2023-04-03

## 2023-04-03 RX ORDER — AMOXICILLIN 250 MG
2 CAPSULE ORAL 2 TIMES DAILY PRN
Status: DISCONTINUED | OUTPATIENT
Start: 2023-04-03 | End: 2023-04-05

## 2023-04-03 RX ORDER — BISACODYL 5 MG/1
5 TABLET, DELAYED RELEASE ORAL DAILY PRN
Status: DISCONTINUED | OUTPATIENT
Start: 2023-04-03 | End: 2023-04-03

## 2023-04-03 RX ORDER — ANASTROZOLE 1 MG/1
1 TABLET ORAL DAILY
Status: DISCONTINUED | OUTPATIENT
Start: 2023-04-04 | End: 2023-04-04

## 2023-04-03 RX ORDER — ACETAMINOPHEN 325 MG/1
650 TABLET ORAL EVERY 4 HOURS PRN
Status: DISCONTINUED | OUTPATIENT
Start: 2023-04-03 | End: 2023-04-03

## 2023-04-03 RX ORDER — POLYETHYLENE GLYCOL 3350 17 G/17G
17 POWDER, FOR SOLUTION ORAL DAILY PRN
Status: DISCONTINUED | OUTPATIENT
Start: 2023-04-03 | End: 2023-04-03

## 2023-04-03 RX ORDER — LORAZEPAM 2 MG/ML
2 INJECTION INTRAMUSCULAR EVERY 4 HOURS PRN
Status: ACTIVE | OUTPATIENT
Start: 2023-04-03 | End: 2023-04-10

## 2023-04-03 RX ORDER — CITALOPRAM 20 MG/1
10 TABLET ORAL DAILY
Status: DISCONTINUED | OUTPATIENT
Start: 2023-04-03 | End: 2023-04-04

## 2023-04-03 RX ORDER — BISACODYL 10 MG
10 SUPPOSITORY, RECTAL RECTAL DAILY PRN
Status: DISCONTINUED | OUTPATIENT
Start: 2023-04-03 | End: 2023-04-03

## 2023-04-03 RX ORDER — POLYETHYLENE GLYCOL 3350 17 G/17G
17 POWDER, FOR SOLUTION ORAL DAILY PRN
Status: DISCONTINUED | OUTPATIENT
Start: 2023-04-03 | End: 2023-04-05

## 2023-04-03 RX ORDER — NITROGLYCERIN 0.4 MG/1
0.4 TABLET SUBLINGUAL
Status: DISCONTINUED | OUTPATIENT
Start: 2023-04-03 | End: 2023-04-22 | Stop reason: HOSPADM

## 2023-04-03 RX ORDER — LEVOTHYROXINE SODIUM 0.03 MG/1
12.5 TABLET ORAL
Status: DISCONTINUED | OUTPATIENT
Start: 2023-04-04 | End: 2023-04-04

## 2023-04-03 RX ORDER — AMLODIPINE BESYLATE 10 MG/1
10 TABLET ORAL DAILY
COMMUNITY

## 2023-04-03 RX ORDER — ANASTROZOLE 1 MG/1
1 TABLET ORAL DAILY
Status: DISCONTINUED | OUTPATIENT
Start: 2023-04-03 | End: 2023-04-03

## 2023-04-03 RX ORDER — ONDANSETRON 2 MG/ML
4 INJECTION INTRAMUSCULAR; INTRAVENOUS EVERY 6 HOURS PRN
Status: DISCONTINUED | OUTPATIENT
Start: 2023-04-03 | End: 2023-04-05

## 2023-04-03 RX ORDER — SODIUM CHLORIDE 0.9 % (FLUSH) 0.9 %
10 SYRINGE (ML) INJECTION EVERY 12 HOURS SCHEDULED
Status: DISCONTINUED | OUTPATIENT
Start: 2023-04-03 | End: 2023-04-22 | Stop reason: HOSPADM

## 2023-04-03 RX ORDER — POTASSIUM CHLORIDE 750 MG/1
10 TABLET, FILM COATED, EXTENDED RELEASE ORAL DAILY
COMMUNITY

## 2023-04-03 RX ORDER — LEVOTHYROXINE SODIUM 0.03 MG/1
12.5 TABLET ORAL
Status: DISCONTINUED | OUTPATIENT
Start: 2023-04-04 | End: 2023-04-03

## 2023-04-03 RX ORDER — ERGOCALCIFEROL (VITAMIN D2) 200 MCG/ML
1000 DROPS ORAL DAILY
COMMUNITY

## 2023-04-03 RX ORDER — CEFTRIAXONE SODIUM 1 G/50ML
1 INJECTION, SOLUTION INTRAVENOUS EVERY 24 HOURS
Status: DISCONTINUED | OUTPATIENT
Start: 2023-04-03 | End: 2023-04-05

## 2023-04-03 RX ORDER — ONDANSETRON 4 MG/1
4 TABLET, FILM COATED ORAL EVERY 6 HOURS PRN
Status: DISCONTINUED | OUTPATIENT
Start: 2023-04-03 | End: 2023-04-05

## 2023-04-03 RX ORDER — ACETAMINOPHEN 325 MG/1
650 TABLET ORAL EVERY 4 HOURS PRN
Status: DISCONTINUED | OUTPATIENT
Start: 2023-04-03 | End: 2023-04-05

## 2023-04-03 RX ORDER — SODIUM BICARBONATE 650 MG/1
650 TABLET ORAL 2 TIMES DAILY
Status: DISCONTINUED | OUTPATIENT
Start: 2023-04-03 | End: 2023-04-04

## 2023-04-03 RX ORDER — SODIUM CHLORIDE 9 MG/ML
40 INJECTION, SOLUTION INTRAVENOUS AS NEEDED
Status: DISCONTINUED | OUTPATIENT
Start: 2023-04-03 | End: 2023-04-22 | Stop reason: HOSPADM

## 2023-04-03 RX ORDER — LACOSAMIDE 10 MG/ML
150 SOLUTION ORAL EVERY 12 HOURS SCHEDULED
Status: DISCONTINUED | OUTPATIENT
Start: 2023-04-04 | End: 2023-04-03

## 2023-04-03 RX ORDER — AMOXICILLIN 250 MG
2 CAPSULE ORAL 2 TIMES DAILY PRN
Status: DISCONTINUED | OUTPATIENT
Start: 2023-04-03 | End: 2023-04-03

## 2023-04-03 RX ADMIN — POTASSIUM CHLORIDE 10 MEQ: 7.46 INJECTION, SOLUTION INTRAVENOUS at 13:09

## 2023-04-03 RX ADMIN — CEFTRIAXONE SODIUM 1 G: 1 INJECTION, SOLUTION INTRAVENOUS at 17:34

## 2023-04-03 RX ADMIN — SODIUM CHLORIDE 1000 ML: 9 INJECTION, SOLUTION INTRAVENOUS at 13:09

## 2023-04-03 RX ADMIN — Medication 10 ML: at 17:34

## 2023-04-03 RX ADMIN — SODIUM CHLORIDE 100 ML/HR: 4.5 INJECTION, SOLUTION INTRAVENOUS at 17:34

## 2023-04-03 NOTE — ED PROVIDER NOTES
Time: 11:30 AM EDT  Date of encounter:  4/3/2023  Independent Historian/Clinical History and Information was obtained by:   Patient  Chief Complaint   Patient presents with   • Seizures       History is limited by: Altered Mental Status    History of Present Illness:  Patient is a 68 y.o. year old female who presents to the emergency department for evaluation of seizure. Patient brought from [facility]. Patient is AOx1 on eval. (Provider in triage, Esau Sanchez PA-C)  Patient denies abdominal pain and vomiting but reports of nausea. No family at bedside at the moment.  Nursing facility states that patient had a seizure this morning.  Patient is on Vimpat.    HPI    Patient Care Team  Primary Care Provider: Ana Huitron MD    Past Medical History:     Allergies   Allergen Reactions   • Artificial Saliva Unknown - High Severity     Past Medical History:   Diagnosis Date   • Cancer     left breast removed 2012   • Type 2 diabetes mellitus      Past Surgical History:   Procedure Laterality Date   • ABDOMINAL SURGERY     • BREAST SURGERY Left 2012    removed due to cancer   • CHOLECYSTECTOMY     • CYSTOSCOPY W/ URETERAL STENT PLACEMENT Right 12/09/2020    Procedure: CYSTOSCOPY, RIGHT RETROGRADE PYELOGRAM, URETEROSCOPY, LASER LITHOTRIPSY, RIGHT URETERAL STENT PLACEMENT;  Surgeon: Rohan Dooley Jr., MD;  Location: [facility];  Service: Urology;  Laterality: Right;   • GASTROSTOMY W/ FEEDING TUBE     • HERNIA REPAIR      mesh removed     Family History   Problem Relation Age of Onset   • Diabetes Mother    • Lung disease Father    • Cancer Father        Home Medications:  Prior to Admission medications    Medication Sig Start Date End Date Taking? Authorizing Provider   acetaminophen (TYLENOL) 160 MG/5ML solution Administer 320 mg per G tube Daily.    Pancho Carpenter MD   alendronate (FOSAMAX) 70 MG tablet Take 1 tablet by mouth Every 7 (Seven) Days. Thursday    Pancho Carpenter MD    amoxicillin-clavulanate (AUGMENTIN) 250-125 MG per tablet Take 1 tablet by mouth 2 (Two) Times a Day. 3/25/23   Christiana Puga APRN   anastrozole (ARIMIDEX) 1 MG tablet Take 1 tablet by mouth Daily. 3/5/23   Pancho Carpenter MD   carvedilol (COREG) 25 MG tablet Take 1 tablet by mouth 2 (Two) Times a Day.    Pancho Carpenter MD   cholestyramine (QUESTRAN) 4 g packet Take 1 packet by mouth 2 (Two) Times a Day.    Pancho Carpenter MD   citalopram (CeleXA) 10 MG tablet Administer 1 tablet per G tube Daily. 12/2/18   Pancho Carpenter MD   Darbepoetin Jaylen (Aranesp, Albumin Free,) 25 MCG/ML injection Inject 12.5 mcg under the skin into the appropriate area as directed Every 30 (Thirty) Days. 26th of Month    Pancho Carpenter MD   ferrous sulfate 300 (60 Fe) MG/5ML syrup Administer 5 mL per G tube Daily.    Pancho Carpenter MD   hydrALAZINE (APRESOLINE) 25 MG tablet Administer 1 tablet per G tube Every 12 (Twelve) Hours.    Pancho Carpenter MD   ipratropium (ATROVENT) 0.02 % nebulizer solution Take 2.5 mL by nebulization 4 (Four) Times a Day Before Meals & at Bedtime.    Pancho Carpenter MD   lacosamide (VIMPAT) 10 MG/ML solution oral solution Administer 15 mL per G tube Every 12 (Twelve) Hours.    Pancho Carpenter MD   levothyroxine (SYNTHROID, LEVOTHROID) 25 MCG tablet Take 12.5 mcg by mouth Every Morning.    Pancho Carpenter MD   losartan (COZAAR) 25 MG tablet Take 1 tablet by mouth Daily.    Pancho Carpenter MD   Menthol-Zinc Oxide (Calmoseptine) 0.44-20.6 % ointment Apply 1 application topically to the appropriate area as directed 3 (Three) Times a Day.    Pancho Carpenter MD   NIFEdipine XL (PROCARDIA XL) 30 MG 24 hr tablet Take 1 tablet by mouth Daily.    Pancho Carpenter MD   phosphorus (Phospha 250 Neutral) 155-852-130 MG tablet Administer 1 tablet per G tube 2 (Two) Times a Day.    Pancho Carpenter MD   sodium bicarbonate 650 MG tablet  "Take 1 tablet by mouth 2 (Two) Times a Day.    Provider, Pancho, MD        Social History:   Social History     Tobacco Use   • Smoking status: Former     Types: Cigarettes     Quit date: 1997     Years since quittin.6   • Smokeless tobacco: Never   Substance Use Topics   • Alcohol use: No   • Drug use: No         Review of Systems:  Review of Systems   Unable to perform ROS: Mental status change   Gastrointestinal: Positive for nausea.        Physical Exam:  /68 (BP Location: Right arm, Patient Position: Lying)   Pulse 76   Temp 98.2 °F (36.8 °C) (Oral)   Resp 16   Ht 160 cm (63\")   Wt 61.8 kg (136 lb 3.9 oz)   SpO2 97%   BMI 24.13 kg/m²     Physical Exam  Vitals and nursing note reviewed.   Constitutional:       General: She is not in acute distress.     Appearance: Normal appearance. She is not toxic-appearing.   HENT:      Head: Normocephalic and atraumatic.      Jaw: There is normal jaw occlusion.      Mouth/Throat:      Mouth: Mucous membranes are moist.   Eyes:      General: Lids are normal.      Extraocular Movements: Extraocular movements intact.      Conjunctiva/sclera: Conjunctivae normal.      Pupils: Pupils are equal, round, and reactive to light.   Cardiovascular:      Rate and Rhythm: Normal rate and regular rhythm.      Pulses: Normal pulses.      Heart sounds: Normal heart sounds.   Pulmonary:      Effort: Pulmonary effort is normal. No respiratory distress.      Breath sounds: Normal breath sounds. No wheezing or rhonchi.   Abdominal:      General: Abdomen is flat. There is no distension.      Palpations: Abdomen is soft.      Tenderness: There is no abdominal tenderness. There is no guarding or rebound.   Musculoskeletal:         General: Normal range of motion.      Cervical back: Normal range of motion and neck supple.      Right lower leg: No edema.      Left lower leg: No edema.   Skin:     General: Skin is warm and dry.   Neurological:      Mental Status: She is " lethargic.      Comments: Patient moves all extremities and follows commands     Psychiatric:         Mood and Affect: Mood normal.         Behavior: Behavior normal.                  Procedures:  Procedures      Medical Decision Making:      Comorbidities that affect care:    Atrial Fibrillation, Cancer, Diabetes,  Hypokalemia, Conversion disorder with seizure, epilepsy     External Notes reviewed:    Previous Admission Note: patient was admitted at [facility] on 3/7/23 for unwitnessed fall and had a laceration on forehead and nose that required 16 sutures. Patient also sustained fracture to the nasal bridge.       The following orders were placed and all results were independently analyzed by me:  Orders Placed This Encounter   Procedures   • Urine Culture - Urine,   • XR Chest 1 View   • CT Head Without Contrast   • Potter Draw   • Comprehensive Metabolic Panel   • Single High Sensitivity Troponin T   • Magnesium   • CBC Auto Differential   • Urinalysis With Culture If Indicated - Straight Cath   • Lacosamide Blood   • Potassium   • Magnesium   • High Sensitivity Troponin T   • Blood Gas, Arterial -With Co-Ox Panel: Yes   • Basic Metabolic Panel   • Phosphorus   • Urinalysis, Microscopic Only - Urine, Clean Catch   • NPO Diet NPO Type: Strict NPO   • Undress & Gown   • Continuous Pulse Oximetry   • Vital Signs   • Orthostatic Blood Pressure   • Vital Signs   • Notify Provider (With Default Parameters)   • Notify Provider (Specify Parameters)   • Intake & Output   • Weigh Patient   • Oral Care   • Place Sequential Compression Device   • Maintain Sequential Compression Device   • Telemetry - Maintain IV Access   • Continuous Cardiac Monitoring   • Telemetry - Pulse Oximetry   • During the day, keep the room lights on, blinds open, TV/Radio on, reposition bed for more light, discourage day naps   • At night, keep environment as quiet as possible, dim the lights, TV/Radio off, minimize alarms, noise and loud talking   •  Reduce, as possible: medication/sedatives, noise, anxiety, pain, constipation, malnutrition, restraints   • Ask family to bring family pictures, home bed cover/blanket, reading material   • Obtain patient's glasses, hearing aids and dentures   • [place] Patient   • Ambulate Patient   • Up With Assistance to Chair   • Between 10pm and 4am, limit waking the patient as much as possible (ie group nursing tasks, lab draws)   • Encourage Family to Visit and Stay at Bedside as Much as Possible   • Assess Patient For Urinary Retention   • Utilize Voiding Measures if Retention is Suspected   • Bladder Scan for Suspected Urinary Retention   • Monitor Every 1-2 Hours for Spontaneous Void if Bladder Scan Volume is Less Than 500mL & Patient is Without Symptoms of Bladder Discomfort / Distention   • Straight Cath For Acute Retention if Bladder Scan Volume is Greater Than 500mL or Patient Has Symptoms of Bladder Discomfort / Distention (Unless Contraindicated)   • Notify Provider Acute Urinary Retention   • Hospitalist (on-call MD unless specified)   • Oxygen Therapy- Nasal Cannula; 2 LPM; Titrate for SPO2: 92%, Greater Than or Equal To   • Oxygen Therapy- Nasal Cannula; Titrate for SPO2: 90% - 95%   • POC Glucose Once   • POC Glucose TID AC   • ECG 12 Lead ED Triage Standing Order; Weak / Dizzy / AMS   • ECG 12 Lead Chest Pain   • EEG   • Insert Peripheral IV   • Insert Peripheral IV   • Initiate Observation Status   • Fall Precautions   • Seizure Precautions   • Fall Precautions   • CBC & Differential   • Green Top (Gel)   • Lavender Top   • Gold Top - SST   • Light Blue Top   • CBC & Differential       Medications Given in the Emergency Department:  Medications   sodium chloride 0.9 % flush 10 mL (has no administration in time range)   potassium chloride 10 mEq in 100 mL IVPB (10 mEq Intravenous New Bag 4/3/23 1309)   sodium chloride 0.9 % flush 10 mL (has no administration in time range)   sodium chloride 0.9 % flush 10 mL (has  no administration in time range)   sodium chloride 0.9 % infusion 40 mL (has no administration in time range)   acetaminophen (TYLENOL) tablet 650 mg (has no administration in time range)   sennosides-docusate (PERICOLACE) 8.6-50 MG per tablet 2 tablet (has no administration in time range)     And   polyethylene glycol (MIRALAX) packet 17 g (has no administration in time range)     And   bisacodyl (DULCOLAX) EC tablet 5 mg (has no administration in time range)     And   bisacodyl (DULCOLAX) suppository 10 mg (has no administration in time range)   ondansetron (ZOFRAN) tablet 4 mg (has no administration in time range)     Or   ondansetron (ZOFRAN) injection 4 mg (has no administration in time range)   nitroglycerin (NITROSTAT) SL tablet 0.4 mg (has no administration in time range)   dextrose (GLUTOSE) oral gel 15 g (has no administration in time range)   dextrose (D50W) (25 g/50 mL) IV injection 25 g (has no administration in time range)   glucagon (GLUCAGEN) injection 1 mg (has no administration in time range)   insulin regular (humuLIN R,novoLIN R) injection 2-7 Units (has no administration in time range)   sodium chloride 0.45 % infusion (has no administration in time range)   cefTRIAXone (ROCEPHIN) IVPB 1 g (has no administration in time range)   sodium chloride 0.9 % bolus 1,000 mL (1,000 mL Intravenous New Bag 4/3/23 1309)        ED Course:    The patient was initially evaluated in the triage area where orders were placed. The patient was later dispositioned by Valencia Davis MD.      The patient was advised to stay for completion of workup which includes but is not limited to communication of labs and radiological results, reassessment and plan. The patient was advised that leaving prior to disposition by a provider could result in critical findings that are not communicated to the patient.     ED Course as of 04/03/23 1656   Mon Apr 03, 2023   1221 Baseline of creatinine for patient is 1.7-1.8   [KS]      ED  Course User Index  [KS] Quynh Leroy       Labs:    Lab Results (last 24 hours)     Procedure Component Value Units Date/Time    POC Glucose Once [[id number]]  (Abnormal) Collected: 04/03/23 1107    Specimen: Blood Updated: 04/03/23 1109     Glucose 278 mg/dL      Comment: Serial Number: [id number]:  [id number]       CBC & Differential [[id number]]  (Abnormal) Collected: 04/03/23 1116    Specimen: Blood Updated: 04/03/23 1126    Narrative:      The following orders were created for panel order CBC & Differential.  Procedure                               Abnormality         Status                     ---------                               -----------         ------                     CBC Auto Differential[[id number]]        Abnormal            Final result                 Please view results for these tests on the individual orders.    Comprehensive Metabolic Panel [[id number]]  (Abnormal) Collected: 04/03/23 1116    Specimen: Blood Updated: 04/03/23 1146     Glucose 285 mg/dL      BUN 92 mg/dL      Creatinine 2.24 mg/dL      Sodium 147 mmol/L      Potassium 2.8 mmol/L      Comment: Slight hemolysis detected by analyzer. Results may be affected.        Chloride 111 mmol/L      CO2 20.7 mmol/L      Calcium 9.7 mg/dL      Total Protein 7.8 g/dL      Albumin 3.5 g/dL      ALT (SGPT) 46 U/L      AST (SGOT) 31 U/L      Alkaline Phosphatase 183 U/L      Total Bilirubin 0.2 mg/dL      Globulin 4.3 gm/dL      A/G Ratio 0.8 g/dL      BUN/Creatinine Ratio 41.1     Anion Gap 15.3 mmol/L      eGFR 23.5 mL/min/1.73     Narrative:      GFR Normal >60  Chronic Kidney Disease <60  Kidney Failure <15      Single High Sensitivity Troponin T [[id number]]  (Abnormal) Collected: 04/03/23 1116    Specimen: Blood Updated: 04/03/23 1201     HS Troponin T 58 ng/L     Narrative:      High Sensitive Troponin T Reference Range:  <10.0 ng/L- Negative Female for AMI  <15.0 ng/L- Negative Male for AMI  >=10 - Abnormal Female indicating  possible myocardial injury.  >=15 - Abnormal Male indicating possible myocardial injury.   Clinicians would have to utilize clinical acumen, EKG, Troponin, and serial changes to determine if it is an Acute Myocardial Infarction or myocardial injury due to an underlying chronic condition.         Magnesium [[id number]]  (Normal) Collected: 04/03/23 1116    Specimen: Blood Updated: 04/03/23 1146     Magnesium 1.9 mg/dL     CBC Auto Differential [[id number]]  (Abnormal) Collected: 04/03/23 1116    Specimen: Blood Updated: 04/03/23 1126     WBC 10.51 10*3/mm3      RBC 3.29 10*6/mm3      Hemoglobin 10.3 g/dL      Hematocrit 28.9 %      MCV 87.8 fL      MCH 31.3 pg      MCHC 35.6 g/dL      RDW 14.3 %      RDW-SD 45.1 fl      MPV 10.7 fL      Platelets 284 10*3/mm3      Neutrophil % 77.1 %      Lymphocyte % 12.7 %      Monocyte % 5.8 %      Eosinophil % 3.4 %      Basophil % 0.6 %      Immature Grans % 0.4 %      Neutrophils, Absolute 8.10 10*3/mm3      Lymphocytes, Absolute 1.34 10*3/mm3      Monocytes, Absolute 0.61 10*3/mm3      Eosinophils, Absolute 0.36 10*3/mm3      Basophils, Absolute 0.06 10*3/mm3      Immature Grans, Absolute 0.04 10*3/mm3      nRBC 0.0 /100 WBC     Lacosamide Blood [[id number]] Collected: 04/03/23 1116    Specimen: Blood Updated: 04/03/23 1424    Phosphorus [[id number]]  (Normal) Collected: 04/03/23 1116    Specimen: Blood Updated: 04/03/23 1444     Phosphorus 3.9 mg/dL     Urinalysis With Culture If Indicated - Straight Cath [[id number]]  (Abnormal) Collected: 04/03/23 1347    Specimen: Urine from Straight Cath Updated: 04/03/23 1428     Color, UA Yellow     Appearance, UA Turbid     pH, UA 5.5     Specific Gravity, UA 1.015     Glucose, UA Negative     Ketones, UA Negative     Bilirubin, UA Negative     Blood, UA Small (1+)     Protein, UA >=300 mg/dL (3+)     Leuk Esterase, UA Large (3+)     Nitrite, UA Negative     Urobilinogen, UA 0.2 E.U./dL    Narrative:      In absence of clinical symptoms,  the presence of pyuria, bacteria, and/or nitrites on the urinalysis result does not correlate with infection.    Urinalysis, Microscopic Only - Straight Cath [[id number]]  (Abnormal) Collected: 04/03/23 1347    Specimen: Urine from Straight Cath Updated: 04/03/23 1446     RBC, UA 6-12 /HPF      WBC, UA Too Numerous to Count /HPF      Bacteria, UA 3+ /HPF      Squamous Epithelial Cells, UA 3-6 /HPF      Yeast, UA Small/1+ Budding Yeast /HPF      Hyaline Casts, UA None Seen /LPF      Methodology Manual Light Microscopy    Urine Culture - Urine, Straight Cath [[id number]] Collected: 04/03/23 1347    Specimen: Urine from Straight Cath Updated: 04/03/23 1446           Imaging:    CT Head Without Contrast    Result Date: 4/3/2023  PROCEDURE: CT HEAD WO CONTRAST  COMPARISON:  [facility], CT, CT FACIAL BONES WO CONTRAST, 3/25/2023, 12:31.  INDICATIONS: AMS  PROTOCOL:   Standard imaging protocol performed    RADIATION:   DLP: 1081.2mGy*cm   MA and/or KV was adjusted to minimize radiation dose.    TECHNIQUE: CT images were obtained without non-ionic intravenous contrast material.  FINDINGS:  The ventricles, sulci, and cerebellar folia are moderately and diffusely prominent consistent with atrophy.  Ill-defined diminished density in cerebral white matter is consistent with mild gliosis and/or numerous lacunar infarcts.  There is no CT evidence of acute intracranial hemorrhage, mass, or mass effect.  The orbits have a normal appearance.  The paranasal sinuses, middle ears, and mastoid air cells are well aerated.        CT scan of the head without IV contrast demonstrating diffuse atrophy and white matter changes.  No acute intracranial abnormality is seen.     KULWANT HOLLOWAY MD       Electronically Signed and Approved By: KULWANT HOLLOWAY MD on 4/03/2023 at 12:27             XR Chest 1 View    Result Date: 4/3/2023  PROCEDURE: XR CHEST 1 VW  COMPARISON: None  INDICATIONS: WEAKNESS TODAY  FINDINGS:  The heart is normal  in size.  The lungs are well-expanded and free of infiltrates.  Mild degenerative changes are seen in the glenohumeral joints.  Surgical clips are seen in the left axillary region.       No active cardiopulmonary disease is seen.       KULWANT HOLLOWAY MD       Electronically Signed and Approved By: KULWANT HOLLOWAY MD on 4/03/2023 at 11:53                 Differential Diagnosis and Discussion:      Seizure: Differential diagnosis includes but is not limited to meningitis, hypoglycemia, electrolyte abnormalities, intracranial hemorrhage, toxin induced, and pseudoseizure.    All labs were reviewed and interpreted by me.  All X-rays were independently reviewed by me.  EKG was interpreted by me.  CT scan radiology interpretation was reviewed by me.    MDM       Patient's creatinine is 2.24 with a BUN of 92.  Suspicious that she has some metabolic encephalopathy occurring as she is slow to respond and ANO x1 is at her baseline of A&O x4.  Patient is also hypokalemic.  CT of the head showed only atrophy chest x-ray unremarkable.  EKG normal sinus rhythm no sign of ST elevation or depression.    Patient Care Considerations:    I considered a CT of the abdomen patient had no abdominal pain on examination.      Consultants/Shared Management Plan:    I spoke with the hospitalist who admitted the patient.    Social Determinants of Health:    Patient is a nursing home/assisted living resident and has reliable access to care.      Disposition and Care Coordination:    Admit:   Through independent evaluation of the patient's history, physical, and imperical data, the patient meets criteria for observation/admission to the hospital.      Final diagnoses:   Metabolic encephalopathy        ED Disposition     ED Disposition   Decision to Admit    Condition   --    Comment   Level of Care: Telemetry [5]   Diagnosis: Metabolic encephalopathy [348.31.ICD-9-CM]   Admitting Physician: KELLE MATUTE [[id number]]   Attending Physician: JUJU  KELLE [[id number]]               This medical record created using voice recognition software.    Documentation assistance provided by Quynh Leroy acting as scribe for Dr. Valencia Davis MD. Information recorded by the scribe was done at my direction and has been verified and validated by me.            Quynh Leroy  04/03/23 5627       Valencia Davis MD  04/03/23 6122

## 2023-04-03 NOTE — H&P
[facility] HISTORY AND PHYSICAL  Date: 4/3/2023   Patient Name: Lyubov Middleton  : 1955  MRN: [id number]  Primary Care Physician:  Ana Huitron MD  Date of admission: 4/3/2023    Subjective   Subjective     Chief Complaint: Altered mental status    HPI:    Lyubov Middleton is a 68 y.o. female past medical history of diabetes mellitus, CKD 4, seizure disorder, dysphagia currently undergoing rehab at [facility] and rehab presents with altered mental status.  Patient remains somewhat altered and has no memory of the episode.  Per nurse at skilled nursing facility patient was in her normal state of health earlier on the morning of presentation and was working with therapy when she indicated she is not feeling well her eyes rolled back in her head and she became unresponsive.  Sternal rub attempted without response.  Patient was brought to the emergency department for further evaluation and treatment.  In the emergency department patient mentation slowly improved.  Patient alert oriented x1 which is below patient's baseline.  Afebrile sinus rhythm 70s blood pressure within normal limits satting well room air.  Labs demonstrated mildly elevated troponin though congruent with CKD 4, slightly elevated glucose, low potassium, elevated sodium, creatinine 2.24 up from patient's baseline of 1.7, white blood cell count within normal limits.  Hemoglobin around patient's baseline.  Urinalysis concerning for UTI.  CT head negative for any acute intracranial abnormalities did demonstrate diffuse atrophy and white matter changes.  Chest x-ray negative for any acute effusions or infiltrate.  Skin exam reveals multiple bruises in various stages of healing.  Nursing reports that patient frequently tries to get out of bed and falls.  Patient recently broke her nose following recent fall. Healing well.  Cerebell applied in the emergency department and negative for seizure activity.   Hospitalist service contacted for admission for further evaluation and treatment of altered mental status likely acute metabolic encephalopathy due to urinary tract infection potentially provoking a nonconvulsive seizure.  Discussed case with patient's ex- (POA) and son at the bedside.  Family is frustrated with level of care and rehab patient is receiving at current facility and discussing alternative care accommodations.      Of note patient recently had a prolonged hospitalization at [facility] from December to February for urinary tract infection with AUSTEN on CKD complicated by nonconvulsive status epilepticus resulting in the patient's inability to safely swallow or ambulate.  Patient has been in rehab since this hospitalization.  Rehab has been complicated by a bout of C. difficile colitis as well as multiple falls.  Patient was discharged from [facility] on [facility] but not currently on med rec.      Personal History     Past Medical History:  Past Medical History:   Diagnosis Date   • Cancer     left breast removed 2012   • Type 2 diabetes mellitus         Past Surgical History:  Past Surgical History:   Procedure Laterality Date   • ABDOMINAL SURGERY     • BREAST SURGERY Left 2012    removed due to cancer   • CHOLECYSTECTOMY     • CYSTOSCOPY W/ URETERAL STENT PLACEMENT Right 12/09/2020    Procedure: CYSTOSCOPY, RIGHT RETROGRADE PYELOGRAM, URETEROSCOPY, LASER LITHOTRIPSY, RIGHT URETERAL STENT PLACEMENT;  Surgeon: Rohan Dooley Jr., MD;  Location: [facility];  Service: Urology;  Laterality: Right;   • GASTROSTOMY W/ FEEDING TUBE     • HERNIA REPAIR      mesh removed        Family History:   Family History   Problem Relation Age of Onset   • Diabetes Mother    • Lung disease Father    • Cancer Father         Social History:   Social History     Socioeconomic History   • Marital status:    Tobacco Use   • Smoking status: Former     Types: Cigarettes     Quit date: 8/17/1997     Years  since quittin.6     Passive exposure: Past   • Smokeless tobacco: Never   Vaping Use   • Vaping Use: Never used   Substance and Sexual Activity   • Alcohol use: No   • Drug use: No   • Sexual activity: Never        Home Medications:  Darbepoetin Jaylen, Ergocalciferol, Menthol-Zinc Oxide, acetaminophen, alendronate, amLODIPine, anastrozole, carvedilol, cholestyramine, citalopram, ferrous sulfate, hydrALAZINE, ipratropium, levothyroxine, losartan, phosphorus, potassium chloride, saccharomyces boulardii, and sodium bicarbonate    Allergies:  Allergies   Allergen Reactions   • Artificial Saliva Unknown - High Severity       Review of Systems   Unable to perform ROS: Mental status change          Objective   Objective     Vitals:   Temp:  [97.7 °F (36.5 °C)-98.2 °F (36.8 °C)] 98.2 °F (36.8 °C)  Heart Rate:  [71-78] 76  Resp:  [16-17] 16  BP: (118-130)/(59-68) 130/68    Physical Exam   Gen. well-developed appearing stated age in no acute distress  HEENT: Normocephalic multiple bruises of the head as well as well-healing laceration of the bridge of the nose, moist membranes pupils equal round reactive light, no scleral icterus no conjunctival injection, protruding right maxillary central incisor  Cardiovascular: regular rate and rhythm no murmurs rubs or gallops S1-S2, no lower extremity edema appreciated  Pulmonary: Clear to auscultation bilaterally no wheezes rales or rhonchi symmetric chest expansion, unlabored, no conversational dyspnea appreciated  Gastrointestinal: Soft nontender nondistended positive bowel sounds all 4 quadrants no rebound or guarding, G-tube in the epigastrium  Musculoskeletal: No clubbing cyanosis, warm and well-perfused, calves soft symmetric nontender bilaterally  Skin: Multiple bruises bilateral lower extremities, excoriation of the buttocks, bruising over the brow and forehead, healing laceration of the bridge of the nose  Neuro: Cranial nerves II through XII intact grossly no sensorimotor  deficits appreciated bilateral upper and lower extremities, generalized weakness  Psych: Patient is calm cooperative and appropriate with exam not responding to internal stimuli  : No Fernandez catheter no bladder distention positive suprapubic tenderness    Result Review    Result Review:  I have personally reviewed the results and agree with these findings:  [x]  Laboratory  LAB RESULTS:      Lab 04/03/23  1116   WBC 10.51   HEMOGLOBIN 10.3*   HEMATOCRIT 28.9*   PLATELETS 284   NEUTROS ABS 8.10*   IMMATURE GRANS (ABS) 0.04   LYMPHS ABS 1.34   MONOS ABS 0.61   EOS ABS 0.36   MCV 87.8         Lab 04/03/23  1116   SODIUM 147*   POTASSIUM 2.8*   CHLORIDE 111*   CO2 20.7*   ANION GAP 15.3*   BUN 92*   CREATININE 2.24*   EGFR 23.5*   GLUCOSE 285*   CALCIUM 9.7   MAGNESIUM 1.9   PHOSPHORUS 3.9         Lab 04/03/23  1116   TOTAL PROTEIN 7.8   ALBUMIN 3.5   GLOBULIN 4.3   ALT (SGPT) 46*   AST (SGOT) 31   BILIRUBIN 0.2   ALK PHOS 183*         Lab 04/03/23  1116   HSTROP T 58*                 Brief Urine Lab Results  (Last result in the past 365 days)      Color   Clarity   Blood   Leuk Est   Nitrite   Protein   CREAT   Urine HCG        04/03/23 1347 Yellow   Turbid   Small (1+)   Large (3+)   Negative   >=300 mg/dL (3+)               Microbiology Results (last 10 days)     ** No results found for the last 240 hours. **          []  Microbiology  [x]  Radiology  CT Head Without Contrast    Result Date: 4/3/2023    CT scan of the head without IV contrast demonstrating diffuse atrophy and white matter changes.  No acute intracranial abnormality is seen.     KULWANT HOLLOWAY MD       Electronically Signed and Approved By: KULWANT HOLLOWAY MD on 4/03/2023 at 12:27             XR Chest 1 View    Result Date: 4/3/2023   No active cardiopulmonary disease is seen.       KULWANT HOLLOWAY MD       Electronically Signed and Approved By: KULWANT HOLLOWAY MD on 4/03/2023 at 11:53               [x]  EKG/Telemetry   []  Cardiology/Vascular   []   Pathology  [x]  Old records discharge summary from [facility] for the above-mentioned hospitalization in February  [x]  Other:  Scheduled Meds:anastrozole, 1 mg, Oral, Daily  cefTRIAXone, 1 g, Intravenous, Q24H  cholestyramine, 1 packet, Per PEG Tube, BID  citalopram, 10 mg, Per G Tube, Daily  insulin regular, 2-7 Units, Subcutaneous, Q6H  [START ON 4/4/2023] levothyroxine, 12.5 mcg, Oral, Q AM  saccharomyces boulardii, 250 mg, Oral, BID  sodium bicarbonate, 650 mg, Per PEG Tube, BID  sodium chloride, 10 mL, Intravenous, Q12H      Continuous Infusions:sodium chloride, 100 mL/hr, Last Rate: 100 mL/hr (04/03/23 5328)      PRN Meds:.•  acetaminophen  •  senna-docusate sodium **AND** polyethylene glycol **AND** bisacodyl **AND** bisacodyl  •  dextrose  •  dextrose  •  glucagon (human recombinant)  •  LORazepam  •  nitroglycerin  •  ondansetron **OR** ondansetron  •  sodium chloride  •  sodium chloride  •  sodium chloride        Assessment & Plan   Assessment / Plan     Assessment/Plan:   Altered mental status  Acute metabolic encephalopathy secondary to urinary tract infection  Urinary tract infection  Nonconvulsive seizure  AUSTEN on CKD 4 baseline creatinine 1.7  Hypokalemia  Diabetes mellitus  Hypernatremia  Metabolic acidosis  Debility  Oropharyngeal dysphagia   Anemia of chronic kidney disease  Chronic diarrhea  Hypertension  Depression  Hypothyroidism  History of nonconvulsive status epilepticus  History of breast cancer status post left mastectomy        Patient admitted for further evaluation and treatment  Neurology consulted thank you for your assistance  Continue regular reorientation  Start Rocephin empirically pending urine culture  Antiepileptic initiation per neurology  Get EEG  Start IV fluids  Monitor renal function closely  Monitor electrolytes replace as needed  Continue sliding scale insulin to every 6  Continue bicarb per G-tube  Consult speech therapy  Consult physical therapy occupational  therapy  Monitor hemoglobin transfuse packed red blood cells as needed to keep hemoglobin greater than 7  Patient receives darbepoetin alpha subcu on the 26th of every month for treatment of anemia of chronic kidney disease  Continue home cholestyramine  Continue home Florastor  Monitor blood pressure and restart home amlodipine, carvedilol, hydralazine, losartan as needed  Continue home citalopram  Continue home levothyroxine  Follow-up thyroid panel  Continue home anastrozole    Discussed case with patient's nurse at bedside.  Discussed case with emergency department attending.  Discussed with neurology attending.  Further inpatient orders recommendations pending clinical course.    Disposition: Inpatient rehab.  Family discussing potentially finding an alternative facility    DVT prophylaxis:  Mechanical DVT prophylaxis orders are present.    CODE STATUS:    Code Status (Patient has no pulse and is not breathing): CPR (Attempt to Resuscitate)  Medical Interventions (Patient has pulse or is breathing): Full Support      Admission Status:  I believe this patient meets inpatient status.

## 2023-04-03 NOTE — PLAN OF CARE
Goal Outcome Evaluation:   Patient admitted from the ED. Family at bedside. No signs of distress noted. Call light in reach.

## 2023-04-03 NOTE — ED NOTES
Spoke with Pts son who is also her POA and he stated he saw her on Saturday and she was AxO x4. His son saw her yesterday and said she was sleeping a lot more than normal.

## 2023-04-04 ENCOUNTER — APPOINTMENT (OUTPATIENT)
Dept: GENERAL RADIOLOGY | Facility: HOSPITAL | Age: 68
DRG: 371 | End: 2023-04-04
Payer: MEDICARE

## 2023-04-04 LAB
ALBUMIN SERPL-MCNC: 3.1 G/DL (ref 3.5–5.2)
ANION GAP SERPL CALCULATED.3IONS-SCNC: 13.1 MMOL/L (ref 5–15)
ANION GAP SERPL CALCULATED.3IONS-SCNC: 14.3 MMOL/L (ref 5–15)
BUN SERPL-MCNC: 78 MG/DL (ref 8–23)
BUN SERPL-MCNC: 89 MG/DL (ref 8–23)
BUN/CREAT SERPL: 36.4 (ref 7–25)
BUN/CREAT SERPL: 40.3 (ref 7–25)
CALCIUM SPEC-SCNC: 8.8 MG/DL (ref 8.6–10.5)
CALCIUM SPEC-SCNC: 9.1 MG/DL (ref 8.6–10.5)
CHLORIDE SERPL-SCNC: 116 MMOL/L (ref 98–107)
CHLORIDE SERPL-SCNC: 116 MMOL/L (ref 98–107)
CO2 SERPL-SCNC: 16.9 MMOL/L (ref 22–29)
CO2 SERPL-SCNC: 18.7 MMOL/L (ref 22–29)
CREAT SERPL-MCNC: 2.14 MG/DL (ref 0.57–1)
CREAT SERPL-MCNC: 2.21 MG/DL (ref 0.57–1)
DEPRECATED RDW RBC AUTO: 46.7 FL (ref 37–54)
EGFRCR SERPLBLD CKD-EPI 2021: 23.9 ML/MIN/1.73
EGFRCR SERPLBLD CKD-EPI 2021: 24.8 ML/MIN/1.73
ERYTHROCYTE [DISTWIDTH] IN BLOOD BY AUTOMATED COUNT: 14.5 % (ref 12.3–15.4)
GLUCOSE BLDC GLUCOMTR-MCNC: 114 MG/DL (ref 70–99)
GLUCOSE BLDC GLUCOMTR-MCNC: 121 MG/DL (ref 70–99)
GLUCOSE BLDC GLUCOMTR-MCNC: 146 MG/DL (ref 70–99)
GLUCOSE SERPL-MCNC: 125 MG/DL (ref 65–99)
GLUCOSE SERPL-MCNC: 138 MG/DL (ref 65–99)
HCT VFR BLD AUTO: 27.5 % (ref 34–46.6)
HGB BLD-MCNC: 9.6 G/DL (ref 12–15.9)
MAGNESIUM SERPL-MCNC: 1.7 MG/DL (ref 1.6–2.4)
MAGNESIUM SERPL-MCNC: 1.8 MG/DL (ref 1.6–2.4)
MCH RBC QN AUTO: 31.3 PG (ref 26.6–33)
MCHC RBC AUTO-ENTMCNC: 34.9 G/DL (ref 31.5–35.7)
MCV RBC AUTO: 89.6 FL (ref 79–97)
PHOSPHATE SERPL-MCNC: 4.6 MG/DL (ref 2.5–4.5)
PLATELET # BLD AUTO: 256 10*3/MM3 (ref 140–450)
PMV BLD AUTO: 10.3 FL (ref 6–12)
POTASSIUM SERPL-SCNC: 2.4 MMOL/L (ref 3.5–5.2)
POTASSIUM SERPL-SCNC: 2.7 MMOL/L (ref 3.5–5.2)
QT INTERVAL: 425 MS
RBC # BLD AUTO: 3.07 10*6/MM3 (ref 3.77–5.28)
SODIUM SERPL-SCNC: 146 MMOL/L (ref 136–145)
SODIUM SERPL-SCNC: 149 MMOL/L (ref 136–145)
WBC NRBC COR # BLD: 10.43 10*3/MM3 (ref 3.4–10.8)

## 2023-04-04 PROCEDURE — 82962 GLUCOSE BLOOD TEST: CPT

## 2023-04-04 PROCEDURE — BD12YZZ FLUOROSCOPY OF STOMACH USING OTHER CONTRAST: ICD-10-PCS | Performed by: RADIOLOGY

## 2023-04-04 PROCEDURE — 49465 FLUORO EXAM OF G/COLON TUBE: CPT

## 2023-04-04 PROCEDURE — 25010000002 CEFTRIAXONE PER 250 MG: Performed by: FAMILY MEDICINE

## 2023-04-04 PROCEDURE — 97161 PT EVAL LOW COMPLEX 20 MIN: CPT

## 2023-04-04 PROCEDURE — 92611 MOTION FLUOROSCOPY/SWALLOW: CPT

## 2023-04-04 PROCEDURE — 0 DIATRIZOATE MEGLUMINE & SODIUM PER 1 ML: Performed by: INTERNAL MEDICINE

## 2023-04-04 PROCEDURE — 92610 EVALUATE SWALLOWING FUNCTION: CPT

## 2023-04-04 PROCEDURE — 99233 SBSQ HOSP IP/OBS HIGH 50: CPT | Performed by: INTERNAL MEDICINE

## 2023-04-04 PROCEDURE — 83735 ASSAY OF MAGNESIUM: CPT | Performed by: STUDENT IN AN ORGANIZED HEALTH CARE EDUCATION/TRAINING PROGRAM

## 2023-04-04 PROCEDURE — 80048 BASIC METABOLIC PNL TOTAL CA: CPT | Performed by: INTERNAL MEDICINE

## 2023-04-04 PROCEDURE — 83735 ASSAY OF MAGNESIUM: CPT | Performed by: INTERNAL MEDICINE

## 2023-04-04 PROCEDURE — 0 POTASSIUM CHLORIDE 10 MEQ/100ML SOLUTION: Performed by: STUDENT IN AN ORGANIZED HEALTH CARE EDUCATION/TRAINING PROGRAM

## 2023-04-04 PROCEDURE — 80069 RENAL FUNCTION PANEL: CPT | Performed by: FAMILY MEDICINE

## 2023-04-04 PROCEDURE — 85027 COMPLETE CBC AUTOMATED: CPT | Performed by: FAMILY MEDICINE

## 2023-04-04 PROCEDURE — 97165 OT EVAL LOW COMPLEX 30 MIN: CPT

## 2023-04-04 PROCEDURE — 25010000002 HYALURONIDASE (HUMAN) 150 UNIT/ML SOLUTION 1 ML VIAL: Performed by: INTERNAL MEDICINE

## 2023-04-04 PROCEDURE — 74230 X-RAY XM SWLNG FUNCJ C+: CPT

## 2023-04-04 RX ORDER — LACOSAMIDE 10 MG/ML
50 SOLUTION ORAL EVERY 12 HOURS SCHEDULED
Status: DISCONTINUED | OUTPATIENT
Start: 2023-04-04 | End: 2023-04-05

## 2023-04-04 RX ORDER — LEVOTHYROXINE SODIUM 0.03 MG/1
12.5 TABLET ORAL
Status: DISCONTINUED | OUTPATIENT
Start: 2023-04-05 | End: 2023-04-05

## 2023-04-04 RX ORDER — CITALOPRAM 20 MG/1
10 TABLET ORAL DAILY
Status: DISCONTINUED | OUTPATIENT
Start: 2023-04-05 | End: 2023-04-05

## 2023-04-04 RX ORDER — POTASSIUM CHLORIDE 7.45 MG/ML
10 INJECTION INTRAVENOUS
Status: DISCONTINUED | OUTPATIENT
Start: 2023-04-04 | End: 2023-04-04

## 2023-04-04 RX ORDER — SODIUM BICARBONATE 650 MG/1
650 TABLET ORAL 2 TIMES DAILY
Status: DISCONTINUED | OUTPATIENT
Start: 2023-04-04 | End: 2023-04-05

## 2023-04-04 RX ORDER — CHOLESTYRAMINE 4 G/9G
1 POWDER, FOR SUSPENSION ORAL 2 TIMES DAILY
Status: DISCONTINUED | OUTPATIENT
Start: 2023-04-04 | End: 2023-04-05

## 2023-04-04 RX ORDER — POTASSIUM CHLORIDE 1.5 G/1.77G
40 POWDER, FOR SOLUTION ORAL EVERY 4 HOURS
Status: COMPLETED | OUTPATIENT
Start: 2023-04-04 | End: 2023-04-04

## 2023-04-04 RX ORDER — ANASTROZOLE 1 MG/1
1 TABLET ORAL DAILY
Status: DISCONTINUED | OUTPATIENT
Start: 2023-04-05 | End: 2023-04-05

## 2023-04-04 RX ORDER — POTASSIUM CHLORIDE 750 MG/1
40 CAPSULE, EXTENDED RELEASE ORAL ONCE
Status: DISCONTINUED | OUTPATIENT
Start: 2023-04-04 | End: 2023-04-04

## 2023-04-04 RX ORDER — POTASSIUM CHLORIDE 7.45 MG/ML
10 INJECTION INTRAVENOUS
Status: COMPLETED | OUTPATIENT
Start: 2023-04-04 | End: 2023-04-04

## 2023-04-04 RX ORDER — SACCHAROMYCES BOULARDII 250 MG
250 CAPSULE ORAL 2 TIMES DAILY
Status: DISCONTINUED | OUTPATIENT
Start: 2023-04-04 | End: 2023-04-05

## 2023-04-04 RX ADMIN — BARIUM SULFATE 50 ML: 400 SUSPENSION ORAL at 15:00

## 2023-04-04 RX ADMIN — SODIUM BICARBONATE 650 MG TABLET 650 MG: at 21:45

## 2023-04-04 RX ADMIN — POTASSIUM CHLORIDE 10 MEQ: 7.46 INJECTION, SOLUTION INTRAVENOUS at 08:03

## 2023-04-04 RX ADMIN — BARIUM SULFATE 1 TEASPOON(S): 0.6 CREAM ORAL at 15:37

## 2023-04-04 RX ADMIN — DIATRIZOATE MEGLUMINE AND DIATRIZOATE SODIUM 5 ML: 660; 100 LIQUID ORAL; RECTAL at 15:00

## 2023-04-04 RX ADMIN — Medication 250 MG: at 21:45

## 2023-04-04 RX ADMIN — POTASSIUM CHLORIDE 40 MEQ: 1.5 POWDER, FOR SOLUTION ORAL at 21:45

## 2023-04-04 RX ADMIN — CEFTRIAXONE SODIUM 1 G: 1 INJECTION, SOLUTION INTRAVENOUS at 17:14

## 2023-04-04 RX ADMIN — Medication 10 ML: at 21:45

## 2023-04-04 RX ADMIN — LACOSAMIDE 50 MG: 10 SOLUTION ORAL at 21:45

## 2023-04-04 RX ADMIN — POTASSIUM CHLORIDE 10 MEQ: 7.46 INJECTION, SOLUTION INTRAVENOUS at 09:10

## 2023-04-04 RX ADMIN — POTASSIUM CHLORIDE 40 MEQ: 1.5 POWDER, FOR SOLUTION ORAL at 18:26

## 2023-04-04 RX ADMIN — CHOLESTYRAMINE 1 PACKET: 4 POWDER, FOR SUSPENSION ORAL at 21:45

## 2023-04-04 RX ADMIN — Medication 10 ML: at 08:05

## 2023-04-04 RX ADMIN — HYALURONIDASE (HUMAN RECOMBINANT) 150 UNITS: 150 INJECTION, SOLUTION SUBCUTANEOUS at 12:01

## 2023-04-04 RX ADMIN — SODIUM CHLORIDE 100 ML/HR: 4.5 INJECTION, SOLUTION INTRAVENOUS at 06:25

## 2023-04-04 RX ADMIN — POTASSIUM CHLORIDE 10 MEQ: 7.46 INJECTION, SOLUTION INTRAVENOUS at 09:11

## 2023-04-04 RX ADMIN — BARIUM SULFATE 55 ML: 0.81 POWDER, FOR SUSPENSION ORAL at 15:37

## 2023-04-04 RX ADMIN — POTASSIUM CHLORIDE 10 MEQ: 7.46 INJECTION, SOLUTION INTRAVENOUS at 06:47

## 2023-04-04 NOTE — SIGNIFICANT NOTE
Wound Eval / Progress Noted     [place]     Patient Name: Lyubov Middleton  : 1955  MRN: [id number]  Today's Date: 2023                 Admit Date: 4/3/2023    Visit Dx:    ICD-10-CM ICD-9-CM   1. Metabolic encephalopathy  G93.41 348.31   2. Oropharyngeal dysphagia  R13.12 787.22   3. Difficulty in walking  R26.2 719.7       Patient Active Problem List   Diagnosis   • Renal stone   • Renal abscess   • Stage 3a chronic kidney disease   • Type 2 diabetes mellitus   • Metabolic encephalopathy   • Seizure disorder        Past Medical History:   Diagnosis Date   • Cancer     left breast removed    • Type 2 diabetes mellitus         Past Surgical History:   Procedure Laterality Date   • ABDOMINAL SURGERY     • BREAST SURGERY Left     removed due to cancer   • CHOLECYSTECTOMY     • CYSTOSCOPY W/ URETERAL STENT PLACEMENT Right 2020    Procedure: CYSTOSCOPY, RIGHT RETROGRADE PYELOGRAM, URETEROSCOPY, LASER LITHOTRIPSY, RIGHT URETERAL STENT PLACEMENT;  Surgeon: Rohan Dooley Jr., MD;  Location: [facility];  Service: Urology;  Laterality: Right;   • GASTROSTOMY W/ FEEDING TUBE     • HERNIA REPAIR      mesh removed         Physical Assessment:  Wound 23 1622 Right labia (Active)   Wound Image   23 1606   Dressing Appearance open to air 23 1103   Closure None 23 1103   Base moist;red;blanchable 23 1103   Periwound moist;pink;redness;blanchable 23 1103   Periwound Temperature warm 23 1103   Periwound Skin Turgor soft 23 1103   Edges open 23 1103   Drainage Characteristics/Odor serosanguineous 23 1103   Drainage Amount scant 23 1103   Care, Wound cleansed with;sterile normal saline;barrier applied 23 1103   Dressing Care open to air 23 1103   Periwound Care barrier ointment applied 23 1103       Wound 23 1628 sacral spine (Active)   Wound Image   23 1628   Dressing Appearance open to air 23  1103   Closure None 04/04/23 1103   Base blanchable;dry;red 04/04/23 1103   Periwound blanchable;intact;dry 04/04/23 1103   Periwound Temperature warm 04/04/23 1103   Periwound Skin Turgor soft 04/04/23 1103   Edges rolled/closed 04/04/23 1103   Drainage Amount none 04/04/23 1103   Care, Wound cleansed with;sterile normal saline;barrier applied 04/04/23 1103   Dressing Care open to air 04/04/23 1103   Periwound Care barrier ointment applied 04/04/23 1103       Wound 04/03/23 1629 Bilateral gluteal (Active)   Wound Image    04/03/23 1629   Dressing Appearance open to air 04/04/23 1103   Closure None 04/04/23 1103   Base blanchable;red;moist 04/04/23 1103   Periwound dry;intact;blanchable;redness 04/04/23 1103   Periwound Temperature warm 04/04/23 1103   Periwound Skin Turgor soft 04/04/23 1103   Edges rolled/closed 04/04/23 1103   Drainage Amount none 04/04/23 1103   Care, Wound cleansed with;sterile normal saline;barrier applied 04/04/23 1103   Dressing Care open to air 04/04/23 1103   Periwound Care barrier ointment applied 04/04/23 1103          Wound Check / Follow-up:  Patient seen today for wound consult. Blanchable redness noted to bilateral gluteal aspects extending to sacrum. Moisture noted to perineum, gluteal crease with superficial open area noted to left labia/perineum. Cleansed areas with normal saline and gauze. Applied thin layer of barrier cream. Recommending TID application. Heels intact with no discoloration or redness. No other wounds identified. Implement every two hour turns, offload heels, keep patient free from moisture.      Impression: MASD, blanchable redness.      Short term goals:  Regain skin integrity, moisture prevention, pressure reduction, skin protection.      Katharine Arevalo RN    4/4/2023    12:55 EDT

## 2023-04-04 NOTE — PLAN OF CARE
Goal Outcome Evaluation:  Plan of Care Reviewed With: patient        Progress: no change  Outcome Evaluation: Patient has experienced decline in function from baseline status, presenting w/ deficits related to functional balance, activity tolerance, strength, transfers and mobility that impede patient independence with activities of daily living.  Patient would benefit from skilled Occupational Therapy intervention to maxamize patient safety, and promote return to baseline independence.    Recommend: inpatient rehabilitation

## 2023-04-04 NOTE — PROGRESS NOTES
[facility]   Hospitalist Progress Note  Date: 2023  Patient Name: Lyubov Middleton  : 1955  MRN: [id number]  Date of admission: 4/3/2023      Subjective   Subjective     Chief Complaint:   AMS    Summary:   Lyubov Middleton is a 68 y.o. female with past medical history of diabetes mellitus, CKD 4, seizure disorder, dysphagia currently undergoing rehab at [facility] and rehab presents with altered mental status.  Per report, patient in her usual state of health in the morning presentation, however when working with therapy.  Patient indicated she was not feeling well, her eyes rolled back in her head and she became unresponsive.  Sternal rub attempted without response.  Patient brought to the emergency department for evaluation and treatment, noted to have slowly improving mentation.  Alert only to 1 on arrival.  Patient's labs also significant for low potassium, elevated sodium, creatinine up at 2.24, up from patient's baseline of 1.7.  UA concerning for urinary tract infection.  CT head negative for any acute intracranial abnormalities, did demonstrate diffuse atrophy and white matter changes.  Chest x-ray negative for any acute abnormalities.  Patient has multiple bruises in various stages of healing on the skin exam.  Nursing report indicates patient frequently tries to get out of bed and falls.  Recently had presentation to the emergency department for a broken nose following a fall.  Cerebella applied in the emergency department and negative for seizure activity acutely.  Hospitalist service contacted for admission.    Interval Followup:   Able to meet with family at bedside, they expressed significant frustration with patient's current nursing facility.  Asking for palliative care to be consulted.  Patient's sodium still elevated, renal function still elevated.  A modified barium swallow study was performed today indicating patient can be started on a limited diet as outlined  by speech therapy in their note.  Issues with G-tube, nursing staff overnight and this morning were unable to pass any contents through, tried warm liquids as well with no improvement.  This was replaced at bedside, repeat Gastrografin study has been ordered to confirm appropriate placement.  On placement of new PEG tube, aspiration did reveal gastric contents.    Review of Systems   All systems were reviewed and negative except for: Denies any acute symptoms at this time.  Remains weak    Objective   Objective     Vitals:   Temp:  [97.2 °F (36.2 °C)-98.1 °F (36.7 °C)] 97.2 °F (36.2 °C)  Heart Rate:  [74-80] 76  Resp:  [16] 16  BP: (128-156)/(48-73) 140/66  Physical Exam   Gen. alert and oriented x3, remains pleasantly confused however when pointedly asked can answer any questions.  HEENT: Normocephalic multiple bruises of the head as well as well-healing laceration of the bridge of the nose, moist membranes pupils equal round reactive light, no scleral icterus no conjunctival injection, protruding right maxillary central incisor  Cardiovascular: regular rate and rhythm no murmurs rubs or gallops S1-S2, no lower extremity edema appreciated  Pulmonary: Clear to auscultation bilaterally no wheezes rales or rhonchi symmetric chest expansion, unlabored, no conversational dyspnea appreciated  Gastrointestinal: Soft nontender nondistended positive bowel sounds all 4 quadrants no rebound or guarding, G-tube replaced with minimal bloody discharge around tube.  Musculoskeletal: No clubbing cyanosis, warm and well-perfused, calves soft symmetric nontender bilaterally  Skin: Multiple bruises bilateral lower extremities, excoriation of the buttocks, bruising over the brow and forehead, healing laceration of the bridge of the nose  Neuro: Cranial nerves II through XII intact grossly no sensorimotor deficits appreciated bilateral upper and lower extremities, generalized weakness  Psych: Patient is calm cooperative and appropriate  with exam not responding to internal stimuli  : No Fernandez catheter no bladder distention positive suprapubic tenderness     Result Review    Result Review:  I have personally reviewed the results from 4/4/2023 and agree with these findings:  []  Laboratory  []  Microbiology  []  Radiology  []  EKG/Telemetry   []  Cardiology/Vascular   []  Pathology  []  Old records  []  Other:    Assessment & Plan   Assessment / Plan     Assessment/Plan:  Altered mental status  Acute metabolic encephalopathy secondary to urinary tract infection  Urinary tract infection  Nonconvulsive seizure   AUSTEN on CKD 4 baseline creatinine 1.7  Hypokalemia  Diabetes mellitus  Hypernatremia  Metabolic acidosis  Debility  Oropharyngeal dysphagia  Blocked PEG tube  Anemia of chronic kidney disease  Chronic diarrhea  Hypertension  Depression  Hypothyroidism  History of nonconvulsive status epilepticus  History of breast cancer status post left mastectomy     Plan:   Patient admitted for further evaluation and treatment  Neurology consulted thank you for your assistance  Official EEG was ordered today  Start Rocephin empirically pending urine culture, now growing gram-negative bacilli in urine  Antiepileptic initiation per neurology  Continue on IV fluids for today  Monitor renal function closely, slight improvement, remains hypernatremic  Monitor electrolytes replace as needed  Continue sliding scale insulin to every 6  MBS shows patient can have modified diet, majority medications switched over to oral  PEG tube replaced at bedside on 4/4/2023, Gastrografin study ordered to confirm placement before use  Unclear how long patient's PEG tube has been blocked and patient has been not receiving tube feeds.  Patient's AUSTEN, hypokalemia and hypernatremia certainly leaning towards poor oral intake over the previous few days.  Patient also at risk for seizures if she has not been receiving her typical medications via PEG tube.  Speech therapy will continue  to follow along, appreciate assistance  Consult physical therapy occupational therapy  Patient receives darbepoetin alpha subcu on the 26th of every month for treatment of anemia of chronic kidney disease  Continue home cholestyramine  Continue home Florastor  Monitor blood pressure and restart home amlodipine, carvedilol, hydralazine, losartan as needed  Continue home citalopram  Continue home levothyroxine  TSH minimally elevated  Continue home anastrozole       Disposition: Inpatient rehab.  Family discussing potentially finding an alternative facility      Discussed plan with RN, speech therapist, , family at bedside    DVT prophylaxis:  Mechanical DVT prophylaxis orders are present.    CODE STATUS:   Code Status (Patient has no pulse and is not breathing): CPR (Attempt to Resuscitate)  Medical Interventions (Patient has pulse or is breathing): Full Support

## 2023-04-04 NOTE — THERAPY EVALUATION
Patient Name: Lyubov Middleton  : 1955    MRN: [id number]                              Today's Date: 2023       Admit Date: 4/3/2023    Visit Dx:     ICD-10-CM ICD-9-CM   1. Metabolic encephalopathy  G93.41 348.31   2. Oropharyngeal dysphagia  R13.12 787.22   3. Difficulty in walking  R26.2 719.7   4. Decreased activities of daily living (ADL)  Z78.9 V49.89     Patient Active Problem List   Diagnosis   • Renal stone   • Renal abscess   • Stage 3a chronic kidney disease   • Type 2 diabetes mellitus   • Metabolic encephalopathy   • Seizure disorder     Past Medical History:   Diagnosis Date   • Cancer     left breast removed    • Type 2 diabetes mellitus      Past Surgical History:   Procedure Laterality Date   • ABDOMINAL SURGERY     • BREAST SURGERY Left     removed due to cancer   • CHOLECYSTECTOMY     • CYSTOSCOPY W/ URETERAL STENT PLACEMENT Right 2020    Procedure: CYSTOSCOPY, RIGHT RETROGRADE PYELOGRAM, URETEROSCOPY, LASER LITHOTRIPSY, RIGHT URETERAL STENT PLACEMENT;  Surgeon: Rohan Dooley Jr., MD;  Location: [facility];  Service: Urology;  Laterality: Right;   • GASTROSTOMY W/ FEEDING TUBE     • HERNIA REPAIR      mesh removed      General Information     Row Name 23 1523          OT Time and Intention    Document Type evaluation  -ES     Mode of Treatment individual therapy;occupational therapy  -ES     Row Name 23 1523          General Information    Patient Profile Reviewed yes  -ES     Prior Level of Function --  Patient at nursing facility for rehabilitation stay. Patient reports independent with ADLs at baseline. Rolling walker for functional mobility. No home O2 use.  -ES     Existing Precautions/Restrictions fall  -ES     Barriers to Rehab none identified  -ES     Row Name 23 1523          Occupational Profile    Reason for Services/Referral (Occupational Profile) Patient is 68 yr old female admitted to [facility] on 4/3/2023 with  reported AMS at rehab facility. OT evaluation and treatment ordered d/t recent decline in ADLs/transfer ability and discharge planning recommendations. No previous OT services for current condition.  -ES     Row Name 04/04/23 1523          Living Environment    People in Home alone  -ES     Row Name 04/04/23 1523          Cognition    Orientation Status (Cognition) oriented to;person;situation  patient is very lethargic at time of evaluation, difficulty with prior level reporting. No family present to assist.  -     Row Name 04/04/23 1523          Safety Issues, Functional Mobility    Impairments Affecting Function (Mobility) balance;coordination;endurance/activity tolerance;range of motion (ROM);strength  -ES           User Key  (r) = Recorded By, (t) = Taken By, (c) = Cosigned By    Initials Name Provider Type    Juany Scales, OTR/L, CSRS Occupational Therapist                 Mobility/ADL's     Row Name 04/04/23 1525          Bed Mobility    Bed Mobility supine-sit;sit-supine  -ES     Supine-Sit [place] (Bed Mobility) minimum assist (75% patient effort);1 person assist  -ES     Sit-Supine [place] (Bed Mobility) moderate assist (50% patient effort);1 person assist  -ES     Row Name 04/04/23 1525          Transfers    Transfers sit-stand transfer;stand-sit transfer  -ES     Row Name 04/04/23 1525          Sit-Stand Transfer    Sit-Stand [place] (Transfers) minimum assist (75% patient effort);1 person assist  -ES     Assistive Device (Sit-Stand Transfers) walker, front-wheeled  -ES     Row Name 04/04/23 1525          Stand-Sit Transfer    Stand-Sit [place] (Transfers) minimum assist (75% patient effort);1 person assist  -ES     Assistive Device (Stand-Sit Transfers) walker, front-wheeled  -ES     Row Name 04/04/23 1525          Functional Mobility    Functional Mobility- Ind. Level not tested  -ES     Row Name 04/04/23 1525          Activities of Daily Living    BADL Assessment/Intervention  bathing;upper body dressing;lower body dressing;grooming;feeding;toileting  -ES     Row Name 04/04/23 1525          Bathing Assessment/Intervention    [place] Level (Bathing) bathing skills;minimum assist (75% patient effort)  -ES     Row Name 04/04/23 1525          Upper Body Dressing Assessment/Training    [place] Level (Upper Body Dressing) upper body dressing skills;set up;standby assist  -ES     Row Name 04/04/23 1525          Lower Body Dressing Assessment/Training    [place] Level (Lower Body Dressing) lower body dressing skills;moderate assist (50% patient effort)  -ES     Row Name 04/04/23 1525          Grooming Assessment/Training    [place] Level (Grooming) grooming skills;set up;standby assist  -ES     [facility] Name 04/04/23 1525          Self-Feeding Assessment/Training    [place] Level (Feeding) feeding skills;set up  -ES     Row Name 04/04/23 1525          Toileting Assessment/Training    [place] Level (Toileting) toileting skills;maximum assist (25% patient effort)  -ES           User Key  (r) = Recorded By, (t) = Taken By, (c) = Cosigned By    Initials Name Provider Type    ES Juany Melgoza, OTR/L, CSRS Occupational Therapist               Obj/Interventions     [facility] Name 04/04/23 1528          Sensory Assessment (Somatosensory)    Sensory Assessment (Somatosensory) sensation intact  -ES     Row Name 04/04/23 1528          Vision Assessment/Intervention    Visual Impairment/Limitations WFL  -ES     Row Name 04/04/23 1528          Range of Motion Comprehensive    General Range of Motion no range of motion deficits identified;bilateral upper extremity ROM WNL  -ES     [facility] Name 04/04/23 1528          Strength Comprehensive (MMT)    Comment, General Manual Muscle Testing (MMT) Assessment bilateral upper extremities assessed 4/5  -ES     Row Name 04/04/23 1528          Motor Skills    Motor Skills coordination;functional endurance  -ES     Coordination WFL;upper extremity  -ES      Functional Endurance fair minus  -ES     Row Name 04/04/23 1528          Balance    Balance Assessment sitting dynamic balance;standing dynamic balance  -ES     Dynamic Sitting Balance standby assist  -ES     Position, Sitting Balance unsupported;sitting edge of bed  -ES     Dynamic Standing Balance minimal assist;1-person assist  -ES     Position/Device Used, Standing Balance supported;walker, front-wheeled  -ES           User Key  (r) = Recorded By, (t) = Taken By, (c) = Cosigned By    Initials Name Provider Type    ES Juany Melgoza, OTR/L, CSRS Occupational Therapist               Goals/Plan     Row Name 04/04/23 1530          Transfer Goal 1 (OT)    Activity/Assistive Device (Transfer Goal 1, OT) transfers, all;walker, rolling  -ES     [place] Level/Cues Needed (Transfer Goal 1, OT) modified independence  -ES     Time Frame (Transfer Goal 1, OT) long term goal (LTG);10 days  -ES     Row Name 04/04/23 1530          Bathing Goal 1 (OT)    Activity/Device (Bathing Goal 1, OT) bathing skills, all  -ES     [place] Level/Cues Needed (Bathing Goal 1, OT) modified independence  -ES     Time Frame (Bathing Goal 1, OT) long term goal (LTG);10 days  -ES     Row Name 04/04/23 1530          Dressing Goal 1 (OT)    Activity/Device (Dressing Goal 1, OT) dressing skills, all  -ES     [place]/Cues Needed (Dressing Goal 1, OT) modified independence  -ES     Time Frame (Dressing Goal 1, OT) long term goal (LTG);10 days  -ES     Row Name 04/04/23 1530          Toileting Goal 1 (OT)    Activity/Device (Toileting Goal 1, OT) toileting skills, all  -ES     [place] Level/Cues Needed (Toileting Goal 1, OT) modified independence  -ES     Time Frame (Toileting Goal 1, OT) long term goal (LTG);10 days  -ES     Row Name 04/04/23 1530          Grooming Goal 1 (OT)    Activity/Device (Grooming Goal 1, OT) grooming skills, all  -ES     [place] (Grooming Goal 1, OT) modified independence  -ES     Time Frame (Grooming  Goal 1, OT) long term goal (LTG);10 days  -ES     Row Name 04/04/23 1530          Problem Specific Goal 1 (OT)    Problem Specific Goal 1 (OT) Patient will demonstrate fair plus activity tolerance in preperation for independent ADL routine completion at time of discharge  -ES     Time Frame (Problem Specific Goal 1, OT) long term goal (LTG);10 days  -ES     Row Name 04/04/23 1530          Therapy Assessment/Plan (OT)    Planned Therapy Interventions (OT) activity tolerance training;BADL retraining;functional balance retraining;IADL retraining;occupation/activity based interventions;patient/caregiver education/training;ROM/therapeutic exercise;strengthening exercise;transfer/mobility retraining  -ES           User Key  (r) = Recorded By, (t) = Taken By, (c) = Cosigned By    Initials Name Provider Type    Juany Scales, OTR/L, CSRS Occupational Therapist               Clinical Impression     Row Name 04/04/23 1530          Plan of Care Review    Plan of Care Reviewed With patient  -ES     Progress no change  -ES     Outcome Evaluation Patient has experienced decline in function from baseline status, presenting w/ deficits related to functional balance, activity tolerance, strength, transfers and mobility that impede patient independence with activities of daily living.  Patient would benefit from skilled Occupational Therapy intervention to maxamize patient safety, and promote return to baseline independence.  -ES     Row Name 04/04/23 1530          Therapy Assessment/Plan (OT)    Rehab Potential (OT) good, to achieve stated therapy goals  -ES     Criteria for Skilled Therapeutic Interventions Met (OT) yes;meets criteria;skilled treatment is necessary  -ES     Therapy Frequency (OT) 5 times/wk  -ES     Row Name 04/04/23 1530          Therapy Plan Review/Discharge Plan (OT)    Anticipated Discharge Disposition (OT) inpatient rehabilitation facility  -ES     Row Name 04/04/23 1530          Positioning and Restraints     Pre-Treatment Position in bed  -ES     Post Treatment Position bed  -ES           User Key  (r) = Recorded By, (t) = Taken By, (c) = Cosigned By    Initials Name Provider Type    Juany Scales, OTR/L, CSRS Occupational Therapist               Outcome Measures     Row Name 04/04/23 1531          How much help from another is currently needed...    Putting on and taking off regular lower body clothing? 2  -ES     Bathing (including washing, rinsing, and drying) 2  -ES     Toileting (which includes using toilet bed pan or urinal) 3  -ES     Putting on and taking off regular upper body clothing 3  -ES     Taking care of personal grooming (such as brushing teeth) 3  -ES     Eating meals 3  -ES     AM-PAC 6 Clicks Score (OT) 16  -ES     Row Name 04/04/23 1100          How much help from another person do you currently need...    Turning from your back to your side while in flat bed without using bedrails? 3  -TOSHA (r) SW (t) TOSHA (c)     Moving from lying on back to sitting on the side of a flat bed without bedrails? 3  -TOSHA (r) SW (t) TOSHA (c)     Moving to and from a bed to a chair (including a wheelchair)? 2  -TOSHA (r) SW (t) TOSHA (c)     Standing up from a chair using your arms (e.g., wheelchair, bedside chair)? 3  -TOSHA (r) SW (t) TOSHA (c)     Climbing 3-5 steps with a railing? 1  -TOSHA (r) SW (t) TOSHA (c)     To walk in hospital room? 2  -TOSHA (r) SW (t) TOSHA (c)     AM-PAC 6 Clicks Score (PT) 14  -TOSHA (r) SW (t)     Highest level of mobility 4 --> Transferred to chair/commode  -TOSHA (r) SW (t)     Row Name 04/04/23 1531 04/04/23 1100       Functional Assessment    Outcome Measure Options AM-PAC 6 Clicks Daily Activity (OT);Optimal Instrument  -ES AM-PAC 6 Clicks Basic Mobility (PT)  -TOSHA (r) SW (t) TOSHA (c)    Row Name 04/04/23 1531          Optimal Instrument    Optimal Instrument Optimal - 3  -ES     Bending/Stooping 2  -ES     Standing 2  -ES     Reaching 1  -ES     From the list, choose the 3 activities you would most like to be able  to do without any difficulty Bending/stooping;Standing;Reaching  -ES     Total Score Optimal - 3 5  -ES           User Key  (r) = Recorded By, (t) = Taken By, (c) = Cosigned By    Initials Name Provider Type    Baudilio Garay, PT Physical Therapist    Juany Scales, OTR/L, CSRS Occupational Therapist    Rosalinda Braun, PT Student PT Student                  OT Recommendation and Plan  Planned Therapy Interventions (OT): activity tolerance training, BADL retraining, functional balance retraining, IADL retraining, occupation/activity based interventions, patient/caregiver education/training, ROM/therapeutic exercise, strengthening exercise, transfer/mobility retraining  Therapy Frequency (OT): 5 times/wk  Plan of Care Review  Plan of Care Reviewed With: patient  Progress: no change  Outcome Evaluation: Patient has experienced decline in function from baseline status, presenting w/ deficits related to functional balance, activity tolerance, strength, transfers and mobility that impede patient independence with activities of daily living.  Patient would benefit from skilled Occupational Therapy intervention to maxamize patient safety, and promote return to baseline independence.     Time Calculation:    Time Calculation- OT     Row Name 04/04/23 1532             Time Calculation- OT    OT Received On 04/04/23  -ES      OT Goal Re-Cert Due Date 04/13/23  -ES         Untimed Charges    OT Eval/Re-eval Minutes 32  -ES         Total Minutes    Untimed Charges Total Minutes 32  -ES       Total Minutes 32  -ES            User Key  (r) = Recorded By, (t) = Taken By, (c) = Cosigned By    Initials Name Provider Type    Juany Scales, OTR/L, CSRS Occupational Therapist              Therapy Charges for Today     Code Description Service Date Service Provider Modifiers Qty    [id number]  OT EVAL LOW COMPLEXITY 3 4/4/2023 Juany Melgoza, OTR/L, CSRS GO 1               Juany Melgoza OTR/L, CSRS  4/4/2023

## 2023-04-04 NOTE — CONSULTS
[facility]   Neurology Consult Note    Patient Name: Lyubov Middleton  : 1955  MRN: [id number]  Primary Care Physician:  Ana Huitron MD  Referring Physician: No ref. provider found  Date of admission: 4/3/2023    Subjective   Subjective     Reason for Consult/ Chief Complaint: Evaluation for seizure    HPI:  Lyubov Middleton is a 68 y.o. female evaluated for seizure.  Patient states that does not know what happened.  She states that she was in a facility and she was upset with the nurse that they were taking her to the emergency room.  She had a prolonged hospital stay at [facility] and at that admission was found to have status epilepticus which was attributed to cefepime by neurology.  Patient was discharged on Vimpat 150 mg every 12 hours.  She is in a nursing home rehab facility here in [place].      Personal History     Past Medical History:   Diagnosis Date   • Cancer     left breast removed    • Type 2 diabetes mellitus        Past Surgical History:   Procedure Laterality Date   • ABDOMINAL SURGERY     • BREAST SURGERY Left     removed due to cancer   • CHOLECYSTECTOMY     • CYSTOSCOPY W/ URETERAL STENT PLACEMENT Right 2020    Procedure: CYSTOSCOPY, RIGHT RETROGRADE PYELOGRAM, URETEROSCOPY, LASER LITHOTRIPSY, RIGHT URETERAL STENT PLACEMENT;  Surgeon: Rohan Dooley Jr., MD;  Location: [facility];  Service: Urology;  Laterality: Right;   • GASTROSTOMY W/ FEEDING TUBE     • HERNIA REPAIR      mesh removed       Family History: family history includes Cancer in her father; Diabetes in her mother; Lung disease in her father. Otherwise pertinent FHx was reviewed and not pertinent to current issue.    Social History:  reports that she quit smoking about 25 years ago. Her smoking use included cigarettes. She has been exposed to tobacco smoke. She has never used smokeless tobacco. She reports that she does not drink alcohol and does not use  drugs.    Home Medications:  Darbepoetin Jaylen, Ergocalciferol, Menthol-Zinc Oxide, acetaminophen, alendronate, amLODIPine, anastrozole, carvedilol, cholestyramine, citalopram, ferrous sulfate, hydrALAZINE, ipratropium, levothyroxine, losartan, phosphorus, potassium chloride, saccharomyces boulardii, and sodium bicarbonate    Allergies:  Allergies   Allergen Reactions   • Artificial Saliva Unknown - High Severity       Objective    Objective     Vitals:   Temp:  [97.3 °F (36.3 °C)-98.2 °F (36.8 °C)] 97.3 °F (36.3 °C)  Heart Rate:  [71-78] 78  Resp:  [16-17] 16  BP: (118-131)/(59-68) 131/60    Physical Exam: She is able to tell me the year, the month and the history.  She was disoriented to place.  She thought she was in [place] and the facility.  She can tell me the president [place].  She is able to follow commands.  She is slow to respond however.  EOMs full directions gaze, facial strength is full, soft palate elevation and tongue are normal.  There is no tongue bite.  She is able to move the upper and lower extremities without any focal weakness.      Result Review    Result Review:  I have personally reviewed the results from the time of this admission to 4/3/2023 23:00 EDT and agree with these findings:  []  Laboratory  []  Microbiology  [x]  Radiology  []  EKG/Telemetry   []  Cardiology/Vascular   []  Pathology  [x]  Old records  []  Other:      Assessment & Plan   Assessment / Plan   Active Hospital Problems:  Active Hospital Problems    Diagnosis    • **Metabolic encephalopathy    • Seizure disorder          Plan: She most likely had a seizure as noted by the history obtained by Dr. Beasley .  Vimpat was not listed in her admission notes.  She will be started on Vimpat 50 mg per G-tube twice daily.  Patient is now fairly oriented to time and answers questions but is still slow to follow commands.  I will reevaluate her tomorrow.  We will also obtain an EEG.    Total time spent with the patient and  coordinating patient care was 35 minutes.    electronically signed by Gaurav Cameron MD, 04/03/23, 10:46 PM EDT.

## 2023-04-04 NOTE — CONSULTS
"Nutrition Services    Patient Name: Lyubov Middleton  YOB: 1955  MRN: [id number]  Admission date: 4/3/2023      CLINICAL NUTRITION ASSESSMENT      Reason for Assessment  Physician consult, EN     H&P:    Past Medical History:   Diagnosis Date   • Cancer     left breast removed 2012   • Type 2 diabetes mellitus         Current Problems:   Active Hospital Problems    Diagnosis    • **Metabolic encephalopathy    • Seizure disorder         Nutrition/Diet History         Narrative     Patient admitted with metabolic encephalopathy.  Has a PEG tube.  Patient and POA at bedside report no known intolerances to tube feeding formulas in the past.  Unable to specify what formula the patient has been on at the nursing home.  Agreeable to using any formula that meets her nutrition needs.      Patient is hypernatremic with Na+ 149 today.  Receiving 1/2NS.      PEG tube is currently not able to be used.  RN reports it cannot be flushed.  RD will provide enteral nutrition orders to be available once PEG is functional.      Patient was seen by SLP today and has planned swallow study tomorrow morning.       Anthropometrics        Current Height, Weight Height: 160 cm (63\")  Weight: 61.8 kg (136 lb 3.9 oz)   Current BMI Body mass index is 24.13 kg/m².       Weight Hx  Wt Readings from Last 30 Encounters:   04/03/23 1058 61.8 kg (136 lb 3.9 oz)   03/25/23 0828 61.8 kg (136 lb 3.9 oz)   02/11/23 1519 72.6 kg (160 lb)   12/17/20 0610 73.5 kg (162 lb 0.6 oz)   12/16/20 0330 73.8 kg (162 lb 9.6 oz)   12/15/20 0349 75.1 kg (165 lb 9.6 oz)   12/14/20 0518 80.1 kg (176 lb 9.6 oz)   12/13/20 0509 78.2 kg (172 lb 4.8 oz)   12/12/20 0550 73.4 kg (161 lb 14.4 oz)   12/11/20 0424 71.4 kg (157 lb 8 oz)   12/10/20 0237 69.6 kg (153 lb 8 oz)   12/09/20 0620 68.5 kg (151 lb 1.6 oz)   12/08/20 1453 64.9 kg (143 lb)            Wt Change Observation -14.9% x 2 months      Estimated/Assessed Needs       Energy Requirements 30 kcal/kg   EST " Needs (kcal/day) 1854 kcal        Protein Requirements 1.0-1.2 g/kg   EST Daily Needs (g/day) 62-74 g       Fluid Requirements 30 ml/kg     Estimated Needs (mL/day) 1854 ml      Labs/Medications         Pertinent Labs Reviewed.   Results from last 7 days   Lab Units 04/04/23  0430 04/03/23  1116   SODIUM mmol/L 149* 147*   POTASSIUM mmol/L 2.4* 2.8*   CHLORIDE mmol/L 116* 111*   CO2 mmol/L 18.7* 20.7*   BUN mg/dL 89* 92*   CREATININE mg/dL 2.21* 2.24*   CALCIUM mg/dL 9.1 9.7   BILIRUBIN mg/dL  --  0.2   ALK PHOS U/L  --  183*   ALT (SGPT) U/L  --  46*   AST (SGOT) U/L  --  31   GLUCOSE mg/dL 125* 285*     Results from last 7 days   Lab Units 04/04/23  0430 04/03/23  1116   MAGNESIUM mg/dL 1.8 1.9   PHOSPHORUS mg/dL 4.6* 3.9   HEMOGLOBIN g/dL 9.6* 10.3*   HEMATOCRIT % 27.5* 28.9*     SARS-CoV-2, KATHLEEN   Date Value Ref Range Status   12/25/2022 NEGATIVE Negative Final     Comment:     The 2019-CoV rRT-PCR Assay is only for use under a Food and Drug Administration Emergency Use Authorization. The performance characteristics of the assay were verified by the Clinical Laboratory at [facility]. Results should be used in   conjunction with the patient's clinical symptoms, medical history, and other clinical/laboratory findings to determine an overall clinical diagnosis. Negative results do not preclude infection with SARS-CoV-2 (COVID-19).    Test parameters have not been validated for screening asymptomatic patients.     Lab Results   Component Value Date    HGBA1C 5.6 03/08/2023         Pertinent Medications Reviewed.     Current Nutrition Orders & Evaluation of Intake       Oral Nutrition     Current PO Diet NPO Diet NPO Type: Strict NPO   Supplement Orders Placed This Encounter      Diet, Tube Feeding Tube Feeding Formula: RD to Initiate & Manage; Tube Feeding Type: RD to Manage       Malnutrition Severity Assessment                Nutrition Diagnosis         Nutrition Dx Problem 1 Inadequate energy Intake  related to decreased ability to consume sufficient energy as evidenced by NPO and non-functioning PEG tube.      Nutrition Intervention         Once able to resume enteral nutrition, recommend:  Diabetisource AC @ 65 ml/hr  Free water flushes 75 ml every 3 hours   Provides 1872 kcal, 94 g pro, 1879 ml fluid      Medical Nutrition Therapy/Nutrition Education          Learner     Readiness Patient and Caregiver  Acceptance     Method     Response Explanation  Verbalizes understanding     Monitor/Evaluation        Monitor Per protocol, I&O, Pertinent labs, EN delivery/tolerance, Weight, POC/GOC, Diet advancement       Nutrition Discharge Plan         To be determined       Electronically signed by:  Belen Skaggs RD  04/04/23 12:27 EDT

## 2023-04-04 NOTE — PLAN OF CARE
Goal Outcome Evaluation:  Plan of Care Reviewed With: patient         ASSESSMENT/ PLAN OF CARE:  Pt presents with limitations, noted below, that impede her ability to swallow safely and maintain nutrition. The skills of a therapist will be required to safely and effectively implement the following treatment plan to restore maximal level of function.    PROBLEMS:  1. Swallow delay, delayed initiation of swallow, risk of aspiration     FREQUENCY/DURATION: Twice daily 5 times per week.    REHAB POTENTIAL:  Pt has good rehab potential.  The following limitations may influence improvement/ length of tx: Medical status.    RECOMMENDATIONS:   1.   DIET: Diet recommendation pending results of modified barium swallow study.    2.  Modified barium swallow study to rule out aspiration and determine pharyngeal functioning for safe p.o. intake.

## 2023-04-04 NOTE — CONSULTS
Purpose of the visit was to evaluate for: goals of care/advanced care planning. Spoke with POA and discussed palliative care.      Assessment: The patient's room is on PCU, currently off the floor for a procedure    Recommendations/Plan: Palliative care will return to speak with patient when she is back in the room..    Tasks Completed: Emotional Support.    Other Comments: Palliative care nurse met with the patients ANGELA, her ex- Keven and his spouse in the room. The patient is currently off the unit for a procedure. Keven explained that the patient has recently had two hospital stays and two different rehab facilities. They are interested in sending the patient to a new facility and the unit SW is working on this. The patient has two sons who have reported to Keven that they did not want to participate in their mothers care and brought Keven back into the picture. Palliative care will re-visit once the patient is in the room to discuss more on goals for the patient as well as code status.     -Roxane LONDON, RN, [facility]   Palliative Care    4/4/2023 15:32 EDT

## 2023-04-04 NOTE — PAYOR COMM NOTE
"Lyubov Middleton (68 y.o. Female)     [employer] REFERENCE # [id number]    PATIENT INFORMATION  Name:  Lyubov Middleton  MRN#:     [id number]  :  1955     INSURANCE MEMBER ID:     [id number]        ADMISSION INFORMATION  CLASS: Inpatient   DOS:  4/3/2023        CURRENT ATTENDING PROVIDER INFORMATION  Name/NPI: Germain Beasley MD [[id number]]  Phone:    Phone: [phone]    RENDERING FACILITY  Name:  [facility]   NPI:  [id number]  TID:  [id number]  Address:      [facility] [address] [place] [address]  Phone:             [phone]      CASE MANAGEMENT CONTACT INFORMATION  Phone:      [phone]  Fax:           [phone]      HOSPITAL PROBLEM LIST and ADMISSION DIAGNOSES               Lyubov Middleton (68 y.o. Female)     Date of Birth   1955    Social Security Number       Address   [address] [place] [address]    Home Phone   [phone]    MRN   [id number]       Scientology   None    Marital Status                               Admission Date   4/3/23    Admission Type   Emergency    Admitting Provider   Germain Beasley MD    Attending Provider   Germain Beasley MD    Department, Room/Bed   [facility] PROGRESSIVE CARE UNIT        Discharge Date       Discharge Disposition       Discharge Destination                               Attending Provider: Germain Beasley MD    Allergies: Artificial Saliva    Isolation: None   Infection: None   Code Status: CPR    Ht: 160 cm (63\")   Wt: 61.8 kg (136 lb 3.9 oz)    Admission Cmt: None   Principal Problem: Metabolic encephalopathy [G93.41]                 Active Insurance as of 4/3/2023     Primary Coverage     Payor Plan Insurance Group Employer/Plan Group    HUMANA MEDICARE REPLACEMENT HUMANA MEDICARE REPLACEMENT [id number]     Payor Plan Address Payor Plan Phone Number Payor Plan Fax Number Effective Dates    [address] [address] [phone]  2023 - None Entered    [facility] [id number]       " Subscriber Name Subscriber Birth Date Member ID       RIC LIEBERMAN 1955 [id number]           Secondary Coverage     Payor Plan Insurance Group Employer/Plan Group    MISC MC SUP   MISC MC SUP                   Coverage Address Coverage Phone Number Coverage Fax Number Effective Dates    [address] [address] [phone]  3/25/2023 - None Entered    [place] [address]       Subscriber Name Subscriber Birth Date Member ID       RIC LIEBERMAN 1955 [id number]                 Emergency Contacts      (Rel.) Home Phone Work Phone Mobile Phone    Emi Grove (POA)Keven (Yonathan) [phone] -- [phone]            [place]: NPI [id number] Tax ID [id number]        Neurology GRG Clinical Indications for Admission to Inpatient Care by Belen Thompson RN       Indications Met: Reviewed on 4/3/2023 by Belen Thompson RN       Created Using Review Status Review Entered   [facility] for Case Management Primary Completed 4/3/2023 2036       Created By   Belen Thompson RN       Criteria Set Name - Subset   Neurology GRG Clinical Indications for Admission to Inpatient Care      Criteria Review   Neurology GRG Clinical Indications for Admission to Inpatient Care     Overall Determination: Indications Met     Criteria:  [×] Hospital admission is needed for appropriate care of the patient because of  1 or more  of the following :      [×] Altered mental status that is severe or persistent          4/3/2023  8:36 PM              -- 4/3/2023  8:36 PM by Belen Thompson, NINA --                                    (X) Altered mental status (ie, different from baseline) that is severe or persistent, as indicated by  1 or more  of the following  (1) (2) (3) (4):                  (X) Lethargy (awake or arousable, but with drowsiness; reduced awareness of self and environment) that persists (eg, for more than few hours) despite appropriate treatment (eg, of underlying cause)                  (X) Obtundation (ie, arousable only  with strong stimuli, lessened interest in environment, slowed responses to stimulation)          4/3/2023  8:36 PM              -- 4/3/2023  8:36 PM by Belen Thompson RN --                  Disoriented, lethargic. Follows commands but slow to respond. + AMS change. Was AAO x4 2 days ago.      [×] Mental status change requiring inpatient care, as indicated by  1 or more  of the following  (26) (27) (28):          [×] Etiology suspected that requires treatment beyond observation care time frame (eg, Wernicke encephalopathy) (29)              4/3/2023  8:36 PM                  -- 4/3/2023  8:36 PM by Belen Thompson RN --                      Determime if AMS/enephalopathy r/t UTI, Epilepsy or another cause & treat cause. Pt oriented X 1.     Notes:  -- 4/3/2023  8:36 PM by Belen Thompson RN --      To ED from NH for AMS. Family reports she was AAO x 4 2 days ago. Now oriented X 1. Lethargic. Slow to respond or follow directions. ? seizure witnessted today.            Recent admission x 2 months in [facility] for UTI/Seizures.            PMHx: DM, Seizures, ; breast cancer/mastectomy;            ED results: UA: Sm blood, large leukocytes; 3+ bacteria. CXR neg; 1st HS trop 58; glucose 285; Na+ 147; K+ 2.8; bicarb 20.7; Anion gap 15.3; Creat 2.24, BUN 92; GFR 23.5; Alk Phos 183;            In ED: IV NS 1000ml bolus; IV KCL 10 IVPB.            Urine C&S pending.            Admit: Telemetry; Neuro consult; Sz precautions; Wound care RN; Bladder scan; O2 prn; PT/OT/SLP; NPO. Diet per G tube; 1/2 NS @ 100/hr; Rocephin IV qd; Zofran IV pnr; Ativan IV prn sz; Labs in AM.                History & Physical      Germain Beasley MD at 23 1651           [facility] HISTORY AND PHYSICAL  Date: 4/3/2023   Patient Name: Lyubov Middleton  : 1955  MRN: [id number]  Primary Care Physician:  Ana Huitron MD  Date of admission: 4/3/2023    Subjective    Subjective     Chief Complaint: Altered mental  status    HPI:    Lyubov Middleton is a 68 y.o. female past medical history of diabetes mellitus, CKD 4, seizure disorder, dysphagia currently undergoing rehab at [facility] and rehab presents with altered mental status.  Patient remains somewhat altered and has no memory of the episode.  Per nurse at [facility] nursing [facility] patient was in her normal state of health earlier on the morning of presentation and was working with therapy when she indicated she is not feeling well her eyes rolled back in her head and she became unresponsive.  Sternal rub attempted without response.  Patient was brought to the emergency department for further evaluation and treatment.  In the emergency department patient mentation slowly improved.  Patient alert oriented x1 which is below patient's baseline.  Afebrile sinus rhythm 70s blood pressure within normal limits satting well room air.  Labs demonstrated mildly elevated troponin though congruent with CKD 4, slightly elevated glucose, low potassium, elevated sodium, creatinine 2.24 up from patient's baseline of 1.7, white blood cell count within normal limits.  Hemoglobin around patient's baseline.  Urinalysis concerning for UTI.  CT head negative for any acute intracranial abnormalities did demonstrate diffuse atrophy and white matter changes.  Chest x-ray negative for any acute effusions or infiltrate.  Skin exam reveals multiple bruises in various stages of healing.  Nursing reports that patient frequently tries to get out of bed and falls.  Patient recently broke her nose following recent fall. Healing well.  Cerebell applied in the emergency department and negative for seizure activity.  Hospitalist service contacted for admission for further evaluation and treatment of altered mental status likely acute metabolic encephalopathy due to urinary tract infection potentially provoking a nonconvulsive seizure.  Discussed case with patient's ex- (POA) and son  at the bedside.  Family is frustrated with level of care and rehab patient is receiving at current facility and discussing alternative care accommodations.      Of note patient recently had a prolonged hospitalization at [facility] from December to February for urinary tract infection with AUSTEN on CKD complicated by nonconvulsive status epilepticus resulting in the patient's inability to safely swallow or ambulate.  Patient has been in rehab since this hospitalization.  Rehab has been complicated by a bout of C. difficile colitis as well as multiple falls.  Patient was discharged from [facility] on Vimpat but not currently on med rec.      Personal History     Past Medical History:  Past Medical History:   Diagnosis Date   • Cancer     left breast removed    • Type 2 diabetes mellitus         Past Surgical History:  Past Surgical History:   Procedure Laterality Date   • ABDOMINAL SURGERY     • BREAST SURGERY Left     removed due to cancer   • CHOLECYSTECTOMY     • CYSTOSCOPY W/ URETERAL STENT PLACEMENT Right 2020    Procedure: CYSTOSCOPY, RIGHT RETROGRADE PYELOGRAM, URETEROSCOPY, LASER LITHOTRIPSY, RIGHT URETERAL STENT PLACEMENT;  Surgeon: Rohan Dooley Jr., MD;  Location: [facility];  Service: Urology;  Laterality: Right;   • GASTROSTOMY W/ FEEDING TUBE     • HERNIA REPAIR      mesh removed        Family History:   Family History   Problem Relation Age of Onset   • Diabetes Mother    • Lung disease Father    • Cancer Father         Social History:   Social History     Socioeconomic History   • Marital status:    Tobacco Use   • Smoking status: Former     Types: Cigarettes     Quit date: 1997     Years since quittin.6     Passive exposure: Past   • Smokeless tobacco: Never   Vaping Use   • Vaping Use: Never used   Substance and Sexual Activity   • Alcohol use: No   • Drug use: No   • Sexual activity: Never        Home Medications:  Darbepoetin Jaylen, Ergocalciferol,  Menthol-Zinc Oxide, acetaminophen, alendronate, amLODIPine, anastrozole, carvedilol, cholestyramine, citalopram, ferrous sulfate, hydrALAZINE, ipratropium, levothyroxine, losartan, phosphorus, potassium chloride, saccharomyces boulardii, and sodium bicarbonate    Allergies:  Allergies   Allergen Reactions   • Artificial Saliva Unknown - High Severity       Review of Systems   Unable to perform ROS: Mental status change          Objective    Objective     Vitals:   Temp:  [97.7 °F (36.5 °C)-98.2 °F (36.8 °C)] 98.2 °F (36.8 °C)  Heart Rate:  [71-78] 76  Resp:  [16-17] 16  BP: (118-130)/(59-68) 130/68    Physical Exam   Gen. well-developed appearing stated age in no acute distress  HEENT: Normocephalic multiple bruises of the head as well as well-healing laceration of the bridge of the nose, moist membranes pupils equal round reactive light, no scleral icterus no conjunctival injection, protruding right maxillary central incisor  Cardiovascular: regular rate and rhythm no murmurs rubs or gallops S1-S2, no lower extremity edema appreciated  Pulmonary: Clear to auscultation bilaterally no wheezes rales or rhonchi symmetric chest expansion, unlabored, no conversational dyspnea appreciated  Gastrointestinal: Soft nontender nondistended positive bowel sounds all 4 quadrants no rebound or guarding, G-tube in the epigastrium  Musculoskeletal: No clubbing cyanosis, warm and well-perfused, calves soft symmetric nontender bilaterally  Skin: Multiple bruises bilateral lower extremities, excoriation of the buttocks, bruising over the brow and forehead, healing laceration of the bridge of the nose  Neuro: Cranial nerves II through XII intact grossly no sensorimotor deficits appreciated bilateral upper and lower extremities, generalized weakness  Psych: Patient is calm cooperative and appropriate with exam not responding to internal stimuli  : No Fernandez catheter no bladder distention positive suprapubic tenderness    Result Review     Result Review:  I have personally reviewed the results and agree with these findings:  [x]  Laboratory  LAB RESULTS:      Lab 04/03/23  1116   WBC 10.51   HEMOGLOBIN 10.3*   HEMATOCRIT 28.9*   PLATELETS 284   NEUTROS ABS 8.10*   IMMATURE GRANS (ABS) 0.04   LYMPHS ABS 1.34   MONOS ABS 0.61   EOS ABS 0.36   MCV 87.8         Lab 04/03/23  1116   SODIUM 147*   POTASSIUM 2.8*   CHLORIDE 111*   CO2 20.7*   ANION GAP 15.3*   BUN 92*   CREATININE 2.24*   EGFR 23.5*   GLUCOSE 285*   CALCIUM 9.7   MAGNESIUM 1.9   PHOSPHORUS 3.9         Lab 04/03/23  1116   TOTAL PROTEIN 7.8   ALBUMIN 3.5   GLOBULIN 4.3   ALT (SGPT) 46*   AST (SGOT) 31   BILIRUBIN 0.2   ALK PHOS 183*         Lab 04/03/23  1116   HSTROP T 58*                 Brief Urine Lab Results  (Last result in the past 365 days)      Color   Clarity   Blood   Leuk Est   Nitrite   Protein   CREAT   Urine HCG        04/03/23 1347 Yellow   Turbid   Small (1+)   Large (3+)   Negative   >=300 mg/dL (3+)               Microbiology Results (last 10 days)     ** No results found for the last 240 hours. **          []  Microbiology  [x]  Radiology  CT Head Without Contrast    Result Date: 4/3/2023    CT scan of the head without IV contrast demonstrating diffuse atrophy and white matter changes.  No acute intracranial abnormality is seen.     KULWANT HOLLOWAY MD       Electronically Signed and Approved By: KULWANT HOLLOWAY MD on 4/03/2023 at 12:27             XR Chest 1 View    Result Date: 4/3/2023   No active cardiopulmonary disease is seen.       KULWANT HOLLOWAY MD       Electronically Signed and Approved By: KULWANT HOLLOWAY MD on 4/03/2023 at 11:53               [x]  EKG/Telemetry   []  Cardiology/Vascular   []  Pathology  [x]  Old records discharge summary from [facility] for the above-mentioned hospitalization in February  [x]  Other:  Scheduled Meds:anastrozole, 1 mg, Oral, Daily  cefTRIAXone, 1 g, Intravenous, Q24H  cholestyramine, 1 packet, Per PEG Tube, BID  citalopram,  10 mg, Per G Tube, Daily  insulin regular, 2-7 Units, Subcutaneous, Q6H  [START ON 4/4/2023] levothyroxine, 12.5 mcg, Oral, Q AM  saccharomyces boulardii, 250 mg, Oral, BID  sodium bicarbonate, 650 mg, Per PEG Tube, BID  sodium chloride, 10 mL, Intravenous, Q12H      Continuous Infusions:sodium chloride, 100 mL/hr, Last Rate: 100 mL/hr (04/03/23 7546)      PRN Meds:.•  acetaminophen  •  senna-docusate sodium **AND** polyethylene glycol **AND** bisacodyl **AND** bisacodyl  •  dextrose  •  dextrose  •  glucagon (human recombinant)  •  LORazepam  •  nitroglycerin  •  ondansetron **OR** ondansetron  •  sodium chloride  •  sodium chloride  •  sodium chloride        Assessment & Plan   Assessment / Plan     Assessment/Plan:   Altered mental status  Acute metabolic encephalopathy secondary to urinary tract infection  Urinary tract infection  Nonconvulsive seizure  AUSTEN on CKD 4 baseline creatinine 1.7  Hypokalemia  Diabetes mellitus  Hypernatremia  Metabolic acidosis  Debility  Oropharyngeal dysphagia   Anemia of chronic kidney disease  Chronic diarrhea  Hypertension  Depression  Hypothyroidism  History of nonconvulsive status epilepticus  History of breast cancer status post left mastectomy        Patient admitted for further evaluation and treatment  Neurology consulted thank you for your assistance  Continue regular reorientation  Start Rocephin empirically pending urine culture  Antiepileptic initiation per neurology  Get EEG  Start IV fluids  Monitor renal function closely  Monitor electrolytes replace as needed  Continue sliding scale insulin to every 6  Continue bicarb per G-tube  Consult speech therapy  Consult physical therapy occupational therapy  Monitor hemoglobin transfuse packed red blood cells as needed to keep hemoglobin greater than 7  Patient receives darbepoetin alpha subcu on the 26th of every month for treatment of anemia of chronic kidney disease  Continue home cholestyramine  Continue home  Florastor  Monitor blood pressure and restart home amlodipine, carvedilol, hydralazine, losartan as needed  Continue home citalopram  Continue home levothyroxine  Follow-up thyroid panel  Continue home anastrozole    Discussed case with patient's nurse at bedside.  Discussed case with emergency department attending.  Discussed with neurology attending.  Further inpatient orders recommendations pending clinical course.    Disposition: Inpatient rehab.  Family discussing potentially finding an alternative facility    DVT prophylaxis:  Mechanical DVT prophylaxis orders are present.    CODE STATUS:    Code Status (Patient has no pulse and is not breathing): CPR (Attempt to Resuscitate)  Medical Interventions (Patient has pulse or is breathing): Full Support      Admission Status:  I believe this patient meets inpatient status.                 Electronically signed by Germain Beasley MD at 04/03/23 7502          Emergency Department Notes      Valencia Davis MD at 04/03/23 1130          Time: 11:30 AM EDT  Date of encounter:  4/3/2023  Independent Historian/Clinical History and Information was obtained by:   Patient  Chief Complaint   Patient presents with   • Seizures       History is limited by: Altered Mental Status    History of Present Illness:  Patient is a 68 y.o. year old female who presents to the emergency department for evaluation of seizure. Patient brought from [facility]. Patient is AOx1 on eval. (Provider in triage, Esau Sancehz PA-C)  Patient denies abdominal pain and vomiting but reports of nausea. No family at bedside at the moment.  Nursing facility states that patient had a seizure this morning.  Patient is on Vimpat.    HPI    Patient Care Team  Primary Care Provider: Ana Huitron MD    Past Medical History:     Allergies   Allergen Reactions   • Artificial Saliva Unknown - High Severity     Past Medical History:   Diagnosis Date   • Cancer     left breast removed 2012   • Type 2  diabetes mellitus      Past Surgical History:   Procedure Laterality Date   • ABDOMINAL SURGERY     • BREAST SURGERY Left 2012    removed due to cancer   • CHOLECYSTECTOMY     • CYSTOSCOPY W/ URETERAL STENT PLACEMENT Right 12/09/2020    Procedure: CYSTOSCOPY, RIGHT RETROGRADE PYELOGRAM, URETEROSCOPY, LASER LITHOTRIPSY, RIGHT URETERAL STENT PLACEMENT;  Surgeon: Rohan Dooley Jr., MD;  Location: [facility] OR;  Service: Urology;  Laterality: Right;   • GASTROSTOMY W/ FEEDING TUBE     • HERNIA REPAIR      mesh removed     Family History   Problem Relation Age of Onset   • Diabetes Mother    • Lung disease Father    • Cancer Father        Home Medications:  Prior to Admission medications    Medication Sig Start Date End Date Taking? Authorizing Provider   acetaminophen (TYLENOL) 160 MG/5ML solution Administer 320 mg per G tube Daily.    Pancho Carpenter MD   alendronate (FOSAMAX) 70 MG tablet Take 1 tablet by mouth Every 7 (Seven) Days. Thursday    Pancho Carpenter MD   amoxicillin-clavulanate (AUGMENTIN) 250-125 MG per tablet Take 1 tablet by mouth 2 (Two) Times a Day. 3/25/23   Christiana Puga APRN   anastrozole (ARIMIDEX) 1 MG tablet Take 1 tablet by mouth Daily. 3/5/23   Pancho Carpenter MD   carvedilol (COREG) 25 MG tablet Take 1 tablet by mouth 2 (Two) Times a Day.    Pancho Carpenter MD   cholestyramine (QUESTRAN) 4 g packet Take 1 packet by mouth 2 (Two) Times a Day.    Pancho Carpenter MD   citalopram (CeleXA) 10 MG tablet Administer 1 tablet per G tube Daily. 12/2/18   Pancho Carpenter MD   Darbepoetin Jaylen (Aranesp, Albumin Free,) 25 MCG/ML injection Inject 12.5 mcg under the skin into the appropriate area as directed Every 30 (Thirty) Days. 26th of Month    Pancho Carpenter MD   ferrous sulfate 300 (60 Fe) MG/5ML syrup Administer 5 mL per G tube Daily.    Pancho Carpenter MD   hydrALAZINE (APRESOLINE) 25 MG tablet Administer 1 tablet per G tube Every 12  "(Twelve) Hours.    Pancho Carpenter MD   ipratropium (ATROVENT) 0.02 % nebulizer solution Take 2.5 mL by nebulization 4 (Four) Times a Day Before Meals & at Bedtime.    Pancho Carpenter MD   lacosamide (VIMPAT) 10 MG/ML solution oral solution Administer 15 mL per G tube Every 12 (Twelve) Hours.    Pancho Carpenter MD   levothyroxine (SYNTHROID, LEVOTHROID) 25 MCG tablet Take 12.5 mcg by mouth Every Morning.    Pancho Carpenter MD   losartan (COZAAR) 25 MG tablet Take 1 tablet by mouth Daily.    Pancho Carpenter MD   Menthol-Zinc Oxide (Calmoseptine) 0.44-20.6 % ointment Apply 1 application topically to the appropriate area as directed 3 (Three) Times a Day.    Pancho Carpenter MD   NIFEdipine XL (PROCARDIA XL) 30 MG 24 hr tablet Take 1 tablet by mouth Daily.    Pancho Carpenter MD   phosphorus (Phospha 250 Neutral) 155-852-130 MG tablet Administer 1 tablet per G tube 2 (Two) Times a Day.    Pancho Carpenter MD   sodium bicarbonate 650 MG tablet Take 1 tablet by mouth 2 (Two) Times a Day.    Pancho Carpenter MD        Social History:   Social History     Tobacco Use   • Smoking status: Former     Types: Cigarettes     Quit date: 1997     Years since quittin.6   • Smokeless tobacco: Never   Substance Use Topics   • Alcohol use: No   • Drug use: No         Review of Systems:  Review of Systems   Unable to perform ROS: Mental status change   Gastrointestinal: Positive for nausea.        Physical Exam:  /68 (BP Location: Right arm, Patient Position: Lying)   Pulse 76   Temp 98.2 °F (36.8 °C) (Oral)   Resp 16   Ht 160 cm (63\")   Wt 61.8 kg (136 lb 3.9 oz)   SpO2 97%   BMI 24.13 kg/m²     Physical Exam  Vitals and nursing note reviewed.   Constitutional:       General: She is not in acute distress.     Appearance: Normal appearance. She is not toxic-appearing.   HENT:      Head: Normocephalic and atraumatic.      Jaw: There is normal jaw occlusion.      " Mouth/Throat:      Mouth: Mucous membranes are moist.   Eyes:      General: Lids are normal.      Extraocular Movements: Extraocular movements intact.      Conjunctiva/sclera: Conjunctivae normal.      Pupils: Pupils are equal, round, and reactive to light.   Cardiovascular:      Rate and Rhythm: Normal rate and regular rhythm.      Pulses: Normal pulses.      Heart sounds: Normal heart sounds.   Pulmonary:      Effort: Pulmonary effort is normal. No respiratory distress.      Breath sounds: Normal breath sounds. No wheezing or rhonchi.   Abdominal:      General: Abdomen is flat. There is no distension.      Palpations: Abdomen is soft.      Tenderness: There is no abdominal tenderness. There is no guarding or rebound.   Musculoskeletal:         General: Normal range of motion.      Cervical back: Normal range of motion and neck supple.      Right lower leg: No edema.      Left lower leg: No edema.   Skin:     General: Skin is warm and dry.   Neurological:      Mental Status: She is lethargic.      Comments: Patient moves all extremities and follows commands     Psychiatric:         Mood and Affect: Mood normal.         Behavior: Behavior normal.                 Procedures:  Procedures      Medical Decision Making:      Comorbidities that affect care:    Atrial Fibrillation, Cancer, Diabetes,  Hypokalemia, Conversion disorder with seizure, epilepsy     External Notes reviewed:    Previous Admission Note: patient was admitted at [facility] on 3/7/23 for unwitnessed fall and had a laceration on forehead and nose that required 16 sutures. Patient also sustained fracture to the nasal bridge.       The following orders were placed and all results were independently analyzed by me:  Orders Placed This Encounter   Procedures   • Urine Culture - Urine,   • XR Chest 1 View   • CT Head Without Contrast   • [place] Draw   • Comprehensive Metabolic Panel   • Single High Sensitivity Troponin T   • Magnesium   • CBC Auto Differential    • Urinalysis With Culture If Indicated - Straight Cath   • Lacosamide Blood   • Potassium   • Magnesium   • High Sensitivity Troponin T   • Blood Gas, Arterial -With Co-Ox Panel: Yes   • Basic Metabolic Panel   • Phosphorus   • Urinalysis, Microscopic Only - Urine, Clean Catch   • NPO Diet NPO Type: Strict NPO   • Undress & Gown   • Continuous Pulse Oximetry   • Vital Signs   • Orthostatic Blood Pressure   • Vital Signs   • Notify Provider (With Default Parameters)   • Notify Provider (Specify Parameters)   • Intake & Output   • Weigh Patient   • Oral Care   • Place Sequential Compression Device   • Maintain Sequential Compression Device   • Telemetry - Maintain IV Access   • Continuous Cardiac Monitoring   • Telemetry - Pulse Oximetry   • During the day, keep the room lights on, blinds open, TV/Radio on, reposition bed for more light, discourage day naps   • At night, keep environment as quiet as possible, dim the lights, TV/Radio off, minimize alarms, noise and loud talking   • Reduce, as possible: medication/sedatives, noise, anxiety, pain, constipation, malnutrition, restraints   • Ask family to bring family pictures, home bed cover/blanket, reading material   • Obtain patient's glasses, hearing aids and dentures   • Clancy Patient   • Ambulate Patient   • Up With Assistance to Chair   • Between 10pm and 4am, limit waking the patient as much as possible (ie group nursing tasks, lab draws)   • Encourage Family to Visit and Stay at Bedside as Much as Possible   • Assess Patient For Urinary Retention   • Utilize Voiding Measures if Retention is Suspected   • Bladder Scan for Suspected Urinary Retention   • Monitor Every 1-2 Hours for Spontaneous Void if Bladder Scan Volume is Less Than 500mL & Patient is Without Symptoms of Bladder Discomfort / Distention   • Straight Cath For Acute Retention if Bladder Scan Volume is Greater Than 500mL or Patient Has Symptoms of Bladder Discomfort / Distention (Unless  Contraindicated)   • Notify Provider Acute Urinary Retention   • Hospitalist (on-call MD unless specified)   • Oxygen Therapy- Nasal Cannula; 2 LPM; Titrate for SPO2: 92%, Greater Than or Equal To   • Oxygen Therapy- Nasal Cannula; Titrate for SPO2: 90% - 95%   • POC Glucose Once   • POC Glucose TID AC   • ECG 12 Lead ED Triage Standing Order; Weak / Dizzy / AMS   • ECG 12 Lead Chest Pain   • EEG   • Insert Peripheral IV   • Insert Peripheral IV   • Initiate Observation Status   • Fall Precautions   • Seizure Precautions   • Fall Precautions   • CBC & Differential   • Green Top (Gel)   • Lavender Top   • Gold Top - SST   • Light Blue Top   • CBC & Differential       Medications Given in the Emergency Department:  Medications   sodium chloride 0.9 % flush 10 mL (has no administration in time range)   potassium chloride 10 mEq in 100 mL IVPB (10 mEq Intravenous New Bag 4/3/23 1309)   sodium chloride 0.9 % flush 10 mL (has no administration in time range)   sodium chloride 0.9 % flush 10 mL (has no administration in time range)   sodium chloride 0.9 % infusion 40 mL (has no administration in time range)   acetaminophen (TYLENOL) tablet 650 mg (has no administration in time range)   sennosides-docusate (PERICOLACE) 8.6-50 MG per tablet 2 tablet (has no administration in time range)     And   polyethylene glycol (MIRALAX) packet 17 g (has no administration in time range)     And   bisacodyl (DULCOLAX) EC tablet 5 mg (has no administration in time range)     And   bisacodyl (DULCOLAX) suppository 10 mg (has no administration in time range)   ondansetron (ZOFRAN) tablet 4 mg (has no administration in time range)     Or   ondansetron (ZOFRAN) injection 4 mg (has no administration in time range)   nitroglycerin (NITROSTAT) SL tablet 0.4 mg (has no administration in time range)   dextrose (GLUTOSE) oral gel 15 g (has no administration in time range)   dextrose (D50W) (25 g/50 mL) IV injection 25 g (has no administration in  time range)   glucagon (GLUCAGEN) injection 1 mg (has no administration in time range)   insulin regular (humuLIN R,novoLIN R) injection 2-7 Units (has no administration in time range)   sodium chloride 0.45 % infusion (has no administration in time range)   cefTRIAXone (ROCEPHIN) IVPB 1 g (has no administration in time range)   sodium chloride 0.9 % bolus 1,000 mL (1,000 mL Intravenous New Bag 4/3/23 1309)        ED Course:    The patient was initially evaluated in the triage area where orders were placed. The patient was later dispositioned by Valencia Davis MD.      The patient was advised to stay for completion of workup which includes but is not limited to communication of labs and radiological results, reassessment and plan. The patient was advised that leaving prior to disposition by a provider could result in critical findings that are not communicated to the patient.     ED Course as of 04/03/23 1656   Mon Apr 03, 2023   1221 Baseline of creatinine for patient is 1.7-1.8   [KS]      ED Course User Index  [KS] Quynh Leroy       Labs:    Lab Results (last 24 hours)     Procedure Component Value Units Date/Time    POC Glucose Once [[id number]]  (Abnormal) Collected: 04/03/23 1107    Specimen: Blood Updated: 04/03/23 1109     Glucose 278 mg/dL      Comment: Serial Number: [id number]:  [id number]       CBC & Differential [[id number]]  (Abnormal) Collected: 04/03/23 1116    Specimen: Blood Updated: 04/03/23 1126    Narrative:      The following orders were created for panel order CBC & Differential.  Procedure                               Abnormality         Status                     ---------                               -----------         ------                     CBC Auto Differential[[id number]]        Abnormal            Final result                 Please view results for these tests on the individual orders.    Comprehensive Metabolic Panel [[id number]]  (Abnormal) Collected: 04/03/23 1116     Specimen: Blood Updated: 04/03/23 1146     Glucose 285 mg/dL      BUN 92 mg/dL      Creatinine 2.24 mg/dL      Sodium 147 mmol/L      Potassium 2.8 mmol/L      Comment: Slight hemolysis detected by analyzer. Results may be affected.        Chloride 111 mmol/L      CO2 20.7 mmol/L      Calcium 9.7 mg/dL      Total Protein 7.8 g/dL      Albumin 3.5 g/dL      ALT (SGPT) 46 U/L      AST (SGOT) 31 U/L      Alkaline Phosphatase 183 U/L      Total Bilirubin 0.2 mg/dL      Globulin 4.3 gm/dL      A/G Ratio 0.8 g/dL      BUN/Creatinine Ratio 41.1     Anion Gap 15.3 mmol/L      eGFR 23.5 mL/min/1.73     Narrative:      GFR Normal >60  Chronic Kidney Disease <60  Kidney Failure <15      Single High Sensitivity Troponin T [[id number]]  (Abnormal) Collected: 04/03/23 1116    Specimen: Blood Updated: 04/03/23 1201     HS Troponin T 58 ng/L     Narrative:      High Sensitive Troponin T Reference Range:  <10.0 ng/L- Negative Female for AMI  <15.0 ng/L- Negative Male for AMI  >=10 - Abnormal Female indicating possible myocardial injury.  >=15 - Abnormal Male indicating possible myocardial injury.   Clinicians would have to utilize clinical acumen, EKG, Troponin, and serial changes to determine if it is an Acute Myocardial Infarction or myocardial injury due to an underlying chronic condition.         Magnesium [[id number]]  (Normal) Collected: 04/03/23 1116    Specimen: Blood Updated: 04/03/23 1146     Magnesium 1.9 mg/dL     CBC Auto Differential [[id number]]  (Abnormal) Collected: 04/03/23 1116    Specimen: Blood Updated: 04/03/23 1126     WBC 10.51 10*3/mm3      RBC 3.29 10*6/mm3      Hemoglobin 10.3 g/dL      Hematocrit 28.9 %      MCV 87.8 fL      MCH 31.3 pg      MCHC 35.6 g/dL      RDW 14.3 %      RDW-SD 45.1 fl      MPV 10.7 fL      Platelets 284 10*3/mm3      Neutrophil % 77.1 %      Lymphocyte % 12.7 %      Monocyte % 5.8 %      Eosinophil % 3.4 %      Basophil % 0.6 %      Immature Grans % 0.4 %      Neutrophils, Absolute  8.10 10*3/mm3      Lymphocytes, Absolute 1.34 10*3/mm3      Monocytes, Absolute 0.61 10*3/mm3      Eosinophils, Absolute 0.36 10*3/mm3      Basophils, Absolute 0.06 10*3/mm3      Immature Grans, Absolute 0.04 10*3/mm3      nRBC 0.0 /100 WBC     Lacosamide Blood [[id number]] Collected: 04/03/23 1116    Specimen: Blood Updated: 04/03/23 1424    Phosphorus [[id number]]  (Normal) Collected: 04/03/23 1116    Specimen: Blood Updated: 04/03/23 1444     Phosphorus 3.9 mg/dL     Urinalysis With Culture If Indicated - Straight Cath [[id number]]  (Abnormal) Collected: 04/03/23 1347    Specimen: Urine from Straight Cath Updated: 04/03/23 1428     Color, UA Yellow     Appearance, UA Turbid     pH, UA 5.5     Specific Gravity, UA 1.015     Glucose, UA Negative     Ketones, UA Negative     Bilirubin, UA Negative     Blood, UA Small (1+)     Protein, UA >=300 mg/dL (3+)     Leuk Esterase, UA Large (3+)     Nitrite, UA Negative     Urobilinogen, UA 0.2 E.U./dL    Narrative:      In absence of clinical symptoms, the presence of pyuria, bacteria, and/or nitrites on the urinalysis result does not correlate with infection.    Urinalysis, Microscopic Only - Straight Cath [[id number]]  (Abnormal) Collected: 04/03/23 1347    Specimen: Urine from Straight Cath Updated: 04/03/23 1446     RBC, UA 6-12 /HPF      WBC, UA Too Numerous to Count /HPF      Bacteria, UA 3+ /HPF      Squamous Epithelial Cells, UA 3-6 /HPF      Yeast, UA Small/1+ Budding Yeast /HPF      Hyaline Casts, UA None Seen /LPF      Methodology Manual Light Microscopy    Urine Culture - Urine, Straight Cath [[id number]] Collected: 04/03/23 1347    Specimen: Urine from Straight Cath Updated: 04/03/23 1446           Imaging:    CT Head Without Contrast    Result Date: 4/3/2023  PROCEDURE: CT HEAD WO CONTRAST  COMPARISON:  [facility], CT, CT FACIAL BONES WO CONTRAST, 3/25/2023, 12:31.  INDICATIONS: AMS  PROTOCOL:   Standard imaging protocol performed    RADIATION:   DLP:  1081.2mGy*cm   MA and/or KV was adjusted to minimize radiation dose.    TECHNIQUE: CT images were obtained without non-ionic intravenous contrast material.  FINDINGS:  The ventricles, sulci, and cerebellar folia are moderately and diffusely prominent consistent with atrophy.  Ill-defined diminished density in cerebral white matter is consistent with mild gliosis and/or numerous lacunar infarcts.  There is no CT evidence of acute intracranial hemorrhage, mass, or mass effect.  The orbits have a normal appearance.  The paranasal sinuses, middle ears, and mastoid air cells are well aerated.        CT scan of the head without IV contrast demonstrating diffuse atrophy and white matter changes.  No acute intracranial abnormality is seen.     KULWANT HOLLOWAY MD       Electronically Signed and Approved By: KULWANT HOLLOWAY MD on 4/03/2023 at 12:27             XR Chest 1 View    Result Date: 4/3/2023  PROCEDURE: XR CHEST 1 VW  COMPARISON: None  INDICATIONS: WEAKNESS TODAY  FINDINGS:  The heart is normal in size.  The lungs are well-expanded and free of infiltrates.  Mild degenerative changes are seen in the glenohumeral joints.  Surgical clips are seen in the left axillary region.       No active cardiopulmonary disease is seen.       KULWANT HOLLOWAY MD       Electronically Signed and Approved By: KULWANT HOLLOWAY MD on 4/03/2023 at 11:53                 Differential Diagnosis and Discussion:      Seizure: Differential diagnosis includes but is not limited to meningitis, hypoglycemia, electrolyte abnormalities, intracranial hemorrhage, toxin induced, and pseudoseizure.    All labs were reviewed and interpreted by me.  All X-rays were independently reviewed by me.  EKG was interpreted by me.  CT scan radiology interpretation was reviewed by me.    MDM       Patient's creatinine is 2.24 with a BUN of 92.  Suspicious that she has some metabolic encephalopathy occurring as she is slow to respond and ANO x1 is at her baseline of A&O  x4.  Patient is also hypokalemic.  CT of the head showed only atrophy chest x-ray unremarkable.  EKG normal sinus rhythm no sign of ST elevation or depression.    Patient Care Considerations:    I considered a CT of the abdomen patient had no abdominal pain on examination.      Consultants/Shared Management Plan:    I spoke with the hospitalist who admitted the patient.    Social Determinants of Health:    Patient is a nursing home/assisted living resident and has reliable access to care.      Disposition and Care Coordination:    Admit:   Through independent evaluation of the patient's history, physical, and imperical data, the patient meets criteria for observation/admission to the hospital.      Final diagnoses:   Metabolic encephalopathy        ED Disposition     ED Disposition   Decision to Admit    Condition   --    Comment   Level of Care: Telemetry [5]   Diagnosis: Metabolic encephalopathy [348.31.ICD-9-CM]   Admitting Physician: GERMAIN BEASLEY [[id number]]   Attending Physician: GERMAIN BEASLEY [[id number]]               This medical record created using voice recognition software.    Documentation assistance provided by Quynh Leroy acting as scribe for Dr. Valencia Davis MD. Information recorded by the scribe was done at my direction and has been verified and validated by me.            Quynh Leroy  04/03/23 1224       Valencia Davis MD  04/03/23 1656      Electronically signed by Valencia Davis MD at 04/03/23 1656     Rashid Bautista, RN at 04/03/23 1143        Spoke with Pts son who is also her POA and he stated he saw her on Saturday and she was AxO x4. His son saw her yesterday and said she was sleeping a lot more than normal.    Electronically signed by Rashid Bautista, RN at 04/03/23 1143         Current Facility-Administered Medications   Medication Dose Route Frequency Provider Last Rate Last Admin   • acetaminophen (TYLENOL) tablet 650 mg  650 mg Per PEG Tube Q4H PRN Germain Beasley MD       • [START ON  4/4/2023] anastrozole (ARIMIDEX) tablet 1 mg  1 mg Per PEG Tube Daily Germain Beasley MD       • sennosides-docusate (PERICOLACE) 8.6-50 MG per tablet 2 tablet  2 tablet Per PEG Tube BID PRN Germain Beasley MD        And   • polyethylene glycol (MIRALAX) packet 17 g  17 g Per PEG Tube Daily PRN Germain Beasley MD        And   • bisacodyl (DULCOLAX) EC tablet 5 mg  5 mg Oral Daily PRN Germain Beasley MD        And   • bisacodyl (DULCOLAX) suppository 10 mg  10 mg Rectal Daily PRN Germain Beasley MD       • cefTRIAXone (ROCEPHIN) IVPB 1 g  1 g Intravenous Q24H Germain Beasley  mL/hr at 04/03/23 1734 1 g at 04/03/23 1734   • cholestyramine (QUESTRAN) packet 1 packet  1 packet Per PEG Tube BID Germain Beasley MD       • citalopram (CeleXA) tablet 10 mg  10 mg Per G Tube Daily Germain Beasley MD       • dextrose (D50W) (25 g/50 mL) IV injection 25 g  25 g Intravenous Q15 Min PRN Germain Beasley MD       • dextrose (GLUTOSE) oral gel 15 g  15 g Oral Q15 Min PRN Germain Beasley MD       • glucagon (GLUCAGEN) injection 1 mg  1 mg Intramuscular Q15 Min PRN Germain Beasley MD       • insulin regular (humuLIN R,novoLIN R) injection 2-7 Units  2-7 Units Subcutaneous Q6H Germain Beasley MD       • [START ON 4/4/2023] levothyroxine (SYNTHROID, LEVOTHROID) tablet 12.5 mcg  12.5 mcg Per PEG Tube Q AM Germain Beasley MD       • LORazepam (ATIVAN) injection 2 mg  2 mg Intravenous Q4H PRN Germain Beasley MD       • nitroglycerin (NITROSTAT) SL tablet 0.4 mg  0.4 mg Sublingual Q5 Min PRN Germain Beasley MD       • ondansetron (ZOFRAN) tablet 4 mg  4 mg Per PEG Tube Q6H PRN Germain Beasley MD        Or   • ondansetron (ZOFRAN) injection 4 mg  4 mg Intravenous Q6H PRN Germain Beasley MD       • saccharomyces boulardii (FLORASTOR) capsule 250 mg  250 mg Oral BID Germain Beasley MD       • sodium bicarbonate tablet 650 mg  650 mg Per PEG Tube BID Germain Beasley MD       • sodium chloride 0.45 % infusion  100 mL/hr Intravenous  Continuous Germain Beasley  mL/hr at 04/03/23 1734 100 mL/hr at 04/03/23 1734   • sodium chloride 0.9 % flush 10 mL  10 mL Intravenous PRN Valencia Davis MD       • sodium chloride 0.9 % flush 10 mL  10 mL Intravenous PRN Germain Beasley MD       • sodium chloride 0.9 % flush 10 mL  10 mL Intravenous Q12H Germain Beasley MD   10 mL at 04/03/23 1734   • sodium chloride 0.9 % infusion 40 mL  40 mL Intravenous PRN Germain Beasley MD           Lab Results (last 24 hours)     Procedure Component Value Units Date/Time    T4, Free [[id number]]  (Normal) Collected: 04/03/23 1116    Specimen: Blood Updated: 04/03/23 1939     Free T4 1.25 ng/dL     Narrative:      Results may be falsely increased if patient taking Biotin.      TSH Rfx On Abnormal To Free T4 [[id number]]  (Abnormal) Collected: 04/03/23 1116    Specimen: Blood Updated: 04/03/23 1851     TSH 4.290 uIU/mL     POC Glucose Once [[id number]]  (Abnormal) Collected: 04/03/23 1716    Specimen: Blood Updated: 04/03/23 1720     Glucose 192 mg/dL      Comment: Serial Number: [id number]:  [id number]       Urinalysis, Microscopic Only - Straight Cath [[id number]]  (Abnormal) Collected: 04/03/23 1347    Specimen: Urine from Straight Cath Updated: 04/03/23 1446     RBC, UA 6-12 /HPF      WBC, UA Too Numerous to Count /HPF      Bacteria, UA 3+ /HPF      Squamous Epithelial Cells, UA 3-6 /HPF      Yeast, UA Small/1+ Budding Yeast /HPF      Hyaline Casts, UA None Seen /LPF      Methodology Manual Light Microscopy    Urine Culture - Urine, Straight Cath [[id number]] Collected: 04/03/23 1347    Specimen: Urine from Straight Cath Updated: 04/03/23 1446    Phosphorus [[id number]]  (Normal) Collected: 04/03/23 1116    Specimen: Blood Updated: 04/03/23 1444     Phosphorus 3.9 mg/dL     Urinalysis With Culture If Indicated - Straight Cath [[id number]]  (Abnormal) Collected: 04/03/23 1347    Specimen: Urine from Straight Cath Updated: 04/03/23 1428     Color, UA Yellow      Appearance, UA Turbid     pH, UA 5.5     Specific Gravity, UA 1.015     Glucose, UA Negative     Ketones, UA Negative     Bilirubin, UA Negative     Blood, UA Small (1+)     Protein, UA >=300 mg/dL (3+)     Leuk Esterase, UA Large (3+)     Nitrite, UA Negative     Urobilinogen, UA 0.2 E.U./dL    Narrative:      In absence of clinical symptoms, the presence of pyuria, bacteria, and/or nitrites on the urinalysis result does not correlate with infection.    Lacosamide Blood [[id number]] Collected: 04/03/23 1116    Specimen: Blood Updated: 04/03/23 1424    Single High Sensitivity Troponin T [[id number]]  (Abnormal) Collected: 04/03/23 1116    Specimen: Blood Updated: 04/03/23 1201     HS Troponin T 58 ng/L     Narrative:      High Sensitive Troponin T Reference Range:  <10.0 ng/L- Negative Female for AMI  <15.0 ng/L- Negative Male for AMI  >=10 - Abnormal Female indicating possible myocardial injury.  >=15 - Abnormal Male indicating possible myocardial injury.   Clinicians would have to utilize clinical acumen, EKG, Troponin, and serial changes to determine if it is an Acute Myocardial Infarction or myocardial injury due to an underlying chronic condition.         Comprehensive Metabolic Panel [[id number]]  (Abnormal) Collected: 04/03/23 1116    Specimen: Blood Updated: 04/03/23 1146     Glucose 285 mg/dL      BUN 92 mg/dL      Creatinine 2.24 mg/dL      Sodium 147 mmol/L      Potassium 2.8 mmol/L      Comment: Slight hemolysis detected by analyzer. Results may be affected.        Chloride 111 mmol/L      CO2 20.7 mmol/L      Calcium 9.7 mg/dL      Total Protein 7.8 g/dL      Albumin 3.5 g/dL      ALT (SGPT) 46 U/L      AST (SGOT) 31 U/L      Alkaline Phosphatase 183 U/L      Total Bilirubin 0.2 mg/dL      Globulin 4.3 gm/dL      A/G Ratio 0.8 g/dL      BUN/Creatinine Ratio 41.1     Anion Gap 15.3 mmol/L      eGFR 23.5 mL/min/1.73     Narrative:      GFR Normal >60  Chronic Kidney Disease <60  Kidney Failure <15       Magnesium [[id number]]  (Normal) Collected: 04/03/23 1116    Specimen: Blood Updated: 04/03/23 1146     Magnesium 1.9 mg/dL     CBC & Differential [[id number]]  (Abnormal) Collected: 04/03/23 1116    Specimen: Blood Updated: 04/03/23 1126    Narrative:      The following orders were created for panel order CBC & Differential.  Procedure                               Abnormality         Status                     ---------                               -----------         ------                     CBC Auto Differential[[id number]]        Abnormal            Final result                 Please view results for these tests on the individual orders.    CBC Auto Differential [[id number]]  (Abnormal) Collected: 04/03/23 1116    Specimen: Blood Updated: 04/03/23 1126     WBC 10.51 10*3/mm3      RBC 3.29 10*6/mm3      Hemoglobin 10.3 g/dL      Hematocrit 28.9 %      MCV 87.8 fL      MCH 31.3 pg      MCHC 35.6 g/dL      RDW 14.3 %      RDW-SD 45.1 fl      MPV 10.7 fL      Platelets 284 10*3/mm3      Neutrophil % 77.1 %      Lymphocyte % 12.7 %      Monocyte % 5.8 %      Eosinophil % 3.4 %      Basophil % 0.6 %      Immature Grans % 0.4 %      Neutrophils, Absolute 8.10 10*3/mm3      Lymphocytes, Absolute 1.34 10*3/mm3      Monocytes, Absolute 0.61 10*3/mm3      Eosinophils, Absolute 0.36 10*3/mm3      Basophils, Absolute 0.06 10*3/mm3      Immature Grans, Absolute 0.04 10*3/mm3      nRBC 0.0 /100 WBC     [place] Draw [[id number]] Collected: 04/03/23 1116    Specimen: Blood Updated: 04/03/23 1120    Narrative:      The following orders were created for panel order [place] Draw.  Procedure                               Abnormality         Status                     ---------                               -----------         ------                     Green Top (Gel)[[id number]]                                  Final result               Lavender Top[[id number]]                                     Final result               Gold Top  - SST[[id number]]                                   Final result               Light Blue Top[[id number]]                                   Final result                 Please view results for these tests on the individual orders.    Gold Top - SST [[id number]] Collected: 04/03/23 1116    Specimen: Blood Updated: 04/03/23 1120     Extra Tube Hold for add-ons.     Comment: Auto resulted.       Green Top (Gel) [[id number]] Collected: 04/03/23 1116    Specimen: Blood Updated: 04/03/23 1120     Extra Tube Hold for add-ons.     Comment: Auto resulted.       Light Blue Top [[id number]] Collected: 04/03/23 1116    Specimen: Blood Updated: 04/03/23 1120     Extra Tube Hold for add-ons.     Comment: Auto resulted       Lavender Top [[id number]] Collected: 04/03/23 1116    Specimen: Blood Updated: 04/03/23 1120     Extra Tube hold for add-on     Comment: Auto resulted       POC Glucose Once [[id number]]  (Abnormal) Collected: 04/03/23 1107    Specimen: Blood Updated: 04/03/23 1109     Glucose 278 mg/dL      Comment: Serial Number: [id number]:  [id number]           Imaging Results (Last 24 Hours)     Procedure Component Value Units Date/Time    CT Head Without Contrast [[id number]] Collected: 04/03/23 1227     Updated: 04/03/23 1230    Narrative:      PROCEDURE: CT HEAD WO CONTRAST     COMPARISON:  [facility], CT, CT FACIAL BONES WO CONTRAST, 3/25/2023, 12:31.     INDICATIONS: AMS     PROTOCOL:   Standard imaging protocol performed      RADIATION:   DLP: 1081.2mGy*cm    MA and/or KV was adjusted to minimize radiation dose.       TECHNIQUE: CT images were obtained without non-ionic intravenous contrast material.      FINDINGS:   The ventricles, sulci, and cerebellar folia are moderately and diffusely prominent consistent with   atrophy.     Ill-defined diminished density in cerebral white matter is consistent with mild gliosis and/or   numerous lacunar infarcts.     There is no CT evidence of acute intracranial  hemorrhage, mass, or mass effect.     The orbits have a normal appearance.     The paranasal sinuses, middle ears, and mastoid air cells are well aerated.       Impression:         CT scan of the head without IV contrast demonstrating diffuse atrophy and white matter changes.     No acute intracranial abnormality is seen.            KULWANT HOLLOWAY MD         Electronically Signed and Approved By: KULWANT HOLLOWAY MD on 4/03/2023 at 12:27                     XR Chest 1 View [[id number]] Collected: 04/03/23 1154     Updated: 04/03/23 1157    Narrative:      PROCEDURE: XR CHEST 1 VW     COMPARISON: None     INDICATIONS: WEAKNESS TODAY     FINDINGS:   The heart is normal in size.  The lungs are well-expanded and free of infiltrates.     Mild degenerative changes are seen in the glenohumeral joints.     Surgical clips are seen in the left axillary region.       Impression:       No active cardiopulmonary disease is seen.                  KULWANT HOLLOWAY MD         Electronically Signed and Approved By: KULWANT HOLLOWAY MD on 4/03/2023 at 11:53                         Orders (last 24 hrs)      Start     Ordered    04/04/23 0900  anastrozole (ARIMIDEX) tablet 1 mg  Daily         04/03/23 2012 04/04/23 0600  Basic Metabolic Panel  Daily,   Status:  Canceled       04/03/23 1359    04/04/23 0600  CBC & Differential  Daily,   Status:  Canceled       04/03/23 1359    04/04/23 0600  levothyroxine (SYNTHROID, LEVOTHROID) tablet 12.5 mcg  Every Early Morning,   Status:  Discontinued         04/03/23 1822    04/04/23 0600  Renal Function Panel  Morning Draw         04/03/23 1827    04/04/23 0600  CBC (No Diff)  Morning Draw         04/03/23 1827 04/04/23 0600  levothyroxine (SYNTHROID, LEVOTHROID) tablet 12.5 mcg  Every Early Morning         04/03/23 2012 04/03/23 2100  cholestyramine (QUESTRAN) packet 1 packet  2 Times Daily         04/03/23 1822 04/03/23 2100  saccharomyces boulardii (FLORASTOR) capsule 250 mg  2 Times  Daily         04/03/23 1822 04/03/23 2100  sodium bicarbonate tablet 650 mg  2 Times Daily         04/03/23 1825 04/03/23 1947  polyethylene glycol (MIRALAX) packet 17 g  Daily PRN        See Hyperspace for full Linked Orders Report.    04/03/23 2012 04/03/23 1947  bisacodyl (DULCOLAX) EC tablet 5 mg  Daily PRN        See Hyperspace for full Linked Orders Report.    04/03/23 2012 04/03/23 1947  bisacodyl (DULCOLAX) suppository 10 mg  Daily PRN        See Hyperspace for full Linked Orders Report.    04/03/23 2012 04/03/23 1947  sennosides-docusate (PERICOLACE) 8.6-50 MG per tablet 2 tablet  2 Times Daily PRN        See Hyperspace for full Linked Orders Report.    04/03/23 2012 04/03/23 1946  ondansetron (ZOFRAN) tablet 4 mg  Every 6 Hours PRN        See Hyperspace for full Linked Orders Report.    04/03/23 2012 04/03/23 1946  ondansetron (ZOFRAN) injection 4 mg  Every 6 Hours PRN        See Hyperspace for full Linked Orders Report.    04/03/23 2012 04/03/23 1946  acetaminophen (TYLENOL) tablet 650 mg  Every 4 Hours PRN         04/03/23 2012 04/03/23 1930  anastrozole (ARIMIDEX) tablet 1 mg  Daily,   Status:  Discontinued         04/03/23 1822 04/03/23 1930  citalopram (CeleXA) tablet 10 mg  Daily         04/03/23 1822 04/03/23 1852  T4, Free  Once         04/03/23 1851 04/03/23 1826  Code Status and Medical Interventions:  Continuous         04/03/23 1825    04/03/23 1825  Inpatient Admission  Once         04/03/23 1825 04/03/23 1824  TSH Rfx On Abnormal To Free T4  Once         04/03/23 1825 04/03/23 1823  Inpatient Neurology Consult General  Once        Specialty:  Neurology  Provider:  Gaurav Cameron MD    04/03/23 1822 04/03/23 1806  PT Consult: Eval & Treat Discharge Placement Assessment, Unsuccessful Mobility by Nursing or Primary Service  Once         04/03/23 1822 04/03/23 1806  OT Consult: Eval & Treat  Once         04/03/23 1822    04/03/23 1800   Oral Care  2 Times Daily       04/03/23 1359    04/03/23 1800  Ambulate Patient  3 Times Daily         04/03/23 1401    04/03/23 1755  Diet, Tube Feeding Tube Feeding Formula: RD to Initiate & Manage; Tube Feeding Type: RD to Manage  Diet Effective Now         04/03/23 1825    04/03/23 1755  SLP Consult: Eval & Treat Swallow Disorder; Renal Diets; Low Sodium (2-3g), Low Phosphorus; Soft to Chew (NDD 3); Whole Meat; Thin (IDDSI 0)  Once         04/03/23 1825    04/03/23 1754  LORazepam (ATIVAN) injection 2 mg  Every 4 Hours PRN         04/03/23 1825    04/03/23 1754  Elevate Head Of Bed 30-45 Degrees  Continuous         04/03/23 1825 04/03/23 1754  NPO Diet NPO Type: Strict NPO  Diet Effective Now         04/03/23 1825    04/03/23 1754  Inpatient Consult to Nutrition Services  Once        Provider:  (Not yet assigned)    04/03/23 1825    04/03/23 1754  Administer Tube Feeding Via Feeding Tube Already in Place  Continuous         04/03/23 1825 04/03/23 1721  POC Glucose Once  PROCEDURE ONCE         04/03/23 1716    04/03/23 1712  Inpatient Nutrition Consult  Once        Provider:  (Not yet assigned)    04/03/23 1711    04/03/23 1712  Consult to Wound / Ostomy Care  Once         04/03/23 1711    04/03/23 1703  Inpatient Case Management  Consult  Once        Provider:  (Not yet assigned)    04/03/23 1703    04/03/23 1700  POC Glucose TID AC  3 Times Daily Before Meals       04/03/23 1401    04/03/23 1630  cefTRIAXone (ROCEPHIN) IVPB 1 g  Every 24 Hours         04/03/23 1541    04/03/23 1447  Urine Culture - Urine, Straight Cath  Once         04/03/23 1446    04/03/23 1420  Urinalysis, Microscopic Only - Straight Cath  Once         04/03/23 1419    04/03/23 1415  sodium chloride 0.9 % flush 10 mL  Every 12 Hours Scheduled         04/03/23 1359    04/03/23 1415  insulin regular (humuLIN R,novoLIN R) injection 2-7 Units  Every 6 Hours Scheduled         04/03/23 1401    04/03/23 1415  sodium chloride  0.45 % infusion  Continuous         04/03/23 1401    04/03/23 1403  Hospitalist (on-call MD unless specified)  Once        Specialty:  Hospitalist  Provider:  Germain Beasley MD    04/03/23 1402    04/03/23 1402  During the day, keep the room lights on, blinds open, TV/Radio on, reposition bed for more light, discourage day naps  Continuous         04/03/23 1401    04/03/23 1402  At night, keep environment as quiet as possible, dim the lights, TV/Radio off, minimize alarms, noise and loud talking  Continuous         04/03/23 1401    04/03/23 1402  Reduce, as possible: medication/sedatives, noise, anxiety, pain, constipation, malnutrition, restraints  Continuous         04/03/23 1401    04/03/23 1402  Ask family to bring family pictures, home bed cover/blanket, reading material  Once         04/03/23 1401    04/03/23 1402  Obtain patient's glasses, hearing aids and dentures  Continuous         04/03/23 1401    04/03/23 1402  Up With Assistance to Chair  Every Shift       04/03/23 1401    04/03/23 1402  Between 10pm and 4am, limit waking the patient as much as possible (ie group nursing tasks, lab draws)  Continuous         04/03/23 1401    04/03/23 1402  Encourage Family to Visit and Stay at Bedside as Much as Possible  Continuous         04/03/23 1401    04/03/23 1402  Fall Precautions  Continuous         04/03/23 1401    04/03/23 1402  Notify Provider Acute Urinary Retention  Until Discontinued         04/03/23 1401    04/03/23 1359  dextrose (GLUTOSE) oral gel 15 g  Every 15 Minutes PRN         04/03/23 1401    04/03/23 1359  dextrose (D50W) (25 g/50 mL) IV injection 25 g  Every 15 Minutes PRN         04/03/23 1401    04/03/23 1359  glucagon (GLUCAGEN) injection 1 mg  Every 15 Minutes PRN         04/03/23 1401    04/03/23 1359  Phosphorus  Once         04/03/23 1359    04/03/23 1358  Seizure Precautions  Continuous         04/03/23 1359    04/03/23 1358  EEG  Once         04/03/23 1359    04/03/23 1357  Vital  Signs  Per Hospital Policy         04/03/23 1359    04/03/23 1357  Notify Provider (With Default Parameters)  Until Discontinued         04/03/23 1359    04/03/23 1357  Notify Provider (Specify Parameters)  Until Discontinued        Comments: Please contact hospitalist on call when patient arrives to the floor.    04/03/23 1359 04/03/23 1357  Intake & Output  Every Shift       04/03/23 1359    04/03/23 1357  Weigh Patient  Once         04/03/23 1359    04/03/23 1357  Insert Peripheral IV  Once         04/03/23 1359    04/03/23 1357  Saline Lock & Maintain IV Access  Continuous,   Status:  Canceled         04/03/23 1359    04/03/23 1357  Place Sequential Compression Device  Once         04/03/23 1359    04/03/23 1357  Maintain Sequential Compression Device  Continuous         04/03/23 1359    04/03/23 1357  Initiate Observation Status  Once         04/03/23 1359 04/03/23 1357  Telemetry - Maintain IV Access  Continuous         04/03/23 1359 04/03/23 1357  Continuous Cardiac Monitoring  Continuous        Comments: Follow Standing Orders As Outlined in Process Instructions (Open Order Report to View Full Instructions)    04/03/23 1359 04/03/23 1356  nitroglycerin (NITROSTAT) SL tablet 0.4 mg  Every 5 Minutes PRN         04/03/23 1359 04/03/23 1356  sodium chloride 0.9 % flush 10 mL  As Needed         04/03/23 1359 04/03/23 1356  sodium chloride 0.9 % infusion 40 mL  As Needed         04/03/23 1359    04/03/23 1356  acetaminophen (TYLENOL) tablet 650 mg  Every 4 Hours PRN,   Status:  Discontinued         04/03/23 1359    04/03/23 1356  sennosides-docusate (PERICOLACE) 8.6-50 MG per tablet 2 tablet  2 Times Daily PRN,   Status:  Discontinued        See Hyperspace for full Linked Orders Report.    04/03/23 1359    04/03/23 1356  polyethylene glycol (MIRALAX) packet 17 g  Daily PRN,   Status:  Discontinued        See Hyperspace for full Linked Orders Report.    04/03/23 1359    04/03/23 1356  bisacodyl  (DULCOLAX) EC tablet 5 mg  Daily PRN,   Status:  Discontinued        See Hyperspace for full Linked Orders Report.    04/03/23 1359    04/03/23 1356  bisacodyl (DULCOLAX) suppository 10 mg  Daily PRN,   Status:  Discontinued        See Hyperspace for full Linked Orders Report.    04/03/23 1359    04/03/23 1356  ondansetron (ZOFRAN) tablet 4 mg  Every 6 Hours PRN,   Status:  Discontinued        See Hyperspace for full Linked Orders Report.    04/03/23 1359    04/03/23 1356  ondansetron (ZOFRAN) injection 4 mg  Every 6 Hours PRN,   Status:  Discontinued        See Hyperspace for full Linked Orders Report.    04/03/23 1359    04/03/23 1324  Hospitalist (on-call MD unless specified)  Once,   Status:  Canceled        Specialty:  Hospitalist  Provider:  (Not yet assigned)    04/03/23 1323    04/03/23 1219  Lacosamide Blood  Once         04/03/23 1218    04/03/23 1215  potassium chloride 10 mEq in 100 mL IVPB  Every 1 Hour         04/03/23 1211    04/03/23 1215  sodium chloride 0.9 % bolus 1,000 mL  Once         04/03/23 1211    04/03/23 1130  CT Head Without Contrast  1 Time Imaging         04/03/23 1130    04/03/23 1130  Urinalysis With Culture If Indicated - Straight Cath  Once         04/03/23 1130    04/03/23 1110  POC Glucose Once  PROCEDURE ONCE         04/03/23 1107    04/03/23 1057  NPO Diet NPO Type: Strict NPO  Diet Effective Now,   Status:  Canceled         04/03/23 1057    04/03/23 1057  Undress & Gown  Once         04/03/23 1057    04/03/23 1057  Cardiac Monitoring  Continuous,   Status:  Canceled        Comments: Follow Standing Orders As Outlined in Process Instructions (Open Order Report to View Full Instructions)    04/03/23 1057    04/03/23 1057  Continuous Pulse Oximetry  Continuous         04/03/23 1057    04/03/23 1057  Vital Signs  Per Hospital Policy         04/03/23 1057    04/03/23 1057  Orthostatic Blood Pressure  Once         04/03/23 1057    04/03/23 1057  Fall Precautions  Continuous          04/03/23 1057    04/03/23 1057  XR Chest 1 View  1 Time Imaging         04/03/23 1057    04/03/23 1057  ECG 12 Lead ED Triage Standing Order; Weak / Dizzy / AMS  Once         04/03/23 1057    04/03/23 1057  POC Glucose Once  Once,   Status:  Canceled         04/03/23 1057    04/03/23 1057  Insert Peripheral IV  Once         04/03/23 1057    04/03/23 1057  [place] Draw  Once         04/03/23 1057    04/03/23 1057  CBC & Differential  Once         04/03/23 1057    04/03/23 1057  Comprehensive Metabolic Panel  Once         04/03/23 1057    04/03/23 1057  Single High Sensitivity Troponin T  Once         04/03/23 1057    04/03/23 1057  Magnesium  Once         04/03/23 1057    04/03/23 1057  Urinalysis With Culture If Indicated -  Once,   Status:  Canceled         04/03/23 1057    04/03/23 1057  Green Top (Gel)  PROCEDURE ONCE         04/03/23 1057    04/03/23 1057  Lavender Top  PROCEDURE ONCE         04/03/23 1057    04/03/23 1057  Gold Top - SST  PROCEDURE ONCE         04/03/23 1057    04/03/23 1057  Light Blue Top  PROCEDURE ONCE         04/03/23 1057    04/03/23 1057  CBC Auto Differential  PROCEDURE ONCE         04/03/23 1057    04/03/23 1056  sodium chloride 0.9 % flush 10 mL  As Needed         04/03/23 1057    Unscheduled  Oxygen Therapy- Nasal Cannula; 2 LPM; Titrate for SPO2: 92%, Greater Than or Equal To  Continuous PRN       04/03/23 1057    Unscheduled  Telemetry - Pulse Oximetry  Continuous PRN      Comments: If Patient Develops Unresponsiveness, Acute Dyspnea, Cyanosis or Suspected Hypoxemia Start Continuous Pulse Ox Monitoring, Apply Oxygen & Notify Provider    04/03/23 1359    Unscheduled  Oxygen Therapy- Nasal Cannula; Titrate for SPO2: 90% - 95%  Continuous PRN      Comments: If Patient Develops Unresponsiveness, Acute Dyspnea, Cyanosis or Suspected Hypoxemia Start Continuous Pulse Ox Monitoring, Apply Oxygen & Notify Provider    04/03/23 1359    Unscheduled  ECG 12 Lead Chest Pain  As Needed       Comments: Nurse to Release if Patient Expericences Acute Chest Pain or Dysrhythmias    04/03/23 1359    Unscheduled  Potassium  As Needed      Comments: For Ventricular Arrhythmias      04/03/23 1359    Unscheduled  Magnesium  As Needed      Comments: For Ventricular Arrhythmias      04/03/23 1359    Unscheduled  High Sensitivity Troponin T  As Needed      Comments: For Chest Pain      04/03/23 1359    Unscheduled  Blood Gas, Arterial -With Co-Ox Panel: Yes  As Needed      Comments: Draw for Acute Dyspnea, Cyanosis, Suspected Hypoxemia or UnresponsivenessNotify Provider of Results      04/03/23 1359    Unscheduled  Follow Hypoglycemia Standing Orders For Blood Glucose <70 & Notify Provider of Treatment  As Needed      Comments: Follow Hypoglycemia Orders As Outlined in Process Instructions (Open Order Report to View Full Instructions)  Notify Provider Any Time Hypoglycemia Treatment is Administered    04/03/23 1401    Unscheduled  [place] Patient  As Needed       04/03/23 1401    Unscheduled  Assess Patient For Urinary Retention  As Needed      Comments: Signs / Symptoms Urinary Retention:  - Bladder Palpable  - Suprapubic / Bladder Discomfort or Pain  - Urge to Void, But Unable  - Reports Unable to Void After 8 Hours    04/03/23 1401    Unscheduled  Utilize Voiding Measures if Retention is Suspected  As Needed      Comments: Voiding Measures:  - Privacy  - Relaxed Environment  - Suprapubic Massage  - Suprapubic Warm Compress  - Trigger Techniques  - Stand to Void   - Bedside Commode    04/03/23 1401    Unscheduled  Bladder Scan for Suspected Urinary Retention  As Needed       04/03/23 1401    Unscheduled  Monitor Every 1-2 Hours for Spontaneous Void if Bladder Scan Volume is Less Than 500mL & Patient is Without Symptoms of Bladder Discomfort / Distention  As Needed       04/03/23 1401    Unscheduled  Straight Cath For Acute Retention if Bladder Scan Volume is Greater Than 500mL or Patient Has Symptoms of Bladder  Discomfort / Distention (Unless Contraindicated)  As Needed       04/03/23 1401    Unscheduled  Fred and Document Tube Depth (in cm)  As Needed       04/03/23 1825    Unscheduled  Verify Tube Placement Upon Insertion & As Needed  As Needed       04/03/23 1825    --  amLODIPine (NORVASC) 10 MG tablet  Daily         04/03/23 1414    --  Ergocalciferol 200 MCG/ML drops  Daily         04/03/23 1414    --  saccharomyces boulardii (FLORASTOR) 250 MG capsule  2 Times Daily         04/03/23 1414    --  potassium chloride 10 MEQ CR tablet  Daily         04/03/23 1414

## 2023-04-04 NOTE — CASE MANAGEMENT/SOCIAL WORK
Discharge Planning Assessment   Sherry     Patient Name: Lyubov Middleton  MRN: [id number]  Today's Date: 4/4/2023    Admit Date: 4/3/2023    [facility] Pt's Ex- states that he is now POA, SW has asked him to bring paperwork to hospital. Pt does have PEG tube, Pt's ex- would like referrral made to [facility] if possible. he states that the facility that she is at now seems too busy and they are not able to care for her appropriately. Keven ex- understands he cannot take care of her at home but would like to see if she can get to another facility. He stated he was there all day and he had to hunt people down to help her, they have lost her clothes multiple times. SW will follow up as needed. referral has been made to [facility].   Discharge Needs Assessment     Row Name 04/04/23 0837       Living Environment    People in Home other (see comments)  EX-     Current Living Arrangements residential facility    Duration at [facility]:  4 weeks    Potentially Unsafe Housing Conditions none    Primary Care Provided by other (see comments)  [facility].    Provides Primary Care For no one, unable/limited ability to care for self    Family Caregiver if Needed other (see comments)  ex-    Quality of Family Relationships helpful;involved       Resource/Environmental Concerns    Resource/Environmental Concerns none    Transportation Concerns none       Food Insecurity    Within the past 12 months, you worried that your food would run out before you got the money to buy more. Never true    Within the past 12 months, the food you bought just didn't last and you didn't have money to get more. Never true       Transition Planning    Patient/Family Anticipated Services at Transition ;skilled nursing    Transportation Anticipated health plan transportation       Discharge Needs Assessment    Readmission Within the  Last 30 Days no previous admission in last 30 days    Concerns to be Addressed discharge planning    Anticipated Changes Related to Illness inability to care for self    Discharge Coordination/Progress Pt from [facility] and rehab. Pt's Ex- states that he is now POA, SW has asked him to bring paperwork to hospital. Pt does have PEG tube, Pt's ex- would like referrral made to [facility] if possible. he states that the facility that she is at now seems too busy and they are not able to care for her appropriately. Keven ex- understands he cannot take care of her at home but would like to see if she can get to another facility. He stated he was there all day and he had to hunt people down to help her, they have lost her clothes multiple times. SW will follow up as needed. referral has been made to [facility].               Discharge Plan     Row Name 04/04/23 0853       Plan    Plan Pt from [facility] and rehab. Pt's Ex- states that he is now POA, SW has asked him to bring paperwork to hospital. Pt does have PEG tube, Pt's ex- would like referrral made to [facility] if possible. he states that the facility that she is at now seems too busy and they are not able to care for her appropriately. Keven ex- understands he cannot take care of her at home but would like to see if she can get to another facility. He stated he was there all day and he had to hunt people down to help her, they have lost her clothes multiple times. SW will follow up as needed. referral has been made to [facility].              Continued Care and Services - Admitted Since 4/3/2023    Coordination has not been started for this encounter.          Demographic Summary     Row Name 04/04/23 0518       General Information    Admission Type inpatient    Arrived From emergency department    Referral Source admission list    Reason for Consult discharge planning    Preferred Language English        Contact Information    Permission Granted to Share Info With power of  for healthcare    Contact Information Obtained for power of  for healthcare       Healthcare Power of  Information    Name, Healthcare Power of  Keven Middleton    Phone, Healthcare Power of  [phone]               Functional Status     Row Name 04/04/23 0835       Functional Status    Usual Activity Tolerance fair    Current Activity Tolerance fair       Physical Activity    On average, how many days per week do you engage in moderate to strenuous exercise (like a brisk walk)? 0 days    On average, how many minutes do you engage in exercise at this level? 0 min    Number of minutes of exercise per week 0       Assessment of Health Literacy    How often do you have someone help you read hospital materials? Often    How often do you have problems learning about your medical condition because of difficulty understanding written information? Often    How often do you have a problem understanding what is told to you about your medical condition? Often    How confident are you filling out medical forms by yourself? Somewhat    Health Literacy Moderate       Functional Status, IADL    Medications assistive person    Meal Preparation assistive person    Housekeeping assistive person    Shopping assistive person       Mental Status    General Appearance WDL WDL    General Appearance unkempt       Employment/    Employment Status retired               Psychosocial    No documentation.                Abuse/Neglect    No documentation.                Legal     Row Name 04/04/23 0837       Financial Resource Strain    How hard is it for you to pay for the very basics like food, housing, medical care, and heating? Not hard       Financial/Legal    Source of Income other (see comments);social security    Application for Public Assistance not applied       Legal    Criminal Activity/Legal Involvement none                Substance Abuse    No documentation.                Patient Forms    No documentation.                   Savannah Smith

## 2023-04-04 NOTE — THERAPY EVALUATION
Acute Care - Speech Language Pathology   Swallow Initial Evaluation  Sherry     Patient Name: Lyubov Middleton  : 1955  MRN: [id number]  Today's Date: 2023               Admit Date: 4/3/2023    Visit Dx:     ICD-10-CM ICD-9-CM   1. Metabolic encephalopathy  G93.41 348.31   2. Oropharyngeal dysphagia  R13.12 787.22     Patient Active Problem List   Diagnosis   • Renal stone   • Renal abscess   • Stage 3a chronic kidney disease   • Type 2 diabetes mellitus   • Metabolic encephalopathy   • Seizure disorder     Past Medical History:   Diagnosis Date   • Cancer     left breast removed    • Type 2 diabetes mellitus      Past Surgical History:   Procedure Laterality Date   • ABDOMINAL SURGERY     • BREAST SURGERY Left     removed due to cancer   • CHOLECYSTECTOMY     • CYSTOSCOPY W/ URETERAL STENT PLACEMENT Right 2020    Procedure: CYSTOSCOPY, RIGHT RETROGRADE PYELOGRAM, URETEROSCOPY, LASER LITHOTRIPSY, RIGHT URETERAL STENT PLACEMENT;  Surgeon: Rohan Dooley Jr., MD;  Location: [facility];  Service: Urology;  Laterality: Right;   • GASTROSTOMY W/ FEEDING TUBE     • HERNIA REPAIR      mesh removed         Inpatient Speech Pathology Dysphagia Evaluation        PAIN SCALE: None indicated.    PRECAUTIONS/CONTRAINDICATIONS: Standard    SUSPECTED ABUSE/NEGLECT/EXPLOITATION: None indicated.    SOCIAL/PSYCHOLOGICAL NEEDS/BARRIERS: None indicated.    PAST SOCIAL HISTORY: 68-year-old female nursing home resident    PRIOR FUNCTION: Has PEG    PATIENT GOALS/EXPECTATIONS: Patient wanting to eat orally    HISTORY: 68-year-old female with the above diagnosis referred for speech therapy evaluation to assess for swallowing.  Patient currently has PEG, per nursing this has become clogged.  Patient had been recently scheduled for modified barium swallow study which was not completed.  Patient states that she has not been eating by mouth, was unsure how long she had had the PEG.    CURRENT DIET LEVEL:  N.p.o. with PEG    OBJECTIVE:    TEST ADMINISTERED: Clinical dysphagia evaluation    COGNITION/SAFETY AWARENESS: Patient followed basic directions and responded to simple questions.    BEHAVIORAL OBSERVATIONS: Alert and cooperative    ORAL MOTOR EXAM: Mild general decreased oral motor strength/range of motion 3/5.    VOICE QUALITY: Soft but adequate    REFLEX EXAM: Weak cough    POSTURE: Assisted sitting upright, patient was able to bring self to midline.    FEEDING/SWALLOWING FUNCTION: Assessed with nectar liquid, thin liquids, puréed solids, crunchy solid.    CLINICAL OBSERVATIONS: Nectar liquid by spoon and by cup with delayed initiation of swallow, swallow delay, vocal quality remaining clear to cervical auscultation.  Thin liquid by cup with delay, vocal quality remaining clear to cervical auscultation.  Second trial of thin by cup with coughing noted, weak cough.  Purée solid with swallow completed with laryngeal elevation noted to palpation.     DYSPHAGIA CRITERIA: Delayed initiation of swallow, swallow delay, risk of aspiration.  History of dysphagia with PEG.    FUNCTIONAL ASSESSMENT INSTRUMENT: Patient currently scored a level 4 of 7 on Functional Communication Measures for swallowing indicating a 40-59% limitation in function.    ASSESSMENT/ PLAN OF CARE:  Pt presents with limitations, noted below, that impede her ability to swallow safely and maintain nutrition. The skills of a therapist will be required to safely and effectively implement the following treatment plan to restore maximal level of function.    PROBLEMS:  1. Swallow delay, delayed initiation of swallow, risk of aspiration   LTG 1: 30 days: Increase swallow function for initiation of p.o. intake.   STG 1a: 14 days: Patient will demonstrate swallow within 3 seconds of multimodalic stimulation in 8 of 10 trials.   STG 1b: 14 days: Patient will demonstrate swallow of nectar liquid in 8 of 10 trials with min to no signs or symptoms of  aspiration.   STG 1c: 14 days: Patient will participate in oral motor and laryngeal exercises with strength /range of motion 4/5.      LTG 2: 30 days: Patient will tolerate diet mechanical soft solids and thin liquid utilizing appropriate positioning and strategies with minimal assistance.   STG 2a: 21 days: Patient will tolerate diet purée solid and nectar thick liquids with min to no signs or symptoms of aspiration.   STG 2b: 21 days: Patient will demonstrate adequate swallow in 8 of 10 trials of thin liquid with min to no signs or symptoms of aspiration.   STG 2c: 21 days: Patient/family education.     FREQUENCY/DURATION: Twice daily 5 times per week.    REHAB POTENTIAL:  Pt has good rehab potential.  The following limitations may influence improvement/ length of tx: Medical status.    RECOMMENDATIONS:   1.   DIET: Diet recommendation pending results of modified barium swallow study.    2.  Modified barium swallow study to rule out aspiration and determine pharyngeal functioning for safe p.o. intake.      Pt/responsible party agrees with plan of care and has been informed of all alternatives, risks and benefits.                              Anticipated Discharge Disposition (SLP): skilled nursing facility (04/04/23 0953)                                                            EDUCATION  The patient has been educated in the following areas:   Dysphagia (Swallowing Impairment).              Time Calculation:    Time Calculation- SLP     Row Name 04/04/23 0953             Time Calculation- SLP    SLP Start Time 0845  -TB      SLP Stop Time 0945  -TB      SLP Time Calculation (min) 60 min  -TB      SLP Received On 04/04/23  -TB         Untimed Charges    SLP Eval/Re-eval  ST Eval Oral Pharyng Swallow - 48679  -TB      [id number] Eval Oral Pharyng Swallow Minutes 60  -TB         Total Minutes    Untimed Charges Total Minutes 60  -TB       Total Minutes 60  -TB            User Key  (r) = Recorded By, (t) = Taken By,  (c) = Cosigned By    Initials Name Provider Type    TB Barbara Reilly SLP Speech and Language Pathologist                Therapy Charges for Today     Code Description Service Date Service Provider Modifiers Qty    [id number]  ST EVAL ORAL PHARYNG SWALLOW 4 4/4/2023 Barbara Reilly SLP GN 1               ANTHONY Santamaria  4/4/2023

## 2023-04-04 NOTE — PLAN OF CARE
Goal Outcome Evaluation:  Plan of Care Reviewed With: (P) patient           Outcome Evaluation: (P) Pt is currently having difficulty walking, has decreased strength, impaired balance, increased dizziness, and poor coordination. Pt would benefit from skilled PT intervention. Recommend discharge to inpatient rehab.

## 2023-04-04 NOTE — THERAPY EVALUATION
Acute Care - Physical Therapy Initial Evaluation   Sherry     Patient Name: Lyubov Middleton  : 1955  MRN: [id number]  Today's Date: 2023      Visit Dx:     ICD-10-CM ICD-9-CM   1. Metabolic encephalopathy  G93.41 348.31   2. Oropharyngeal dysphagia  R13.12 787.22   3. Difficulty in walking  R26.2 719.7     Patient Active Problem List   Diagnosis   • Renal stone   • Renal abscess   • Stage 3a chronic kidney disease   • Type 2 diabetes mellitus   • Metabolic encephalopathy   • Seizure disorder     Past Medical History:   Diagnosis Date   • Cancer     left breast removed    • Type 2 diabetes mellitus      Past Surgical History:   Procedure Laterality Date   • ABDOMINAL SURGERY     • BREAST SURGERY Left     removed due to cancer   • CHOLECYSTECTOMY     • CYSTOSCOPY W/ URETERAL STENT PLACEMENT Right 2020    Procedure: CYSTOSCOPY, RIGHT RETROGRADE PYELOGRAM, URETEROSCOPY, LASER LITHOTRIPSY, RIGHT URETERAL STENT PLACEMENT;  Surgeon: Rohan Dooley Jr., MD;  Location: [facility];  Service: Urology;  Laterality: Right;   • GASTROSTOMY W/ FEEDING TUBE     • HERNIA REPAIR      mesh removed     PT Assessment (last 12 hours)     PT Evaluation and Treatment     Row Name 23 1057          Physical Therapy Time and Intention    Subjective Information dizziness;nausea/vomiting (P)   -     Document Type evaluation (P)   -SW     Mode of Treatment individual therapy;physical therapy (P)   -SW     Patient Effort good (P)   -SW     Symptoms Noted During/After Treatment dizziness (P)   -     Row Name 23 1054          General Information    Patient Profile Reviewed yes (P)   -SW     Patient Observations alert;cooperative;agree to therapy (P)   -SW     Prior Level of Function -- (P)   Recently admitted to nursing home for rehab.  -     Row Name 23 1053          Living Environment    Current Living Arrangements residential facility (P)   -SW     People in Home other (see comments)  (P)   Ex-  -     Primary Care Provided by other (see comments) (P)   [place] [facility]  -[facility] Name 04/04/23 1057          Home Use of Assistive/Adaptive Equipment    Equipment Currently Used at Home none (P)   -[facility] Name 04/04/23 1057          Strength (Manual Muscle Testing)    Strength (Manual Muscle Testing) -- (P)   Bilateral LE strength: grossly 4/5  -[facility] Name 04/04/23 1057          Bed Mobility    Bed Mobility bed mobility (all) activities (P)   -     All Activities, [place] (Bed Mobility) minimum assist (75% patient effort) (P)   -     Assistive Device (Bed Mobility) bed rails;head of bed elevated (P)   -[facility] Name 04/04/23 1057          Transfers    Transfers sit-stand transfer;stand-sit transfer (P)   -[facility] Name 04/04/23 1057          Sit-Stand Transfer    Sit-Stand [place] (Transfers) minimum assist (75% patient effort) (P)   -     Assistive Device (Sit-Stand Transfers) walker, front-wheeled (P)   -[facility] Name 04/04/23 1057          Stand-Sit Transfer    Stand-Sit [place] (Transfers) minimum assist (75% patient effort) (P)   -     Assistive Device (Stand-Sit Transfers) walker, front-wheeled (P)   -[facility] Name 04/04/23 1057          Gait/Stairs (Locomotion)    Gait/Stairs Locomotion gait/ambulation independence;gait/ambulation assistive device;distance ambulated (P)   -     [place] Level (Gait) minimum assist (75% patient effort) (P)   -     Assistive Device (Gait) walker, front-wheeled (P)   -     Distance in Feet (Gait) 2 (P)   -SW     Pattern (Gait) 4-point;step-through (P)   -SW     Deviations/Abnormal Patterns (Gait) ataxic;gait speed decreased (P)   -[facility] Name 04/04/23 1057          Safety Issues, Functional Mobility    Impairments Affecting Function (Mobility) balance;coordination;endurance/activity tolerance;motor control;range of motion (ROM);strength (P)   -[facility] Name 04/04/23 1057          Balance     Balance Assessment standing dynamic balance (P)   -SW     Dynamic Standing Balance minimal assist (P)   -SW     Position/Device Used, Standing Balance walker, front-wheeled (P)   -SW     Row Name             Wound 04/03/23 1622 Right labia    Wound - Properties Group Placement Date: 04/03/23  -AC Placement Time: 1622  -AC Present on Hospital Admission: Y  -AC Side: Right  -AC Location: labia  -AC    Retired Wound - Properties Group Placement Date: 04/03/23  -AC Placement Time: 1622  -AC Present on Hospital Admission: Y  -AC Side: Right  -AC Location: labia  -AC    Retired Wound - Properties Group Date first assessed: 04/03/23  -AC Time first assessed: 1622  -AC Present on Hospital Admission: Y  -AC Side: Right  -AC Location: labia  -AC    Row Name             Wound 04/03/23 1625 Left posterior heel    Wound - Properties Group Placement Date: 04/03/23  -AC Placement Time: 1625  -AC Present on Hospital Admission: Y  -AC Side: Left  -AC Orientation: posterior  -AC Location: heel  -AC    Retired Wound - Properties Group Placement Date: 04/03/23  -AC Placement Time: 1625  -AC Present on Hospital Admission: Y  -AC Side: Left  -AC Orientation: posterior  -AC Location: heel  -AC    Retired Wound - Properties Group Date first assessed: 04/03/23  -AC Time first assessed: 1625  -AC Present on Hospital Admission: Y  -AC Side: Left  -AC Location: heel  -AC    Row Name             Wound 04/03/23 1628 sacral spine    Wound - Properties Group Placement Date: 04/03/23  -AC Placement Time: 1628  -AC Present on Hospital Admission: Y  -AC Location: sacral spine  -AC    Retired Wound - Properties Group Placement Date: 04/03/23  -AC Placement Time: 1628  -AC Present on Hospital Admission: Y  -AC Location: sacral spine  -AC    Retired Wound - Properties Group Date first assessed: 04/03/23  -AC Time first assessed: 1628  -AC Present on Hospital Admission: Y  -AC Location: sacral spine  -AC    Row Name             Wound 04/03/23 1629  Bilateral gluteal    Wound - Properties Group Placement Date: 04/03/23  -AC Placement Time: 1629  -AC Present on Hospital Admission: Y  -AC Side: Bilateral  -AC Location: gluteal  -AC    Retired Wound - Properties Group Placement Date: 04/03/23  -AC Placement Time: 1629  -AC Present on Hospital Admission: Y  -AC Side: Bilateral  -AC Location: gluteal  -AC    Retired Wound - Properties Group Date first assessed: 04/03/23  -AC Time first assessed: 1629  -AC Present on Hospital Admission: Y  -AC Side: Bilateral  -AC Location: gluteal  -AC    Row Name             Wound 04/03/23 1630 Bilateral posterior greater trochanter    Wound - Properties Group Placement Date: 04/03/23  -AC Placement Time: 1630  -AC Present on Hospital Admission: Y  -AC Side: Bilateral  -AC Orientation: posterior  -AC Location: greater trochanter  -AC    Retired Wound - Properties Group Placement Date: 04/03/23  -AC Placement Time: 1630  -AC Present on Hospital Admission: Y  -AC Side: Bilateral  -AC Orientation: posterior  -AC Location: greater trochanter  -AC    Retired Wound - Properties Group Date first assessed: 04/03/23  -AC Time first assessed: 1630  -AC Present on Hospital Admission: Y  -AC Side: Bilateral  -AC Location: greater trochanter  -AC    Row Name 04/04/23 1057          Plan of Care Review    Plan of Care Reviewed With patient (P)   -SW     Outcome Evaluation Pt is currently having difficulty walking, has decreased strength, impaired balance, increased dizziness, and poor coordination. Pt would benefit from skilled PT intervention. Recommend discharge to inpatient rehab. (P)   -SW     Row Name 04/04/23 1057          Positioning and Restraints    Pre-Treatment Position in bed (P)   -SW     Post Treatment Position bed (P)   -SW     In Bed supine;call light within reach (P)   -SW     Row Name 04/04/23 1057          Therapy Assessment/Plan (PT)    Rehab Potential (PT) good, to achieve stated therapy goals (P)   -SW     Criteria for  Skilled Interventions Met (PT) yes;skilled treatment is necessary (P)   -SW     Therapy Frequency (PT) daily (P)   -SW     Predicted Duration of Therapy Intervention (PT) 10 days (P)   -SW     Problem List (PT) problems related to;balance;coordination;mobility;range of motion (ROM);strength (P)   -SW     Activity Limitations Related to Problem List (PT) unable to ambulate safely;unable to transfer safely (P)   -     Row Name 04/04/23 1057          Therapy Plan Review/Discharge Plan (PT)    Therapy Plan Review (PT) evaluation/treatment results reviewed;participants included;patient (P)   -     Row Name 04/04/23 1057          Physical Therapy Goals    Bed Mobility Goal Selection (PT) bed mobility, PT goal 1 (P)   -SW     Transfer Goal Selection (PT) transfer, PT goal 1 (P)   -SW     Strength Goal Selection (PT) strength, PT goal 1 (P)   -     Row Name 04/04/23 1057          Bed Mobility Goal 1 (PT)    Activity/Assistive Device (Bed Mobility Goal 1, PT) bed mobility activities, all (P)   -SW     [place] Level/Cues Needed (Bed Mobility Goal 1, PT) standby assist (P)   -SW     Time Frame (Bed Mobility Goal 1, PT) long term goal (LTG);10 days (P)   -     Row Name 04/04/23 1057          Transfer Goal 1 (PT)    Activity/Assistive Device (Transfer Goal 1, PT) transfers, all (P)   -SW     [place] Level/Cues Needed (Transfer Goal 1, PT) standby assist (P)   -SW     Time Frame (Transfer Goal 1, PT) long term goal (LTG);10 days (P)   -     Row Name 04/04/23 1057          Strength Goal 1 (PT)    Strength Goal 1 (PT) Pt will demonstrate gross bilateral LE strength of 4+/5. (P)   -SW     Time Frame (Strength Goal 1, PT) long term goal (LTG);10 days (P)   -SW           User Key  (r) = Recorded By, (t) = Taken By, (c) = Cosigned By    Initials Name Provider Type    Jessica Bowie, RN Registered Nurse    Rosalinda Braun, PT Student PT Student                Physical Therapy Education     Title: PT OT SLP  Therapies (Done)     Topic: Physical Therapy (Done)     Point: Mobility training (Done)     Learning Progress Summary           Patient Acceptance, E,TB, VU by QUITA at 4/4/2023 1113                   Point: Precautions (Done)     Learning Progress Summary           Patient Acceptance, E,TB, VU by QUITA at 4/4/2023 1113                               User Key     Initials Effective Dates Name Provider Type Discipline     03/07/23 -  Rosalinda Peacock, PT Student PT Student PT              PT Recommendation and Plan  Anticipated Discharge Disposition (PT): (P) inpatient rehabilitation facility  Planned Therapy Interventions (PT): (P) balance training, bed mobility training, gait training, ROM (range of motion), stair training, strengthening, stretching, transfer training  Therapy Frequency (PT): (P) daily  Plan of Care Reviewed With: (P) patient  Outcome Evaluation: (P) Pt is currently having difficulty walking, has decreased strength, impaired balance, increased dizziness, and poor coordination. Pt would benefit from skilled PT intervention. Recommend discharge to inpatient rehab.   Outcome Measures     Row Name 04/04/23 1100             How much help from another person do you currently need...    Turning from your back to your side while in flat bed without using bedrails? 3 (P)   -SW      Moving from lying on back to sitting on the side of a flat bed without bedrails? 3 (P)   -SW      Moving to and from a bed to a chair (including a wheelchair)? 2 (P)   -SW      Standing up from a chair using your arms (e.g., wheelchair, bedside chair)? 3 (P)   -SW      Climbing 3-5 steps with a railing? 1 (P)   -SW      To walk in hospital room? 2 (P)   -SW      AM-PAC 6 Clicks Score (PT) 14 (P)   -SW         Functional Assessment    Outcome Measure Options AM-PAC 6 Clicks Basic Mobility (PT) (P)   -SW            User Key  (r) = Recorded By, (t) = Taken By, (c) = Cosigned By    Initials Name Provider Type    Rosalinda Braun,  PT Student PT Student                 Time Calculation:    PT Charges     Row Name 04/04/23 1057             Time Calculation    PT Received On 04/04/23 (P)   -SW      PT Goal Re-Cert Due Date 04/13/23 (P)   -SW         Untimed Charges    PT Eval/Re-eval Minutes 25 (P)   -SW         Total Minutes    Untimed Charges Total Minutes 25 (P)   -SW       Total Minutes 25 (P)   -SW            User Key  (r) = Recorded By, (t) = Taken By, (c) = Cosigned By    Initials Name Provider Type    Rosalinda Braun PT Student PT Student              Therapy Charges for Today     Code Description Service Date Service Provider Modifiers Qty    [id number] HC PT EVAL LOW COMPLEXITY 2 4/4/2023 Rosalinda Peacock, PT Student GP 1          PT G-Codes  Outcome Measure Options: (P) AM-PAC 6 Clicks Basic Mobility (PT)  AM-PAC 6 Clicks Score (PT): (P) 14    Rosalinda Peacock PT Student  4/4/2023

## 2023-04-04 NOTE — MBS/VFSS/FEES
Acute Care - Speech Language Pathology   Swallow Modified Barium Swallow Study  [place]     Patient Name: Lyubov Middleton  : 1955  MRN: [id number]  Today's Date: 2023               Admit Date: 4/3/2023    Visit Dx:     ICD-10-CM ICD-9-CM   1. Metabolic encephalopathy  G93.41 348.31   2. Oropharyngeal dysphagia  R13.12 787.22   3. Difficulty in walking  R26.2 719.7     Patient Active Problem List   Diagnosis   • Renal stone   • Renal abscess   • Stage 3a chronic kidney disease   • Type 2 diabetes mellitus   • Metabolic encephalopathy   • Seizure disorder     Past Medical History:   Diagnosis Date   • Cancer     left breast removed    • Type 2 diabetes mellitus      Past Surgical History:   Procedure Laterality Date   • ABDOMINAL SURGERY     • BREAST SURGERY Left     removed due to cancer   • CHOLECYSTECTOMY     • CYSTOSCOPY W/ URETERAL STENT PLACEMENT Right 2020    Procedure: CYSTOSCOPY, RIGHT RETROGRADE PYELOGRAM, URETEROSCOPY, LASER LITHOTRIPSY, RIGHT URETERAL STENT PLACEMENT;  Surgeon: Rohan Dooley Jr., MD;  Location: [facility];  Service: Urology;  Laterality: Right;   • GASTROSTOMY W/ FEEDING TUBE     • HERNIA REPAIR      mesh removed       MODIFIED BARIUM SWALLOW STUDY: SPEECH PATHOLOGY REPORT        DATE OF SERVICE: 2023    PERTINENT INFORMATION:  Ms. Middleton is a 68year old female with diagnosis of dysphagia.  Patient is status post PEG.    She was referred for an MBSS by Dr. Winters to rule out aspiration as well as to determine appropriate treatment plan for this patient.      PROCEDURE:    Ms. Middleton was alert and cooperative.  The patient was viewed in a lateral plane.  The following Ba consistencies were administered: Thin liquids, nectar thick liquids, barium mixed with applesauce, barium paste, barium mixed with cracker. The following compensatory swallowing strategies were performed: Bolus modification, cyclic ingestion.    RESULTS:    1.  Nectar liquid by  spoon with spillage to the piriform sinus, swallow completed.  2. Nectar liquid by cup with large drink, maximum spillage to the vallecula and to the piriform sinus, swallow completed.  3. Thin liquid with large drink, spillage to the pharynx and into the laryngeal vestibule swallow completed with deep laryngeal penetration to the cords with no cough.  Sequential swallow with spillage to the pharynx, swallow completed with deep laryngeal penetration.  4. Purée with lingual pumping, maximum spillage to the vallecula, spilling over epiglottis, swallow completed with minimal laryngeal penetration which appears to clear.  5. Pudding with slow AP transit, spillage to the vallecula, swallow completed.  Oral residue noted.  6. Solid results with chewing, spillover base of tongue followed by swallow completed.  Residue was noted at tongue base and vallecula.  Patient cued for second swallow which does not completely clear.  7. Thin liquid via straw with spillover to the pharynx and into the laryngeal vestibule, swallow completed with deep laryngeal penetration to the vocal cords with no cough.  Residual coating at tongue base and vallecula.  8.  Nectar thickened liquid by straw with spillage to the pharynx, swallow completed.  Residual coating noted at tongue base and vallecula.      IMPRESSIONS:    Ms. Middleton demonstrated oral pharyngeal dysphagia characterized by delayed A-P transit, swallow delay.  Laryngeal penetration noted with thin liquid at times deep to the cords with no cough.  No aspiration was observed during the study though patient is at risk of aspiration.     FUNCTIONAL DEFICIT: Patient scored level 5 of 7 on Functional Communication Measures for swallowing indicating a 20-39% limitation in function for current status, goal status, and discharge status.      RECOMMENDATIONS:   1.  Mechanical soft solids with ground meats and extra gravies, nectar thickened liquid.  2.  Positioning fully upright for all p.o.  intake and 30 minutes following.  3.  One-on-one supervision, assist patient to feed self.  Alternate small bites and small sips of solids and liquids.  Small bite of solid with double swallow.  Small single sips of liquid.  Medications whole in applesauce.        Yes, patient/responsible party agrees with the plan of care and has been informed of all alternatives, risks and benefits.    Thank you for this referral.                          Anticipated Discharge Disposition (SLP): skilled nursing facility (04/04/23 0953)                                                 Plan of Care Reviewed With: patient          EDUCATION  The patient has been educated in the following areas:   Modified Diet Instruction.              Time Calculation:    Time Calculation- SLP     Row Name 04/04/23 1506 04/04/23 0953          Time Calculation- SLP    SLP Start Time -- 0845  -TB     SLP Stop Time -- 0945  -TB     SLP Time Calculation (min) -- 60 min  -TB     SLP Received On 04/04/23  -TB 04/04/23  -TB        Untimed Charges    SLP Eval/Re-eval  ST Motion Fluoro Eval Swallow - 61729  -TB ST Eval Oral Pharyng Swallow - 77577  -TB     27221-PT Eval Oral Pharyng Swallow Minutes -- 60  -TB     15324-FM Motion Fluoro Eval Swallow Minutes 90  -TB --        Total Minutes    Untimed Charges Total Minutes 90  -TB 60  -TB      Total Minutes 90  -TB 60  -TB           User Key  (r) = Recorded By, (t) = Taken By, (c) = Cosigned By    Initials Name Provider Type    TB Barbara Reilly SLP Speech and Language Pathologist                Therapy Charges for Today     Code Description Service Date Service Provider Modifiers Qty    87325705444 HC ST EVAL ORAL PHARYNG SWALLOW 4 4/4/2023 Barbara Reilly SLP GN 1    [id number] HC ST MOTION FLUORO EVAL SWALLOW 6 4/4/2023 Barbara Reilly SLP GN 1               ANTHONY Santamaria  4/4/2023

## 2023-04-04 NOTE — PLAN OF CARE
Goal Outcome Evaluation:  Plan of Care Reviewed With: patient      Patient rested throughout the shift, able to state name, year, month, president, and knew she was in the hospital. Patient bottom and groin area very red, powder applied. Unable to get patient feeding tube to flush at this time, had another nurse attempt as well with no success. No meds given this shift due to inability to flush feeding tube and patient NPO for speech consult.

## 2023-04-05 LAB
ALBUMIN SERPL-MCNC: 3.1 G/DL (ref 3.5–5.2)
ALBUMIN/GLOB SERPL: 0.8 G/DL
ALP SERPL-CCNC: 110 U/L (ref 39–117)
ALT SERPL W P-5'-P-CCNC: 32 U/L (ref 1–33)
ANION GAP SERPL CALCULATED.3IONS-SCNC: 10.9 MMOL/L (ref 5–15)
ANION GAP SERPL CALCULATED.3IONS-SCNC: 13.9 MMOL/L (ref 5–15)
ANION GAP SERPL CALCULATED.3IONS-SCNC: 13.9 MMOL/L (ref 5–15)
AST SERPL-CCNC: 19 U/L (ref 1–32)
BACTERIA SPEC AEROBE CULT: ABNORMAL
BILIRUB SERPL-MCNC: 0.2 MG/DL (ref 0–1.2)
BUN SERPL-MCNC: 57 MG/DL (ref 8–23)
BUN SERPL-MCNC: 62 MG/DL (ref 8–23)
BUN SERPL-MCNC: 67 MG/DL (ref 8–23)
BUN/CREAT SERPL: 30.5 (ref 7–25)
BUN/CREAT SERPL: 33.3 (ref 7–25)
BUN/CREAT SERPL: 34.7 (ref 7–25)
CALCIUM SPEC-SCNC: 8.4 MG/DL (ref 8.6–10.5)
CALCIUM SPEC-SCNC: 8.6 MG/DL (ref 8.6–10.5)
CALCIUM SPEC-SCNC: 8.9 MG/DL (ref 8.6–10.5)
CHLORIDE SERPL-SCNC: 112 MMOL/L (ref 98–107)
CHLORIDE SERPL-SCNC: 118 MMOL/L (ref 98–107)
CHLORIDE SERPL-SCNC: 120 MMOL/L (ref 98–107)
CO2 SERPL-SCNC: 13.1 MMOL/L (ref 22–29)
CO2 SERPL-SCNC: 16.1 MMOL/L (ref 22–29)
CO2 SERPL-SCNC: 17.1 MMOL/L (ref 22–29)
CREAT SERPL-MCNC: 1.86 MG/DL (ref 0.57–1)
CREAT SERPL-MCNC: 1.87 MG/DL (ref 0.57–1)
CREAT SERPL-MCNC: 1.93 MG/DL (ref 0.57–1)
DEPRECATED RDW RBC AUTO: 45.5 FL (ref 37–54)
EGFRCR SERPLBLD CKD-EPI 2021: 28.1 ML/MIN/1.73
EGFRCR SERPLBLD CKD-EPI 2021: 29.2 ML/MIN/1.73
EGFRCR SERPLBLD CKD-EPI 2021: 29.4 ML/MIN/1.73
ERYTHROCYTE [DISTWIDTH] IN BLOOD BY AUTOMATED COUNT: 14.3 % (ref 12.3–15.4)
GLOBULIN UR ELPH-MCNC: 3.9 GM/DL
GLUCOSE BLDC GLUCOMTR-MCNC: 100 MG/DL (ref 70–99)
GLUCOSE BLDC GLUCOMTR-MCNC: 103 MG/DL (ref 70–99)
GLUCOSE BLDC GLUCOMTR-MCNC: 137 MG/DL (ref 70–99)
GLUCOSE BLDC GLUCOMTR-MCNC: 139 MG/DL (ref 70–99)
GLUCOSE BLDC GLUCOMTR-MCNC: 152 MG/DL (ref 70–99)
GLUCOSE SERPL-MCNC: 150 MG/DL (ref 65–99)
GLUCOSE SERPL-MCNC: 166 MG/DL (ref 65–99)
GLUCOSE SERPL-MCNC: 94 MG/DL (ref 65–99)
HCT VFR BLD AUTO: 27 % (ref 34–46.6)
HGB BLD-MCNC: 9.5 G/DL (ref 12–15.9)
MAGNESIUM SERPL-MCNC: 1.6 MG/DL (ref 1.6–2.4)
MCH RBC QN AUTO: 30.9 PG (ref 26.6–33)
MCHC RBC AUTO-ENTMCNC: 35.2 G/DL (ref 31.5–35.7)
MCV RBC AUTO: 87.9 FL (ref 79–97)
PLATELET # BLD AUTO: 281 10*3/MM3 (ref 140–450)
PMV BLD AUTO: 10.3 FL (ref 6–12)
POTASSIUM SERPL-SCNC: 3.1 MMOL/L (ref 3.5–5.2)
POTASSIUM SERPL-SCNC: 3.3 MMOL/L (ref 3.5–5.2)
POTASSIUM SERPL-SCNC: 3.4 MMOL/L (ref 3.5–5.2)
PROT SERPL-MCNC: 7 G/DL (ref 6–8.5)
RBC # BLD AUTO: 3.07 10*6/MM3 (ref 3.77–5.28)
SODIUM SERPL-SCNC: 140 MMOL/L (ref 136–145)
SODIUM SERPL-SCNC: 145 MMOL/L (ref 136–145)
SODIUM SERPL-SCNC: 150 MMOL/L (ref 136–145)
WBC NRBC COR # BLD: 9.24 10*3/MM3 (ref 3.4–10.8)

## 2023-04-05 PROCEDURE — 25010000002 CEFEPIME PER 500 MG: Performed by: INTERNAL MEDICINE

## 2023-04-05 PROCEDURE — 85027 COMPLETE CBC AUTOMATED: CPT | Performed by: INTERNAL MEDICINE

## 2023-04-05 PROCEDURE — 80053 COMPREHEN METABOLIC PANEL: CPT | Performed by: INTERNAL MEDICINE

## 2023-04-05 PROCEDURE — 25010000002 ONDANSETRON PER 1 MG: Performed by: FAMILY MEDICINE

## 2023-04-05 PROCEDURE — 92526 ORAL FUNCTION THERAPY: CPT

## 2023-04-05 PROCEDURE — 99233 SBSQ HOSP IP/OBS HIGH 50: CPT | Performed by: INTERNAL MEDICINE

## 2023-04-05 PROCEDURE — 82962 GLUCOSE BLOOD TEST: CPT

## 2023-04-05 PROCEDURE — 97116 GAIT TRAINING THERAPY: CPT

## 2023-04-05 PROCEDURE — 83735 ASSAY OF MAGNESIUM: CPT | Performed by: INTERNAL MEDICINE

## 2023-04-05 RX ORDER — ACETAMINOPHEN 325 MG/1
650 TABLET ORAL EVERY 4 HOURS PRN
Status: DISCONTINUED | OUTPATIENT
Start: 2023-04-05 | End: 2023-04-22 | Stop reason: HOSPADM

## 2023-04-05 RX ORDER — AMOXICILLIN 250 MG
2 CAPSULE ORAL 2 TIMES DAILY PRN
Status: DISCONTINUED | OUTPATIENT
Start: 2023-04-05 | End: 2023-04-11

## 2023-04-05 RX ORDER — LACOSAMIDE 10 MG/ML
50 SOLUTION ORAL EVERY 12 HOURS SCHEDULED
Status: DISCONTINUED | OUTPATIENT
Start: 2023-04-05 | End: 2023-04-05

## 2023-04-05 RX ORDER — CHOLESTYRAMINE 4 G/9G
1 POWDER, FOR SUSPENSION ORAL 2 TIMES DAILY
Status: DISCONTINUED | OUTPATIENT
Start: 2023-04-05 | End: 2023-04-05

## 2023-04-05 RX ORDER — ONDANSETRON 4 MG/1
4 TABLET, FILM COATED ORAL EVERY 6 HOURS PRN
Status: DISCONTINUED | OUTPATIENT
Start: 2023-04-05 | End: 2023-04-22 | Stop reason: HOSPADM

## 2023-04-05 RX ORDER — CITALOPRAM 20 MG/1
10 TABLET ORAL DAILY
Status: DISCONTINUED | OUTPATIENT
Start: 2023-04-06 | End: 2023-04-05

## 2023-04-05 RX ORDER — AMLODIPINE BESYLATE 5 MG/1
10 TABLET ORAL DAILY
Status: DISCONTINUED | OUTPATIENT
Start: 2023-04-05 | End: 2023-04-22 | Stop reason: HOSPADM

## 2023-04-05 RX ORDER — SODIUM BICARBONATE 650 MG/1
650 TABLET ORAL 2 TIMES DAILY
Status: DISCONTINUED | OUTPATIENT
Start: 2023-04-05 | End: 2023-04-05

## 2023-04-05 RX ORDER — ONDANSETRON 2 MG/ML
4 INJECTION INTRAMUSCULAR; INTRAVENOUS EVERY 6 HOURS PRN
Status: DISCONTINUED | OUTPATIENT
Start: 2023-04-05 | End: 2023-04-22 | Stop reason: HOSPADM

## 2023-04-05 RX ORDER — CITALOPRAM 20 MG/1
10 TABLET ORAL DAILY
Status: DISCONTINUED | OUTPATIENT
Start: 2023-04-06 | End: 2023-04-22 | Stop reason: HOSPADM

## 2023-04-05 RX ORDER — LEVOTHYROXINE SODIUM 0.03 MG/1
12.5 TABLET ORAL
Status: DISCONTINUED | OUTPATIENT
Start: 2023-04-06 | End: 2023-04-05

## 2023-04-05 RX ORDER — SACCHAROMYCES BOULARDII 250 MG
250 CAPSULE ORAL 2 TIMES DAILY
Status: DISCONTINUED | OUTPATIENT
Start: 2023-04-05 | End: 2023-04-22 | Stop reason: HOSPADM

## 2023-04-05 RX ORDER — CARVEDILOL 12.5 MG/1
25 TABLET ORAL 2 TIMES DAILY
Status: DISCONTINUED | OUTPATIENT
Start: 2023-04-05 | End: 2023-04-22 | Stop reason: HOSPADM

## 2023-04-05 RX ORDER — POTASSIUM CHLORIDE 1.5 G/1.77G
40 POWDER, FOR SOLUTION ORAL EVERY 4 HOURS
Status: COMPLETED | OUTPATIENT
Start: 2023-04-05 | End: 2023-04-05

## 2023-04-05 RX ORDER — LEVOTHYROXINE SODIUM 0.03 MG/1
12.5 TABLET ORAL
Status: DISCONTINUED | OUTPATIENT
Start: 2023-04-06 | End: 2023-04-22 | Stop reason: HOSPADM

## 2023-04-05 RX ORDER — SODIUM BICARBONATE 650 MG/1
650 TABLET ORAL 2 TIMES DAILY
Status: DISCONTINUED | OUTPATIENT
Start: 2023-04-05 | End: 2023-04-10

## 2023-04-05 RX ORDER — LACOSAMIDE 10 MG/ML
50 SOLUTION ORAL EVERY 12 HOURS SCHEDULED
Status: DISCONTINUED | OUTPATIENT
Start: 2023-04-05 | End: 2023-04-13

## 2023-04-05 RX ORDER — CHOLESTYRAMINE 4 G/9G
1 POWDER, FOR SUSPENSION ORAL 2 TIMES DAILY
Status: DISCONTINUED | OUTPATIENT
Start: 2023-04-05 | End: 2023-04-12

## 2023-04-05 RX ORDER — BISACODYL 10 MG
10 SUPPOSITORY, RECTAL RECTAL DAILY PRN
Status: DISCONTINUED | OUTPATIENT
Start: 2023-04-05 | End: 2023-04-11

## 2023-04-05 RX ORDER — POLYETHYLENE GLYCOL 3350 17 G/17G
17 POWDER, FOR SOLUTION ORAL DAILY PRN
Status: DISCONTINUED | OUTPATIENT
Start: 2023-04-05 | End: 2023-04-11

## 2023-04-05 RX ORDER — ANASTROZOLE 1 MG/1
1 TABLET ORAL DAILY
Status: DISCONTINUED | OUTPATIENT
Start: 2023-04-06 | End: 2023-04-22 | Stop reason: HOSPADM

## 2023-04-05 RX ADMIN — POTASSIUM CHLORIDE 40 MEQ: 1.5 POWDER, FOR SOLUTION ORAL at 14:06

## 2023-04-05 RX ADMIN — Medication 250 MG: at 09:18

## 2023-04-05 RX ADMIN — CARVEDILOL 25 MG: 25 TABLET, FILM COATED ORAL at 21:25

## 2023-04-05 RX ADMIN — CEFEPIME HYDROCHLORIDE 1 G: 1 INJECTION, POWDER, FOR SOLUTION INTRAMUSCULAR; INTRAVENOUS at 13:40

## 2023-04-05 RX ADMIN — CITALOPRAM HYDROBROMIDE 10 MG: 20 TABLET ORAL at 09:18

## 2023-04-05 RX ADMIN — Medication 10 ML: at 21:25

## 2023-04-05 RX ADMIN — Medication 10 ML: at 09:20

## 2023-04-05 RX ADMIN — ACETAMINOPHEN 650 MG: 325 TABLET ORAL at 13:38

## 2023-04-05 RX ADMIN — LEVOTHYROXINE SODIUM 12.5 MCG: 0.03 TABLET ORAL at 05:08

## 2023-04-05 RX ADMIN — LACOSAMIDE 50 MG: 10 SOLUTION ORAL at 21:25

## 2023-04-05 RX ADMIN — CHOLESTYRAMINE 1 PACKET: 4 POWDER, FOR SUSPENSION ORAL at 21:27

## 2023-04-05 RX ADMIN — ONDANSETRON 4 MG: 2 INJECTION INTRAMUSCULAR; INTRAVENOUS at 11:53

## 2023-04-05 RX ADMIN — POTASSIUM CHLORIDE 40 MEQ: 1.5 POWDER, FOR SOLUTION ORAL at 17:37

## 2023-04-05 RX ADMIN — LACOSAMIDE 50 MG: 10 SOLUTION ORAL at 09:19

## 2023-04-05 RX ADMIN — SODIUM BICARBONATE 650 MG TABLET 650 MG: at 21:25

## 2023-04-05 RX ADMIN — SODIUM BICARBONATE 650 MG TABLET 650 MG: at 09:18

## 2023-04-05 RX ADMIN — AMLODIPINE BESYLATE 10 MG: 5 TABLET ORAL at 14:07

## 2023-04-05 RX ADMIN — CHOLESTYRAMINE 1 PACKET: 4 POWDER, FOR SUSPENSION ORAL at 09:19

## 2023-04-05 RX ADMIN — Medication 250 MG: at 21:25

## 2023-04-05 RX ADMIN — ANASTROZOLE 1 MG: 1 TABLET ORAL at 09:19

## 2023-04-05 NOTE — PLAN OF CARE
Goal Outcome Evaluation:  Plan of Care Reviewed With: patient        Progress: improving  Outcome Evaluation: Xray confirmed placement of newly inserted PEG tube

## 2023-04-05 NOTE — CONSULTS
"Nutrition Services    Patient Name: Lyubov Middleton  YOB: 1955  MRN: [id number]  Admission date: 4/3/2023      CLINICAL NUTRITION ASSESSMENT      Reason for Assessment  Follow-up protocol     H&P:    Past Medical History:   Diagnosis Date   • Cancer     left breast removed 2012   • Type 2 diabetes mellitus         Current Problems:   Active Hospital Problems    Diagnosis    • **Metabolic encephalopathy    • Seizure disorder         Nutrition/Diet History         Narrative     Nutrition follow up.  Patient started on mechanical ground diet with nectar thickened liquids.  Able to consume some at each meal.  Experienced some nausea at breakfast, but has not any issues tolerating enteral nutrition since then.      Patient and family agreeable to nocturnal tube feeds to allow patient to eat during the day.  Will adjust enteral nutrition orders.       Anthropometrics        Current Height, Weight Height: 160 cm (63\")  Weight: 61.8 kg (136 lb 3.9 oz)   Current BMI Body mass index is 24.13 kg/m².       Weight Hx  Wt Readings from Last 30 Encounters:   04/03/23 1058 61.8 kg (136 lb 3.9 oz)   03/25/23 0828 61.8 kg (136 lb 3.9 oz)   02/11/23 1519 72.6 kg (160 lb)   12/17/20 0610 73.5 kg (162 lb 0.6 oz)   12/16/20 0330 73.8 kg (162 lb 9.6 oz)   12/15/20 0349 75.1 kg (165 lb 9.6 oz)   12/14/20 0518 80.1 kg (176 lb 9.6 oz)   12/13/20 0509 78.2 kg (172 lb 4.8 oz)   12/12/20 0550 73.4 kg (161 lb 14.4 oz)   12/11/20 0424 71.4 kg (157 lb 8 oz)   12/10/20 0237 69.6 kg (153 lb 8 oz)   12/09/20 0620 68.5 kg (151 lb 1.6 oz)   12/08/20 1453 64.9 kg (143 lb)            Wt Change Observation -14.9% x 2 months      Estimated/Assessed Needs       Energy Requirements 30 kcal/kg   EST Needs (kcal/day) 1854 kcal        Protein Requirements 1.0-1.2 g/kg   EST Daily Needs (g/day) 62-74 g       Fluid Requirements 30 ml/kg     Estimated Needs (mL/day) 1854 ml      Labs/Medications         Pertinent Labs Reviewed.   Results from last 7 " days   Lab Units 04/05/23  1132 04/05/23  0433 04/04/23  1359 04/04/23  0430 04/03/23  1116   SODIUM mmol/L 145 150* 146*   < > 147*   POTASSIUM mmol/L 3.3* 3.1* 2.7*   < > 2.8*   CHLORIDE mmol/L 118* 120* 116*   < > 111*   CO2 mmol/L 13.1* 16.1* 16.9*   < > 20.7*   BUN mg/dL 62* 67* 78*   < > 92*   CREATININE mg/dL 1.86* 1.93* 2.14*   < > 2.24*   CALCIUM mg/dL 8.6 8.9 8.8   < > 9.7   BILIRUBIN mg/dL  --  0.2  --   --  0.2   ALK PHOS U/L  --  110  --   --  183*   ALT (SGPT) U/L  --  32  --   --  46*   AST (SGOT) U/L  --  19  --   --  31   GLUCOSE mg/dL 166* 94 138*   < > 285*    < > = values in this interval not displayed.     Results from last 7 days   Lab Units 04/05/23  0433 04/04/23  1359 04/04/23  0430   MAGNESIUM mg/dL 1.6 1.7 1.8   PHOSPHORUS mg/dL  --   --  4.6*   HEMOGLOBIN g/dL 9.5*  --  9.6*   HEMATOCRIT % 27.0*  --  27.5*     SARS-CoV-2, KATHLEEN   Date Value Ref Range Status   12/25/2022 NEGATIVE Negative Final     Comment:     The 2019-CoV rRT-PCR Assay is only for use under a Food and Drug Administration Emergency Use Authorization. The performance characteristics of the assay were verified by the Clinical Laboratory at [facility]. Results should be used in   conjunction with the patient's clinical symptoms, medical history, and other clinical/laboratory findings to determine an overall clinical diagnosis. Negative results do not preclude infection with SARS-CoV-2 (COVID-19).    Test parameters have not been validated for screening asymptomatic patients.     Lab Results   Component Value Date    HGBA1C 5.6 03/08/2023         Pertinent Medications Reviewed.     Current Nutrition Orders & Evaluation of Intake       Oral Nutrition     Current PO Diet Diet: Diabetic Diets; Consistent Carbohydrate; Texture: Mechanical Ground (NDD 2); Fluid Consistency: Nectar Thick   Supplement Orders Placed This Encounter      Diet, Tube Feeding Tube Feeding Formula: Diabetisource AC; Tube Feeding Type: Continuous;  Continuous Tube Feeding Start Rate (mL/hr): 25; Then Advance Rate By (mL/hr): 25; Every __ Hours: 4; To Goal Rate of (mL/hr): 65       Malnutrition Severity Assessment                Nutrition Diagnosis         Nutrition Dx Problem 1 Inadequate energy Intake related to decreased ability to consume sufficient energy as evidenced by NPO and non-functioning PEG tube.      Nutrition Intervention         Diet per SLP    Diabetisource AC @ 90 ml/hr x 12 hours (8pm-8am)  Free water flushes 150 ml every 3 hours x 24 hours  Provides 1296 kcal, 65 g pro, 2086 ml fluid      Medical Nutrition Therapy/Nutrition Education          Learner     Readiness Patient and Caregiver  Acceptance     Method     Response Explanation  Verbalizes understanding     Monitor/Evaluation        Monitor Per protocol, I&O, Pertinent labs, EN delivery/tolerance, Weight, POC/GOC, Diet advancement     Nutrition Discharge Plan         To be determined       Electronically signed by:  Belen Skaggs RD  04/05/23 13:41 EDT

## 2023-04-05 NOTE — THERAPY TREATMENT NOTE
Acute Care - Speech Language Pathology   Swallow Treatment Note  [place]     Patient Name: Lyubov Middleton  : 1955  MRN: [id number]  Today's Date: 2023               Admit Date: 4/3/2023    Visit Dx:     ICD-10-CM ICD-9-CM   1. Metabolic encephalopathy  G93.41 348.31   2. Oropharyngeal dysphagia  R13.12 787.22   3. Difficulty in walking  R26.2 719.7   4. Decreased activities of daily living (ADL)  Z78.9 V49.89     Patient Active Problem List   Diagnosis   • Renal stone   • Renal abscess   • Stage 3a chronic kidney disease   • Type 2 diabetes mellitus   • Metabolic encephalopathy   • Seizure disorder     Past Medical History:   Diagnosis Date   • Cancer     left breast removed    • Type 2 diabetes mellitus      Past Surgical History:   Procedure Laterality Date   • ABDOMINAL SURGERY     • BREAST SURGERY Left     removed due to cancer   • CHOLECYSTECTOMY     • CYSTOSCOPY W/ URETERAL STENT PLACEMENT Right 2020    Procedure: CYSTOSCOPY, RIGHT RETROGRADE PYELOGRAM, URETEROSCOPY, LASER LITHOTRIPSY, RIGHT URETERAL STENT PLACEMENT;  Surgeon: Rohan Dooley Jr., MD;  Location: [facility];  Service: Urology;  Laterality: Right;   • GASTROSTOMY W/ FEEDING TUBE     • HERNIA REPAIR      mesh removed       SPEECH PATHOLOGY DYSPHAGIA TREATMENT    Subjective/Behavioral Observations: Alert and cooperative, assisted to sit upright in bed.      Day/time of Treatment: 23      Current Diet: Mechanical soft, nectar liquid      Current Strategies:One-on-one supervision, assist patient to feed self.  Alternate small bites and small sips of solids and liquids.  Small bite of solid with double swallow.  Small single sips of liquid.  Medications whole in applesauce.      Treatment received: Dysphagia therapy to address swallow function through exercises and education of strategies.      Results of treatment: Patient taking sips of nectar liquid with moderate cueing for slow rate, single sips.  Patient  taking few bites of sausage, effortful swallow noted.  Patient noted with vomiting, stating she was feeling sick.  Patient later taking further intake of nectar thickened liquids with moderate cueing for single sips.  Nectar liquid trials by cup and by straw with similar result.      Progress toward goals: Initial treatment      Barriers to Achieving goals: Medical status      Plan of care:/changes in plan: Continue per current plan.  P.o. diet as tolerated.  Continue with nectar thickened liquid.                                                                                          Plan of Care Reviewed With: patient          EDUCATION  The patient has been educated in the following areas:   Modified Diet Instruction.              Time Calculation:    Time Calculation- SLP     Row Name 04/05/23 1014             Time Calculation- SLP    SLP Stop Time 0915  -TB      SLP Received On 04/05/23  -TB         Untimed Charges    19037-QO Treatment Swallow Minutes 40  -TB         Total Minutes    Untimed Charges Total Minutes 40  -TB       Total Minutes 40  -TB            User Key  (r) = Recorded By, (t) = Taken By, (c) = Cosigned By    Initials Name Provider Type    TB Barbara Reilly SLP Speech and Language Pathologist                Therapy Charges for Today     Code Description Service Date Service Provider Modifiers Qty    03147753251 HC ST EVAL ORAL PHARYNG SWALLOW 4 4/4/2023 Barbara Reilly SLP GN 1    [id number] HC ST MOTION FLUORO EVAL SWALLOW 6 4/4/2023 Barbara Reilly SLP GN 1    [id number] HC ST TREATMENT SWALLOW 3 4/5/2023 Barbara Reilly SLP GN 1               ANTHONY Santamaria  4/5/2023

## 2023-04-05 NOTE — THERAPY TREATMENT NOTE
Acute Care - Physical Therapy Treatment Note   Sherry     Patient Name: Lyubov Middleton  : 1955  MRN: [id number]  Today's Date: 2023      Visit Dx:     ICD-10-CM ICD-9-CM   1. Metabolic encephalopathy  G93.41 348.31   2. Oropharyngeal dysphagia  R13.12 787.22   3. Difficulty in walking  R26.2 719.7   4. Decreased activities of daily living (ADL)  Z78.9 V49.89     Patient Active Problem List   Diagnosis   • Renal stone   • Renal abscess   • Stage 3a chronic kidney disease   • Type 2 diabetes mellitus   • Metabolic encephalopathy   • Seizure disorder     Past Medical History:   Diagnosis Date   • Cancer     left breast removed    • Type 2 diabetes mellitus      Past Surgical History:   Procedure Laterality Date   • ABDOMINAL SURGERY     • BREAST SURGERY Left     removed due to cancer   • CHOLECYSTECTOMY     • CYSTOSCOPY W/ URETERAL STENT PLACEMENT Right 2020    Procedure: CYSTOSCOPY, RIGHT RETROGRADE PYELOGRAM, URETEROSCOPY, LASER LITHOTRIPSY, RIGHT URETERAL STENT PLACEMENT;  Surgeon: Rohan Dooley Jr., MD;  Location: [facility];  Service: Urology;  Laterality: Right;   • GASTROSTOMY W/ FEEDING TUBE     • HERNIA REPAIR      mesh removed     PT Assessment (last 12 hours)     PT Evaluation and Treatment     Row Name 23 1418          Physical Therapy Time and Intention    Subjective Information complains of;weakness (P)   -AT     Document Type therapy note (daily note) (P)   -AT     Mode of Treatment individual therapy;physical therapy (P)   -AT     Patient Effort good (P)   -AT     Symptoms Noted During/After Treatment dizziness;fatigue (P)   -AT     Row Name 23 1418          General Information    Patient Profile Reviewed yes (P)   -AT     Patient Observations alert;cooperative;agree to therapy (P)   -AT     Existing Precautions/Restrictions fall (P)   -AT     Barriers to Rehab none identified (P)   -AT     Row Name 23 1418          Cognition    Affect/Mental  Status (Cognition) WFL (P)   -AT     Orientation Status (Cognition) oriented x 3 (P)   -AT     Follows Commands (Cognition) WFL (P)   -AT     Cognitive Function WFL (P)   -AT     Row Name 04/05/23 1418          Bed Mobility    Bed Mobility supine-sit;sit-supine (P)   -AT     All Activities, [place] (Bed Mobility) moderate assist (50% patient effort) (P)   -AT     Supine-Sit [place] (Bed Mobility) moderate assist (50% patient effort) (P)   -AT     Sit-Supine [place] (Bed Mobility) moderate assist (50% patient effort) (P)   -AT     Bed Mobility, Safety Issues decreased use of arms for pushing/pulling;decreased use of legs for bridging/pushing (P)   -AT     Assistive Device (Bed Mobility) bed rails;draw sheet;head of bed elevated (P)   -AT     Row Name 04/05/23 1418          Transfers    Transfers sit-stand transfer;stand-sit transfer (P)   -AT     Row Name 04/05/23 1418          Sit-Stand Transfer    Sit-Stand [place] (Transfers) minimum assist (75% patient effort) (P)   -AT     Assistive Device (Sit-Stand Transfers) walker, front-wheeled (P)   -AT     Row Name 04/05/23 1418          Stand-Sit Transfer    Stand-Sit [place] (Transfers) minimum assist (75% patient effort) (P)   -AT     Assistive Device (Stand-Sit Transfers) walker, front-wheeled (P)   -AT     Row Name 04/05/23 1418          Gait/Stairs (Locomotion)    Gait/Stairs Locomotion gait/ambulation independence;gait/ambulation assistive device (P)   -AT     [place] Level (Gait) contact guard (P)   -AT     Assistive Device (Gait) walker, front-wheeled (P)   -AT     Distance in Feet (Gait) 5 (P)   5 steps forward, 5 steps back  -AT     Pattern (Gait) 4-point;step-through (P)   -AT     Deviations/Abnormal Patterns (Gait) gait speed decreased;stride length decreased (P)   -AT     Bilateral Gait Deviations forward flexed posture (P)   -AT     Gait Assessment/Intervention patient took 5 steps forward and 5 steps back, unable to ambulate  further due to increased dizziness. (P)   -AT     Row Name 04/05/23 1418          Balance    Balance Assessment standing dynamic balance (P)   -AT     Dynamic Standing Balance contact guard (P)   -AT     Position/Device Used, Standing Balance supported;walker, front-wheeled (P)   -AT     Row Name             Wound 04/03/23 1622 Right labia    Wound - Properties Group Placement Date: 04/03/23  -AC Placement Time: 1622  -AC Present on Hospital Admission: Y  -AC Side: Right  -AC Location: labia  -AC    Retired Wound - Properties Group Placement Date: 04/03/23  -AC Placement Time: 1622  -AC Present on Hospital Admission: Y  -AC Side: Right  -AC Location: labia  -AC    Retired Wound - Properties Group Date first assessed: 04/03/23  -AC Time first assessed: 1622  -AC Present on Hospital Admission: Y  -AC Side: Right  -AC Location: labia  -AC    Row Name             Wound 04/03/23 1628 sacral spine    Wound - Properties Group Placement Date: 04/03/23  -AC Placement Time: 1628  -AC Present on Hospital Admission: Y  -AC Location: sacral spine  -AC    Retired Wound - Properties Group Placement Date: 04/03/23  -AC Placement Time: 1628  -AC Present on Hospital Admission: Y  -AC Location: sacral spine  -AC    Retired Wound - Properties Group Date first assessed: 04/03/23  -AC Time first assessed: 1628  -AC Present on Hospital Admission: Y  -AC Location: sacral spine  -AC    Row Name             Wound 04/03/23 1629 Bilateral gluteal    Wound - Properties Group Placement Date: 04/03/23  -AC Placement Time: 1629  -AC Present on Hospital Admission: Y  -AC Side: Bilateral  -AC Location: gluteal  -AC    Retired Wound - Properties Group Placement Date: 04/03/23  -AC Placement Time: 1629  -AC Present on Hospital Admission: Y  -AC Side: Bilateral  -AC Location: gluteal  -AC    Retired Wound - Properties Group Date first assessed: 04/03/23  -AC Time first assessed: 1629  -AC Present on Hospital Admission: Y  -AC Side: Bilateral  -AC  Location: gluteal  -AC    Row Name             Wound 04/03/23 1630 Bilateral posterior greater trochanter    Wound - Properties Group Placement Date: 04/03/23  -AC Placement Time: 1630  -AC Present on Hospital Admission: Y  -AC Side: Bilateral  -AC Orientation: posterior  -AC Location: greater trochanter  -AC    Retired Wound - Properties Group Placement Date: 04/03/23  -AC Placement Time: 1630  -AC Present on Hospital Admission: Y  -AC Side: Bilateral  -AC Orientation: posterior  -AC Location: greater trochanter  -AC    Retired Wound - Properties Group Date first assessed: 04/03/23  -AC Time first assessed: 1630  -AC Present on Hospital Admission: Y  -AC Side: Bilateral  -AC Location: greater trochanter  -AC    Row Name 04/05/23 1418          Progress Summary (PT)    Daily Progress Summary (PT) Patient tolerated ambulating 5 steps in room today with moderate difficulty due to increased weakness. She was unable to ambulate further due to increased dizziness with ambulating. Patient assisted back into bed after session. She will continue to benefit from physical therapy services while in the hospital. (P)   -AT           User Key  (r) = Recorded By, (t) = Taken By, (c) = Cosigned By    Initials Name Provider Type    AC Jessica Glez, RN Registered Nurse    AT Trista Quezada, PT Student PT Student                Physical Therapy Education     Title: PT OT SLP Therapies (Done)     Topic: Physical Therapy (Done)     Point: Mobility training (Done)     Learning Progress Summary           Patient Acceptance, E,TB, VU by  at 4/4/2023 1113                   Point: Precautions (Done)     Learning Progress Summary           Patient Acceptance, E,TB, VU by  at 4/4/2023 1113                               User Key     Initials Effective Dates Name Provider Type [facility] 03/07/23 -  Rosalinda Peacock, PT Student PT Student PT              PT Recommendation and Plan     Progress Summary (PT)  Daily Progress Summary  (PT): (P) Patient tolerated ambulating 5 steps in room today with moderate difficulty due to increased weakness. She was unable to ambulate further due to increased dizziness with ambulating. Patient assisted back into bed after session. She will continue to benefit from physical therapy services while in the hospital.   Outcome Measures     Row Name 04/05/23 1423 04/04/23 1100          How much help from another person do you currently need...    Turning from your back to your side while in flat bed without using bedrails? 3 (P)   -AT 3  -TOSHA (r) SW (t) TOSHA (c)     Moving from lying on back to sitting on the side of a flat bed without bedrails? 3 (P)   -AT 3  -TOSHA (r) SW (t) TOSHA (c)     Moving to and from a bed to a chair (including a wheelchair)? 2 (P)   -AT 2  -TOSHA (r) SW (t) TOSHA (c)     Standing up from a chair using your arms (e.g., wheelchair, bedside chair)? 3 (P)   -AT 3  -TOSHA (r) SW (t) TOSHA (c)     Climbing 3-5 steps with a railing? 1 (P)   -AT 1  -TOSHA (r) SW (t) TOSHA (c)     To walk in hospital room? 2 (P)   -AT 2  -TOSHA (r) SW (t) TOSHA (c)     AM-PAC 6 Clicks Score (PT) 14 (P)   -AT 14  -TOSHA (r) SW (t)        Functional Assessment    Outcome Measure Options AM-PAC 6 Clicks Basic Mobility (PT) (P)   -AT AM-PAC 6 Clicks Basic Mobility (PT)  -TOSHA (r) SW (t) TOSHA (c)           User Key  (r) = Recorded By, (t) = Taken By, (c) = Cosigned By    Initials Name Provider Type    Baudilio Garay, PT Physical Therapist    AT Trista Quezada, PT Student PT Student    Rosalinda Braun, PT Student PT Student                 Time Calculation:    PT Charges     Row Name 04/05/23 1424             Time Calculation    PT Received On 04/05/23 (P)   -AT         Timed Charges    51465 - Gait Training Minutes  15 (P)   -AT         Total Minutes    Timed Charges Total Minutes 15 (P)   -AT       Total Minutes 15 (P)   -AT            User Key  (r) = Recorded By, (t) = Taken By, (c) = Cosigned By    Initials Name Provider Type    AT [place],  Trista, PT Student PT Student              Therapy Charges for Today     Code Description Service Date Service Provider Modifiers Qty    [id number] HC GAIT TRAINING EA 15 MIN 4/5/2023 Trista Quezada, PT Student GP 1          PT G-Codes  Outcome Measure Options: (P) AM-PAC 6 Clicks Basic Mobility (PT)  AM-PAC 6 Clicks Score (PT): (P) 14  AM-PAC 6 Clicks Score (OT): 16    Trista Quezada PT Student  4/5/2023

## 2023-04-05 NOTE — ACP (ADVANCE CARE PLANNING)
Educated on and completed an EMS DNR. Original to chart, copy to MR and POA. Discussed code status and patient request to be a NO CPR/DNI at all times. Everyone educated to inform staff at each visit.    -Roxane LONDON, RN, [facility]   Palliative Care    4/5/2023 13:52 EDT

## 2023-04-05 NOTE — PROGRESS NOTES
[facility]   Hospitalist Progress Note  Date: 2023  Patient Name: Lyubov Middleton  : 1955  MRN: [id number]  Date of admission: 4/3/2023      Subjective   Subjective     Chief Complaint:   AMS    Summary:   Lyubov Middleton is a 68 y.o. female with past medical history of diabetes mellitus, CKD 4, seizure disorder, dysphagia currently undergoing rehab at [facility] and rehab presents with altered mental status.  Per report, patient in her usual state of health in the morning presentation, however when working with therapy.  Patient indicated she was not feeling well, her eyes rolled back in her head and she became unresponsive.  Sternal rub attempted without response.  Patient brought to the emergency department for evaluation and treatment, noted to have slowly improving mentation.  Alert only to 1 on arrival.  Patient's labs also significant for low potassium, elevated sodium, creatinine up at 2.24, up from patient's baseline of 1.7.  UA concerning for urinary tract infection.  CT head negative for any acute intracranial abnormalities, did demonstrate diffuse atrophy and white matter changes.  Chest x-ray negative for any acute abnormalities.  Patient has multiple bruises in various stages of healing on the skin exam.  Nursing report indicates patient frequently tries to get out of bed and falls.  Recently had presentation to the emergency department for a broken nose following a fall.  Cerebella applied in the emergency department and negative for seizure activity acutely.  Hospitalist service contacted for admission.    Interval Followup:   PEG tube was confirmed with imaging.  We will start patient back on her tube feeds.  Patient still hyponatremic and with AUSTEN therefore we will continue half normal saline infusion, repeat BMP later today.  Changing antibiotics based on current culture data.  Patient has Citrobacter freundii with multiple resistances, patient now on cefepime.   Patient originally on ceftriaxone, resistant.    Review of Systems   All systems were reviewed and negative except for: Denies any acute symptoms at this time.  Remains weak    Objective   Objective     Vitals:   Temp:  [97.2 °F (36.2 °C)-98.8 °F (37.1 °C)] 97.3 °F (36.3 °C)  Heart Rate:  [73-80] 80  Resp:  [16-17] 16  BP: (140-167)/(62-80) 162/80  Physical Exam   Gen. alert and oriented x3, remains pleasantly confused however when pointedly asked can answer any questions.  HEENT: Normocephalic multiple bruises of the head as well as well-healing laceration of the bridge of the nose, moist membranes pupils equal round reactive light, no scleral icterus no conjunctival injection, protruding right maxillary central incisor  Cardiovascular: regular rate and rhythm no murmurs rubs or gallops S1-S2, no lower extremity edema appreciated  Pulmonary: Clear to auscultation bilaterally no wheezes rales or rhonchi symmetric chest expansion, unlabored, no conversational dyspnea appreciated  Gastrointestinal: Soft nontender nondistended positive bowel sounds all 4 quadrants no rebound or guarding, G-tube replaced with minimal bloody discharge around tube.  Musculoskeletal: No clubbing cyanosis, warm and well-perfused, calves soft symmetric nontender bilaterally  Skin: Multiple bruises bilateral lower extremities, excoriation of the buttocks, bruising over the brow and forehead, healing laceration of the bridge of the nose  Neuro: Cranial nerves II through XII intact grossly no sensorimotor deficits appreciated bilateral upper and lower extremities, generalized weakness  Psych: Patient is calm cooperative and appropriate with exam not responding to internal stimuli  : No Fernandez catheter no bladder distention positive suprapubic tenderness     Result Review    Result Review:  I have personally reviewed the results from 4/5/2023 and agree with these findings:  []  Laboratory  []  Microbiology  []  Radiology  []  EKG/Telemetry   []   Cardiology/Vascular   []  Pathology  []  Old records  []  Other:    Assessment & Plan   Assessment / Plan     Assessment/Plan:  Altered mental status  Acute metabolic encephalopathy secondary to urinary tract infection  Urinary tract infection  Nonconvulsive seizure   AUSTEN on CKD 4 baseline creatinine 1.7  Hypokalemia  Diabetes mellitus  Hypernatremia  Metabolic acidosis  Debility  Oropharyngeal dysphagia  Blocked PEG tube  Anemia of chronic kidney disease  Chronic diarrhea  Hypertension  Depression  Hypothyroidism  History of nonconvulsive status epilepticus  History of breast cancer status post left mastectomy     Plan:   Patient admitted for further evaluation and treatment  Neurology consulted thank you for your assistance  EEG with no seizure-like activity  Antibiotics transitioned to cefepime based on sensitivities  Antiepileptic drugs continued per neurology  Continue on IV fluids for today, monitoring hypernatremia  Monitor renal function closely, creatinine slowly improving  Monitor electrolytes replace as needed, again requiring potassium replacement today  Continue sliding scale insulin to every 6 hours  MBS shows patient can have modified diet, medications transitioned to oral  Repeat imaging shows appropriate placement of PEG tube, replaced at bedside on 4/4/2023  Started back on tube feeds  Unclear how long patient's PEG tube has been blocked and patient has been not receiving tube feeds.  Patient's AUSTEN, hypokalemia and hypernatremia certainly leaning towards poor oral intake over the previous few days.  Patient also at risk for seizures if she has not been receiving her typical AEDs via PEG tube.  Speech therapy will continue to follow along, appreciate assistance  Consult physical therapy occupational therapy  Patient receives darbepoetin alpha subcu on the 26th of every month for treatment of anemia of chronic kidney disease  Continue home cholestyramine  Continue home Florastor  Resume home  amlodipine, carvedilol for persistent hypertension, continue monitor  Continue home citalopram  Continue home levothyroxine  TSH minimally elevated  Continue home anastrozole  Palliative care was able to meet with family at bedside, updating CODE STATUS DNR/DNI       Disposition: Inpatient rehab.  Family discussing potentially finding an alternative facility      Discussed plan with RN, speech therapist, , palliative care    DVT prophylaxis:  Mechanical DVT prophylaxis orders are present.    CODE STATUS:   Code Status (Patient has no pulse and is not breathing): CPR (Attempt to Resuscitate)  Medical Interventions (Patient has pulse or is breathing): Full Support

## 2023-04-05 NOTE — NURSING NOTE
Palliative care nurse visited the patient and the patients POA and his wife at the bedside. I encouraged the three of them to discuss goals and wishes should her POA have to make any decisions on her behalf. The patient does request to be a DNR/DNI at this time. The unit SW is currently looking for rehab to long term referrals and is asking how many days the patient has left. I answered all the POA's questions that I could, emotional support provided and an EMS DNR form completed. Provider updated.    -Roxane LONDON, RN, [facility]   Palliative Care    4/5/2023 13:40 EDT

## 2023-04-05 NOTE — PLAN OF CARE
Goal Outcome Evaluation:   Patient rested throughout shift. Medications switched from g-tube to PO. No complaints at this time. Call light in reach. No signs of distress noted.

## 2023-04-06 LAB
ANION GAP SERPL CALCULATED.3IONS-SCNC: 11 MMOL/L (ref 5–15)
ANION GAP SERPL CALCULATED.3IONS-SCNC: 11.8 MMOL/L (ref 5–15)
ANION GAP SERPL CALCULATED.3IONS-SCNC: 11.8 MMOL/L (ref 5–15)
BUN SERPL-MCNC: 52 MG/DL (ref 8–23)
BUN SERPL-MCNC: 54 MG/DL (ref 8–23)
BUN SERPL-MCNC: 54 MG/DL (ref 8–23)
BUN/CREAT SERPL: 27.4 (ref 7–25)
BUN/CREAT SERPL: 28.4 (ref 7–25)
BUN/CREAT SERPL: 28.9 (ref 7–25)
CALCIUM SPEC-SCNC: 7.9 MG/DL (ref 8.6–10.5)
CALCIUM SPEC-SCNC: 8.3 MG/DL (ref 8.6–10.5)
CALCIUM SPEC-SCNC: 8.6 MG/DL (ref 8.6–10.5)
CHLORIDE SERPL-SCNC: 108 MMOL/L (ref 98–107)
CHLORIDE SERPL-SCNC: 109 MMOL/L (ref 98–107)
CHLORIDE SERPL-SCNC: 113 MMOL/L (ref 98–107)
CO2 SERPL-SCNC: 14 MMOL/L (ref 22–29)
CO2 SERPL-SCNC: 15.2 MMOL/L (ref 22–29)
CO2 SERPL-SCNC: 17.2 MMOL/L (ref 22–29)
CREAT SERPL-MCNC: 1.87 MG/DL (ref 0.57–1)
CREAT SERPL-MCNC: 1.9 MG/DL (ref 0.57–1)
CREAT SERPL-MCNC: 1.9 MG/DL (ref 0.57–1)
DEPRECATED RDW RBC AUTO: 48.6 FL (ref 37–54)
EGFRCR SERPLBLD CKD-EPI 2021: 28.6 ML/MIN/1.73
EGFRCR SERPLBLD CKD-EPI 2021: 28.6 ML/MIN/1.73
EGFRCR SERPLBLD CKD-EPI 2021: 29.2 ML/MIN/1.73
ERYTHROCYTE [DISTWIDTH] IN BLOOD BY AUTOMATED COUNT: 14.4 % (ref 12.3–15.4)
GLUCOSE BLDC GLUCOMTR-MCNC: 106 MG/DL (ref 70–99)
GLUCOSE BLDC GLUCOMTR-MCNC: 140 MG/DL (ref 70–99)
GLUCOSE BLDC GLUCOMTR-MCNC: 148 MG/DL (ref 70–99)
GLUCOSE BLDC GLUCOMTR-MCNC: 174 MG/DL (ref 70–99)
GLUCOSE SERPL-MCNC: 115 MG/DL (ref 65–99)
GLUCOSE SERPL-MCNC: 160 MG/DL (ref 65–99)
GLUCOSE SERPL-MCNC: 176 MG/DL (ref 65–99)
HCT VFR BLD AUTO: 25.2 % (ref 34–46.6)
HGB BLD-MCNC: 8.3 G/DL (ref 12–15.9)
MAGNESIUM SERPL-MCNC: 1.3 MG/DL (ref 1.6–2.4)
MCH RBC QN AUTO: 30.6 PG (ref 26.6–33)
MCHC RBC AUTO-ENTMCNC: 32.9 G/DL (ref 31.5–35.7)
MCV RBC AUTO: 93 FL (ref 79–97)
PHOSPHATE SERPL-MCNC: 2.4 MG/DL (ref 2.5–4.5)
PLATELET # BLD AUTO: 235 10*3/MM3 (ref 140–450)
PMV BLD AUTO: 9.9 FL (ref 6–12)
POTASSIUM SERPL-SCNC: 3.8 MMOL/L (ref 3.5–5.2)
POTASSIUM SERPL-SCNC: 3.8 MMOL/L (ref 3.5–5.2)
POTASSIUM SERPL-SCNC: 4.2 MMOL/L (ref 3.5–5.2)
RBC # BLD AUTO: 2.71 10*6/MM3 (ref 3.77–5.28)
SODIUM SERPL-SCNC: 135 MMOL/L (ref 136–145)
SODIUM SERPL-SCNC: 138 MMOL/L (ref 136–145)
SODIUM SERPL-SCNC: 138 MMOL/L (ref 136–145)
WBC NRBC COR # BLD: 7.84 10*3/MM3 (ref 3.4–10.8)

## 2023-04-06 PROCEDURE — 82962 GLUCOSE BLOOD TEST: CPT

## 2023-04-06 PROCEDURE — 25010000002 CEFEPIME PER 500 MG: Performed by: INTERNAL MEDICINE

## 2023-04-06 PROCEDURE — 83735 ASSAY OF MAGNESIUM: CPT | Performed by: INTERNAL MEDICINE

## 2023-04-06 PROCEDURE — 85027 COMPLETE CBC AUTOMATED: CPT | Performed by: INTERNAL MEDICINE

## 2023-04-06 PROCEDURE — 80048 BASIC METABOLIC PNL TOTAL CA: CPT | Performed by: INTERNAL MEDICINE

## 2023-04-06 PROCEDURE — 25010000002 MAGNESIUM SULFATE 2 GM/50ML SOLUTION: Performed by: INTERNAL MEDICINE

## 2023-04-06 PROCEDURE — 63710000001 INSULIN REGULAR HUMAN PER 5 UNITS: Performed by: FAMILY MEDICINE

## 2023-04-06 PROCEDURE — 84100 ASSAY OF PHOSPHORUS: CPT | Performed by: INTERNAL MEDICINE

## 2023-04-06 PROCEDURE — 97116 GAIT TRAINING THERAPY: CPT

## 2023-04-06 PROCEDURE — 99232 SBSQ HOSP IP/OBS MODERATE 35: CPT | Performed by: INTERNAL MEDICINE

## 2023-04-06 RX ORDER — MAGNESIUM SULFATE HEPTAHYDRATE 40 MG/ML
2 INJECTION, SOLUTION INTRAVENOUS ONCE
Status: COMPLETED | OUTPATIENT
Start: 2023-04-06 | End: 2023-04-06

## 2023-04-06 RX ADMIN — Medication 10 ML: at 22:28

## 2023-04-06 RX ADMIN — CARVEDILOL 25 MG: 25 TABLET, FILM COATED ORAL at 08:49

## 2023-04-06 RX ADMIN — AMLODIPINE BESYLATE 10 MG: 5 TABLET ORAL at 08:49

## 2023-04-06 RX ADMIN — POTASSIUM & SODIUM PHOSPHATES POWDER PACK 280-160-250 MG 1 PACKET: 280-160-250 PACK at 17:30

## 2023-04-06 RX ADMIN — Medication 10 ML: at 08:47

## 2023-04-06 RX ADMIN — INSULIN HUMAN 2 UNITS: 100 INJECTION, SOLUTION PARENTERAL at 12:20

## 2023-04-06 RX ADMIN — Medication 250 MG: at 22:28

## 2023-04-06 RX ADMIN — CITALOPRAM HYDROBROMIDE 10 MG: 20 TABLET ORAL at 08:50

## 2023-04-06 RX ADMIN — MAGNESIUM SULFATE HEPTAHYDRATE 2 G: 2 INJECTION, SOLUTION INTRAVENOUS at 08:49

## 2023-04-06 RX ADMIN — SODIUM BICARBONATE 650 MG TABLET 650 MG: at 08:49

## 2023-04-06 RX ADMIN — Medication 250 MG: at 08:49

## 2023-04-06 RX ADMIN — CHOLESTYRAMINE 1 PACKET: 4 POWDER, FOR SUSPENSION ORAL at 08:48

## 2023-04-06 RX ADMIN — LACOSAMIDE 50 MG: 10 SOLUTION ORAL at 08:49

## 2023-04-06 RX ADMIN — POTASSIUM & SODIUM PHOSPHATES POWDER PACK 280-160-250 MG 1 PACKET: 280-160-250 PACK at 12:20

## 2023-04-06 RX ADMIN — LEVOTHYROXINE SODIUM 12.5 MCG: 0.03 TABLET ORAL at 05:49

## 2023-04-06 RX ADMIN — CHOLESTYRAMINE 1 PACKET: 4 POWDER, FOR SUSPENSION ORAL at 22:28

## 2023-04-06 RX ADMIN — CARVEDILOL 25 MG: 25 TABLET, FILM COATED ORAL at 22:28

## 2023-04-06 RX ADMIN — CEFEPIME HYDROCHLORIDE 1 G: 1 INJECTION, POWDER, FOR SOLUTION INTRAMUSCULAR; INTRAVENOUS at 12:23

## 2023-04-06 RX ADMIN — SODIUM BICARBONATE 650 MG TABLET 650 MG: at 22:28

## 2023-04-06 RX ADMIN — LACOSAMIDE 50 MG: 10 SOLUTION ORAL at 22:28

## 2023-04-06 RX ADMIN — ANASTROZOLE 1 MG: 1 TABLET ORAL at 08:49

## 2023-04-06 RX ADMIN — POTASSIUM & SODIUM PHOSPHATES POWDER PACK 280-160-250 MG 1 PACKET: 280-160-250 PACK at 08:49

## 2023-04-06 NOTE — THERAPY TREATMENT NOTE
Acute Care - Physical Therapy Treatment Note   [place]     Patient Name: Lyubov Middleton  : 1955  MRN: [id number]  Today's Date: 2023      Visit Dx:     ICD-10-CM ICD-9-CM   1. Metabolic encephalopathy  G93.41 348.31   2. Oropharyngeal dysphagia  R13.12 787.22   3. Difficulty in walking  R26.2 719.7   4. Decreased activities of daily living (ADL)  Z78.9 V49.89     Patient Active Problem List   Diagnosis   • Renal stone   • Renal abscess   • Stage 3a chronic kidney disease   • Type 2 diabetes mellitus   • Metabolic encephalopathy   • Seizure disorder     Past Medical History:   Diagnosis Date   • Cancer     left breast removed    • Type 2 diabetes mellitus      Past Surgical History:   Procedure Laterality Date   • ABDOMINAL SURGERY     • BREAST SURGERY Left     removed due to cancer   • CHOLECYSTECTOMY     • CYSTOSCOPY W/ URETERAL STENT PLACEMENT Right 2020    Procedure: CYSTOSCOPY, RIGHT RETROGRADE PYELOGRAM, URETEROSCOPY, LASER LITHOTRIPSY, RIGHT URETERAL STENT PLACEMENT;  Surgeon: Rohan Dooley Jr., MD;  Location: [facility];  Service: Urology;  Laterality: Right;   • GASTROSTOMY W/ FEEDING TUBE     • HERNIA REPAIR      mesh removed     PT Assessment (last 12 hours)     PT Evaluation and Treatment     Row Name 23 1400          Physical Therapy Time and Intention    Subjective Information no complaints (P)   -SW     Document Type therapy note (daily note) (P)   -SW     Mode of Treatment individual therapy;physical therapy (P)   -SW     Patient Effort good (P)   -SW     Symptoms Noted During/After Treatment none (P)   -SW     Row Name 23 1400          Bed Mobility    Bed Mobility bed mobility (all) activities (P)   -SW     All Activities, [place] (Bed Mobility) standby assist (P)   -SW     Assistive Device (Bed Mobility) bed rails;head of bed elevated (P)   -SW     Row Name 23 1400          Transfers    Transfers sit-stand transfer;stand-sit transfer (P)    -SW     Row Name 04/06/23 1400          Sit-Stand Transfer    Sit-Stand [place] (Transfers) minimum assist (75% patient effort) (P)   -SW     Assistive Device (Sit-Stand Transfers) walker, front-wheeled (P)   -SW     Row Name 04/06/23 1400          Stand-Sit Transfer    Stand-Sit Cole (Transfers) minimum assist (75% patient effort) (P)   -SW     Assistive Device (Stand-Sit Transfers) walker, front-wheeled (P)   -SW     Row Name 04/06/23 1400          Gait/Stairs (Locomotion)    Gait/Stairs Locomotion gait/ambulation independence;gait/ambulation assistive device;distance ambulated (P)   -SW     [place] Level (Gait) moderate assist (50% patient effort) (P)   -SW     Assistive Device (Gait) walker, front-wheeled (P)   -SW     Distance in Feet (Gait) 5 (P)   -SW     Pattern (Gait) 4-point;step-through (P)   -SW     Deviations/Abnormal Patterns (Gait) gait speed decreased;festinating/shuffling;other (see comments) (P)   Leaning to left side  -SW     Bilateral Gait Deviations forward flexed posture (P)   -SW     Gait Assessment/Intervention Pt was leaning to left side when standing and required mod assist to remain standing. Pt improved and required less assistance when walking, but still required min A to remain upright. (P)   -     Row Name 04/06/23 1400          Safety Issues, Functional Mobility    Impairments Affecting Function (Mobility) balance;coordination;endurance/activity tolerance;range of motion (ROM);strength (P)   -     Row Name 04/06/23 1400          Balance    Balance Assessment standing dynamic balance (P)   -SW     Dynamic Standing Balance moderate assist (P)   -SW     Position/Device Used, Standing Balance walker, front-wheeled (P)   -SW     Row Name             Wound 04/03/23 1622 Right labia    Wound - Properties Group Placement Date: 04/03/23  -AC Placement Time: 1622  -AC Present on Hospital Admission: Y  -AC Side: Right  -AC Location: labia  -AC    Retired Wound - Properties  Group Placement Date: 04/03/23  -AC Placement Time: 1622  -AC Present on Hospital Admission: Y  -AC Side: Right  -AC Location: labia  -AC    Retired Wound - Properties Group Date first assessed: 04/03/23  -AC Time first assessed: 1622  -AC Present on Hospital Admission: Y  -AC Side: Right  -AC Location: labia  -AC    Row Name             Wound 04/03/23 1628 sacral spine    Wound - Properties Group Placement Date: 04/03/23  -AC Placement Time: 1628  -AC Present on Hospital Admission: Y  -AC Location: sacral spine  -AC    Retired Wound - Properties Group Placement Date: 04/03/23  -AC Placement Time: 1628  -AC Present on Hospital Admission: Y  -AC Location: sacral spine  -AC    Retired Wound - Properties Group Date first assessed: 04/03/23  -AC Time first assessed: 1628  -AC Present on Hospital Admission: Y  -AC Location: sacral spine  -AC    Row Name             Wound 04/03/23 1629 Bilateral gluteal    Wound - Properties Group Placement Date: 04/03/23  -AC Placement Time: 1629  -AC Present on Hospital Admission: Y  -AC Side: Bilateral  -AC Location: gluteal  -AC    Retired Wound - Properties Group Placement Date: 04/03/23  -AC Placement Time: 1629  -AC Present on Hospital Admission: Y  -AC Side: Bilateral  -AC Location: gluteal  -AC    Retired Wound - Properties Group Date first assessed: 04/03/23  -AC Time first assessed: 1629  -AC Present on Hospital Admission: Y  -AC Side: Bilateral  -AC Location: gluteal  -AC    Row Name             Wound 04/03/23 1630 Bilateral posterior greater trochanter    Wound - Properties Group Placement Date: 04/03/23  -AC Placement Time: 1630  -AC Present on Hospital Admission: Y  -AC Side: Bilateral  -AC Orientation: posterior  -AC Location: greater trochanter  -AC    Retired Wound - Properties Group Placement Date: 04/03/23  -AC Placement Time: 1630  -AC Present on Hospital Admission: Y  -AC Side: Bilateral  -AC Orientation: posterior  -AC Location: greater trochanter  -AC    Retired  Wound - Properties Group Date first assessed: 04/03/23  -AC Time first assessed: 1630  -AC Present on Hospital Admission: Y  -AC Side: Bilateral  -AC Location: greater trochanter  -AC    Row Name 04/06/23 1400          Positioning and Restraints    Pre-Treatment Position in bed (P)   -SW     Post Treatment Position chair (P)   -SW     In Chair sitting;exit alarm on;with family/caregiver (P)   -     Row Name 04/06/23 1400          Progress Summary (PT)    Progress Toward Functional Goals (PT) progress toward functional goals is fair (P)   -SW     Daily Progress Summary (PT) Pt walked 5ft with rolling walker to chair. Pt was leaning to left side when standing and required mod assist to remain standing. Pt improved and required less assistance when walking, but still required min A to remain upright. Pt was left sitting in chair with exit alarm and family in room. Pt would continue to benefit from physical therapy services while in hospital. (P)   -           User Key  (r) = Recorded By, (t) = Taken By, (c) = Cosigned By    Initials Name Provider Type    AC Jessica Glez, RN Registered Nurse    Rosalinda Braun, PT Student PT Student                Physical Therapy Education     Title: PT OT SLP Therapies (Done)     Topic: Physical Therapy (Done)     Point: Mobility training (Done)     Learning Progress Summary           Patient Acceptance, E,TB, VU,NR by  at 4/6/2023 0434    Acceptance, E,TB, VU by  at 4/4/2023 1113                   Point: Precautions (Done)     Learning Progress Summary           Patient Acceptance, E,TB, VU,NR by  at 4/6/2023 0434    Acceptance, E,TB, VU by  at 4/4/2023 1113                               User Key     Initials Effective Dates Name Provider Type Discipline     06/08/21 -  Moreno Longoria RN Registered Nurse Nurse     03/07/23 -  Rosalinda Peacock, PT Student PT Student PT              PT Recommendation and Plan  Anticipated Discharge Disposition (PT): inpatient  rehabilitation facility  Planned Therapy Interventions (PT): balance training, bed mobility training, gait training, ROM (range of motion), stair training, strengthening, stretching, transfer training  Therapy Frequency (PT): daily  Progress Summary (PT)  Progress Toward Functional Goals (PT): (P) progress toward functional goals is fair  Daily Progress Summary (PT): (P) Pt walked 5ft with rolling walker to chair. Pt was leaning to left side when standing and required mod assist to remain standing. Pt improved and required less assistance when walking, but still required min A to remain upright. Pt was left sitting in chair with exit alarm and family in room. Pt would continue to benefit from physical therapy services while in hospital.  Plan of Care Reviewed With: patient  Outcome Evaluation: Pt is currently having difficulty walking, has decreased strength, impaired balance, increased dizziness, and poor coordination. Pt would benefit from skilled PT intervention. Recommend discharge to inpatient rehab.   Outcome Measures     Row Name 04/06/23 1400 04/05/23 1423 04/04/23 1100       How much help from another person do you currently need...    Turning from your back to your side while in flat bed without using bedrails? 4 (P)   -SW 3 (P)   -AT 3  -TOSHA (r) SW (t) TOSHA (c)    Moving from lying on back to sitting on the side of a flat bed without bedrails? 4 (P)   -SW 3 (P)   -AT 3  -TOSHA (r) SW (t) TOSHA (c)    Moving to and from a bed to a chair (including a wheelchair)? 2 (P)   -SW 2 (P)   -AT 2  -TOSHA (r) SW (t) TOSHA (c)    Standing up from a chair using your arms (e.g., wheelchair, bedside chair)? 3 (P)   -SW 3 (P)   -AT 3  -TOSHA (r) SW (t) TOSHA (c)    Climbing 3-5 steps with a railing? 2 (P)   -SW 1 (P)   -AT 1  -TOSHA (r) SW (t) TOSHA (c)    To walk in hospital room? 2 (P)   -SW 2 (P)   -AT 2  -TOSHA (r) SW (t) TOSHA (c)    AM-PAC 6 Clicks Score (PT) 17 (P)   -SW 14 (P)   -AT 14  -TOSHA (r) SW (t)       Functional Assessment    Outcome  Measure Options -- AM-PAC 6 Clicks Basic Mobility (PT) (P)   -AT AM-PAC 6 Clicks Basic Mobility (PT)  -TOSHA (r) SW (t) TOSHA (c)          User Key  (r) = Recorded By, (t) = Taken By, (c) = Cosigned By    Initials Name Provider Type    Baudilio Garay, PT Physical Therapist    AT OxfordTrista, PT Student PT Student    Rosalinda Braun PT Student PT Student                 Time Calculation:    PT Charges     Row Name 04/06/23 1446             Time Calculation    PT Received On 04/06/23 (P)   -SW         Timed Charges    66893 - Gait Training Minutes  10 (P)   -SW         Total Minutes    Timed Charges Total Minutes 10 (P)   -SW       Total Minutes 10 (P)   -SW            User Key  (r) = Recorded By, (t) = Taken By, (c) = Cosigned By    Initials Name Provider Type    Rosalinda Braun, PT Student PT Student              Therapy Charges for Today     Code Description Service Date Service Provider Modifiers Qty    97557770717 HC GAIT TRAINING EA 15 MIN 4/6/2023 Rosalinda Peacock, PT Student GP 1          PT G-Codes  Outcome Measure Options: (P) AM-PAC 6 Clicks Basic Mobility (PT)  AM-PAC 6 Clicks Score (PT): (P) 17  AM-PAC 6 Clicks Score (OT): 16    Rosalinda Peacock PT Student  4/6/2023     Skin normal color for race, warm, dry and intact. No evidence of rash.

## 2023-04-06 NOTE — CONSULTS
"Nutrition Services    Patient Name: Lyubov Middleton  YOB: 1955  MRN: [id number]  Admission date: 4/3/2023      CLINICAL NUTRITION ASSESSMENT      Reason for Assessment  Follow-up protocol     H&P:    Past Medical History:   Diagnosis Date   • Cancer     left breast removed 2012   • Type 2 diabetes mellitus         Current Problems:   Active Hospital Problems    Diagnosis    • **Metabolic encephalopathy    • Seizure disorder         Nutrition/Diet History         Narrative     Nutrition follow up.  Patient continues on mechanical ground diet with nectar thickened liquids.  Tolerated enteral nutrition well overnight and was able to eat breakfast this morning.      Hypernatremia corrected.  Will decreased free water flushes.  Discussed with primary MD.       Anthropometrics        Current Height, Weight Height: 160 cm (63\")  Weight: 61.8 kg (136 lb 3.9 oz)   Current BMI Body mass index is 24.13 kg/m².       Weight Hx  Wt Readings from Last 30 Encounters:   04/03/23 1058 61.8 kg (136 lb 3.9 oz)   03/25/23 0828 61.8 kg (136 lb 3.9 oz)   02/11/23 1519 72.6 kg (160 lb)   12/17/20 0610 73.5 kg (162 lb 0.6 oz)   12/16/20 0330 73.8 kg (162 lb 9.6 oz)   12/15/20 0349 75.1 kg (165 lb 9.6 oz)   12/14/20 0518 80.1 kg (176 lb 9.6 oz)   12/13/20 0509 78.2 kg (172 lb 4.8 oz)   12/12/20 0550 73.4 kg (161 lb 14.4 oz)   12/11/20 0424 71.4 kg (157 lb 8 oz)   12/10/20 0237 69.6 kg (153 lb 8 oz)   12/09/20 0620 68.5 kg (151 lb 1.6 oz)   12/08/20 1453 64.9 kg (143 lb)            Wt Change Observation -14.9% x 2 months      Estimated/Assessed Needs       Energy Requirements 30 kcal/kg   EST Needs (kcal/day) 1854 kcal        Protein Requirements 1.0-1.2 g/kg   EST Daily Needs (g/day) 62-74 g       Fluid Requirements 30 ml/kg     Estimated Needs (mL/day) 1854 ml      Labs/Medications         Pertinent Labs Reviewed.   Results from last 7 days   Lab Units 04/06/23  0453 04/05/23  1703 04/05/23  1132 04/05/23  0433 04/04/23  0430 " 04/03/23  1116   SODIUM mmol/L 138 140 145 150*   < > 147*   POTASSIUM mmol/L 4.2 3.4* 3.3* 3.1*   < > 2.8*   CHLORIDE mmol/L 113* 112* 118* 120*   < > 111*   CO2 mmol/L 14.0* 17.1* 13.1* 16.1*   < > 20.7*   BUN mg/dL 54* 57* 62* 67*   < > 92*   CREATININE mg/dL 1.90* 1.87* 1.86* 1.93*   < > 2.24*   CALCIUM mg/dL 7.9* 8.4* 8.6 8.9   < > 9.7   BILIRUBIN mg/dL  --   --   --  0.2  --  0.2   ALK PHOS U/L  --   --   --  110  --  183*   ALT (SGPT) U/L  --   --   --  32  --  46*   AST (SGOT) U/L  --   --   --  19  --  31   GLUCOSE mg/dL 160* 150* 166* 94   < > 285*    < > = values in this interval not displayed.     Results from last 7 days   Lab Units 04/06/23  0453 04/05/23  0433 04/04/23  1359   MAGNESIUM mg/dL 1.3* 1.6 1.7   PHOSPHORUS mg/dL 2.4*  --   --    HEMOGLOBIN g/dL 8.3* 9.5*  --    HEMATOCRIT % 25.2* 27.0*  --      SARS-CoV-2, KATHLEEN   Date Value Ref Range Status   12/25/2022 NEGATIVE Negative Final     Comment:     The 2019-CoV rRT-PCR Assay is only for use under a Food and Drug Administration Emergency Use Authorization. The performance characteristics of the assay were verified by the Clinical Laboratory at [facility]. Results should be used in   conjunction with the patient's clinical symptoms, medical history, and other clinical/laboratory findings to determine an overall clinical diagnosis. Negative results do not preclude infection with SARS-CoV-2 (COVID-19).    Test parameters have not been validated for screening asymptomatic patients.     Lab Results   Component Value Date    HGBA1C 5.6 03/08/2023         Pertinent Medications Reviewed.     Current Nutrition Orders & Evaluation of Intake       Oral Nutrition     Current PO Diet Diet: Diabetic Diets; Consistent Carbohydrate; Texture: Mechanical Ground (NDD 2); Fluid Consistency: Nectar Thick   Supplement Orders Placed This Encounter      Diet, Tube Feeding Tube Feeding Formula: Diabetisource AC; Tube Feeding Type: Cyclic / Intermittent; Feeding  Start Time: 8:00 PM; Feeding End Time: 8:00 AM; Cyclic Feeding Start Rate (mL/hr): 90; To Goal Rate of (mL/hr): 90; Supplemental Bolus Fee...       Malnutrition Severity Assessment                Nutrition Diagnosis         Nutrition Dx Problem 1 Inadequate energy Intake related to decreased ability to consume sufficient energy as evidenced by NPO and non-functioning PEG tube.      Nutrition Intervention         Diet per SLP    Diabetisource AC @ 90 ml/hr x 12 hours (8pm-8am)  Free water flushes 25 ml every hour x 12 hours (8pm-8am)  Provides 1296 kcal, 65 g pro, 1182 ml fluid     Meets about 70% estimated kcal needs, 100% estimated pro needs, and 65% estimated fluid needs      Medical Nutrition Therapy/Nutrition Education          Learner     Readiness Patient and Caregiver  Acceptance     Method     Response Explanation  Verbalizes understanding     Monitor/Evaluation        Monitor Per protocol, I&O, Pertinent labs, EN delivery/tolerance, Weight, POC/GOC, Diet advancement     Nutrition Discharge Plan         To be determined       Electronically signed by:  Belen Skaggs RD  04/06/23 12:16 EDT

## 2023-04-06 NOTE — PROGRESS NOTES
[facility]   Hospitalist Progress Note  Date: 2023  Patient Name: Lyubov Middleton  : 1955  MRN: [id number]  Date of admission: 4/3/2023      Subjective   Subjective     Chief Complaint:   AMS    Summary:   Lyubov Middleton is a 68 y.o. female with past medical history of diabetes mellitus, CKD 4, seizure disorder, dysphagia currently undergoing rehab at [facility] and rehab presents with altered mental status.  Per report, patient in her usual state of health in the morning presentation, however when working with therapy.  Patient indicated she was not feeling well, her eyes rolled back in her head and she became unresponsive.  Sternal rub attempted without response.  Patient brought to the emergency department for evaluation and treatment, noted to have slowly improving mentation.  Alert only to 1 on arrival.  Patient's labs also significant for low potassium, elevated sodium, creatinine up at 2.24, up from patient's baseline of 1.7.  UA concerning for urinary tract infection.  CT head negative for any acute intracranial abnormalities, did demonstrate diffuse atrophy and white matter changes.  Chest x-ray negative for any acute abnormalities.  Patient has multiple bruises in various stages of healing on the skin exam.  Nursing report indicates patient frequently tries to get out of bed and falls.  Recently had presentation to the emergency department for a broken nose following a fall.  Cerebella applied in the emergency department and negative for seizure activity acutely.  Hospitalist service contacted for admission.  Patient required replacement of PEG tube while inpatient.    Interval Followup:   Patient sodium much better today, discussed with dietitian, will transfer all tube feeds and free water flushes over to nocturnal.  We will continue to promote appropriate eating and drinking today.  We will monitor sodium closely today to ensure no further drop.    Review of  Systems   All systems were reviewed and negative except for: Denies any acute symptoms at this time.  Remains weak    Objective   Objective     Vitals:   Temp:  [97.5 °F (36.4 °C)-98.4 °F (36.9 °C)] 97.5 °F (36.4 °C)  Heart Rate:  [65-78] 65  Resp:  [16-18] 16  BP: (110-154)/(50-67) 110/52  Physical Exam   Gen. alert and oriented x3, remains pleasantly confused however when pointedly asked can answer any questions.  HEENT: Normocephalic multiple bruises of the head as well as well-healing laceration of the bridge of the nose, moist membranes pupils equal round reactive light, no scleral icterus no conjunctival injection, protruding right maxillary central incisor  Cardiovascular: regular rate and rhythm no murmurs rubs or gallops S1-S2, no lower extremity edema appreciated  Pulmonary: Clear to auscultation bilaterally no wheezes rales or rhonchi symmetric chest expansion, unlabored, no conversational dyspnea appreciated  Gastrointestinal: Soft nontender nondistended positive bowel sounds all 4 quadrants no rebound or guarding, G-tube replaced with minimal bloody discharge around tube.  Musculoskeletal: No clubbing cyanosis, warm and well-perfused, calves soft symmetric nontender bilaterally  Skin: Multiple bruises bilateral lower extremities, excoriation of the buttocks, bruising over the brow and forehead, healing laceration of the bridge of the nose  Neuro: Cranial nerves II through XII intact grossly no sensorimotor deficits appreciated bilateral upper and lower extremities, generalized weakness  Psych: Patient is calm cooperative and appropriate with exam not responding to internal stimuli  : No Fernandez catheter no bladder distention positive suprapubic tenderness     Result Review    Result Review:  I have personally reviewed the results from 4/6/2023 and agree with these findings:  []  Laboratory  []  Microbiology  []  Radiology  []  EKG/Telemetry   []  Cardiology/Vascular   []  Pathology  []  Old records  []   Other:    Assessment & Plan   Assessment / Plan     Assessment/Plan:  Altered mental status  Acute metabolic encephalopathy secondary to urinary tract infection  Urinary tract infection  Nonconvulsive seizure   AUSTEN on CKD 4 baseline creatinine 1.7  Hypokalemia  Diabetes mellitus  Hypernatremia  Metabolic acidosis  Debility  Oropharyngeal dysphagia  Blocked PEG tube  Anemia of chronic kidney disease  Chronic diarrhea  Hypertension  Depression  Hypothyroidism  History of nonconvulsive status epilepticus  History of breast cancer status post left mastectomy     Plan:   Patient admitted for further evaluation and treatment  Neurology consulted thank you for your assistance  EEG with no seizure-like activity  Antibiotics transitioned to cefepime based on sensitivities  Antiepileptic drugs continued per neurology  Hypernatremia resolving, continue to monitor closely, but backing off on free water flushes today  Patient requiring replacement of Phos and magnesium today  Continue sliding scale insulin to every 6 hours  MBS shows patient can have modified diet, medications transitioned to oral  Repeat imaging shows appropriate placement of PEG tube, replaced at bedside on 4/4/2023  Nocturnal tube feeds only now  Speech therapy will continue to follow along, appreciate assistance  Consult physical therapy/occupational therapy while inpatient  Patient receives darbepoetin alpha subcu on the 26th of every month for treatment of anemia of chronic kidney disease  Continue home cholestyramine  Continue home Florastor  Resume home amlodipine, carvedilol for persistent hypertension, blood pressures have improved, will monitor  Continue home citalopram  Continue home levothyroxine  TSH minimally elevated  Continue home anastrozole  CODE STATUS DNR/DNI confirmed with family       Disposition: Inpatient rehab.  Family discussing potentially finding an alternative facility      Discussed plan with RN, , family at  bedside    DVT prophylaxis:  Mechanical DVT prophylaxis orders are present.    CODE STATUS:   Medical Intervention Limits: NO intubation (DNI)  Code Status (Patient has no pulse and is not breathing): No CPR (Do Not Attempt to Resuscitate)  Medical Interventions (Patient has pulse or is breathing): Limited Support  Release to patient: Routine Release

## 2023-04-06 NOTE — PLAN OF CARE
Goal Outcome Evaluation:   Patient and family agreeable to rehab placement to get stronger.  Some confusion noted later into the afternoon and evening.  Patient reoriented as needed.

## 2023-04-07 LAB
ANION GAP SERPL CALCULATED.3IONS-SCNC: 11.7 MMOL/L (ref 5–15)
ANION GAP SERPL CALCULATED.3IONS-SCNC: 12 MMOL/L (ref 5–15)
BUN SERPL-MCNC: 51 MG/DL (ref 8–23)
BUN SERPL-MCNC: 51 MG/DL (ref 8–23)
BUN/CREAT SERPL: 26.6 (ref 7–25)
BUN/CREAT SERPL: 27.9 (ref 7–25)
CALCIUM SPEC-SCNC: 8.2 MG/DL (ref 8.6–10.5)
CALCIUM SPEC-SCNC: 8.3 MG/DL (ref 8.6–10.5)
CHLORIDE SERPL-SCNC: 110 MMOL/L (ref 98–107)
CHLORIDE SERPL-SCNC: 110 MMOL/L (ref 98–107)
CO2 SERPL-SCNC: 16.3 MMOL/L (ref 22–29)
CO2 SERPL-SCNC: 17 MMOL/L (ref 22–29)
CREAT SERPL-MCNC: 1.83 MG/DL (ref 0.57–1)
CREAT SERPL-MCNC: 1.92 MG/DL (ref 0.57–1)
DEPRECATED RDW RBC AUTO: 42.4 FL (ref 37–54)
EGFRCR SERPLBLD CKD-EPI 2021: 28.3 ML/MIN/1.73
EGFRCR SERPLBLD CKD-EPI 2021: 30 ML/MIN/1.73
ERYTHROCYTE [DISTWIDTH] IN BLOOD BY AUTOMATED COUNT: 13.6 % (ref 12.3–15.4)
GLUCOSE BLDC GLUCOMTR-MCNC: 103 MG/DL (ref 70–99)
GLUCOSE BLDC GLUCOMTR-MCNC: 131 MG/DL (ref 70–99)
GLUCOSE BLDC GLUCOMTR-MCNC: 134 MG/DL (ref 70–99)
GLUCOSE BLDC GLUCOMTR-MCNC: 139 MG/DL (ref 70–99)
GLUCOSE BLDC GLUCOMTR-MCNC: 142 MG/DL (ref 70–99)
GLUCOSE SERPL-MCNC: 115 MG/DL (ref 65–99)
GLUCOSE SERPL-MCNC: 131 MG/DL (ref 65–99)
HCT VFR BLD AUTO: 23.6 % (ref 34–46.6)
HGB BLD-MCNC: 8.4 G/DL (ref 12–15.9)
MAGNESIUM SERPL-MCNC: 1.8 MG/DL (ref 1.6–2.4)
MCH RBC QN AUTO: 31 PG (ref 26.6–33)
MCHC RBC AUTO-ENTMCNC: 35.6 G/DL (ref 31.5–35.7)
MCV RBC AUTO: 87.1 FL (ref 79–97)
PLATELET # BLD AUTO: 228 10*3/MM3 (ref 140–450)
PMV BLD AUTO: 9.6 FL (ref 6–12)
POTASSIUM SERPL-SCNC: 3.5 MMOL/L (ref 3.5–5.2)
POTASSIUM SERPL-SCNC: 3.6 MMOL/L (ref 3.5–5.2)
RBC # BLD AUTO: 2.71 10*6/MM3 (ref 3.77–5.28)
SODIUM SERPL-SCNC: 138 MMOL/L (ref 136–145)
SODIUM SERPL-SCNC: 139 MMOL/L (ref 136–145)
WBC NRBC COR # BLD: 7.63 10*3/MM3 (ref 3.4–10.8)

## 2023-04-07 PROCEDURE — 85027 COMPLETE CBC AUTOMATED: CPT | Performed by: INTERNAL MEDICINE

## 2023-04-07 PROCEDURE — 97110 THERAPEUTIC EXERCISES: CPT

## 2023-04-07 PROCEDURE — 80048 BASIC METABOLIC PNL TOTAL CA: CPT | Performed by: INTERNAL MEDICINE

## 2023-04-07 PROCEDURE — 92526 ORAL FUNCTION THERAPY: CPT

## 2023-04-07 PROCEDURE — 25010000002 CEFEPIME PER 500 MG: Performed by: INTERNAL MEDICINE

## 2023-04-07 PROCEDURE — 83735 ASSAY OF MAGNESIUM: CPT | Performed by: INTERNAL MEDICINE

## 2023-04-07 PROCEDURE — 82962 GLUCOSE BLOOD TEST: CPT

## 2023-04-07 PROCEDURE — 97530 THERAPEUTIC ACTIVITIES: CPT

## 2023-04-07 PROCEDURE — 99232 SBSQ HOSP IP/OBS MODERATE 35: CPT | Performed by: INTERNAL MEDICINE

## 2023-04-07 RX ADMIN — POTASSIUM & SODIUM PHOSPHATES POWDER PACK 280-160-250 MG 1 PACKET: 280-160-250 PACK at 12:57

## 2023-04-07 RX ADMIN — CHOLESTYRAMINE 1 PACKET: 4 POWDER, FOR SUSPENSION ORAL at 08:23

## 2023-04-07 RX ADMIN — CEFEPIME HYDROCHLORIDE 1 G: 1 INJECTION, POWDER, FOR SOLUTION INTRAMUSCULAR; INTRAVENOUS at 12:57

## 2023-04-07 RX ADMIN — SODIUM BICARBONATE 650 MG TABLET 650 MG: at 08:24

## 2023-04-07 RX ADMIN — POTASSIUM & SODIUM PHOSPHATES POWDER PACK 280-160-250 MG 1 PACKET: 280-160-250 PACK at 18:30

## 2023-04-07 RX ADMIN — Medication 10 ML: at 08:23

## 2023-04-07 RX ADMIN — AMLODIPINE BESYLATE 10 MG: 5 TABLET ORAL at 08:24

## 2023-04-07 RX ADMIN — CARVEDILOL 25 MG: 25 TABLET, FILM COATED ORAL at 21:51

## 2023-04-07 RX ADMIN — LEVOTHYROXINE SODIUM 12.5 MCG: 0.03 TABLET ORAL at 06:12

## 2023-04-07 RX ADMIN — LACOSAMIDE 50 MG: 10 SOLUTION ORAL at 08:23

## 2023-04-07 RX ADMIN — LACOSAMIDE 50 MG: 10 SOLUTION ORAL at 21:51

## 2023-04-07 RX ADMIN — Medication 10 ML: at 21:52

## 2023-04-07 RX ADMIN — Medication 250 MG: at 21:52

## 2023-04-07 RX ADMIN — CARVEDILOL 25 MG: 25 TABLET, FILM COATED ORAL at 08:24

## 2023-04-07 RX ADMIN — CITALOPRAM HYDROBROMIDE 10 MG: 20 TABLET ORAL at 08:24

## 2023-04-07 RX ADMIN — Medication 250 MG: at 09:20

## 2023-04-07 RX ADMIN — CHOLESTYRAMINE 1 PACKET: 4 POWDER, FOR SUSPENSION ORAL at 21:51

## 2023-04-07 RX ADMIN — SODIUM BICARBONATE 650 MG TABLET 650 MG: at 21:51

## 2023-04-07 RX ADMIN — ANASTROZOLE 1 MG: 1 TABLET ORAL at 08:24

## 2023-04-07 RX ADMIN — POTASSIUM & SODIUM PHOSPHATES POWDER PACK 280-160-250 MG 1 PACKET: 280-160-250 PACK at 08:23

## 2023-04-07 NOTE — THERAPY TREATMENT NOTE
Acute Care - Physical Therapy Treatment Note   [place]     Patient Name: Lyubov Middleton  : 1955  MRN: [id number]  Today's Date: 2023      Visit Dx:     ICD-10-CM ICD-9-CM   1. Metabolic encephalopathy  G93.41 348.31   2. Oropharyngeal dysphagia  R13.12 787.22   3. Difficulty in walking  R26.2 719.7   4. Decreased activities of daily living (ADL)  Z78.9 V49.89     Patient Active Problem List   Diagnosis   • Renal stone   • Renal abscess   • Stage 3a chronic kidney disease   • Type 2 diabetes mellitus   • Metabolic encephalopathy   • Seizure disorder     Past Medical History:   Diagnosis Date   • Cancer     left breast removed    • Type 2 diabetes mellitus      Past Surgical History:   Procedure Laterality Date   • ABDOMINAL SURGERY     • BREAST SURGERY Left     removed due to cancer   • CHOLECYSTECTOMY     • CYSTOSCOPY W/ URETERAL STENT PLACEMENT Right 2020    Procedure: CYSTOSCOPY, RIGHT RETROGRADE PYELOGRAM, URETEROSCOPY, LASER LITHOTRIPSY, RIGHT URETERAL STENT PLACEMENT;  Surgeon: Rohan Dooley Jr., MD;  Location: [facility];  Service: Urology;  Laterality: Right;   • GASTROSTOMY W/ FEEDING TUBE     • HERNIA REPAIR      mesh removed     PT Assessment (last 12 hours)     PT Evaluation and Treatment     Row Name 23 1223          Physical Therapy Time and Intention    Subjective Information complains of;weakness;dizziness  -RH     Document Type therapy note (daily note)  -     Mode of Treatment physical therapy;individual therapy  -     Patient Effort fair  -     Row Name 23 1223          Pain    Additional Documentation Pain Scale: FACES Pre/Post-Treatment (Group)  -     Row Name 23 1223          Pain Scale: FACES Pre/Post-Treatment    Pain: FACES Scale, Pretreatment 0-->no hurt  -     Posttreatment Pain Rating 0-->no hurt  -     Row Name 23 1223          Transfers    Transfers sit-stand transfer;stand-sit transfer  -     Row Name 23  1223          Sit-Stand Transfer    Sit-Stand [place] (Transfers) minimum assist (75% patient effort)  -RH     Assistive Device (Sit-Stand Transfers) walker, front-wheeled  -RH     Row Name 04/07/23 1223          Stand-Sit Transfer    Stand-Sit [place] (Transfers) minimum assist (75% patient effort)  -     Assistive Device (Stand-Sit Transfers) walker, front-wheeled  -RH     Row Name 04/07/23 1223          Gait/Stairs (Locomotion)    Gait/Stairs Locomotion gait/ambulation independence;gait/ambulation assistive device;distance ambulated;gait pattern;gait deviations  -RH     [place] Level (Gait) minimum assist (75% patient effort)  -     Assistive Device (Gait) walker, front-wheeled  -RH     Distance in Feet (Gait) 5  -RH     Pattern (Gait) 3-point;step-to  -RH     Deviations/Abnormal Patterns (Gait) base of support, narrow;festinating/shuffling;gait speed decreased  -RH     Bilateral Gait Deviations forward flexed posture;heel strike decreased  -RH     Gait Assessment/Intervention Pt amb only short distance with RW and min assist secondary to pt becoming dizzy and requiring return to her chair.  -RH     Row Name 04/07/23 1223          Balance    Dynamic Standing Balance minimal assist  -RH     Position/Device Used, Standing Balance walker, front-wheeled  -RH     Row Name             Wound 04/03/23 1622 Right labia    Wound - Properties Group Placement Date: 04/03/23  -AC Placement Time: 1622  -AC Present on Hospital Admission: Y  -AC Side: Right  -AC Location: labia  -AC    Retired Wound - Properties Group Placement Date: 04/03/23  -AC Placement Time: 1622  -AC Present on Hospital Admission: Y  -AC Side: Right  -AC Location: labia  -AC    Retired Wound - Properties Group Date first assessed: 04/03/23  -AC Time first assessed: 1622  -AC Present on Hospital Admission: Y  -AC Side: Right  -AC Location: labia  -AC    Row Name             Wound 04/03/23 1628 sacral spine    Wound - Properties Group  Placement Date: 04/03/23  -AC Placement Time: 1628  -AC Present on Hospital Admission: Y  -AC Location: sacral spine  -AC    Retired Wound - Properties Group Placement Date: 04/03/23  -AC Placement Time: 1628  -AC Present on Hospital Admission: Y  -AC Location: sacral spine  -AC    Retired Wound - Properties Group Date first assessed: 04/03/23  -AC Time first assessed: 1628  -AC Present on Hospital Admission: Y  -AC Location: sacral spine  -AC    Row Name             Wound 04/03/23 1629 Bilateral gluteal    Wound - Properties Group Placement Date: 04/03/23  -AC Placement Time: 1629  -AC Present on Hospital Admission: Y  -AC Side: Bilateral  -AC Location: gluteal  -AC    Retired Wound - Properties Group Placement Date: 04/03/23  -AC Placement Time: 1629  -AC Present on Hospital Admission: Y  -AC Side: Bilateral  -AC Location: gluteal  -AC    Retired Wound - Properties Group Date first assessed: 04/03/23  -AC Time first assessed: 1629  -AC Present on Hospital Admission: Y  -AC Side: Bilateral  -AC Location: gluteal  -AC    Row Name             Wound 04/03/23 1630 Bilateral posterior greater trochanter    Wound - Properties Group Placement Date: 04/03/23  -AC Placement Time: 1630  -AC Present on Hospital Admission: Y  -AC Side: Bilateral  -AC Orientation: posterior  -AC Location: greater trochanter  -AC    Retired Wound - Properties Group Placement Date: 04/03/23  -AC Placement Time: 1630  -AC Present on Hospital Admission: Y  -AC Side: Bilateral  -AC Orientation: posterior  -AC Location: greater trochanter  -AC    Retired Wound - Properties Group Date first assessed: 04/03/23  -AC Time first assessed: 1630  -AC Present on Hospital Admission: Y  -AC Side: Bilateral  -AC Location: greater trochanter  -AC    Row Name 04/07/23 1223          Vital Signs    O2 Delivery Intra Treatment room air  -RH     Row Name 04/07/23 1223          Progress Summary (PT)    Progress Toward Functional Goals (PT) progress toward functional  goals is gradual  -RH           User Key  (r) = Recorded By, (t) = Taken By, (c) = Cosigned By    Initials Name Provider Type     Rj Soto PTA Physical Therapist Assistant    Jessica Bowie RN Registered Nurse               Bilateral Lower Extremity   Exercise  Reps  Sets    Short arc quads   15 1   Heel slides  15 1   Ankle pumps  15 1   Quad sets  15 1   Straight leg raise  15 1   Hip ab/adduction 15 1        Physical Therapy Education     Title: PT OT SLP Therapies (Done)     Topic: Physical Therapy (Done)     Point: Mobility training (Done)     Learning Progress Summary           Patient Acceptance, E,TB, VU,NR by  at 4/6/2023 0434    Acceptance, E,TB, VU by  at 4/4/2023 1113                   Point: Precautions (Done)     Learning Progress Summary           Patient Acceptance, E,TB, VU,NR by  at 4/6/2023 0434    Acceptance, E,TB, VU by  at 4/4/2023 1113                               User Key     Initials Effective Dates Name Provider Type Discipline     06/08/21 -  Moreno Longoria, RN Registered Nurse Nurse     03/07/23 -  Rosalinda Peacock, RICH Student PT Student PT              PT Recommendation and Plan     Progress Summary (PT)  Progress Toward Functional Goals (PT): progress toward functional goals is gradual   Outcome Measures     Row Name 04/07/23 1200 04/06/23 1400 04/05/23 1423       How much help from another person do you currently need...    Turning from your back to your side while in flat bed without using bedrails? 4  -RH 4  -TOSHA (r) SW (t) TOSHA (c) 3  -TOSHA (r) AT (t) TOSHA (c)    Moving from lying on back to sitting on the side of a flat bed without bedrails? 4  -RH 4  -TOSHA (r) SW (t) TOSHA (c) 3  -TOSHA (r) AT (t) TOSHA (c)    Moving to and from a bed to a chair (including a wheelchair)? 2  -RH 2  -TOSHA (r) SW (t) TOSHA (c) 2  -TOSHA (r) AT (t) TOSHA (c)    Standing up from a chair using your arms (e.g., wheelchair, bedside chair)? 2  -RH 3  -TOSHA (r) SW (t) TOSHA (c) 3  -TOSHA (r) AT (t) TOSHA (c)    Climbing  3-5 steps with a railing? 2  -RH 2  -TOSHA (r) SW (t) TOSHA (c) 1  -TOSHA (r) AT (t) TOSHA (c)    To walk in hospital room? 2  -RH 2  -TOSHA (r) SW (t) TOSHA (c) 2  -TOSHA (r) AT (t) TOSHA (c)    AM-PAC 6 Clicks Score (PT) 16  -RH 17  -TOSHA (r) SW (t) 14  -TOSHA (r) AT (t)       Functional Assessment    Outcome Measure Options -- -- AM-PAC 6 Clicks Basic Mobility (PT)  -TOSHA (r) AT (t) TOSHA (c)          User Key  (r) = Recorded By, (t) = Taken By, (c) = Cosigned By    Initials Name Provider Type    Rj Schmitt PTA Physical Therapist Assistant    Baudilio Garay, PT Physical Therapist    AT Trista Quezada, PT Student PT Student    Rosalinda Braun, PT Student PT Student                 Time Calculation:    PT Charges     Row Name 04/07/23 1223             Time Calculation    PT Received On 04/07/23  -RH         Timed Charges    23276 - PT Therapeutic Exercise Minutes 16  -RH      94929 - Gait Training Minutes  3  -RH      92827 - PT Therapeutic Activity Minutes 5  -RH         Total Minutes    Timed Charges Total Minutes 24  -RH       Total Minutes 24  -RH            User Key  (r) = Recorded By, (t) = Taken By, (c) = Cosigned By    Initials Name Provider Type     Rj Soto PTA Physical Therapist Assistant              Therapy Charges for Today     Code Description Service Date Service Provider Modifiers Qty    01762183085 HC PT THER PROC EA 15 MIN 4/7/2023 Rj Stoo PTA GP 1    55891491830 HC PT THERAPEUTIC ACT EA 15 MIN 4/7/2023 Rj Soto PTA GP 1          PT G-Codes  Outcome Measure Options: AM-PAC 6 Clicks Basic Mobility (PT)  AM-PAC 6 Clicks Score (PT): 16  AM-PAC 6 Clicks Score (OT): 16    Rj Soto PTA  4/7/2023

## 2023-04-07 NOTE — PROGRESS NOTES
[facility]   Hospitalist Progress Note  Date: 2023  Patient Name: Lyubov Middleton  : 1955  MRN: [id number]  Date of admission: 4/3/2023      Subjective   Subjective     Chief Complaint:   AMS    Summary:   Lyubov Middleton is a 68 y.o. female with past medical history of diabetes mellitus, CKD 4, seizure disorder, dysphagia currently undergoing rehab at [facility] and rehab presents with altered mental status.  Per report, patient in her usual state of health in the morning presentation, however when working with therapy.  Patient indicated she was not feeling well, her eyes rolled back in her head and she became unresponsive.  Sternal rub attempted without response.  Patient brought to the emergency department for evaluation and treatment, noted to have slowly improving mentation.  Alert only to 1 on arrival.  Patient's labs also significant for low potassium, elevated sodium, creatinine up at 2.24, up from patient's baseline of 1.7.  UA concerning for urinary tract infection.  CT head negative for any acute intracranial abnormalities, did demonstrate diffuse atrophy and white matter changes.  Chest x-ray negative for any acute abnormalities.  Patient has multiple bruises in various stages of healing on the skin exam.  Nursing report indicates patient frequently tries to get out of bed and falls.  Recently had presentation to the emergency department for a broken nose following a fall.  Cerebella applied in the emergency department and negative for seizure activity acutely.  Hospitalist service contacted for admission.  Patient required replacement of PEG tube while inpatient.    Interval Followup:   Sodium remains stable today.  Leukos remains stable.  We will back off on the frequency of checking patient's sodium.  Renal function still stagnant around 1.8 today.  Patient was observed up and moving around with the assistance of 2 people today    Review of Systems   All systems  were reviewed and negative except for: Denies any acute symptoms at this time.  Remains weak    Objective   Objective     Vitals:   Temp:  [97.7 °F (36.5 °C)-98.6 °F (37 °C)] 97.7 °F (36.5 °C)  Heart Rate:  [64-78] 69  Resp:  [15-16] 16  BP: (116-139)/(56-64) 116/57  Physical Exam   Gen. alert and oriented x3, remains pleasantly confused however when pointedly asked can answer any questions.  HEENT: Normocephalic multiple bruises of the head as well as well-healing laceration of the bridge of the nose, moist membranes pupils equal round reactive light, no scleral icterus no conjunctival injection, protruding right maxillary central incisor  Cardiovascular: regular rate and rhythm no murmurs rubs or gallops S1-S2, no lower extremity edema appreciated  Pulmonary: Clear to auscultation bilaterally no wheezes rales or rhonchi symmetric chest expansion, unlabored, no conversational dyspnea appreciated  Gastrointestinal: Soft nontender nondistended positive bowel sounds all 4 quadrants no rebound or guarding, G-tube replaced with minimal bloody discharge around tube.  Musculoskeletal: No clubbing cyanosis, warm and well-perfused, calves soft symmetric nontender bilaterally  Skin: Multiple bruises bilateral lower extremities, excoriation of the buttocks, bruising over the brow and forehead, healing laceration of the bridge of the nose  Neuro: Cranial nerves II through XII intact grossly no sensorimotor deficits appreciated bilateral upper and lower extremities, generalized weakness  Psych: Patient is calm cooperative and appropriate with exam not responding to internal stimuli  : No Fernandez catheter no bladder distention positive suprapubic tenderness     Result Review    Result Review:  I have personally reviewed the results from 4/7/2023 and agree with these findings:  []  Laboratory  []  Microbiology  []  Radiology  []  EKG/Telemetry   []  Cardiology/Vascular   []  Pathology  []  Old records  []  Other:    Assessment &  Plan   Assessment / Plan     Assessment/Plan:  Altered mental status  Acute metabolic encephalopathy secondary to urinary tract infection  Urinary tract infection  Nonconvulsive seizure   AUSTEN on CKD 4 baseline creatinine 1.7  Hypokalemia  Diabetes mellitus  Hypernatremia  Metabolic acidosis  Debility  Oropharyngeal dysphagia  Blocked PEG tube  Anemia of chronic kidney disease  Chronic diarrhea  Hypertension  Depression  Hypothyroidism  History of nonconvulsive status epilepticus  History of breast cancer status post left mastectomy     Plan:   Patient admitted for further evaluation and treatment  Neurology consulted thank you for your assistance  EEG with no seizure-like activity  Antibiotics transitioned to cefepime based on sensitivities  Antiepileptic drugs continued per neurology  Hyponatremia resolved, daily monitoring of labs  We will recheck phosphate tomorrow, have been started on oral phosphate replacement  MBS shows patient can have modified diet, medications transitioned to oral  Repeat imaging shows appropriate placement of PEG tube, replaced at bedside on 4/4/2023  Nocturnal tube feeds only now  Speech therapy will continue to follow along, appreciate assistance  Consult physical therapy/occupational therapy while inpatient  Patient receives darbepoetin alpha subcu on the 26th of every month for treatment of anemia of chronic kidney disease  Continue home cholestyramine  Continue home Florastor  Resume home amlodipine, carvedilol for persistent hypertension, blood pressures have improved, will monitor  Continue home citalopram  Continue home levothyroxine  TSH minimally elevated  Continue home anastrozole  CODE STATUS DNR/DNI confirmed with family       Disposition: Inpatient rehab.  Family discussing potentially finding an alternative facility      Discussed plan with RN, , family at bedside    DVT prophylaxis:  Mechanical DVT prophylaxis orders are present.    CODE STATUS:   Medical  Intervention Limits: NO intubation (DNI)  Code Status (Patient has no pulse and is not breathing): No CPR (Do Not Attempt to Resuscitate)  Medical Interventions (Patient has pulse or is breathing): Limited Support  Release to patient: Routine Release

## 2023-04-07 NOTE — THERAPY TREATMENT NOTE
Acute Care - Speech Language Pathology   Swallow Treatment Note  [place]     Patient Name: Lyubov Middleton  : 1955  MRN: [id number]  Today's Date: 2023               Admit Date: 4/3/2023    Visit Dx:     ICD-10-CM ICD-9-CM   1. Metabolic encephalopathy  G93.41 348.31   2. Oropharyngeal dysphagia  R13.12 787.22   3. Difficulty in walking  R26.2 719.7   4. Decreased activities of daily living (ADL)  Z78.9 V49.89     Patient Active Problem List   Diagnosis   • Renal stone   • Renal abscess   • Stage 3a chronic kidney disease   • Type 2 diabetes mellitus   • Metabolic encephalopathy   • Seizure disorder     Past Medical History:   Diagnosis Date   • Cancer     left breast removed    • Type 2 diabetes mellitus      Past Surgical History:   Procedure Laterality Date   • ABDOMINAL SURGERY     • BREAST SURGERY Left     removed due to cancer   • CHOLECYSTECTOMY     • CYSTOSCOPY W/ URETERAL STENT PLACEMENT Right 2020    Procedure: CYSTOSCOPY, RIGHT RETROGRADE PYELOGRAM, URETEROSCOPY, LASER LITHOTRIPSY, RIGHT URETERAL STENT PLACEMENT;  Surgeon: Rohan Dooley Jr., MD;  Location: [facility];  Service: Urology;  Laterality: Right;   • GASTROSTOMY W/ FEEDING TUBE     • HERNIA REPAIR      mesh removed       SPEECH PATHOLOGY DYSPHAGIA TREATMENT     Subjective/Behavioral Observations: Alert and cooperative, assisted to sit upright in bed.        Day/time of Treatment: 23        Current Diet: Mechanical soft, nectar liquid        Current Strategies:One-on-one supervision, assist patient to feed self.  Alternate small bites and small sips of solids and liquids.  Small bite of solid with double swallow.  Small single sips of liquid.  Medications whole in applesauce.        Treatment received: Dysphagia therapy to address swallow function through exercises and education of strategies.        Results of treatment: Patient taking sips of nectar liquid with min to mod cueing for slow rate, single  Pt remains pleasant and social on the unit with peers  Singing and dancing at times  Denies SI/HI, AVH however does appear to be responding to internal stimuli  Reports fluctuating anxiety throughout the day  States "I feel happy, I'm a happy person!" Denies any concerns or questions  sips.  Patient taking few bites of sausage and egg.   Patient observed taking medications whole with water, no overt clinical signs or symptoms of aspiration noted.  Note patient may be at risk of silent aspiration.       Progress toward goals: Adequate   Barriers to Achieving goals: Medical status        Plan of care:/changes in plan: Continue per current plan.  P.o. diet as tolerated.  Continue with nectar thickened liquid.  Medications whole in applesauce.                                                                            Plan of Care Reviewed With: patient          EDUCATION  The patient has been educated in the following areas:   Modified Diet Instruction.              Time Calculation:    Time Calculation- SLP     Row Name 04/07/23 0927             Time Calculation- SLP    SLP Stop Time 0915  -TB      SLP Received On 04/07/23  -TB         Untimed Charges    34566-RB Treatment Swallow Minutes 40  -TB         Total Minutes    Untimed Charges Total Minutes 40  -TB       Total Minutes 40  -TB            User Key  (r) = Recorded By, (t) = Taken By, (c) = Cosigned By    Initials Name Provider Type    TB Barbara Reilly SLP Speech and Language Pathologist                Therapy Charges for Today     Code Description Service Date Service Provider Modifiers Qty    [id number]  ST TREATMENT SWALLOW 3 4/7/2023 Barbara Reilly SLP GN 1               ANTHONY Santamaria  4/7/2023

## 2023-04-07 NOTE — PLAN OF CARE
Goal Outcome Evaluation:  Plan of Care Reviewed With: patient        Progress: no change  Outcome Evaluation: patient alert and orientated overnight; VSS; patient has tube feeds running per orders; no s/s of distress noted overnight; will continue to monitor;

## 2023-04-08 LAB
ANION GAP SERPL CALCULATED.3IONS-SCNC: 9.7 MMOL/L (ref 5–15)
BUN SERPL-MCNC: 48 MG/DL (ref 8–23)
BUN/CREAT SERPL: 26.4 (ref 7–25)
CALCIUM SPEC-SCNC: 8.5 MG/DL (ref 8.6–10.5)
CHLORIDE SERPL-SCNC: 110 MMOL/L (ref 98–107)
CO2 SERPL-SCNC: 16.3 MMOL/L (ref 22–29)
CREAT SERPL-MCNC: 1.82 MG/DL (ref 0.57–1)
DEPRECATED RDW RBC AUTO: 41.8 FL (ref 37–54)
EGFRCR SERPLBLD CKD-EPI 2021: 30.2 ML/MIN/1.73
ERYTHROCYTE [DISTWIDTH] IN BLOOD BY AUTOMATED COUNT: 13.5 % (ref 12.3–15.4)
GLUCOSE BLDC GLUCOMTR-MCNC: 116 MG/DL (ref 70–99)
GLUCOSE BLDC GLUCOMTR-MCNC: 119 MG/DL (ref 70–99)
GLUCOSE BLDC GLUCOMTR-MCNC: 132 MG/DL (ref 70–99)
GLUCOSE BLDC GLUCOMTR-MCNC: 92 MG/DL (ref 70–99)
GLUCOSE SERPL-MCNC: 127 MG/DL (ref 65–99)
HCT VFR BLD AUTO: 24.5 % (ref 34–46.6)
HGB BLD-MCNC: 8.8 G/DL (ref 12–15.9)
MAGNESIUM SERPL-MCNC: 1.7 MG/DL (ref 1.6–2.4)
MCH RBC QN AUTO: 31.1 PG (ref 26.6–33)
MCHC RBC AUTO-ENTMCNC: 35.9 G/DL (ref 31.5–35.7)
MCV RBC AUTO: 86.6 FL (ref 79–97)
PHOSPHATE SERPL-MCNC: 4.1 MG/DL (ref 2.5–4.5)
PLATELET # BLD AUTO: 219 10*3/MM3 (ref 140–450)
PMV BLD AUTO: 9.4 FL (ref 6–12)
POTASSIUM SERPL-SCNC: 3.4 MMOL/L (ref 3.5–5.2)
RBC # BLD AUTO: 2.83 10*6/MM3 (ref 3.77–5.28)
SODIUM SERPL-SCNC: 136 MMOL/L (ref 136–145)
WBC NRBC COR # BLD: 7.98 10*3/MM3 (ref 3.4–10.8)

## 2023-04-08 PROCEDURE — 83735 ASSAY OF MAGNESIUM: CPT | Performed by: INTERNAL MEDICINE

## 2023-04-08 PROCEDURE — 80048 BASIC METABOLIC PNL TOTAL CA: CPT | Performed by: INTERNAL MEDICINE

## 2023-04-08 PROCEDURE — 99232 SBSQ HOSP IP/OBS MODERATE 35: CPT | Performed by: INTERNAL MEDICINE

## 2023-04-08 PROCEDURE — 25010000002 CEFEPIME PER 500 MG: Performed by: INTERNAL MEDICINE

## 2023-04-08 PROCEDURE — 97110 THERAPEUTIC EXERCISES: CPT

## 2023-04-08 PROCEDURE — 85027 COMPLETE CBC AUTOMATED: CPT | Performed by: INTERNAL MEDICINE

## 2023-04-08 PROCEDURE — 82962 GLUCOSE BLOOD TEST: CPT

## 2023-04-08 PROCEDURE — 84100 ASSAY OF PHOSPHORUS: CPT | Performed by: INTERNAL MEDICINE

## 2023-04-08 RX ORDER — NICOTINE POLACRILEX 4 MG
15 LOZENGE BUCCAL
Status: DISCONTINUED | OUTPATIENT
Start: 2023-04-08 | End: 2023-04-22 | Stop reason: HOSPADM

## 2023-04-08 RX ORDER — INSULIN LISPRO 100 [IU]/ML
2-7 INJECTION, SOLUTION INTRAVENOUS; SUBCUTANEOUS
Status: DISCONTINUED | OUTPATIENT
Start: 2023-04-08 | End: 2023-04-22 | Stop reason: HOSPADM

## 2023-04-08 RX ORDER — DEXTROSE MONOHYDRATE 25 G/50ML
25 INJECTION, SOLUTION INTRAVENOUS
Status: DISCONTINUED | OUTPATIENT
Start: 2023-04-08 | End: 2023-04-22 | Stop reason: HOSPADM

## 2023-04-08 RX ADMIN — CHOLESTYRAMINE 1 PACKET: 4 POWDER, FOR SUSPENSION ORAL at 08:11

## 2023-04-08 RX ADMIN — CARVEDILOL 25 MG: 25 TABLET, FILM COATED ORAL at 21:32

## 2023-04-08 RX ADMIN — ANASTROZOLE 1 MG: 1 TABLET ORAL at 08:11

## 2023-04-08 RX ADMIN — CITALOPRAM HYDROBROMIDE 10 MG: 20 TABLET ORAL at 08:11

## 2023-04-08 RX ADMIN — Medication 10 ML: at 08:12

## 2023-04-08 RX ADMIN — LACOSAMIDE 50 MG: 10 SOLUTION ORAL at 08:11

## 2023-04-08 RX ADMIN — POTASSIUM & SODIUM PHOSPHATES POWDER PACK 280-160-250 MG 1 PACKET: 280-160-250 PACK at 08:11

## 2023-04-08 RX ADMIN — CARVEDILOL 25 MG: 25 TABLET, FILM COATED ORAL at 08:11

## 2023-04-08 RX ADMIN — Medication 250 MG: at 08:11

## 2023-04-08 RX ADMIN — SODIUM BICARBONATE 650 MG TABLET 650 MG: at 08:11

## 2023-04-08 RX ADMIN — Medication 250 MG: at 21:32

## 2023-04-08 RX ADMIN — SODIUM BICARBONATE 650 MG TABLET 650 MG: at 21:32

## 2023-04-08 RX ADMIN — Medication 10 ML: at 21:32

## 2023-04-08 RX ADMIN — LACOSAMIDE 50 MG: 10 SOLUTION ORAL at 21:32

## 2023-04-08 RX ADMIN — CEFEPIME HYDROCHLORIDE 1 G: 1 INJECTION, POWDER, FOR SOLUTION INTRAMUSCULAR; INTRAVENOUS at 15:11

## 2023-04-08 RX ADMIN — LEVOTHYROXINE SODIUM 12.5 MCG: 0.03 TABLET ORAL at 05:50

## 2023-04-08 RX ADMIN — AMLODIPINE BESYLATE 10 MG: 5 TABLET ORAL at 08:11

## 2023-04-08 RX ADMIN — CHOLESTYRAMINE 1 PACKET: 4 POWDER, FOR SUSPENSION ORAL at 21:32

## 2023-04-08 NOTE — PROGRESS NOTES
[facility]   Hospitalist Progress Note  Date: 2023  Patient Name: Lyubov Middleton  : 1955  MRN: [id number]  Date of admission: 4/3/2023      Subjective   Subjective     Chief Complaint:   AMS    Summary:   Lyubov Middleton is a 68 y.o. female with past medical history of diabetes mellitus, CKD 4, seizure disorder, dysphagia currently undergoing rehab at [facility] and rehab presents with altered mental status.  Per report, patient in her usual state of health in the morning presentation, however when working with therapy.  Patient indicated she was not feeling well, her eyes rolled back in her head and she became unresponsive.  Sternal rub attempted without response.  Patient brought to the emergency department for evaluation and treatment, noted to have slowly improving mentation.  Alert only to 1 on arrival.  Patient's labs also significant for low potassium, elevated sodium, creatinine up at 2.24, up from patient's baseline of 1.7.  UA concerning for urinary tract infection.  CT head negative for any acute intracranial abnormalities, did demonstrate diffuse atrophy and white matter changes.  Chest x-ray negative for any acute abnormalities.  Patient has multiple bruises in various stages of healing on the skin exam.  Nursing report indicates patient frequently tries to get out of bed and falls.  Recently had presentation to the emergency department for a broken nose following a fall.  Cerebella applied in the emergency department and negative for seizure activity acutely.  Hospitalist service contacted for admission.  Patient required replacement of PEG tube while inpatient.    Interval Followup:   Sodium remained stable.  Patient with good mentation today.  We will try and get patient up and ambulating today.  Sign scale insulin adjusted as patient is now eating normally.  Nursing has informed me patient had loose stools overnight.  Continue to closely monitor    Review of  Systems   All systems were reviewed and negative except for: Denies any acute symptoms at this time.  Remains weak    Objective   Objective     Vitals:   Temp:  [97.4 °F (36.3 °C)-98.6 °F (37 °C)] 98.2 °F (36.8 °C)  Heart Rate:  [69-72] 69  Resp:  [15-18] 15  BP: (116-145)/(56-96) 134/59  Physical Exam   Gen. alert and oriented x3, remains pleasantly confused however when pointedly asked can answer any questions.  HEENT: Normocephalic multiple bruises of the head as well as well-healing laceration of the bridge of the nose, moist membranes pupils equal round reactive light, no scleral icterus no conjunctival injection, protruding right maxillary central incisor  Cardiovascular: regular rate and rhythm no murmurs rubs or gallops S1-S2, no lower extremity edema appreciated  Pulmonary: Clear to auscultation bilaterally no wheezes rales or rhonchi symmetric chest expansion, unlabored, no conversational dyspnea appreciated  Gastrointestinal: Soft nontender nondistended positive bowel sounds all 4 quadrants no rebound or guarding, G-tube replaced with minimal bloody discharge around tube.  Musculoskeletal: No clubbing cyanosis, warm and well-perfused, calves soft symmetric nontender bilaterally  Skin: Multiple bruises bilateral lower extremities, excoriation of the buttocks, bruising over the brow and forehead, healing laceration of the bridge of the nose  Neuro: Cranial nerves II through XII intact grossly no sensorimotor deficits appreciated bilateral upper and lower extremities, generalized weakness  Psych: Patient is calm cooperative and appropriate with exam not responding to internal stimuli  : No Fernandez catheter no bladder distention positive suprapubic tenderness     Result Review    Result Review:  I have personally reviewed the results from 4/8/2023 and agree with these findings:  []  Laboratory  []  Microbiology  []  Radiology  []  EKG/Telemetry   []  Cardiology/Vascular   []  Pathology  []  Old records  []   Other:    Assessment & Plan   Assessment / Plan     Assessment/Plan:  Altered mental status  Acute metabolic encephalopathy secondary to urinary tract infection  Urinary tract infection  Nonconvulsive seizure   AUSTEN on CKD 4 baseline creatinine 1.7  Hypokalemia  Diabetes mellitus  Hypernatremia  Metabolic acidosis  Debility  Oropharyngeal dysphagia  Blocked PEG tube  Anemia of chronic kidney disease  Chronic diarrhea  Hypertension  Depression  Hypothyroidism  History of nonconvulsive status epilepticus  History of breast cancer status post left mastectomy     Plan:   Patient admitted for further evaluation and treatment  Neurology consulted thank you for your assistance  EEG with no seizure-like activity  Antibiotics transitioned to cefepime based on sensitivities, completed 5 days  Antiepileptic drugs continued per neurology  Phosphate now within normal limits  MBS shows patient can have modified diet, medications transitioned to oral  Repeat imaging shows appropriate placement of PEG tube, replaced at bedside on 4/4/2023  Nocturnal tube feeds only now to help promote eating during the day  Speech therapy will continue to follow along, appreciate assistance  Consult physical therapy/occupational therapy while inpatient  Patient receives darbepoetin alpha subcu on the 26th of every month for treatment of anemia of chronic kidney disease  Continue home cholestyramine  Continue home Florastor  Resume home amlodipine, carvedilol for persistent hypertension, blood pressures have improved, will monitor  Continue home citalopram  Continue home levothyroxine  TSH minimally elevated  Continue home anastrozole  CODE STATUS DNR/DNI confirmed with family       Disposition: Inpatient rehab.  Family discussing potentially finding an alternative facility      Discussed plan with RN, , family at bedside    DVT prophylaxis:  Mechanical DVT prophylaxis orders are present.    CODE STATUS:   Medical Intervention Limits: NO  intubation (DNI)  Code Status (Patient has no pulse and is not breathing): No CPR (Do Not Attempt to Resuscitate)  Medical Interventions (Patient has pulse or is breathing): Limited Support  Release to patient: Routine Release

## 2023-04-08 NOTE — PLAN OF CARE
Goal Outcome Evaluation:  Plan of Care Reviewed With: patient           Outcome Evaluation: Patient stable at this time with no significant changes. Tube feedings tolerated well, turned off at 0800 this morning.

## 2023-04-08 NOTE — THERAPY TREATMENT NOTE
Acute Care - Physical Therapy Treatment Note   [place]     Patient Name: Lyubov Middleton  : 1955  MRN: [id number]  Today's Date: 2023      Visit Dx:     ICD-10-CM ICD-9-CM   1. Metabolic encephalopathy  G93.41 348.31   2. Oropharyngeal dysphagia  R13.12 787.22   3. Difficulty in walking  R26.2 719.7   4. Decreased activities of daily living (ADL)  Z78.9 V49.89     Patient Active Problem List   Diagnosis   • Renal stone   • Renal abscess   • Stage 3a chronic kidney disease   • Type 2 diabetes mellitus   • Metabolic encephalopathy   • Seizure disorder     Past Medical History:   Diagnosis Date   • Cancer     left breast removed    • Type 2 diabetes mellitus      Past Surgical History:   Procedure Laterality Date   • ABDOMINAL SURGERY     • BREAST SURGERY Left     removed due to cancer   • CHOLECYSTECTOMY     • CYSTOSCOPY W/ URETERAL STENT PLACEMENT Right 2020    Procedure: CYSTOSCOPY, RIGHT RETROGRADE PYELOGRAM, URETEROSCOPY, LASER LITHOTRIPSY, RIGHT URETERAL STENT PLACEMENT;  Surgeon: Rohan Dooley Jr., MD;  Location: [facility];  Service: Urology;  Laterality: Right;   • GASTROSTOMY W/ FEEDING TUBE     • HERNIA REPAIR      mesh removed     PT Assessment (last 12 hours)     PT Evaluation and Treatment     Row Name 23 1332          Physical Therapy Time and Intention    Subjective Information complains of;weakness  -RH     Document Type therapy note (daily note)  -     Mode of Treatment physical therapy;individual therapy  -RH     Patient Effort fair  -RH     Comment Pt agreeable to therex but declines transfers and gait.  -     Row Name 23 1332          Pain Scale: FACES Pre/Post-Treatment    Pain: FACES Scale, Pretreatment 0-->no hurt  -RH     Posttreatment Pain Rating 0-->no hurt  -RH     Row Name             Wound 23 1622 Right labia    Wound - Properties Group Placement Date: 23  -AC Placement Time:   -AC Present on Hospital Admission: Y  -AC  Side: Right  -AC Location: labia  -AC    Retired Wound - Properties Group Placement Date: 04/03/23  -AC Placement Time: 1622  -AC Present on Hospital Admission: Y  -AC Side: Right  -AC Location: labia  -AC    Retired Wound - Properties Group Date first assessed: 04/03/23  -AC Time first assessed: 1622  -AC Present on Hospital Admission: Y  -AC Side: Right  -AC Location: labia  -AC    Row Name             Wound 04/03/23 1628 sacral spine    Wound - Properties Group Placement Date: 04/03/23  -AC Placement Time: 1628  -AC Present on Hospital Admission: Y  -AC Location: sacral spine  -AC    Retired Wound - Properties Group Placement Date: 04/03/23  -AC Placement Time: 1628  -AC Present on Hospital Admission: Y  -AC Location: sacral spine  -AC    Retired Wound - Properties Group Date first assessed: 04/03/23  -AC Time first assessed: 1628  -AC Present on Hospital Admission: Y  -AC Location: sacral spine  -AC    Row Name             Wound 04/03/23 1629 Bilateral gluteal    Wound - Properties Group Placement Date: 04/03/23  -AC Placement Time: 1629  -AC Present on Hospital Admission: Y  -AC Side: Bilateral  -AC Location: gluteal  -AC    Retired Wound - Properties Group Placement Date: 04/03/23  -AC Placement Time: 1629  -AC Present on Hospital Admission: Y  -AC Side: Bilateral  -AC Location: gluteal  -AC    Retired Wound - Properties Group Date first assessed: 04/03/23  -AC Time first assessed: 1629  -AC Present on Hospital Admission: Y  -AC Side: Bilateral  -AC Location: gluteal  -AC    Row Name             Wound 04/03/23 1630 Bilateral posterior greater trochanter    Wound - Properties Group Placement Date: 04/03/23  -AC Placement Time: 1630  -AC Present on Hospital Admission: Y  -AC Side: Bilateral  -AC Orientation: posterior  -AC Location: greater trochanter  -AC    Retired Wound - Properties Group Placement Date: 04/03/23  -AC Placement Time: 1630  -AC Present on Hospital Admission: Y  -AC Side: Bilateral  -AC  Orientation: posterior  -AC Location: greater trochanter  -AC    Retired Wound - Properties Group Date first assessed: 04/03/23  -AC Time first assessed: 1630  -AC Present on Hospital Admission: Y  -AC Side: Bilateral  -AC Location: greater trochanter  -AC    Row Name 04/08/23 1332          Vital Signs    O2 Delivery Intra Treatment room air  -RH     Row Name 04/08/23 1332          Progress Summary (PT)    Progress Toward Functional Goals (PT) progress toward functional goals is gradual  -           User Key  (r) = Recorded By, (t) = Taken By, (c) = Cosigned By    Initials Name Provider Type    RH Rj Soto, LONG Physical Therapist Assistant    Jessica Bowie RN Registered Nurse               Bilateral Lower Extremity   Exercise  Reps  Sets    Short arc quads   15 1   Gluteal sets 15 1   Ankle pumps  15 1   Quad sets  15 1   Straight leg raise  15 1   Hip ab/adduction 15 1        Physical Therapy Education     Title: PT OT SLP Therapies (Done)     Topic: Physical Therapy (Done)     Point: Mobility training (Done)     Learning Progress Summary           Patient Acceptance, E,TB, VU,NR by  at 4/6/2023 0434    Acceptance, E,TB, VU by  at 4/4/2023 1113                   Point: Precautions (Done)     Learning Progress Summary           Patient Acceptance, E,TB, VU,NR by  at 4/6/2023 0434    Acceptance, E,TB, VU by  at 4/4/2023 1113                               User Key     Initials Effective Dates Name Provider Type Discipline     06/08/21 -  Moreno Longoria, RN Registered Nurse Nurse     03/07/23 -  Rosalinda Peacock, RICH Student PT Student PT              PT Recommendation and Plan     Progress Summary (PT)  Progress Toward Functional Goals (PT): progress toward functional goals is gradual   Outcome Measures     Row Name 04/08/23 1300 04/07/23 1200 04/06/23 1400       How much help from another person do you currently need...    Turning from your back to your side while in flat bed without using  bedrails? 4  -RH 4  -RH 4  -TOSHA (r) SW (t) TOSHA (c)    Moving from lying on back to sitting on the side of a flat bed without bedrails? 4  -RH 4  -RH 4  -TOSHA (r) SW (t) TOSHA (c)    Moving to and from a bed to a chair (including a wheelchair)? 2  -RH 2  -RH 2  -TOSHA (r) SW (t) TOSHA (c)    Standing up from a chair using your arms (e.g., wheelchair, bedside chair)? 2  -RH 2  -RH 3  -TOSHA (r) SW (t) TOSHA (c)    Climbing 3-5 steps with a railing? 2  -RH 2  -RH 2  -TOSHA (r) SW (t) TOSHA (c)    To walk in hospital room? 2  -RH 2  -RH 2  -TOSHA (r) SW (t) TOSHA (c)    AM-PAC 6 Clicks Score (PT) 16  -RH 16  -RH 17  -TOSHA (r) SW (t)    Row Name 04/05/23 2448             How much help from another person do you currently need...    Turning from your back to your side while in flat bed without using bedrails? 3  -TOSHA (r) AT (t) TOSHA (c)      Moving from lying on back to sitting on the side of a flat bed without bedrails? 3  -TOSHA (r) AT (t) TOSHA (c)      Moving to and from a bed to a chair (including a wheelchair)? 2  -TOSHA (r) AT (t) TOSHA (c)      Standing up from a chair using your arms (e.g., wheelchair, bedside chair)? 3  -TOSHA (r) AT (t) TOSHA (c)      Climbing 3-5 steps with a railing? 1  -TOSHA (r) AT (t) TOSHA (c)      To walk in hospital room? 2  -TOSHA (r) AT (t) TOSHA (c)      AM-PAC 6 Clicks Score (PT) 14  -TOSHA (r) AT (t)         Functional Assessment    Outcome Measure Options AM-PAC 6 Clicks Basic Mobility (PT)  -TOSHA (r) AT (t) TOSHA (c)            User Key  (r) = Recorded By, (t) = Taken By, (c) = Cosigned By    Initials Name Provider Type    RH Rj Soto, PTA Physical Therapist Assistant    Baudilio Garay, PT Physical Therapist    AT DamienTrista, PT Student PT Student    Rosalinda Braun, PT Student PT Student                 Time Calculation:    PT Charges     Row Name 04/08/23 1331             Time Calculation    PT Received On 04/08/23  -RH         Timed Charges    99852 - PT Therapeutic Exercise Minutes 12  -RH         Total Minutes    Timed Charges  Total Minutes 12  -RH       Total Minutes 12  -RH            User Key  (r) = Recorded By, (t) = Taken By, (c) = Cosigned By    Initials Name Provider Type     Rj Soto PTA Physical Therapist Assistant              Therapy Charges for Today     Code Description Service Date Service Provider Modifiers Qty    54359573416 HC PT THER PROC EA 15 MIN 4/7/2023 Rj Soto, LONG GP 1    45733507462 HC PT THERAPEUTIC ACT EA 15 MIN 4/7/2023 Rj Soto PTA GP 1    40958543023 HC PT THER PROC EA 15 MIN 4/8/2023 Rj Soto PTA GP 1          PT G-Codes  Outcome Measure Options: AM-PAC 6 Clicks Basic Mobility (PT)  AM-PAC 6 Clicks Score (PT): 16  AM-PAC 6 Clicks Score (OT): 16    Rj Soto PTA  4/8/2023

## 2023-04-09 LAB
ANION GAP SERPL CALCULATED.3IONS-SCNC: 12.3 MMOL/L (ref 5–15)
BUN SERPL-MCNC: 46 MG/DL (ref 8–23)
BUN/CREAT SERPL: 25 (ref 7–25)
CALCIUM SPEC-SCNC: 8.2 MG/DL (ref 8.6–10.5)
CHLORIDE SERPL-SCNC: 109 MMOL/L (ref 98–107)
CO2 SERPL-SCNC: 15.7 MMOL/L (ref 22–29)
CREAT SERPL-MCNC: 1.84 MG/DL (ref 0.57–1)
DEPRECATED RDW RBC AUTO: 42.4 FL (ref 37–54)
EGFRCR SERPLBLD CKD-EPI 2021: 29.8 ML/MIN/1.73
ERYTHROCYTE [DISTWIDTH] IN BLOOD BY AUTOMATED COUNT: 13.6 % (ref 12.3–15.4)
GLUCOSE BLDC GLUCOMTR-MCNC: 115 MG/DL (ref 70–99)
GLUCOSE BLDC GLUCOMTR-MCNC: 124 MG/DL (ref 70–99)
GLUCOSE BLDC GLUCOMTR-MCNC: 127 MG/DL (ref 70–99)
GLUCOSE BLDC GLUCOMTR-MCNC: 128 MG/DL (ref 70–99)
GLUCOSE BLDC GLUCOMTR-MCNC: 136 MG/DL (ref 70–99)
GLUCOSE BLDC GLUCOMTR-MCNC: 150 MG/DL (ref 70–99)
GLUCOSE SERPL-MCNC: 117 MG/DL (ref 65–99)
HCT VFR BLD AUTO: 24.3 % (ref 34–46.6)
HGB BLD-MCNC: 8.6 G/DL (ref 12–15.9)
MAGNESIUM SERPL-MCNC: 1.6 MG/DL (ref 1.6–2.4)
MCH RBC QN AUTO: 31.2 PG (ref 26.6–33)
MCHC RBC AUTO-ENTMCNC: 35.4 G/DL (ref 31.5–35.7)
MCV RBC AUTO: 88 FL (ref 79–97)
PLATELET # BLD AUTO: 247 10*3/MM3 (ref 140–450)
PMV BLD AUTO: 9.9 FL (ref 6–12)
POTASSIUM SERPL-SCNC: 3.7 MMOL/L (ref 3.5–5.2)
RBC # BLD AUTO: 2.76 10*6/MM3 (ref 3.77–5.28)
SODIUM SERPL-SCNC: 137 MMOL/L (ref 136–145)
WBC NRBC COR # BLD: 9.02 10*3/MM3 (ref 3.4–10.8)

## 2023-04-09 PROCEDURE — 97535 SELF CARE MNGMENT TRAINING: CPT

## 2023-04-09 PROCEDURE — 25010000002 MAGNESIUM SULFATE 2 GM/50ML SOLUTION: Performed by: INTERNAL MEDICINE

## 2023-04-09 PROCEDURE — 25010000002 CEFEPIME PER 500 MG: Performed by: INTERNAL MEDICINE

## 2023-04-09 PROCEDURE — 85027 COMPLETE CBC AUTOMATED: CPT | Performed by: INTERNAL MEDICINE

## 2023-04-09 PROCEDURE — 25010000002 ONDANSETRON PER 1 MG: Performed by: INTERNAL MEDICINE

## 2023-04-09 PROCEDURE — 99232 SBSQ HOSP IP/OBS MODERATE 35: CPT | Performed by: INTERNAL MEDICINE

## 2023-04-09 PROCEDURE — 82962 GLUCOSE BLOOD TEST: CPT

## 2023-04-09 PROCEDURE — 83735 ASSAY OF MAGNESIUM: CPT | Performed by: INTERNAL MEDICINE

## 2023-04-09 PROCEDURE — 97530 THERAPEUTIC ACTIVITIES: CPT

## 2023-04-09 PROCEDURE — 80048 BASIC METABOLIC PNL TOTAL CA: CPT | Performed by: INTERNAL MEDICINE

## 2023-04-09 RX ORDER — POTASSIUM CHLORIDE 750 MG/1
40 CAPSULE, EXTENDED RELEASE ORAL ONCE
Status: COMPLETED | OUTPATIENT
Start: 2023-04-09 | End: 2023-04-09

## 2023-04-09 RX ORDER — MAGNESIUM SULFATE HEPTAHYDRATE 40 MG/ML
2 INJECTION, SOLUTION INTRAVENOUS ONCE
Status: COMPLETED | OUTPATIENT
Start: 2023-04-09 | End: 2023-04-09

## 2023-04-09 RX ADMIN — ANASTROZOLE 1 MG: 1 TABLET ORAL at 09:09

## 2023-04-09 RX ADMIN — CEFEPIME HYDROCHLORIDE 1 G: 1 INJECTION, POWDER, FOR SOLUTION INTRAMUSCULAR; INTRAVENOUS at 14:00

## 2023-04-09 RX ADMIN — LACOSAMIDE 50 MG: 10 SOLUTION ORAL at 09:08

## 2023-04-09 RX ADMIN — POTASSIUM CHLORIDE 40 MEQ: 10 CAPSULE, COATED, EXTENDED RELEASE ORAL at 15:57

## 2023-04-09 RX ADMIN — SODIUM BICARBONATE 650 MG TABLET 650 MG: at 09:08

## 2023-04-09 RX ADMIN — ONDANSETRON 4 MG: 2 INJECTION INTRAMUSCULAR; INTRAVENOUS at 10:34

## 2023-04-09 RX ADMIN — CHOLESTYRAMINE 1 PACKET: 4 POWDER, FOR SUSPENSION ORAL at 09:10

## 2023-04-09 RX ADMIN — MAGNESIUM SULFATE HEPTAHYDRATE 2 G: 2 INJECTION, SOLUTION INTRAVENOUS at 16:21

## 2023-04-09 RX ADMIN — CITALOPRAM HYDROBROMIDE 10 MG: 20 TABLET ORAL at 09:09

## 2023-04-09 RX ADMIN — LACOSAMIDE 50 MG: 10 SOLUTION ORAL at 20:37

## 2023-04-09 RX ADMIN — Medication 250 MG: at 20:36

## 2023-04-09 RX ADMIN — CARVEDILOL 25 MG: 25 TABLET, FILM COATED ORAL at 20:36

## 2023-04-09 RX ADMIN — CARVEDILOL 25 MG: 25 TABLET, FILM COATED ORAL at 09:09

## 2023-04-09 RX ADMIN — Medication 10 ML: at 09:09

## 2023-04-09 RX ADMIN — SODIUM BICARBONATE 650 MG TABLET 650 MG: at 20:36

## 2023-04-09 RX ADMIN — AMLODIPINE BESYLATE 10 MG: 5 TABLET ORAL at 09:08

## 2023-04-09 RX ADMIN — Medication 250 MG: at 09:09

## 2023-04-09 RX ADMIN — Medication 10 ML: at 20:37

## 2023-04-09 RX ADMIN — LEVOTHYROXINE SODIUM 12.5 MCG: 0.03 TABLET ORAL at 05:51

## 2023-04-09 NOTE — THERAPY TREATMENT NOTE
Acute Care - Physical Therapy Progress Note   [place]     Patient Name: Lyubov Middleton  : 1955  MRN: [id number]  Today's Date: 2023      Visit Dx:     ICD-10-CM ICD-9-CM   1. Metabolic encephalopathy  G93.41 348.31   2. Oropharyngeal dysphagia  R13.12 787.22   3. Difficulty in walking  R26.2 719.7   4. Decreased activities of daily living (ADL)  Z78.9 V49.89     Patient Active Problem List   Diagnosis   • Renal stone   • Renal abscess   • Stage 3a chronic kidney disease   • Type 2 diabetes mellitus   • Metabolic encephalopathy   • Seizure disorder     Past Medical History:   Diagnosis Date   • Cancer     left breast removed    • Type 2 diabetes mellitus      Past Surgical History:   Procedure Laterality Date   • ABDOMINAL SURGERY     • BREAST SURGERY Left     removed due to cancer   • CHOLECYSTECTOMY     • CYSTOSCOPY W/ URETERAL STENT PLACEMENT Right 2020    Procedure: CYSTOSCOPY, RIGHT RETROGRADE PYELOGRAM, URETEROSCOPY, LASER LITHOTRIPSY, RIGHT URETERAL STENT PLACEMENT;  Surgeon: Rohan Dooley Jr., MD;  Location: [facility];  Service: Urology;  Laterality: Right;   • GASTROSTOMY W/ FEEDING TUBE     • HERNIA REPAIR      mesh removed     PT Assessment (last 12 hours)     PT Evaluation and Treatment     Row Name 23 1200          Physical Therapy Time and Intention    Subjective Information complains of;fatigue;weakness  -CS     Document Type therapy note (daily note)  -CS     Mode of Treatment individual therapy;physical therapy  -CS     Patient Effort adequate  -CS     Symptoms Noted During/After Treatment nausea  vomiting  -CS     Row Name 23 1200          Bed Mobility    Supine-Sit [place] (Bed Mobility) verbal cues;minimum assist (75% patient effort);1 person assist  -CS     Sit-Supine [place] (Bed Mobility) verbal cues;moderate assist (50% patient effort);1 person assist  -CS     Bed Mobility, Safety Issues decreased use of arms for  pushing/pulling;decreased use of legs for bridging/pushing  -CS     Assistive Device (Bed Mobility) bed rails;head of bed elevated  -CS     Row Name 04/09/23 1200          Sit-Stand Transfer    Sit-Stand [place] (Transfers) verbal cues;minimum assist (75% patient effort);1 person assist  -CS     Assistive Device (Sit-Stand Transfers) walker, front-wheeled  -CS     Row Name 04/09/23 1200          Stand-Sit Transfer    Stand-Sit [place] (Transfers) verbal cues;minimum assist (75% patient effort);1 person assist  -CS     Assistive Device (Stand-Sit Transfers) walker, front-wheeled  -CS     Row Name 04/09/23 1200          Gait/Stairs (Locomotion)    [place] Level (Gait) verbal cues;minimum assist (75% patient effort);1 person assist  -CS     Assistive Device (Gait) walker, front-wheeled  -CS     Distance in Feet (Gait) 5  -CS     Pattern (Gait) 4-point;step-to  -CS     Deviations/Abnormal Patterns (Gait) base of support, narrow;festinating/shuffling;gait speed decreased  -CS     Bilateral Gait Deviations forward flexed posture;heel strike decreased  -CS     Row Name             Wound 04/03/23 1622 Right labia    Wound - Properties Group Placement Date: 04/03/23  -AC Placement Time: 1622  -AC Present on Hospital Admission: Y  -AC Side: Right  -AC Location: labia  -AC    Retired Wound - Properties Group Placement Date: 04/03/23  -AC Placement Time: 1622  -AC Present on Hospital Admission: Y  -AC Side: Right  -AC Location: labia  -AC    Retired Wound - Properties Group Date first assessed: 04/03/23  -AC Time first assessed: 1622  -AC Present on Hospital Admission: Y  -AC Side: Right  -AC Location: labia  -AC    Row Name             Wound 04/03/23 1628 sacral spine    Wound - Properties Group Placement Date: 04/03/23  -AC Placement Time: 1628  -AC Present on Hospital Admission: Y  -AC Location: sacral spine  -AC    Retired Wound - Properties Group Placement Date: 04/03/23  -AC Placement Time: 1628  -AC  Present on Hospital Admission: Y  -AC Location: sacral spine  -AC    Retired Wound - Properties Group Date first assessed: 04/03/23  -AC Time first assessed: 1628  -AC Present on Hospital Admission: Y  -AC Location: sacral spine  -AC    Row Name             Wound 04/03/23 1629 Bilateral gluteal    Wound - Properties Group Placement Date: 04/03/23  -AC Placement Time: 1629  -AC Present on Hospital Admission: Y  -AC Side: Bilateral  -AC Location: gluteal  -AC    Retired Wound - Properties Group Placement Date: 04/03/23  -AC Placement Time: 1629  -AC Present on Hospital Admission: Y  -AC Side: Bilateral  -AC Location: gluteal  -AC    Retired Wound - Properties Group Date first assessed: 04/03/23  -AC Time first assessed: 1629  -AC Present on Hospital Admission: Y  -AC Side: Bilateral  -AC Location: gluteal  -AC    Row Name             Wound 04/03/23 1630 Bilateral posterior greater trochanter    Wound - Properties Group Placement Date: 04/03/23  -AC Placement Time: 1630  -AC Present on Hospital Admission: Y  -AC Side: Bilateral  -AC Orientation: posterior  -AC Location: greater trochanter  -AC    Retired Wound - Properties Group Placement Date: 04/03/23  -AC Placement Time: 1630  -AC Present on Hospital Admission: Y  -AC Side: Bilateral  -AC Orientation: posterior  -AC Location: greater trochanter  -AC    Retired Wound - Properties Group Date first assessed: 04/03/23  -AC Time first assessed: 1630  -AC Present on Hospital Admission: Y  -AC Side: Bilateral  -AC Location: greater trochanter  -AC    Row Name 04/09/23 1200          Positioning and Restraints    Pre-Treatment Position in bed  -CS     Post Treatment Position bed  -CS     In Bed fowlers;call light within reach;encouraged to call for assist;exit alarm on;notified nsg  -CS     Row Name 04/09/23 1200          Progress Summary (PT)    Progress Toward Functional Goals (PT) progress toward functional goals is gradual  -CS     Daily Progress Summary (PT) Pt.  reported upset stomach during Tx and beame nauseated.  Pt. was observed to have vomiting episode, which was relayed to RN.  Pt. also had BM during Tx that was complete liquid, which was also relayed to nursing staff.  -CS           User Key  (r) = Recorded By, (t) = Taken By, (c) = Cosigned By    Initials Name Provider Type    CS Farzad Jagn, LONG Physical Therapist Assistant    Jessica Bowie RN Registered Nurse                Physical Therapy Education     Title: PT OT SLP Therapies (Done)     Topic: Physical Therapy (Done)     Point: Mobility training (Done)     Learning Progress Summary           Patient Acceptance, E,TB, VU,NR by  at 4/6/2023 0434    Acceptance, E,TB, VU by  at 4/4/2023 1113                   Point: Precautions (Done)     Learning Progress Summary           Patient Acceptance, E,TB, VU,NR by  at 4/6/2023 0434    Acceptance, E,TB, VU by  at 4/4/2023 1113                               User Key     Initials Effective Dates Name Provider Type Discipline     06/08/21 -  Moreno Longoria RN Registered Nurse Nurse     03/07/23 -  Rosalinda Peacock PT Student PT Student PT              PT Recommendation and Plan     Progress Summary (PT)  Progress Toward Functional Goals (PT): progress toward functional goals is gradual  Daily Progress Summary (PT): Pt. reported upset stomach during Tx and beame nauseated.  Pt. was observed to have vomiting episode, which was relayed to RN.  Pt. also had BM during Tx that was complete liquid, which was also relayed to nursing staff.   Outcome Measures     Row Name 04/09/23 1300 04/08/23 1300 04/07/23 1200       How much help from another person do you currently need...    Turning from your back to your side while in flat bed without using bedrails? 3  -CS 4  -RH 4  -RH    Moving from lying on back to sitting on the side of a flat bed without bedrails? 2  -CS 4  -RH 4  -RH    Moving to and from a bed to a chair (including a wheelchair)? 3  -CS 2   -RH 2  -RH    Standing up from a chair using your arms (e.g., wheelchair, bedside chair)? 3  -CS 2  -RH 2  -RH    Climbing 3-5 steps with a railing? 2  -CS 2  -RH 2  -RH    To walk in hospital room? 3  -CS 2  -RH 2  -RH    AM-PAC 6 Clicks Score (PT) 16  -CS 16  -RH 16  -RH       Functional Assessment    Outcome Measure Options AM-PAC 6 Clicks Basic Mobility (PT)  -CS -- --    Row Name 04/06/23 1400             How much help from another person do you currently need...    Turning from your back to your side while in flat bed without using bedrails? 4  -TOSHA (r) SW (t) TOSHA (c)      Moving from lying on back to sitting on the side of a flat bed without bedrails? 4  -TOSHA (r) SW (t) TOSHA (c)      Moving to and from a bed to a chair (including a wheelchair)? 2  -TOSHA (r) SW (t) TOSHA (c)      Standing up from a chair using your arms (e.g., wheelchair, bedside chair)? 3  -TOSHA (r) SW (t) TOSHA (c)      Climbing 3-5 steps with a railing? 2  -TOSHA (r) SW (t) TOSHA (c)      To walk in hospital room? 2  -TOSHA (r) SW (t) TOSHA (c)      AM-PAC 6 Clicks Score (PT) 17  -TOSHA (r) SW (t)            User Key  (r) = Recorded By, (t) = Taken By, (c) = Cosigned By    Initials Name Provider Type    RH Rj Soto, LONG Physical Therapist Assistant    Baudilio Garay, PT Physical Therapist    Farzad Calderón PTA Physical Therapist Assistant    Rosalinda Braun, PT Student PT Student                 Time Calculation:    PT Charges     Row Name 04/09/23 1257             Time Calculation    Start Time 0947  -CS      PT Received On 04/09/23  -CS         Timed Charges    71469 - Gait Training Minutes  4  -CS      16026 - PT Therapeutic Activity Minutes 11  -CS      13646 - PT Self Care/Mgmt Minutes 10  -CS         Total Minutes    Timed Charges Total Minutes 25  -CS       Total Minutes 25  -CS            User Key  (r) = Recorded By, (t) = Taken By, (c) = Cosigned By    Initials Name Provider Type    Farzad Calderón, PTA Physical Therapist Assistant               Therapy Charges for Today     Code Description Service Date Service Provider Modifiers Qty    51769470977 HC PT THERAPEUTIC ACT EA 15 MIN 4/9/2023 Farzad Jang PTA GP 1    99079598186 HC PT SELF CARE/MGMT/TRAIN EA 15 MIN 4/9/2023 Farzad Jang PTA GP 1          PT G-Codes  Outcome Measure Options: AM-PAC 6 Clicks Basic Mobility (PT)  AM-PAC 6 Clicks Score (PT): 16  AM-PAC 6 Clicks Score (OT): 16    Farzad Jang PTA  4/9/2023

## 2023-04-09 NOTE — PLAN OF CARE
Goal Outcome Evaluation:  Plan of Care Reviewed With: patient        Progress: no change  Outcome Evaluation: patient alert and orientated overnight; Pt had 30 second witnessed seizure like activity; no s/s of distress noted after; TF running per orders with no s/s of distress noted; VSS; will continue to monitor;

## 2023-04-09 NOTE — PLAN OF CARE
Goal Outcome Evaluation:           Progress: no change   VSS. Pt has rested this shift. No seizure like activity noted. Will continue to monitor

## 2023-04-09 NOTE — PROGRESS NOTES
[facility]   Hospitalist Progress Note  Date: 2023  Patient Name: Lyubov Middleton  : 1955  MRN: [id number]  Date of admission: 4/3/2023      Subjective   Subjective     Chief Complaint:   AMS    Summary:   Lyubov Middleton is a 68 y.o. female with past medical history of diabetes mellitus, CKD 4, seizure disorder, dysphagia currently undergoing rehab at [facility] and rehab presents with altered mental status.  Per report, patient in her usual state of health in the morning presentation, however when working with therapy.  Patient indicated she was not feeling well, her eyes rolled back in her head and she became unresponsive.  Sternal rub attempted without response.  Patient brought to the emergency department for evaluation and treatment, noted to have slowly improving mentation.  Alert only to 1 on arrival.  Patient's labs also significant for low potassium, elevated sodium, creatinine up at 2.24, up from patient's baseline of 1.7.  UA concerning for urinary tract infection.  CT head negative for any acute intracranial abnormalities, did demonstrate diffuse atrophy and white matter changes.  Chest x-ray negative for any acute abnormalities.  Patient has multiple bruises in various stages of healing on the skin exam.  Nursing report indicates patient frequently tries to get out of bed and falls.  Recently had presentation to the emergency department for a broken nose following a fall.  Cerebella applied in the emergency department and negative for seizure activity acutely.  Hospitalist service contacted for admission.  Patient required replacement of PEG tube while inpatient.    Interval Followup:   Question of seizure-like activity overnight.  Patient does not recall any of the events surrounding it.  However nursing informs me patient was being cleaned up when her eyes rolled back, no tonic-clonic activity, however spaced out for around 30 seconds with what sounds like a  postictal period.  Continue current dose of Vimpat, may uptitrate for any further episodes.  Replacing potassium and magnesium    Review of Systems   All systems were reviewed and negative except for: Denies any acute symptoms at this time.  Remains weak    Objective   Objective     Vitals:   Temp:  [97.5 °F (36.4 °C)-98.6 °F (37 °C)] 97.6 °F (36.4 °C)  Heart Rate:  [65-77] 65  Resp:  [12-18] 12  BP: (108-144)/(56-80) 108/56  Physical Exam   Gen. alert and oriented x3, remains pleasantly confused however when pointedly asked can answer any questions.  HEENT: Normocephalic multiple bruises of the head as well as well-healing laceration of the bridge of the nose, moist membranes pupils equal round reactive light, no scleral icterus no conjunctival injection, protruding right maxillary central incisor  Cardiovascular: regular rate and rhythm no murmurs rubs or gallops S1-S2, no lower extremity edema appreciated  Pulmonary: Clear to auscultation bilaterally no wheezes rales or rhonchi symmetric chest expansion, unlabored, no conversational dyspnea appreciated  Gastrointestinal: Soft nontender nondistended positive bowel sounds all 4 quadrants no rebound or guarding, G-tube replaced with minimal bloody discharge around tube.  Musculoskeletal: No clubbing cyanosis, warm and well-perfused, calves soft symmetric nontender bilaterally  Skin: Multiple bruises bilateral lower extremities, excoriation of the buttocks, bruising over the brow and forehead, healing laceration of the bridge of the nose  Neuro: Cranial nerves II through XII intact grossly no sensorimotor deficits appreciated bilateral upper and lower extremities, generalized weakness  : No Fernandez catheter no bladder distention      Result Review    Result Review:  I have personally reviewed the results from 4/9/2023 and agree with these findings:  []  Laboratory  []  Microbiology  []  Radiology  []  EKG/Telemetry   []  Cardiology/Vascular   []  Pathology  []  Old  records  []  Other:    Assessment & Plan   Assessment / Plan     Assessment/Plan:  Altered mental status  Acute metabolic encephalopathy secondary to urinary tract infection  Urinary tract infection  Nonconvulsive seizure   AUSTEN on CKD 4 baseline creatinine 1.7  Hypokalemia  Diabetes mellitus  Hypernatremia  Metabolic acidosis  Debility  Oropharyngeal dysphagia  Blocked PEG tube  Anemia of chronic kidney disease  Chronic diarrhea  Hypertension  Depression  Hypothyroidism  History of nonconvulsive status epilepticus  History of breast cancer status post left mastectomy     Plan:   Patient admitted for further evaluation and treatment  Neurology consulted thank you for your assistance  EEG with no seizure-like activity  Another episode of what sounds like seizure-like activity, current dose of Vimpat will be maintained  For further episodes we will consult with neurology before increasing dose  Antibiotics transitioned to cefepime based on sensitivities, completed 5 days  Testing magnesium being replaced today, recheck in the morning  Also rechecking phosphate in the morning  MBS shows patient can have modified diet, medications transitioned to oral  Repeat imaging shows appropriate placement of PEG tube, replaced at bedside on 4/4/2023  Nocturnal tube feeds only now to help promote eating during the day  Speech therapy will continue to follow along, appreciate assistance  Consult physical therapy/occupational therapy while inpatient  Patient receives darbepoetin alpha subcu on the 26th of every month for treatment of anemia of chronic kidney disease  Continue home cholestyramine  Continue home Florastor  Resume home amlodipine, carvedilol for persistent hypertension, blood pressures have improved, will monitor  Continue home citalopram  Continue home levothyroxine  TSH minimally elevated  Continue home anastrozole  CODE STATUS DNR/DNI confirmed with family       Disposition: Inpatient rehab.  Family discussing  potentially finding an alternative facility      Discussed plan with RN,      DVT prophylaxis:  Mechanical DVT prophylaxis orders are present.    CODE STATUS:   Medical Intervention Limits: NO intubation (DNI)  Code Status (Patient has no pulse and is not breathing): No CPR (Do Not Attempt to Resuscitate)  Medical Interventions (Patient has pulse or is breathing): Limited Support  Release to patient: Routine Release

## 2023-04-09 NOTE — NURSING NOTE
"This nurse was called into room by PCA; patient was receiving a bath and complained of nausea; when being assisted with a green bag for possible emesis patient eyes were noted to be rolling in head and eye lids flickering; when nurse arrived to room patient was found to be alert and orientated but slow to respond to nurses questions; patient was able to squeeze this nurses hands but with minimal strength; when asking the patient how she felt she stated \"I feel like I'm in a fog.\" called placed to [place] to discuss patients seizure like activity; no new orders placed at this time; encouraged increased rounding; seizure precautions maintained at this time; will continue to monitor;   "

## 2023-04-10 LAB
ANION GAP SERPL CALCULATED.3IONS-SCNC: 13.2 MMOL/L (ref 5–15)
BUN SERPL-MCNC: 45 MG/DL (ref 8–23)
BUN/CREAT SERPL: 22.2 (ref 7–25)
CALCIUM SPEC-SCNC: 8.4 MG/DL (ref 8.6–10.5)
CHLORIDE SERPL-SCNC: 110 MMOL/L (ref 98–107)
CO2 SERPL-SCNC: 11.8 MMOL/L (ref 22–29)
CREAT SERPL-MCNC: 2.03 MG/DL (ref 0.57–1)
DEPRECATED RDW RBC AUTO: 45.8 FL (ref 37–54)
EGFRCR SERPLBLD CKD-EPI 2021: 26.5 ML/MIN/1.73
ERYTHROCYTE [DISTWIDTH] IN BLOOD BY AUTOMATED COUNT: 13.9 % (ref 12.3–15.4)
GLUCOSE BLDC GLUCOMTR-MCNC: 115 MG/DL (ref 70–99)
GLUCOSE BLDC GLUCOMTR-MCNC: 125 MG/DL (ref 70–99)
GLUCOSE BLDC GLUCOMTR-MCNC: 82 MG/DL (ref 70–99)
GLUCOSE SERPL-MCNC: 132 MG/DL (ref 65–99)
HCT VFR BLD AUTO: 26.7 % (ref 34–46.6)
HGB BLD-MCNC: 9 G/DL (ref 12–15.9)
MAGNESIUM SERPL-MCNC: 2 MG/DL (ref 1.6–2.4)
MCH RBC QN AUTO: 30.9 PG (ref 26.6–33)
MCHC RBC AUTO-ENTMCNC: 33.7 G/DL (ref 31.5–35.7)
MCV RBC AUTO: 91.8 FL (ref 79–97)
PHOSPHATE SERPL-MCNC: 3.7 MG/DL (ref 2.5–4.5)
PLATELET # BLD AUTO: 274 10*3/MM3 (ref 140–450)
PMV BLD AUTO: 9.7 FL (ref 6–12)
POTASSIUM SERPL-SCNC: 4.5 MMOL/L (ref 3.5–5.2)
RBC # BLD AUTO: 2.91 10*6/MM3 (ref 3.77–5.28)
SODIUM SERPL-SCNC: 135 MMOL/L (ref 136–145)
WBC NRBC COR # BLD: 11.17 10*3/MM3 (ref 3.4–10.8)

## 2023-04-10 PROCEDURE — 85027 COMPLETE CBC AUTOMATED: CPT | Performed by: INTERNAL MEDICINE

## 2023-04-10 PROCEDURE — 83735 ASSAY OF MAGNESIUM: CPT | Performed by: INTERNAL MEDICINE

## 2023-04-10 PROCEDURE — 80048 BASIC METABOLIC PNL TOTAL CA: CPT | Performed by: INTERNAL MEDICINE

## 2023-04-10 PROCEDURE — 84100 ASSAY OF PHOSPHORUS: CPT | Performed by: INTERNAL MEDICINE

## 2023-04-10 PROCEDURE — 82962 GLUCOSE BLOOD TEST: CPT

## 2023-04-10 PROCEDURE — 99232 SBSQ HOSP IP/OBS MODERATE 35: CPT | Performed by: INTERNAL MEDICINE

## 2023-04-10 PROCEDURE — 97530 THERAPEUTIC ACTIVITIES: CPT

## 2023-04-10 RX ORDER — SODIUM BICARBONATE 650 MG/1
1300 TABLET ORAL 2 TIMES DAILY
Status: DISCONTINUED | OUTPATIENT
Start: 2023-04-10 | End: 2023-04-22 | Stop reason: HOSPADM

## 2023-04-10 RX ADMIN — SODIUM BICARBONATE 650 MG TABLET 1300 MG: at 22:13

## 2023-04-10 RX ADMIN — CHOLESTYRAMINE 1 PACKET: 4 POWDER, FOR SUSPENSION ORAL at 02:49

## 2023-04-10 RX ADMIN — CHOLESTYRAMINE 1 PACKET: 4 POWDER, FOR SUSPENSION ORAL at 09:58

## 2023-04-10 RX ADMIN — SODIUM BICARBONATE 650 MG TABLET 650 MG: at 09:57

## 2023-04-10 RX ADMIN — Medication 250 MG: at 22:13

## 2023-04-10 RX ADMIN — ANASTROZOLE 1 MG: 1 TABLET ORAL at 09:57

## 2023-04-10 RX ADMIN — LACOSAMIDE 50 MG: 10 SOLUTION ORAL at 09:58

## 2023-04-10 RX ADMIN — AMLODIPINE BESYLATE 10 MG: 5 TABLET ORAL at 09:58

## 2023-04-10 RX ADMIN — LACOSAMIDE 50 MG: 10 SOLUTION ORAL at 22:13

## 2023-04-10 RX ADMIN — Medication 10 ML: at 22:14

## 2023-04-10 RX ADMIN — LEVOTHYROXINE SODIUM 12.5 MCG: 0.03 TABLET ORAL at 05:39

## 2023-04-10 RX ADMIN — CARVEDILOL 25 MG: 25 TABLET, FILM COATED ORAL at 09:57

## 2023-04-10 RX ADMIN — Medication 250 MG: at 09:57

## 2023-04-10 RX ADMIN — Medication 10 ML: at 09:58

## 2023-04-10 RX ADMIN — CHOLESTYRAMINE 1 PACKET: 4 POWDER, FOR SUSPENSION ORAL at 22:13

## 2023-04-10 RX ADMIN — CARVEDILOL 25 MG: 25 TABLET, FILM COATED ORAL at 22:14

## 2023-04-10 RX ADMIN — CITALOPRAM HYDROBROMIDE 10 MG: 20 TABLET ORAL at 09:58

## 2023-04-10 NOTE — CONSULTS
"Nutrition Services    Patient Name: Lyubov Middleton  YOB: 1955  MRN: [id number]  Admission date: 4/3/2023      CLINICAL NUTRITION ASSESSMENT      Reason for Assessment  Follow-up protocol     H&P:    Past Medical History:   Diagnosis Date   • Cancer     left breast removed 2012   • Type 2 diabetes mellitus         Current Problems:   Active Hospital Problems    Diagnosis    • **Metabolic encephalopathy    • Seizure disorder         Nutrition/Diet History         Narrative     Nutrition follow up.  Patient continues on mechanical ground diet with nectar thickened liquids.  Consuming about 25% of meals.      Tolerating enteral nutrition at nighttime.      Hypernatremia corrected.  Will further decrease free water flushes.      Anthropometrics        Current Height, Weight Height: 160 cm (63\")  Weight: 61.8 kg (136 lb 3.9 oz)   Current BMI Body mass index is 24.13 kg/m².       Weight Hx  Wt Readings from Last 30 Encounters:   04/03/23 1058 61.8 kg (136 lb 3.9 oz)   03/25/23 0828 61.8 kg (136 lb 3.9 oz)   02/11/23 1519 72.6 kg (160 lb)   12/17/20 0610 73.5 kg (162 lb 0.6 oz)   12/16/20 0330 73.8 kg (162 lb 9.6 oz)   12/15/20 0349 75.1 kg (165 lb 9.6 oz)   12/14/20 0518 80.1 kg (176 lb 9.6 oz)   12/13/20 0509 78.2 kg (172 lb 4.8 oz)   12/12/20 0550 73.4 kg (161 lb 14.4 oz)   12/11/20 0424 71.4 kg (157 lb 8 oz)   12/10/20 0237 69.6 kg (153 lb 8 oz)   12/09/20 0620 68.5 kg (151 lb 1.6 oz)   12/08/20 1453 64.9 kg (143 lb)            Wt Change Observation -14.9% x 2 months      Estimated/Assessed Needs       Energy Requirements 30 kcal/kg   EST Needs (kcal/day) 1854 kcal        Protein Requirements 1.0-1.2 g/kg   EST Daily Needs (g/day) 62-74 g       Fluid Requirements 30 ml/kg     Estimated Needs (mL/day) 1854 ml      Labs/Medications         Pertinent Labs Reviewed.   Results from last 7 days   Lab Units 04/10/23  0438 04/09/23  0752 04/08/23  0431 04/05/23  1132 04/05/23  0433   SODIUM mmol/L 135* 137 136   " < > 150*   POTASSIUM mmol/L 4.5 3.7 3.4*   < > 3.1*   CHLORIDE mmol/L 110* 109* 110*   < > 120*   CO2 mmol/L 11.8* 15.7* 16.3*   < > 16.1*   BUN mg/dL 45* 46* 48*   < > 67*   CREATININE mg/dL 2.03* 1.84* 1.82*   < > 1.93*   CALCIUM mg/dL 8.4* 8.2* 8.5*   < > 8.9   BILIRUBIN mg/dL  --   --   --   --  0.2   ALK PHOS U/L  --   --   --   --  110   ALT (SGPT) U/L  --   --   --   --  32   AST (SGOT) U/L  --   --   --   --  19   GLUCOSE mg/dL 132* 117* 127*   < > 94    < > = values in this interval not displayed.     Results from last 7 days   Lab Units 04/10/23  0438 04/09/23  0752 04/08/23  0431   MAGNESIUM mg/dL 2.0 1.6 1.7   PHOSPHORUS mg/dL 3.7  --  4.1   HEMOGLOBIN g/dL 9.0* 8.6* 8.8*   HEMATOCRIT % 26.7* 24.3* 24.5*     SARS-CoV-2, KATHLEEN   Date Value Ref Range Status   12/25/2022 NEGATIVE Negative Final     Comment:     The 2019-CoV rRT-PCR Assay is only for use under a Food and Drug Administration Emergency Use Authorization. The performance characteristics of the assay were verified by the Clinical Laboratory at [facility]. Results should be used in   conjunction with the patient's clinical symptoms, medical history, and other clinical/laboratory findings to determine an overall clinical diagnosis. Negative results do not preclude infection with SARS-CoV-2 (COVID-19).    Test parameters have not been validated for screening asymptomatic patients.     Lab Results   Component Value Date    HGBA1C 5.6 03/08/2023         Pertinent Medications Reviewed.     Current Nutrition Orders & Evaluation of Intake       Oral Nutrition     Current PO Diet Diet: Diabetic Diets; Consistent Carbohydrate; Texture: Mechanical Ground (NDD 2); Fluid Consistency: Nectar Thick   Supplement Orders Placed This Encounter      Diet, Tube Feeding Tube Feeding Formula: Diabetisource AC; Tube Feeding Type: Cyclic / Intermittent; Feeding Start Time: 8:00 PM; Feeding End Time: 8:00 AM; Cyclic Feeding Start Rate (mL/hr): 90; To Goal Rate of  (mL/hr): 90; Supplemental Bolus Fee...       Malnutrition Severity Assessment                Nutrition Diagnosis         Nutrition Dx Problem 1 Inadequate energy Intake related to decreased ability to consume sufficient energy as evidenced by NPO and non-functioning PEG tube.      Nutrition Intervention         Diet per SLP    Diabetisource AC @ 90 ml/hr x 12 hours (8pm-8am)  Free water flushes 75 ml at the start and stop of tube feeds  Provides 1296 kcal, 65 g pro, 1036 ml fluid     Meets about 70% estimated kcal needs, 100% estimated pro needs, and 55% estimated fluid needs      Medical Nutrition Therapy/Nutrition Education          Learner     Readiness Patient and Caregiver  Acceptance     Method     Response Explanation  Verbalizes understanding     Monitor/Evaluation        Monitor Per protocol, I&O, Pertinent labs, EN delivery/tolerance, Weight, POC/GOC, Diet advancement     Nutrition Discharge Plan         To be determined       Electronically signed by:  Belen Skaggs RD  04/10/23 13:12 EDT

## 2023-04-10 NOTE — PROGRESS NOTES
[facility]   Hospitalist Progress Note  Date: 4/10/2023  Patient Name: Lybuov Middleton  : 1955  MRN: [id number]  Date of admission: 4/3/2023      Subjective   Subjective     Chief Complaint:   AMS    Summary:   Lyubov Middleton is a 68 y.o. female with past medical history of diabetes mellitus, CKD 4, seizure disorder, dysphagia currently undergoing rehab at [facility] and rehab presents with altered mental status.  Per report, patient in her usual state of health in the morning presentation, however when working with therapy.  Patient indicated she was not feeling well, her eyes rolled back in her head and she became unresponsive.  Sternal rub attempted without response.  Patient brought to the emergency department for evaluation and treatment, noted to have slowly improving mentation.  Alert only to 1 on arrival.  Patient's labs also significant for low potassium, elevated sodium, creatinine up at 2.24, up from patient's baseline of 1.7.  UA concerning for urinary tract infection.  CT head negative for any acute intracranial abnormalities, did demonstrate diffuse atrophy and white matter changes.  Chest x-ray negative for any acute abnormalities.  Patient has multiple bruises in various stages of healing on the skin exam.  Nursing report indicates patient frequently tries to get out of bed and falls.  Recently had presentation to the emergency department for a broken nose following a fall.  Cerebella applied in the emergency department and negative for seizure activity acutely.  Hospitalist service contacted for admission.  Patient required replacement of PEG tube while inpatient.  Following discussion with family, looking for appropriate placement in SNF.    Interval Followup:   She is having intermittent episodes of diarrhea.  We will send off stool studies for both enteric bacteria and C. difficile.  Patient's white count slightly up today.  Patient bicarb also decreasing.   Patient chronically on bicarb replacement, increasing the dose today.  No other issues reported overnight    Review of Systems   All systems were reviewed and negative except for: Denies any acute symptoms at this time.  Remains weak    Objective   Objective     Vitals:   Temp:  [97.3 °F (36.3 °C)-98.6 °F (37 °C)] 97.3 °F (36.3 °C)  Heart Rate:  [66-75] 75  Resp:  [14-18] 18  BP: (134-161)/(57-75) 161/65  Physical Exam   Gen. alert and oriented x3, remains pleasantly confused however when pointedly asked can answer any questions.  HEENT: Normocephalic multiple bruises of the head as well as well-healing laceration of the bridge of the nose, moist membranes pupils equal round reactive light, no scleral icterus no conjunctival injection, protruding right maxillary central incisor  Cardiovascular: regular rate and rhythm no murmurs rubs or gallops S1-S2, no lower extremity edema appreciated  Pulmonary: Clear to auscultation bilaterally no wheezes rales or rhonchi symmetric chest expansion, unlabored, no conversational dyspnea appreciated  Gastrointestinal: Soft nontender nondistended positive bowel sounds all 4 quadrants no rebound or guarding, G-tube replaced with minimal bloody discharge around tube.  Musculoskeletal: No clubbing cyanosis, warm and well-perfused, calves soft symmetric nontender bilaterally  Skin: Multiple bruises bilateral lower extremities, excoriation of the buttocks, bruising over the brow and forehead, healing laceration of the bridge of the nose  Neuro: Cranial nerves II through XII intact grossly no sensorimotor deficits appreciated bilateral upper and lower extremities, generalized weakness  : No Fernandez catheter no bladder distention      Result Review    Result Review:  I have personally reviewed the results from 4/10/2023 and agree with these findings:  []  Laboratory  []  Microbiology  []  Radiology  []  EKG/Telemetry   []  Cardiology/Vascular   []  Pathology  []  Old records  []   Other:    Assessment & Plan   Assessment / Plan     Assessment/Plan:  Altered mental status  Acute metabolic encephalopathy secondary to urinary tract infection  Urinary tract infection  Diarrhea, new onset  Nonconvulsive seizure   AUSTEN on CKD 4 baseline creatinine 1.7  Hypokalemia  Diabetes mellitus  Hypernatremia, resolved  Metabolic acidosis  Debility  Oropharyngeal dysphagia  Blocked PEG tube  Anemia of chronic kidney disease  Chronic diarrhea  Hypertension  Depression  Hypothyroidism  History of nonconvulsive status epilepticus  History of breast cancer status post left mastectomy     Plan:   Patient admitted for further evaluation and treatment  Neurology consulted thank you for your assistance  EEG with no seizure-like activity  Patient with repeat episodes of diarrhea therefore we will send off cultures for enteric and C. difficile rule out  Patient will be placed in isolation  Patient's bicarb continues to downtrend, increasing dose of home oral bicarb  Antibiotics transitioned to cefepime based on sensitivities, completed 5 days  Patient's potassium, magnesium, phosphate all within normal limits today  MBS shows patient can have modified diet, medications transitioned to oral  Repeat imaging shows appropriate placement of PEG tube, replaced at bedside on 4/4/2023  Nocturnal tube feeds only now to help promote eating during the day  Speech therapy will continue to follow along, appreciate assistance  Consult physical therapy/occupational therapy while inpatient  Patient receives darbepoetin alpha subcu on the 26th of every month for treatment of anemia of chronic kidney disease  Continue home cholestyramine  Continue home Florastor   Resume home amlodipine, carvedilol for persistent hypertension, blood pressures have improved, will monitor  Continue home citalopram  Continue home levothyroxine  TSH minimally elevated  Continue home anastrozole  CODE STATUS DNR/DNI confirmed with family       Disposition:  Inpatient rehab.  Family discussing potentially finding an alternative facility, was not happy with original care at [facility] and rehab     Discussed plan with RN,       DVT prophylaxis:  Mechanical DVT prophylaxis orders are present.    CODE STATUS:   Medical Intervention Limits: NO intubation (DNI)  Code Status (Patient has no pulse and is not breathing): No CPR (Do Not Attempt to Resuscitate)  Medical Interventions (Patient has pulse or is breathing): Limited Support  Release to patient: Routine Release

## 2023-04-10 NOTE — PLAN OF CARE
Goal Outcome Evaluation:   Patient has been sleeping throughout night with respirations noted. Patient has tube feeding going per order. Patient VSS throughout the night.   Pamela RENNER RN.

## 2023-04-10 NOTE — THERAPY EVALUATION
Acute Care - Physical Therapy Initial Evaluation   Sherry     Patient Name: Lyubov Middleton  : 1955  MRN: [id number]  Today's Date: 4/10/2023     Admit date: 4/3/2023     Referring Physician: Felix Winters MD     Surgery Date:* No surgery found *               Visit Dx:     ICD-10-CM ICD-9-CM   1. Metabolic encephalopathy  G93.41 348.31   2. Oropharyngeal dysphagia  R13.12 787.22   3. Difficulty in walking  R26.2 719.7   4. Decreased activities of daily living (ADL)  Z78.9 V49.89     Patient Active Problem List   Diagnosis   • Renal stone   • Renal abscess   • Stage 3a chronic kidney disease   • Type 2 diabetes mellitus   • Metabolic encephalopathy   • Seizure disorder     Past Medical History:   Diagnosis Date   • Cancer     left breast removed    • Type 2 diabetes mellitus      Past Surgical History:   Procedure Laterality Date   • ABDOMINAL SURGERY     • BREAST SURGERY Left     removed due to cancer   • CHOLECYSTECTOMY     • CYSTOSCOPY W/ URETERAL STENT PLACEMENT Right 2020    Procedure: CYSTOSCOPY, RIGHT RETROGRADE PYELOGRAM, URETEROSCOPY, LASER LITHOTRIPSY, RIGHT URETERAL STENT PLACEMENT;  Surgeon: Rohan Dooley Jr., MD;  Location: [facility];  Service: Urology;  Laterality: Right;   • GASTROSTOMY W/ FEEDING TUBE     • HERNIA REPAIR      mesh removed     PT Assessment (last 12 hours)     PT Evaluation and Treatment     Row Name 04/10/23 1200          Physical Therapy Time and Intention    Subjective Information no complaints  -TOSHA     Document Type therapy note (daily note)  -TOSHA     Mode of Treatment individual therapy;physical therapy  -TOSHA     Patient Effort good  -TOSHA     Row Name 04/10/23 1200          General Information    Patient Observations alert;cooperative;agree to therapy  -TOSHA     Row Name 04/10/23 1200          Bed Mobility    Bed Mobility bed mobility (all) activities  -TOSHA     All Activities, [place] (Bed Mobility) minimum assist (75% patient effort)  -TOSHA      Supine-Sit [place] (Bed Mobility) minimum assist (75% patient effort)  -TOSHA     Row Name 04/10/23 1200          Transfers    Transfers bed-chair transfer;sit-stand transfer  -TOSHA     Row Name 04/10/23 1200          Bed-Chair Transfer    Bed-Chair [place] (Transfers) minimum assist (75% patient effort)  -TOSHA     Assistive Device (Bed-Chair Transfers) walker, front-wheeled  -TOSHA     Row Name 04/10/23 1200          Sit-Stand Transfer    Sit-Stand [place] (Transfers) minimum assist (75% patient effort)  -TOSHA     Assistive Device (Sit-Stand Transfers) walker, front-wheeled  -TOSHA     Row Name 04/10/23 1200          Gait/Stairs (Locomotion)    Gait/Stairs Locomotion gait/ambulation assistive device  -TOSHA     [place] Level (Gait) minimum assist (75% patient effort)  -TOSHA     Assistive Device (Gait) walker, front-wheeled  -TOSHA     Distance in Feet (Gait) 6  -TOSHA     Row Name 04/10/23 1200          Safety Issues, Functional Mobility    Impairments Affecting Function (Mobility) balance;coordination;endurance/activity tolerance;range of motion (ROM);strength  -TOSHA     Row Name 04/10/23 1200          Balance    Dynamic Standing Balance minimal assist  -TOSHA     Position/Device Used, Standing Balance walker, front-wheeled  -TOSHA     Row Name             Wound 04/03/23 1622 Right labia    Wound - Properties Group Placement Date: 04/03/23  -AC Placement Time: 1622  -AC Present on Hospital Admission: Y  -AC Side: Right  -AC Location: labia  -AC    Retired Wound - Properties Group Placement Date: 04/03/23  -AC Placement Time: 1622  -AC Present on Hospital Admission: Y  -AC Side: Right  -AC Location: labia  -AC    Retired Wound - Properties Group Date first assessed: 04/03/23  -AC Time first assessed: 1622  -AC Present on Hospital Admission: Y  -AC Side: Right  -AC Location: labia  -AC    Row Name             Wound 04/03/23 1628 sacral spine    Wound - Properties Group Placement Date: 04/03/23  -AC Placement Time: 1628  -AC  Present on Hospital Admission: Y  -AC Location: sacral spine  -AC    Retired Wound - Properties Group Placement Date: 04/03/23  -AC Placement Time: 1628  -AC Present on Hospital Admission: Y  -AC Location: sacral spine  -AC    Retired Wound - Properties Group Date first assessed: 04/03/23  -AC Time first assessed: 1628  -AC Present on Hospital Admission: Y  -AC Location: sacral spine  -AC    Row Name             Wound 04/03/23 1629 Bilateral gluteal    Wound - Properties Group Placement Date: 04/03/23  -AC Placement Time: 1629  -AC Present on Hospital Admission: Y  -AC Side: Bilateral  -AC Location: gluteal  -AC    Retired Wound - Properties Group Placement Date: 04/03/23  -AC Placement Time: 1629  -AC Present on Hospital Admission: Y  -AC Side: Bilateral  -AC Location: gluteal  -AC    Retired Wound - Properties Group Date first assessed: 04/03/23  -AC Time first assessed: 1629  -AC Present on Hospital Admission: Y  -AC Side: Bilateral  -AC Location: gluteal  -AC    Row Name             Wound 04/03/23 1630 Bilateral posterior greater trochanter    Wound - Properties Group Placement Date: 04/03/23  -AC Placement Time: 1630  -AC Present on Hospital Admission: Y  -AC Side: Bilateral  -AC Orientation: posterior  -AC Location: greater trochanter  -AC    Retired Wound - Properties Group Placement Date: 04/03/23  -AC Placement Time: 1630  -AC Present on Hospital Admission: Y  -AC Side: Bilateral  -AC Orientation: posterior  -AC Location: greater trochanter  -AC    Retired Wound - Properties Group Date first assessed: 04/03/23  -AC Time first assessed: 1630  -AC Present on Hospital Admission: Y  -AC Side: Bilateral  -AC Location: greater trochanter  -AC    Row Name 04/10/23 1200          Progress Summary (PT)    Progress Toward Functional Goals (PT) progress toward functional goals is good  -TOSHA     Daily Progress Summary (PT) Pt participted well with treatment today.  She was able to ambulate a short distance becoming  fatigued quickly.  Will continue current plan  -TOSHA           User Key  (r) = Recorded By, (t) = Taken By, (c) = Cosigned By    Initials Name Provider Type    TOSHA Baudilio Sanchez, PT Physical Therapist    Jessica Bowie, RN Registered Nurse                Physical Therapy Education     Title: PT OT SLP Therapies (Done)     Topic: Physical Therapy (Done)     Point: Mobility training (Done)     Learning Progress Summary           Patient Acceptance, E,TB, VU,NR by  at 4/6/2023 0434    Acceptance, E,TB, VU by  at 4/4/2023 1113                   Point: Precautions (Done)     Learning Progress Summary           Patient Acceptance, E,TB, VU,NR by  at 4/6/2023 0434    Acceptance, E,TB, VU by  at 4/4/2023 1113                               User Key     Initials Effective Dates Name Provider Type Discipline     06/08/21 -  Moreno Longoria RN Registered Nurse Nurse     03/07/23 -  Rosalinda Peacock PT Student PT Student PT              PT Recommendation and Plan     Progress Summary (PT)  Progress Toward Functional Goals (PT): progress toward functional goals is good  Daily Progress Summary (PT): Pt participted well with treatment today.  She was able to ambulate a short distance becoming fatigued quickly.  Will continue current plan   Outcome Measures     Row Name 04/10/23 1200 04/09/23 1300 04/08/23 1300       How much help from another person do you currently need...    Turning from your back to your side while in flat bed without using bedrails? 3  -TOSHA 3  -CS 4  -RH    Moving from lying on back to sitting on the side of a flat bed without bedrails? 3  -TOSHA 2  -CS 4  -RH    Moving to and from a bed to a chair (including a wheelchair)? 3  -TOSHA 3  -CS 2  -RH    Standing up from a chair using your arms (e.g., wheelchair, bedside chair)? 3  -TOSHA 3  -CS 2  -RH    Climbing 3-5 steps with a railing? 3  -TOSHA 2  -CS 2  -RH    To walk in hospital room? 3  -TOSHA 3  -CS 2  -RH    AM-PAC 6 Clicks Score (PT) 18  -TOSHA 16  -CS 16   -       Functional Assessment    Outcome Measure Options AM-PAC 6 Clicks Basic Mobility (PT)  -TOSHA AM-PAC 6 Clicks Basic Mobility (PT)  -CS --          User Key  (r) = Recorded By, (t) = Taken By, (c) = Cosigned By    Initials Name Provider Type     Rj Soto, PTA Physical Therapist Assistant    Baudilio Garay, PT Physical Therapist    Farzad Calderón PTA Physical Therapist Assistant                 Time Calculation:    PT Charges     Row Name 04/10/23 1241             Time Calculation    PT Received On 04/10/23  -TOSHA         Timed Charges    65049 - Gait Training Minutes  6  -TOSHA      89405 - PT Therapeutic Activity Minutes 10  -TOSHA         Total Minutes    Timed Charges Total Minutes 16  -TOSHA       Total Minutes 16  -TOSHA            User Key  (r) = Recorded By, (t) = Taken By, (c) = Cosigned By    Initials Name Provider Type    Baudilio Garay, PT Physical Therapist              Therapy Charges for Today     Code Description Service Date Service Provider Modifiers Qty    47301628086  PT THERAPEUTIC ACT EA 15 MIN 4/10/2023 Baudilio Sanchez, PT GP 1          PT G-Codes  Outcome Measure Options: AM-PAC 6 Clicks Basic Mobility (PT)  AM-PAC 6 Clicks Score (PT): 18  AM-PAC 6 Clicks Score (OT): 16    Baudilio Sanchez PT  4/10/2023

## 2023-04-11 LAB
027 TOXIN: ABNORMAL
ANION GAP SERPL CALCULATED.3IONS-SCNC: 10.9 MMOL/L (ref 5–15)
BUN SERPL-MCNC: 47 MG/DL (ref 8–23)
BUN/CREAT SERPL: 26 (ref 7–25)
C COLI+JEJ+UPSA DNA STL QL NAA+NON-PROBE: NOT DETECTED
C DIFF TOX GENS STL QL NAA+PROBE: POSITIVE
CALCIUM SPEC-SCNC: 8.7 MG/DL (ref 8.6–10.5)
CHLORIDE SERPL-SCNC: 107 MMOL/L (ref 98–107)
CO2 SERPL-SCNC: 17.1 MMOL/L (ref 22–29)
CREAT SERPL-MCNC: 1.81 MG/DL (ref 0.57–1)
DEPRECATED RDW RBC AUTO: 44 FL (ref 37–54)
EC STX1+STX2 GENES STL QL NAA+NON-PROBE: NOT DETECTED
EGFRCR SERPLBLD CKD-EPI 2021: 30.4 ML/MIN/1.73
ERYTHROCYTE [DISTWIDTH] IN BLOOD BY AUTOMATED COUNT: 13.8 % (ref 12.3–15.4)
GLUCOSE BLDC GLUCOMTR-MCNC: 103 MG/DL (ref 70–99)
GLUCOSE BLDC GLUCOMTR-MCNC: 106 MG/DL (ref 70–99)
GLUCOSE BLDC GLUCOMTR-MCNC: 111 MG/DL (ref 70–99)
GLUCOSE SERPL-MCNC: 117 MG/DL (ref 65–99)
HCT VFR BLD AUTO: 26.5 % (ref 34–46.6)
HGB BLD-MCNC: 9.3 G/DL (ref 12–15.9)
MAGNESIUM SERPL-MCNC: 1.8 MG/DL (ref 1.6–2.4)
MCH RBC QN AUTO: 31.2 PG (ref 26.6–33)
MCHC RBC AUTO-ENTMCNC: 35.1 G/DL (ref 31.5–35.7)
MCV RBC AUTO: 88.9 FL (ref 79–97)
PLATELET # BLD AUTO: 270 10*3/MM3 (ref 140–450)
PMV BLD AUTO: 9.7 FL (ref 6–12)
POTASSIUM SERPL-SCNC: 4.5 MMOL/L (ref 3.5–5.2)
RBC # BLD AUTO: 2.98 10*6/MM3 (ref 3.77–5.28)
S ENT+BONG DNA STL QL NAA+NON-PROBE: NOT DETECTED
SHIGELLA SP+EIEC IPAH ST NAA+NON-PROBE: NOT DETECTED
SODIUM SERPL-SCNC: 135 MMOL/L (ref 136–145)
WBC NRBC COR # BLD: 10.65 10*3/MM3 (ref 3.4–10.8)

## 2023-04-11 PROCEDURE — 80048 BASIC METABOLIC PNL TOTAL CA: CPT | Performed by: INTERNAL MEDICINE

## 2023-04-11 PROCEDURE — 25010000002 MAGNESIUM SULFATE 2 GM/50ML SOLUTION: Performed by: INTERNAL MEDICINE

## 2023-04-11 PROCEDURE — 82962 GLUCOSE BLOOD TEST: CPT

## 2023-04-11 PROCEDURE — 87505 NFCT AGENT DETECTION GI: CPT | Performed by: INTERNAL MEDICINE

## 2023-04-11 PROCEDURE — 87493 C DIFF AMPLIFIED PROBE: CPT | Performed by: INTERNAL MEDICINE

## 2023-04-11 PROCEDURE — 85027 COMPLETE CBC AUTOMATED: CPT | Performed by: INTERNAL MEDICINE

## 2023-04-11 PROCEDURE — 92526 ORAL FUNCTION THERAPY: CPT

## 2023-04-11 PROCEDURE — 99233 SBSQ HOSP IP/OBS HIGH 50: CPT | Performed by: INTERNAL MEDICINE

## 2023-04-11 PROCEDURE — 83735 ASSAY OF MAGNESIUM: CPT | Performed by: INTERNAL MEDICINE

## 2023-04-11 RX ORDER — VANCOMYCIN HYDROCHLORIDE 125 MG/1
125 CAPSULE ORAL EVERY 6 HOURS SCHEDULED
Status: COMPLETED | OUTPATIENT
Start: 2023-04-11 | End: 2023-04-21

## 2023-04-11 RX ORDER — MAGNESIUM SULFATE HEPTAHYDRATE 40 MG/ML
2 INJECTION, SOLUTION INTRAVENOUS ONCE
Status: COMPLETED | OUTPATIENT
Start: 2023-04-11 | End: 2023-04-11

## 2023-04-11 RX ADMIN — MAGNESIUM SULFATE HEPTAHYDRATE 2 G: 2 INJECTION, SOLUTION INTRAVENOUS at 11:16

## 2023-04-11 RX ADMIN — VANCOMYCIN HYDROCHLORIDE 125 MG: 125 CAPSULE ORAL at 17:04

## 2023-04-11 RX ADMIN — LEVOTHYROXINE SODIUM 12.5 MCG: 0.03 TABLET ORAL at 06:30

## 2023-04-11 RX ADMIN — Medication 250 MG: at 22:05

## 2023-04-11 RX ADMIN — CARVEDILOL 25 MG: 25 TABLET, FILM COATED ORAL at 22:04

## 2023-04-11 RX ADMIN — CHOLESTYRAMINE 1 PACKET: 4 POWDER, FOR SUSPENSION ORAL at 22:05

## 2023-04-11 RX ADMIN — VANCOMYCIN HYDROCHLORIDE 125 MG: 125 CAPSULE ORAL at 12:01

## 2023-04-11 RX ADMIN — LACOSAMIDE 50 MG: 10 SOLUTION ORAL at 22:04

## 2023-04-11 RX ADMIN — SODIUM BICARBONATE 650 MG TABLET 1300 MG: at 22:04

## 2023-04-11 RX ADMIN — CHOLESTYRAMINE 1 PACKET: 4 POWDER, FOR SUSPENSION ORAL at 09:43

## 2023-04-11 RX ADMIN — AMLODIPINE BESYLATE 10 MG: 5 TABLET ORAL at 09:43

## 2023-04-11 RX ADMIN — ANASTROZOLE 1 MG: 1 TABLET ORAL at 09:43

## 2023-04-11 RX ADMIN — Medication 250 MG: at 09:43

## 2023-04-11 RX ADMIN — CARVEDILOL 25 MG: 25 TABLET, FILM COATED ORAL at 09:43

## 2023-04-11 RX ADMIN — LACOSAMIDE 50 MG: 10 SOLUTION ORAL at 09:43

## 2023-04-11 RX ADMIN — SODIUM BICARBONATE 650 MG TABLET 1300 MG: at 09:43

## 2023-04-11 RX ADMIN — Medication 10 ML: at 22:05

## 2023-04-11 RX ADMIN — CITALOPRAM HYDROBROMIDE 10 MG: 20 TABLET ORAL at 09:43

## 2023-04-11 RX ADMIN — VANCOMYCIN HYDROCHLORIDE 125 MG: 125 CAPSULE ORAL at 06:30

## 2023-04-11 NOTE — PLAN OF CARE
Problem: Adult Inpatient Plan of Care  Goal: Plan of Care Review  Outcome: Ongoing, Progressing  Flowsheets (Taken 4/11/2023 1609)  Outcome Evaluation: Patient sleeping in between care. Tube Feed tolerated overnight. VSS. Q2h turn in bed. Cream applied to sandrita area.  Goal: Patient-Specific Goal (Individualized)  Outcome: Ongoing, Progressing  Goal: Absence of Hospital-Acquired Illness or Injury  Outcome: Ongoing, Progressing  Intervention: Identify and Manage Fall Risk  Recent Flowsheet Documentation  Taken 4/11/2023 0824 by Aury Jacques RN  Safety Promotion/Fall Prevention: safety round/check completed  Intervention: Prevent Skin Injury  Recent Flowsheet Documentation  Taken 4/11/2023 0948 by Aury Jacques RN  Body Position: position changed independently  Taken 4/11/2023 0824 by Aury Jacques RN  Body Position: position changed independently  Intervention: Prevent and Manage VTE (Venous Thromboembolism) Risk  Recent Flowsheet Documentation  Taken 4/11/2023 0824 by Aury Jacques RN  Activity Management: up in chair  Goal: Optimal Comfort and Wellbeing  Outcome: Ongoing, Progressing  Goal: Readiness for Transition of Care  Outcome: Ongoing, Progressing     Problem: Fall Injury Risk  Goal: Absence of Fall and Fall-Related Injury  Outcome: Ongoing, Progressing  Intervention: Promote Injury-Free Environment  Recent Flowsheet Documentation  Taken 4/11/2023 0824 by Aury Jacques RN  Safety Promotion/Fall Prevention: safety round/check completed     Problem: Skin Injury Risk Increased  Goal: Skin Health and Integrity  Outcome: Ongoing, Progressing  Intervention: Optimize Skin Protection  Recent Flowsheet Documentation  Taken 4/11/2023 0948 by Aury Jacques RN  Head of Bed (HOB) Positioning: HOB at 30-45 degrees     Problem: Palliative Care  Goal: Enhanced Quality of Life  Outcome: Ongoing, Progressing  Goal: Enhanced Quality of Life  Outcome: Ongoing, Progressing   Goal Outcome Evaluation:               Outcome Evaluation: Patient sleeping in between care. Tube Feed tolerated overnight. VSS. Q2h turn in bed. Cream applied to sandrita area.

## 2023-04-11 NOTE — THERAPY TREATMENT NOTE
Acute Care - Speech Language Pathology   Swallow Treatment Note  Sherry     Patient Name: Lyubov Middleton  : 1955  MRN: [id number]  Today's Date: 2023               Admit Date: 4/3/2023    Visit Dx:     ICD-10-CM ICD-9-CM   1. Metabolic encephalopathy  G93.41 348.31   2. Oropharyngeal dysphagia  R13.12 787.22   3. Difficulty in walking  R26.2 719.7   4. Decreased activities of daily living (ADL)  Z78.9 V49.89     Patient Active Problem List   Diagnosis   • Renal stone   • Renal abscess   • Stage 3a chronic kidney disease   • Type 2 diabetes mellitus   • Metabolic encephalopathy   • Seizure disorder     Past Medical History:   Diagnosis Date   • Cancer     left breast removed    • Type 2 diabetes mellitus      Past Surgical History:   Procedure Laterality Date   • ABDOMINAL SURGERY     • BREAST SURGERY Left     removed due to cancer   • CHOLECYSTECTOMY     • CYSTOSCOPY W/ URETERAL STENT PLACEMENT Right 2020    Procedure: CYSTOSCOPY, RIGHT RETROGRADE PYELOGRAM, URETEROSCOPY, LASER LITHOTRIPSY, RIGHT URETERAL STENT PLACEMENT;  Surgeon: Rohan Dooley Jr., MD;  Location: [facility];  Service: Urology;  Laterality: Right;   • GASTROSTOMY W/ FEEDING TUBE     • HERNIA REPAIR      mesh removed     SPEECH PATHOLOGY DYSPHAGIA TREATMENT     Subjective/Behavioral Observations: Alert and cooperative, assisted to sit upright in bed.        Day/time of Treatment: 23        Current Diet: Mechanical soft, nectar liquid        Current Strategies:One-on-one supervision, assist patient to feed self.  Alternate small bites and small sips of solids and liquids.  Small bite of solid with double swallow.  Small single sips of liquid.  Medications whole in applesauce.        Treatment received: Dysphagia therapy to address swallow function through exercises and education of strategies.        Results of treatment: Patient taking sips of nectar liquid with mod cueing for slow rate, single sips.     Ex- assisting with feeding noon meal.  P.o. intake less than 25%.  Near close of meal patient encouraged to feed self with patient successful for hand to mouth and adequate bite-size.  No overt clinical signs or symptoms of aspiration were noted at the bedside.       Progress toward goals: Adequate   Barriers to Achieving goals: Medical status        Plan of care:/changes in plan: Continue per current plan.  P.o. diet as tolerated.  Continue with mechanical soft solids with nectar thickened liquid.  Medications whole in applesauce.                                                                              Plan of Care Reviewed With: patient          EDUCATION  The patient has been educated in the following areas:   Modified Diet Instruction.              Time Calculation:    Time Calculation- SLP     Row Name 04/11/23 1420             Time Calculation- SLP    SLP Stop Time 1300  -TB      SLP Received On 04/11/23  -TB         Untimed Charges    74444-DA Treatment Swallow Minutes 40  -TB         Total Minutes    Untimed Charges Total Minutes 40  -TB       Total Minutes 40  -TB            User Key  (r) = Recorded By, (t) = Taken By, (c) = Cosigned By    Initials Name Provider Type    TB Barbara Reilly SLP Speech and Language Pathologist                Therapy Charges for Today     Code Description Service Date Service Provider Modifiers Qty    [id number]  ST TREATMENT SWALLOW 3 4/11/2023 Barbara Reilly SLP GN 1               ANTHONY Santamaria  4/11/2023

## 2023-04-11 NOTE — SIGNIFICANT NOTE
Wound Eval / Progress Noted     [place]     Patient Name: Lyubov Middleton  : 1955  MRN: [id number]  Today's Date: 2023                 Admit Date: 4/3/2023    Visit Dx:    ICD-10-CM ICD-9-CM   1. Metabolic encephalopathy  G93.41 348.31   2. Oropharyngeal dysphagia  R13.12 787.22   3. Difficulty in walking  R26.2 719.7   4. Decreased activities of daily living (ADL)  Z78.9 V49.89       Patient Active Problem List   Diagnosis   • Renal stone   • Renal abscess   • Stage 3a chronic kidney disease   • Type 2 diabetes mellitus   • Metabolic encephalopathy   • Seizure disorder        Past Medical History:   Diagnosis Date   • Cancer     left breast removed    • Type 2 diabetes mellitus         Past Surgical History:   Procedure Laterality Date   • ABDOMINAL SURGERY     • BREAST SURGERY Left     removed due to cancer   • CHOLECYSTECTOMY     • CYSTOSCOPY W/ URETERAL STENT PLACEMENT Right 2020    Procedure: CYSTOSCOPY, RIGHT RETROGRADE PYELOGRAM, URETEROSCOPY, LASER LITHOTRIPSY, RIGHT URETERAL STENT PLACEMENT;  Surgeon: Rohan Dooley Jr., MD;  Location: [facility];  Service: Urology;  Laterality: Right;   • GASTROSTOMY W/ FEEDING TUBE     • HERNIA REPAIR      mesh removed         Physical Assessment:  Wound 23 1622 Right labia (Active)   Dressing Appearance open to air 23 1510   Closure None 23 1510   Base red;moist 23 1510   Periwound moist;redness 23 1510   Periwound Temperature warm 23 1510   Periwound Skin Turgor soft 23 1510   Edges open 23 1510   Drainage Characteristics/Odor serosanguineous 23 0824   Drainage Amount none 23 1510   Care, Wound cleansed with;sterile normal saline 23 1510   Dressing Care open to air;skin barrier agent applied 23 1510   Periwound Care barrier ointment applied 23 1510       Wound 23 1628 sacral spine (Active)   Wound Image   23 1510   Dressing Appearance open to  air 04/11/23 1510   Closure None 04/11/23 1510   Base blanchable;dry;red 04/11/23 1510   Periwound blanchable;intact;dry 04/11/23 1510   Periwound Temperature warm 04/11/23 1510   Periwound Skin Turgor soft 04/11/23 1510   Edges rolled/closed 04/11/23 1510   Drainage Amount none 04/11/23 1510   Care, Wound cleansed with;sterile normal saline 04/11/23 1510   Dressing Care open to air;skin barrier agent applied 04/11/23 1510   Periwound Care barrier ointment applied 04/11/23 1510       Wound 04/03/23 1629 Bilateral gluteal (Active)   Dressing Appearance open to air 04/11/23 1510   Closure None 04/11/23 1510   Base blanchable;red;dry 04/11/23 1510   Periwound dry;intact;blanchable;redness 04/11/23 1510   Periwound Temperature warm 04/11/23 1510   Periwound Skin Turgor soft 04/11/23 1510   Edges rolled/closed 04/11/23 1510   Drainage Amount none 04/11/23 1510   Care, Wound cleansed with;sterile normal saline 04/11/23 1510   Dressing Care open to air;skin barrier agent applied 04/11/23 1510   Periwound Care barrier ointment applied 04/11/23 1510     Wound Check / Follow-up:  Patient seen today for a wound check and skin care.  Buttocks and bilateral ischial tuberosities with dry blanchable redness. No open wounds or drainage noted. Cleansed with NS. Applied barrier cream and left open to air. Recommend to continue current skin care.  Groin and labia has improved with some redness and moist still present. Cleansed with NS. Recommend to continue current skin care.     Impression: blanchable redness    Short term goals: regain skin integrity, pressure reduction, skin protection, moisture prevention    Muriel Leach RN    4/11/2023    15:12 EDT

## 2023-04-12 ENCOUNTER — APPOINTMENT (OUTPATIENT)
Dept: MRI IMAGING | Facility: HOSPITAL | Age: 68
DRG: 371 | End: 2023-04-12
Payer: MEDICARE

## 2023-04-12 LAB
ANION GAP SERPL CALCULATED.3IONS-SCNC: 11.5 MMOL/L (ref 5–15)
BASOPHILS # BLD AUTO: 0.05 10*3/MM3 (ref 0–0.2)
BASOPHILS NFR BLD AUTO: 0.4 % (ref 0–1.5)
BUN SERPL-MCNC: 50 MG/DL (ref 8–23)
BUN/CREAT SERPL: 24.9 (ref 7–25)
CALCIUM SPEC-SCNC: 9 MG/DL (ref 8.6–10.5)
CHLORIDE SERPL-SCNC: 108 MMOL/L (ref 98–107)
CO2 SERPL-SCNC: 17.5 MMOL/L (ref 22–29)
CREAT SERPL-MCNC: 2.01 MG/DL (ref 0.57–1)
DEPRECATED RDW RBC AUTO: 44.6 FL (ref 37–54)
EGFRCR SERPLBLD CKD-EPI 2021: 26.8 ML/MIN/1.73
EOSINOPHIL # BLD AUTO: 0.24 10*3/MM3 (ref 0–0.4)
EOSINOPHIL NFR BLD AUTO: 2.1 % (ref 0.3–6.2)
ERYTHROCYTE [DISTWIDTH] IN BLOOD BY AUTOMATED COUNT: 13.9 % (ref 12.3–15.4)
GLUCOSE BLDC GLUCOMTR-MCNC: 103 MG/DL (ref 70–99)
GLUCOSE BLDC GLUCOMTR-MCNC: 120 MG/DL (ref 70–99)
GLUCOSE BLDC GLUCOMTR-MCNC: 125 MG/DL (ref 70–99)
GLUCOSE SERPL-MCNC: 132 MG/DL (ref 65–99)
HCT VFR BLD AUTO: 25.8 % (ref 34–46.6)
HGB BLD-MCNC: 8.8 G/DL (ref 12–15.9)
IMM GRANULOCYTES # BLD AUTO: 0.05 10*3/MM3 (ref 0–0.05)
IMM GRANULOCYTES NFR BLD AUTO: 0.4 % (ref 0–0.5)
LACOSAMIDE SERPL-MCNC: 8.6 UG/ML (ref 5–10)
LYMPHOCYTES # BLD AUTO: 2.15 10*3/MM3 (ref 0.7–3.1)
LYMPHOCYTES NFR BLD AUTO: 18.4 % (ref 19.6–45.3)
MAGNESIUM SERPL-MCNC: 2.3 MG/DL (ref 1.6–2.4)
MCH RBC QN AUTO: 30.6 PG (ref 26.6–33)
MCHC RBC AUTO-ENTMCNC: 34.1 G/DL (ref 31.5–35.7)
MCV RBC AUTO: 89.6 FL (ref 79–97)
MONOCYTES # BLD AUTO: 0.48 10*3/MM3 (ref 0.1–0.9)
MONOCYTES NFR BLD AUTO: 4.1 % (ref 5–12)
NEUTROPHILS NFR BLD AUTO: 74.6 % (ref 42.7–76)
NEUTROPHILS NFR BLD AUTO: 8.7 10*3/MM3 (ref 1.7–7)
NRBC BLD AUTO-RTO: 0 /100 WBC (ref 0–0.2)
PHOSPHATE SERPL-MCNC: 4.2 MG/DL (ref 2.5–4.5)
PLATELET # BLD AUTO: 281 10*3/MM3 (ref 140–450)
PMV BLD AUTO: 9.7 FL (ref 6–12)
POTASSIUM SERPL-SCNC: 4.5 MMOL/L (ref 3.5–5.2)
RBC # BLD AUTO: 2.88 10*6/MM3 (ref 3.77–5.28)
SODIUM SERPL-SCNC: 137 MMOL/L (ref 136–145)
WBC NRBC COR # BLD: 11.67 10*3/MM3 (ref 3.4–10.8)

## 2023-04-12 PROCEDURE — 82962 GLUCOSE BLOOD TEST: CPT

## 2023-04-12 PROCEDURE — 84100 ASSAY OF PHOSPHORUS: CPT | Performed by: INTERNAL MEDICINE

## 2023-04-12 PROCEDURE — 97110 THERAPEUTIC EXERCISES: CPT

## 2023-04-12 PROCEDURE — 92526 ORAL FUNCTION THERAPY: CPT

## 2023-04-12 PROCEDURE — 80048 BASIC METABOLIC PNL TOTAL CA: CPT | Performed by: INTERNAL MEDICINE

## 2023-04-12 PROCEDURE — 70551 MRI BRAIN STEM W/O DYE: CPT

## 2023-04-12 PROCEDURE — 85025 COMPLETE CBC W/AUTO DIFF WBC: CPT | Performed by: INTERNAL MEDICINE

## 2023-04-12 PROCEDURE — 99233 SBSQ HOSP IP/OBS HIGH 50: CPT | Performed by: INTERNAL MEDICINE

## 2023-04-12 PROCEDURE — 83735 ASSAY OF MAGNESIUM: CPT | Performed by: INTERNAL MEDICINE

## 2023-04-12 RX ORDER — SODIUM CHLORIDE, SODIUM LACTATE, POTASSIUM CHLORIDE, CALCIUM CHLORIDE 600; 310; 30; 20 MG/100ML; MG/100ML; MG/100ML; MG/100ML
100 INJECTION, SOLUTION INTRAVENOUS CONTINUOUS
Status: ACTIVE | OUTPATIENT
Start: 2023-04-12 | End: 2023-04-12

## 2023-04-12 RX ADMIN — CITALOPRAM HYDROBROMIDE 10 MG: 20 TABLET ORAL at 10:35

## 2023-04-12 RX ADMIN — LACOSAMIDE 50 MG: 10 SOLUTION ORAL at 21:34

## 2023-04-12 RX ADMIN — SODIUM CHLORIDE, POTASSIUM CHLORIDE, SODIUM LACTATE AND CALCIUM CHLORIDE 100 ML/HR: 600; 310; 30; 20 INJECTION, SOLUTION INTRAVENOUS at 10:34

## 2023-04-12 RX ADMIN — CARVEDILOL 25 MG: 25 TABLET, FILM COATED ORAL at 21:34

## 2023-04-12 RX ADMIN — LEVOTHYROXINE SODIUM 12.5 MCG: 0.03 TABLET ORAL at 06:15

## 2023-04-12 RX ADMIN — VANCOMYCIN HYDROCHLORIDE 125 MG: 125 CAPSULE ORAL at 02:14

## 2023-04-12 RX ADMIN — Medication 250 MG: at 21:34

## 2023-04-12 RX ADMIN — ANASTROZOLE 1 MG: 1 TABLET ORAL at 10:35

## 2023-04-12 RX ADMIN — VANCOMYCIN HYDROCHLORIDE 125 MG: 125 CAPSULE ORAL at 16:46

## 2023-04-12 RX ADMIN — LACOSAMIDE 50 MG: 10 SOLUTION ORAL at 10:35

## 2023-04-12 RX ADMIN — Medication 250 MG: at 10:35

## 2023-04-12 RX ADMIN — VANCOMYCIN HYDROCHLORIDE 125 MG: 125 CAPSULE ORAL at 12:33

## 2023-04-12 RX ADMIN — Medication 10 ML: at 10:35

## 2023-04-12 RX ADMIN — SODIUM BICARBONATE 650 MG TABLET 1300 MG: at 21:34

## 2023-04-12 RX ADMIN — AMLODIPINE BESYLATE 10 MG: 5 TABLET ORAL at 10:35

## 2023-04-12 RX ADMIN — CARVEDILOL 25 MG: 25 TABLET, FILM COATED ORAL at 10:35

## 2023-04-12 RX ADMIN — Medication 10 ML: at 21:34

## 2023-04-12 RX ADMIN — SODIUM BICARBONATE 650 MG TABLET 1300 MG: at 10:35

## 2023-04-12 RX ADMIN — VANCOMYCIN HYDROCHLORIDE 125 MG: 125 CAPSULE ORAL at 06:15

## 2023-04-12 NOTE — THERAPY TREATMENT NOTE
Patient Name: Lyubov Middleton  : 1955    MRN: [id number]                              Today's Date: 2023       Admit Date: 4/3/2023    Visit Dx:     ICD-10-CM ICD-9-CM   1. Metabolic encephalopathy  G93.41 348.31   2. Oropharyngeal dysphagia  R13.12 787.22   3. Difficulty in walking  R26.2 719.7   4. Decreased activities of daily living (ADL)  Z78.9 V49.89     Patient Active Problem List   Diagnosis   • Renal stone   • Renal abscess   • Stage 3a chronic kidney disease   • Type 2 diabetes mellitus   • Metabolic encephalopathy   • Seizure disorder     Past Medical History:   Diagnosis Date   • Cancer     left breast removed    • Type 2 diabetes mellitus      Past Surgical History:   Procedure Laterality Date   • ABDOMINAL SURGERY     • BREAST SURGERY Left     removed due to cancer   • CHOLECYSTECTOMY     • CYSTOSCOPY W/ URETERAL STENT PLACEMENT Right 2020    Procedure: CYSTOSCOPY, RIGHT RETROGRADE PYELOGRAM, URETEROSCOPY, LASER LITHOTRIPSY, RIGHT URETERAL STENT PLACEMENT;  Surgeon: Rohan Dooley Jr., MD;  Location: [facility];  Service: Urology;  Laterality: Right;   • GASTROSTOMY W/ FEEDING TUBE     • HERNIA REPAIR      mesh removed      General Information     Row Name 23 1417          OT Time and Intention    Document Type therapy note (daily note)  -ES     Mode of Treatment individual therapy;occupational therapy  -ES     Row Name 23 1417          General Information    Existing Precautions/Restrictions fall  -ES     Barriers to Rehab cognitive status  -ES     Row Name 23 1417          Cognition    Orientation Status (Cognition) oriented to;person;situation  Patient pleasantly confused. Patient agreeable to therapy participation. Patient requires frequent cueing for attention to task.  -ES     Row Name 23 1417          Safety Issues, Functional Mobility    Impairments Affecting Function (Mobility) balance;coordination;endurance/activity  tolerance;strength  -ES           User Key  (r) = Recorded By, (t) = Taken By, (c) = Cosigned By    Initials Name Provider Type    Juany Scales OTR/L, ABIOLA Occupational Therapist                 Mobility/ADL's     Row Name 04/12/23 1419          Bed Mobility    Bed Mobility supine-sit;sit-supine  -ES     Supine-Sit [place] (Bed Mobility) minimum assist (75% patient effort);1 person assist  -ES     Sit-Supine [place] (Bed Mobility) minimum assist (75% patient effort);1 person assist  -ES     Bed Mobility, Safety Issues decreased use of arms for pushing/pulling;decreased use of legs for bridging/pushing  -ES     Comment, (Bed Mobility) patient completes upper extremity exercises seated edge of bed. SBA for sitting balance.  -ES           User Key  (r) = Recorded By, (t) = Taken By, (c) = Cosigned By    Initials Name Provider Type    Juany Scales, TRESA/L, ABIOLA Occupational Therapist               Obj/Interventions     Row Name 04/12/23 1420          Shoulder (Therapeutic Exercise)    Shoulder (Therapeutic Exercise) strengthening exercise  -ES     Shoulder Strengthening (Therapeutic Exercise) bilateral;flexion;horizontal aBduction/aDduction;resistance band;yellow;10 repetitions  Patient completes 2 x 10 strengthening seated edge of bed. Patient is unable to tolerate all reps with resistance, completes 10 with resistance and 10 without resistance.  -ES     Row Name 04/12/23 1420          Elbow/Forearm (Therapeutic Exercise)    Elbow/Forearm (Therapeutic Exercise) strengthening exercise  -ES     Elbow/Forearm Strengthening (Therapeutic Exercise) bilateral;flexion;extension;resistance band;yellow;10 repetitions;2 sets  Patient completes 2 x 10 bilateral elbow flexion and extension with yellow (light resistance). Patient is unable to tolerate all exercise with resistance, completes 10 reps with resistance and 10 AROM  -ES     Row Name 04/12/23 1425          Motor Skills    Motor Skills functional endurance   -ES     Functional Endurance patient tolerates upper extremity exercises seated edge of bed. Patient requires short rest break between each set of exercises. Patient endorses fatigue at conclusion of therapy session.  -ES     Therapeutic Exercise shoulder;elbow/forearm  -ES     Row Name 04/12/23 1420          Balance    Balance Assessment sitting dynamic balance  -ES     Dynamic Sitting Balance standby assist  -ES     Position, Sitting Balance unsupported;sitting edge of bed  -ES           User Key  (r) = Recorded By, (t) = Taken By, (c) = Cosigned By    Initials Name Provider Type    Juany Scales, OTR/L, CSRS Occupational Therapist               Goals/Plan    No documentation.                Clinical Impression     Row Name 04/12/23 1424          Plan of Care Review    Plan of Care Reviewed With patient  -ES     Progress improving  -ES     Outcome Evaluation Patient with good participation in therapy session today. Patient completes upper extremity exercises seated edge of bed with yellow (light resistance). Patient is unable to tolerate all reps with resistance secondary to fatigue, completing half of reps AROM without resistance. Patient with decreased attention to task, requires frequent cueing for attention and continuation of task, impeding patient independence with ADL engagement. Patient would benefit from continued skilled OT intervention to promote return to baseline.  -ES     Row Name 04/12/23 1424          Vital Signs    O2 Delivery Pre Treatment nasal cannula  -ES     O2 Delivery Intra Treatment nasal cannula  -ES     O2 Delivery Post Treatment nasal cannula  -ES           User Key  (r) = Recorded By, (t) = Taken By, (c) = Cosigned By    Initials Name Provider Type    Juany Scales, OTR/L, CSRS Occupational Therapist               Outcome Measures     Row Name 04/12/23 1427          How much help from another is currently needed...    Putting on and taking off regular lower body clothing? 2  -ES      Bathing (including washing, rinsing, and drying) 2  -ES     Toileting (which includes using toilet bed pan or urinal) 1  -ES     Putting on and taking off regular upper body clothing 3  -ES     Taking care of personal grooming (such as brushing teeth) 3  -ES     Eating meals 3  -ES     AM-PAC 6 Clicks Score (OT) 14  -ES     Row Name 04/12/23 1100          How much help from another person do you currently need...    Turning from your back to your side while in flat bed without using bedrails? 3  -TOSHA     Moving from lying on back to sitting on the side of a flat bed without bedrails? 3  -TOSHA     Moving to and from a bed to a chair (including a wheelchair)? 3  -TOSHA     Standing up from a chair using your arms (e.g., wheelchair, bedside chair)? 3  -TOSHA     Climbing 3-5 steps with a railing? 2  -TOSHA     To walk in hospital room? 3  -TOSHA     AM-PAC 6 Clicks Score (PT) 17  -TOSHA     Highest level of mobility 5 --> Static standing  -TOSHA     Row Name 04/12/23 1427 04/12/23 1100       Functional Assessment    Outcome Measure Options AM-PAC 6 Clicks Daily Activity (OT);Optimal Instrument  -ES AM-PAC 6 Clicks Basic Mobility (PT)  -TOSHA    Row Name 04/12/23 1427          Optimal Instrument    Optimal Instrument Optimal - 3  -ES     Bending/Stooping 3  -ES     Standing 2  -ES     Reaching 2  -ES           User Key  (r) = Recorded By, (t) = Taken By, (c) = Cosigned By    Initials Name Provider Type    TOSHA Baudilio Sanchez, PT Physical Therapist    ES Juany Melgoza, OTR/L, CSRS Occupational Therapist                  OT Recommendation and Plan  Planned Therapy Interventions (OT): activity tolerance training, BADL retraining, functional balance retraining, IADL retraining, occupation/activity based interventions, patient/caregiver education/training, ROM/therapeutic exercise, strengthening exercise, transfer/mobility retraining  Therapy Frequency (OT): 5 times/wk  Plan of Care Review  Plan of Care Reviewed With: patient  Progress:  improving  Outcome Evaluation: Patient with good participation in therapy session today. Patient completes upper extremity exercises seated edge of bed with yellow (light resistance). Patient is unable to tolerate all reps with resistance secondary to fatigue, completing half of reps AROM without resistance. Patient with decreased attention to task, requires frequent cueing for attention and continuation of task, impeding patient independence with ADL engagement. Patient would benefit from continued skilled OT intervention to promote return to baseline.     Time Calculation:    Time Calculation- OT     Row Name 04/12/23 1428             Time Calculation- OT    OT Received On 04/12/23  -ES      OT Goal Re-Cert Due Date 04/13/23  -ES         Timed Charges    74051 - OT Therapeutic Exercise Minutes 11  -ES         Total Minutes    Timed Charges Total Minutes 11  -ES       Total Minutes 11  -ES            User Key  (r) = Recorded By, (t) = Taken By, (c) = Cosigned By    Initials Name Provider Type    ES Juany Melgoza, OTR/L, CSRS Occupational Therapist              Therapy Charges for Today     Code Description Service Date Service Provider Modifiers Qty    80437261922 HC OT THER PROC EA 15 MIN 4/12/2023 Juany Melgoza, OTR/L, CSRS GO 1    89888078622 HC OT THER PROC EA 15 MIN 4/12/2023 Juany Melgoza, OTR/L, CSRS GO 1               Juany Melgoza OTR/L, CSRS  4/12/2023

## 2023-04-12 NOTE — PROGRESS NOTES
[facility]   Hospitalist Progress Note    Date of admission: 4/3/2023  Patient Name: Lyubov Middleton  1955  Date: 4/11/2023      Subjective     Chief Complaint   Patient presents with   • Seizures       Summary: Lyubov Middleton is a 68 y.o. female with past medical history of diabetes mellitus, CKD 4, seizure disorder, dysphagia currently undergoing rehab at [facility] and rehab presents with altered mental status.  Per report, patient in her usual state of health in the morning presentation, however when working with therapy.  Patient indicated she was not feeling well, her eyes rolled back in her head and she became unresponsive.  Sternal rub attempted without response.  Patient brought to the emergency department for evaluation and treatment, noted to have slowly improving mentation.  Alert only to 1 on arrival.  Patient's labs also significant for low potassium, elevated sodium, creatinine up at 2.24, up from patient's baseline of 1.7.  UA concerning for urinary tract infection.  CT head negative for any acute intracranial abnormalities, did demonstrate diffuse atrophy and white matter changes and suspect patient with underlying dementia.  Chest x-ray negative for any acute abnormalities.  Patient has multiple bruises in various stages of healing on the skin exam.  Nursing report indicates patient frequently tries to get out of bed and falls.  Recently had presentation to the emergency department for a broken nose following a fall.  Cerebella applied in the emergency department and negative for seizure activity acutely.  Hospitalist service contacted for admission.  Patient required replacement of PEG tube while inpatient.  Following discussion with family, looking for appropriate placement in SNF.    Interval Followup: Decreasing diarrhea.  Less confused overall but not very talkative at time my exam.  Answering a few basic questions.  Nursing notes patient requiring encouragement  with meals and wants assistance with feeds although suspect she can do some more on around with encouragement.      Objective     Vitals:   Temp:  [96.4 °F (35.8 °C)-98.8 °F (37.1 °C)] 98.8 °F (37.1 °C)  Heart Rate:  [66-80] 80  Resp:  [14-18] 16  BP: (118-159)/(58-62) 129/60    Physical Exam  Awake conversant in bed smiling when interacting with staff  CTA B  RRR  Abdomen soft nontender nondistended  Alert to self basic questions, difficult to assess higher-level thinking is not communicating much further  Moves all extremities spontaneously    Result Review:  Vital signs, labs and recent relevant imaging reviewed.      amLODIPine, 10 mg, Oral, Daily  anastrozole, 1 mg, Oral, Daily  carvedilol, 25 mg, Oral, BID  cholestyramine, 1 packet, Oral, BID  citalopram, 10 mg, Oral, Daily  insulin lispro, 2-7 Units, Subcutaneous, TID With Meals  lacosamide, 50 mg, Oral, Q12H  levothyroxine, 12.5 mcg, Oral, Q AM  saccharomyces boulardii, 250 mg, Oral, BID  sodium bicarbonate, 1,300 mg, Oral, BID  sodium chloride, 10 mL, Intravenous, Q12H  vancomycin, 125 mg, Oral, Q6H        •  acetaminophen  •  dextrose  •  dextrose  •  glucagon (human recombinant)  •  nitroglycerin  •  ondansetron **OR** ondansetron  •  Pharmacy Consult  •  sodium chloride  •  sodium chloride  •  sodium chloride      Assessment / Plan     Assessment/Plan:  AMS/acute metabolic encephalopathy secondary to UTI  C. difficile diarrhea and history of chronic diarrhea  Nonconvulsive seizure with history of nonconvulsive status epilepticus  Metabolic acidosis  AUSTEN on CKD 4 baseline creatinine proxy 1.7  Hypokalemia  Hypomagnesemia  Hypernatremia  DM 2  Oropharyngeal dysphagia with chronic PEG tube and nocturnal tube feeds  Blocked PEG tube, resolved  Anemia of chronic disease  Hypertension  Depression  Hypothyroidism  History of breast cancer and prior left mastectomy  Suspect underlying dementia    -C. difficile resulted positive, 4/11 p.o. vancomycin follow-up  additional infectious stool studies  - Replace magnesium encourage increased p.o., speech assisting, mechanical soft nectar thick currently recommended.  Continue with nocturnal tube feeds via PEG  - Continue cholestyramine, hold if having any constipation monitor closely with C. difficile as above  - Continue amlodipine and carvedilol monitor blood pressure  - Continue lacosamide, monitor for seizures/seizure precautions  -Monitor mental status closely, delirium precautions, CT head reviewed noting diffuse atrophy and white matter changes, no acute abnormality reported although some cerebral white matter changes noted by radiology suggestive of mild gliosis or numerous lacunar infarcts given patient still apparently not completely back to baseline we will evaluate further with MRI.  Patient's POA not currently at bedside but was or earlier, will call tomorrow if not present when I round    *No CVA history noted, will try and obtain an MRI brain for further clarification/evaluation.    - Continue sodium bicarbonate, still with metabolic acidosis slowly improving, monitor renal function and I's and O's  - Continue SSI  - Continue citalopram  - Continue Synthroid  - Continue anastrozole  -Continue PT OT  - Check a.m. CBC, BMP, magnesium, phosphorus  - Continue hospital monitoring and treatment at current level of care - does not need upgraded at this time.    Ultimately working on skilled nursing facility for placement    DVT prophylaxis:  Mechanical DVT prophylaxis orders are present.    Medical Intervention Limits: NO intubation (DNI)  Code Status (Patient has no pulse and is not breathing): No CPR (Do Not Attempt to Resuscitate)  Medical Interventions (Patient has pulse or is breathing): Limited Support  Release to patient: Routine Release        CBC        4/9/2023    07:52 4/10/2023    04:38 4/11/2023    04:12   CBC   WBC 9.02   11.17   10.65     RBC 2.76   2.91   2.98     Hemoglobin 8.6   9.0   9.3      Hematocrit 24.3   26.7   26.5     MCV 88.0   91.8   88.9     MCH 31.2   30.9   31.2     MCHC 35.4   33.7   35.1     RDW 13.6   13.9   13.8     Platelets 247   274   270         CMP        4/9/2023    07:52 4/10/2023    04:38 4/11/2023    04:12   CMP   Glucose 117   132   117     BUN 46   45   47     Creatinine 1.84   2.03   1.81     EGFR 29.8   26.5   30.4     Sodium 137   135   135     Potassium 3.7   4.5   4.5     Chloride 109   110   107     Calcium 8.2   8.4   8.7     BUN/Creatinine Ratio 25.0   22.2   26.0     Anion Gap 12.3   13.2   10.9

## 2023-04-12 NOTE — THERAPY TREATMENT NOTE
Acute Care - Physical Therapy Treatment Note   S[facility]     Patient Name: Lyubov Middleton  : 1955  MRN: [id number]  Today's Date: 2023      Visit Dx:     ICD-10-CM ICD-9-CM   1. Metabolic encephalopathy  G93.41 348.31   2. Oropharyngeal dysphagia  R13.12 787.22   3. Difficulty in walking  R26.2 719.7   4. Decreased activities of daily living (ADL)  Z78.9 V49.89     Patient Active Problem List   Diagnosis   • Renal stone   • Renal abscess   • Stage 3a chronic kidney disease   • Type 2 diabetes mellitus   • Metabolic encephalopathy   • Seizure disorder     Past Medical History:   Diagnosis Date   • Cancer     left breast removed    • Type 2 diabetes mellitus      Past Surgical History:   Procedure Laterality Date   • ABDOMINAL SURGERY     • BREAST SURGERY Left     removed due to cancer   • CHOLECYSTECTOMY     • CYSTOSCOPY W/ URETERAL STENT PLACEMENT Right 2020    Procedure: CYSTOSCOPY, RIGHT RETROGRADE PYELOGRAM, URETEROSCOPY, LASER LITHOTRIPSY, RIGHT URETERAL STENT PLACEMENT;  Surgeon: Rohan oDoley Jr., MD;  Location: [facility];  Service: Urology;  Laterality: Right;   • GASTROSTOMY W/ FEEDING TUBE     • HERNIA REPAIR      mesh removed     PT Assessment (last 12 hours)     PT Evaluation and Treatment     Row Name 23 1111          Physical Therapy Time and Intention    Subjective Information no complaints  -TOSHA     Document Type therapy note (daily note)  -TOSHA     Mode of Treatment individual therapy;physical therapy  -TOSHA     Patient Effort good  -TOSHA     Symptoms Noted During/After Treatment fatigue  -TOSHA     Row Name 23 1111          General Information    Patient Profile Reviewed yes  -TOSHA     Row Name 23 1111          Cognition    Affect/Mental Status (Cognition) low arousal/lethargic  -TOSHA     Follows Commands (Cognition) delayed response/completion;increased processing time needed  -TOSHA     Row Name 23 1111          Bed Mobility    Bed Mobility bed  mobility (all) activities  -TOSHA     All Activities, Union (Bed Mobility) minimum assist (75% patient effort)  -TOSHA     Supine-Sit Union (Bed Mobility) minimum assist (75% patient effort)  -TOSHA     Sit-Supine Union (Bed Mobility) minimum assist (75% patient effort)  -TOSHA     Bed Mobility, Safety Issues decreased use of arms for pushing/pulling;decreased use of legs for bridging/pushing  -TOSHA     Assistive Device (Bed Mobility) bed rails;head of bed elevated  -TOSHA     Row Name 04/12/23 1111          Transfers    Transfers bed-chair transfer;sit-stand transfer  -TOSHA     Row Name 04/12/23 1111          Bed-Chair Transfer    Bed-Chair Union (Transfers) minimum assist (75% patient effort)  -TOSHA     Assistive Device (Bed-Chair Transfers) walker, front-wheeled  -TOSHA     Row Name 04/12/23 1111          Sit-Stand Transfer    Sit-Stand Union (Transfers) minimum assist (75% patient effort)  -TOSHA     Assistive Device (Sit-Stand Transfers) walker, front-wheeled  -TOSHA     Row Name 04/12/23 1111          Gait/Stairs (Locomotion)    Gait/Stairs Locomotion gait/ambulation assistive device  -TOSHA     Union Level (Gait) minimum assist (75% patient effort)  -TOSHA     Assistive Device (Gait) walker, front-wheeled  -TOSHA     Distance in Feet (Gait) 3  -TOSHA     Deviations/Abnormal Patterns (Gait) base of support, narrow;festinating/shuffling;gait speed decreased  -TOSHA     Bilateral Gait Deviations forward flexed posture;heel strike decreased  -TOSHA     Row Name 04/12/23 1111          Balance    Balance Assessment standing dynamic balance  -TOSHA     Dynamic Standing Balance minimal assist  -TOSHA     Position/Device Used, Standing Balance supported;walker, front-wheeled  -TOSHA     Row Name 04/12/23 1111          Motor Skills    Therapeutic Exercise hip;knee;ankle  -TOSHA     Row Name 04/12/23 1111          Hip (Therapeutic Exercise)    Hip (Therapeutic Exercise) AROM (active range of motion)  -TOSHA     Hip AROM (Therapeutic  Exercise) bilateral;flexion;sitting;10 repetitions  -TOSHA     Row Name 04/12/23 1111          Knee (Therapeutic Exercise)    Knee (Therapeutic Exercise) AROM (active range of motion)  -TOSHA     Knee AROM (Therapeutic Exercise) bilateral;LAQ (long arc quad);sitting;10 repetitions  -TOSHA     Row Name 04/12/23 1111          Ankle (Therapeutic Exercise)    Ankle (Therapeutic Exercise) AROM (active range of motion)  -TOSHA     Ankle AROM (Therapeutic Exercise) bilateral;dorsiflexion;plantarflexion;sitting;10 repetitions  -TOSHA     Row Name             Wound 04/03/23 1622 Right labia    Wound - Properties Group Placement Date: 04/03/23  -AC Placement Time: 1622  -AC Present on Hospital Admission: Y  -AC Side: Right  -AC Location: labia  -AC    Retired Wound - Properties Group Placement Date: 04/03/23  -AC Placement Time: 1622  -AC Present on Hospital Admission: Y  -AC Side: Right  -AC Location: labia  -AC    Retired Wound - Properties Group Date first assessed: 04/03/23  -AC Time first assessed: 1622  -AC Present on Hospital Admission: Y  -AC Side: Right  -AC Location: labia  -AC    Row Name             Wound 04/03/23 1628 sacral spine    Wound - Properties Group Placement Date: 04/03/23  -AC Placement Time: 1628  -AC Present on Hospital Admission: Y  -AC Location: sacral spine  -AC    Retired Wound - Properties Group Placement Date: 04/03/23  -AC Placement Time: 1628  -AC Present on Hospital Admission: Y  -AC Location: sacral spine  -AC    Retired Wound - Properties Group Date first assessed: 04/03/23  -AC Time first assessed: 1628  -AC Present on Hospital Admission: Y  -AC Location: sacral spine  -AC    Row Name             Wound 04/03/23 1629 Bilateral gluteal    Wound - Properties Group Placement Date: 04/03/23  -AC Placement Time: 1629  -AC Present on Hospital Admission: Y  -AC Side: Bilateral  -AC Location: gluteal  -AC    Retired Wound - Properties Group Placement Date: 04/03/23  -AC Placement Time: 1629  -AC Present on  Hospital Admission: Y  -AC Side: Bilateral  -AC Location: gluteal  -AC    Retired Wound - Properties Group Date first assessed: 04/03/23  -AC Time first assessed: 1629  -AC Present on Hospital Admission: Y  -AC Side: Bilateral  -AC Location: gluteal  -AC    Row Name             Wound 04/03/23 1630 Bilateral posterior greater trochanter    Wound - Properties Group Placement Date: 04/03/23  -AC Placement Time: 1630  -AC Present on Hospital Admission: Y  -AC Side: Bilateral  -AC Orientation: posterior  -AC Location: greater trochanter  -AC    Retired Wound - Properties Group Placement Date: 04/03/23  -AC Placement Time: 1630  -AC Present on Hospital Admission: Y  -AC Side: Bilateral  -AC Orientation: posterior  -AC Location: greater trochanter  -AC    Retired Wound - Properties Group Date first assessed: 04/03/23  -AC Time first assessed: 1630  -AC Present on Hospital Admission: Y  -AC Side: Bilateral  -AC Location: greater trochanter  -AC    Row Name 04/12/23 1111          Plan of Care Review    Plan of Care Reviewed With patient  -TOSHA     Progress no change  -TOSHA     Row Name 04/12/23 1111          Progress Summary (PT)    Progress Toward Functional Goals (PT) progress toward functional goals is good  -TOSHA     Daily Progress Summary (PT) Patient tolerated therapeutic exercise and transfers in her room today. She will continue to benefit from physical therapy services while in the hospital.  -TOSHA           User Key  (r) = Recorded By, (t) = Taken By, (c) = Cosigned By    Initials Name Provider Type    TOSHA Baudilio Sanchez, PT Physical Therapist    Jessica Bowie RN Registered Nurse                Physical Therapy Education     Title: PT OT SLP Therapies (Done)     Topic: Physical Therapy (Done)     Point: Mobility training (Done)     Learning Progress Summary           Patient Acceptance, E,TB, VU,NR by RM at 4/6/2023 0434    Acceptance, E,TB, VU by SW at 4/4/2023 1113                   Point: Precautions (Done)      Learning Progress Summary           Patient Acceptance, E,TB, VU,NR by  at 4/6/2023 0434    Acceptance, E,TB, VU by  at 4/4/2023 1113                               User Key     Initials Effective Dates Name Provider Type Discipline     06/08/21 -  Moreno Longoria, RN Registered Nurse Nurse     03/07/23 -  Rosalinda Peacock, RICH Student PT Student PT              PT Recommendation and Plan     Progress Summary (PT)  Progress Toward Functional Goals (PT): progress toward functional goals is good  Daily Progress Summary (PT): Patient tolerated therapeutic exercise and transfers in her room today. She will continue to benefit from physical therapy services while in the hospital.  Plan of Care Reviewed With: patient  Progress: no change   Outcome Measures     Row Name 04/12/23 1100 04/10/23 1200 04/09/23 1300       How much help from another person do you currently need...    Turning from your back to your side while in flat bed without using bedrails? 3  -TOSHA 3  -TOSHA 3  -CS    Moving from lying on back to sitting on the side of a flat bed without bedrails? 3  -TOSHA 3  -TOSHA 2  -CS    Moving to and from a bed to a chair (including a wheelchair)? 3  -TOSHA 3  -TOSHA 3  -CS    Standing up from a chair using your arms (e.g., wheelchair, bedside chair)? 3  -TOSHA 3  -TOSHA 3  -CS    Climbing 3-5 steps with a railing? 2  -TOSHA 3  -TOSHA 2  -CS    To walk in hospital room? 3  -TOSHA 3  -TOSHA 3  -CS    AM-PAC 6 Clicks Score (PT) 17  -TOSHA 18  -TOSHA 16  -CS       Functional Assessment    Outcome Measure Options AM-PAC 6 Clicks Basic Mobility (PT)  -TOSHA AM-PAC 6 Clicks Basic Mobility (PT)  -TOSHA AM-PAC 6 Clicks Basic Mobility (PT)  -CS          User Key  (r) = Recorded By, (t) = Taken By, (c) = Cosigned By    Initials Name Provider Type    Baudilio Garay, PT Physical Therapist    Farzad Calderón PTA Physical Therapist Assistant                 Time Calculation:    PT Charges     Row Name 04/12/23 1121             Time Calculation    PT Received On  04/12/23  -TOSHA         Timed Charges    24073 - PT Therapeutic Exercise Minutes 10  -TOSHA         Total Minutes    Timed Charges Total Minutes 10  -TOSHA       Total Minutes 10  -TOSHA            User Key  (r) = Recorded By, (t) = Taken By, (c) = Cosigned By    Initials Name Provider Type    Baudilio Garay, PT Physical Therapist              Therapy Charges for Today     Code Description Service Date Service Provider Modifiers Qty    88782745742 HC PT THER PROC EA 15 MIN 4/12/2023 Baudilio Sanchez PT GP 1          PT G-Codes  Outcome Measure Options: AM-PAC 6 Clicks Basic Mobility (PT)  AM-PAC 6 Clicks Score (PT): 17  AM-PAC 6 Clicks Score (OT): 16    Baudilio Sanchez PT  4/12/2023

## 2023-04-12 NOTE — THERAPY TREATMENT NOTE
Acute Care - Speech Language Pathology   Swallow Treatment Note  Sherry     Patient Name: Lyubov Middleton  : 1955  MRN: [id number]  Today's Date: 2023               Admit Date: 4/3/2023    Visit Dx:     ICD-10-CM ICD-9-CM   1. Metabolic encephalopathy  G93.41 348.31   2. Oropharyngeal dysphagia  R13.12 787.22   3. Difficulty in walking  R26.2 719.7   4. Decreased activities of daily living (ADL)  Z78.9 V49.89     Patient Active Problem List   Diagnosis   • Renal stone   • Renal abscess   • Stage 3a chronic kidney disease   • Type 2 diabetes mellitus   • Metabolic encephalopathy   • Seizure disorder     Past Medical History:   Diagnosis Date   • Cancer     left breast removed    • Type 2 diabetes mellitus      Past Surgical History:   Procedure Laterality Date   • ABDOMINAL SURGERY     • BREAST SURGERY Left     removed due to cancer   • CHOLECYSTECTOMY     • CYSTOSCOPY W/ URETERAL STENT PLACEMENT Right 2020    Procedure: CYSTOSCOPY, RIGHT RETROGRADE PYELOGRAM, URETEROSCOPY, LASER LITHOTRIPSY, RIGHT URETERAL STENT PLACEMENT;  Surgeon: Rohan Dooley Jr., MD;  Location: [facility];  Service: Urology;  Laterality: Right;   • GASTROSTOMY W/ FEEDING TUBE     • HERNIA REPAIR      mesh removed       SPEECH PATHOLOGY DYSPHAGIA TREATMENT     Subjective/Behavioral Observations: Alert and cooperative, assisted to sit upright in bed.        Day/time of Treatment: 23        Current Diet: Mechanical soft, nectar liquid        Current Strategies:One-on-one supervision, assist patient to feed self.  Alternate small bites and small sips of solids and liquids.  Small bite of solid with double swallow.  Small single sips of liquid.  Medications whole in applesauce.        Treatment received: Dysphagia therapy to address swallow function through exercises and education of strategies.        Results of treatment: Trials of thin liquid by cup.  Patient educated for strategy of tasting liquid to  achieve single sip.  Patient's regular drinking habit is to double sip from cup.  P.o. intake with 200 cc of thin water with coughing noted x2, wet vocal quality x1.  Minimal cueing for single sips with patient utilizing strategy of tasting.     Progress toward goals: Adequate     Barriers to Achieving goals: Medical status        Plan of care:/changes in plan: Continue per current plan.  Continue with mechanical soft solids with nectar thickened liquid.  Medications whole in applesauce.                                                                              Plan of Care Reviewed With: patient          EDUCATION  The patient has been educated in the following areas:   Modified Diet Instruction.              Time Calculation:    Time Calculation- SLP     Row Name 04/12/23 1444             Time Calculation- SLP    SLP Stop Time 1444  -TB      SLP Received On 04/12/23  -TB         Untimed Charges    10143-JT Treatment Swallow Minutes 45  -TB         Total Minutes    Untimed Charges Total Minutes 45  -TB       Total Minutes 45  -TB            User Key  (r) = Recorded By, (t) = Taken By, (c) = Cosigned By    Initials Name Provider Type    TB Barbara Reilly SLP Speech and Language Pathologist                Therapy Charges for Today     Code Description Service Date Service Provider Modifiers Qty    48428279188 HC ST TREATMENT SWALLOW 3 4/11/2023 Barbara Reilly SLP GN 1    51519227332 HC ST TREATMENT SWALLOW 3 4/12/2023 Barbara Reilly SLP GN 1               ANTHONY Santamaria  4/12/2023

## 2023-04-12 NOTE — PLAN OF CARE
Goal Outcome Evaluation:   Patient alert and oriented, intermittently confused, on room air, no complaints of pain, fluids continued, tube feeds intermittently at night new bag hung, no other changes

## 2023-04-12 NOTE — SIGNIFICANT NOTE
" Wound Eval / Progress Noted     [place]     Patient Name: Lyubov Middleton  : 1955  MRN: [id number]  Today's Date: 2023                 Admit Date: 4/3/2023    Visit Dx:    ICD-10-CM ICD-9-CM   1. Metabolic encephalopathy  G93.41 348.31   2. Oropharyngeal dysphagia  R13.12 787.22   3. Difficulty in walking  R26.2 719.7   4. Decreased activities of daily living (ADL)  Z78.9 V49.89       Patient Active Problem List   Diagnosis    Renal stone    Renal abscess    Stage 3a chronic kidney disease    Type 2 diabetes mellitus    Metabolic encephalopathy    Seizure disorder        Past Medical History:   Diagnosis Date    Cancer     left breast removed     Type 2 diabetes mellitus         Past Surgical History:   Procedure Laterality Date    ABDOMINAL SURGERY      BREAST SURGERY Left     removed due to cancer    CHOLECYSTECTOMY      CYSTOSCOPY W/ URETERAL STENT PLACEMENT Right 2020    Procedure: CYSTOSCOPY, RIGHT RETROGRADE PYELOGRAM, URETEROSCOPY, LASER LITHOTRIPSY, RIGHT URETERAL STENT PLACEMENT;  Surgeon: Rohan Dooley Jr., MD;  Location: [facility];  Service: Urology;  Laterality: Right;    GASTROSTOMY W/ FEEDING TUBE      HERNIA REPAIR      mesh removed         Physical Assessment:     23 1130   Gastrostomy/Enterostomy Gastrostomy LLQ   Placement date: If unknown, DO NOT use \"Add Comment\" note: (c)    Present on Admission? Select all that apply: Unknown placement date/time  Inserted by: patient came in with tube in place  Type: Gastrostomy  Location: LLQ   Surrounding Skin Dry   Site Description Scabbed   Gastrostomy/Enterostomy Site Interventions Site assessed;Site care performed   Dressing Status Removed   Dressing Intervention Dressing changed   Dressing Type Special type          Wound Check / Follow-up:  Patient seen today for wound follow-up on crusting to feeding tube insertion site. Old dressing removed. Site was cleansed with NS and gauze. Thick crusting removed with " cleansing. Bolster is loose and was re-secured. Silicone border dressing applied around feeding tube at time for site protection. No signs of infection noted. Recommending BID and PRN site care.     Impression: Crusting around feeding tube site    Short term goals:  Site care    Carolyn Shahid RN    4/12/2023    12:49 EDT

## 2023-04-12 NOTE — PROGRESS NOTES
[facility]   Hospitalist Progress Note    Date of admission: 4/3/2023  Patient Name: Lyubov Middleton  1955  Date: 4/12/2023      Subjective     Chief Complaint   Patient presents with   • Seizures       Summary: Lyubov Middleton is a 68 y.o. female with past medical history of diabetes mellitus, CKD 4, seizure disorder, dysphagia currently undergoing rehab at [facility] and rehab presents with altered mental status.  Per report, patient in her usual state of health in the morning presentation, however when working with therapy.  Patient indicated she was not feeling well, her eyes rolled back in her head and she became unresponsive.  Sternal rub attempted without response.  Patient brought to the emergency department for evaluation and treatment, noted to have slowly improving mentation.  Alert only to 1 on arrival.  Patient's labs also significant for low potassium, elevated sodium, creatinine up at 2.24, up from patient's baseline of 1.7.  UA concerning for urinary tract infection.  CT head negative for any acute intracranial abnormalities, did demonstrate diffuse atrophy and white matter changes and suspect patient with underlying dementia.  Chest x-ray negative for any acute abnormalities.  Patient has multiple bruises in various stages of healing on the skin exam.  Nursing report indicates patient frequently tries to get out of bed and falls.  Recently had presentation to the emergency department for a broken nose following a fall.  Cerebella applied in the emergency department and negative for seizure activity acutely.  Hospitalist service contacted for admission.  Patient required replacement of PEG tube while inpatient.  Following discussion with family, looking for appropriate placement in SNF.    Interval Followup: Fina is doing better, no abdominal pain.  Tolerated tube feeds via PEG overnight.  Having some intermittent confusion per staff doing better on my discussion  today.        Objective     Vitals:   Temp:  [97.9 °F (36.6 °C)-99.2 °F (37.3 °C)] 98.1 °F (36.7 °C)  Heart Rate:  [75-96] 96  Resp:  [16-18] 18  BP: (112-141)/(59-70) 134/70    Physical Exam  Awake conversant in bed smiling when interacting with staff  CTA B  RRR  Abdomen soft nontender nondistended, some crusting and irritation around PEG site  Alert to self, hospital, basic questions, able to tell me year, more interactive today  Moves all extremities spontaneously    Result Review:  Vital signs, labs and recent relevant imaging reviewed.      amLODIPine, 10 mg, Oral, Daily  anastrozole, 1 mg, Oral, Daily  carvedilol, 25 mg, Oral, BID  cholestyramine, 1 packet, Oral, BID  citalopram, 10 mg, Oral, Daily  insulin lispro, 2-7 Units, Subcutaneous, TID With Meals  lacosamide, 50 mg, Oral, Q12H  levothyroxine, 12.5 mcg, Oral, Q AM  saccharomyces boulardii, 250 mg, Oral, BID  sodium bicarbonate, 1,300 mg, Oral, BID  sodium chloride, 10 mL, Intravenous, Q12H  vancomycin, 125 mg, Oral, Q6H        •  acetaminophen  •  dextrose  •  dextrose  •  glucagon (human recombinant)  •  nitroglycerin  •  ondansetron **OR** ondansetron  •  Pharmacy Consult  •  sodium chloride  •  sodium chloride  •  sodium chloride      Assessment / Plan     Assessment/Plan:  AMS/acute metabolic encephalopathy secondary to UTI  C. difficile diarrhea and history of chronic diarrhea  Nonconvulsive seizure with history of nonconvulsive status epilepticus  Metabolic acidosis  AUSTEN on CKD 4 baseline creatinine proxy 1.7  Hypokalemia  Hypomagnesemia  Hypernatremia  DM 2  Oropharyngeal dysphagia with chronic PEG tube and nocturnal tube feeds  Blocked PEG tube, resolved  Anemia of chronic disease  Hypertension  Depression  Hypothyroidism  History of breast cancer and prior left mastectomy  Suspect underlying dementia    -C. difficile resulted positive, 4/11 p.o. vancomycin, additional stool studies resulted negative, slight increase in white count today,  afebrile, repeat in morning if still increasing will need to consider additional infectious work-up  - MRI brain still pending, if unable to obtain given however will change to CT head to follow-up previously reported multiple lacunar strokes versus gliosis noted, no overt focal deficit appreciated on exam, does have intermittent confusion/continue to monitor mental status changes/delirium precautions and for any neurological decline.  - Still with metabolic acidosis in setting of patient's diarrhea, give additional IV fluids and encouraging increasing p.o. intake as tolerated also continuing on sodium bicarb and monitoring I's and O's repeat labs in the morning  -Discussed with wound care, adjusting regimen for her peg site care, do not suspect need acute antibiotics for this currently but if white count worsening in status/improvement with increased local wound care will consider additional treatment for that at that time.  -Mechanical soft nectar thick diet as per speech, nocturnal tube feeds via PEG continuing  - Hold cholestyramine and monitor bowel movements,  - Continue amlodipine and carvedilol monitor blood pressure  - Continue lacosamide, monitor for seizures/seizure precautions  - Continue SSI  - Continue citalopram  - Continue Synthroid  - Continue anastrozole  - Continue PT OT  - Check a.m. CBC, BMP, magnesium, phosphorus  - Continue hospital monitoring and treatment at current level of care - does not need upgraded at this time.    Ultimately working on skilled nursing facility for placement- continue increased urine pending per discussion with social work.    DVT prophylaxis:  Mechanical DVT prophylaxis orders are present.    Medical Intervention Limits: NO intubation (DNI)  Code Status (Patient has no pulse and is not breathing): No CPR (Do Not Attempt to Resuscitate)  Medical Interventions (Patient has pulse or is breathing): Limited Support  Release to patient: Routine Release        CBC         4/10/2023    04:38 4/11/2023    04:12 4/12/2023    04:43   CBC   WBC 11.17   10.65   11.67     RBC 2.91   2.98   2.88     Hemoglobin 9.0   9.3   8.8     Hematocrit 26.7   26.5   25.8     MCV 91.8   88.9   89.6     MCH 30.9   31.2   30.6     MCHC 33.7   35.1   34.1     RDW 13.9   13.8   13.9     Platelets 274   270   281         CMP        4/10/2023    04:38 4/11/2023    04:12 4/12/2023    04:43   CMP   Glucose 132   117   132     BUN 45   47   50     Creatinine 2.03   1.81   2.01     EGFR 26.5   30.4   26.8     Sodium 135   135   137     Potassium 4.5   4.5   4.5     Chloride 110   107   108     Calcium 8.4   8.7   9.0     BUN/Creatinine Ratio 22.2   26.0   24.9     Anion Gap 13.2   10.9   11.5

## 2023-04-12 NOTE — PLAN OF CARE
Goal Outcome Evaluation:  Plan of Care Reviewed With: patient        Progress: no change  Outcome Evaluation: patient alert and orientated x3 overnight; TF tolerated overnight and run per MD orders; VSS; no s/s of distress noted overnight; seizure precautions remain in place; will continue to monitor   negative...

## 2023-04-13 LAB
ANION GAP SERPL CALCULATED.3IONS-SCNC: 11.4 MMOL/L (ref 5–15)
BASOPHILS # BLD AUTO: 0.06 10*3/MM3 (ref 0–0.2)
BASOPHILS NFR BLD AUTO: 0.6 % (ref 0–1.5)
BUN SERPL-MCNC: 49 MG/DL (ref 8–23)
BUN/CREAT SERPL: 27.2 (ref 7–25)
CALCIUM SPEC-SCNC: 8.8 MG/DL (ref 8.6–10.5)
CHLORIDE SERPL-SCNC: 105 MMOL/L (ref 98–107)
CO2 SERPL-SCNC: 19.6 MMOL/L (ref 22–29)
CREAT SERPL-MCNC: 1.8 MG/DL (ref 0.57–1)
DEPRECATED RDW RBC AUTO: 45.1 FL (ref 37–54)
EGFRCR SERPLBLD CKD-EPI 2021: 30.6 ML/MIN/1.73
EOSINOPHIL # BLD AUTO: 0.32 10*3/MM3 (ref 0–0.4)
EOSINOPHIL NFR BLD AUTO: 3.2 % (ref 0.3–6.2)
ERYTHROCYTE [DISTWIDTH] IN BLOOD BY AUTOMATED COUNT: 14 % (ref 12.3–15.4)
GLUCOSE BLDC GLUCOMTR-MCNC: 93 MG/DL (ref 70–99)
GLUCOSE BLDC GLUCOMTR-MCNC: 94 MG/DL (ref 70–99)
GLUCOSE BLDC GLUCOMTR-MCNC: 99 MG/DL (ref 70–99)
GLUCOSE SERPL-MCNC: 114 MG/DL (ref 65–99)
HCT VFR BLD AUTO: 26.8 % (ref 34–46.6)
HGB BLD-MCNC: 9.3 G/DL (ref 12–15.9)
IMM GRANULOCYTES # BLD AUTO: 0.05 10*3/MM3 (ref 0–0.05)
IMM GRANULOCYTES NFR BLD AUTO: 0.5 % (ref 0–0.5)
LYMPHOCYTES # BLD AUTO: 1.97 10*3/MM3 (ref 0.7–3.1)
LYMPHOCYTES NFR BLD AUTO: 19.9 % (ref 19.6–45.3)
MAGNESIUM SERPL-MCNC: 1.9 MG/DL (ref 1.6–2.4)
MCH RBC QN AUTO: 31 PG (ref 26.6–33)
MCHC RBC AUTO-ENTMCNC: 34.7 G/DL (ref 31.5–35.7)
MCV RBC AUTO: 89.3 FL (ref 79–97)
MONOCYTES # BLD AUTO: 0.44 10*3/MM3 (ref 0.1–0.9)
MONOCYTES NFR BLD AUTO: 4.5 % (ref 5–12)
NEUTROPHILS NFR BLD AUTO: 7.04 10*3/MM3 (ref 1.7–7)
NEUTROPHILS NFR BLD AUTO: 71.3 % (ref 42.7–76)
NRBC BLD AUTO-RTO: 0 /100 WBC (ref 0–0.2)
PHOSPHATE SERPL-MCNC: 3.8 MG/DL (ref 2.5–4.5)
PLATELET # BLD AUTO: 250 10*3/MM3 (ref 140–450)
PMV BLD AUTO: 9.4 FL (ref 6–12)
POTASSIUM SERPL-SCNC: 4.5 MMOL/L (ref 3.5–5.2)
RBC # BLD AUTO: 3 10*6/MM3 (ref 3.77–5.28)
SODIUM SERPL-SCNC: 136 MMOL/L (ref 136–145)
WBC NRBC COR # BLD: 9.88 10*3/MM3 (ref 3.4–10.8)

## 2023-04-13 PROCEDURE — 92526 ORAL FUNCTION THERAPY: CPT

## 2023-04-13 PROCEDURE — 99233 SBSQ HOSP IP/OBS HIGH 50: CPT | Performed by: INTERNAL MEDICINE

## 2023-04-13 PROCEDURE — 80048 BASIC METABOLIC PNL TOTAL CA: CPT | Performed by: INTERNAL MEDICINE

## 2023-04-13 PROCEDURE — 83735 ASSAY OF MAGNESIUM: CPT | Performed by: INTERNAL MEDICINE

## 2023-04-13 PROCEDURE — 85025 COMPLETE CBC W/AUTO DIFF WBC: CPT | Performed by: INTERNAL MEDICINE

## 2023-04-13 PROCEDURE — 82962 GLUCOSE BLOOD TEST: CPT

## 2023-04-13 PROCEDURE — 84100 ASSAY OF PHOSPHORUS: CPT | Performed by: INTERNAL MEDICINE

## 2023-04-13 PROCEDURE — 99231 SBSQ HOSP IP/OBS SF/LOW 25: CPT | Performed by: PSYCHIATRY & NEUROLOGY

## 2023-04-13 RX ORDER — FLUTICASONE PROPIONATE 50 MCG
2 SPRAY, SUSPENSION (ML) NASAL DAILY
Status: DISCONTINUED | OUTPATIENT
Start: 2023-04-14 | End: 2023-04-22 | Stop reason: HOSPADM

## 2023-04-13 RX ORDER — CHOLESTYRAMINE 4 G/9G
1 POWDER, FOR SUSPENSION ORAL EVERY 12 HOURS SCHEDULED
Status: DISPENSED | OUTPATIENT
Start: 2023-04-13 | End: 2023-04-16

## 2023-04-13 RX ORDER — CETIRIZINE HYDROCHLORIDE 10 MG/1
10 TABLET ORAL DAILY
Status: DISCONTINUED | OUTPATIENT
Start: 2023-04-14 | End: 2023-04-22 | Stop reason: HOSPADM

## 2023-04-13 RX ORDER — LACOSAMIDE 10 MG/ML
100 SOLUTION ORAL EVERY 12 HOURS SCHEDULED
Status: DISCONTINUED | OUTPATIENT
Start: 2023-04-13 | End: 2023-04-22 | Stop reason: HOSPADM

## 2023-04-13 RX ADMIN — LEVOTHYROXINE SODIUM 12.5 MCG: 0.03 TABLET ORAL at 06:03

## 2023-04-13 RX ADMIN — SODIUM BICARBONATE 650 MG TABLET 1300 MG: at 21:29

## 2023-04-13 RX ADMIN — CARVEDILOL 25 MG: 25 TABLET, FILM COATED ORAL at 21:30

## 2023-04-13 RX ADMIN — VANCOMYCIN HYDROCHLORIDE 125 MG: 125 CAPSULE ORAL at 00:25

## 2023-04-13 RX ADMIN — Medication 250 MG: at 08:45

## 2023-04-13 RX ADMIN — VANCOMYCIN HYDROCHLORIDE 125 MG: 125 CAPSULE ORAL at 12:13

## 2023-04-13 RX ADMIN — VANCOMYCIN HYDROCHLORIDE 125 MG: 125 CAPSULE ORAL at 06:03

## 2023-04-13 RX ADMIN — SODIUM BICARBONATE 650 MG TABLET 1300 MG: at 08:44

## 2023-04-13 RX ADMIN — CITALOPRAM HYDROBROMIDE 10 MG: 20 TABLET ORAL at 08:44

## 2023-04-13 RX ADMIN — CHOLESTYRAMINE 1 PACKET: 4 POWDER, FOR SUSPENSION ORAL at 22:47

## 2023-04-13 RX ADMIN — CARVEDILOL 25 MG: 25 TABLET, FILM COATED ORAL at 08:45

## 2023-04-13 RX ADMIN — AMLODIPINE BESYLATE 10 MG: 5 TABLET ORAL at 08:45

## 2023-04-13 RX ADMIN — VANCOMYCIN HYDROCHLORIDE 125 MG: 125 CAPSULE ORAL at 23:17

## 2023-04-13 RX ADMIN — Medication 10 ML: at 08:45

## 2023-04-13 RX ADMIN — Medication 10 ML: at 21:30

## 2023-04-13 RX ADMIN — ANASTROZOLE 1 MG: 1 TABLET ORAL at 08:45

## 2023-04-13 RX ADMIN — VANCOMYCIN HYDROCHLORIDE 125 MG: 125 CAPSULE ORAL at 17:46

## 2023-04-13 RX ADMIN — LACOSAMIDE 100 MG: 10 SOLUTION ORAL at 21:29

## 2023-04-13 RX ADMIN — Medication 250 MG: at 21:29

## 2023-04-13 RX ADMIN — LACOSAMIDE 50 MG: 10 SOLUTION ORAL at 08:45

## 2023-04-13 NOTE — THERAPY TREATMENT NOTE
Acute Care - Speech Language Pathology   Swallow Treatment Note  Sherry     Patient Name: Lyubov Middleton  : 1955  MRN: [id number]  Today's Date: 2023               Admit Date: 4/3/2023    Visit Dx:     ICD-10-CM ICD-9-CM   1. Metabolic encephalopathy  G93.41 348.31   2. Oropharyngeal dysphagia  R13.12 787.22   3. Difficulty in walking  R26.2 719.7   4. Decreased activities of daily living (ADL)  Z78.9 V49.89     Patient Active Problem List   Diagnosis   • Renal stone   • Renal abscess   • Stage 3a chronic kidney disease   • Type 2 diabetes mellitus   • Metabolic encephalopathy   • Seizure disorder     Past Medical History:   Diagnosis Date   • Cancer     left breast removed    • Type 2 diabetes mellitus      Past Surgical History:   Procedure Laterality Date   • ABDOMINAL SURGERY     • BREAST SURGERY Left     removed due to cancer   • CHOLECYSTECTOMY     • CYSTOSCOPY W/ URETERAL STENT PLACEMENT Right 2020    Procedure: CYSTOSCOPY, RIGHT RETROGRADE PYELOGRAM, URETEROSCOPY, LASER LITHOTRIPSY, RIGHT URETERAL STENT PLACEMENT;  Surgeon: Rohan Dooley Jr., MD;  Location: [facility];  Service: Urology;  Laterality: Right;   • GASTROSTOMY W/ FEEDING TUBE     • HERNIA REPAIR      mesh removed       SPEECH PATHOLOGY DYSPHAGIA TREATMENT     Subjective/Behavioral Observations: Alert and cooperative, assisted to sit upright in bed.        Day/time of Treatment: 23        Current Diet: Mechanical soft, nectar liquid        Current Strategies:One-on-one supervision, assist patient to feed self.  Alternate small bites and small sips of solids and liquids.  Small bite of solid with double swallow.  Small single sips of liquid.  Medications whole in applesauce.        Treatment received: Dysphagia therapy to address swallow function through exercises and education of strategies.        Results of treatment: Trials of thin liquid by cup.  Patient recalled strategy of tasting liquid to  achieve single sip.  Patient's regular drinking habit is to double sip from cup.  P.o. intake with 200 cc of thin water with coughing noted x2.  Minimal cueing for single sips with patient utilizing strategy of tasting.  Coughing noted when patient losing attention to task and taking double sip.     Progress toward goals: Adequate      Barriers to Achieving goals: Medical status        Plan of care:/changes in plan: Continue per current plan.  Continue with mechanical soft solids with nectar thickened liquid.  Medications whole in applesauce.                                                                              Plan of Care Reviewed With: patient          EDUCATION  The patient has been educated in the following areas:   Modified Diet Instruction.              Time Calculation:    Time Calculation- SLP     Row Name 04/13/23 1155             Time Calculation- SLP    SLP Stop Time 1130  -TB      SLP Received On 04/13/23  -TB         Untimed Charges    64153-NA Treatment Swallow Minutes 45  -TB         Total Minutes    Untimed Charges Total Minutes 45  -TB       Total Minutes 45  -TB            User Key  (r) = Recorded By, (t) = Taken By, (c) = Cosigned By    Initials Name Provider Type    TB Barbara Reilly SLP Speech and Language Pathologist                Therapy Charges for Today     Code Description Service Date Service Provider Modifiers Qty    04964858665 HC ST TREATMENT SWALLOW 3 4/12/2023 Barbara Reilly SLP GN 1    [id number] HC ST TREATMENT SWALLOW 3 4/13/2023 Barbara Reilly SLP GN 1               ANTHONY Santamaria  4/13/2023

## 2023-04-13 NOTE — CONSULTS
"Nutrition Services    Patient Name: Lyubov Middleton  YOB: 1955  MRN: [id number]  Admission date: 4/3/2023      CLINICAL NUTRITION ASSESSMENT      Reason for Assessment  Follow-up protocol     H&P:    Past Medical History:   Diagnosis Date   • Cancer     left breast removed 2012   • Type 2 diabetes mellitus         Current Problems:   Active Hospital Problems    Diagnosis    • **Metabolic encephalopathy    • Seizure disorder         Nutrition/Diet History         Narrative     Nutrition follow up.  Patient continues on mechanical ground diet with nectar thickened liquids.  Consuming 25-75% of meals. Tolerating enteral nutrition at nighttime.     Per MD note, pt C.diff (+) on 4/11. Nursing notes multiple loose BMs overnight. RD to order Banatrol BID via PEG to improve stool. Will continue to monitor.     Anthropometrics        Current Height, Weight Height: 160 cm (63\")  Weight: 61.8 kg (136 lb 3.9 oz)   Current BMI Body mass index is 24.13 kg/m².       Weight Hx  Wt Readings from Last 30 Encounters:   04/03/23 1058 61.8 kg (136 lb 3.9 oz)   03/25/23 0828 61.8 kg (136 lb 3.9 oz)   02/11/23 1519 72.6 kg (160 lb)   12/17/20 0610 73.5 kg (162 lb 0.6 oz)   12/16/20 0330 73.8 kg (162 lb 9.6 oz)   12/15/20 0349 75.1 kg (165 lb 9.6 oz)   12/14/20 0518 80.1 kg (176 lb 9.6 oz)   12/13/20 0509 78.2 kg (172 lb 4.8 oz)   12/12/20 0550 73.4 kg (161 lb 14.4 oz)   12/11/20 0424 71.4 kg (157 lb 8 oz)   12/10/20 0237 69.6 kg (153 lb 8 oz)   12/09/20 0620 68.5 kg (151 lb 1.6 oz)   12/08/20 1453 64.9 kg (143 lb)            Wt Change Observation -14.9% x 2 months      Estimated/Assessed Needs       Energy Requirements 30 kcal/kg   EST Needs (kcal/day) 1854 kcal        Protein Requirements 1.0-1.2 g/kg   EST Daily Needs (g/day) 62-74 g       Fluid Requirements 30 ml/kg     Estimated Needs (mL/day) 1854 ml      Labs/Medications         Pertinent Labs Reviewed.   Results from last 7 days   Lab Units 04/13/23  0441 " 04/12/23 0443 04/11/23 0412   SODIUM mmol/L 136 137 135*   POTASSIUM mmol/L 4.5 4.5 4.5   CHLORIDE mmol/L 105 108* 107   CO2 mmol/L 19.6* 17.5* 17.1*   BUN mg/dL 49* 50* 47*   CREATININE mg/dL 1.80* 2.01* 1.81*   CALCIUM mg/dL 8.8 9.0 8.7   GLUCOSE mg/dL 114* 132* 117*     Results from last 7 days   Lab Units 04/13/23 0441 04/12/23 0443 04/11/23 0412   MAGNESIUM mg/dL 1.9 2.3 1.8   PHOSPHORUS mg/dL 3.8 4.2  --    HEMOGLOBIN g/dL 9.3* 8.8* 9.3*   HEMATOCRIT % 26.8* 25.8* 26.5*     SARS-CoV-2, KATHLEEN   Date Value Ref Range Status   12/25/2022 NEGATIVE Negative Final     Comment:     The 2019-CoV rRT-PCR Assay is only for use under a Food and Drug Administration Emergency Use Authorization. The performance characteristics of the assay were verified by the Clinical Laboratory at [facility]. Results should be used in   conjunction with the patient's clinical symptoms, medical history, and other clinical/laboratory findings to determine an overall clinical diagnosis. Negative results do not preclude infection with SARS-CoV-2 (COVID-19).    Test parameters have not been validated for screening asymptomatic patients.     Lab Results   Component Value Date    HGBA1C 5.6 03/08/2023         Pertinent Medications Reviewed.     Current Nutrition Orders & Evaluation of Intake       Oral Nutrition     Current PO Diet Diet: Diabetic Diets; Consistent Carbohydrate; Texture: Mechanical Ground (NDD 2); Fluid Consistency: Nectar Thick   Supplement Orders Placed This Encounter      Diet, Tube Feeding Tube Feeding Formula: Diabetisource AC; Tube Feeding Type: Cyclic / Intermittent; Feeding Start Time: 8:00 PM; Feeding End Time: 8:00 AM; Cyclic Feeding Start Rate (mL/hr): 90; To Goal Rate of (mL/hr): 90; Supplemental Bolus Fee...      Dietary Nutrition Supplements Banatrol (Packet)       Malnutrition Severity Assessment                Nutrition Diagnosis         Nutrition Dx Problem 1 Inadequate energy Intake related to  decreased ability to consume sufficient energy as evidenced by PEG for primary nutrition      Nutrition Intervention         Diet per SLP    Diabetisource AC @ 90 ml/hr x 12 hours (8pm-8am)  Free water flushes 75 ml at the start and stop of tube feeds  Provides 1296 kcal, 65 g pro, 1036 ml fluid     Meets about 70% estimated kcal needs, 100% estimated pro needs, and 55% estimated fluid needs     +Banatrol BID via PEG (flush w/ 20 ml before and after, adding 80 ml fluids/d)     Medical Nutrition Therapy/Nutrition Education          Learner     Readiness Patient  N/A     Method     Response N/A  N/A     Monitor/Evaluation        Monitor Per protocol, I&O, Pertinent labs, EN delivery/tolerance, Weight, POC/GOC, Diet advancement     Nutrition Discharge Plan         To be determined       Electronically signed by:  Alisa Barkley RD  04/13/23 12:35 EDT

## 2023-04-13 NOTE — PLAN OF CARE
Goal Outcome Evaluation:  Plan of Care Reviewed With: patient        Progress: no change  Outcome Evaluation: patient alert and orientated x2 overnight; patient had some confusion throughout night; VSS; multiple loose BM overnight; TF continued per MD orders; no s/s of distress noted; will continue to monitor;

## 2023-04-13 NOTE — PLAN OF CARE
Problem: Adult Inpatient Plan of Care  Goal: Plan of Care Review  Outcome: Ongoing, Progressing  Goal: Patient-Specific Goal (Individualized)  Outcome: Ongoing, Progressing  Goal: Absence of Hospital-Acquired Illness or Injury  Outcome: Ongoing, Progressing  Intervention: Identify and Manage Fall Risk  Recent Flowsheet Documentation  Taken 4/13/2023 0800 by Miya Schmitt RN  Safety Promotion/Fall Prevention:   safety round/check completed   room organization consistent  Intervention: Prevent Skin Injury  Recent Flowsheet Documentation  Taken 4/13/2023 0933 by Miya Schmitt RN  Body Position: position changed independently  Taken 4/13/2023 0800 by Miya Schmitt RN  Body Position: position changed independently  Skin Protection:   skin-to-device areas padded   tubing/devices free from skin contact  Intervention: Prevent and Manage VTE (Venous Thromboembolism) Risk  Recent Flowsheet Documentation  Taken 4/13/2023 0800 by Miya Schmitt RN  Activity Management: patient refuses activity  VTE Prevention/Management: sequential compression devices off  Range of Motion: active ROM (range of motion) encouraged  Goal: Optimal Comfort and Wellbeing  Outcome: Ongoing, Progressing  Intervention: Monitor Pain and Promote Comfort  Recent Flowsheet Documentation  Taken 4/13/2023 0800 by Miya Schmitt RN  Pain Management Interventions: no interventions per patient request  Intervention: Provide Person-Centered Care  Recent Flowsheet Documentation  Taken 4/13/2023 0800 by Miya Schmitt RN  Trust Relationship/Rapport:   care explained   questions answered   questions encouraged   reassurance provided   thoughts/feelings acknowledged  Goal: Readiness for Transition of Care  Outcome: Ongoing, Progressing     Problem: Fall Injury Risk  Goal: Absence of Fall and Fall-Related Injury  Outcome: Ongoing, Progressing  Intervention: Promote Injury-Free Environment  Recent Flowsheet Documentation  Taken 4/13/2023  0800 by Miya Schmitt RN  Safety Promotion/Fall Prevention:   safety round/check completed   room organization consistent     Problem: Skin Injury Risk Increased  Goal: Skin Health and Integrity  Outcome: Ongoing, Progressing  Intervention: Optimize Skin Protection  Recent Flowsheet Documentation  Taken 4/13/2023 0800 by Miya Schmitt RN  Pressure Reduction Techniques:   frequent weight shift encouraged   weight shift assistance provided  Pressure Reduction Devices: pressure-redistributing mattress utilized  Skin Protection:   skin-to-device areas padded   tubing/devices free from skin contact     Problem: Palliative Care  Goal: Enhanced Quality of Life  Outcome: Ongoing, Progressing  Intervention: Maximize Comfort  Recent Flowsheet Documentation  Taken 4/13/2023 0800 by Miya Schmitt RN  Pain Management Interventions: no interventions per patient request  Oral Care: patient refused intervention  Intervention: Optimize Function  Recent Flowsheet Documentation  Taken 4/13/2023 0800 by Miya Schmitt RN  Sleep/Rest Enhancement:   awakenings minimized   natural light exposure provided  Intervention: Optimize Psychosocial Wellbeing  Recent Flowsheet Documentation  Taken 4/13/2023 0800 by Miya Schmitt RN  Supportive Measures: active listening utilized  Goal: Enhanced Quality of Life  Outcome: Ongoing, Progressing  Intervention: Maximize Comfort  Recent Flowsheet Documentation  Taken 4/13/2023 0800 by Miya Schmitt RN  Pain Management Interventions: no interventions per patient request  Oral Care: patient refused intervention  Intervention: Optimize Function  Recent Flowsheet Documentation  Taken 4/13/2023 0800 by Miya Schmitt RN  Sleep/Rest Enhancement:   awakenings minimized   natural light exposure provided  Intervention: Optimize Psychosocial Wellbeing  Recent Flowsheet Documentation  Taken 4/13/2023 0800 by Miya Schmitt RN  Supportive Measures: active listening utilized    Goal Outcome Evaluation:

## 2023-04-13 NOTE — NURSING NOTE
END OF SHIFT:  Pt with C-diff, multiple episodes of diarrhea. Barrier cream applied with each bowel movement. Dressing changed on peg tube. No c/o pain or discomfort on this shift. Pt denied breakfast and lunch but ate well with dinner. Up to [facility] with one assist.  IV: patent and flushed with ease. Dressing C/D/I. Peg tube dressing changed and flushed with ease. Supplemental liquid given without issues.   Changes: new order for Pro-source via tube feed.   Pain control: No request for pain medication on verbal communication for pain.  Progressing to goal: Continue care plan  Discharge: medical approval.

## 2023-04-14 ENCOUNTER — APPOINTMENT (OUTPATIENT)
Dept: NEUROLOGY | Facility: HOSPITAL | Age: 68
DRG: 371 | End: 2023-04-14
Payer: MEDICARE

## 2023-04-14 LAB
GLUCOSE BLDC GLUCOMTR-MCNC: 124 MG/DL (ref 70–99)
GLUCOSE BLDC GLUCOMTR-MCNC: 80 MG/DL (ref 70–99)
GLUCOSE BLDC GLUCOMTR-MCNC: 91 MG/DL (ref 70–99)

## 2023-04-14 PROCEDURE — 99232 SBSQ HOSP IP/OBS MODERATE 35: CPT | Performed by: INTERNAL MEDICINE

## 2023-04-14 PROCEDURE — 95816 EEG AWAKE AND DROWSY: CPT | Performed by: PSYCHIATRY & NEUROLOGY

## 2023-04-14 PROCEDURE — 97164 PT RE-EVAL EST PLAN CARE: CPT

## 2023-04-14 PROCEDURE — 95816 EEG AWAKE AND DROWSY: CPT

## 2023-04-14 PROCEDURE — 92526 ORAL FUNCTION THERAPY: CPT

## 2023-04-14 PROCEDURE — 82962 GLUCOSE BLOOD TEST: CPT

## 2023-04-14 RX ADMIN — LACOSAMIDE 100 MG: 10 SOLUTION ORAL at 08:12

## 2023-04-14 RX ADMIN — CITALOPRAM HYDROBROMIDE 10 MG: 20 TABLET ORAL at 08:13

## 2023-04-14 RX ADMIN — CETIRIZINE HYDROCHLORIDE 10 MG: 10 TABLET, FILM COATED ORAL at 08:12

## 2023-04-14 RX ADMIN — VANCOMYCIN HYDROCHLORIDE 125 MG: 125 CAPSULE ORAL at 17:55

## 2023-04-14 RX ADMIN — Medication 250 MG: at 23:19

## 2023-04-14 RX ADMIN — CHOLESTYRAMINE 1 PACKET: 4 POWDER, FOR SUSPENSION ORAL at 08:13

## 2023-04-14 RX ADMIN — SODIUM BICARBONATE 650 MG TABLET 1300 MG: at 08:13

## 2023-04-14 RX ADMIN — FLUTICASONE PROPIONATE 2 SPRAY: 50 SPRAY, METERED NASAL at 08:13

## 2023-04-14 RX ADMIN — VANCOMYCIN HYDROCHLORIDE 125 MG: 125 CAPSULE ORAL at 23:21

## 2023-04-14 RX ADMIN — AMLODIPINE BESYLATE 10 MG: 5 TABLET ORAL at 08:12

## 2023-04-14 RX ADMIN — CARVEDILOL 25 MG: 25 TABLET, FILM COATED ORAL at 23:19

## 2023-04-14 RX ADMIN — VANCOMYCIN HYDROCHLORIDE 125 MG: 125 CAPSULE ORAL at 12:11

## 2023-04-14 RX ADMIN — LACOSAMIDE 100 MG: 10 SOLUTION ORAL at 23:19

## 2023-04-14 RX ADMIN — Medication 250 MG: at 08:13

## 2023-04-14 RX ADMIN — VANCOMYCIN HYDROCHLORIDE 125 MG: 125 CAPSULE ORAL at 06:00

## 2023-04-14 RX ADMIN — Medication 10 ML: at 10:28

## 2023-04-14 RX ADMIN — LEVOTHYROXINE SODIUM 12.5 MCG: 0.03 TABLET ORAL at 06:00

## 2023-04-14 RX ADMIN — CARVEDILOL 25 MG: 25 TABLET, FILM COATED ORAL at 08:12

## 2023-04-14 RX ADMIN — SODIUM BICARBONATE 650 MG TABLET 1300 MG: at 23:19

## 2023-04-14 RX ADMIN — ANASTROZOLE 1 MG: 1 TABLET ORAL at 08:13

## 2023-04-14 NOTE — PROGRESS NOTES
[facility]   Hospitalist Progress Note    Date of admission: 4/3/2023  Patient Name: Lyubov Middleton  1955  Date: 4/14/2023      Subjective     Chief Complaint   Patient presents with   • Seizures       Summary: Lyubov Middleton is a 68 y.o. female with past medical history of diabetes mellitus, CKD 4, seizure disorder, dysphagia currently undergoing rehab at [facility] and rehab presents with altered mental status.  Per report, patient in her usual state of health in the morning presentation, however when working with therapy.  Patient indicated she was not feeling well, her eyes rolled back in her head and she became unresponsive.  Sternal rub attempted without response.  Patient brought to the emergency department for evaluation and treatment, noted to have slowly improving mentation.  Alert only to 1 on arrival.  Patient's labs also significant for low potassium, elevated sodium, creatinine up at 2.24, up from patient's baseline of 1.7.  UA concerning for urinary tract infection.  CT head negative for any acute intracranial abnormalities, did demonstrate diffuse atrophy and white matter changes and suspect patient with underlying dementia.  Chest x-ray negative for any acute abnormalities.  Patient has multiple bruises in various stages of healing on the skin exam.  Nursing report indicates patient frequently tries to get out of bed and falls.  Recently had presentation to the emergency department for a broken nose following a fall.  Cerebella applied in the emergency department and negative for seizure activity acutely.  Hospitalist service contacted for admission.  Patient required replacement of PEG tube while inpatient.  Following discussion with family, looking for appropriate placement in SNF.    Interval Followup: 70 some intermittent confusion at times.  Just completed EEG.  Denies abdominal pain, still having some diarrhea, tolerating p.o. however.    Objective     Vitals:    Temp:  [97.2 °F (36.2 °C)-98.6 °F (37 °C)] 98.2 °F (36.8 °C)  Heart Rate:  [67-83] 72  Resp:  [16-18] 16  BP: (114-148)/(51-61) 124/53    Physical Exam  Awake conversant in bed appears tired  CTA B on room air  RRR  Abdomen soft nontender nondistended, no guarding, peg site dressed  Alert to self, hospital, president, was not able to tell me the year, sometimes let us respond for answers  Neuro: Baseline resting tremor in hands, somewhat pill-rolling at times, hypothenar muscle wasting     Result Review:  Vital signs, labs and recent relevant imaging reviewed.      amLODIPine, 10 mg, Oral, Daily  anastrozole, 1 mg, Oral, Daily  carvedilol, 25 mg, Oral, BID  cetirizine, 10 mg, Oral, Daily  cholestyramine, 1 packet, Per G Tube, Q12H  citalopram, 10 mg, Oral, Daily  fluticasone, 2 spray, Each Nare, Daily  insulin lispro, 2-7 Units, Subcutaneous, TID With Meals  lacosamide, 100 mg, Oral, Q12H  levothyroxine, 12.5 mcg, Oral, Q AM  saccharomyces boulardii, 250 mg, Oral, BID  sodium bicarbonate, 1,300 mg, Oral, BID  sodium chloride, 10 mL, Intravenous, Q12H  vancomycin, 125 mg, Oral, Q6H        •  acetaminophen  •  dextrose  •  dextrose  •  glucagon (human recombinant)  •  nitroglycerin  •  ondansetron **OR** ondansetron  •  Pharmacy Consult  •  sodium chloride  •  sodium chloride  •  sodium chloride      Assessment / Plan     Assessment/Plan:  AMS/acute metabolic encephalopathy secondary to UTI  C. difficile diarrhea and history of chronic diarrhea  Nonconvulsive seizure with history of nonconvulsive status epilepticus  Metabolic acidosis  AUSTEN on CKD 4 baseline creatinine proxy 1.7  Hypokalemia  Hypomagnesemia  Hypernatremia  DM 2  Oropharyngeal dysphagia with chronic PEG tube and nocturnal tube feeds  Blocked PEG tube, resolved  Anemia of chronic disease  Hypertension  Depression  Hypothyroidism  History of breast cancer and prior left mastectomy  Suspect underlying dementia    -4/11 p.o. vancomycin for C. Difficile,  monitor bowel movements, cholestyramine  - Continue sodium bicarbonate until diarrhea resolved  - MRI with no acute stroke noted, some possible chronic small vessel ischemia    *Additionally evaluation for metabolic encephalopathy with EEG today, appreciate neurology assistance, follow-up results  - Vimpat was recently increased continue current dose unless further changes needed, will discuss further with Dr. Cameron  -Continue delirium precautions, suspect underlying component of dementia in addition to possible further changes needed for seizure disorder.  Needs formal outpatient dementia work-up as well is possibly considering DaTscan/monitor patient's tremor  - Flonase for sinuses and cetirizine  -Continue wound care to PEG site  - Mechanical soft nectar thick diet as per speech, nocturnal tube feeds via PEG continuing, he did cautiously monitor given patient's cough question if having some silent aspiration, continue aspiration precautions.  Discussed with speech again today continue to evaluate appreciate assistance  - Continue amlodipine and carvedilol monitor blood pressure  - Continue lacosamide, monitor for seizures/seizure precautions  - Continue SSI  - Continue citalopram  - Continue Synthroid  - Continue anastrozole  - Continue PT OT  - Continue hospital monitoring and treatment at current level of care - does not need upgraded at this time.    Pending EEG and additional treatment changes hopefully can go to [place] 1-2 days for rehab     DVT prophylaxis:  Mechanical DVT prophylaxis orders are present.    Medical Intervention Limits: NO intubation (DNI)  Code Status (Patient has no pulse and is not breathing): No CPR (Do Not Attempt to Resuscitate)  Medical Interventions (Patient has pulse or is breathing): Limited Support  Release to patient: Routine Release        CBC        4/11/2023    04:12 4/12/2023    04:43 4/13/2023    04:41   CBC   WBC 10.65   11.67   9.88     RBC 2.98   2.88   3.00      Hemoglobin 9.3   8.8   9.3     Hematocrit 26.5   25.8   26.8     MCV 88.9   89.6   89.3     MCH 31.2   30.6   31.0     MCHC 35.1   34.1   34.7     RDW 13.8   13.9   14.0     Platelets 270   281   250         CMP        4/11/2023    04:12 4/12/2023    04:43 4/13/2023    04:41   CMP   Glucose 117   132   114     BUN 47   50   49     Creatinine 1.81   2.01   1.80     EGFR 30.4   26.8   30.6     Sodium 135   137   136     Potassium 4.5   4.5   4.5     Chloride 107   108   105     Calcium 8.7   9.0   8.8     BUN/Creatinine Ratio 26.0   24.9   27.2     Anion Gap 10.9   11.5   11.4

## 2023-04-14 NOTE — PLAN OF CARE
Problem: Adult Inpatient Plan of Care  Goal: Plan of Care Review  Outcome: Ongoing, Progressing  Flowsheets (Taken 4/14/2023 1651)  Progress: no change  Plan of Care Reviewed With: patient  Outcome Evaluation: VSS. PT HAS BEEN RESTING THROUGHOUT SHIFT. PT HAD EEG. PT WILL GOING TO REHAB TOMORROW. PT STILL HAVING FREQ BOWEL MOVEMENTS. NO SEIZURE ACTIVITY THIS SHIFT.  Goal: Patient-Specific Goal (Individualized)  Outcome: Ongoing, Progressing  Goal: Absence of Hospital-Acquired Illness or Injury  Outcome: Ongoing, Progressing  Intervention: Identify and Manage Fall Risk  Recent Flowsheet Documentation  Taken 4/14/2023 1536 by Manuel Hudson RN  Safety Promotion/Fall Prevention: safety round/check completed  Taken 4/14/2023 1358 by Manuel Hudson RN  Safety Promotion/Fall Prevention: safety round/check completed  Taken 4/14/2023 1145 by Manuel Hudson RN  Safety Promotion/Fall Prevention: safety round/check completed  Taken 4/14/2023 0900 by Manuel Hudson RN  Safety Promotion/Fall Prevention: safety round/check completed  Taken 4/14/2023 0812 by Manuel Hudson RN  Safety Promotion/Fall Prevention: safety round/check completed  Taken 4/14/2023 0755 by Manuel Hudson RN  Safety Promotion/Fall Prevention: safety round/check completed  Goal: Optimal Comfort and Wellbeing  Outcome: Ongoing, Progressing  Goal: Readiness for Transition of Care  Outcome: Ongoing, Progressing     Problem: Fall Injury Risk  Goal: Absence of Fall and Fall-Related Injury  Outcome: Ongoing, Progressing  Intervention: Promote Injury-Free Environment  Recent Flowsheet Documentation  Taken 4/14/2023 1536 by Manuel Hudson RN  Safety Promotion/Fall Prevention: safety round/check completed  Taken 4/14/2023 1358 by Manuel Hudson RN  Safety Promotion/Fall Prevention: safety round/check completed  Taken 4/14/2023 1145 by Manuel Hudson RN  Safety Promotion/Fall Prevention: safety round/check completed  Taken 4/14/2023 0900 by Manuel Hudson RN  Safety  Promotion/Fall Prevention: safety round/check completed  Taken 4/14/2023 0812 by Manuel Hudson, RN  Safety Promotion/Fall Prevention: safety round/check completed  Taken 4/14/2023 0755 by Manuel Hudson, RN  Safety Promotion/Fall Prevention: safety round/check completed     Problem: Skin Injury Risk Increased  Goal: Skin Health and Integrity  Outcome: Ongoing, Progressing     Problem: Palliative Care  Goal: Enhanced Quality of Life  Outcome: Ongoing, Progressing  Goal: Enhanced Quality of Life  Outcome: Ongoing, Progressing   Goal Outcome Evaluation:  Plan of Care Reviewed With: patient        Progress: no change  Outcome Evaluation: VSS. PT HAS BEEN RESTING THROUGHOUT SHIFT. PT HAD EEG. PT WILL GOING TO REHAB TOMORROW. PT STILL HAVING FREQ BOWEL MOVEMENTS. NO SEIZURE ACTIVITY THIS SHIFT.

## 2023-04-14 NOTE — THERAPY TREATMENT NOTE
Acute Care - Speech Language Pathology   Swallow Treatment Note  Sherry     Patient Name: Lyubov Middleton  : 1955  MRN: [id number]  Today's Date: 2023               Admit Date: 4/3/2023    Visit Dx:     ICD-10-CM ICD-9-CM   1. Metabolic encephalopathy  G93.41 348.31   2. Oropharyngeal dysphagia  R13.12 787.22   3. Difficulty in walking  R26.2 719.7   4. Decreased activities of daily living (ADL)  Z78.9 V49.89     Patient Active Problem List   Diagnosis   • Renal stone   • Renal abscess   • Stage 3a chronic kidney disease   • Type 2 diabetes mellitus   • Metabolic encephalopathy   • Seizure disorder     Past Medical History:   Diagnosis Date   • Cancer     left breast removed    • Type 2 diabetes mellitus      Past Surgical History:   Procedure Laterality Date   • ABDOMINAL SURGERY     • BREAST SURGERY Left     removed due to cancer   • CHOLECYSTECTOMY     • CYSTOSCOPY W/ URETERAL STENT PLACEMENT Right 2020    Procedure: CYSTOSCOPY, RIGHT RETROGRADE PYELOGRAM, URETEROSCOPY, LASER LITHOTRIPSY, RIGHT URETERAL STENT PLACEMENT;  Surgeon: Rohan Dooley Jr., MD;  Location: [facility];  Service: Urology;  Laterality: Right;   • GASTROSTOMY W/ FEEDING TUBE     • HERNIA REPAIR      mesh removed     SPEECH PATHOLOGY DYSPHAGIA TREATMENT     Subjective/Behavioral Observations: Alert and cooperative, patient sitting up in chair, family present.     Day/time of Treatment: 23        Current Diet: Mechanical soft, nectar liquid        Current Strategies:One-on-one supervision, assist patient to feed self.  Alternate small bites and small sips of solids and liquids.  Small bite of solid with double swallow.  Small single sips of liquid.  Medications whole in applesauce.        Treatment received: Dysphagia therapy to address swallow function through exercises and education of strategies.        Results of treatment: Trials of thin liquid by cup.  Patient recalled strategy of tasting liquid  to achieve single sip.  Patient's regular drinking habit is to double sip from cup.  P.o. intake with 200 cc of thin water with coughing noted x2.  Cough was noted with significant wetness x1 after patient had taken to double sips.  Minimal cueing for single sips with patient utilizing strategy of tasting.    Patient is noted to lose attention to task at times.       Progress toward goals: Adequate      Barriers to Achieving goals: Medical status        Plan of care:/changes in plan: Continue per current plan.  Patient is at risk of aspiration with thin liquids.  Continue with mechanical soft solids with nectar thickened liquid.  Medications whole in applesauce.                                                                              Plan of Care Reviewed With: patient          EDUCATION  The patient has been educated in the following areas:   Modified Diet Instruction.              Time Calculation:    Time Calculation- SLP     Row Name 04/14/23 1241             Time Calculation- SLP    SLP Stop Time 1205  -TB      SLP Received On 04/14/23  -TB         Untimed Charges    69178-OQ Treatment Swallow Minutes 40  -TB         Total Minutes    Untimed Charges Total Minutes 40  -TB       Total Minutes 40  -TB            User Key  (r) = Recorded By, (t) = Taken By, (c) = Cosigned By    Initials Name Provider Type    TB Barbara Reilly SLP Speech and Language Pathologist                Therapy Charges for Today     Code Description Service Date Service Provider Modifiers Qty    30865468247 HC ST TREATMENT SWALLOW 3 4/13/2023 Barbara Reilly SLP GN 1    31801219621 HC ST TREATMENT SWALLOW 3 4/14/2023 Barbara Reilly SLP GN 1               ANTHONY Santamaria  4/14/2023

## 2023-04-14 NOTE — PROGRESS NOTES
[facility]   Hospitalist Progress Note    Date of admission: 4/3/2023  Patient Name: Lyubov Middleton  1955  Date: 4/13/2023      Subjective     Chief Complaint   Patient presents with   • Seizures       Summary: Lyubov Middleton is a 68 y.o. female with past medical history of diabetes mellitus, CKD 4, seizure disorder, dysphagia currently undergoing rehab at [facility] and rehab presents with altered mental status.  Per report, patient in her usual state of health in the morning presentation, however when working with therapy.  Patient indicated she was not feeling well, her eyes rolled back in her head and she became unresponsive.  Sternal rub attempted without response.  Patient brought to the emergency department for evaluation and treatment, noted to have slowly improving mentation.  Alert only to 1 on arrival.  Patient's labs also significant for low potassium, elevated sodium, creatinine up at 2.24, up from patient's baseline of 1.7.  UA concerning for urinary tract infection.  CT head negative for any acute intracranial abnormalities, did demonstrate diffuse atrophy and white matter changes and suspect patient with underlying dementia.  Chest x-ray negative for any acute abnormalities.  Patient has multiple bruises in various stages of healing on the skin exam.  Nursing report indicates patient frequently tries to get out of bed and falls.  Recently had presentation to the emergency department for a broken nose following a fall.  Cerebella applied in the emergency department and negative for seizure activity acutely.  Hospitalist service contacted for admission.  Patient required replacement of PEG tube while inpatient.  Following discussion with family, looking for appropriate placement in SNF.    Interval Followup: Still with diarrhea/semiformed at times, notes some mild nausea today.  Occasional dry cough but attributes to her sinuses.  Patient's ex-/POA present and  updated as well today.  Continued and pre-CERT still pending        Objective     Vitals:   Temp:  [97.4 °F (36.3 °C)-98.6 °F (37 °C)] 98.6 °F (37 °C)  Heart Rate:  [72-86] 83  Resp:  [18] 18  BP: (120-148)/(61-80) 148/61    Physical Exam  Awake conversant in bed more talkative and interactive today  CTAB no wheezing/ronchi/rales, on room air, occasional dry cough, nasal congestion  RRR  Abdomen soft nontender nondistended, no guarding, peg site dressed  More appropriate alert and interactive today, still with poor overall health literacy   Moves all extremities spontaneously    Result Review:  Vital signs, labs and recent relevant imaging reviewed.      amLODIPine, 10 mg, Oral, Daily  anastrozole, 1 mg, Oral, Daily  carvedilol, 25 mg, Oral, BID  cholestyramine, 1 packet, Per G Tube, Q12H  citalopram, 10 mg, Oral, Daily  insulin lispro, 2-7 Units, Subcutaneous, TID With Meals  lacosamide, 100 mg, Oral, Q12H  levothyroxine, 12.5 mcg, Oral, Q AM  saccharomyces boulardii, 250 mg, Oral, BID  sodium bicarbonate, 1,300 mg, Oral, BID  sodium chloride, 10 mL, Intravenous, Q12H  vancomycin, 125 mg, Oral, Q6H        •  acetaminophen  •  dextrose  •  dextrose  •  glucagon (human recombinant)  •  nitroglycerin  •  ondansetron **OR** ondansetron  •  Pharmacy Consult  •  sodium chloride  •  sodium chloride  •  sodium chloride      Assessment / Plan     Assessment/Plan:  AMS/acute metabolic encephalopathy secondary to UTI  C. difficile diarrhea and history of chronic diarrhea  Nonconvulsive seizure with history of nonconvulsive status epilepticus  Metabolic acidosis  AUSTEN on CKD 4 baseline creatinine proxy 1.7  Hypokalemia  Hypomagnesemia  Hypernatremia  DM 2  Oropharyngeal dysphagia with chronic PEG tube and nocturnal tube feeds  Blocked PEG tube, resolved  Anemia of chronic disease  Hypertension  Depression  Hypothyroidism  History of breast cancer and prior left mastectomy  Suspect underlying dementia    -4/11 p.o. vancomycin  for C. Difficile, white count improved today, still having notable diarrhea, will add a few days of cholestyramine with holding parameters as has constipation.  - Continue sodium bicarbonate, bicarb still low but improving  - MRI with no acute stroke noted, some possible chronic small vessel ischemia infarction noted in some sinus congestion, started on Flonase, discussed with neurology for follow-up given incidental reports of possible subacute hemorrhage related and T2/flair hyperintensities throughout the cerebral white matter- neurology do not suspect any acute abnormalities recommends increasing Vimpat and follow-up with Dr. Cameron in 1 month for seizures and further mental status monitoring.  - Discussed my concerns of early dementia with patient's poa/ex , questions addressed, still working on rehab placement  - Add Flonase for sinuses and cetirizine  -Continue wound care to PEG site  - Mechanical soft nectar thick diet as per speech, nocturnal tube feeds via PEG continuing, he did cautiously monitor given patient's cough question if having some silent aspiration, continue aspiration precautions.  - Continue amlodipine and carvedilol monitor blood pressure  - Continue lacosamide, monitor for seizures/seizure precautions  - Continue SSI  - Continue citalopram  - Continue Synthroid  - Continue anastrozole  - Continue PT OT  - Check a.m. CBC, BMP, magnesium, phosphorus  - Continue hospital monitoring and treatment at current level of care - does not need upgraded at this time.    Govind pre-CERT pending continued treatment and monitoring as above    DVT prophylaxis:  Mechanical DVT prophylaxis orders are present.    Medical Intervention Limits: NO intubation (DNI)  Code Status (Patient has no pulse and is not breathing): No CPR (Do Not Attempt to Resuscitate)  Medical Interventions (Patient has pulse or is breathing): Limited Support  Release to patient: Routine Release        CBC        4/11/2023     04:12 4/12/2023    04:43 4/13/2023    04:41   CBC   WBC 10.65   11.67   9.88     RBC 2.98   2.88   3.00     Hemoglobin 9.3   8.8   9.3     Hematocrit 26.5   25.8   26.8     MCV 88.9   89.6   89.3     MCH 31.2   30.6   31.0     MCHC 35.1   34.1   34.7     RDW 13.8   13.9   14.0     Platelets 270   281   250         CMP        4/11/2023    04:12 4/12/2023    04:43 4/13/2023    04:41   CMP   Glucose 117   132   114     BUN 47   50   49     Creatinine 1.81   2.01   1.80     EGFR 30.4   26.8   30.6     Sodium 135   137   136     Potassium 4.5   4.5   4.5     Chloride 107   108   105     Calcium 8.7   9.0   8.8     BUN/Creatinine Ratio 26.0   24.9   27.2     Anion Gap 10.9   11.5   11.4         At least 50min spent on pt care coordination

## 2023-04-14 NOTE — THERAPY RE-EVALUATION
Acute Care - Physical Therapy Re-Evaluation   [facility]     Patient Name: Lyubov Middleton  : 1955  MRN: [id number]  Today's Date: 2023      Visit Dx:     ICD-10-CM ICD-9-CM   1. Metabolic encephalopathy  G93.41 348.31   2. Oropharyngeal dysphagia  R13.12 787.22   3. Difficulty in walking  R26.2 719.7   4. Decreased activities of daily living (ADL)  Z78.9 V49.89     Patient Active Problem List   Diagnosis   • Renal stone   • Renal abscess   • Stage 3a chronic kidney disease   • Type 2 diabetes mellitus   • Metabolic encephalopathy   • Seizure disorder     Past Medical History:   Diagnosis Date   • Cancer     left breast removed    • Type 2 diabetes mellitus      Past Surgical History:   Procedure Laterality Date   • ABDOMINAL SURGERY     • BREAST SURGERY Left     removed due to cancer   • CHOLECYSTECTOMY     • CYSTOSCOPY W/ URETERAL STENT PLACEMENT Right 2020    Procedure: CYSTOSCOPY, RIGHT RETROGRADE PYELOGRAM, URETEROSCOPY, LASER LITHOTRIPSY, RIGHT URETERAL STENT PLACEMENT;  Surgeon: Rohan Dooley Jr., MD;  Location: [facility];  Service: Urology;  Laterality: Right;   • GASTROSTOMY W/ FEEDING TUBE     • HERNIA REPAIR      mesh removed     PT Assessment (last 12 hours)     PT Evaluation and Treatment     Row Name 23          Physical Therapy Time and Intention    Subjective Information no complaints (P)   -SW     Document Type re-evaluation (P)   -SW     Mode of Treatment individual therapy;physical therapy (P)   -SW     Patient Effort adequate (P)   -SW     Symptoms Noted During/After Treatment other (see comments) (P)   confusion  -SW     Row Name 23          Pain    Pretreatment Pain Rating 0/10 - no pain (P)   -SW     Posttreatment Pain Rating 0/10 - no pain (P)   -SW     Row Name 23          Cognition    Affect/Mental Status (Cognition) confused;low arousal/lethargic (P)   -SW     Follows Commands (Cognition) follows one-step commands;25-49%  accuracy (P)   -     Row Name 04/14/23 0900          Strength (Manual Muscle Testing)    Strength (Manual Muscle Testing) -- (P)   Bilateral LE: grossly 4-/5, pt had difficulty following MMT prompts  -     Row Name 04/14/23 0900          Bed Mobility    Bed Mobility bed mobility (all) activities (P)   -     All Activities, [place] (Bed Mobility) moderate assist (50% patient effort);other (see comments) (P)   Pt had difficulty following commands  -     Row Name 04/14/23 0900          Transfers    Transfers bed-chair transfer;sit-stand transfer;stand-sit transfer (P)   -     Row Name 04/14/23 0900          Bed-Chair Transfer    Bed-Chair [place] (Transfers) minimum assist (75% patient effort);verbal cues;other (see comments) (P)   manual and weightshifting cues, pt had difficulty following commands  -     Assistive Device (Bed-Chair Transfers) walker, front-wheeled (P)   -     Row Name 04/14/23 0900          Sit-Stand Transfer    Sit-Stand [place] (Transfers) minimum assist (75% patient effort) (P)   -SW     Assistive Device (Sit-Stand Transfers) walker, front-wheeled (P)   -     Row Name 04/14/23 0900          Stand-Sit Transfer    Stand-Sit [place] (Transfers) minimum assist (75% patient effort) (P)   -SW     Assistive Device (Stand-Sit Transfers) walker, front-wheeled (P)   -     Row Name 04/14/23 0900          Gait/Stairs (Locomotion)    Gait/Stairs Locomotion gait/ambulation independence;gait/ambulation assistive device;distance ambulated (P)   -     [place] Level (Gait) minimum assist (75% patient effort) (P)   -SW     Assistive Device (Gait) walker, front-wheeled (P)   -     Distance in Feet (Gait) 5 (P)   -SW     Pattern (Gait) 4-point;step-to (P)   -SW     Deviations/Abnormal Patterns (Gait) base of support, narrow;festinating/shuffling;gait speed decreased (P)   -SW     Bilateral Gait Deviations forward flexed posture;heel strike decreased (P)   -     Row Name  04/14/23 0900          Safety Issues, Functional Mobility    Impairments Affecting Function (Mobility) balance;coordination;endurance/activity tolerance;strength;cognition (P)   -SW     Cognitive Impairments, Mobility Safety/Performance attention;sequencing abilities (P)   -SW     Row Name 04/14/23 0900          Balance    Balance Assessment standing dynamic balance (P)   -SW     Dynamic Standing Balance minimal assist (P)   -SW     Position/Device Used, Standing Balance walker, front-wheeled (P)   -SW     Row Name             Wound 04/03/23 1622 Right labia    Wound - Properties Group Placement Date: 04/03/23  -AC Placement Time: 1622  -AC Present on Hospital Admission: Y  -AC Side: Right  -AC Location: labia  -AC    Retired Wound - Properties Group Placement Date: 04/03/23  -AC Placement Time: 1622  -AC Present on Hospital Admission: Y  -AC Side: Right  -AC Location: labia  -AC    Retired Wound - Properties Group Date first assessed: 04/03/23  -AC Time first assessed: 1622  -AC Present on Hospital Admission: Y  -AC Side: Right  -AC Location: labia  -AC    Row Name             Wound 04/03/23 1628 sacral spine    Wound - Properties Group Placement Date: 04/03/23  -AC Placement Time: 1628  -AC Present on Hospital Admission: Y  -AC Location: sacral spine  -AC    Retired Wound - Properties Group Placement Date: 04/03/23  -AC Placement Time: 1628  -AC Present on Hospital Admission: Y  -AC Location: sacral spine  -AC    Retired Wound - Properties Group Date first assessed: 04/03/23  -AC Time first assessed: 1628  -AC Present on Hospital Admission: Y  -AC Location: sacral spine  -AC    Row Name             Wound 04/03/23 1629 Bilateral gluteal    Wound - Properties Group Placement Date: 04/03/23  -AC Placement Time: 1629  -AC Present on Hospital Admission: Y  -AC Side: Bilateral  -AC Location: gluteal  -AC    Retired Wound - Properties Group Placement Date: 04/03/23  -AC Placement Time: 1629  -AC Present on Hospital  Admission: Y  -AC Side: Bilateral  -AC Location: gluteal  -AC    Retired Wound - Properties Group Date first assessed: 04/03/23  -AC Time first assessed: 1629  -AC Present on Hospital Admission: Y  -AC Side: Bilateral  -AC Location: gluteal  -AC    Row Name             Wound 04/03/23 1630 Bilateral posterior greater trochanter    Wound - Properties Group Placement Date: 04/03/23  -AC Placement Time: 1630  -AC Present on Hospital Admission: Y  -AC Side: Bilateral  -AC Orientation: posterior  -AC Location: greater trochanter  -AC    Retired Wound - Properties Group Placement Date: 04/03/23  -AC Placement Time: 1630  -AC Present on Hospital Admission: Y  -AC Side: Bilateral  -AC Orientation: posterior  -AC Location: greater trochanter  -AC    Retired Wound - Properties Group Date first assessed: 04/03/23  -AC Time first assessed: 1630  -AC Present on Hospital Admission: Y  -AC Side: Bilateral  -AC Location: greater trochanter  -AC    Row Name 04/14/23 0900          Positioning and Restraints    Pre-Treatment Position in bed (P)   -SW     Post Treatment Position chair (P)   -SW     In Chair reclined;call light within reach;with other staff;waffle cushion (P)   -SW     Row Name 04/14/23 0900          Therapy Assessment/Plan (PT)    Rehab Potential (PT) fair, will monitor progress closely (P)   -SW     Criteria for Skilled Interventions Met (PT) skilled treatment is necessary (P)   -SW     Therapy Frequency (PT) daily (P)   -SW     Predicted Duration of Therapy Intervention (PT) 10 days (P)   -SW     Problem List (PT) problems related to;balance;coordination;mobility;range of motion (ROM);strength;cognition (P)   -SW     Activity Limitations Related to Problem List (PT) unable to ambulate safely;unable to transfer safely (P)   -SW     Row Name 04/14/23 0900          Progress Summary (PT)    Progress Toward Functional Goals (PT) progress toward functional goals is gradual (P)   -SW     Daily Progress Summary (PT) Pt  tolerated ambulating 5 feet to chair with minimal difficulty, but required both verbal, manual, and weightshifting cues to perform due to confusion. Pt was confused and had difficulty following one-step commands. Pt would continue to benefit from skilled phsyical therapy services while in the hospital. Recommend discharge to inpatient rehab. (P)   -QUITA Olivares Name 04/14/23 0900          Therapy Plan Review/Discharge Plan (PT)    Therapy Plan Review (PT) evaluation/treatment results reviewed;participants included;patient (P)   -QUITA Olivares Name 04/14/23 0900          Physical Therapy Goals    Bed Mobility Goal Selection (PT) bed mobility, PT goal 1 (P)   -SW     Transfer Goal Selection (PT) transfer, PT goal 1 (P)   -SW     Gait Training Goal Selection (PT) gait training, PT goal 1 (P)   -SW     Strength Goal Selection (PT) strength, PT goal 1 (P)   -QUITA Olivares Name 04/14/23 0900          Bed Mobility Goal 1 (PT)    Activity/Assistive Device (Bed Mobility Goal 1, PT) bed mobility activities, all (P)   -SW     [place] Level/Cues Needed (Bed Mobility Goal 1, PT) standby assist (P)   -SW     Time Frame (Bed Mobility Goal 1, PT) long term goal (LTG);10 days (P)   -SW     Progress/Outcomes (Bed Mobility Goal 1, PT) goal not met;continuing progress toward goal (P)   -QUITA Olivares Name 04/14/23 0900          Transfer Goal 1 (PT)    Activity/Assistive Device (Transfer Goal 1, PT) transfers, all (P)   -SW     [place] Level/Cues Needed (Transfer Goal 1, PT) standby assist (P)   -SW     Time Frame (Transfer Goal 1, PT) long term goal (LTG);10 days (P)   -SW     Progress/Outcome (Transfer Goal 1, PT) goal not met;continuing progress toward goal (P)   -QUITA Olivares Name 04/14/23 0900          Gait Training Goal 1 (PT)    Activity/Assistive Device (Gait Training Goal 1, PT) gait (walking locomotion);assistive device use;walker, rolling (P)   -SW     [place] Level (Gait Training Goal 1, PT) contact guard required (P)    -     Distance (Gait Training Goal 1, PT) 20 feet (P)   -     Time Frame (Gait Training Goal 1, PT) long term goal (LTG);10 days (P)   -     Progress/Outcome (Gait Training Goal 1, PT) new goal (P)   -     Row Name 04/14/23 0900          Strength Goal 1 (PT)    Strength Goal 1 (PT) Pt will demonstrate gross bilateral LE strength of 4+/5. (P)   -     Time Frame (Strength Goal 1, PT) long term goal (LTG);10 days (P)   -     Progress/Outcome (Strength Goal 1, PT) goal not met;continuing progress toward goal (P)   -           User Key  (r) = Recorded By, (t) = Taken By, (c) = Cosigned By    Initials Name Provider Type    AC Jessica Glez, RN Registered Nurse    Rosalinda Braun, PT Student PT Student                Physical Therapy Education     Title: PT OT SLP Therapies (Done)     Topic: Physical Therapy (Done)     Point: Mobility training (Done)     Learning Progress Summary           Patient Acceptance, E,TB, VU,NR by  at 4/6/2023 0434    Acceptance, E,TB, VU by  at 4/4/2023 1113                   Point: Precautions (Done)     Learning Progress Summary           Patient Acceptance, E,TB, VU,NR by  at 4/6/2023 0434    Acceptance, E,TB, VU by  at 4/4/2023 1113                               User Key     Initials Effective Dates Name Provider Type Discipline     06/08/21 -  Moreno Longoria RN Registered Nurse Nurse     03/07/23 -  Rosalinda Peacock, PT Student PT Student PT              PT Recommendation and Plan  Anticipated Discharge Disposition (PT): (P) inpatient rehabilitation facility  Planned Therapy Interventions (PT): (P) balance training, bed mobility training, gait training, ROM (range of motion), strengthening, stretching, transfer training  Therapy Frequency (PT): (P) daily  Progress Summary (PT)  Progress Toward Functional Goals (PT): (P) progress toward functional goals is gradual  Daily Progress Summary (PT): (P) Pt tolerated ambulating 5 feet to chair with minimal  difficulty, but required both verbal, manual, and weightshifting cues to perform due to confusion. Pt was confused and had difficulty following one-step commands. Pt would continue to benefit from skilled phsyical therapy services while in the hospital. Recommend discharge to inpatient rehab.  Plan of Care Reviewed With: patient  Outcome Evaluation: Pt is currently having difficulty walking, has decreased strength, impaired balance, increased dizziness, and poor coordination. Pt would benefit from skilled PT intervention. Recommend discharge to inpatient rehab.   Outcome Measures     Row Name 04/14/23 0900 04/12/23 1100          How much help from another person do you currently need...    Turning from your back to your side while in flat bed without using bedrails? 3 (P)   -SW 3  -TOSHA     Moving from lying on back to sitting on the side of a flat bed without bedrails? 3 (P)   -SW 3  -TOSHA     Moving to and from a bed to a chair (including a wheelchair)? 3 (P)   -SW 3  -TOSHA     Standing up from a chair using your arms (e.g., wheelchair, bedside chair)? 3 (P)   -SW 3  -TOSHA     Climbing 3-5 steps with a railing? 2 (P)   -SW 2  -TOSHA     To walk in hospital room? 3 (P)   -SW 3  -TOSHA     AM-PAC 6 Clicks Score (PT) 17 (P)   -SW 17  -TOSHA        Functional Assessment    Outcome Measure Options -- AM-PAC 6 Clicks Basic Mobility (PT)  -TOSHA           User Key  (r) = Recorded By, (t) = Taken By, (c) = Cosigned By    Initials Name Provider Type    TOSHA Baudilio Sanchez, PT Physical Therapist    Rosalinda Braun, PT Student PT Student                 Time Calculation:    PT Charges     Row Name 04/14/23 0931             Time Calculation    PT Received On 04/14/23 (P)   -SW      PT Goal Re-Cert Due Date 04/23/23 (P)   -SW         Untimed Charges    PT Eval/Re-eval Minutes 25 (P)   -SW         Total Minutes    Untimed Charges Total Minutes 25 (P)   -SW       Total Minutes 25 (P)   -SW            User Key  (r) = Recorded By, (t) = Taken  By, (c) = Cosigned By    Initials Name Provider Type    SW Rosalinda Peacock, PT Student PT Student              Therapy Charges for Today     Code Description Service Date Service Provider Modifiers Qty    [id number] HC PT RE-EVAL ESTABLISHED PLAN 2 4/14/2023 Rosalinda Peacock, PT Student GP 1          PT G-Codes  Outcome Measure Options: AM-PAC 6 Clicks Daily Activity (OT), Optimal Instrument  AM-PAC 6 Clicks Score (PT): (P) 17  AM-PAC 6 Clicks Score (OT): 14    Rosalinda Peacock PT Student  4/14/2023

## 2023-04-14 NOTE — PROGRESS NOTES
[facility]   Neurology Progress Note    Patient Name: Lyubov Middleton  : 1955  MRN: [id number]  Primary Care Physician:  Ana Huitron MD  Referring Physician: No ref. provider found  Date of admission: 4/3/2023    Subjective   Subjective     Reason for Consult/ Chief Complaint: Follow-up visit for seizure and confusion    HPI:  Lyubov Middleton is a 68 y.o. female follow-up visit for seizure and confusion.  Patient had an MRI of the brain yesterday for confusion in which there is no acute brain abnormality noted.  There may be mild to moderate chronic small vessel ischemia/infarction.  She has been confused intermittently since I saw her 10 days ago.        Objective     Vitals:   Temp:  [97.4 °F (36.3 °C)-98.6 °F (37 °C)] 98.6 °F (37 °C)  Heart Rate:  [72-86] 83  Resp:  [18] 18  BP: (120-148)/(61-80) 148/61    Physical Exam: She tells me she lives in [facility].  She given the right address.  She tells me that her children are around next-door grandmother's house.  She is disoriented.  She is unable to tell me that she is in [place].  She tells me that she is at home.  I oriented her that she was in [place] in a nursing home rehab facility.  She has no recollection that she has a history of seizures.      Result Review    Result Review:  I have personally reviewed the results from the time of this admission to 2023 21:33 EDT and agree with these findings:  []  Laboratory  []  Microbiology  [x]  Radiology  []  EKG/Telemetry   []  Cardiology/Vascular   []  Pathology  [x]  Old records  [x]  Other:      Assessment & Plan   Assessment / Plan   Active Hospital Problems:  Active Hospital Problems    Diagnosis    • **Metabolic encephalopathy    • Seizure disorder          Plan: She continues to have metabolic encephalopathy.  I will repeat the EEG tomorrow.  The MRI of the brain is non diagnostic for her present condition.    Total time spent with the patient and coordinating  patient care was 25 minutes.    electronically signed by Gaurav Cameron MD, 04/13/23, 9:33 PM EDT.

## 2023-04-15 ENCOUNTER — APPOINTMENT (OUTPATIENT)
Dept: GENERAL RADIOLOGY | Facility: HOSPITAL | Age: 68
DRG: 371 | End: 2023-04-15
Payer: MEDICARE

## 2023-04-15 LAB
ANION GAP SERPL CALCULATED.3IONS-SCNC: 12.2 MMOL/L (ref 5–15)
BASOPHILS # BLD AUTO: 0.03 10*3/MM3 (ref 0–0.2)
BASOPHILS NFR BLD AUTO: 0.4 % (ref 0–1.5)
BUN SERPL-MCNC: 46 MG/DL (ref 8–23)
BUN/CREAT SERPL: 24.3 (ref 7–25)
CALCIUM SPEC-SCNC: 8.6 MG/DL (ref 8.6–10.5)
CHLORIDE SERPL-SCNC: 107 MMOL/L (ref 98–107)
CO2 SERPL-SCNC: 20.8 MMOL/L (ref 22–29)
CREAT SERPL-MCNC: 1.89 MG/DL (ref 0.57–1)
DEPRECATED RDW RBC AUTO: 45.8 FL (ref 37–54)
EGFRCR SERPLBLD CKD-EPI 2021: 28.8 ML/MIN/1.73
EOSINOPHIL # BLD AUTO: 0.27 10*3/MM3 (ref 0–0.4)
EOSINOPHIL NFR BLD AUTO: 3.2 % (ref 0.3–6.2)
ERYTHROCYTE [DISTWIDTH] IN BLOOD BY AUTOMATED COUNT: 13.9 % (ref 12.3–15.4)
GLUCOSE BLDC GLUCOMTR-MCNC: 119 MG/DL (ref 70–99)
GLUCOSE BLDC GLUCOMTR-MCNC: 134 MG/DL (ref 70–99)
GLUCOSE BLDC GLUCOMTR-MCNC: 92 MG/DL (ref 70–99)
GLUCOSE SERPL-MCNC: 83 MG/DL (ref 65–99)
HCT VFR BLD AUTO: 25.5 % (ref 34–46.6)
HGB BLD-MCNC: 8.9 G/DL (ref 12–15.9)
IMM GRANULOCYTES # BLD AUTO: 0.03 10*3/MM3 (ref 0–0.05)
IMM GRANULOCYTES NFR BLD AUTO: 0.4 % (ref 0–0.5)
LYMPHOCYTES # BLD AUTO: 2.11 10*3/MM3 (ref 0.7–3.1)
LYMPHOCYTES NFR BLD AUTO: 24.6 % (ref 19.6–45.3)
MAGNESIUM SERPL-MCNC: 1.8 MG/DL (ref 1.6–2.4)
MCH RBC QN AUTO: 31.7 PG (ref 26.6–33)
MCHC RBC AUTO-ENTMCNC: 34.9 G/DL (ref 31.5–35.7)
MCV RBC AUTO: 90.7 FL (ref 79–97)
MONOCYTES # BLD AUTO: 0.44 10*3/MM3 (ref 0.1–0.9)
MONOCYTES NFR BLD AUTO: 5.1 % (ref 5–12)
NEUTROPHILS NFR BLD AUTO: 5.69 10*3/MM3 (ref 1.7–7)
NEUTROPHILS NFR BLD AUTO: 66.3 % (ref 42.7–76)
NRBC BLD AUTO-RTO: 0 /100 WBC (ref 0–0.2)
PHOSPHATE SERPL-MCNC: 4.8 MG/DL (ref 2.5–4.5)
PLATELET # BLD AUTO: 232 10*3/MM3 (ref 140–450)
PMV BLD AUTO: 9.7 FL (ref 6–12)
POTASSIUM SERPL-SCNC: 3.7 MMOL/L (ref 3.5–5.2)
RBC # BLD AUTO: 2.81 10*6/MM3 (ref 3.77–5.28)
SODIUM SERPL-SCNC: 140 MMOL/L (ref 136–145)
WBC NRBC COR # BLD: 8.57 10*3/MM3 (ref 3.4–10.8)

## 2023-04-15 PROCEDURE — 84100 ASSAY OF PHOSPHORUS: CPT | Performed by: INTERNAL MEDICINE

## 2023-04-15 PROCEDURE — 25010000002 MAGNESIUM SULFATE 2 GM/50ML SOLUTION: Performed by: INTERNAL MEDICINE

## 2023-04-15 PROCEDURE — 80048 BASIC METABOLIC PNL TOTAL CA: CPT | Performed by: INTERNAL MEDICINE

## 2023-04-15 PROCEDURE — 94799 UNLISTED PULMONARY SVC/PX: CPT

## 2023-04-15 PROCEDURE — 82962 GLUCOSE BLOOD TEST: CPT

## 2023-04-15 PROCEDURE — 25010000002 HEPARIN (PORCINE) PER 1000 UNITS: Performed by: INTERNAL MEDICINE

## 2023-04-15 PROCEDURE — BD12YZZ FLUOROSCOPY OF STOMACH USING OTHER CONTRAST: ICD-10-PCS | Performed by: RADIOLOGY

## 2023-04-15 PROCEDURE — 85025 COMPLETE CBC W/AUTO DIFF WBC: CPT | Performed by: INTERNAL MEDICINE

## 2023-04-15 PROCEDURE — 49465 FLUORO EXAM OF G/COLON TUBE: CPT

## 2023-04-15 PROCEDURE — 0 DIATRIZOATE MEGLUMINE & SODIUM PER 1 ML: Performed by: INTERNAL MEDICINE

## 2023-04-15 PROCEDURE — 99233 SBSQ HOSP IP/OBS HIGH 50: CPT | Performed by: INTERNAL MEDICINE

## 2023-04-15 PROCEDURE — 83735 ASSAY OF MAGNESIUM: CPT | Performed by: INTERNAL MEDICINE

## 2023-04-15 PROCEDURE — 0D20XUZ CHANGE FEEDING DEVICE IN UPPER INTESTINAL TRACT, EXTERNAL APPROACH: ICD-10-PCS | Performed by: INTERNAL MEDICINE

## 2023-04-15 RX ORDER — HEPARIN SODIUM 5000 [USP'U]/ML
5000 INJECTION, SOLUTION INTRAVENOUS; SUBCUTANEOUS EVERY 8 HOURS SCHEDULED
Status: DISCONTINUED | OUTPATIENT
Start: 2023-04-15 | End: 2023-04-17

## 2023-04-15 RX ORDER — MAGNESIUM SULFATE HEPTAHYDRATE 40 MG/ML
2 INJECTION, SOLUTION INTRAVENOUS ONCE
Status: COMPLETED | OUTPATIENT
Start: 2023-04-15 | End: 2023-04-15

## 2023-04-15 RX ADMIN — LEVOTHYROXINE SODIUM 12.5 MCG: 0.03 TABLET ORAL at 05:47

## 2023-04-15 RX ADMIN — SODIUM BICARBONATE 650 MG TABLET 1300 MG: at 08:24

## 2023-04-15 RX ADMIN — Medication 10 ML: at 21:54

## 2023-04-15 RX ADMIN — AMLODIPINE BESYLATE 10 MG: 5 TABLET ORAL at 08:24

## 2023-04-15 RX ADMIN — CETIRIZINE HYDROCHLORIDE 10 MG: 10 TABLET, FILM COATED ORAL at 08:24

## 2023-04-15 RX ADMIN — VANCOMYCIN HYDROCHLORIDE 125 MG: 125 CAPSULE ORAL at 05:47

## 2023-04-15 RX ADMIN — SODIUM BICARBONATE 650 MG TABLET 1300 MG: at 21:54

## 2023-04-15 RX ADMIN — CITALOPRAM HYDROBROMIDE 10 MG: 20 TABLET ORAL at 08:25

## 2023-04-15 RX ADMIN — LACOSAMIDE 100 MG: 10 SOLUTION ORAL at 21:54

## 2023-04-15 RX ADMIN — Medication 250 MG: at 21:54

## 2023-04-15 RX ADMIN — HEPARIN SODIUM 5000 UNITS: 5000 INJECTION INTRAVENOUS; SUBCUTANEOUS at 13:06

## 2023-04-15 RX ADMIN — MAGNESIUM SULFATE HEPTAHYDRATE 2 G: 2 INJECTION, SOLUTION INTRAVENOUS at 11:22

## 2023-04-15 RX ADMIN — LACOSAMIDE 100 MG: 10 SOLUTION ORAL at 08:24

## 2023-04-15 RX ADMIN — VANCOMYCIN HYDROCHLORIDE 125 MG: 125 CAPSULE ORAL at 13:05

## 2023-04-15 RX ADMIN — ANASTROZOLE 1 MG: 1 TABLET ORAL at 08:25

## 2023-04-15 RX ADMIN — CARVEDILOL 25 MG: 25 TABLET, FILM COATED ORAL at 08:24

## 2023-04-15 RX ADMIN — Medication 250 MG: at 08:25

## 2023-04-15 RX ADMIN — CHOLESTYRAMINE 1 PACKET: 4 POWDER, FOR SUSPENSION ORAL at 21:54

## 2023-04-15 RX ADMIN — CARVEDILOL 25 MG: 25 TABLET, FILM COATED ORAL at 21:54

## 2023-04-15 RX ADMIN — CHOLESTYRAMINE 1 PACKET: 4 POWDER, FOR SUSPENSION ORAL at 08:24

## 2023-04-15 RX ADMIN — VANCOMYCIN HYDROCHLORIDE 125 MG: 125 CAPSULE ORAL at 17:18

## 2023-04-15 RX ADMIN — HEPARIN SODIUM 5000 UNITS: 5000 INJECTION INTRAVENOUS; SUBCUTANEOUS at 21:54

## 2023-04-15 RX ADMIN — DIATRIZOATE MEGLUMINE AND DIATRIZOATE SODIUM 30 ML: 660; 100 LIQUID ORAL; RECTAL at 11:30

## 2023-04-15 NOTE — PLAN OF CARE
Goal Outcome Evaluation:           Progress: no change  Outcome Evaluation: vss. no sign of pain/discomfort. rested well. will continue to monitor.

## 2023-04-15 NOTE — PLAN OF CARE
Goal Outcome Evaluation:              Outcome Evaluation: Pt vss. Pt resting well. Pt had PEG tube replaced. Feedings to be resumed tonight. Pt alert to self. Will continue to monitor.

## 2023-04-15 NOTE — BRIEF OP NOTE
Progress Note    Lyubov Middleton      Pre-op Diagnosis:   Malfunction of PEG tube       Post-Op Diagnosis Codes:  Placement and replacement of PEG tube    Procedure/CPT® Codes:    PEG tube replacement    At the bedside patient previous PEG site had a Fernandez catheter in it we deflated the balloon and then replaced it with a 18 Korean tube B SCOTT  Balloon was inflated to 18 cc        Estimated Blood Loss: * No surgery found *    Specimens:                None          Drains:   Gastrostomy/Enterostomy  1 16 Fr. RUQ (Active)   Surrounding Skin Dry;Intact 04/15/23 0824   Securement taped to abdomen 04/15/23 0824   Drain Status Clamped 04/15/23 0824   Gastrostomy/Enterostomy Site Interventions Site assessed 04/15/23 0813   Tube Feeding Frequency Other (Comment) 04/14/23 2330       [REMOVED] Closed/Suction Drain Right;Lateral RLQ Pigtail 8 Fr. (Removed)       [REMOVED] Gastrostomy/Enterostomy Gastrostomy LLQ (Removed)   Surrounding Skin Dry 04/14/23 0812   Securement sutured to abdomen 04/14/23 0812   Drain Status Other (Comment);Irrigated 04/13/23 1945   Drainage Appearance None 04/14/23 0812   Site Description Healing 04/14/23 0812   Gastrostomy/Enterostomy Site Interventions Site assessed 04/14/23 0812   Dressing Status Dry;Intact;Clean 04/14/23 0812   Dressing Intervention Dressing changed 04/12/23 1130   Dressing Type Special type 04/12/23 1130   Tube Feeding Frequency Intermittent 04/14/23 0812   Tube Feeding Product Diabetisource 04/14/23 0812   Tube Feeding Strength Full strength 04/14/23 0812   Tube Feeding Method Intermittent 04/14/23 0812   Tube Feeding Rate (mL) 90 mL 04/14/23 0812   Tube Feeding Bag Changed Yes 04/13/23 1945   Tube Feeding Residual (mL) 0 mL 04/14/23 0812   Tube Feeding Residual Returned (mL) 0 mL 04/14/23 0812   Feeding Tube Flushed With Tap water 04/14/23 0812   Flush/ Irrigation Intake (mL) 75 04/14/23 0812   Tube Feeding Intake (mL) 1000 04/14/23 0812   Intake (mL) 975 mL 04/11/23 0600        Findings: Replacement of PEG tube with 18 [place] placement tube        Complications: None    I have scheduled a tube check by radiology and then can restart tube feedings depending on that in the meantime hold tube feedings          Hawk Napier MD     Date: 4/15/2023  Time: 10:57 EDT

## 2023-04-15 NOTE — PROGRESS NOTES
[facility]   Hospitalist Progress Note    Date of admission: 4/3/2023  Patient Name: Lyubov Middleton  1955  Date: 4/15/2023      Subjective     Chief Complaint   Patient presents with   • Seizures       Summary: Lyubov Middleton is a 68 y.o. female with past medical history of diabetes mellitus, CKD 4, seizure disorder (history of status epilepticus), dysphagia currently undergoing rehab at [facility] and rehab presents with altered mental status -reportedly had an episode while working with therapy where she became unresponsive and her eyes rolled to the back of her head she was nonresponsive to sternal rub and brought to the ED for further evaluation at that time.  Initially ANO x1 in the emergency department.  Treated initially for hypokalemia, AUSTEN on CKD, UTI.  CT head negative for any acute intracranial abnormalities, did demonstrate diffuse atrophy and white matter changes and suspect patient with underlying dementia.  Chest x-ray negative for any acute abnormalities.      Hospital course complicated by C. difficile treated with p.o. vancomycin and intermittent levels of confusion/encephalopathy, EEGs have been obtained both are negative for acute seizure activity but notable for encephalopathy, neurology is assisting in patient's Vimpat dose was increased.  She is pending LP on 4/17 to evaluate for paraneoplastic syndrome/alternative etiologies.      Patient required replacement of PEG tube while inpatient initially and subsequently had another episode of pulling out her PEG tube on 4/14 and GI replaced it with an 18 [place] tube on 4/15.    Ultimately will need long-term care placement pending at skilled nursing facility based on results/further work-up of LP.    Interval Followup: PEG tube removed yesterday by accident by patient's as above.  Has happened before.  She does not recall how this happened.  Intermittent confusion, may be slightly better today.  she still having some  diarrhea slightly improved as well.    Objective     Vitals:   Temp:  [98 °F (36.7 °C)-98.6 °F (37 °C)] 98 °F (36.7 °C)  Heart Rate:  [53-88] 53  Resp:  [16-18] 18  BP: (102-143)/(43-69) 143/65    Physical Exam  Awake conversant in bed appears more interactive today  CTA B on room air  RRR  Abdomen soft nontender nondistended, no guarding, peg site dressed, no bleeding or obvious bruising noted  AOx3 currently, poor overall health literacy but is more appropriate than yesterday with answers  Neuro: Baseline resting tremor in hands, somewhat pill-rolling at times, hypothenar muscle wasting     Result Review:  Vital signs, labs and recent relevant imaging reviewed.      amLODIPine, 10 mg, Oral, Daily  anastrozole, 1 mg, Oral, Daily  carvedilol, 25 mg, Oral, BID  cetirizine, 10 mg, Oral, Daily  cholestyramine, 1 packet, Per G Tube, Q12H  citalopram, 10 mg, Oral, Daily  fluticasone, 2 spray, Each Nare, Daily  heparin (porcine), 5,000 Units, Subcutaneous, Q8H  insulin lispro, 2-7 Units, Subcutaneous, TID With Meals  lacosamide, 100 mg, Oral, Q12H  levothyroxine, 12.5 mcg, Oral, Q AM  saccharomyces boulardii, 250 mg, Oral, BID  sodium bicarbonate, 1,300 mg, Oral, BID  sodium chloride, 10 mL, Intravenous, Q12H  vancomycin, 125 mg, Oral, Q6H        •  acetaminophen  •  dextrose  •  dextrose  •  glucagon (human recombinant)  •  nitroglycerin  •  ondansetron **OR** ondansetron  •  Pharmacy Consult  •  sodium chloride  •  sodium chloride  •  sodium chloride      Assessment / Plan     Assessment/Plan:  AMS/acute metabolic encephalopathy secondary to UTI  C. difficile diarrhea and history of chronic diarrhea  Nonconvulsive seizure with history of nonconvulsive status epilepticus  Metabolic acidosis  AUSTEN on CKD 4 baseline creatinine proxy 1.7  Hypokalemia  Hypomagnesemia  Hypernatremia  DM 2  Oropharyngeal dysphagia with chronic PEG tube and nocturnal tube feeds  Blocked PEG tube, resolved  Anemia of chronic  disease  Hypertension  Depression  Hypothyroidism  History of breast cancer and prior left mastectomy  Suspect underlying dementia    -4/11 p.o. vancomycin for C. Difficile, monitor bowel movements, cholestyramine  - Continue sodium bicarbonate until diarrhea resolved, monitor levels  - Replace magnesium IV  - Repeat EEG negative for seizure, still with encephalopathy noted, discuss further with neurology/Dr. Cameron, obtain LP on Monday to evaluate for autoimmune versus paraneoplastic encephalitis  - Continue delirium precautions and supportive care for now  - Continue Vimpat at recently increased dose, monitor  - May need further evaluation with DaTscan/additional dementia evaluation as outpatient.  - Flonase for sinuses and cetirizine  - Continue wound care to PEG site, discussed with GI, appreciate assistance with placement of G-tube    *Follow-up G-tube contrast study to confirm placement showing normal exam, will continue on tube feeds per previous schedule  - Mechanical soft nectar thick diet as per speech, nocturnal tube feeds via PEG continuing, aspiration precautions.  Speech is following patient as well appreciate assistance  - Continue amlodipine and carvedilol monitor blood pressure  - Continue lacosamide, monitor for seizures/seizure precautions  - Continue SSI  - Continue citalopram  - Continue Synthroid  - Continue anastrozole  - Continue PT OT  - Continue hospital monitoring and treatment at current level of care - does not need upgraded at this time.    Continue to rehab once LP/additional neuro evaluation completed and if otherwise stable.    DVT prophylaxis:  Medical and mechanical DVT prophylaxis orders are present.    Medical Intervention Limits: NO intubation (DNI)  Code Status (Patient has no pulse and is not breathing): No CPR (Do Not Attempt to Resuscitate)  Medical Interventions (Patient has pulse or is breathing): Limited Support  Release to patient: Routine Release        CBC         4/12/2023    04:43 4/13/2023    04:41 4/15/2023    05:06   CBC   WBC 11.67   9.88   8.57     RBC 2.88   3.00   2.81     Hemoglobin 8.8   9.3   8.9     Hematocrit 25.8   26.8   25.5     MCV 89.6   89.3   90.7     MCH 30.6   31.0   31.7     MCHC 34.1   34.7   34.9     RDW 13.9   14.0   13.9     Platelets 281   250   232         CMP        4/12/2023    04:43 4/13/2023    04:41 4/15/2023    05:06   CMP   Glucose 132   114   83     BUN 50   49   46     Creatinine 2.01   1.80   1.89     EGFR 26.8   30.6   28.8     Sodium 137   136   140     Potassium 4.5   4.5   3.7     Chloride 108   105   107     Calcium 9.0   8.8   8.6     BUN/Creatinine Ratio 24.9   27.2   24.3     Anion Gap 11.5   11.4   12.2       At least 50-minute spent in patient care coordination

## 2023-04-16 LAB
ANION GAP SERPL CALCULATED.3IONS-SCNC: 9.9 MMOL/L (ref 5–15)
BASOPHILS # BLD AUTO: 0.04 10*3/MM3 (ref 0–0.2)
BASOPHILS NFR BLD AUTO: 0.5 % (ref 0–1.5)
BUN SERPL-MCNC: 44 MG/DL (ref 8–23)
BUN/CREAT SERPL: 23.5 (ref 7–25)
CALCIUM SPEC-SCNC: 8.8 MG/DL (ref 8.6–10.5)
CHLORIDE SERPL-SCNC: 105 MMOL/L (ref 98–107)
CO2 SERPL-SCNC: 23.1 MMOL/L (ref 22–29)
CREAT SERPL-MCNC: 1.87 MG/DL (ref 0.57–1)
DEPRECATED RDW RBC AUTO: 45.7 FL (ref 37–54)
EGFRCR SERPLBLD CKD-EPI 2021: 29.2 ML/MIN/1.73
EOSINOPHIL # BLD AUTO: 0.25 10*3/MM3 (ref 0–0.4)
EOSINOPHIL NFR BLD AUTO: 3.4 % (ref 0.3–6.2)
ERYTHROCYTE [DISTWIDTH] IN BLOOD BY AUTOMATED COUNT: 13.8 % (ref 12.3–15.4)
GLUCOSE BLDC GLUCOMTR-MCNC: 125 MG/DL (ref 70–99)
GLUCOSE BLDC GLUCOMTR-MCNC: 133 MG/DL (ref 70–99)
GLUCOSE BLDC GLUCOMTR-MCNC: 79 MG/DL (ref 70–99)
GLUCOSE SERPL-MCNC: 113 MG/DL (ref 65–99)
HCT VFR BLD AUTO: 26.6 % (ref 34–46.6)
HGB BLD-MCNC: 9 G/DL (ref 12–15.9)
IMM GRANULOCYTES # BLD AUTO: 0.02 10*3/MM3 (ref 0–0.05)
IMM GRANULOCYTES NFR BLD AUTO: 0.3 % (ref 0–0.5)
LYMPHOCYTES # BLD AUTO: 1.99 10*3/MM3 (ref 0.7–3.1)
LYMPHOCYTES NFR BLD AUTO: 27.1 % (ref 19.6–45.3)
MAGNESIUM SERPL-MCNC: 2.3 MG/DL (ref 1.6–2.4)
MCH RBC QN AUTO: 30.6 PG (ref 26.6–33)
MCHC RBC AUTO-ENTMCNC: 33.8 G/DL (ref 31.5–35.7)
MCV RBC AUTO: 90.5 FL (ref 79–97)
MONOCYTES # BLD AUTO: 0.35 10*3/MM3 (ref 0.1–0.9)
MONOCYTES NFR BLD AUTO: 4.8 % (ref 5–12)
NEUTROPHILS NFR BLD AUTO: 4.68 10*3/MM3 (ref 1.7–7)
NEUTROPHILS NFR BLD AUTO: 63.9 % (ref 42.7–76)
NRBC BLD AUTO-RTO: 0 /100 WBC (ref 0–0.2)
PHOSPHATE SERPL-MCNC: 4 MG/DL (ref 2.5–4.5)
PLATELET # BLD AUTO: 245 10*3/MM3 (ref 140–450)
PMV BLD AUTO: 9.7 FL (ref 6–12)
POTASSIUM SERPL-SCNC: 4.5 MMOL/L (ref 3.5–5.2)
RBC # BLD AUTO: 2.94 10*6/MM3 (ref 3.77–5.28)
SODIUM SERPL-SCNC: 138 MMOL/L (ref 136–145)
WBC NRBC COR # BLD: 7.33 10*3/MM3 (ref 3.4–10.8)

## 2023-04-16 PROCEDURE — 99232 SBSQ HOSP IP/OBS MODERATE 35: CPT | Performed by: INTERNAL MEDICINE

## 2023-04-16 PROCEDURE — 82962 GLUCOSE BLOOD TEST: CPT

## 2023-04-16 PROCEDURE — 25010000002 HEPARIN (PORCINE) PER 1000 UNITS: Performed by: INTERNAL MEDICINE

## 2023-04-16 PROCEDURE — 83735 ASSAY OF MAGNESIUM: CPT | Performed by: INTERNAL MEDICINE

## 2023-04-16 PROCEDURE — 85025 COMPLETE CBC W/AUTO DIFF WBC: CPT | Performed by: INTERNAL MEDICINE

## 2023-04-16 PROCEDURE — 80048 BASIC METABOLIC PNL TOTAL CA: CPT | Performed by: INTERNAL MEDICINE

## 2023-04-16 PROCEDURE — 84100 ASSAY OF PHOSPHORUS: CPT | Performed by: INTERNAL MEDICINE

## 2023-04-16 RX ORDER — NYSTATIN AND TRIAMCINOLONE ACETONIDE 100000; 1 [USP'U]/G; MG/G
1 OINTMENT TOPICAL EVERY 12 HOURS SCHEDULED
Status: DISCONTINUED | OUTPATIENT
Start: 2023-04-16 | End: 2023-04-22 | Stop reason: HOSPADM

## 2023-04-16 RX ORDER — FLUCONAZOLE 150 MG/1
150 TABLET ORAL ONCE
Status: COMPLETED | OUTPATIENT
Start: 2023-04-16 | End: 2023-04-16

## 2023-04-16 RX ADMIN — HEPARIN SODIUM 5000 UNITS: 5000 INJECTION INTRAVENOUS; SUBCUTANEOUS at 13:20

## 2023-04-16 RX ADMIN — SODIUM BICARBONATE 650 MG TABLET 1300 MG: at 09:22

## 2023-04-16 RX ADMIN — VANCOMYCIN HYDROCHLORIDE 125 MG: 125 CAPSULE ORAL at 06:39

## 2023-04-16 RX ADMIN — Medication 10 ML: at 21:23

## 2023-04-16 RX ADMIN — Medication 250 MG: at 08:25

## 2023-04-16 RX ADMIN — VANCOMYCIN HYDROCHLORIDE 125 MG: 125 CAPSULE ORAL at 11:32

## 2023-04-16 RX ADMIN — NYSTATIN AND TRIAMCINOLONE ACETONIDE 1 APPLICATION: 100000; 1 OINTMENT TOPICAL at 21:24

## 2023-04-16 RX ADMIN — CITALOPRAM HYDROBROMIDE 10 MG: 20 TABLET ORAL at 08:25

## 2023-04-16 RX ADMIN — AMLODIPINE BESYLATE 10 MG: 5 TABLET ORAL at 08:25

## 2023-04-16 RX ADMIN — LEVOTHYROXINE SODIUM 12.5 MCG: 0.03 TABLET ORAL at 06:39

## 2023-04-16 RX ADMIN — NYSTATIN AND TRIAMCINOLONE ACETONIDE 1 APPLICATION: 100000; 1 OINTMENT TOPICAL at 11:32

## 2023-04-16 RX ADMIN — LACOSAMIDE 100 MG: 10 SOLUTION ORAL at 21:21

## 2023-04-16 RX ADMIN — CARVEDILOL 25 MG: 25 TABLET, FILM COATED ORAL at 21:22

## 2023-04-16 RX ADMIN — FLUCONAZOLE 150 MG: 150 TABLET ORAL at 11:32

## 2023-04-16 RX ADMIN — CETIRIZINE HYDROCHLORIDE 10 MG: 10 TABLET, FILM COATED ORAL at 08:25

## 2023-04-16 RX ADMIN — VANCOMYCIN HYDROCHLORIDE 125 MG: 125 CAPSULE ORAL at 17:10

## 2023-04-16 RX ADMIN — Medication 10 ML: at 08:26

## 2023-04-16 RX ADMIN — FLUTICASONE PROPIONATE 2 SPRAY: 50 SPRAY, METERED NASAL at 08:26

## 2023-04-16 RX ADMIN — HEPARIN SODIUM 5000 UNITS: 5000 INJECTION INTRAVENOUS; SUBCUTANEOUS at 06:38

## 2023-04-16 RX ADMIN — ANASTROZOLE 1 MG: 1 TABLET ORAL at 08:25

## 2023-04-16 RX ADMIN — CARVEDILOL 25 MG: 25 TABLET, FILM COATED ORAL at 08:25

## 2023-04-16 RX ADMIN — SODIUM BICARBONATE 650 MG TABLET 1300 MG: at 22:50

## 2023-04-16 RX ADMIN — CHOLESTYRAMINE 1 PACKET: 4 POWDER, FOR SUSPENSION ORAL at 08:25

## 2023-04-16 RX ADMIN — VANCOMYCIN HYDROCHLORIDE 125 MG: 125 CAPSULE ORAL at 00:41

## 2023-04-16 RX ADMIN — Medication 250 MG: at 21:22

## 2023-04-16 RX ADMIN — LACOSAMIDE 100 MG: 10 SOLUTION ORAL at 08:25

## 2023-04-16 NOTE — PLAN OF CARE
Goal Outcome Evaluation:              Outcome Evaluation: Pt vss. Pt resting well. Pt on nocturnal feeds. Skin care tolerated well. No complaints of pain. Will continue to monitor.

## 2023-04-16 NOTE — PLAN OF CARE
Problem: Adult Inpatient Plan of Care  Goal: Plan of Care Review  Outcome: Ongoing, Progressing  Flowsheets (Taken 4/16/2023 0543)  Progress: no change  Plan of Care Reviewed With: patient  Goal: Patient-Specific Goal (Individualized)  Outcome: Ongoing, Progressing  Goal: Absence of Hospital-Acquired Illness or Injury  Outcome: Ongoing, Progressing  Intervention: Identify and Manage Fall Risk  Recent Flowsheet Documentation  Taken 4/16/2023 0522 by Jocy Decker LPN  Safety Promotion/Fall Prevention: safety round/check completed  Taken 4/16/2023 0318 by Jocy Decker LPN  Safety Promotion/Fall Prevention: safety round/check completed  Taken 4/16/2023 0111 by Jocy Decker LPN  Safety Promotion/Fall Prevention: safety round/check completed  Taken 4/15/2023 2310 by Jocy Decker LPN  Safety Promotion/Fall Prevention: safety round/check completed  Taken 4/15/2023 2124 by Jocy Decker LPN  Safety Promotion/Fall Prevention: safety round/check completed  Taken 4/15/2023 1916 by Jocy Decker LPN  Safety Promotion/Fall Prevention: safety round/check completed  Intervention: Prevent Skin Injury  Recent Flowsheet Documentation  Taken 4/16/2023 0111 by Jocy Decker LPN  Skin Protection:   adhesive use limited   transparent dressing maintained   tubing/devices free from skin contact  Intervention: Prevent and Manage VTE (Venous Thromboembolism) Risk  Recent Flowsheet Documentation  Taken 4/16/2023 0111 by Jocy Decker LPN  VTE Prevention/Management: (Heparin) other (see comments)  Range of Motion: active ROM (range of motion) encouraged  Intervention: Prevent Infection  Recent Flowsheet Documentation  Taken 4/16/2023 0111 by Jocy Decker LPN  Infection Prevention:   equipment surfaces disinfected   hand hygiene promoted   personal protective equipment utilized   visitors restricted/screened   single patient room provided  Goal: Optimal Comfort and Wellbeing  Outcome: Ongoing, Progressing  Intervention:  Monitor Pain and Promote Comfort  Recent Flowsheet Documentation  Taken 4/16/2023 0111 by Jocy Decker LPN  Pain Management Interventions:   pillow support provided   position adjusted  Intervention: Provide Person-Centered Care  Recent Flowsheet Documentation  Taken 4/16/2023 0111 by Jocy Decker LPN  Trust Relationship/Rapport:   care explained   choices provided   questions answered   thoughts/feelings acknowledged  Goal: Readiness for Transition of Care  Outcome: Ongoing, Progressing   Goal Outcome Evaluation:  Plan of Care Reviewed With: patient        Progress: no change   Vitals stable. No complaints of pain. Pt still only alert to self. One loose stool this shift. Will continue to monitor.

## 2023-04-16 NOTE — PROGRESS NOTES
[facility]   Hospitalist Progress Note    Date of admission: 4/3/2023  Patient Name: Lyubov Middleton  1955  Date: 4/16/2023      Subjective     Chief Complaint   Patient presents with   • Seizures       Summary: Lyubov Middleton is a 68 y.o. female with past medical history of diabetes mellitus, CKD 4, seizure disorder (history of status epilepticus), dysphagia currently undergoing rehab at [facility] and rehab presents with altered mental status -reportedly had an episode while working with therapy where she became unresponsive and her eyes rolled to the back of her head she was nonresponsive to sternal rub and brought to the ED for further evaluation at that time.  Initially ANO x1 in the emergency department.  Treated initially for hypokalemia, AUSTEN on CKD, UTI.  CT head negative for any acute intracranial abnormalities, did demonstrate diffuse atrophy and white matter changes and suspect patient with underlying dementia.  Chest x-ray negative for any acute abnormalities.      Hospital course complicated by C. difficile treated with p.o. vancomycin and intermittent levels of confusion/encephalopathy, EEGs have been obtained both are negative for acute seizure activity but notable for encephalopathy, neurology is assisting in patient's Vimpat dose was increased.  She is pending LP on 4/17 to evaluate for paraneoplastic syndrome/alternative etiologies.      Patient required replacement of PEG tube while inpatient initially and subsequently had another episode of pulling out her PEG tube on 4/14 and GI replaced it with an 18 [place]ri[place] tube on 4/15.    Ultimately will need long-term care placement pending at skilled nursing facility based on results/further work-up of LP pending 4/17.    Interval Followup: No acute events overnight.  Still some intermittent confusion.  Doing slightly better today.  Nursing noting some white vaginal discharge.      Objective     Vitals:   Temp:  [97 °F (36.1  °C)-99 °F (37.2 °C)] 98 °F (36.7 °C)  Heart Rate:  [66-91] 73  Resp:  [16-20] 18  BP: (121-145)/(58-83) 138/83    Physical Exam  Awake conversant in bed slightly more conversant today, appears older than stated age  CTA B on room air  RRR  Abdomen soft nontender nondistended, no guarding, peg site dressed, no bleeding or obvious bruising noted  Alert to self, hospital, president, states years 2013 and with some prompting gets 2023 answer basic questions appropriately is pleasant  Neuro: Resting tremor noted in right hand slightly pill-rolling, no obvious cogwheeling, hypothenar muscle wasting     Result Review:  Vital signs, labs and recent relevant imaging reviewed.      amLODIPine, 10 mg, Oral, Daily  anastrozole, 1 mg, Oral, Daily  carvedilol, 25 mg, Oral, BID  cetirizine, 10 mg, Oral, Daily  cholestyramine, 1 packet, Per G Tube, Q12H  citalopram, 10 mg, Oral, Daily  fluticasone, 2 spray, Each Nare, Daily  heparin (porcine), 5,000 Units, Subcutaneous, Q8H  insulin lispro, 2-7 Units, Subcutaneous, TID With Meals  lacosamide, 100 mg, Oral, Q12H  levothyroxine, 12.5 mcg, Oral, Q AM  nystatin-triamcinolone, 1 application, Topical, Q12H  saccharomyces boulardii, 250 mg, Oral, BID  sodium bicarbonate, 1,300 mg, Oral, BID  sodium chloride, 10 mL, Intravenous, Q12H  vancomycin, 125 mg, Oral, Q6H        •  acetaminophen  •  dextrose  •  dextrose  •  glucagon (human recombinant)  •  nitroglycerin  •  ondansetron **OR** ondansetron  •  Pharmacy Consult  •  sodium chloride  •  sodium chloride  •  sodium chloride      Assessment / Plan     Assessment/Plan:  AMS/acute metabolic encephalopathy   AMS secondary to uti/cdiff, underlying dementia and and 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 difficile diarrhea and history of chronic diarrhea  Nonconvulsive seizure with history of nonconvulsive status epilepticus  Metabolic acidosis  AUSTEN on CKD 4 baseline creatinine proxy  1.7  Hypokalemia  Hypomagnesemia  Hypernatremia  DM 2  Oropharyngeal dysphagia with chronic PEG tube and nocturnal tube feeds  Blocked PEG tube, resolved  Anemia of chronic disease  Hypertension  Depression  Hypothyroidism  History of breast cancer and prior left mastectomy  Suspect underlying dementia    -4/11 p.o. vancomycin for C. Difficile, monitor bowel movements, cholestyramine prn if severe symptoms  - Continue sodium bicarbonate until diarrhea resolved, monitor levels  - Replace magnesium  - Repeat EEG negative for seizure, still with encephalopathy noted, discussed further with neurology/Dr. Cameron, obtain LP on Monday to evaluate for autoimmune versus paraneoplastic encephalitis, check a.m. INR  - Give dose of fluconazole for vaginal candidiasis, additional nystatin powder added for groin fold  - Continue delirium precautions and supportive care for now  - Continue Vimpat at recently increased dose, monitor  - May need further evaluation with DaTscan/additional dementia evaluation as outpatient.  Does have a resting tremor question if component of Parkinson's dementia  - Flonase for sinuses and cetirizine  - Continue wound care to PEG site, discussed with GI, appreciate assistance with placement of G-tube.  Follow-up G-tube study to confirm placement.  Continue on nocturnal tube feeds  - Mechanical soft nectar thick diet as per speech, nocturnal tube feeds via PEG continuing, aspiration precautions.  Speech is following patient as well appreciate assistance  - Continue amlodipine and carvedilol monitor blood pressure  - Continue lacosamide, monitor for seizures/seizure precautions  - Continue SSI  - Continue citalopram  - Continue Synthroid  - Continue anastrozole  - Continue PT OT  - Continue hospital monitoring and treatment at current level of care - does not need upgraded at this time.    Continue to rehab once LP/additional neuro evaluation completed and if otherwise stable.    DVT  prophylaxis:  Medical and mechanical DVT prophylaxis orders are present.    Medical Intervention Limits: NO intubation (DNI)  Code Status (Patient has no pulse and is not breathing): No CPR (Do Not Attempt to Resuscitate)  Medical Interventions (Patient has pulse or is breathing): Limited Support  Release to patient: Routine Release        CBC        4/13/2023    04:41 4/15/2023    05:06 4/16/2023    05:39   CBC   WBC 9.88   8.57   7.33     RBC 3.00   2.81   2.94     Hemoglobin 9.3   8.9   9.0     Hematocrit 26.8   25.5   26.6     MCV 89.3   90.7   90.5     MCH 31.0   31.7   30.6     MCHC 34.7   34.9   33.8     RDW 14.0   13.9   13.8     Platelets 250   232   245         CMP        4/13/2023    04:41 4/15/2023    05:06 4/16/2023    05:39   CMP   Glucose 114   83   113     BUN 49   46   44     Creatinine 1.80   1.89   1.87     EGFR 30.6   28.8   29.2     Sodium 136   140   138     Potassium 4.5   3.7   4.5     Chloride 105   107   105     Calcium 8.8   8.6   8.8     BUN/Creatinine Ratio 27.2   24.3   23.5     Anion Gap 11.4   12.2   9.9

## 2023-04-17 LAB
ANION GAP SERPL CALCULATED.3IONS-SCNC: 9.2 MMOL/L (ref 5–15)
BASOPHILS # BLD AUTO: 0.05 10*3/MM3 (ref 0–0.2)
BASOPHILS NFR BLD AUTO: 0.6 % (ref 0–1.5)
BUN SERPL-MCNC: 45 MG/DL (ref 8–23)
BUN/CREAT SERPL: 23.6 (ref 7–25)
CALCIUM SPEC-SCNC: 8.9 MG/DL (ref 8.6–10.5)
CHLORIDE SERPL-SCNC: 107 MMOL/L (ref 98–107)
CO2 SERPL-SCNC: 22.8 MMOL/L (ref 22–29)
CREAT SERPL-MCNC: 1.91 MG/DL (ref 0.57–1)
DEPRECATED RDW RBC AUTO: 46.3 FL (ref 37–54)
EGFRCR SERPLBLD CKD-EPI 2021: 28.5 ML/MIN/1.73
EOSINOPHIL # BLD AUTO: 0.24 10*3/MM3 (ref 0–0.4)
EOSINOPHIL NFR BLD AUTO: 3 % (ref 0.3–6.2)
ERYTHROCYTE [DISTWIDTH] IN BLOOD BY AUTOMATED COUNT: 13.8 % (ref 12.3–15.4)
GLUCOSE BLDC GLUCOMTR-MCNC: 124 MG/DL (ref 70–99)
GLUCOSE BLDC GLUCOMTR-MCNC: 142 MG/DL (ref 70–99)
GLUCOSE BLDC GLUCOMTR-MCNC: 85 MG/DL (ref 70–99)
GLUCOSE BLDC GLUCOMTR-MCNC: 95 MG/DL (ref 70–99)
GLUCOSE SERPL-MCNC: 146 MG/DL (ref 65–99)
HCT VFR BLD AUTO: 27.1 % (ref 34–46.6)
HGB BLD-MCNC: 9.2 G/DL (ref 12–15.9)
IMM GRANULOCYTES # BLD AUTO: 0.02 10*3/MM3 (ref 0–0.05)
IMM GRANULOCYTES NFR BLD AUTO: 0.3 % (ref 0–0.5)
INR PPP: 1.07 (ref 0.86–1.15)
LYMPHOCYTES # BLD AUTO: 1.92 10*3/MM3 (ref 0.7–3.1)
LYMPHOCYTES NFR BLD AUTO: 24.3 % (ref 19.6–45.3)
MAGNESIUM SERPL-MCNC: 1.9 MG/DL (ref 1.6–2.4)
MCH RBC QN AUTO: 31.4 PG (ref 26.6–33)
MCHC RBC AUTO-ENTMCNC: 33.9 G/DL (ref 31.5–35.7)
MCV RBC AUTO: 92.5 FL (ref 79–97)
MONOCYTES # BLD AUTO: 0.37 10*3/MM3 (ref 0.1–0.9)
MONOCYTES NFR BLD AUTO: 4.7 % (ref 5–12)
NEUTROPHILS NFR BLD AUTO: 5.3 10*3/MM3 (ref 1.7–7)
NEUTROPHILS NFR BLD AUTO: 67.1 % (ref 42.7–76)
NRBC BLD AUTO-RTO: 0 /100 WBC (ref 0–0.2)
PHOSPHATE SERPL-MCNC: 4 MG/DL (ref 2.5–4.5)
PLATELET # BLD AUTO: 215 10*3/MM3 (ref 140–450)
PMV BLD AUTO: 9.4 FL (ref 6–12)
POTASSIUM SERPL-SCNC: 4.5 MMOL/L (ref 3.5–5.2)
PROTHROMBIN TIME: 14 SECONDS (ref 11.8–14.9)
RBC # BLD AUTO: 2.93 10*6/MM3 (ref 3.77–5.28)
SODIUM SERPL-SCNC: 139 MMOL/L (ref 136–145)
WBC NRBC COR # BLD: 7.9 10*3/MM3 (ref 3.4–10.8)

## 2023-04-17 PROCEDURE — 82962 GLUCOSE BLOOD TEST: CPT

## 2023-04-17 PROCEDURE — 85025 COMPLETE CBC W/AUTO DIFF WBC: CPT | Performed by: INTERNAL MEDICINE

## 2023-04-17 PROCEDURE — 97530 THERAPEUTIC ACTIVITIES: CPT

## 2023-04-17 PROCEDURE — 97110 THERAPEUTIC EXERCISES: CPT

## 2023-04-17 PROCEDURE — 99232 SBSQ HOSP IP/OBS MODERATE 35: CPT | Performed by: FAMILY MEDICINE

## 2023-04-17 PROCEDURE — 80048 BASIC METABOLIC PNL TOTAL CA: CPT | Performed by: INTERNAL MEDICINE

## 2023-04-17 PROCEDURE — 83735 ASSAY OF MAGNESIUM: CPT | Performed by: INTERNAL MEDICINE

## 2023-04-17 PROCEDURE — 84100 ASSAY OF PHOSPHORUS: CPT | Performed by: INTERNAL MEDICINE

## 2023-04-17 PROCEDURE — 85610 PROTHROMBIN TIME: CPT | Performed by: INTERNAL MEDICINE

## 2023-04-17 RX ADMIN — NYSTATIN AND TRIAMCINOLONE ACETONIDE 1 APPLICATION: 100000; 1 OINTMENT TOPICAL at 21:00

## 2023-04-17 RX ADMIN — Medication 10 ML: at 21:00

## 2023-04-17 RX ADMIN — VANCOMYCIN HYDROCHLORIDE 125 MG: 125 CAPSULE ORAL at 23:27

## 2023-04-17 RX ADMIN — ANASTROZOLE 1 MG: 1 TABLET ORAL at 09:33

## 2023-04-17 RX ADMIN — VANCOMYCIN HYDROCHLORIDE 125 MG: 125 CAPSULE ORAL at 17:34

## 2023-04-17 RX ADMIN — SODIUM BICARBONATE 650 MG TABLET 1300 MG: at 10:46

## 2023-04-17 RX ADMIN — LEVOTHYROXINE SODIUM 12.5 MCG: 0.03 TABLET ORAL at 06:44

## 2023-04-17 RX ADMIN — CARVEDILOL 25 MG: 25 TABLET, FILM COATED ORAL at 09:33

## 2023-04-17 RX ADMIN — VANCOMYCIN HYDROCHLORIDE 125 MG: 125 CAPSULE ORAL at 06:44

## 2023-04-17 RX ADMIN — Medication 250 MG: at 21:00

## 2023-04-17 RX ADMIN — LACOSAMIDE 100 MG: 10 SOLUTION ORAL at 20:59

## 2023-04-17 RX ADMIN — Medication 250 MG: at 09:34

## 2023-04-17 RX ADMIN — VANCOMYCIN HYDROCHLORIDE 125 MG: 125 CAPSULE ORAL at 13:06

## 2023-04-17 RX ADMIN — CARVEDILOL 25 MG: 25 TABLET, FILM COATED ORAL at 21:00

## 2023-04-17 RX ADMIN — CITALOPRAM HYDROBROMIDE 10 MG: 20 TABLET ORAL at 09:33

## 2023-04-17 RX ADMIN — LACOSAMIDE 100 MG: 10 SOLUTION ORAL at 10:46

## 2023-04-17 RX ADMIN — SODIUM BICARBONATE 650 MG TABLET 1300 MG: at 20:59

## 2023-04-17 RX ADMIN — CETIRIZINE HYDROCHLORIDE 10 MG: 10 TABLET, FILM COATED ORAL at 09:33

## 2023-04-17 RX ADMIN — AMLODIPINE BESYLATE 10 MG: 5 TABLET ORAL at 09:33

## 2023-04-17 RX ADMIN — FLUTICASONE PROPIONATE 2 SPRAY: 50 SPRAY, METERED NASAL at 09:35

## 2023-04-17 RX ADMIN — NYSTATIN AND TRIAMCINOLONE ACETONIDE 1 APPLICATION: 100000; 1 OINTMENT TOPICAL at 09:35

## 2023-04-17 RX ADMIN — Medication 10 ML: at 09:34

## 2023-04-17 RX ADMIN — VANCOMYCIN HYDROCHLORIDE 125 MG: 125 CAPSULE ORAL at 00:24

## 2023-04-17 NOTE — PLAN OF CARE
Goal Outcome Evaluation:              Outcome Evaluation: VSS, pt has had several episodes of diarrhea tonight, pt is intermittently confused. Tolerating tube feeds and PO meds well. Possible LP today

## 2023-04-17 NOTE — THERAPY PROGRESS REPORT/RE-CERT
Patient Name: Lyubov Middleton  : 1955    MRN: [id number]                              Today's Date: 2023       Admit Date: 4/3/2023    Visit Dx:     ICD-10-CM ICD-9-CM   1. Metabolic encephalopathy  G93.41 348.31   2. Oropharyngeal dysphagia  R13.12 787.22   3. Difficulty in walking  R26.2 719.7   4. Decreased activities of daily living (ADL)  Z78.9 V49.89     Patient Active Problem List   Diagnosis   • Renal stone   • Renal abscess   • Stage 3a chronic kidney disease   • Type 2 diabetes mellitus   • Metabolic encephalopathy   • Seizure disorder     Past Medical History:   Diagnosis Date   • Cancer     left breast removed    • Type 2 diabetes mellitus      Past Surgical History:   Procedure Laterality Date   • ABDOMINAL SURGERY     • BREAST SURGERY Left     removed due to cancer   • CHOLECYSTECTOMY     • CYSTOSCOPY W/ URETERAL STENT PLACEMENT Right 2020    Procedure: CYSTOSCOPY, RIGHT RETROGRADE PYELOGRAM, URETEROSCOPY, LASER LITHOTRIPSY, RIGHT URETERAL STENT PLACEMENT;  Surgeon: Rohan Dooley Jr., MD;  Location: [facility];  Service: Urology;  Laterality: Right;   • GASTROSTOMY W/ FEEDING TUBE     • HERNIA REPAIR      mesh removed      General Information     Row Name 23 1540          OT Time and Intention    Document Type progress note/recertification;therapy note (daily note)  -AV     Mode of Treatment individual therapy;occupational therapy  -AV     Row Name 23 5608          General Information    Patient Profile Reviewed yes  -AV     Prior Level of Function independent:;ADL's;transfer;all household mobility  Independent at baseline at home using RW.  No home oxygen..  Has required assistance while undergoing rehab for the past month.  -AV     Existing Precautions/Restrictions fall  DNR.  Seizure precautions.  PEG.  Mechanical ground/nectar thick liquids  -AV     Barriers to Rehab cognitive status  -AV     Row Name 23 2995          Occupational Profile     Reason for Services/Referral (Occupational Profile) OT recertification as patient remains hospitalized and continues to present with deficits impacting ADL and transfer independence.  -AV     Row Name 04/17/23 1540          Living Environment    People in Home alone  -AV     Row Name 04/17/23 1540          Cognition    Orientation Status (Cognition) --  Alert, pleasantly confused.  -AV     Row Name 04/17/23 1540          Safety Issues, Functional Mobility    Impairments Affecting Function (Mobility) balance;endurance/activity tolerance;cognition  -AV           User Key  (r) = Recorded By, (t) = Taken By, (c) = Cosigned By    Initials Name Provider Type     Ryan Carl OT Occupational Therapist                 Mobility/ADL's     Row Name 04/17/23 1543          Transfers    Comment, (Transfers) Min assist  -     Row Name 04/17/23 1543          Activities of Daily Living    BADL Assessment/Intervention --  Independent feeding and grooming with set up while seated.  Independent with set up upper body ADLs/mod assist lower body ADLs.  Max assist toilet hygiene/diarrhea.  -AV           User Key  (r) = Recorded By, (t) = Taken By, (c) = Cosigned By    Initials Name Provider Type     Ryan Carl OT Occupational Therapist               Obj/Interventions     Row Name 04/17/23 1544          Sensory Assessment (Somatosensory)    Sensory Assessment (Somatosensory) UE sensation intact  -AV     Row Name 04/17/23 1544          Vision Assessment/Intervention    Visual Impairment/Limitations WFL;corrective lenses full-time  -AV     Row Name 04/17/23 1544          Range of Motion Comprehensive    General Range of Motion bilateral upper extremity ROM WFL  -AV     Comment, General Range of Motion AROM  -AV     Row Name 04/17/23 1544          Strength Comprehensive (MMT)    Comment, General Manual Muscle Testing (MMT) Assessment 4/5 bilateral upper extremities  -AV     Row Name 04/17/23 1544          Shoulder (Therapeutic  Exercise)    Shoulder Strengthening (Therapeutic Exercise) bilateral;flexion;horizontal aBduction/aDduction;1 lb free weight;10 repetitions;15 repititions  Anterior deltoid raises x10.  Horizontal ABD x15  -AV     Row Name 04/17/23 1544          Elbow/Forearm (Therapeutic Exercise)    Elbow/Forearm Strengthening (Therapeutic Exercise) bilateral;flexion;extension;supination;pronation;1 lb free weight;15 repititions  -AV     Row Name 04/17/23 1544          Motor Skills    Motor Skills coordination;functional endurance  -AV     Coordination WFL  Right dominant  -AV     Functional Endurance Fair minus  -AV     Therapeutic Exercise shoulder;elbow/forearm  Performed in high Fowlers on room air.  -AV           User Key  (r) = Recorded By, (t) = Taken By, (c) = Cosigned By    Initials Name Provider Type    Ryan Sprague OT Occupational Therapist               Goals/Plan     Row Name 04/17/23 1547          Transfer Goal 1 (OT)    Activity/Assistive Device (Transfer Goal 1, OT) transfers, all;walker, rolling  -AV     [place] Level/Cues Needed (Transfer Goal 1, OT) supervision required;verbal cues required  -AV     Time Frame (Transfer Goal 1, OT) long term goal (LTG);10 days  -AV     Progress/Outcome (Transfer Goal 1, OT) goal revised this date  -AV     Row Name 04/17/23 1547          Bathing Goal 1 (OT)    Activity/Device (Bathing Goal 1, OT) bathing skills, all  -AV     [place] Level/Cues Needed (Bathing Goal 1, OT) supervision required;set-up required;verbal cues required  -AV     Time Frame (Bathing Goal 1, OT) long term goal (LTG);10 days  -AV     Progress/Outcomes (Bathing Goal 1, OT) goal revised this date  -AV     Row Name 04/17/23 1547          Dressing Goal 1 (OT)    [place]/Cues Needed (Dressing Goal 1, OT) supervision required;set-up required;verbal cues required  -AV     Time Frame (Dressing Goal 1, OT) long term goal (LTG);10 days  -AV     Progress/Outcome (Dressing Goal 1, OT) goal revised  this date  -AV     Row Name 04/17/23 1547          Toileting Goal 1 (OT)    Activity/Device (Toileting Goal 1, OT) toileting skills, all;raised toilet seat  -AV     [place] Level/Cues Needed (Toileting Goal 1, OT) supervision required;set-up required;verbal cues required  -AV     Time Frame (Toileting Goal 1, OT) long term goal (LTG);10 days  -AV     Row Name 04/17/23 1547          Grooming Goal 1 (OT)    Activity/Device (Grooming Goal 1, OT) grooming skills, all  -AV     [place] (Grooming Goal 1, OT) supervision required;set-up required;verbal cues required  -AV     Time Frame (Grooming Goal 1, OT) long term goal (LTG);10 days  -AV     Progress/Outcome (Grooming Goal 1, OT) goal revised this date  -AV     Row Name 04/17/23 1547          Problem Specific Goal 1 (OT)    Problem Specific Goal 1 (OT) Patient will demonstrate fair plus activity tolerance in preperation for independent ADL routine completion at time of discharge  -AV     Time Frame (Problem Specific Goal 1, OT) long term goal (LTG);10 days  -AV     Progress/Outcome (Problem Specific Goal 1, OT) goal ongoing  -AV     Row Name 04/17/23 1547          Therapy Assessment/Plan (OT)    Planned Therapy Interventions (OT) activity tolerance training;BADL retraining;functional balance retraining;occupation/activity based interventions;patient/caregiver education/training;transfer/mobility retraining  -AV           User Key  (r) = Recorded By, (t) = Taken By, (c) = Cosigned By    Initials Name Provider Type    AV Ryan Carl, LOIS Occupational Therapist               Clinical Impression     Row Name 04/17/23 1546          Pain Scale: FACES Pre/Post-Treatment    Pain: FACES Scale, Pretreatment 0-->no hurt  -AV     Posttreatment Pain Rating 0-->no hurt  -AV     Row Name 04/17/23 1546          Plan of Care Review    Plan of Care Reviewed With patient  -AV     Progress no change  -AV     Outcome Evaluation Patient continues to present with limitations of  balance, cognition/safety awareness and endurance/activity tolerance which impede her ability to perform ADL and transfers as prior.  The skills of a therapist will be required to safely and effectively implement treatment plan to restore maximal level of function.  -AV     Row Name 04/17/23 1546          Therapy Assessment/Plan (OT)    Patient/Family Therapy Goal Statement (OT) None stated  -AV     Rehab Potential (OT) good, to achieve stated therapy goals  -AV     Criteria for Skilled Therapeutic Interventions Met (OT) yes;meets criteria;skilled treatment is necessary  -AV     Therapy Frequency (OT) 5 times/wk  -AV     Row Name 04/17/23 1546          Therapy Plan Review/Discharge Plan (OT)    Anticipated Discharge Disposition (OT) sub acute care setting;inpatient rehabilitation facility  -AV     Row Name 04/17/23 1546          Vital Signs    O2 Delivery Pre Treatment room air  -AV     O2 Delivery Intra Treatment room air  -AV     O2 Delivery Post Treatment room air  -AV           User Key  (r) = Recorded By, (t) = Taken By, (c) = Cosigned By    Initials Name Provider Type    Ryan Sprague, OT Occupational Therapist               Outcome Measures     Row Name 04/17/23 1548          How much help from another is currently needed...    Putting on and taking off regular lower body clothing? 2  -AV     Bathing (including washing, rinsing, and drying) 2  -AV     Toileting (which includes using toilet bed pan or urinal) 1  -AV     Putting on and taking off regular upper body clothing 3  -AV     Taking care of personal grooming (such as brushing teeth) 3  -AV     Row Name 04/17/23 0903 04/17/23 0900       How much help from another person do you currently need...    Turning from your back to your side while in flat bed without using bedrails? 3  -DK 3  -HW    Moving from lying on back to sitting on the side of a flat bed without bedrails? 3  -DK 3  -HW    Moving to and from a bed to a chair (including a wheelchair)? 2   -DK 2  -HW    Standing up from a chair using your arms (e.g., wheelchair, bedside chair)? 2  -DK 2  -HW    Climbing 3-5 steps with a railing? 1  -DK 1  -HW    To walk in hospital room? 2  -DK 2  -HW    AM-PAC 6 Clicks Score (PT) 13  -DK 13  -HW    Highest level of mobility 4 --> Transferred to chair/commode  -DK 4 --> Transferred to chair/commode  -HW    Row Name 04/17/23 0903          Functional Assessment    Outcome Measure Options AM-PAC 6 Clicks Basic Mobility (PT)  -     Row Name 04/17/23 1548          Optimal Instrument    Bending/Stooping 3  -AV     Standing 2  -AV     Reaching 1  -AV           User Key  (r) = Recorded By, (t) = Taken By, (c) = Cosigned By    Initials Name Provider Type    Tonya Mejia, RN Registered Nurse    Jimena Ambriz PTA Physical Therapist Assistant    Ryan Sprague, OT Occupational Therapist                Occupational Therapy Education     Title: PT OT SLP Therapies (Done)     Topic: Occupational Therapy (Done)     Point: ADL training (Done)     Description:   Instruct learner(s) on proper safety adaptation and remediation techniques during self care or transfers.   Instruct in proper use of assistive devices.              Learning Progress Summary           Patient Acceptance, E, VU by AV at 4/17/2023 6569                   Point: Home exercise program (Done)     Description:   Instruct learner(s) on appropriate technique for monitoring, assisting and/or progressing therapeutic exercises/activities.              Learning Progress Summary           Patient Acceptance, E, VU by AV at 4/17/2023 2859                   Point: Precautions (Done)     Description:   Instruct learner(s) on prescribed precautions during self-care and functional transfers.              Learning Progress Summary           Patient Acceptance, E, VU by AV at 4/17/2023 1549                   Point: Body mechanics (Done)     Description:   Instruct learner(s) on proper positioning and spine  alignment during self-care, functional mobility activities and/or exercises.              Learning Progress Summary           Patient Acceptance, E, VU by AV at 4/17/2023 1549                               User Key     Initials Effective Dates Name Provider Type Discipline     06/16/21 -  Ryan Carl OT Occupational Therapist OT              OT Recommendation and Plan  Planned Therapy Interventions (OT): activity tolerance training, BADL retraining, functional balance retraining, occupation/activity based interventions, patient/caregiver education/training, transfer/mobility retraining  Therapy Frequency (OT): 5 times/wk  Plan of Care Review  Plan of Care Reviewed With: patient  Progress: no change  Outcome Evaluation: Patient continues to present with limitations of balance, cognition/safety awareness and endurance/activity tolerance which impede her ability to perform ADL and transfers as prior.  The skills of a therapist will be required to safely and effectively implement treatment plan to restore maximal level of function.     Time Calculation:    Time Calculation- OT     Row Name 04/17/23 1550 04/17/23 1116          Time Calculation- OT    OT Received On 04/17/23  -AV 04/17/23  -AV     OT Goal Re-Cert Due Date 04/26/23  -AV --        Timed Charges    92474 - OT Therapeutic Exercise Minutes 25  -AV --        Total Minutes    Timed Charges Total Minutes 25  -AV --      Total Minutes 25  -AV --           User Key  (r) = Recorded By, (t) = Taken By, (c) = Cosigned By    Initials Name Provider Type    Ryan Sprague OT Occupational Therapist              Therapy Charges for Today     Code Description Service Date Service Provider Modifiers Qty    [id number]  OT THER PROC EA 15 MIN 4/17/2023 Ryan Carl OT GO 2               Ryan Carl OT  4/17/2023

## 2023-04-17 NOTE — CONSULTS
"Nutrition Services    Patient Name: Lyubov Middleton  YOB: 1955  MRN: [id number]  Admission date: 4/3/2023      CLINICAL NUTRITION ASSESSMENT      Reason for Assessment  Follow-up protocol     H&P:    Past Medical History:   Diagnosis Date   • Cancer     left breast removed 2012   • Type 2 diabetes mellitus         Current Problems:   Active Hospital Problems    Diagnosis    • **Metabolic encephalopathy    • Seizure disorder         Nutrition/Diet History         Narrative     Nutrition follow up.  Patient continues on mechanical ground diet with nectar thickened liquids.  Consuming 50-75% of meals. Tolerating enteral nutrition at nighttime.     Per MD note, pt C.diff (+) on 4/11.  Banatrol was ordered BID on 4/13 via PEG to improve stool consistency.  Chart review indicates continued large, liquid BMs.  Will continue current interventions and monitor per protocol.     Anthropometrics        Current Height, Weight Height: 160 cm (63\")  Weight: 59 kg (130 lb 1.1 oz)   Current BMI Body mass index is 23.04 kg/m².       Weight Hx  Wt Readings from Last 30 Encounters:   04/13/23 1349 59 kg (130 lb 1.1 oz)   04/13/23 1231 59 kg (130 lb 1.1 oz)   04/03/23 1058 61.8 kg (136 lb 3.9 oz)   03/25/23 0828 61.8 kg (136 lb 3.9 oz)   02/11/23 1519 72.6 kg (160 lb)   12/17/20 0610 73.5 kg (162 lb 0.6 oz)   12/16/20 0330 73.8 kg (162 lb 9.6 oz)   12/15/20 0349 75.1 kg (165 lb 9.6 oz)   12/14/20 0518 80.1 kg (176 lb 9.6 oz)   12/13/20 0509 78.2 kg (172 lb 4.8 oz)   12/12/20 0550 73.4 kg (161 lb 14.4 oz)   12/11/20 0424 71.4 kg (157 lb 8 oz)   12/10/20 0237 69.6 kg (153 lb 8 oz)   12/09/20 0620 68.5 kg (151 lb 1.6 oz)   12/08/20 1453 64.9 kg (143 lb)            Wt Change Observation -14.9% x 2 months     Question accuracy of weight on admission 4/03 (estimated weight).      Estimated/Assessed Needs       Energy Requirements 30 kcal/kg   EST Needs (kcal/day) 1854 kcal        Protein Requirements 1.0-1.2 g/kg   EST Daily " Needs (g/day) 62-74 g       Fluid Requirements 30 ml/kg     Estimated Needs (mL/day) 1854 ml      Labs/Medications         Pertinent Labs Reviewed.   Results from last 7 days   Lab Units 04/17/23  0506 04/16/23  0539 04/15/23  0506   SODIUM mmol/L 139 138 140   POTASSIUM mmol/L 4.5 4.5 3.7   CHLORIDE mmol/L 107 105 107   CO2 mmol/L 22.8 23.1 20.8*   BUN mg/dL 45* 44* 46*   CREATININE mg/dL 1.91* 1.87* 1.89*   CALCIUM mg/dL 8.9 8.8 8.6   GLUCOSE mg/dL 146* 113* 83     Results from last 7 days   Lab Units 04/17/23  0506 04/16/23  0539 04/15/23  0506   MAGNESIUM mg/dL 1.9 2.3 1.8   PHOSPHORUS mg/dL 4.0 4.0 4.8*   HEMOGLOBIN g/dL 9.2* 9.0* 8.9*   HEMATOCRIT % 27.1* 26.6* 25.5*     SARS-CoV-2, KATHLEEN   Date Value Ref Range Status   12/25/2022 NEGATIVE Negative Final     Comment:     The 2019-CoV rRT-PCR Assay is only for use under a Food and Drug Administration Emergency Use Authorization. The performance characteristics of the assay were verified by the Clinical Laboratory at [facility]. Results should be used in   conjunction with the patient's clinical symptoms, medical history, and other clinical/laboratory findings to determine an overall clinical diagnosis. Negative results do not preclude infection with SARS-CoV-2 (COVID-19).    Test parameters have not been validated for screening asymptomatic patients.     Lab Results   Component Value Date    HGBA1C 5.6 03/08/2023         Pertinent Medications Reviewed.     Current Nutrition Orders & Evaluation of Intake       Oral Nutrition     Current PO Diet Diet: Diabetic Diets; Consistent Carbohydrate; Texture: Mechanical Ground (NDD 2); Fluid Consistency: Nectar Thick   Supplement Orders Placed This Encounter      Diet, Tube Feeding Tube Feeding Formula: Diabetisource AC; Tube Feeding Type: Cyclic / Intermittent; Feeding Start Time: 8:00 PM; Feeding End Time: 8:00 AM; Cyclic Feeding Start Rate (mL/hr): 90; To Goal Rate of (mL/hr): 90; Supplemental Bolus Fee...       Dietary Nutrition Supplements Banatrol (Packet)       Malnutrition Severity Assessment                Nutrition Diagnosis         Nutrition Dx Problem 1 Inadequate energy Intake related to decreased ability to consume sufficient energy as evidenced by PEG for primary nutrition      Nutrition Intervention         Diet per SLP    Diabetisource AC @ 90 ml/hr x 12 hours (8pm-8am)  Free water flushes 75 ml at the start and stop of tube feeds  Provides 1296 kcal, 65 g pro, 1036 ml fluid     Meets about 70% estimated kcal needs, 100% estimated pro needs, and 55% estimated fluid needs     +Banatrol BID via PEG (flush w/ 20 ml before and after, adding 80 ml fluids/d)     Medical Nutrition Therapy/Nutrition Education          Learner     Readiness N/A  Education not indicated at this time     Method     Response N/A  N/A     Monitor/Evaluation        Monitor Per protocol, I&O, PO intake, Pertinent labs, EN delivery/tolerance, Weight, POC/GOC, Diet advancement     Nutrition Discharge Plan         To be determined       Electronically signed by:  Ivan Lozano RD  04/17/23 08:44 EDT

## 2023-04-17 NOTE — THERAPY TREATMENT NOTE
Acute Care - Physical Therapy Treatment Note   [facility]     Patient Name: Lyubov Middleton  : 1955  MRN: [id number]  Today's Date: 2023      Visit Dx:     ICD-10-CM ICD-9-CM   1. Metabolic encephalopathy  G93.41 348.31   2. Oropharyngeal dysphagia  R13.12 787.22   3. Difficulty in walking  R26.2 719.7   4. Decreased activities of daily living (ADL)  Z78.9 V49.89     Patient Active Problem List   Diagnosis   • Renal stone   • Renal abscess   • Stage 3a chronic kidney disease   • Type 2 diabetes mellitus   • Metabolic encephalopathy   • Seizure disorder     Past Medical History:   Diagnosis Date   • Cancer     left breast removed    • Type 2 diabetes mellitus      Past Surgical History:   Procedure Laterality Date   • ABDOMINAL SURGERY     • BREAST SURGERY Left     removed due to cancer   • CHOLECYSTECTOMY     • CYSTOSCOPY W/ URETERAL STENT PLACEMENT Right 2020    Procedure: CYSTOSCOPY, RIGHT RETROGRADE PYELOGRAM, URETEROSCOPY, LASER LITHOTRIPSY, RIGHT URETERAL STENT PLACEMENT;  Surgeon: Rohan Dooley Jr., MD;  Location: [facility];  Service: Urology;  Laterality: Right;   • GASTROSTOMY W/ FEEDING TUBE     • HERNIA REPAIR      mesh removed     PT Assessment (last 12 hours)     PT Evaluation and Treatment     Row Name 23          Physical Therapy Time and Intention    Subjective Information complains of;weakness;fatigue  -DK     Document Type therapy note (daily note)  -DK     Mode of Treatment individual therapy;physical therapy  -DK     Patient Effort good  -DK     Symptoms Noted During/After Treatment fatigue  -DK     Comment Pt had just been cleaned up and BM in bed. BM's very odorous.  -DK     Row Name 23          Pain    Pretreatment Pain Rating 0/10 - no pain  -DK     Posttreatment Pain Rating 0/10 - no pain  -DK     Row Name 23          Cognition    Affect/Mental Status (Cognition) confused;flat/blunted affect  -DK     Orientation Status  (Cognition) oriented to;person  -DK     Follows Commands (Cognition) WFL  -DK     Cognitive Function WFL  -DK     Personal Safety Interventions gait belt;nonskid shoes/slippers when out of bed;supervised activity  -DK     Row Name 04/17/23 0903          Bed Mobility    Bed Mobility scooting/bridging;supine-sit-supine  -DK     All Activities, [place] (Bed Mobility) contact guard;minimum assist (75% patient effort);1 person assist  -DK     Scooting/Bridging [place] (Bed Mobility) maximum assist (25% patient effort);1 person assist  -DK     Supine-Sit [place] (Bed Mobility) contact guard;minimum assist (75% patient effort);1 person assist  -DK     Sit-Supine [place] (Bed Mobility) contact guard;minimum assist (75% patient effort);1 person assist  -DK     Supine-Sit-Supine [place] (Bed Mobility) contact guard;minimum assist (75% patient effort);1 person assist  -DK     Bed Mobility, Safety Issues decreased use of arms for pushing/pulling;decreased use of legs for bridging/pushing  -DK     Assistive Device (Bed Mobility) bed rails;draw sheet  -     Comment, (Bed Mobility) Pt sat EOB x 4-5 minutes with SBA.  Standing deferred due to fatigue after multiple BMs.  Pt returned to bed on alert post treatment.  -     Row Name 04/17/23 0903          Transfers    Transfers sit-stand transfer;stand-sit transfer  -     Row Name 04/17/23 0903          Safety Issues, Functional Mobility    Safety Issues Affecting Function (Mobility) awareness of need for assistance;impulsivity;judgment;safety precaution awareness  -     Impairments Affecting Function (Mobility) balance;cognition;endurance/activity tolerance;strength  -DK     Cognitive Impairments, Mobility Safety/Performance attention;awareness, need for assistance;impulsivity;judgment;safety precaution awareness  -     Row Name 04/17/23 0903          Balance    Balance Assessment sitting static balance;sitting dynamic balance  -     Static  Sitting Balance standby assist  -DK     Dynamic Sitting Balance standby assist  -DK     Position, Sitting Balance unsupported;sitting edge of bed  -DK     Balance Interventions sitting;static;dynamic  -     Row Name 04/17/23 0903          Motor Skills    Motor Skills --  therapeutic exercises  -DK     Coordination WFL  -DK     Therapeutic Exercise hip;knee;ankle  -DK     Row Name 04/17/23 0903          Hip (Therapeutic Exercise)    Hip (Therapeutic Exercise) AAROM (active assistive range of motion)  -     Hip AAROM (Therapeutic Exercise) bilateral;flexion;extension;aBduction;aDduction;supine;10 repetitions;2 sets  -     Row Name 04/17/23 0903          Knee (Therapeutic Exercise)    Knee (Therapeutic Exercise) AAROM (active assistive range of motion)  -     Knee AAROM (Therapeutic Exercise) bilateral;flexion;extension;supine;10 repetitions;2 sets  -     Row Name 04/17/23 0903          Ankle (Therapeutic Exercise)    Ankle (Therapeutic Exercise) AAROM (active assistive range of motion)  -     Ankle AAROM (Therapeutic Exercise) bilateral;dorsiflexion;plantarflexion;supine;10 repetitions;2 sets  -     Row Name             Wound 04/03/23 1622 Right labia    Wound - Properties Group Placement Date: 04/03/23  -AC Placement Time: 1622  -AC Present on Hospital Admission: Y  -AC Side: Right  -AC Location: labia  -AC    Retired Wound - Properties Group Placement Date: 04/03/23  -AC Placement Time: 1622  -AC Present on Hospital Admission: Y  -AC Side: Right  -AC Location: labia  -AC    Retired Wound - Properties Group Date first assessed: 04/03/23  -AC Time first assessed: 1622  -AC Present on Hospital Admission: Y  -AC Side: Right  -AC Location: labia  -AC    Row Name             Wound 04/03/23 1628 sacral spine    Wound - Properties Group Placement Date: 04/03/23  -AC Placement Time: 1628  -AC Present on Hospital Admission: Y  -AC Location: sacral spine  -AC    Retired Wound - Properties Group Placement Date:  04/03/23  -AC Placement Time: 1628  -AC Present on Hospital Admission: Y  -AC Location: sacral spine  -AC    Retired Wound - Properties Group Date first assessed: 04/03/23  -AC Time first assessed: 1628  -AC Present on Hospital Admission: Y  -AC Location: sacral spine  -AC    Row Name             Wound 04/03/23 1629 Bilateral gluteal    Wound - Properties Group Placement Date: 04/03/23  -AC Placement Time: 1629  -AC Present on Hospital Admission: Y  -AC Side: Bilateral  -AC Location: gluteal  -AC    Retired Wound - Properties Group Placement Date: 04/03/23  -AC Placement Time: 1629  -AC Present on Hospital Admission: Y  -AC Side: Bilateral  -AC Location: gluteal  -AC    Retired Wound - Properties Group Date first assessed: 04/03/23  -AC Time first assessed: 1629  -AC Present on Hospital Admission: Y  -AC Side: Bilateral  -AC Location: gluteal  -AC    Row Name             Wound 04/03/23 1630 Bilateral posterior greater trochanter    Wound - Properties Group Placement Date: 04/03/23  -AC Placement Time: 1630  -AC Present on Hospital Admission: Y  -AC Side: Bilateral  -AC Orientation: posterior  -AC Location: greater trochanter  -AC    Retired Wound - Properties Group Placement Date: 04/03/23  -AC Placement Time: 1630  -AC Present on Hospital Admission: Y  -AC Side: Bilateral  -AC Orientation: posterior  -AC Location: greater trochanter  -AC    Retired Wound - Properties Group Date first assessed: 04/03/23  -AC Time first assessed: 1630  -AC Present on Hospital Admission: Y  -AC Side: Bilateral  -AC Location: greater trochanter  -AC    Row Name 04/17/23 0903          Plan of Care Review    Plan of Care Reviewed With patient  -DK     Progress improving  -DK     Row Name 04/17/23 0903          Positioning and Restraints    Pre-Treatment Position in bed  -DK     Post Treatment Position bed  -DK     In Bed supine;call light within reach;encouraged to call for assist;exit alarm on;legs elevated;heels elevated  side rails up  x 4, with seizure pads in place x 2  -     Row Name 04/17/23 0903          Therapy Assessment/Plan (PT)    Rehab Potential (PT) good, to achieve stated therapy goals  -     Criteria for Skilled Interventions Met (PT) skilled treatment is necessary  -     Therapy Frequency (PT) daily  -     Problem List (PT) problems related to;balance;cognition;mobility;strength;hearing;communication  -     Activity Limitations Related to Problem List (PT) unable to ambulate safely;unable to transfer safely  -DK     Row Name 04/17/23 0903          Progress Summary (PT)    Progress Toward Functional Goals (PT) progress toward functional goals is good  -           User Key  (r) = Recorded By, (t) = Taken By, (c) = Cosigned By    Initials Name Provider Type    DK Jimena Patino PTA Physical Therapist Assistant    Jessica Bowie, RN Registered Nurse                Physical Therapy Education     Title: PT OT SLP Therapies (Done)     Topic: Physical Therapy (Done)     Point: Mobility training (Done)     Learning Progress Summary           Patient Acceptance, E,TB, VU,NR by  at 4/6/2023 0434    Acceptance, E,TB, VU by  at 4/4/2023 1113                   Point: Precautions (Done)     Learning Progress Summary           Patient Acceptance, E,TB, VU,NR by  at 4/6/2023 0434    Acceptance, E,TB, VU by  at 4/4/2023 1113                               User Key     Initials Effective Dates Name Provider Type Discipline     06/08/21 -  Moreno Longoria, RN Registered Nurse Nurse     03/07/23 -  Rosalinda Peacock PT Student PT Student PT              PT Recommendation and Plan  Planned Therapy Interventions (PT): balance training, bed mobility training, gait training, strengthening, transfer training  Therapy Frequency (PT): daily  Progress Summary (PT)  Progress Toward Functional Goals (PT): progress toward functional goals is good  Plan of Care Reviewed With: patient  Progress: improving   Outcome Measures     Row Name  04/17/23 0903             How much help from another person do you currently need...    Turning from your back to your side while in flat bed without using bedrails? 3  -DK      Moving from lying on back to sitting on the side of a flat bed without bedrails? 3  -DK      Moving to and from a bed to a chair (including a wheelchair)? 2  -DK      Standing up from a chair using your arms (e.g., wheelchair, bedside chair)? 2  -DK      Climbing 3-5 steps with a railing? 1  -DK      To walk in hospital room? 2  -DK      AM-PAC 6 Clicks Score (PT) 13  -DK         Functional Assessment    Outcome Measure Options AM-PAC 6 Clicks Basic Mobility (PT)  -DK            User Key  (r) = Recorded By, (t) = Taken By, (c) = Cosigned By    Initials Name Provider Type    Jimena Ambriz PTA Physical Therapist Assistant                 Time Calculation:    PT Charges     Row Name 04/17/23 0910             Time Calculation    PT Received On 04/17/23  -DK      PT Goal Re-Cert Due Date 04/23/23  -DK         Timed Charges    49741 - PT Therapeutic Exercise Minutes 14  -DK      67372 - PT Therapeutic Activity Minutes 11  -DK         Total Minutes    Timed Charges Total Minutes 25  -DK       Total Minutes 25  -DK            User Key  (r) = Recorded By, (t) = Taken By, (c) = Cosigned By    Initials Name Provider Type    Jimena Ambriz PTA Physical Therapist Assistant              Therapy Charges for Today     Code Description Service Date Service Provider Modifiers Qty    40378793543 HC PT THER PROC EA 15 MIN 4/17/2023 Jimena Patino PTA GP 1    61542634983 HC PT THERAPEUTIC ACT EA 15 MIN 4/17/2023 Jimena Patino PTA GP 1          PT G-Codes  Outcome Measure Options: AM-PAC 6 Clicks Basic Mobility (PT)  AM-PAC 6 Clicks Score (PT): 13  AM-PAC 6 Clicks Score (OT): 14    Jimena Patino PTA  4/17/2023

## 2023-04-17 NOTE — PROGRESS NOTES
[facility]   Hospitalist Progress Note  Date: 2023  Patient Name: Lyubov Middleton  : 1955  MRN: [id number]  Date of admission: 4/3/2023      Subjective   Subjective     Chief complaint: Seizures    Summary:   68 y.o. female with diabetes mellitus, CKD 4, seizure disorder (history of status epilepticus), dysphagia currently undergoing rehab at [facility] and rehab hospitalized with altered mental status, went unresponsive for working with therapy.  Initially ANO x1 in the emergency department.  Treated initially for hypokalemia, AUSTEN on CKD, UTI.  CT head negative for any acute intracranial abnormalities, did demonstrate diffuse atrophy and white matter changes and suspect patient with underlying dementia.  Chest x-ray negative for any acute abnormalities.  Discovered to have C. difficile treated with p.o. vancomycin and intermittent levels of confusion/encephalopathy, EEGs have been obtained both are negative for acute seizure activity but notable for encephalopathy, neurology is assisting in patient's Vimpat dose was increased.  She is pending LP on 23 to evaluate for paraneoplastic syndrome/alternative etiologies.  Patient required replacement of PEG tube while inpatient initially and subsequently had another episode of pulling out her PEG tube on  and GI replaced it with an 18 Belgian tube on 4/15/23.  We will need to look into long-term placement once we have further information after lumbar puncture.     Interval follow-up: Patient seen and examined this morning, no acute distress, no acute major night events, remains confused, smiling, still having several episodes of diarrhea, tolerating tube feeds.  Creatinine up to 1.91, potassium 4.5, sodium 139, white blood cell count 7000, hemoglobin 9.2.    Review of systems:  Unable to obtain review of systems due to confusion    Objective   Objective     Vitals:   Temp:  [97.6 °F (36.4 °C)-98.6 °F (37 °C)] 98.6 °F (37  °C)  Heart Rate:  [66-78] 78  Resp:  [18] 18  BP: (116-151)/(54-83) 126/64  Physical Exam    Constitutional: Awake, alert, confused, smiling, no acute distress   Eyes: Pupils equal, sclerae anicteric, no conjunctival injection   HENT: NCAT, mucous membranes moist   Neck: Supple, no masses   Respiratory: Clear to auscultation bilaterally, nonlabored respirations, poor inspiratory effort   Cardiovascular: RRR, no murmurs, rubs, or gallops, palpable pedal pulses bilaterally   Gastrointestinal: Positive bowel sounds, soft, nontender, nondistended   Musculoskeletal: No bilateral ankle edema, no clubbing or cyanosis to extremities resting tremor right hand   Psychiatric: Appropriate affect, cooperative, confused   Neurologic: Oriented to self only, weakness throughout, somewhat garbled speech   Skin: No rashes visible on exposed skin  Result Review    Result Review:  I have personally reviewed the pertinent results from the past 24 hours to 4/17/2023 13:02 EDT and agree with these findings:  [x]  Laboratory   CBC        4/15/2023    05:06 4/16/2023    05:39 4/17/2023    05:06   CBC   WBC 8.57   7.33   7.90     RBC 2.81   2.94   2.93     Hemoglobin 8.9   9.0   9.2     Hematocrit 25.5   26.6   27.1     MCV 90.7   90.5   92.5     MCH 31.7   30.6   31.4     MCHC 34.9   33.8   33.9     RDW 13.9   13.8   13.8     Platelets 232   245   215       BMP        4/15/2023    05:06 4/16/2023    05:39 4/17/2023    05:06   BMP   BUN 46   44   45     Creatinine 1.89   1.87   1.91     Sodium 140   138   139     Potassium 3.7   4.5   4.5     Chloride 107   105   107     CO2 20.8   23.1   22.8     Calcium 8.6   8.8   8.9       LIVER FUNCTION TESTS:        [x]  Microbiology No results found for: ACANTHNAEG, AFBCX, BPERTUSSISCX, BLOODCX  No results found for: BCIDPCR, CXREFLEX, CSFCX, CULTURETIS  No results found for: CULTURES, HSVCX, URCX  No results found for: EYECULTURE, GCCX, HSVCULTURE, LABHSV  No results found for: LEGIONELLA, MRSACX,  MUMPSCX, MYCOPLASCX  No results found for: NOCARDIACX, STOOLCX  No results found for: THROATCX, UNSTIMCULT, URINECX, CULTURE, VZVCULTUR  No results found for: VIRALCULTU, WOUNDCX    [x]  Radiology EEG    Result Date: 2023  Clinical history: 68-year-old woman evaluated for confusion. EEG description: This is a portable 19 channel EEG recording using the 10/20 international classification of electrode placement. EEG report: The underlying background activity consist of a 5 to 6 Hz theta activity amplitude of 30 to 40 µV that is distributed diffusely.  This is intermixed with 2 to 3 Hz delta activity amplitude of 90 to 120 µV.  Triphasic configuration was noted with delta activity.  There was no focal slowing.  There were no epileptiform discharges.  Photic stimulation did not activate any abnormalities. Impression: This EEG is abnormal secondary to slowing of background activity.  Is finding suggestive of a mild to moderate encephalopathy of nonspecific etiology.  There were no epileptiform discharges recorded.    EEG    Result Date: 4/3/2023  Table formatting from the original result was not included. Images from the original result were not included. [address] [place] [address] [phone] ELECTROENCEPHALOGRAPHIC REPORT Patient: Lyubov Middleton EEG # :    AWY-M- : 1955  ID:      [id number]    Age: 68 y.o. female    Primary  Physician: Germain Beasley MD Technician:  Camille Everett Ordering  Physician: Germain Beasley MD    Recording Date:  April 3, 2023 Report  Date: 4/3/2023     History:  Seizures Medications: Darbepoetin chris, ergocalciferol, menthol zinc oxide, acetaminophen, alendronate, amlodipine, anastrozole, carvedilol, cholestyramine, citalopram, ferrous sulfate, hydralazine, ipratropium, levothyroxine, losartan, phosphorus, Saccharomyces boulardii, sodium bicarbonate. Reading: The EEG was obtained portable in her room during the waking state and on photic stimulation with  video monitoring.  We do not know how much sleep she has had the night before the testing.  During the EEG, she was quite tense and restless and the EEG was filled with so much movement and muscle artifacts. The dominant background activity consists of symmetric and irregular 30 to 165 µV 6 to 7 cps which were prominent on both parieto-occipital regions.  There were frequent 100 to 235 µV slow activities of 2 cps which were generalized and synchronous more prominent on both anterior quadrants noted intermittently all throughout the recording.  There were no discernible responses on photic stimulation at various frequencies.  There was no amplitude asymmetry.  No paroxysmal discharges. Impression: Abnormal EEG because of slow background activity mixed with slower waves compatible with moderate diffuse encephalopathy. There were no epileptiform discharges noted today. The EEG may be repeated at a later date when she is more calm and more cooperative. Electronically signed by Hernesto Braxton Jr., MD, 04/03/23, 6:39 PM EDT. Please note that portions of this note were completed with a voice recognition program.  Part of this note is an electric or electronic transcription/translation of spoken language to printed text using the dragon dictating system.    CT Head Without Contrast    Result Date: 4/3/2023  PROCEDURE: CT HEAD WO CONTRAST  COMPARISON:  [facility], CT, CT FACIAL BONES WO CONTRAST, 3/25/2023, 12:31.  INDICATIONS: AMS  PROTOCOL:   Standard imaging protocol performed    RADIATION:   DLP: 1081.2mGy*cm   MA and/or KV was adjusted to minimize radiation dose.    TECHNIQUE: CT images were obtained without non-ionic intravenous contrast material.  FINDINGS:  The ventricles, sulci, and cerebellar folia are moderately and diffusely prominent consistent with atrophy.  Ill-defined diminished density in cerebral white matter is consistent with mild gliosis and/or numerous lacunar infarcts.  There is no  CT evidence of acute intracranial hemorrhage, mass, or mass effect.  The orbits have a normal appearance.  The paranasal sinuses, middle ears, and mastoid air cells are well aerated.        CT scan of the head without IV contrast demonstrating diffuse atrophy and white matter changes.  No acute intracranial abnormality is seen.     KULWANT HOLLOWAY MD       Electronically Signed and Approved By: KULWANT HOLLOWAY MD on 4/03/2023 at 12:27             MRI Brain Without Contrast    Result Date: 4/12/2023  PROCEDURE: MRI BRAIN WO CONTRAST  COMPARISONS: 4/3/2023; 3/25/2023.  INDICATIONS: AMS (altered mental status); previous CT head concerning for gliosis versus numerous lacunar infarcts.  TECHNIQUE: A variety of imaging planes and parameters were utilized for visualization of suspected pathology within the brain.  275 magnetic resonance (MR) images were obtained without contrast.  FINDINGS: No reduced ADC by DWI is identified to suggest an acute infarct or other acute brain pathology. Slight motion artifact is seen on some of the sequences.  FLAIR/T2 hyperintensities are seen throughout the cerebral white matter, especially in the central and periventricular areas but also in subcortical regions. These findings are nonspecific but may represent mild-to-moderate chronic small vessel ischemia/infarction.  There is also involvement of the brainstem, especially the abdullahi.  The hippocampal formations are symmetric in size, signal intensity, and internal architecture. No abnormal susceptibility is seen on the SWI sequence except for the basal ganglia, consistent with mineralization.  Age-indeterminate congestive and/or inflammatory changes involve the mastoid air cell complexes, greater on the right than the left.  These findings may represent age-indeterminate congestive and/or inflammatory change.  Air-fluid levels also involve the bilateral sphenoid sinuses.  To a lesser extent, opacities involve the bilateral posterior ethmoid  air cells.  These findings may be related to congestive and/or inflammatory change.  Subacute hemorrhage related to recent trauma is possible.  The extra-axial spaces and the ventricular system are prominent, suggesting global atrophy.        1. No acute brain abnormality is seen. No acute infarct. No acute intracranial hemorrhage. 2. There may be mild-to-moderate chronic small vessel ischemia/infarction. 3. Slight motion artifact obscures detail on some of the sequences.    4. The other findings are as detailed above.     Please note that portions of this note were completed with a voice recognition program.  DWAIN RAMOS JR, MD       Electronically Signed and Approved By: DWAIN RAMOS JR, MD on 4/12/2023 at 23:26              FL Video Swallow With Speech Single Contrast    Result Date: 4/4/2023  PROCEDURE: FL VIDEO SWALLOW W SPEECH SINGLE-CONTRAST  COMPARISON: None  INDICATIONS: dysphagia/ 5.0 mGy/ 2.3 min  TECHNIQUE: Examination was performed in conjunction with the speech pathologist. The patient was given both thin and nectar thick liquid barium, applesauce with barium, contrast and rohit cracker with Esophotrast. The patient's medication list was reviewed and documented in the medical record.  FINDINGS:  Fluoroscopic examination was performed in conjunction with speech pathology department.  Various consistencies of barium were given to assess the swallow mechanism.  Across all consistencies, no tracheal aspiration was observed on exam.  Patient experienced deep laryngeal penetration with thin liquid barium.  Patient experienced transient laryngeal penetration with applesauce with barium.  The exam was discussed in real-time by myself and the speech pathologist. Please consult speech pathology report for further details regarding exam.         1. Deep laryngeal penetration with thin liquid barium 2. Transient laryngeal penetration with applesauce with barium 3. No tracheal aspiration  Please consult  speech pathology report for further details regarding exam and dietary recommendations   THANG BRIONES       Electronically Signed and Approved By: Sohail Fernández M.D. on 4/04/2023 at 16:08             XR Chest 1 View    Result Date: 4/3/2023  PROCEDURE: XR CHEST 1 VW  COMPARISON: None  INDICATIONS: WEAKNESS TODAY  FINDINGS:  The heart is normal in size.  The lungs are well-expanded and free of infiltrates.  Mild degenerative changes are seen in the glenohumeral joints.  Surgical clips are seen in the left axillary region.       No active cardiopulmonary disease is seen.       KULWANT HOLLOWAY MD       Electronically Signed and Approved By: KULWANT HOLLOWAY MD on 4/03/2023 at 11:53             CT Facial Bones Without Contrast    Result Date: 3/25/2023  PROCEDURE: CT FACIAL BONES WO CONTRAST  COMPARISON: None  INDICATIONS: facial trauma  PROTOCOL:   Standard imaging protocol performed    RADIATION:   DLP: 527.8 mGy*cm   Automated exposure control was utilized to minimize radiation dose.  TECHNIQUE: Axial images of the facial bones without contrast. Coronal and sagittal reformatted images were performed.  FINDINGS: There is a mildly comminuted nasal bone fracture.  No other facial bone fractures are identified.  There is mild mucosal thickening in the ethmoid sinuses.  There is a small air-fluid level in the left sphenoid sinus without evidence of significant mucosal thickening.  Questionable subtle fracture of the bony nasal septum.  There incidental middle leslie bullosa.  The ostiomeatal units are patent.  No orbital abnormalities are identified.  There is generalized atrophy in the visualized brain.  No orbital abnormalities are identified.  IMPRESSION: 1. Mildly comminuted nasal bone fracture.  2. Questionable subtle fracture of the bony nasal septum.   FRITZ CARMEN MD       Electronically Signed and Approved By: FRITZ CARMEN MD on 3/25/2023 at 12:46             IR J or G Tube Contrast Eval    Result Date:  4/15/2023  PROCEDURE: IR J OR G TUBE CONTRAST EVAL  COMPARISON: [facility], CR, IR J OR G TUBE CONTRAST EVAL, 4/04/2023, 17:55.  INDICATIONS: PEG tube placement  FINDINGS: A  image and a 3 minutes delayed imags were obtained.  There is a PEG tube in the stomach.  No evidence of contrast extravasation.  Contrast is present in the proximal small bowel on the delayed image.  No evidence of obstruction.       Normal exam.      FRITZ CARMEN MD       Electronically Signed and Approved By: FRITZ CARMEN MD on 4/15/2023 at 11:43             IR J or G Tube Contrast Eval    Result Date: 4/4/2023  PROCEDURE: IR J OR G TUBE CONTRAST EVAL  COMPARISON: [facility], CR, IR J OR G TUBE CONTRAST EVAL, 4/04/2023, 15:06.  INDICATIONS: G TUBE EVAL/ 30 ML INJECTED  FINDINGS: Contrast is present within the stomach.  No definite contrast extravasation identified.  Residual contrast present within the colon.       Contrast within the stomach.  No definite contrast extravasation identified.  Residual previous contrast present within the colon slightly limiting evaluation.  No obvious leak identified..      DARLENE MONTERO MD       Electronically Signed and Approved By: DARLENE MONTERO MD on 4/04/2023 at 19:57             IR J or G Tube Contrast Eval    Result Date: 4/4/2023  PROCEDURE: IR J OR G TUBE CONTRAST EVAL  COMPARISON: None  INDICATIONS: Confirm placement of G-tube/ F.T. .9 MIN/ 19.6 mGy/ 2 IMAGES  FINDINGS:   There is a percutaneous enterostomy tube in the left upper quadrant. Contrast material from previous modified barium swallow observed in stomach and small bowel.    Water soluble contrast agent, Gastrografin, was attempted to be injected into the patient's percutaneous gastrostomy (peg) tube. However, Gastrografin contrast would not advance past the distal portion of the peg tube into the stomach, likely due to obstruction or blockage within the tubing.        Percutaneous gastric tube appears  to be in good position, however, unable to pass Gastrografin contrast through the distal portion of the peg tube into the stomach, likely due to obstruction within tubing    THANG BRIONES       Electronically Signed and Approved By: Sohail Fernández M.D. 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 difficile diarrhea and history of chronic diarrhea  Nonconvulsive seizure with history of nonconvulsive status epilepticus  Metabolic acidosis  AUSTEN on CKD 4 baseline creatinine proxy 1.7  Hypokalemia  Hypomagnesemia  Hypernatremia  DM 2  Oropharyngeal dysphagia with chronic PEG tube and nocturnal tube feeds  Blocked PEG tube, resolved  Anemia of chronic disease  Hypertension  Depression  Hypothyroidism  History of breast cancer and prior left mastectomy  Suspect underlying dementia    Plan:  Labs and imaging reviewed  LP today  We will follow-up on results from LP looking for autoimmune versus paraneoplastic encephalitis  Continue fluconazole  Continue delirium precautions  Continue Vimpat with dose increased during this hospitalization  Neurology recommendations appreciated  Continue PEG site wound care  Oral diet per speech  Continue PEG tube feeds per dietitian  Aspiration precautions  Continue amlodipine 10 mg daily  Continue Coreg 25 mg twice a day  Continue Celexa 10 mg daily  Continue Zyrtec and fluticasone  Continue levothyroxine at a very low dose  Continue sodium bicarb tabs  Continue oral vancomycin to complete 10 days total for C. difficile  A.m. labs  DNR  DVT prophylaxis with SCDs while we await LP procedure  Clinical course will dictate further management  Discussed with nurse at the bedside  DVT  prophylaxis:  Mechanical DVT prophylaxis orders are present.    CODE STATUS:   Medical Intervention Limits: NO intubation (DNI)  Code Status (Patient has no pulse and is not breathing): No CPR (Do Not Attempt to Resuscitate)  Medical Interventions (Patient has pulse or is breathing): Limited Support  Release to patient: Routine Release        Electronically signed by Nenita Whiting MD, 04/17/23, 1:02 PM EDT.    Portions of this documentation were transcribed electronically from a voice recognition software.  I confirm all data accurately represents the service(s) I performed at today's visit.

## 2023-04-17 NOTE — PLAN OF CARE
Goal Outcome Evaluation:  Plan of Care Reviewed With: patient        Progress: no change  Outcome Evaluation: Patient continues to present with limitations of balance, cognition/safety awareness and endurance/activity tolerance which impede her ability to perform ADL and transfers as prior.  The skills of a therapist will be required to safely and effectively implement treatment plan to restore maximal level of function.

## 2023-04-18 ENCOUNTER — APPOINTMENT (OUTPATIENT)
Dept: INTERVENTIONAL RADIOLOGY/VASCULAR | Facility: HOSPITAL | Age: 68
DRG: 371 | End: 2023-04-18
Payer: MEDICARE

## 2023-04-18 LAB
ALBUMIN SERPL-MCNC: 3 G/DL (ref 3.5–5.2)
ALP SERPL-CCNC: 93 U/L (ref 39–117)
ALT SERPL W P-5'-P-CCNC: 12 U/L (ref 1–33)
ANION GAP SERPL CALCULATED.3IONS-SCNC: 10.9 MMOL/L (ref 5–15)
APPEARANCE CSF: CLEAR
APPEARANCE CSF: CLEAR
AST SERPL-CCNC: 15 U/L (ref 1–32)
BASOPHILS # BLD AUTO: 0.04 10*3/MM3 (ref 0–0.2)
BASOPHILS NFR BLD AUTO: 0.5 % (ref 0–1.5)
BILIRUB CONJ SERPL-MCNC: <0.2 MG/DL (ref 0–0.3)
BILIRUB INDIRECT SERPL-MCNC: ABNORMAL MG/DL
BILIRUB SERPL-MCNC: 0.3 MG/DL (ref 0–1.2)
BUN SERPL-MCNC: 46 MG/DL (ref 8–23)
BUN/CREAT SERPL: 25.6 (ref 7–25)
C GATTII+NEOFOR DNA CSF QL NAA+NON-PROBE: NOT DETECTED
CALCIUM SPEC-SCNC: 8.9 MG/DL (ref 8.6–10.5)
CHLORIDE SERPL-SCNC: 103 MMOL/L (ref 98–107)
CMV DNA CSF QL NAA+PROBE: NOT DETECTED
CO2 SERPL-SCNC: 24.1 MMOL/L (ref 22–29)
COLOR CSF: COLORLESS
COLOR CSF: COLORLESS
CREAT SERPL-MCNC: 1.8 MG/DL (ref 0.57–1)
CRYPTOC AG CSF QL LA: NEGATIVE
DEPRECATED RDW RBC AUTO: 45.8 FL (ref 37–54)
E COLI K1 DNA CSF QL NAA+NON-PROBE: NOT DETECTED
EGFRCR SERPLBLD CKD-EPI 2021: 30.6 ML/MIN/1.73
EOSINOPHIL # BLD AUTO: 0.34 10*3/MM3 (ref 0–0.4)
EOSINOPHIL NFR BLD AUTO: 4.3 % (ref 0.3–6.2)
ERYTHROCYTE [DISTWIDTH] IN BLOOD BY AUTOMATED COUNT: 13.9 % (ref 12.3–15.4)
EV RNA CSF QL NAA+PROBE: NOT DETECTED
GLUCOSE BLDC GLUCOMTR-MCNC: 103 MG/DL (ref 70–99)
GLUCOSE BLDC GLUCOMTR-MCNC: 108 MG/DL (ref 70–99)
GLUCOSE BLDC GLUCOMTR-MCNC: 124 MG/DL (ref 70–99)
GLUCOSE CSF-MCNC: 75 MG/DL (ref 40–70)
GLUCOSE SERPL-MCNC: 127 MG/DL (ref 65–99)
GP B STREP DNA SPEC QL NAA+PROBE: NOT DETECTED
HAEM INFLU SEROTYP DNA SPEC NAA+PROBE: NOT DETECTED
HCT VFR BLD AUTO: 27.3 % (ref 34–46.6)
HGB BLD-MCNC: 9.3 G/DL (ref 12–15.9)
HHV6 DNA CSF QL NAA+PROBE: NOT DETECTED
HSV1 DNA CSF QL NAA+PROBE: NOT DETECTED
HSV2 DNA CSF QL NAA+PROBE: NOT DETECTED
IMM GRANULOCYTES # BLD AUTO: 0.02 10*3/MM3 (ref 0–0.05)
IMM GRANULOCYTES NFR BLD AUTO: 0.3 % (ref 0–0.5)
L MONOCYTOG RRNA SPEC QL PROBE: NOT DETECTED
LYMPHOCYTES # BLD AUTO: 2.07 10*3/MM3 (ref 0.7–3.1)
LYMPHOCYTES NFR BLD AUTO: 25.9 % (ref 19.6–45.3)
MAGNESIUM SERPL-MCNC: 1.8 MG/DL (ref 1.6–2.4)
MCH RBC QN AUTO: 31 PG (ref 26.6–33)
MCHC RBC AUTO-ENTMCNC: 34.1 G/DL (ref 31.5–35.7)
MCV RBC AUTO: 91 FL (ref 79–97)
MONOCYTES # BLD AUTO: 0.48 10*3/MM3 (ref 0.1–0.9)
MONOCYTES NFR BLD AUTO: 6 % (ref 5–12)
N MEN DNA SPEC QL NAA+PROBE: NOT DETECTED
NEUTROPHILS NFR BLD AUTO: 5.04 10*3/MM3 (ref 1.7–7)
NEUTROPHILS NFR BLD AUTO: 63 % (ref 42.7–76)
NRBC BLD AUTO-RTO: 0 /100 WBC (ref 0–0.2)
NUC CELL # CSF MANUAL: 2.5 /MM3 (ref 0–5)
NUC CELL # CSF MANUAL: 2.5 /MM3 (ref 0–5)
PARECHOVIRUS A RNA CSF QL NAA+NON-PROBE: NOT DETECTED
PHOSPHATE SERPL-MCNC: 4 MG/DL (ref 2.5–4.5)
PLATELET # BLD AUTO: 232 10*3/MM3 (ref 140–450)
PMV BLD AUTO: 9.4 FL (ref 6–12)
POTASSIUM SERPL-SCNC: 4.4 MMOL/L (ref 3.5–5.2)
PROT CSF-MCNC: 39.8 MG/DL (ref 15–45)
PROT SERPL-MCNC: 6.6 G/DL (ref 6–8.5)
RBC # BLD AUTO: 3 10*6/MM3 (ref 3.77–5.28)
RBC # CSF MANUAL: 0 /MM3
RBC # CSF MANUAL: 0 /MM3
S PNEUM DNA CSF QL NAA+NON-PROBE: NOT DETECTED
SODIUM SERPL-SCNC: 138 MMOL/L (ref 136–145)
TUBE # CSF: 1
TUBE # CSF: 4
VZV DNA CSF QL NAA+PROBE: NOT DETECTED
WBC NRBC COR # BLD: 7.99 10*3/MM3 (ref 3.4–10.8)
XANTHOCHROMIA FLD QL: NORMAL
XANTHOCHROMIA FLD QL: NORMAL

## 2023-04-18 PROCEDURE — 99232 SBSQ HOSP IP/OBS MODERATE 35: CPT | Performed by: FAMILY MEDICINE

## 2023-04-18 PROCEDURE — 87015 SPECIMEN INFECT AGNT CONCNTJ: CPT | Performed by: PSYCHIATRY & NEUROLOGY

## 2023-04-18 PROCEDURE — B01B1ZZ FLUOROSCOPY OF SPINAL CORD USING LOW OSMOLAR CONTRAST: ICD-10-PCS | Performed by: PHYSICIAN ASSISTANT

## 2023-04-18 PROCEDURE — 80048 BASIC METABOLIC PNL TOTAL CA: CPT | Performed by: FAMILY MEDICINE

## 2023-04-18 PROCEDURE — 82945 GLUCOSE OTHER FLUID: CPT | Performed by: PSYCHIATRY & NEUROLOGY

## 2023-04-18 PROCEDURE — 009U3ZX DRAINAGE OF SPINAL CANAL, PERCUTANEOUS APPROACH, DIAGNOSTIC: ICD-10-PCS | Performed by: PHYSICIAN ASSISTANT

## 2023-04-18 PROCEDURE — 92526 ORAL FUNCTION THERAPY: CPT

## 2023-04-18 PROCEDURE — 84157 ASSAY OF PROTEIN OTHER: CPT | Performed by: PSYCHIATRY & NEUROLOGY

## 2023-04-18 PROCEDURE — 80076 HEPATIC FUNCTION PANEL: CPT | Performed by: FAMILY MEDICINE

## 2023-04-18 PROCEDURE — 87205 SMEAR GRAM STAIN: CPT | Performed by: PSYCHIATRY & NEUROLOGY

## 2023-04-18 PROCEDURE — 87483 CNS DNA AMP PROBE TYPE 12-25: CPT | Performed by: PSYCHIATRY & NEUROLOGY

## 2023-04-18 PROCEDURE — 87102 FUNGUS ISOLATION CULTURE: CPT | Performed by: PSYCHIATRY & NEUROLOGY

## 2023-04-18 PROCEDURE — 97110 THERAPEUTIC EXERCISES: CPT

## 2023-04-18 PROCEDURE — 82962 GLUCOSE BLOOD TEST: CPT

## 2023-04-18 PROCEDURE — 85025 COMPLETE CBC W/AUTO DIFF WBC: CPT | Performed by: FAMILY MEDICINE

## 2023-04-18 PROCEDURE — 89050 BODY FLUID CELL COUNT: CPT | Performed by: PSYCHIATRY & NEUROLOGY

## 2023-04-18 PROCEDURE — 83735 ASSAY OF MAGNESIUM: CPT | Performed by: FAMILY MEDICINE

## 2023-04-18 PROCEDURE — 87070 CULTURE OTHR SPECIMN AEROBIC: CPT | Performed by: PSYCHIATRY & NEUROLOGY

## 2023-04-18 PROCEDURE — 87327 CRYPTOCOCCUS NEOFORM AG IA: CPT | Performed by: PSYCHIATRY & NEUROLOGY

## 2023-04-18 PROCEDURE — 84100 ASSAY OF PHOSPHORUS: CPT | Performed by: FAMILY MEDICINE

## 2023-04-18 RX ORDER — LIDOCAINE HYDROCHLORIDE 20 MG/ML
20 INJECTION, SOLUTION INFILTRATION; PERINEURAL ONCE
Status: COMPLETED | OUTPATIENT
Start: 2023-04-18 | End: 2023-04-18

## 2023-04-18 RX ADMIN — LEVOTHYROXINE SODIUM 12.5 MCG: 0.03 TABLET ORAL at 06:27

## 2023-04-18 RX ADMIN — LACOSAMIDE 100 MG: 10 SOLUTION ORAL at 08:45

## 2023-04-18 RX ADMIN — SODIUM BICARBONATE 650 MG TABLET 1300 MG: at 20:18

## 2023-04-18 RX ADMIN — SODIUM BICARBONATE 1 ML: 84 INJECTION, SOLUTION INTRAVENOUS at 11:25

## 2023-04-18 RX ADMIN — VANCOMYCIN HYDROCHLORIDE 125 MG: 125 CAPSULE ORAL at 13:18

## 2023-04-18 RX ADMIN — LACOSAMIDE 100 MG: 10 SOLUTION ORAL at 20:57

## 2023-04-18 RX ADMIN — Medication 10 ML: at 08:45

## 2023-04-18 RX ADMIN — AMLODIPINE BESYLATE 10 MG: 5 TABLET ORAL at 08:45

## 2023-04-18 RX ADMIN — NYSTATIN AND TRIAMCINOLONE ACETONIDE 1 APPLICATION: 100000; 1 OINTMENT TOPICAL at 20:20

## 2023-04-18 RX ADMIN — SODIUM BICARBONATE 650 MG TABLET 1300 MG: at 08:45

## 2023-04-18 RX ADMIN — CARVEDILOL 25 MG: 25 TABLET, FILM COATED ORAL at 08:45

## 2023-04-18 RX ADMIN — CARVEDILOL 25 MG: 25 TABLET, FILM COATED ORAL at 20:19

## 2023-04-18 RX ADMIN — CETIRIZINE HYDROCHLORIDE 10 MG: 10 TABLET, FILM COATED ORAL at 08:45

## 2023-04-18 RX ADMIN — ANASTROZOLE 1 MG: 1 TABLET ORAL at 10:24

## 2023-04-18 RX ADMIN — NYSTATIN AND TRIAMCINOLONE ACETONIDE 1 APPLICATION: 100000; 1 OINTMENT TOPICAL at 08:46

## 2023-04-18 RX ADMIN — CITALOPRAM HYDROBROMIDE 10 MG: 20 TABLET ORAL at 08:45

## 2023-04-18 RX ADMIN — Medication 250 MG: at 08:45

## 2023-04-18 RX ADMIN — Medication 10 ML: at 20:20

## 2023-04-18 RX ADMIN — Medication 250 MG: at 20:19

## 2023-04-18 RX ADMIN — LIDOCAINE HYDROCHLORIDE 9 ML: 20 INJECTION, SOLUTION INFILTRATION; PERINEURAL at 11:25

## 2023-04-18 RX ADMIN — VANCOMYCIN HYDROCHLORIDE 125 MG: 125 CAPSULE ORAL at 17:05

## 2023-04-18 RX ADMIN — VANCOMYCIN HYDROCHLORIDE 125 MG: 125 CAPSULE ORAL at 06:27

## 2023-04-18 NOTE — PLAN OF CARE
Goal Outcome Evaluation:              Outcome Evaluation: VSS, no significant changes. Patient confused at times, but answers questions coherently and follows imstruction.

## 2023-04-18 NOTE — THERAPY TREATMENT NOTE
Patient Name: Lyubov Middleton  : 1955    MRN: [id number]                              Today's Date: 2023       Admit Date: 4/3/2023    Visit Dx:     ICD-10-CM ICD-9-CM   1. Metabolic encephalopathy  G93.41 348.31   2. Oropharyngeal dysphagia  R13.12 787.22   3. Difficulty in walking  R26.2 719.7   4. Decreased activities of daily living (ADL)  Z78.9 V49.89     Patient Active Problem List   Diagnosis   • Renal stone   • Renal abscess   • Stage 3a chronic kidney disease   • Type 2 diabetes mellitus   • Metabolic encephalopathy   • Seizure disorder     Past Medical History:   Diagnosis Date   • Cancer     left breast removed    • Type 2 diabetes mellitus      Past Surgical History:   Procedure Laterality Date   • ABDOMINAL SURGERY     • BREAST SURGERY Left     removed due to cancer   • CHOLECYSTECTOMY     • CYSTOSCOPY W/ URETERAL STENT PLACEMENT Right 2020    Procedure: CYSTOSCOPY, RIGHT RETROGRADE PYELOGRAM, URETEROSCOPY, LASER LITHOTRIPSY, RIGHT URETERAL STENT PLACEMENT;  Surgeon: Rohan Dooley Jr., MD;  Location: [facility];  Service: Urology;  Laterality: Right;   • GASTROSTOMY W/ FEEDING TUBE     • HERNIA REPAIR      mesh removed      General Information     Row Name 23 1119          OT Time and Intention    Document Type therapy note (daily note)  -AV     Mode of Treatment individual therapy;occupational therapy  -AV     Row Name 23 1119          General Information    Existing Precautions/Restrictions fall  DNR.  Seizure precautions.  -AV     Barriers to Rehab cognitive status  -AV     Row Name 23 1119          Cognition    Orientation Status (Cognition) --  Alert, pleasant and cooperative.  Patient required max cues/demonstration and encouragement to complete upper extremity exercises.  -AV     Row Name 23 1119          Safety Issues, Functional Mobility    Impairments Affecting Function (Mobility) balance;endurance/activity tolerance;cognition  -AV            User Key  (r) = Recorded By, (t) = Taken By, (c) = Cosigned By    Initials Name Provider Type    Ryan Sprague OT Occupational Therapist                 Mobility/ADL's    No documentation.                Obj/Interventions     Row Name 04/18/23 1121          Elbow/Forearm (Therapeutic Exercise)    Elbow/Forearm Strengthening (Therapeutic Exercise) bilateral;flexion;extension;supination;pronation;1 lb free weight  1 set of 8.  Patient with slowed movements.  Max cues/demonstration required.  -AV     [facility] Name 04/18/23 1121          Motor Skills    Therapeutic Exercise elbow/forearm  Performed in high Fowlers/on room air.  Patient with slow UE movements.  -AV           User Key  (r) = Recorded By, (t) = Taken By, (c) = Cosigned By    Initials Name Provider Type    Ryan Sprague OT Occupational Therapist               Goals/Plan    No documentation.                Clinical Impression     Row Name 04/18/23 1122          Pain Scale: FACES Pre/Post-Treatment    Pain: FACES Scale, Pretreatment 0-->no hurt  -AV     Posttreatment Pain Rating 0-->no hurt  -AV     Row Name 04/18/23 1122          Plan of Care Review    Progress no change  -AV     Outcome Evaluation Patient performed upper extremity therapeutic exercises with 1 pound resistance.  Continued OT is indicated to remediate/compensate for deficits to maximize independence.  -AV     Row Name 04/18/23 1122          Vital Signs    O2 Delivery Pre Treatment room air  -AV     O2 Delivery Intra Treatment room air  -AV     O2 Delivery Post Treatment room air  -AV           User Key  (r) = Recorded By, (t) = Taken By, (c) = Cosigned By    Initials Name Provider Type    Ryan Sprague OT Occupational Therapist               Outcome Measures     [facility] Name 04/18/23 1123          How much help from another is currently needed...    Putting on and taking off regular lower body clothing? 2  -AV     Bathing (including washing, rinsing, and drying) 2  -AV      Toileting (which includes using toilet bed pan or urinal) 1  -AV     Putting on and taking off regular upper body clothing 3  -AV     Taking care of personal grooming (such as brushing teeth) 3  -AV     Eating meals 3  -AV     AM-PAC 6 Clicks Score (OT) 14  -AV     Row Name 04/18/23 0845          How much help from another person do you currently need...    Turning from your back to your side while in flat bed without using bedrails? 3  -[place]     Moving from lying on back to sitting on the side of a flat bed without bedrails? 3  -[place]     Moving to and from a bed to a chair (including a wheelchair)? 2  -[place]     Standing up from a chair using your arms (e.g., wheelchair, bedside chair)? 3  -[place]     Climbing 3-5 steps with a railing? 1  -KY     To walk in hospital room? 2  -KY     AM-PAC 6 Clicks Score (PT) 14  -KY     Highest level of mobility 4 --> Transferred to chair/commode  -KY     Row Name 04/18/23 1123          Optimal Instrument    Bending/Stooping 3  -AV     Standing 2  -AV     Reaching 1  -AV           User Key  (r) = Recorded By, (t) = Taken By, (c) = Cosigned By    Initials Name Provider Type    AV Ryan Carl OT Occupational Therapist    Cathleen Beckman RNA Registered Nurse                Occupational Therapy Education     Title: PT OT SLP Therapies (Done)     Topic: Occupational Therapy (Done)     Point: ADL training (Done)     Description:   Instruct learner(s) on proper safety adaptation and remediation techniques during self care or transfers.   Instruct in proper use of assistive devices.              Learning Progress Summary           Patient Acceptance, E, VU by TAMMY at 4/17/2023 3211                   Point: Home exercise program (Done)     Description:   Instruct learner(s) on appropriate technique for monitoring, assisting and/or progressing therapeutic exercises/activities.              Learning Progress Summary           Patient Acceptance, E, VU by TAMMY at 4/17/2023 3989                    Point: Precautions (Done)     Description:   Instruct learner(s) on prescribed precautions during self-care and functional transfers.              Learning Progress Summary           Patient Acceptance, E, VU by  at 4/17/2023 1549                   Point: Body mechanics (Done)     Description:   Instruct learner(s) on proper positioning and spine alignment during self-care, functional mobility activities and/or exercises.              Learning Progress Summary           Patient Acceptance, E, VU by AV at 4/17/2023 1549                               User Key     Initials Effective Dates Name Provider Type Discipline    AV 06/16/21 -  Ryan Carl OT Occupational Therapist OT              OT Recommendation and Plan  Planned Therapy Interventions (OT): activity tolerance training, BADL retraining, functional balance retraining, occupation/activity based interventions, patient/caregiver education/training, transfer/mobility retraining  Therapy Frequency (OT): 5 times/wk  Plan of Care Review  Plan of Care Reviewed With: patient  Progress: no change  Outcome Evaluation: Patient performed upper extremity therapeutic exercises with 1 pound resistance.  Continued OT is indicated to remediate/compensate for deficits to maximize independence.     Time Calculation:    Time Calculation- OT     Row Name 04/18/23 1123             Time Calculation- OT    OT Received On 04/18/23  -AV      OT Goal Re-Cert Due Date 04/26/23  -AV         Timed Charges    77149 - OT Therapeutic Exercise Minutes 10  -AV         Total Minutes    Timed Charges Total Minutes 10  -AV       Total Minutes 10  -AV            User Key  (r) = Recorded By, (t) = Taken By, (c) = Cosigned By    Initials Name Provider Type    AV Ryan Carl OT Occupational Therapist              Therapy Charges for Today     Code Description Service Date Service Provider Modifiers Qty    05007599405  OT THER PROC EA 15 MIN 4/17/2023 Ryan Carl OT GO 2    [id number]  HC OT THER PROC EA 15 MIN 4/18/2023 Ryan Carl, OT GO 1               Ryan Carl, OT  4/18/2023

## 2023-04-18 NOTE — PLAN OF CARE
Goal Outcome Evaluation:           Progress: no change  Outcome Evaluation: Pt vss. Pt continues to have loose bowel movements. No c/o pain. Continue plan of care

## 2023-04-18 NOTE — THERAPY TREATMENT NOTE
Acute Care - Speech Language Pathology   Swallow Treatment Note  Sherry     Patient Name: Lyubov Middleton  : 1955  MRN: [id number]  Today's Date: 2023               Admit Date: 4/3/2023    Visit Dx:     ICD-10-CM ICD-9-CM   1. Metabolic encephalopathy  G93.41 348.31   2. Oropharyngeal dysphagia  R13.12 787.22   3. Difficulty in walking  R26.2 719.7   4. Decreased activities of daily living (ADL)  Z78.9 V49.89     Patient Active Problem List   Diagnosis   • Renal stone   • Renal abscess   • Stage 3a chronic kidney disease   • Type 2 diabetes mellitus   • Metabolic encephalopathy   • Seizure disorder     Past Medical History:   Diagnosis Date   • Cancer     left breast removed    • Type 2 diabetes mellitus      Past Surgical History:   Procedure Laterality Date   • ABDOMINAL SURGERY     • BREAST SURGERY Left     removed due to cancer   • CHOLECYSTECTOMY     • CYSTOSCOPY W/ URETERAL STENT PLACEMENT Right 2020    Procedure: CYSTOSCOPY, RIGHT RETROGRADE PYELOGRAM, URETEROSCOPY, LASER LITHOTRIPSY, RIGHT URETERAL STENT PLACEMENT;  Surgeon: Rohan Dooley Jr., MD;  Location: [facility];  Service: Urology;  Laterality: Right;   • GASTROSTOMY W/ FEEDING TUBE     • HERNIA REPAIR      mesh removed     SPEECH PATHOLOGY DYSPHAGIA TREATMENT     Subjective/Behavioral Observations: Alert and cooperative     Day/time of Treatment: 23        Current Diet: Mechanical soft, nectar liquid        Current Strategies:One-on-one supervision, assist patient to feed self.  Alternate small bites and small sips of solids and liquids.  Small bite of solid with double swallow.  Small single sips of liquid.  Medications whole in applesauce.        Treatment received: Dysphagia therapy to address swallow function through exercises and education of strategies.        Results of treatment: Trials of thin liquid by cup.  Patient recalled strategy of tasting liquid to achieve single sip.  Patient's regular  drinking habit is to double sip from cup.  P.o. intake with 200 cc of thin water with no overt clinical signs or symptoms of aspiration noted. Minimal cueing for single sips with patient utilizing strategy of tasting.     Patient at times taking larger drinks with losing attention.     Progress toward goals: Adequate      Barriers to Achieving goals: Medical status        Plan of care:/changes in plan: Continue per current plan.   Upgrade of diet to include thin liquid with mechanical soft diet.  Medications whole in applesauce.  Patient be sitting fully upright for all p.o. intake.  Recommend supervision with drinking with patient cued to 'taste' her liquids.                                                                                    Plan of Care Reviewed With: patient          EDUCATION  The patient has been educated in the following areas:   Modified Diet Instruction.              Time Calculation:    Time Calculation- SLP     Row Name 04/18/23 1052             Time Calculation- SLP    SLP Stop Time 0915  -TB      SLP Received On 04/18/23  -TB         Untimed Charges    13018-XB Treatment Swallow Minutes 40  -TB         Total Minutes    Untimed Charges Total Minutes 40  -TB       Total Minutes 40  -TB            User Key  (r) = Recorded By, (t) = Taken By, (c) = Cosigned By    Initials Name Provider Type    TB Barbara Reilly SLP Speech and Language Pathologist                Therapy Charges for Today     Code Description Service Date Service Provider Modifiers Qty    [id number] HC ST TREATMENT SWALLOW 3 4/18/2023 Barbara Reilly SLP GN 1               ANTHONY Santamaria  4/18/2023

## 2023-04-18 NOTE — PROGRESS NOTES
[facility]   Hospitalist Progress Note  Date: 2023  Patient Name: Lyubov Middleton  : 1955  MRN: [id number]  Date of admission: 4/3/2023      Subjective   Subjective   Chief complaint: Seizures    Summary:   68 y.o. female with diabetes mellitus, CKD 4, seizure disorder (history of status epilepticus), dysphagia currently undergoing rehab at [facility] and rehab hospitalized with altered mental status, went unresponsive for working with therapy.  Initially ANO x1 in the emergency department.  Treated initially for hypokalemia, AUSTEN on CKD, UTI.  CT head negative for any acute intracranial abnormalities, did demonstrate diffuse atrophy and white matter changes and suspect patient with underlying dementia.  Chest x-ray negative for any acute abnormalities.  Discovered to have C. difficile treated with p.o. vancomycin and intermittent levels of confusion/encephalopathy, EEGs have been obtained both are negative for acute seizure activity but notable for encephalopathy, neurology is assisting in patient's Vimpat dose was increased.  She is pending LP on 23 to evaluate for paraneoplastic syndrome/alternative etiologies.  Patient required replacement of PEG tube while inpatient initially and subsequently had another episode of pulling out her PEG tube on  and GI replaced it with an 18 Upper sorbian tube on 4/15/23.  We will need to look into long-term placement once we have further information after lumbar puncture.     Interval follow-up: Patient seen and examined this morning, no acute distress, no acute major night events, lumbar puncture not performed yesterday, performed today, results reviewed.  Creatinine down to 1.8, potassium 4.4, sodium 138.  Continues to have loose bowel movements per nursing staff having to clean the patient up frequently.  The patient is confused and not providing much information but is following instructions. Afebrile. LP results below.    Review of  systems:  Unable to obtain review of systems due to confusion      Objective   Objective     Vitals:   Temp:  [97.3 °F (36.3 °C)-98.4 °F (36.9 °C)] 98.4 °F (36.9 °C)  Heart Rate:  [70-82] 76  Resp:  [15-18] 16  BP: (128-154)/(61-88) 137/62  Physical Exam    Constitutional: Awake, alert, confused, smiling, no acute distress being cleaned up by the nursing staff   Eyes: Pupils equal, sclerae anicteric, no conjunctival injection   HENT: NCAT, mucous membranes moist   Neck: Supple, no masses   Respiratory: Clear to auscultation bilaterally, nonlabored respirations, poor inspiratory effort   Cardiovascular: RRR, no murmurs, rubs, or gallops, palpable pedal pulses bilaterally   Gastrointestinal: Positive bowel sounds, soft, nontender, nondistended   Musculoskeletal: No bilateral ankle edema, no clubbing or cyanosis to extremities resting tremor right hand   Psychiatric: Appropriate affect, cooperative, confused   Neurologic: Oriented to self only, weakness throughout, somewhat garbled speech   Skin: No rashes visible on exposed skin  Result Review    Result Review:  I have personally reviewed the pertinent results from the past 24 hours to 4/18/2023 17:06 EDT and agree with these findings:  [x]  Laboratory   CBC        4/16/2023    05:39 4/17/2023    05:06 4/18/2023    04:58   CBC   WBC 7.33   7.90   7.99     RBC 2.94   2.93   3.00     Hemoglobin 9.0   9.2   9.3     Hematocrit 26.6   27.1   27.3     MCV 90.5   92.5   91.0     MCH 30.6   31.4   31.0     MCHC 33.8   33.9   34.1     RDW 13.8   13.8   13.9     Platelets 245   215   232       BMP        4/16/2023    05:39 4/17/2023    05:06 4/18/2023    04:58   BMP   BUN 44   45   46     Creatinine 1.87   1.91   1.80     Sodium 138   139   138     Potassium 4.5   4.5   4.4     Chloride 105   107   103     CO2 23.1   22.8   24.1     Calcium 8.8   8.9   8.9       LIVER FUNCTION TESTS:      Lab 04/18/23  0458   TOTAL PROTEIN 6.6   ALBUMIN 3.0*   ALT (SGPT) 12   AST (SGOT) 15    BILIRUBIN 0.3   BILIRUBIN DIRECT <0.2   ALK PHOS 93       [x]  Microbiology No results found for: ACANTHNAEG, AFBCX, BPERTUSSISCX, BLOODCX  No results found for: BCIDPCR, CXREFLEX, CSFCX, CULTURETIS  No results found for: CULTURES, HSVCX, URCX  No results found for: EYECULTURE, GCCX, HSVCULTURE, LABHSV  No results found for: LEGIONELLA, MRSACX, MUMPSCX, MYCOPLASCX  No results found for: NOCARDIACX, STOOLCX  No results found for: THROATCX, UNSTIMCULT, URINECX, CULTURE, VZVCULTUR  No results found for: VIRALCULTU, WOUNDCX    [x]  Radiology EEG    Result Date: 2023  Clinical history: 68-year-old woman evaluated for confusion. EEG description: This is a portable 19 channel EEG recording using the 10/20 international classification of electrode placement. EEG report: The underlying background activity consist of a 5 to 6 Hz theta activity amplitude of 30 to 40 µV that is distributed diffusely.  This is intermixed with 2 to 3 Hz delta activity amplitude of 90 to 120 µV.  Triphasic configuration was noted with delta activity.  There was no focal slowing.  There were no epileptiform discharges.  Photic stimulation did not activate any abnormalities. Impression: This EEG is abnormal secondary to slowing of background activity.  Is finding suggestive of a mild to moderate encephalopathy of nonspecific etiology.  There were no epileptiform discharges recorded.    EEG    Result Date: 4/3/2023  Table formatting from the original result was not included. Images from the original result were not included. [address] [place], [place] [id number] [phone] ELECTROENCEPHALOGRAPHIC REPORT Patient: Lyubov Middleton EEG # :    PXE-G- : 1955  ID:      [id number]    Age: 68 y.o. female    Primary  Physician: Germain Beasley MD Technician:  Camille Everett Ordering  Physician: Germain Beasley MD    Recording Date:  April 3, 2023 Report  Date: 4/3/2023     History:  Seizures Medications: Darbepoetin chris,  ergocalciferol, menthol zinc oxide, acetaminophen, alendronate, amlodipine, anastrozole, carvedilol, cholestyramine, citalopram, ferrous sulfate, hydralazine, ipratropium, levothyroxine, losartan, phosphorus, Saccharomyces boulardii, sodium bicarbonate. Reading: The EEG was obtained portable in her room during the waking state and on photic stimulation with video monitoring.  We do not know how much sleep she has had the night before the testing.  During the EEG, she was quite tense and restless and the EEG was filled with so much movement and muscle artifacts. The dominant background activity consists of symmetric and irregular 30 to 165 µV 6 to 7 cps which were prominent on both parieto-occipital regions.  There were frequent 100 to 235 µV slow activities of 2 cps which were generalized and synchronous more prominent on both anterior quadrants noted intermittently all throughout the recording.  There were no discernible responses on photic stimulation at various frequencies.  There was no amplitude asymmetry.  No paroxysmal discharges. Impression: Abnormal EEG because of slow background activity mixed with slower waves compatible with moderate diffuse encephalopathy. There were no epileptiform discharges noted today. The EEG may be repeated at a later date when she is more calm and more cooperative. Electronically signed by Hernesto Braxton Jr., MD, 04/03/23, 6:39 PM EDT. Please note that portions of this note were completed with a voice recognition program.  Part of this note is an electric or electronic transcription/translation of spoken language to printed text using the dragon dictating system.    CT Head Without Contrast    Result Date: 4/3/2023  PROCEDURE: CT HEAD WO CONTRAST  COMPARISON:  [facility], CT, CT FACIAL BONES WO CONTRAST, 3/25/2023, 12:31.  INDICATIONS: [facility]  PROTOCOL:   Standard imaging protocol performed    RADIATION:   DLP: 1081.2mGy*cm   MA and/or KV was adjusted to minimize  radiation dose.    TECHNIQUE: CT images were obtained without non-ionic intravenous contrast material.  FINDINGS:  The ventricles, sulci, and cerebellar folia are moderately and diffusely prominent consistent with atrophy.  Ill-defined diminished density in cerebral white matter is consistent with mild gliosis and/or numerous lacunar infarcts.  There is no CT evidence of acute intracranial hemorrhage, mass, or mass effect.  The orbits have a normal appearance.  The paranasal sinuses, middle ears, and mastoid air cells are well aerated.        CT scan of the head without IV contrast demonstrating diffuse atrophy and white matter changes.  No acute intracranial abnormality is seen.     KULWANT HOLLOWAY MD       Electronically Signed and Approved By: KULWANT HOLLOWAY MD on 4/03/2023 at 12:27             MRI Brain Without Contrast    Result Date: 4/12/2023  PROCEDURE: MRI BRAIN WO CONTRAST  COMPARISONS: 4/3/2023; 3/25/2023.  INDICATIONS: AMS (altered mental status); previous CT head concerning for gliosis versus numerous lacunar infarcts.  TECHNIQUE: A variety of imaging planes and parameters were utilized for visualization of suspected pathology within the brain.  275 magnetic resonance (MR) images were obtained without contrast.  FINDINGS: No reduced ADC by DWI is identified to suggest an acute infarct or other acute brain pathology. Slight motion artifact is seen on some of the sequences.  FLAIR/T2 hyperintensities are seen throughout the cerebral white matter, especially in the central and periventricular areas but also in subcortical regions. These findings are nonspecific but may represent mild-to-moderate chronic small vessel ischemia/infarction.  There is also involvement of the brainstem, especially the abdullahi.  The hippocampal formations are symmetric in size, signal intensity, and internal architecture. No abnormal susceptibility is seen on the SWI sequence except for the basal ganglia, consistent with  mineralization.  Age-indeterminate congestive and/or inflammatory changes involve the mastoid air cell complexes, greater on the right than the left.  These findings may represent age-indeterminate congestive and/or inflammatory change.  Air-fluid levels also involve the bilateral sphenoid sinuses.  To a lesser extent, opacities involve the bilateral posterior ethmoid air cells.  These findings may be related to congestive and/or inflammatory change.  Subacute hemorrhage related to recent trauma is possible.  The extra-axial spaces and the ventricular system are prominent, suggesting global atrophy.        1. No acute brain abnormality is seen. No acute infarct. No acute intracranial hemorrhage. 2. There may be mild-to-moderate chronic small vessel ischemia/infarction. 3. Slight motion artifact obscures detail on some of the sequences.    4. The other findings are as detailed above.     Please note that portions of this note were completed with a voice recognition program.  DWAIN RAMOS JR, MD       Electronically Signed and Approved By: DWAIN RAMOS JR, MD on 4/12/2023 at 23:26              FL Video Swallow With Speech Single Contrast    Result Date: 4/4/2023  PROCEDURE: FL VIDEO SWALLOW W SPEECH SINGLE-CONTRAST  COMPARISON: None  INDICATIONS: dysphagia/ 5.0 mGy/ 2.3 min  TECHNIQUE: Examination was performed in conjunction with the speech pathologist. The patient was given both thin and nectar thick liquid barium, applesauce with barium, contrast and rohit cracker with Esophotrast. The patient's medication list was reviewed and documented in the medical record.  FINDINGS:  Fluoroscopic examination was performed in conjunction with speech pathology department.  Various consistencies of barium were given to assess the swallow mechanism.  Across all consistencies, no tracheal aspiration was observed on exam.  Patient experienced deep laryngeal penetration with thin liquid barium.  Patient experienced transient  laryngeal penetration with applesauce with barium.  The exam was discussed in real-time by myself and the speech pathologist. Please consult speech pathology report for further details regarding exam.         1. Deep laryngeal penetration with thin liquid barium 2. Transient laryngeal penetration with applesauce with barium 3. No tracheal aspiration  Please consult speech pathology report for further details regarding exam and dietary recommendations   THANG BRIONES       Electronically Signed and Approved By: Sohail Fernández M.D. on 4/04/2023 at 16:08             XR Chest 1 View    Result Date: 4/3/2023  PROCEDURE: XR CHEST 1 VW  COMPARISON: None  INDICATIONS: WEAKNESS TODAY  FINDINGS:  The heart is normal in size.  The lungs are well-expanded and free of infiltrates.  Mild degenerative changes are seen in the glenohumeral joints.  Surgical clips are seen in the left axillary region.       No active cardiopulmonary disease is seen.       KULWANT HOLLOWAY MD       Electronically Signed and Approved By: KULWANT HOLLOWAY MD on 4/03/2023 at 11:53             CT Facial Bones Without Contrast    Result Date: 3/25/2023  PROCEDURE: CT FACIAL BONES WO CONTRAST  COMPARISON: None  INDICATIONS: facial trauma  PROTOCOL:   Standard imaging protocol performed    RADIATION:   DLP: 527.8 mGy*cm   Automated exposure control was utilized to minimize radiation dose.  TECHNIQUE: Axial images of the facial bones without contrast. Coronal and sagittal reformatted images were performed.  FINDINGS: There is a mildly comminuted nasal bone fracture.  No other facial bone fractures are identified.  There is mild mucosal thickening in the ethmoid sinuses.  There is a small air-fluid level in the left sphenoid sinus without evidence of significant mucosal thickening.  Questionable subtle fracture of the bony nasal septum.  There incidental middle leslie bullosa.  The ostiomeatal units are patent.  No orbital abnormalities are identified.  There is  generalized atrophy in the visualized brain.  No orbital abnormalities are identified.  IMPRESSION: 1. Mildly comminuted nasal bone fracture.  2. Questionable subtle fracture of the bony nasal septum.   FRITZ CARMEN MD       Electronically Signed and Approved By: FRITZ CARMEN MD on 3/25/2023 at 12:46             IR J or G Tube Contrast Eval    Result Date: 4/15/2023  PROCEDURE: IR J OR G TUBE CONTRAST EVAL  COMPARISON: [facility], CR, IR J OR G TUBE CONTRAST EVAL, 4/04/2023, 17:55.  INDICATIONS: PEG tube placement  FINDINGS: A  image and a 3 minutes delayed imags were obtained.  There is a PEG tube in the stomach.  No evidence of contrast extravasation.  Contrast is present in the proximal small bowel on the delayed image.  No evidence of obstruction.       Normal exam.      FRITZ CARMEN MD       Electronically Signed and Approved By: FRITZ CARMEN MD on 4/15/2023 at 11:43             IR J or G Tube Contrast Eval    Result Date: 4/4/2023  PROCEDURE: IR J OR G TUBE CONTRAST EVAL  COMPARISON: [facility], CR, IR J OR G TUBE CONTRAST EVAL, 4/04/2023, 15:06.  INDICATIONS: G TUBE EVAL/ 30 ML INJECTED  FINDINGS: Contrast is present within the stomach.  No definite contrast extravasation identified.  Residual contrast present within the colon.       Contrast within the stomach.  No definite contrast extravasation identified.  Residual previous contrast present within the colon slightly limiting evaluation.  No obvious leak identified..      DARLENE MONTERO MD       Electronically Signed and Approved By: DARLENE MONTERO MD on 4/04/2023 at 19:57             IR J or G Tube Contrast Eval    Result Date: 4/4/2023  PROCEDURE: IR J OR G TUBE CONTRAST EVAL  COMPARISON: None  INDICATIONS: Confirm placement of G-tube/ F.T. .9 MIN/ 19.6 mGy/ 2 IMAGES  FINDINGS:   There is a percutaneous enterostomy tube in the left upper quadrant. Contrast material from previous modified barium swallow  observed in stomach and small bowel.    Water soluble contrast agent, Gastrografin, was attempted to be injected into the patient's percutaneous gastrostomy (peg) tube. However, Gastrografin contrast would not advance past the distal portion of the peg tube into the stomach, likely due to obstruction or blockage within the tubing.        Percutaneous gastric tube appears to be in good position, however, unable to pass Gastrografin contrast through the distal portion of the peg tube into the stomach, likely due to obstruction within tubing    THANG BRIONES       Electronically Signed and Approved By: Sohail Fernández M.D. 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 difficile diarrhea and history of chronic diarrhea  Nonconvulsive seizure with history of nonconvulsive status epilepticus  Metabolic acidosis  AUSTEN on CKD 4 baseline creatinine proxy 1.7  Hypokalemia  Hypomagnesemia  Hypernatremia  DM 2  Oropharyngeal dysphagia with chronic PEG tube and nocturnal tube feeds  Blocked PEG tube, resolved  Anemia of chronic disease  Hypertension  Depression  Hypothyroidism  History of breast cancer and prior left mastectomy  Suspect underlying dementia    Plan:  Labs and imaging reviewed  LP was not performed yesterday, it was performed today with fluid analysis showing clear, colorless, 0 RBCs, 2.5 nucleated cells, mild elevation in glucose, normal protein.  Opening pressure was 12 cm of water.  Continue following diarrhea symptoms  Continue fluconazole  Continue delirium precautions  Continue Vimpat with dose increased during this hospitalization  Neurology recommendations appreciated  Continue PEG site wound care  Oral diet per  speech  Continue PEG tube feeds per dietitian  Aspiration precautions  Continue amlodipine 10 mg daily  Continue Coreg 25 mg twice a day  Continue Celexa 10 mg daily  Continue Zyrtec and fluticasone  Continue levothyroxine at a very low dose  Continue sodium bicarb tabs  Continue oral vancomycin to complete 10 days total for C. difficile  A.m. labs  DNR  DVT prophylaxis with SCDs while we await LP procedure  Clinical course will dictate further management  Discussed with nurse at the bedside  DVT prophylaxis:  Mechanical DVT prophylaxis orders are present.    CODE STATUS:   Medical Intervention Limits: NO intubation (DNI)  Code Status (Patient has no pulse and is not breathing): No CPR (Do Not Attempt to Resuscitate)  Medical Interventions (Patient has pulse or is breathing): Limited Support  Release to patient: Routine Release    Electronically signed by Nenita Whiting MD, 04/18/23, 5:06 PM EDT.  Portions of this documentation were transcribed electronically from a voice recognition software.  I confirm all data accurately represents the service(s) I performed at today's visit.

## 2023-04-19 LAB
ALBUMIN SERPL-MCNC: 3 G/DL (ref 3.5–5.2)
ALP SERPL-CCNC: 94 U/L (ref 39–117)
ALT SERPL W P-5'-P-CCNC: 12 U/L (ref 1–33)
ANION GAP SERPL CALCULATED.3IONS-SCNC: 9.5 MMOL/L (ref 5–15)
AST SERPL-CCNC: 15 U/L (ref 1–32)
BASOPHILS # BLD AUTO: 0.04 10*3/MM3 (ref 0–0.2)
BASOPHILS NFR BLD AUTO: 0.5 % (ref 0–1.5)
BILIRUB CONJ SERPL-MCNC: <0.2 MG/DL (ref 0–0.3)
BILIRUB INDIRECT SERPL-MCNC: ABNORMAL MG/DL
BILIRUB SERPL-MCNC: 0.3 MG/DL (ref 0–1.2)
BUN SERPL-MCNC: 49 MG/DL (ref 8–23)
BUN/CREAT SERPL: 26.9 (ref 7–25)
CALCIUM SPEC-SCNC: 8.9 MG/DL (ref 8.6–10.5)
CHLORIDE SERPL-SCNC: 100 MMOL/L (ref 98–107)
CO2 SERPL-SCNC: 26.5 MMOL/L (ref 22–29)
CREAT SERPL-MCNC: 1.82 MG/DL (ref 0.57–1)
DEPRECATED RDW RBC AUTO: 43.4 FL (ref 37–54)
EGFRCR SERPLBLD CKD-EPI 2021: 30.2 ML/MIN/1.73
EOSINOPHIL # BLD AUTO: 0.4 10*3/MM3 (ref 0–0.4)
EOSINOPHIL NFR BLD AUTO: 4.9 % (ref 0.3–6.2)
ERYTHROCYTE [DISTWIDTH] IN BLOOD BY AUTOMATED COUNT: 13.2 % (ref 12.3–15.4)
GLUCOSE BLDC GLUCOMTR-MCNC: 112 MG/DL (ref 70–99)
GLUCOSE BLDC GLUCOMTR-MCNC: 118 MG/DL (ref 70–99)
GLUCOSE BLDC GLUCOMTR-MCNC: 128 MG/DL (ref 70–99)
GLUCOSE SERPL-MCNC: 122 MG/DL (ref 65–99)
HCT VFR BLD AUTO: 26.2 % (ref 34–46.6)
HGB BLD-MCNC: 9 G/DL (ref 12–15.9)
IMM GRANULOCYTES # BLD AUTO: 0.03 10*3/MM3 (ref 0–0.05)
IMM GRANULOCYTES NFR BLD AUTO: 0.4 % (ref 0–0.5)
LYMPHOCYTES # BLD AUTO: 2.04 10*3/MM3 (ref 0.7–3.1)
LYMPHOCYTES NFR BLD AUTO: 25 % (ref 19.6–45.3)
MAGNESIUM SERPL-MCNC: 1.5 MG/DL (ref 1.6–2.4)
MCH RBC QN AUTO: 30.6 PG (ref 26.6–33)
MCHC RBC AUTO-ENTMCNC: 34.4 G/DL (ref 31.5–35.7)
MCV RBC AUTO: 89.1 FL (ref 79–97)
MONOCYTES # BLD AUTO: 0.43 10*3/MM3 (ref 0.1–0.9)
MONOCYTES NFR BLD AUTO: 5.3 % (ref 5–12)
NEUTROPHILS NFR BLD AUTO: 5.21 10*3/MM3 (ref 1.7–7)
NEUTROPHILS NFR BLD AUTO: 63.9 % (ref 42.7–76)
NRBC BLD AUTO-RTO: 0 /100 WBC (ref 0–0.2)
PHOSPHATE SERPL-MCNC: 4.2 MG/DL (ref 2.5–4.5)
PLATELET # BLD AUTO: 213 10*3/MM3 (ref 140–450)
PMV BLD AUTO: 9.3 FL (ref 6–12)
POTASSIUM SERPL-SCNC: 4.4 MMOL/L (ref 3.5–5.2)
PROT SERPL-MCNC: 6.3 G/DL (ref 6–8.5)
RBC # BLD AUTO: 2.94 10*6/MM3 (ref 3.77–5.28)
SODIUM SERPL-SCNC: 136 MMOL/L (ref 136–145)
WBC NRBC COR # BLD: 8.15 10*3/MM3 (ref 3.4–10.8)

## 2023-04-19 PROCEDURE — 84100 ASSAY OF PHOSPHORUS: CPT | Performed by: FAMILY MEDICINE

## 2023-04-19 PROCEDURE — 80076 HEPATIC FUNCTION PANEL: CPT | Performed by: FAMILY MEDICINE

## 2023-04-19 PROCEDURE — 99232 SBSQ HOSP IP/OBS MODERATE 35: CPT | Performed by: FAMILY MEDICINE

## 2023-04-19 PROCEDURE — 85025 COMPLETE CBC W/AUTO DIFF WBC: CPT | Performed by: FAMILY MEDICINE

## 2023-04-19 PROCEDURE — 80048 BASIC METABOLIC PNL TOTAL CA: CPT | Performed by: FAMILY MEDICINE

## 2023-04-19 PROCEDURE — 25010000002 MAGNESIUM SULFATE IN D5W 1G/100ML (PREMIX) 1-5 GM/100ML-% SOLUTION: Performed by: FAMILY MEDICINE

## 2023-04-19 PROCEDURE — 97110 THERAPEUTIC EXERCISES: CPT

## 2023-04-19 PROCEDURE — 92526 ORAL FUNCTION THERAPY: CPT

## 2023-04-19 PROCEDURE — 83735 ASSAY OF MAGNESIUM: CPT | Performed by: FAMILY MEDICINE

## 2023-04-19 PROCEDURE — 82962 GLUCOSE BLOOD TEST: CPT

## 2023-04-19 RX ORDER — MAGNESIUM SULFATE 1 G/100ML
1 INJECTION INTRAVENOUS
Status: COMPLETED | OUTPATIENT
Start: 2023-04-19 | End: 2023-04-19

## 2023-04-19 RX ADMIN — CETIRIZINE HYDROCHLORIDE 10 MG: 10 TABLET, FILM COATED ORAL at 08:59

## 2023-04-19 RX ADMIN — VANCOMYCIN HYDROCHLORIDE 125 MG: 125 CAPSULE ORAL at 05:46

## 2023-04-19 RX ADMIN — Medication 250 MG: at 21:19

## 2023-04-19 RX ADMIN — SODIUM BICARBONATE 650 MG TABLET 1300 MG: at 21:19

## 2023-04-19 RX ADMIN — Medication 250 MG: at 08:59

## 2023-04-19 RX ADMIN — LACOSAMIDE 100 MG: 10 SOLUTION ORAL at 08:59

## 2023-04-19 RX ADMIN — AMLODIPINE BESYLATE 10 MG: 5 TABLET ORAL at 08:59

## 2023-04-19 RX ADMIN — LACOSAMIDE 100 MG: 10 SOLUTION ORAL at 21:19

## 2023-04-19 RX ADMIN — CARVEDILOL 25 MG: 25 TABLET, FILM COATED ORAL at 21:19

## 2023-04-19 RX ADMIN — CARVEDILOL 25 MG: 25 TABLET, FILM COATED ORAL at 08:59

## 2023-04-19 RX ADMIN — ANASTROZOLE 1 MG: 1 TABLET ORAL at 11:35

## 2023-04-19 RX ADMIN — LEVOTHYROXINE SODIUM 12.5 MCG: 0.03 TABLET ORAL at 05:46

## 2023-04-19 RX ADMIN — VANCOMYCIN HYDROCHLORIDE 125 MG: 125 CAPSULE ORAL at 12:09

## 2023-04-19 RX ADMIN — SODIUM BICARBONATE 650 MG TABLET 1300 MG: at 08:59

## 2023-04-19 RX ADMIN — Medication 10 ML: at 08:59

## 2023-04-19 RX ADMIN — NYSTATIN AND TRIAMCINOLONE ACETONIDE 1 APPLICATION: 100000; 1 OINTMENT TOPICAL at 09:00

## 2023-04-19 RX ADMIN — VANCOMYCIN HYDROCHLORIDE 125 MG: 125 CAPSULE ORAL at 17:09

## 2023-04-19 RX ADMIN — CITALOPRAM HYDROBROMIDE 10 MG: 20 TABLET ORAL at 08:59

## 2023-04-19 RX ADMIN — VANCOMYCIN HYDROCHLORIDE 125 MG: 125 CAPSULE ORAL at 01:00

## 2023-04-19 RX ADMIN — MAGNESIUM SULFATE 1 G: 1 INJECTION INTRAVENOUS at 11:35

## 2023-04-19 RX ADMIN — MAGNESIUM SULFATE 1 G: 1 INJECTION INTRAVENOUS at 10:23

## 2023-04-19 RX ADMIN — MAGNESIUM SULFATE 1 G: 1 INJECTION INTRAVENOUS at 14:01

## 2023-04-19 RX ADMIN — NYSTATIN AND TRIAMCINOLONE ACETONIDE 1 APPLICATION: 100000; 1 OINTMENT TOPICAL at 21:28

## 2023-04-19 RX ADMIN — Medication 10 ML: at 21:19

## 2023-04-19 RX ADMIN — MAGNESIUM SULFATE 1 G: 1 INJECTION INTRAVENOUS at 09:00

## 2023-04-19 NOTE — THERAPY TREATMENT NOTE
Acute Care - Speech Language Pathology   Swallow Treatment Note  Sherry     Patient Name: Lyubov Middleton  : 1955  MRN: [id number]  Today's Date: 2023               Admit Date: 4/3/2023    Visit Dx:     ICD-10-CM ICD-9-CM   1. Metabolic encephalopathy  G93.41 348.31   2. Oropharyngeal dysphagia  R13.12 787.22   3. Difficulty in walking  R26.2 719.7   4. Decreased activities of daily living (ADL)  Z78.9 V49.89     Patient Active Problem List   Diagnosis   • Renal stone   • Renal abscess   • Stage 3a chronic kidney disease   • Type 2 diabetes mellitus   • Metabolic encephalopathy   • Seizure disorder     Past Medical History:   Diagnosis Date   • Cancer     left breast removed    • Type 2 diabetes mellitus      Past Surgical History:   Procedure Laterality Date   • ABDOMINAL SURGERY     • BREAST SURGERY Left     removed due to cancer   • CHOLECYSTECTOMY     • CYSTOSCOPY W/ URETERAL STENT PLACEMENT Right 2020    Procedure: CYSTOSCOPY, RIGHT RETROGRADE PYELOGRAM, URETEROSCOPY, LASER LITHOTRIPSY, RIGHT URETERAL STENT PLACEMENT;  Surgeon: Rohan Dooley Jr., MD;  Location: [facility];  Service: Urology;  Laterality: Right;   • GASTROSTOMY W/ FEEDING TUBE     • HERNIA REPAIR      mesh removed       SPEECH PATHOLOGY DYSPHAGIA TREATMENT     Subjective/Behavioral Observations: Alert and cooperative     Day/time of Treatment: 23        Current Diet: Mechanical soft, thin liquid        Current Strategies:One-on-one supervision, assist patient to feed self.  Alternate small bites and small sips of solids and liquids.  Small bite of solid with double swallow.  Small single sips of liquid.  Medications whole in applesauce.        Treatment received: Dysphagia therapy to address swallow function through exercises and education of strategies.        Results of treatment: Trials of thin liquid by cup.  Patient recalled strategy of tasting liquid to achieve single sip.  Patient's regular  drinking habit is to double sip from cup.  P.o. intake with 200 cc of thin liquid with no overt clinical signs or symptoms of aspiration noted. Minimal cueing for single sips with patient utilizing strategy of tasting.         Progress toward goals: Adequate      Barriers to Achieving goals: Medical status        Plan of care:/changes in plan: Continue per current plan.  Continue diet to include thin liquid with mechanical soft diet. No straw.  Medications whole in applesauce.  Patient be sitting fully upright for all p.o. intake.  Recommend supervision with drinking with patient cued to 'taste' her liquids.                                                                            Plan of Care Reviewed With: patient          EDUCATION  The patient has been educated in the following areas:   Modified Diet Instruction.              Time Calculation:    Time Calculation- SLP     Row Name 04/19/23 0942             Time Calculation- SLP    SLP Stop Time 0800  -TB      SLP Received On 04/19/23  -TB         Untimed Charges    89316-RY Treatment Swallow Minutes 40  -TB         Total Minutes    Untimed Charges Total Minutes 40  -TB       Total Minutes 40  -TB            User Key  (r) = Recorded By, (t) = Taken By, (c) = Cosigned By    Initials Name Provider Type    TB Barbara Reilly SLP Speech and Language Pathologist                Therapy Charges for Today     Code Description Service Date Service Provider Modifiers Qty    93359639940 HC ST TREATMENT SWALLOW 3 4/18/2023 Barbara Reilly SLP GN 1    82557061689 HC ST TREATMENT SWALLOW 3 4/19/2023 Barbara Reilly SLP GN 1               ANTHONY Santamaria  4/19/2023

## 2023-04-19 NOTE — SIGNIFICANT NOTE
Wound Eval / Progress Noted     [place]     Patient Name: Lyubov Middleton  : 1955  MRN: [id number]  Today's Date: 2023                 Admit Date: 4/3/2023    Visit Dx:    ICD-10-CM ICD-9-CM   1. Metabolic encephalopathy  G93.41 348.31   2. Oropharyngeal dysphagia  R13.12 787.22   3. Difficulty in walking  R26.2 719.7   4. Decreased activities of daily living (ADL)  Z78.9 V49.89       Patient Active Problem List   Diagnosis    Renal stone    Renal abscess    Stage 3a chronic kidney disease    Type 2 diabetes mellitus    Metabolic encephalopathy    Seizure disorder        Past Medical History:   Diagnosis Date    Cancer     left breast removed     Type 2 diabetes mellitus         Past Surgical History:   Procedure Laterality Date    ABDOMINAL SURGERY      BREAST SURGERY Left     removed due to cancer    CHOLECYSTECTOMY      CYSTOSCOPY W/ URETERAL STENT PLACEMENT Right 2020    Procedure: CYSTOSCOPY, RIGHT RETROGRADE PYELOGRAM, URETEROSCOPY, LASER LITHOTRIPSY, RIGHT URETERAL STENT PLACEMENT;  Surgeon: Rohan Dooley Jr., MD;  Location: [facility];  Service: Urology;  Laterality: Right;    GASTROSTOMY W/ FEEDING TUBE      HERNIA REPAIR      mesh removed         Physical Assessment:  Wound 23 1622 Right labia (Active)   Wound Image   23 1235   Dressing Appearance open to air 23 1235   Closure None 23 1235   Base red 23 1235   Periwound moist;redness 23 1235   Periwound Temperature warm 23 1235   Periwound Skin Turgor soft 23 1235   Edges open;rolled/closed 23 1235   Drainage Amount none 23 1235   Care, Wound cleansed with;sterile normal saline 23   Periwound Care topical treatment applied 23 1235       Wound 23 1628 sacral spine (Active)   Dressing Appearance open to air 23 1235   Closure None 23 1235   Base red;moist 23 1235   Periwound moist;redness 23 1235   Periwound  Temperature warm 04/19/23 1235   Periwound Skin Turgor soft 04/19/23 1235   Edges rolled/closed 04/19/23 1235   Drainage Amount none 04/19/23 1235   Care, Wound cleansed with;soap and water 04/18/23 2010   Periwound Care barrier ointment applied 04/18/23 2010       Wound 04/03/23 1629 Bilateral gluteal (Active)   Dressing Appearance open to air 04/19/23 1235   Closure None 04/19/23 1235   Base moist;red 04/19/23 1235   Periwound moist;redness 04/19/23 1235   Periwound Skin Turgor soft 04/19/23 1235   Edges rolled/closed 04/19/23 1235   Drainage Amount none 04/19/23 1235   Care, Wound cleansed with;soap and water 04/18/23 2010   Periwound Care barrier ointment applied 04/18/23 2010       Wound 04/03/23 1630 Bilateral posterior greater trochanter (Active)   Dressing Appearance open to air 04/19/23 1235   Closure None 04/19/23 1235   Base pink 04/19/23 1235   Periwound intact;pink 04/19/23 1235   Periwound Skin Turgor soft 04/19/23 1235   Edges rolled/closed 04/19/23 1235   Drainage Amount none 04/19/23 1235   Care, Wound cleansed with;soap and water 04/18/23 2010   Periwound Care barrier ointment applied 04/18/23 2010              Wound Check / Follow-up:  Patient seen today for wound follow-up.   Peg tube site with intact dressing. Dressing removed. Crusting noted. Cleansed insertion site with NS and gauze. Blotted dry and new fenestrated gauze. Bolstering readjusted to secure tube to surface without applying pressure.   Patient with MASD within perineum, to bilateral groin and gluteal aspects. Much improved with less redness and irritation. Per Primary RN loose stools have also significantly improved. Topical treatment is currently in use. Recommending continued use of medicated topical ointment with skin barrier in between applications to assist with moisture prevention, skin protection. Skin is blanchable with minimal erosion noted to right labia.  Heels are pink and blanchable.     Impression: MASD with fungal  presentation to perineum, PEG site with crusting    Short term goals:  Regain skin integrity. Topical treatments. Site care every shift. Skin protection, moisture prevention and pressure reduction.    Carolyn Shahid RN    4/19/2023    13:22 EDT

## 2023-04-19 NOTE — PLAN OF CARE
Goal Outcome Evaluation:  Plan of Care Reviewed With: patient        Progress: no change  Outcome Evaluation: Pt vss. Pt has had 3 episodes of incontince today. Possible dc tomorrow to [facility] tomorrow

## 2023-04-19 NOTE — PROGRESS NOTES
[facility]   Hospitalist Progress Note  Date: 2023  Patient Name: Lyubov Middleton  : 1955  MRN: [id number]  Date of admission: 4/3/2023      Subjective   Subjective   Chief complaint: Seizures    Summary:   68 y.o. female with diabetes mellitus, CKD 4, seizure disorder (history of status epilepticus), dysphagia currently undergoing rehab at [facility] and rehab hospitalized with altered mental status, went unresponsive for working with therapy.  Initially ANO x1 in the emergency department.  Treated initially for hypokalemia, AUSTEN on CKD, UTI.  CT head negative for any acute intracranial abnormalities, did demonstrate diffuse atrophy and white matter changes and suspect patient with underlying dementia.  Chest x-ray negative for any acute abnormalities.  Discovered to have C. difficile treated with p.o. vancomycin and intermittent levels of confusion/encephalopathy, EEGs have been obtained both are negative for acute seizure activity but notable for encephalopathy, neurology is assisting in patient's Vimpat dose was increased.  She is pending LP on 23 to evaluate for paraneoplastic syndrome/alternative etiologies.  Patient required replacement of PEG tube while inpatient initially and subsequently had another episode of pulling out her PEG tube on  and GI replaced it with an 18 [place] tube on 4/15/23.  We will need to look into long-term placement once we have further information after lumbar puncture.     Interval follow-up: Patient seen and examined this morning, no acute distress, no acute major night events, so far lumbar puncture has not yielded any new infections warranting any further neuro work-up.  Her diarrhea continues but is less frequent.  Her electrolytes are well-balanced, potassium 4.4, sodium 136, phosphorus 4.2.  Her creatinine is at 1.82, white blood cell count 8000.  Magnesium low at 1.5, replacement magnesium ordered IV instead of p.o. to help prevent  diarrhea.  Precertification process started to place her back in rehab    Review of systems:  Unable to obtain review of systems due to confusion      Objective   Objective     Vitals:   Temp:  [97.2 °F (36.2 °C)-98.2 °F (36.8 °C)] 97.7 °F (36.5 °C)  Heart Rate:  [65-98] 91  Resp:  [14-16] 16  BP: (104-149)/(45-75) 147/72  Physical Exam    Constitutional: Awake, alert, confused, smiling, no acute distress    Eyes: Pupils equal, sclerae anicteric, no conjunctival injection   HENT: NCAT, mucous membranes moist   Neck: Supple, no masses   Respiratory: Clear to auscultation bilaterally, nonlabored respirations, poor inspiratory effort   Cardiovascular: RRR, no murmurs, rubs, or gallops, palpable pedal pulses bilaterally   Gastrointestinal: Positive bowel sounds, soft, nontender, nondistended   Musculoskeletal: No bilateral ankle edema, no clubbing or cyanosis to extremities resting tremor right hand   Psychiatric: Appropriate affect, cooperative, confused   Neurologic: Oriented to self only, weakness throughout, somewhat garbled speech   Skin: No rashes visible on exposed skin  Result Review    Result Review:  I have personally reviewed the pertinent results from the past 24 hours to 4/19/2023 21:20 EDT and agree with these findings:  [x]  Laboratory   CBC        4/17/2023    05:06 4/18/2023    04:58 4/19/2023    04:49   CBC   WBC 7.90   7.99   8.15     RBC 2.93   3.00   2.94     Hemoglobin 9.2   9.3   9.0     Hematocrit 27.1   27.3   26.2     MCV 92.5   91.0   89.1     MCH 31.4   31.0   30.6     MCHC 33.9   34.1   34.4     RDW 13.8   13.9   13.2     Platelets 215   232   213       BMP        4/17/2023    05:06 4/18/2023    04:58 4/19/2023    04:49   BMP   BUN 45   46   49     Creatinine 1.91   1.80   1.82     Sodium 139   138   136     Potassium 4.5   4.4   4.4     Chloride 107   103   100     CO2 22.8   24.1   26.5     Calcium 8.9   8.9   8.9       LIVER FUNCTION TESTS:      Lab 04/19/23  0449 04/18/23  0458   TOTAL  PROTEIN 6.3 6.6   ALBUMIN 3.0* 3.0*   ALT (SGPT) 12 12   AST (SGOT) 15 15   BILIRUBIN 0.3 0.3   BILIRUBIN DIRECT <0.2 <0.2   ALK PHOS 94 93       [x]  Microbiology No results found for: ACANTHNAEG, AFBCX, BPERTUSSISCX, BLOODCX  No results found for: BCIDPCR, CXREFLEX, CSFCX, CULTURETIS  No results found for: CULTURES, HSVCX, URCX  No results found for: EYECULTURE, GCCX, HSVCULTURE, LABHSV  No results found for: LEGIONELLA, MRSACX, MUMPSCX, MYCOPLASCX  No results found for: NOCARDIACX, STOOLCX  No results found for: THROATCX, UNSTIMCULT, URINECX, CULTURE, VZVCULTUR  No results found for: VIRALCULTU, WOUNDCX    [x]  Radiology EEG    Result Date: 2023  Clinical history: 68-year-old woman evaluated for confusion. EEG description: This is a portable 19 channel EEG recording using the 10/20 international classification of electrode placement. EEG report: The underlying background activity consist of a 5 to 6 Hz theta activity amplitude of 30 to 40 µV that is distributed diffusely.  This is intermixed with 2 to 3 Hz delta activity amplitude of 90 to 120 µV.  Triphasic configuration was noted with delta activity.  There was no focal slowing.  There were no epileptiform discharges.  Photic stimulation did not activate any abnormalities. Impression: This EEG is abnormal secondary to slowing of background activity.  Is finding suggestive of a mild to moderate encephalopathy of nonspecific etiology.  There were no epileptiform discharges recorded.    EEG    Result Date: 4/3/2023  Table formatting from the original result was not included. Images from the original result were not included. [address] [place], [place] [id number] [phone] ELECTROENCEPHALOGRAPHIC REPORT Patient: Lyubov Middleton EEG # :    ZSX-C- : 1955  ID:      [id number]    Age: 68 y.o. female    Primary  Physician: Germain Beasley MD Technician:  Camille Everett Ordering  Physician: Germain Beasley MD    Recording Date:  April 3, 2023  Report  Date: 4/3/2023     History:  Seizures Medications: Darbepoetin chris, ergocalciferol, menthol zinc oxide, acetaminophen, alendronate, amlodipine, anastrozole, carvedilol, cholestyramine, citalopram, ferrous sulfate, hydralazine, ipratropium, levothyroxine, losartan, phosphorus, Saccharomyces boulardii, sodium bicarbonate. Reading: The EEG was obtained portable in her room during the waking state and on photic stimulation with video monitoring.  We do not know how much sleep she has had the night before the testing.  During the EEG, she was quite tense and restless and the EEG was filled with so much movement and muscle artifacts. The dominant background activity consists of symmetric and irregular 30 to 165 µV 6 to 7 cps which were prominent on both parieto-occipital regions.  There were frequent 100 to 235 µV slow activities of 2 cps which were generalized and synchronous more prominent on both anterior quadrants noted intermittently all throughout the recording.  There were no discernible responses on photic stimulation at various frequencies.  There was no amplitude asymmetry.  No paroxysmal discharges. Impression: Abnormal EEG because of slow background activity mixed with slower waves compatible with moderate diffuse encephalopathy. There were no epileptiform discharges noted today. The EEG may be repeated at a later date when she is more calm and more cooperative. Electronically signed by Hernesto Braxton Jr., MD, 04/03/23, 6:39 PM EDT. Please note that portions of this note were completed with a voice recognition program.  Part of this note is an electric or electronic transcription/translation of spoken language to printed text using the dragon dictating system.    CT Head Without Contrast    Result Date: 4/3/2023  PROCEDURE: CT HEAD WO CONTRAST  COMPARISON:  [facility], CT, CT FACIAL BONES WO CONTRAST, 3/25/2023, 12:31.  INDICATIONS: AMS  PROTOCOL:   Standard imaging protocol  performed    RADIATION:   DLP: 1081.2mGy*cm   MA and/or KV was adjusted to minimize radiation dose.    TECHNIQUE: CT images were obtained without non-ionic intravenous contrast material.  FINDINGS:  The ventricles, sulci, and cerebellar folia are moderately and diffusely prominent consistent with atrophy.  Ill-defined diminished density in cerebral white matter is consistent with mild gliosis and/or numerous lacunar infarcts.  There is no CT evidence of acute intracranial hemorrhage, mass, or mass effect.  The orbits have a normal appearance.  The paranasal sinuses, middle ears, and mastoid air cells are well aerated.        CT scan of the head without IV contrast demonstrating diffuse atrophy and white matter changes.  No acute intracranial abnormality is seen.     KULWANT HOLLOWAY MD       Electronically Signed and Approved By: KULWANT HOLLOWAY MD on 4/03/2023 at 12:27             MRI Brain Without Contrast    Result Date: 4/12/2023  PROCEDURE: MRI BRAIN WO CONTRAST  COMPARISONS: 4/3/2023; 3/25/2023.  INDICATIONS: AMS (altered mental status); previous CT head concerning for gliosis versus numerous lacunar infarcts.  TECHNIQUE: A variety of imaging planes and parameters were utilized for visualization of suspected pathology within the brain.  275 magnetic resonance (MR) images were obtained without contrast.  FINDINGS: No reduced ADC by DWI is identified to suggest an acute infarct or other acute brain pathology. Slight motion artifact is seen on some of the sequences.  FLAIR/T2 hyperintensities are seen throughout the cerebral white matter, especially in the central and periventricular areas but also in subcortical regions. These findings are nonspecific but may represent mild-to-moderate chronic small vessel ischemia/infarction.  There is also involvement of the brainstem, especially the abdullahi.  The hippocampal formations are symmetric in size, signal intensity, and internal architecture. No abnormal susceptibility is  seen on the SWI sequence except for the basal ganglia, consistent with mineralization.  Age-indeterminate congestive and/or inflammatory changes involve the mastoid air cell complexes, greater on the right than the left.  These findings may represent age-indeterminate congestive and/or inflammatory change.  Air-fluid levels also involve the bilateral sphenoid sinuses.  To a lesser extent, opacities involve the bilateral posterior ethmoid air cells.  These findings may be related to congestive and/or inflammatory change.  Subacute hemorrhage related to recent trauma is possible.  The extra-axial spaces and the ventricular system are prominent, suggesting global atrophy.        1. No acute brain abnormality is seen. No acute infarct. No acute intracranial hemorrhage. 2. There may be mild-to-moderate chronic small vessel ischemia/infarction. 3. Slight motion artifact obscures detail on some of the sequences.    4. The other findings are as detailed above.     Please note that portions of this note were completed with a voice recognition program.  DWAIN RAMOS JR, MD       Electronically Signed and Approved By: DWAIN RAMOS JR, MD on 4/12/2023 at 23:26              FL Video Swallow With Speech Single Contrast    Result Date: 4/4/2023  PROCEDURE: FL VIDEO SWALLOW W SPEECH SINGLE-CONTRAST  COMPARISON: None  INDICATIONS: dysphagia/ 5.0 mGy/ 2.3 min  TECHNIQUE: Examination was performed in conjunction with the speech pathologist. The patient was given both thin and nectar thick liquid barium, applesauce with barium, contrast and rohit cracker with Esophotrast. The patient's medication list was reviewed and documented in the medical record.  FINDINGS:  Fluoroscopic examination was performed in conjunction with speech pathology department.  Various consistencies of barium were given to assess the swallow mechanism.  Across all consistencies, no tracheal aspiration was observed on exam.  Patient experienced deep laryngeal  penetration with thin liquid barium.  Patient experienced transient laryngeal penetration with applesauce with barium.  The exam was discussed in real-time by myself and the speech pathologist. Please consult speech pathology report for further details regarding exam.         1. Deep laryngeal penetration with thin liquid barium 2. Transient laryngeal penetration with applesauce with barium 3. No tracheal aspiration  Please consult speech pathology report for further details regarding exam and dietary recommendations   THANG BRIONES       Electronically Signed and Approved By: Sohail Fernández M.D. on 4/04/2023 at 16:08             XR Chest 1 View    Result Date: 4/3/2023  PROCEDURE: XR CHEST 1 VW  COMPARISON: None  INDICATIONS: WEAKNESS TODAY  FINDINGS:  The heart is normal in size.  The lungs are well-expanded and free of infiltrates.  Mild degenerative changes are seen in the glenohumeral joints.  Surgical clips are seen in the left axillary region.       No active cardiopulmonary disease is seen.       KULWANT HOLLOWAY MD       Electronically Signed and Approved By: KULWANT HOLLOWAY MD on 4/03/2023 at 11:53             CT Facial Bones Without Contrast    Result Date: 3/25/2023  PROCEDURE: CT FACIAL BONES WO CONTRAST  COMPARISON: None  INDICATIONS: facial trauma  PROTOCOL:   Standard imaging protocol performed    RADIATION:   DLP: 527.8 mGy*cm   Automated exposure control was utilized to minimize radiation dose.  TECHNIQUE: Axial images of the facial bones without contrast. Coronal and sagittal reformatted images were performed.  FINDINGS: There is a mildly comminuted nasal bone fracture.  No other facial bone fractures are identified.  There is mild mucosal thickening in the ethmoid sinuses.  There is a small air-fluid level in the left sphenoid sinus without evidence of significant mucosal thickening.  Questionable subtle fracture of the bony nasal septum.  There incidental middle leslie bullosa.  The ostiomeatal  units are patent.  No orbital abnormalities are identified.  There is generalized atrophy in the visualized brain.  No orbital abnormalities are identified.  IMPRESSION: 1. Mildly comminuted nasal bone fracture.  2. Questionable subtle fracture of the bony nasal septum.   FRITZ CARMEN MD       Electronically Signed and Approved By: FIRTZ CARMEN MD on 3/25/2023 at 12:46             IR J or G Tube Contrast Eval    Result Date: 4/15/2023  PROCEDURE: IR J OR G TUBE CONTRAST EVAL  COMPARISON: [facility], CR, IR J OR G TUBE CONTRAST EVAL, 4/04/2023, 17:55.  INDICATIONS: PEG tube placement  FINDINGS: A  image and a 3 minutes delayed imags were obtained.  There is a PEG tube in the stomach.  No evidence of contrast extravasation.  Contrast is present in the proximal small bowel on the delayed image.  No evidence of obstruction.       Normal exam.      FRITZ CARMEN MD       Electronically Signed and Approved By: FRITZ CARMEN MD on 4/15/2023 at 11:43             IR J or G Tube Contrast Eval    Result Date: 4/4/2023  PROCEDURE: IR J OR G TUBE CONTRAST EVAL  COMPARISON: [facility], CR, IR J OR G TUBE CONTRAST EVAL, 4/04/2023, 15:06.  INDICATIONS: G TUBE EVAL/ 30 ML INJECTED  FINDINGS: Contrast is present within the stomach.  No definite contrast extravasation identified.  Residual contrast present within the colon.       Contrast within the stomach.  No definite contrast extravasation identified.  Residual previous contrast present within the colon slightly limiting evaluation.  No obvious leak identified..      DARLENE MONTERO MD       Electronically Signed and Approved By: DARLENE MONTERO MD on 4/04/2023 at 19:57             IR J or G Tube Contrast Eval    Result Date: 4/4/2023  PROCEDURE: IR J OR G TUBE CONTRAST EVAL  COMPARISON: None  INDICATIONS: Confirm placement of G-tube/ F.T. .9 MIN/ 19.6 mGy/ 2 IMAGES  FINDINGS:   There is a percutaneous enterostomy tube in the left upper  quadrant. Contrast material from previous modified barium swallow observed in stomach and small bowel.    Water soluble contrast agent, Gastrografin, was attempted to be injected into the patient's percutaneous gastrostomy (peg) tube. However, Gastrografin contrast would not advance past the distal portion of the peg tube into the stomach, likely due to obstruction or blockage within the tubing.        Percutaneous gastric tube appears to be in good position, however, unable to pass Gastrografin contrast through the distal portion of the peg tube into the stomach, likely due to obstruction within tubing    THANG BRIONES       Electronically Signed and Approved By: Sohail Fernández M.D. 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 difficile diarrhea and history of chronic diarrhea  Nonconvulsive seizure with history of nonconvulsive status epilepticus  Metabolic acidosis  AUSTEN on CKD 4 baseline creatinine proxy 1.7  Hypokalemia  Hypomagnesemia  Hypernatremia  DM 2  Oropharyngeal dysphagia with chronic PEG tube and nocturnal tube feeds  Blocked PEG tube, resolved  Anemia of chronic disease  Hypertension  Depression  Hypothyroidism  History of breast cancer and prior left mastectomy  Suspect underlying dementia    Plan:  Labs and imaging reviewed  Replace Mg x 4 runs IV each 1 GM  No further work-up warranted as her mentation has improved to her baseline  Diarrhea seems to be resolving  Precertification process still she can return back to rehab  Continue fluconazole  Continue delirium precautions  Continue Vimpat with dose increased during this hospitalization  Neurology recommendations appreciated  Continue PEG site wound  care  Oral diet per speech  Continue PEG tube feeds per dietitian  Aspiration precautions  Continue amlodipine 10 mg daily  Continue Coreg 25 mg twice a day  Continue Celexa 10 mg daily  Continue Zyrtec and fluticasone  Continue levothyroxine at a very low dose  Continue sodium bicarb tabs  Continue oral vancomycin to complete 10 days total for C. difficile  A.m. labs  DNR  DVT prophylaxis with SCDs   Clinical course will dictate further management  Discussed with nurse at the bedside  DVT prophylaxis:  Mechanical DVT prophylaxis orders are present.    CODE STATUS:   Medical Intervention Limits: NO intubation (DNI)  Code Status (Patient has no pulse and is not breathing): No CPR (Do Not Attempt to Resuscitate)  Medical Interventions (Patient has pulse or is breathing): Limited Support  Release to patient: Routine Release    Electronically signed by Nenita Whiting MD, 04/19/23, 9:20 PM EDT.    Portions of this documentation were transcribed electronically from a voice recognition software.  I confirm all data accurately represents the service(s) I performed at today's visit.

## 2023-04-19 NOTE — PLAN OF CARE
Goal Outcome Evaluation:  Plan of Care Reviewed With: patient        Progress: improving    1 BM THIS SHIFT, CONTINENT AT START OF SHIFT, INCONTINENT AFTER FALLING ASLEEP, INTERMITTENT CONFUSION, VSS, RESIDUAL OF 10 AT MOST TOLERATING TUBE FEEDS WELL AT NIGHT

## 2023-04-19 NOTE — THERAPY TREATMENT NOTE
Patient Name: Lyubov Middleton  : 1955    MRN: [id number]                              Today's Date: 2023       Admit Date: 4/3/2023    Visit Dx:     ICD-10-CM ICD-9-CM   1. Metabolic encephalopathy  G93.41 348.31   2. Oropharyngeal dysphagia  R13.12 787.22   3. Difficulty in walking  R26.2 719.7   4. Decreased activities of daily living (ADL)  Z78.9 V49.89     Patient Active Problem List   Diagnosis   • Renal stone   • Renal abscess   • Stage 3a chronic kidney disease   • Type 2 diabetes mellitus   • Metabolic encephalopathy   • Seizure disorder     Past Medical History:   Diagnosis Date   • Cancer     left breast removed    • Type 2 diabetes mellitus      Past Surgical History:   Procedure Laterality Date   • ABDOMINAL SURGERY     • BREAST SURGERY Left     removed due to cancer   • CHOLECYSTECTOMY     • CYSTOSCOPY W/ URETERAL STENT PLACEMENT Right 2020    Procedure: CYSTOSCOPY, RIGHT RETROGRADE PYELOGRAM, URETEROSCOPY, LASER LITHOTRIPSY, RIGHT URETERAL STENT PLACEMENT;  Surgeon: Rohan Dooley Jr., MD;  Location: [facility];  Service: Urology;  Laterality: Right;   • GASTROSTOMY W/ FEEDING TUBE     • HERNIA REPAIR      mesh removed      General Information     Row Name 23 1248          OT Time and Intention    Document Type therapy note (daily note)  -PG     Mode of Treatment individual therapy;occupational therapy  -PG           User Key  (r) = Recorded By, (t) = Taken By, (c) = Cosigned By    Initials Name Provider Type    PG Golden Andrews OT Occupational Therapist                 Mobility/ADL's    No documentation.                Obj/Interventions     Row Name 23 1249          Shoulder (Therapeutic Exercise)    Shoulder (Therapeutic Exercise) strengthening exercise  -PG     Shoulder Strengthening (Therapeutic Exercise) 15 repititions;1 lb free weight  -PG     Row Name 23 1249          Elbow/Forearm (Therapeutic Exercise)    Elbow/Forearm (Therapeutic  Exercise) strengthening exercise  -PG     Elbow/Forearm Strengthening (Therapeutic Exercise) 15 repititions;1 lb free weight  -PG     Row Name 04/19/23 1249          Motor Skills    Therapeutic Exercise shoulder;elbow/forearm  -PG           User Key  (r) = Recorded By, (t) = Taken By, (c) = Cosigned By    Initials Name Provider Type    PG Golden Andrews OT Occupational Therapist               Goals/Plan    No documentation.                Clinical Impression     Row Name 04/19/23 1250          Plan of Care Review    Progress no change  -PG           User Key  (r) = Recorded By, (t) = Taken By, (c) = Cosigned By    Initials Name Provider Type    PG Golden Andrews OT Occupational Therapist               Outcome Measures     Row Name 04/19/23 1251          How much help from another is currently needed...    Putting on and taking off regular lower body clothing? 2  -PG     Bathing (including washing, rinsing, and drying) 2  -PG     Toileting (which includes using toilet bed pan or urinal) 1  -PG     Putting on and taking off regular upper body clothing 3  -PG     Taking care of personal grooming (such as brushing teeth) 3  -PG     Eating meals 3  -PG     AM-PAC 6 Clicks Score (OT) 14  -PG     Row Name 04/19/23 1251          Functional Assessment    Outcome Measure Options AM-PAC 6 Clicks Daily Activity (OT);Optimal Instrument  -PG     Row Name 04/19/23 1251          Optimal Instrument    Optimal Instrument Optimal - 3  -PG     Bending/Stooping 3  -PG     Standing 2  -PG     Reaching 1  -PG           User Key  (r) = Recorded By, (t) = Taken By, (c) = Cosigned By    Initials Name Provider Type    Golden Garcia OT Occupational Therapist                Occupational Therapy Education     Title: PT OT SLP Therapies (Done)     Topic: Occupational Therapy (Done)     Point: ADL training (Done)     Description:   Instruct learner(s) on proper safety adaptation and remediation techniques during self care or transfers.    Instruct in proper use of assistive devices.              Learning Progress Summary           Patient Acceptance, E, VU by AV at 4/17/2023 1549                   Point: Home exercise program (Done)     Description:   Instruct learner(s) on appropriate technique for monitoring, assisting and/or progressing therapeutic exercises/activities.              Learning Progress Summary           Patient Acceptance, E, VU by AV at 4/17/2023 1549                   Point: Precautions (Done)     Description:   Instruct learner(s) on prescribed precautions during self-care and functional transfers.              Learning Progress Summary           Patient Acceptance, E, VU by AV at 4/17/2023 1549                   Point: Body mechanics (Done)     Description:   Instruct learner(s) on proper positioning and spine alignment during self-care, functional mobility activities and/or exercises.              Learning Progress Summary           Patient Acceptance, E, VU by AV at 4/17/2023 1549                               User Key     Initials Effective Dates Name Provider Type Discipline     06/16/21 -  Ryan Carl OT Occupational Therapist OT              OT Recommendation and Plan     Plan of Care Review  Progress: no change     Time Calculation:    Time Calculation- OT     Row Name 04/19/23 1252             Time Calculation- OT    OT Received On 04/19/23  -PG      OT Goal Re-Cert Due Date 04/26/23  -PG         Timed Charges    89766 - OT Therapeutic Exercise Minutes 10  -PG         Total Minutes    Timed Charges Total Minutes 10  -PG       Total Minutes 10  -PG            User Key  (r) = Recorded By, (t) = Taken By, (c) = Cosigned By    Initials Name Provider Type    PG Golden Andrwes OT Occupational Therapist              Therapy Charges for Today     Code Description Service Date Service Provider Modifiers Qty    36255897476 HC OT THER PROC EA 15 MIN 4/19/2023 Golden Andrews OT GO 1               Golden Andrews OT  4/19/2023

## 2023-04-20 LAB
ALBUMIN SERPL-MCNC: 3 G/DL (ref 3.5–5.2)
ALP SERPL-CCNC: 96 U/L (ref 39–117)
ALT SERPL W P-5'-P-CCNC: 11 U/L (ref 1–33)
ANION GAP SERPL CALCULATED.3IONS-SCNC: 8.7 MMOL/L (ref 5–15)
AST SERPL-CCNC: 14 U/L (ref 1–32)
BASOPHILS # BLD AUTO: 0.04 10*3/MM3 (ref 0–0.2)
BASOPHILS NFR BLD AUTO: 0.5 % (ref 0–1.5)
BILIRUB CONJ SERPL-MCNC: <0.2 MG/DL (ref 0–0.3)
BILIRUB INDIRECT SERPL-MCNC: ABNORMAL MG/DL
BILIRUB SERPL-MCNC: 0.3 MG/DL (ref 0–1.2)
BUN SERPL-MCNC: 23 MG/DL (ref 8–23)
BUN/CREAT SERPL: 11.9 (ref 7–25)
CALCIUM SPEC-SCNC: 8.8 MG/DL (ref 8.6–10.5)
CHLORIDE SERPL-SCNC: 100 MMOL/L (ref 98–107)
CO2 SERPL-SCNC: 27.3 MMOL/L (ref 22–29)
CREAT SERPL-MCNC: 1.94 MG/DL (ref 0.57–1)
DEPRECATED RDW RBC AUTO: 43.6 FL (ref 37–54)
EGFRCR SERPLBLD CKD-EPI 2021: 27.9 ML/MIN/1.73
EOSINOPHIL # BLD AUTO: 0.33 10*3/MM3 (ref 0–0.4)
EOSINOPHIL NFR BLD AUTO: 4 % (ref 0.3–6.2)
ERYTHROCYTE [DISTWIDTH] IN BLOOD BY AUTOMATED COUNT: 13.2 % (ref 12.3–15.4)
GLUCOSE BLDC GLUCOMTR-MCNC: 101 MG/DL (ref 70–99)
GLUCOSE BLDC GLUCOMTR-MCNC: 109 MG/DL (ref 70–99)
GLUCOSE BLDC GLUCOMTR-MCNC: 142 MG/DL (ref 70–99)
GLUCOSE SERPL-MCNC: 129 MG/DL (ref 65–99)
HCT VFR BLD AUTO: 26.4 % (ref 34–46.6)
HGB BLD-MCNC: 9.2 G/DL (ref 12–15.9)
IMM GRANULOCYTES # BLD AUTO: 0.03 10*3/MM3 (ref 0–0.05)
IMM GRANULOCYTES NFR BLD AUTO: 0.4 % (ref 0–0.5)
LYMPHOCYTES # BLD AUTO: 1.74 10*3/MM3 (ref 0.7–3.1)
LYMPHOCYTES NFR BLD AUTO: 21.1 % (ref 19.6–45.3)
MAGNESIUM SERPL-MCNC: 2.5 MG/DL (ref 1.6–2.4)
MCH RBC QN AUTO: 31.3 PG (ref 26.6–33)
MCHC RBC AUTO-ENTMCNC: 34.8 G/DL (ref 31.5–35.7)
MCV RBC AUTO: 89.8 FL (ref 79–97)
MONOCYTES # BLD AUTO: 0.5 10*3/MM3 (ref 0.1–0.9)
MONOCYTES NFR BLD AUTO: 6.1 % (ref 5–12)
NEUTROPHILS NFR BLD AUTO: 5.61 10*3/MM3 (ref 1.7–7)
NEUTROPHILS NFR BLD AUTO: 67.9 % (ref 42.7–76)
NRBC BLD AUTO-RTO: 0 /100 WBC (ref 0–0.2)
PHOSPHATE SERPL-MCNC: 4.5 MG/DL (ref 2.5–4.5)
PLATELET # BLD AUTO: 226 10*3/MM3 (ref 140–450)
PMV BLD AUTO: 9.3 FL (ref 6–12)
POTASSIUM SERPL-SCNC: 4.4 MMOL/L (ref 3.5–5.2)
PROT SERPL-MCNC: 6.3 G/DL (ref 6–8.5)
RBC # BLD AUTO: 2.94 10*6/MM3 (ref 3.77–5.28)
SODIUM SERPL-SCNC: 136 MMOL/L (ref 136–145)
WBC NRBC COR # BLD: 8.25 10*3/MM3 (ref 3.4–10.8)

## 2023-04-20 PROCEDURE — 83735 ASSAY OF MAGNESIUM: CPT | Performed by: FAMILY MEDICINE

## 2023-04-20 PROCEDURE — 97110 THERAPEUTIC EXERCISES: CPT

## 2023-04-20 PROCEDURE — 85025 COMPLETE CBC W/AUTO DIFF WBC: CPT | Performed by: FAMILY MEDICINE

## 2023-04-20 PROCEDURE — 97530 THERAPEUTIC ACTIVITIES: CPT

## 2023-04-20 PROCEDURE — 84100 ASSAY OF PHOSPHORUS: CPT | Performed by: FAMILY MEDICINE

## 2023-04-20 PROCEDURE — 99232 SBSQ HOSP IP/OBS MODERATE 35: CPT | Performed by: FAMILY MEDICINE

## 2023-04-20 PROCEDURE — 80048 BASIC METABOLIC PNL TOTAL CA: CPT | Performed by: FAMILY MEDICINE

## 2023-04-20 PROCEDURE — 80076 HEPATIC FUNCTION PANEL: CPT | Performed by: FAMILY MEDICINE

## 2023-04-20 PROCEDURE — 82962 GLUCOSE BLOOD TEST: CPT

## 2023-04-20 RX ADMIN — CARVEDILOL 25 MG: 25 TABLET, FILM COATED ORAL at 08:33

## 2023-04-20 RX ADMIN — Medication 10 ML: at 20:04

## 2023-04-20 RX ADMIN — VANCOMYCIN HYDROCHLORIDE 125 MG: 125 CAPSULE ORAL at 11:43

## 2023-04-20 RX ADMIN — LACOSAMIDE 100 MG: 10 SOLUTION ORAL at 20:04

## 2023-04-20 RX ADMIN — LACOSAMIDE 100 MG: 10 SOLUTION ORAL at 08:34

## 2023-04-20 RX ADMIN — CETIRIZINE HYDROCHLORIDE 10 MG: 10 TABLET, FILM COATED ORAL at 08:33

## 2023-04-20 RX ADMIN — VANCOMYCIN HYDROCHLORIDE 125 MG: 125 CAPSULE ORAL at 06:07

## 2023-04-20 RX ADMIN — Medication 250 MG: at 08:33

## 2023-04-20 RX ADMIN — VANCOMYCIN HYDROCHLORIDE 125 MG: 125 CAPSULE ORAL at 00:42

## 2023-04-20 RX ADMIN — SODIUM BICARBONATE 650 MG TABLET 1300 MG: at 08:34

## 2023-04-20 RX ADMIN — Medication 10 ML: at 08:35

## 2023-04-20 RX ADMIN — SODIUM BICARBONATE 650 MG TABLET 1300 MG: at 20:04

## 2023-04-20 RX ADMIN — CITALOPRAM HYDROBROMIDE 10 MG: 20 TABLET ORAL at 08:34

## 2023-04-20 RX ADMIN — NYSTATIN AND TRIAMCINOLONE ACETONIDE 1 APPLICATION: 100000; 1 OINTMENT TOPICAL at 08:34

## 2023-04-20 RX ADMIN — ACETAMINOPHEN 650 MG: 325 TABLET ORAL at 15:48

## 2023-04-20 RX ADMIN — Medication 250 MG: at 20:04

## 2023-04-20 RX ADMIN — NYSTATIN AND TRIAMCINOLONE ACETONIDE 1 APPLICATION: 100000; 1 OINTMENT TOPICAL at 20:04

## 2023-04-20 RX ADMIN — CARVEDILOL 25 MG: 25 TABLET, FILM COATED ORAL at 20:08

## 2023-04-20 RX ADMIN — FLUTICASONE PROPIONATE 2 SPRAY: 50 SPRAY, METERED NASAL at 08:34

## 2023-04-20 RX ADMIN — VANCOMYCIN HYDROCHLORIDE 125 MG: 125 CAPSULE ORAL at 17:07

## 2023-04-20 RX ADMIN — LEVOTHYROXINE SODIUM 12.5 MCG: 0.03 TABLET ORAL at 06:06

## 2023-04-20 RX ADMIN — ANASTROZOLE 1 MG: 1 TABLET ORAL at 08:33

## 2023-04-20 RX ADMIN — AMLODIPINE BESYLATE 10 MG: 5 TABLET ORAL at 08:33

## 2023-04-20 NOTE — PLAN OF CARE
Problem: Adult Inpatient Plan of Care  Goal: Plan of Care Review  Outcome: Ongoing, Progressing  Flowsheets (Taken 4/20/2023 0804)  Progress: no change  Plan of Care Reviewed With: patient   Goal Outcome Evaluation:  Plan of Care Reviewed With: patient        Progress: no change   Vitals stable. No complaints of pain. Pt had fall at beginning of shift, no injuries. MD and family notified, safety report submitted. Possible DC to rehab today.

## 2023-04-20 NOTE — THERAPY TREATMENT NOTE
Acute Care - Physical Therapy Treatment Note   [facility]     Patient Name: Lyubov Middleton  : 1955  MRN: [id number]  Today's Date: 2023      Visit Dx:     ICD-10-CM ICD-9-CM   1. Metabolic encephalopathy  G93.41 348.31   2. Oropharyngeal dysphagia  R13.12 787.22   3. Difficulty in walking  R26.2 719.7   4. Decreased activities of daily living (ADL)  Z78.9 V49.89     Patient Active Problem List   Diagnosis   • Renal stone   • Renal abscess   • Stage 3a chronic kidney disease   • Type 2 diabetes mellitus   • Metabolic encephalopathy   • Seizure disorder     Past Medical History:   Diagnosis Date   • Cancer     left breast removed    • Type 2 diabetes mellitus      Past Surgical History:   Procedure Laterality Date   • ABDOMINAL SURGERY     • BREAST SURGERY Left     removed due to cancer   • CHOLECYSTECTOMY     • CYSTOSCOPY W/ URETERAL STENT PLACEMENT Right 2020    Procedure: CYSTOSCOPY, RIGHT RETROGRADE PYELOGRAM, URETEROSCOPY, LASER LITHOTRIPSY, RIGHT URETERAL STENT PLACEMENT;  Surgeon: Rohan Dooley Jr., MD;  Location: [facility];  Service: Urology;  Laterality: Right;   • GASTROSTOMY W/ FEEDING TUBE     • HERNIA REPAIR      mesh removed     PT Assessment (last 12 hours)     PT Evaluation and Treatment     Row Name 23 1205          Physical Therapy Time and Intention    Subjective Information no complaints  -DK     Document Type therapy note (daily note)  -DK     Mode of Treatment individual therapy;physical therapy  -DK     Patient Effort good  -DK     Symptoms Noted During/After Treatment fatigue  -DK     Comment Pt was able to tolerate exercises and transfers to sitting EOB.  Transfers to chair deferred by staff due to pt having frequent BMs/C-diff.  -DK     Row Name 23 1205          Pain    Pretreatment Pain Rating 0/10 - no pain  -DK     Posttreatment Pain Rating 0/10 - no pain  -DK     Row Name 23 1205          Cognition    Affect/Mental Status (Cognition)  confused;flat/blunted affect  -DK     Orientation Status (Cognition) oriented to;person  -DK     Follows Commands (Cognition) WFL  -DK     Cognitive Function WFL  -DK     Personal Safety Interventions supervised activity;nonskid shoes/slippers when out of bed  -DK     Row Name 04/20/23 1205          Bed Mobility    Bed Mobility scooting/bridging;supine-sit-supine  -DK     All Activities, [place] (Bed Mobility) minimum assist (75% patient effort);1 person assist;moderate assist (50% patient effort)  -DK     Scooting/Bridging [place] (Bed Mobility) maximum assist (25% patient effort);1 person assist  -DK     Supine-Sit [place] (Bed Mobility) minimum assist (75% patient effort);1 person assist;moderate assist (50% patient effort)  -DK     Sit-Supine [place] (Bed Mobility) minimum assist (75% patient effort);1 person assist;moderate assist (50% patient effort)  -DK     Supine-Sit-Supine [place] (Bed Mobility) minimum assist (75% patient effort);1 person assist;moderate assist (50% patient effort)  -DK     Bed Mobility, Safety Issues decreased use of arms for pushing/pulling;decreased use of legs for bridging/pushing  -DK     Assistive Device (Bed Mobility) bed rails;draw sheet  -DK     Comment, (Bed Mobility) Pt sat EOB x 3-4 minutes with ANEUDY.  She returned to bed on alert post treatment.  -     Row Name 04/20/23 1205          Safety Issues, Functional Mobility    Safety Issues Affecting Function (Mobility) awareness of need for assistance;judgment;safety precaution awareness  -DK     Impairments Affecting Function (Mobility) balance;cognition;endurance/activity tolerance;strength  -DK     Cognitive Impairments, Mobility Safety/Performance attention;awareness, need for assistance;judgment;safety precaution awareness  -DK     Row Name 04/20/23 1205          Balance    Balance Assessment sitting static balance;sitting dynamic balance  -DK     Static Sitting Balance contact guard;1-person assist   -DK     Dynamic Sitting Balance contact guard;1-person assist  -DK     Position, Sitting Balance supported;sitting edge of bed  -DK     Balance Interventions sitting;supported;static;dynamic  -     Row Name 04/20/23 1205          Motor Skills    Motor Skills --  therapeutic exercises  -     Coordination WFL  -DK     Therapeutic Exercise hip;knee;ankle  -DK     Row Name 04/20/23 1205          Hip (Therapeutic Exercise)    Hip (Therapeutic Exercise) AAROM (active assistive range of motion)  -     Hip AAROM (Therapeutic Exercise) bilateral;flexion;extension;aBduction;aDduction;supine;10 repetitions;2 sets  -DK     Row Name 04/20/23 1205          Knee (Therapeutic Exercise)    Knee (Therapeutic Exercise) AAROM (active assistive range of motion)  -     Knee AAROM (Therapeutic Exercise) bilateral;flexion;extension;supine;10 repetitions;2 sets  -     Row Name 04/20/23 1205          Ankle (Therapeutic Exercise)    Ankle (Therapeutic Exercise) AAROM (active assistive range of motion)  -     Ankle AAROM (Therapeutic Exercise) bilateral;dorsiflexion;plantarflexion;supine;10 repetitions;2 sets  -     Row Name             Wound 04/03/23 1622 Right labia    Wound - Properties Group Placement Date: 04/03/23  -AC Placement Time: 1622  -AC Present on Hospital Admission: Y  -AC Side: Right  -AC Location: labia  -AC    Retired Wound - Properties Group Placement Date: 04/03/23  -AC Placement Time: 1622  -AC Present on Hospital Admission: Y  -AC Side: Right  -AC Location: labia  -AC    Retired Wound - Properties Group Date first assessed: 04/03/23  -AC Time first assessed: 1622  -AC Present on Hospital Admission: Y  -AC Side: Right  -AC Location: labia  -AC    Row Name             Wound 04/03/23 1628 sacral spine    Wound - Properties Group Placement Date: 04/03/23  -AC Placement Time: 1628  -AC Present on Hospital Admission: Y  -AC Location: sacral spine  -AC    Retired Wound - Properties Group Placement Date: 04/03/23   -AC Placement Time: 1628  -AC Present on Hospital Admission: Y  -AC Location: sacral spine  -AC    Retired Wound - Properties Group Date first assessed: 04/03/23  -AC Time first assessed: 1628  -AC Present on Hospital Admission: Y  -AC Location: sacral spine  -AC    Row Name             Wound 04/03/23 1629 Bilateral gluteal    Wound - Properties Group Placement Date: 04/03/23  -AC Placement Time: 1629  -AC Present on Hospital Admission: Y  -AC Side: Bilateral  -AC Location: gluteal  -AC    Retired Wound - Properties Group Placement Date: 04/03/23  -AC Placement Time: 1629  -AC Present on Hospital Admission: Y  -AC Side: Bilateral  -AC Location: gluteal  -AC    Retired Wound - Properties Group Date first assessed: 04/03/23  -AC Time first assessed: 1629  -AC Present on Hospital Admission: Y  -AC Side: Bilateral  -AC Location: gluteal  -AC    Row Name             Wound 04/03/23 1630 Bilateral posterior greater trochanter    Wound - Properties Group Placement Date: 04/03/23  -AC Placement Time: 1630  -AC Present on Hospital Admission: Y  -AC Side: Bilateral  -AC Orientation: posterior  -AC Location: greater trochanter  -AC    Retired Wound - Properties Group Placement Date: 04/03/23  -AC Placement Time: 1630  -AC Present on Hospital Admission: Y  -AC Side: Bilateral  -AC Orientation: posterior  -AC Location: greater trochanter  -AC    Retired Wound - Properties Group Date first assessed: 04/03/23  -AC Time first assessed: 1630  -AC Present on Hospital Admission: Y  -AC Side: Bilateral  -AC Location: greater trochanter  -AC    Row Name 04/20/23 1205          Plan of Care Review    Plan of Care Reviewed With patient  -DK     Progress no change  -DK     Row Name 04/20/23 1205          Positioning and Restraints    Pre-Treatment Position in bed  -DK     Post Treatment Position bed  -DK     In Bed supine;call light within reach;encouraged to call for assist;exit alarm on;side rails up x2;legs elevated;heels elevated  -DK      Row Name 04/20/23 1205          Therapy Assessment/Plan (PT)    Rehab Potential (PT) good, to achieve stated therapy goals  -DK     Criteria for Skilled Interventions Met (PT) skilled treatment is necessary  -DK     Therapy Frequency (PT) daily  -DK     Problem List (PT) problems related to;balance;cognition;mobility;strength;hearing;communication  -DK     Activity Limitations Related to Problem List (PT) unable to ambulate safely;unable to transfer safely  -DK     Row Name 04/20/23 1205          Progress Summary (PT)    Progress Toward Functional Goals (PT) progress toward functional goals is good  -DK           User Key  (r) = Recorded By, (t) = Taken By, (c) = Cosigned By    Initials Name Provider Type    Jimena Ambriz, LONG Physical Therapist Assistant    Jessica Bowie RN Registered Nurse                Physical Therapy Education     Title: PT OT SLP Therapies (Done)     Topic: Physical Therapy (Done)     Point: Mobility training (Done)     Learning Progress Summary           Patient Acceptance, E, VU by  at 4/17/2023 1549    Acceptance, E, VU by  at 4/17/2023 0900    Comment: pt appears confused; alert to self    Acceptance, E,TB, VU,NR by  at 4/6/2023 0434    Acceptance, E,TB, VU by  at 4/4/2023 1113                   Point: Precautions (Done)     Learning Progress Summary           Patient Acceptance, E, VU by AV at 4/17/2023 1549    Acceptance, E, VU by  at 4/17/2023 0900    Comment: pt appears confused; alert to self    Acceptance, E,TB, VU,NR by  at 4/6/2023 0434    Acceptance, E,TB, VU by  at 4/4/2023 1113                               User Key     Initials Effective Dates Name Provider Type Discipline     06/16/21 -  Tonya Jimenez, RN Registered Nurse Nurse     06/08/21 -  Moreno Longoria, RN Registered Nurse Nurse    AV 06/16/21 -  Ryan Carl OT Occupational Therapist OT     03/07/23 -  Rosalinda Peacock, PT Student PT Student PT              PT Recommendation and  Plan  Planned Therapy Interventions (PT): balance training, bed mobility training, gait training, strengthening, transfer training  Therapy Frequency (PT): daily  Progress Summary (PT)  Progress Toward Functional Goals (PT): progress toward functional goals is good  Plan of Care Reviewed With: patient  Progress: no change   Outcome Measures     Row Name 04/20/23 1205             How much help from another person do you currently need...    Turning from your back to your side while in flat bed without using bedrails? 3  -DK      Moving from lying on back to sitting on the side of a flat bed without bedrails? 3  -DK      Moving to and from a bed to a chair (including a wheelchair)? 2  -DK      Standing up from a chair using your arms (e.g., wheelchair, bedside chair)? 3  -DK      Climbing 3-5 steps with a railing? 2  -DK      To walk in hospital room? 3  -DK      AM-PAC 6 Clicks Score (PT) 16  -DK         Functional Assessment    Outcome Measure Options AM-PAC 6 Clicks Basic Mobility (PT)  -DK            User Key  (r) = Recorded By, (t) = Taken By, (c) = Cosigned By    Initials Name Provider Type    Jimena Ambriz PTA Physical Therapist Assistant                 Time Calculation:    PT Charges     Row Name 04/20/23 1210             Time Calculation    PT Received On 04/20/23  -DK      PT Goal Re-Cert Due Date 04/23/23  -DK         Timed Charges    57346 - PT Therapeutic Exercise Minutes 14  -DK      49722 - PT Therapeutic Activity Minutes 12  -DK         Total Minutes    Timed Charges Total Minutes 26  -DK       Total Minutes 26  -DK            User Key  (r) = Recorded By, (t) = Taken By, (c) = Cosigned By    Initials Name Provider Type    Jimena Ambriz PTA Physical Therapist Assistant              Therapy Charges for Today     Code Description Service Date Service Provider Modifiers Qty    62614794102 HC PT THER PROC EA 15 MIN 4/20/2023 iJmena Patino PTA GP 1    51355100982 HC PT THERAPEUTIC ACT EA 15 MIN  4/20/2023 Jimena Patino, PTA GP 1          PT G-Codes  Outcome Measure Options: AM-PAC 6 Clicks Basic Mobility (PT)  AM-PAC 6 Clicks Score (PT): 16  AM-PAC 6 Clicks Score (OT): 10    Jimena Patino PTA  4/20/2023

## 2023-04-20 NOTE — PROGRESS NOTES
[facility]   Hospitalist Progress Note  Date: 2023  Patient Name: Lyubov Middleton  : 1955  MRN: [id number]  Date of admission: 4/3/2023      Subjective   Subjective   Chief complaint: Seizures     Summary:   68 y.o. female with diabetes mellitus, CKD 4, seizure disorder (history of status epilepticus), dysphagia currently undergoing rehab at [facility] and rehab hospitalized with altered mental status, went unresponsive for working with therapy.  Initially ANO x1 in the emergency department.  Treated initially for hypokalemia, AUSTEN on CKD, UTI.  CT head negative for any acute intracranial abnormalities, did demonstrate diffuse atrophy and white matter changes and suspect patient with underlying dementia.  Chest x-ray negative for any acute abnormalities.  Discovered to have C. difficile treated with p.o. vancomycin and intermittent levels of confusion/encephalopathy, EEGs have been obtained both are negative for acute seizure activity but notable for encephalopathy, neurology is assisting in patient's Vimpat dose was increased.  She is pending LP on 23 to evaluate for paraneoplastic syndrome/alternative etiologies.  Patient required replacement of PEG tube while inpatient initially and subsequently had another episode of pulling out her PEG tube on  and GI replaced it with an 18 Yi tube on 4/15/23.  Lumbar puncture unremarkable.  Diarrhea subsiding, mentation back to baseline once C. difficile is treated.  Awaiting precertification process to return back to rehab.     Interval follow-up: Patient seen and examined this morning, no acute distress,  diarrhea subsiding, less frequent.  No reported abdominal pain.  She did fall at the beginning of shift yesterday in the early evening, family and physician covering notified, she complains of no pain.  She is confused but indicates she is comfortable.  Creatinine 1.94, potassium 4.4, sodium 136.  Blood sugars in the 100  range, she is tolerating oral intake.  Magnesium up to 2.5, white blood cell count 8000     Review of systems:  Unable to obtain review of systems due to confusion    Objective   Objective     Vitals:   Temp:  [97 °F (36.1 °C)-98.4 °F (36.9 °C)] 97 °F (36.1 °C)  Heart Rate:  [70-91] 73  Resp:  [16-20] 16  BP: (120-147)/(55-72) 140/55  Physical Exam    Constitutional: Awake, alert, confused, smiling, no acute distress               Eyes: Pupils equal, sclerae anicteric, no conjunctival injection              HENT: NCAT, mucous membranes moist              Neck: Supple, no masses              Respiratory: Clear to auscultation bilaterally, nonlabored respirations, poor inspiratory effort              Cardiovascular: RRR, no murmurs, rubs, or gallops, palpable pedal pulses bilaterally              Gastrointestinal: Positive bowel sounds, soft, nontender, nondistended              Musculoskeletal: No bilateral ankle edema, no clubbing or cyanosis to extremities resting tremor right hand              Psychiatric: Appropriate affect, cooperative, confused              Neurologic: Oriented to self only, weakness throughout, somewhat garbled speech              Skin: No rashes visible on exposed skin  Result Review    Result Review:  I have personally reviewed the pertinent results from the past 24 hours to 4/20/2023 17:51 EDT and agree with these findings:  [x]  Laboratory   CBC        4/18/2023    04:58 4/19/2023    04:49 4/20/2023    04:58   CBC   WBC 7.99   8.15   8.25     RBC 3.00   2.94   2.94     Hemoglobin 9.3   9.0   9.2     Hematocrit 27.3   26.2   26.4     MCV 91.0   89.1   89.8     MCH 31.0   30.6   31.3     MCHC 34.1   34.4   34.8     RDW 13.9   13.2   13.2     Platelets 232   213   226       BMP        4/18/2023    04:58 4/19/2023    04:49 4/20/2023    04:58   BMP   BUN 46   49   23     Creatinine 1.80   1.82   1.94     Sodium 138   136   136     Potassium 4.4   4.4   4.4     Chloride 103   100   100     CO2  24.1   26.5   27.3     Calcium 8.9   8.9   8.8       LIVER FUNCTION TESTS:      Lab 23  0458 23  0449 23  0458   TOTAL PROTEIN 6.3 6.3 6.6   ALBUMIN 3.0* 3.0* 3.0*   ALT (SGPT) 11 12 12   AST (SGOT) 14 15 15   BILIRUBIN 0.3 0.3 0.3   BILIRUBIN DIRECT <0.2 <0.2 <0.2   ALK PHOS 96 94 93       [x]  Microbiology No results found for: ACANTHNAEG, AFBCX, BPERTUSSISCX, BLOODCX  No results found for: BCIDPCR, CXREFLEX, CSFCX, CULTURETIS  No results found for: CULTURES, HSVCX, URCX  No results found for: EYECULTURE, GCCX, HSVCULTURE, LABHSV  No results found for: LEGIONELLA, MRSACX, MUMPSCX, MYCOPLASCX  No results found for: NOCARDIACX, STOOLCX  No results found for: THROATCX, UNSTIMCULT, URINECX, CULTURE, VZVCULTUR  No results found for: VIRALCULTU, WOUNDCX    [x]  Radiology EEG    Result Date: 2023  Clinical history: 68-year-old woman evaluated for confusion. EEG description: This is a portable 19 channel EEG recording using the 10/20 international classification of electrode placement. EEG report: The underlying background activity consist of a 5 to 6 Hz theta activity amplitude of 30 to 40 µV that is distributed diffusely.  This is intermixed with 2 to 3 Hz delta activity amplitude of 90 to 120 µV.  Triphasic configuration was noted with delta activity.  There was no focal slowing.  There were no epileptiform discharges.  Photic stimulation did not activate any abnormalities. Impression: This EEG is abnormal secondary to slowing of background activity.  Is finding suggestive of a mild to moderate encephalopathy of nonspecific etiology.  There were no epileptiform discharges recorded.    EEG    Result Date: 4/3/2023  Table formatting from the original result was not included. Images from the original result were not included. [address] [place] [id number] [phone] ELECTROENCEPHALOGRAPHIC REPORT Patient: Lyubov Middleton EEG # :    DFW-P- : 1955  ID:      [id number]     Age: 68 y.o. female    Primary  Physician: Germain Beasley MD Technician:  Camille Everett Ordering  Physician: Germain Beasley MD    Recording Date:  April 3, 2023 Report  Date: 4/3/2023     History:  Seizures Medications: Darbepoetin chris, ergocalciferol, menthol zinc oxide, acetaminophen, alendronate, amlodipine, anastrozole, carvedilol, cholestyramine, citalopram, ferrous sulfate, hydralazine, ipratropium, levothyroxine, losartan, phosphorus, Saccharomyces boulardii, sodium bicarbonate. Reading: The EEG was obtained portable in her room during the waking state and on photic stimulation with video monitoring.  We do not know how much sleep she has had the night before the testing.  During the EEG, she was quite tense and restless and the EEG was filled with so much movement and muscle artifacts. The dominant background activity consists of symmetric and irregular 30 to 165 µV 6 to 7 cps which were prominent on both parieto-occipital regions.  There were frequent 100 to 235 µV slow activities of 2 cps which were generalized and synchronous more prominent on both anterior quadrants noted intermittently all throughout the recording.  There were no discernible responses on photic stimulation at various frequencies.  There was no amplitude asymmetry.  No paroxysmal discharges. Impression: Abnormal EEG because of slow background activity mixed with slower waves compatible with moderate diffuse encephalopathy. There were no epileptiform discharges noted today. The EEG may be repeated at a later date when she is more calm and more cooperative. Electronically signed by Hernesto Braxton Jr., MD, 04/03/23, 6:39 PM EDT. Please note that portions of this note were completed with a voice recognition program.  Part of this note is an electric or electronic transcription/translation of spoken language to printed text using the dragon dictating system.    CT Head Without Contrast    Result Date: 4/3/2023  PROCEDURE: CT HEAD WO  CONTRAST  COMPARISON:  [facility], CT, CT FACIAL BONES WO CONTRAST, 3/25/2023, 12:31.  INDICATIONS: AMS  PROTOCOL:   Standard imaging protocol performed    RADIATION:   DLP: 1081.2mGy*cm   MA and/or KV was adjusted to minimize radiation dose.    TECHNIQUE: CT images were obtained without non-ionic intravenous contrast material.  FINDINGS:  The ventricles, sulci, and cerebellar folia are moderately and diffusely prominent consistent with atrophy.  Ill-defined diminished density in cerebral white matter is consistent with mild gliosis and/or numerous lacunar infarcts.  There is no CT evidence of acute intracranial hemorrhage, mass, or mass effect.  The orbits have a normal appearance.  The paranasal sinuses, middle ears, and mastoid air cells are well aerated.        CT scan of the head without IV contrast demonstrating diffuse atrophy and white matter changes.  No acute intracranial abnormality is seen.     KULWANT HOLLOWAY MD       Electronically Signed and Approved By: KULWANT HOLLOWAY MD on 4/03/2023 at 12:27             MRI Brain Without Contrast    Result Date: 4/12/2023  PROCEDURE: MRI BRAIN WO CONTRAST  COMPARISONS: 4/3/2023; 3/25/2023.  INDICATIONS: AMS (altered mental status); previous CT head concerning for gliosis versus numerous lacunar infarcts.  TECHNIQUE: A variety of imaging planes and parameters were utilized for visualization of suspected pathology within the brain.  275 magnetic resonance (MR) images were obtained without contrast.  FINDINGS: No reduced ADC by DWI is identified to suggest an acute infarct or other acute brain pathology. Slight motion artifact is seen on some of the sequences.  FLAIR/T2 hyperintensities are seen throughout the cerebral white matter, especially in the central and periventricular areas but also in subcortical regions. These findings are nonspecific but may represent mild-to-moderate chronic small vessel ischemia/infarction.  There is also involvement of the  brainstem, especially the abdullahi.  The hippocampal formations are symmetric in size, signal intensity, and internal architecture. No abnormal susceptibility is seen on the SWI sequence except for the basal ganglia, consistent with mineralization.  Age-indeterminate congestive and/or inflammatory changes involve the mastoid air cell complexes, greater on the right than the left.  These findings may represent age-indeterminate congestive and/or inflammatory change.  Air-fluid levels also involve the bilateral sphenoid sinuses.  To a lesser extent, opacities involve the bilateral posterior ethmoid air cells.  These findings may be related to congestive and/or inflammatory change.  Subacute hemorrhage related to recent trauma is possible.  The extra-axial spaces and the ventricular system are prominent, suggesting global atrophy.        1. No acute brain abnormality is seen. No acute infarct. No acute intracranial hemorrhage. 2. There may be mild-to-moderate chronic small vessel ischemia/infarction. 3. Slight motion artifact obscures detail on some of the sequences.    4. The other findings are as detailed above.     Please note that portions of this note were completed with a voice recognition program.  DWAIN RAMOS JR, MD       Electronically Signed and Approved By: DWAIN RAMOS JR, MD on 4/12/2023 at 23:26              FL Video Swallow With Speech Single Contrast    Result Date: 4/4/2023  PROCEDURE: FL VIDEO SWALLOW W SPEECH SINGLE-CONTRAST  COMPARISON: None  INDICATIONS: dysphagia/ 5.0 mGy/ 2.3 min  TECHNIQUE: Examination was performed in conjunction with the speech pathologist. The patient was given both thin and nectar thick liquid barium, applesauce with barium, contrast and rohit cracker with Esophotrast. The patient's medication list was reviewed and documented in the medical record.  FINDINGS:  Fluoroscopic examination was performed in conjunction with speech pathology department.  Various consistencies of  barium were given to assess the swallow mechanism.  Across all consistencies, no tracheal aspiration was observed on exam.  Patient experienced deep laryngeal penetration with thin liquid barium.  Patient experienced transient laryngeal penetration with applesauce with barium.  The exam was discussed in real-time by myself and the speech pathologist. Please consult speech pathology report for further details regarding exam.         1. Deep laryngeal penetration with thin liquid barium 2. Transient laryngeal penetration with applesauce with barium 3. No tracheal aspiration  Please consult speech pathology report for further details regarding exam and dietary recommendations   THANG BRIONES       Electronically Signed and Approved By: Sohail Fernández M.D. on 4/04/2023 at 16:08             XR Chest 1 View    Result Date: 4/3/2023  PROCEDURE: XR CHEST 1 VW  COMPARISON: None  INDICATIONS: WEAKNESS TODAY  FINDINGS:  The heart is normal in size.  The lungs are well-expanded and free of infiltrates.  Mild degenerative changes are seen in the glenohumeral joints.  Surgical clips are seen in the left axillary region.       No active cardiopulmonary disease is seen.       KLUWANT HOLLOWAY MD       Electronically Signed and Approved By: KULWANT HOLLOWAY MD on 4/03/2023 at 11:53             CT Facial Bones Without Contrast    Result Date: 3/25/2023  PROCEDURE: CT FACIAL BONES WO CONTRAST  COMPARISON: None  INDICATIONS: facial trauma  PROTOCOL:   Standard imaging protocol performed    RADIATION:   DLP: 527.8 mGy*cm   Automated exposure control was utilized to minimize radiation dose.  TECHNIQUE: Axial images of the facial bones without contrast. Coronal and sagittal reformatted images were performed.  FINDINGS: There is a mildly comminuted nasal bone fracture.  No other facial bone fractures are identified.  There is mild mucosal thickening in the ethmoid sinuses.  There is a small air-fluid level in the left sphenoid sinus without  evidence of significant mucosal thickening.  Questionable subtle fracture of the bony nasal septum.  There incidental middle leslie bullosa.  The ostiomeatal units are patent.  No orbital abnormalities are identified.  There is generalized atrophy in the visualized brain.  No orbital abnormalities are identified.  IMPRESSION: 1. Mildly comminuted nasal bone fracture.  2. Questionable subtle fracture of the bony nasal septum.   FRITZ CARMEN MD       Electronically Signed and Approved By: FRITZ CARMEN MD on 3/25/2023 at 12:46             IR J or G Tube Contrast Eval    Result Date: 4/15/2023  PROCEDURE: IR J OR G TUBE CONTRAST EVAL  COMPARISON: [facility], CR, IR J OR G TUBE CONTRAST EVAL, 4/04/2023, 17:55.  INDICATIONS: PEG tube placement  FINDINGS: A  image and a 3 minutes delayed imags were obtained.  There is a PEG tube in the stomach.  No evidence of contrast extravasation.  Contrast is present in the proximal small bowel on the delayed image.  No evidence of obstruction.       Normal exam.      FRITZ CARMEN MD       Electronically Signed and Approved By: FRITZ CARMEN MD on 4/15/2023 at 11:43             IR J or G Tube Contrast Eval    Result Date: 4/4/2023  PROCEDURE: IR J OR G TUBE CONTRAST EVAL  COMPARISON: [facility], CR, IR J OR G TUBE CONTRAST EVAL, 4/04/2023, 15:06.  INDICATIONS: G TUBE EVAL/ 30 ML INJECTED  FINDINGS: Contrast is present within the stomach.  No definite contrast extravasation identified.  Residual contrast present within the colon.       Contrast within the stomach.  No definite contrast extravasation identified.  Residual previous contrast present within the colon slightly limiting evaluation.  No obvious leak identified..      DARLENE MONTERO MD       Electronically Signed and Approved By: DARLENE MONTERO MD on 4/04/2023 at 19:57             IR J or G Tube Contrast Eval    Result Date: 4/4/2023  PROCEDURE: IR J OR G TUBE CONTRAST EVAL   COMPARISON: None  INDICATIONS: Confirm placement of G-tube/ F.T. .9 MIN/ 19.6 mGy/ 2 IMAGES  FINDINGS:   There is a percutaneous enterostomy tube in the left upper quadrant. Contrast material from previous modified barium swallow observed in stomach and small bowel.    Water soluble contrast agent, Gastrografin, was attempted to be injected into the patient's percutaneous gastrostomy (peg) tube. However, Gastrografin contrast would not advance past the distal portion of the peg tube into the stomach, likely due to obstruction or blockage within the tubing.        Percutaneous gastric tube appears to be in good position, however, unable to pass Gastrografin contrast through the distal portion of the peg tube into the stomach, likely due to obstruction within tubing    THANG BRIONES       Electronically Signed and Approved By: Sohail Fernández M.D. 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 difficile diarrhea and history of chronic diarrhea  Nonconvulsive seizure with history of nonconvulsive status epilepticus  Metabolic acidosis  AUSTEN on CKD 4 baseline creatinine proxy 1.7  Hypokalemia  Hypomagnesemia  Hypernatremia  DM 2  Oropharyngeal dysphagia with chronic PEG tube and nocturnal tube feeds  Blocked PEG tube, resolved  Anemia of chronic disease  Hypertension  Depression  Hypothyroidism  History of breast cancer and prior left mastectomy  Suspect underlying dementia     Plan:  Labs and imaging reviewed  Awaiting precertification process for rehab  Continue fluconazole  Continue delirium precautions  Continue Vimpat with dose increased during this hospitalization  Neurology recommendations appreciated  Continue PEG site  wound care  Oral diet per speech  Continue PEG tube feeds per dietitian  Aspiration precautions  Continue amlodipine 10 mg daily  Continue Coreg 25 mg twice a day  Continue Celexa 10 mg daily  Continue Zyrtec and fluticasone  Continue levothyroxine at a very low dose  Continue sodium bicarb tabs  Continue oral vancomycin to complete 10 days total for C. difficile finishing soon, will need to follow for further symptoms of diarrhea when she completes treatment  A.m. labs  DNR  DVT prophylaxis with SCDs   Clinical course will dictate further management  Discussed with nurse at the bedside  DVT prophylaxis:  Mechanical DVT prophylaxis orders are present.    CODE STATUS:   Medical Intervention Limits: NO intubation (DNI)  Code Status (Patient has no pulse and is not breathing): No CPR (Do Not Attempt to Resuscitate)  Medical Interventions (Patient has pulse or is breathing): Limited Support  Release to patient: Routine Release    Electronically signed by Nenita Whiting MD, 04/20/23, 5:51 PM EDT.  Portions of this documentation were transcribed electronically from a voice recognition software.  I confirm all data accurately represents the service(s) I performed at today's visit.

## 2023-04-20 NOTE — THERAPY TREATMENT NOTE
Patient Name: Lyubov Middleton  : 1955    MRN: [id number]                              Today's Date: 2023       Admit Date: 4/3/2023    Visit Dx:     ICD-10-CM ICD-9-CM   1. Metabolic encephalopathy  G93.41 348.31   2. Oropharyngeal dysphagia  R13.12 787.22   3. Difficulty in walking  R26.2 719.7   4. Decreased activities of daily living (ADL)  Z78.9 V49.89     Patient Active Problem List   Diagnosis   • Renal stone   • Renal abscess   • Stage 3a chronic kidney disease   • Type 2 diabetes mellitus   • Metabolic encephalopathy   • Seizure disorder     Past Medical History:   Diagnosis Date   • Cancer     left breast removed    • Type 2 diabetes mellitus      Past Surgical History:   Procedure Laterality Date   • ABDOMINAL SURGERY     • BREAST SURGERY Left     removed due to cancer   • CHOLECYSTECTOMY     • CYSTOSCOPY W/ URETERAL STENT PLACEMENT Right 2020    Procedure: CYSTOSCOPY, RIGHT RETROGRADE PYELOGRAM, URETEROSCOPY, LASER LITHOTRIPSY, RIGHT URETERAL STENT PLACEMENT;  Surgeon: Rohan Dooley Jr., MD;  Location: [facility];  Service: Urology;  Laterality: Right;   • GASTROSTOMY W/ FEEDING TUBE     • HERNIA REPAIR      mesh removed      General Information     Row Name 23 1119          OT Time and Intention    Document Type therapy note (daily note)  -PG     Mode of Treatment individual therapy;occupational therapy  -PG           User Key  (r) = Recorded By, (t) = Taken By, (c) = Cosigned By    Initials Name Provider Type    PG Golden Anrdews OT Occupational Therapist                 Mobility/ADL's    No documentation.                Obj/Interventions     Row Name 23 1119          Shoulder (Therapeutic Exercise)    Shoulder Strengthening (Therapeutic Exercise) 1 lb free weight;2 sets;10 repetitions  -PG     Row Name 23 1119          Elbow/Forearm (Therapeutic Exercise)    Elbow/Forearm Strengthening (Therapeutic Exercise) 2 sets;1 lb free weight;10 repetitions   -PG     Row Name 04/20/23 1119          Motor Skills    Therapeutic Exercise shoulder;elbow/forearm  -PG           User Key  (r) = Recorded By, (t) = Taken By, (c) = Cosigned By    Initials Name Provider Type    Golden Garcia OT Occupational Therapist               Goals/Plan    No documentation.                Clinical Impression     Row Name 04/20/23 1120          Plan of Care Review    Progress no change  -PG           User Key  (r) = Recorded By, (t) = Taken By, (c) = Cosigned By    Initials Name Provider Type    Golden Garcia OT Occupational Therapist               Outcome Measures     Row Name 04/20/23 1120          How much help from another is currently needed...    Putting on and taking off regular lower body clothing? 1  -PG     Bathing (including washing, rinsing, and drying) 1  -PG     Toileting (which includes using toilet bed pan or urinal) 1  -PG     Putting on and taking off regular upper body clothing 2  -PG     Taking care of personal grooming (such as brushing teeth) 2  -PG     Eating meals 3  -PG     AM-PAC 6 Clicks Score (OT) 10  -PG     Row Name 04/20/23 0833 04/20/23 0512       How much help from another person do you currently need...    Turning from your back to your side while in flat bed without using bedrails? 3  -MG 3  -MF    Moving from lying on back to sitting on the side of a flat bed without bedrails? 3  -MG 3  -MF    Moving to and from a bed to a chair (including a wheelchair)? 2  -MG 2  -MF    Standing up from a chair using your arms (e.g., wheelchair, bedside chair)? 3  -MG 3  -MF    Climbing 3-5 steps with a railing? 2  -MG 1  -MF    To walk in hospital room? 3  -MG 2  -MF    AM-PAC 6 Clicks Score (PT) 16  -MG 14  -MF    Highest level of mobility 5 --> Static standing  -MG 4 --> Transferred to chair/commode  -MF    Row Name 04/20/23 1120          Functional Assessment    Outcome Measure Options AM-PAC 6 Clicks Daily Activity (OT);Optimal Instrument  -PG     Row Name  04/20/23 1120          Optimal Instrument    Optimal Instrument Optimal - 3  -PG     Bending/Stooping 3  -PG     Standing 3  -PG     Reaching 2  -PG           User Key  (r) = Recorded By, (t) = Taken By, (c) = Cosigned By    Initials Name Provider Type    PG Golden Andrews, OT Occupational Therapist    Jocy Tirado LPN Licensed Nurse    Leanne Varner RN Registered Nurse                Occupational Therapy Education     Title: PT OT SLP Therapies (Done)     Topic: Occupational Therapy (Done)     Point: ADL training (Done)     Description:   Instruct learner(s) on proper safety adaptation and remediation techniques during self care or transfers.   Instruct in proper use of assistive devices.              Learning Progress Summary           Patient Acceptance, E, VU by AV at 4/17/2023 1549                   Point: Home exercise program (Done)     Description:   Instruct learner(s) on appropriate technique for monitoring, assisting and/or progressing therapeutic exercises/activities.              Learning Progress Summary           Patient Acceptance, E, VU by AV at 4/17/2023 1549                   Point: Precautions (Done)     Description:   Instruct learner(s) on prescribed precautions during self-care and functional transfers.              Learning Progress Summary           Patient Acceptance, E, VU by AV at 4/17/2023 1549                   Point: Body mechanics (Done)     Description:   Instruct learner(s) on proper positioning and spine alignment during self-care, functional mobility activities and/or exercises.              Learning Progress Summary           Patient Acceptance, E, VU by AV at 4/17/2023 1549                               User Key     Initials Effective Dates Name Provider Type Discipline     06/16/21 -  Ryan Carl OT Occupational Therapist OT              OT Recommendation and Plan     Plan of Care Review  Progress: no change     Time Calculation:    Time Calculation- OT     Row  Name 04/20/23 1121             Time Calculation- OT    OT Received On 04/20/23  Simultaneous filing. User may be unaware of other data.  -AV      OT Goal Re-Cert Due Date 04/26/23  -PG         Timed Charges    41118 - OT Therapeutic Exercise Minutes 10  -PG         Total Minutes    Timed Charges Total Minutes 10  -PG       Total Minutes 10  -PG            User Key  (r) = Recorded By, (t) = Taken By, (c) = Cosigned By    Initials Name Provider Type    AV Ryan Carl OT Occupational Therapist    PG Golden Andrews OT Occupational Therapist              Therapy Charges for Today     Code Description Service Date Service Provider Modifiers Qty    69081014350 HC OT THER PROC EA 15 MIN 4/19/2023 Golden Andrews OT GO 1    37155175329 HC OT THER PROC EA 15 MIN 4/20/2023 Golden Andrews OT GO 1               Golden Andrews OT  4/20/2023

## 2023-04-21 LAB
ALBUMIN SERPL-MCNC: 3 G/DL (ref 3.5–5.2)
ALP SERPL-CCNC: 91 U/L (ref 39–117)
ALT SERPL W P-5'-P-CCNC: 11 U/L (ref 1–33)
ANION GAP SERPL CALCULATED.3IONS-SCNC: 10.1 MMOL/L (ref 5–15)
AST SERPL-CCNC: 14 U/L (ref 1–32)
BACTERIA SPEC AEROBE CULT: NORMAL
BASOPHILS # BLD AUTO: 0.05 10*3/MM3 (ref 0–0.2)
BASOPHILS NFR BLD AUTO: 0.6 % (ref 0–1.5)
BILIRUB CONJ SERPL-MCNC: <0.2 MG/DL (ref 0–0.3)
BILIRUB INDIRECT SERPL-MCNC: ABNORMAL MG/DL
BILIRUB SERPL-MCNC: 0.3 MG/DL (ref 0–1.2)
BUN SERPL-MCNC: 52 MG/DL (ref 8–23)
BUN/CREAT SERPL: 26.4 (ref 7–25)
CALCIUM SPEC-SCNC: 8.7 MG/DL (ref 8.6–10.5)
CHLORIDE SERPL-SCNC: 99 MMOL/L (ref 98–107)
CO2 SERPL-SCNC: 28.9 MMOL/L (ref 22–29)
CREAT SERPL-MCNC: 1.97 MG/DL (ref 0.57–1)
DEPRECATED RDW RBC AUTO: 44.4 FL (ref 37–54)
EGFRCR SERPLBLD CKD-EPI 2021: 27.4 ML/MIN/1.73
EOSINOPHIL # BLD AUTO: 0.43 10*3/MM3 (ref 0–0.4)
EOSINOPHIL NFR BLD AUTO: 5.1 % (ref 0.3–6.2)
ERYTHROCYTE [DISTWIDTH] IN BLOOD BY AUTOMATED COUNT: 13.5 % (ref 12.3–15.4)
GLUCOSE BLDC GLUCOMTR-MCNC: 112 MG/DL (ref 70–99)
GLUCOSE BLDC GLUCOMTR-MCNC: 115 MG/DL (ref 70–99)
GLUCOSE BLDC GLUCOMTR-MCNC: 137 MG/DL (ref 70–99)
GLUCOSE BLDC GLUCOMTR-MCNC: 85 MG/DL (ref 70–99)
GLUCOSE SERPL-MCNC: 136 MG/DL (ref 65–99)
GRAM STN SPEC: NORMAL
HCT VFR BLD AUTO: 26.3 % (ref 34–46.6)
HGB BLD-MCNC: 9.1 G/DL (ref 12–15.9)
IMM GRANULOCYTES # BLD AUTO: 0.02 10*3/MM3 (ref 0–0.05)
IMM GRANULOCYTES NFR BLD AUTO: 0.2 % (ref 0–0.5)
LYMPHOCYTES # BLD AUTO: 1.83 10*3/MM3 (ref 0.7–3.1)
LYMPHOCYTES NFR BLD AUTO: 21.8 % (ref 19.6–45.3)
MAGNESIUM SERPL-MCNC: 2.1 MG/DL (ref 1.6–2.4)
MCH RBC QN AUTO: 31.2 PG (ref 26.6–33)
MCHC RBC AUTO-ENTMCNC: 34.6 G/DL (ref 31.5–35.7)
MCV RBC AUTO: 90.1 FL (ref 79–97)
MONOCYTES # BLD AUTO: 0.51 10*3/MM3 (ref 0.1–0.9)
MONOCYTES NFR BLD AUTO: 6.1 % (ref 5–12)
NEUTROPHILS NFR BLD AUTO: 5.54 10*3/MM3 (ref 1.7–7)
NEUTROPHILS NFR BLD AUTO: 66.2 % (ref 42.7–76)
NRBC BLD AUTO-RTO: 0 /100 WBC (ref 0–0.2)
PHOSPHATE SERPL-MCNC: 4.6 MG/DL (ref 2.5–4.5)
PLATELET # BLD AUTO: 232 10*3/MM3 (ref 140–450)
PMV BLD AUTO: 9.4 FL (ref 6–12)
POTASSIUM SERPL-SCNC: 4.1 MMOL/L (ref 3.5–5.2)
PROT SERPL-MCNC: 6.2 G/DL (ref 6–8.5)
RBC # BLD AUTO: 2.92 10*6/MM3 (ref 3.77–5.28)
SODIUM SERPL-SCNC: 138 MMOL/L (ref 136–145)
WBC NRBC COR # BLD: 8.38 10*3/MM3 (ref 3.4–10.8)

## 2023-04-21 PROCEDURE — 82962 GLUCOSE BLOOD TEST: CPT

## 2023-04-21 PROCEDURE — 84100 ASSAY OF PHOSPHORUS: CPT | Performed by: FAMILY MEDICINE

## 2023-04-21 PROCEDURE — 85025 COMPLETE CBC W/AUTO DIFF WBC: CPT | Performed by: FAMILY MEDICINE

## 2023-04-21 PROCEDURE — 99232 SBSQ HOSP IP/OBS MODERATE 35: CPT | Performed by: FAMILY MEDICINE

## 2023-04-21 PROCEDURE — 80048 BASIC METABOLIC PNL TOTAL CA: CPT | Performed by: FAMILY MEDICINE

## 2023-04-21 PROCEDURE — 97110 THERAPEUTIC EXERCISES: CPT

## 2023-04-21 PROCEDURE — 80076 HEPATIC FUNCTION PANEL: CPT | Performed by: FAMILY MEDICINE

## 2023-04-21 PROCEDURE — 83735 ASSAY OF MAGNESIUM: CPT | Performed by: FAMILY MEDICINE

## 2023-04-21 RX ORDER — VANCOMYCIN HYDROCHLORIDE 125 MG/1
125 CAPSULE ORAL EVERY OTHER DAY
Status: DISCONTINUED | OUTPATIENT
Start: 2023-05-15 | End: 2023-04-21

## 2023-04-21 RX ORDER — VANCOMYCIN HYDROCHLORIDE 125 MG/1
125 CAPSULE ORAL EVERY 6 HOURS SCHEDULED
Status: DISCONTINUED | OUTPATIENT
Start: 2023-04-21 | End: 2023-04-21

## 2023-04-21 RX ORDER — VANCOMYCIN HYDROCHLORIDE 125 MG/1
125 CAPSULE ORAL EVERY 12 HOURS
Status: DISCONTINUED | OUTPATIENT
Start: 2023-05-01 | End: 2023-04-21

## 2023-04-21 RX ORDER — VANCOMYCIN HYDROCHLORIDE 125 MG/1
125 CAPSULE ORAL EVERY 24 HOURS
Status: DISCONTINUED | OUTPATIENT
Start: 2023-05-08 | End: 2023-04-21

## 2023-04-21 RX ADMIN — NYSTATIN AND TRIAMCINOLONE ACETONIDE 1 APPLICATION: 100000; 1 OINTMENT TOPICAL at 09:11

## 2023-04-21 RX ADMIN — SODIUM BICARBONATE 650 MG TABLET 1300 MG: at 09:07

## 2023-04-21 RX ADMIN — FIDAXOMICIN 200 MG: 200 TABLET, FILM COATED ORAL at 12:10

## 2023-04-21 RX ADMIN — CARVEDILOL 25 MG: 25 TABLET, FILM COATED ORAL at 09:07

## 2023-04-21 RX ADMIN — CETIRIZINE HYDROCHLORIDE 10 MG: 10 TABLET, FILM COATED ORAL at 09:08

## 2023-04-21 RX ADMIN — Medication 10 ML: at 09:09

## 2023-04-21 RX ADMIN — NYSTATIN AND TRIAMCINOLONE ACETONIDE 1 APPLICATION: 100000; 1 OINTMENT TOPICAL at 21:43

## 2023-04-21 RX ADMIN — VANCOMYCIN HYDROCHLORIDE 125 MG: 125 CAPSULE ORAL at 09:25

## 2023-04-21 RX ADMIN — SODIUM BICARBONATE 650 MG TABLET 1300 MG: at 21:51

## 2023-04-21 RX ADMIN — AMLODIPINE BESYLATE 10 MG: 5 TABLET ORAL at 09:08

## 2023-04-21 RX ADMIN — VANCOMYCIN HYDROCHLORIDE 125 MG: 125 CAPSULE ORAL at 00:14

## 2023-04-21 RX ADMIN — LACOSAMIDE 100 MG: 10 SOLUTION ORAL at 09:08

## 2023-04-21 RX ADMIN — Medication 250 MG: at 09:08

## 2023-04-21 RX ADMIN — Medication 250 MG: at 21:51

## 2023-04-21 RX ADMIN — LEVOTHYROXINE SODIUM 12.5 MCG: 0.03 TABLET ORAL at 06:14

## 2023-04-21 RX ADMIN — FIDAXOMICIN 200 MG: 200 TABLET, FILM COATED ORAL at 21:51

## 2023-04-21 RX ADMIN — CARVEDILOL 25 MG: 25 TABLET, FILM COATED ORAL at 21:43

## 2023-04-21 RX ADMIN — ANASTROZOLE 1 MG: 1 TABLET ORAL at 09:08

## 2023-04-21 RX ADMIN — LACOSAMIDE 100 MG: 10 SOLUTION ORAL at 21:42

## 2023-04-21 RX ADMIN — CITALOPRAM HYDROBROMIDE 10 MG: 20 TABLET ORAL at 09:08

## 2023-04-21 NOTE — THERAPY TREATMENT NOTE
Acute Care - Physical Therapy Progress Note   [facility]     Patient Name: Lyubov Middleton  : 1955  MRN: [id number]  Today's Date: 2023      Visit Dx:     ICD-10-CM ICD-9-CM   1. Metabolic encephalopathy  G93.41 348.31   2. Oropharyngeal dysphagia  R13.12 787.22   3. Difficulty in walking  R26.2 719.7   4. Decreased activities of daily living (ADL)  Z78.9 V49.89     Patient Active Problem List   Diagnosis   • Renal stone   • Renal abscess   • Stage 3a chronic kidney disease   • Type 2 diabetes mellitus   • Metabolic encephalopathy   • Seizure disorder     Past Medical History:   Diagnosis Date   • Cancer     left breast removed    • Type 2 diabetes mellitus      Past Surgical History:   Procedure Laterality Date   • ABDOMINAL SURGERY     • BREAST SURGERY Left     removed due to cancer   • CHOLECYSTECTOMY     • CYSTOSCOPY W/ URETERAL STENT PLACEMENT Right 2020    Procedure: CYSTOSCOPY, RIGHT RETROGRADE PYELOGRAM, URETEROSCOPY, LASER LITHOTRIPSY, RIGHT URETERAL STENT PLACEMENT;  Surgeon: Rohan Dooley Jr., MD;  Location: [facility];  Service: Urology;  Laterality: Right;   • GASTROSTOMY W/ FEEDING TUBE     • HERNIA REPAIR      mesh removed     PT Assessment (last 12 hours)     PT Evaluation and Treatment     Row Name 23 1600          Physical Therapy Time and Intention    Subjective Information complains of  upset stomach  -CS     Document Type therapy note (daily note)  -CS     Mode of Treatment individual therapy;physical therapy  -CS     Patient Effort fair  -CS     Row Name 23 1600          Hip (Therapeutic Exercise)    Hip AAROM (Therapeutic Exercise) bilateral;flexion;extension;aBduction;aDduction;external rotation;internal rotation;supine;10 repetitions;5 repetitions  -CS     Row Name 23 1600          Knee (Therapeutic Exercise)    Knee AAROM (Therapeutic Exercise) bilateral;flexion;extension;supine;10 repetitions;5 repetitions  -CS     Row Name 23  1600          Ankle (Therapeutic Exercise)    Ankle AAROM (Therapeutic Exercise) bilateral;dorsiflexion;plantarflexion;supine;10 repetitions;5 repetitions  -CS     Row Name             Wound 04/03/23 1622 Right labia    Wound - Properties Group Placement Date: 04/03/23  -AC Placement Time: 1622  -AC Present on Hospital Admission: Y  -AC Side: Right  -AC Location: labia  -AC    Retired Wound - Properties Group Placement Date: 04/03/23  -AC Placement Time: 1622  -AC Present on Hospital Admission: Y  -AC Side: Right  -AC Location: labia  -AC    Retired Wound - Properties Group Date first assessed: 04/03/23  -AC Time first assessed: 1622  -AC Present on Hospital Admission: Y  -AC Side: Right  -AC Location: labia  -AC    Row Name             Wound 04/03/23 1628 sacral spine    Wound - Properties Group Placement Date: 04/03/23  -AC Placement Time: 1628  -AC Present on Hospital Admission: Y  -AC Location: sacral spine  -AC    Retired Wound - Properties Group Placement Date: 04/03/23  -AC Placement Time: 1628  -AC Present on Hospital Admission: Y  -AC Location: sacral spine  -AC    Retired Wound - Properties Group Date first assessed: 04/03/23  -AC Time first assessed: 1628  -AC Present on Hospital Admission: Y  -AC Location: sacral spine  -AC    Row Name             Wound 04/03/23 1629 Bilateral gluteal    Wound - Properties Group Placement Date: 04/03/23  -AC Placement Time: 1629  -AC Present on Hospital Admission: Y  -AC Side: Bilateral  -AC Location: gluteal  -AC    Retired Wound - Properties Group Placement Date: 04/03/23  -AC Placement Time: 1629  -AC Present on Hospital Admission: Y  -AC Side: Bilateral  -AC Location: gluteal  -AC    Retired Wound - Properties Group Date first assessed: 04/03/23  -AC Time first assessed: 1629  -AC Present on Hospital Admission: Y  -AC Side: Bilateral  -AC Location: gluteal  -AC    Row Name             Wound 04/03/23 1630 Bilateral posterior greater trochanter    Wound - Properties  Group Placement Date: 04/03/23  -AC Placement Time: 1630  -AC Present on Hospital Admission: Y  -AC Side: Bilateral  -AC Orientation: posterior  -AC Location: greater trochanter  -AC    Retired Wound - Properties Group Placement Date: 04/03/23  -AC Placement Time: 1630  -AC Present on Hospital Admission: Y  -AC Side: Bilateral  -AC Orientation: posterior  -AC Location: greater trochanter  -AC    Retired Wound - Properties Group Date first assessed: 04/03/23  -AC Time first assessed: 1630  -AC Present on Hospital Admission: Y  -AC Side: Bilateral  -AC Location: greater trochanter  -AC    Row Name 04/21/23 1600          Positioning and Restraints    Pre-Treatment Position in bed  -CS     Post Treatment Position bed  -CS     In Bed supine;call light within reach;encouraged to call for assist;exit alarm on  -CS     Row Name 04/21/23 1600          Progress Summary (PT)    Progress Toward Functional Goals (PT) progress toward functional goals is good  -CS           User Key  (r) = Recorded By, (t) = Taken By, (c) = Cosigned By    Initials Name Provider Type    Farzad Calderón PTA Physical Therapist Assistant    Jessica Bowie, RN Registered Nurse                Physical Therapy Education     Title: PT OT SLP Therapies (Done)     Topic: Physical Therapy (Done)     Point: Mobility training (Done)     Learning Progress Summary           Patient Acceptance, E, VU by AV at 4/17/2023 1549    Acceptance, E, VU by  at 4/17/2023 0900    Comment: pt appears confused; alert to self    Acceptance, E,TB, VU,NR by  at 4/6/2023 0434    Acceptance, E,TB, VU by  at 4/4/2023 1113                   Point: Precautions (Done)     Learning Progress Summary           Patient Acceptance, E, VU by AV at 4/17/2023 1549    Acceptance, E, VU by  at 4/17/2023 0900    Comment: pt appears confused; alert to self    Acceptance, E,TB, VU,NR by  at 4/6/2023 0434    Acceptance, E,TB, VU by  at 4/4/2023 1113                                User Key     Initials Effective Dates Name Provider Type Discipline     06/16/21 -  Tonya Jimenez, RN Registered Nurse Nurse    RM 06/08/21 -  Moreno Longoria, RN Registered Nurse Nurse    AV 06/16/21 -  Ryan Carl OT Occupational Therapist OT     03/07/23 -  Rosalinda Peacock, RICH Student PT Student PT              PT Recommendation and Plan     Progress Summary (PT)  Progress Toward Functional Goals (PT): progress toward functional goals is good  Daily Progress Summary (PT): Pt. reported upset stomach during Tx and beame nauseated.  Pt. was observed to have vomiting episode, which was relayed to RN.  Pt. also had BM during Tx that was complete liquid, which was also relayed to nursing staff.   Outcome Measures     Row Name 04/21/23 1600 04/20/23 1205          How much help from another person do you currently need...    Turning from your back to your side while in flat bed without using bedrails? 3  -CS 3  -DK     Moving from lying on back to sitting on the side of a flat bed without bedrails? 3  -CS 3  -DK     Moving to and from a bed to a chair (including a wheelchair)? 2  -CS 2  -DK     Standing up from a chair using your arms (e.g., wheelchair, bedside chair)? 3  -CS 3  -DK     Climbing 3-5 steps with a railing? 2  -CS 2  -DK     To walk in hospital room? 3  -CS 3  -DK     AM-PAC 6 Clicks Score (PT) 16  -CS 16  -DK        Functional Assessment    Outcome Measure Options AM-PAC 6 Clicks Basic Mobility (PT)  -CS AM-PAC 6 Clicks Basic Mobility (PT)  -DK           User Key  (r) = Recorded By, (t) = Taken By, (c) = Cosigned By    Initials Name Provider Type    Jimena Ambriz PTA Physical Therapist Assistant    Farzad Calderón PTA Physical Therapist Assistant                 Time Calculation:    PT Charges     Row Name 04/21/23 1619             Time Calculation    Start Time 1600  -CS      PT Received On 04/21/23  -         Timed Charges    04301 - PT Therapeutic Exercise Minutes 9  -CS          Total Minutes    Timed Charges Total Minutes 9  -CS       Total Minutes 9  -CS            User Key  (r) = Recorded By, (t) = Taken By, (c) = Cosigned By    Initials Name Provider Type    CS Farzad Jang PTA Physical Therapist Assistant              Therapy Charges for Today     Code Description Service Date Service Provider Modifiers Qty    04828716231 HC PT THER PROC EA 15 MIN 4/21/2023 Farzad Jang PTA GP 1          PT G-Codes  Outcome Measure Options: AM-PAC 6 Clicks Basic Mobility (PT)  AM-PAC 6 Clicks Score (PT): 16  AM-PAC 6 Clicks Score (OT): 10    Farzad Jang PTA  4/21/2023

## 2023-04-21 NOTE — PLAN OF CARE
Goal Outcome Evaluation:Patient with no acute events last night.  Patient with no needs or concerns voiced.   Patient VSS.  Patient rested well last night.  Continue plan of care.

## 2023-04-21 NOTE — PLAN OF CARE
Problem: Adult Inpatient Plan of Care  Goal: Plan of Care Review  Outcome: Ongoing, Progressing  Flowsheets (Taken 4/21/2023 1752)  Progress: no change  Plan of Care Reviewed With: patient  Outcome Evaluation: Vital signs stable. Stool is soft and loose. Gave Banatrol twice during shift per peg tube. See discharge care plan for further details.  Goal: Patient-Specific Goal (Individualized)  Outcome: Ongoing, Progressing  Goal: Absence of Hospital-Acquired Illness or Injury  Outcome: Ongoing, Progressing  Intervention: Identify and Manage Fall Risk  Recent Flowsheet Documentation  Taken 4/21/2023 1739 by Leatha Mejía RN  Safety Promotion/Fall Prevention: safety round/check completed  Taken 4/21/2023 1710 by Leatha Mejía RN  Safety Promotion/Fall Prevention: safety round/check completed  Taken 4/21/2023 1210 by Leatha Mejía RN  Safety Promotion/Fall Prevention: safety round/check completed  Taken 4/21/2023 0929 by Leatha Mejía RN  Safety Promotion/Fall Prevention: safety round/check completed  Taken 4/21/2023 0926 by Leatha Mejía RN  Safety Promotion/Fall Prevention: safety round/check completed  Taken 4/21/2023 0752 by Leatha Mejía RN  Safety Promotion/Fall Prevention: safety round/check completed  Intervention: Prevent and Manage VTE (Venous Thromboembolism) Risk  Recent Flowsheet Documentation  Taken 4/21/2023 0929 by Leatha Mejía RN  Activity Management: up to bedside commode  Goal: Optimal Comfort and Wellbeing  Outcome: Ongoing, Progressing  Goal: Readiness for Transition of Care  Outcome: Ongoing, Progressing     Problem: Fall Injury Risk  Goal: Absence of Fall and Fall-Related Injury  Outcome: Ongoing, Progressing  Intervention: Promote Injury-Free Environment  Recent Flowsheet Documentation  Taken 4/21/2023 1739 by Leatha Mejía RN  Safety Promotion/Fall Prevention: safety round/check completed  Taken 4/21/2023 1710 by Leatha Mejía RN  Safety Promotion/Fall Prevention: safety round/check  completed  Taken 4/21/2023 1210 by Leatha Mejía, NINA  Safety Promotion/Fall Prevention: safety round/check completed  Taken 4/21/2023 0929 by Leatha Mejía RN  Safety Promotion/Fall Prevention: safety round/check completed  Taken 4/21/2023 0926 by Leatha Mejía RN  Safety Promotion/Fall Prevention: safety round/check completed  Taken 4/21/2023 0752 by Leatha Mejía RN  Safety Promotion/Fall Prevention: safety round/check completed   Goal Outcome Evaluation:  Plan of Care Reviewed With: patient        Progress: no change  Outcome Evaluation: Vital signs stable. Stool is soft and loose. Gave Banatrol twice during shift per peg tube. See discharge care plan for further details.

## 2023-04-22 VITALS
DIASTOLIC BLOOD PRESSURE: 61 MMHG | BODY MASS INDEX: 23.05 KG/M2 | OXYGEN SATURATION: 100 % | WEIGHT: 130.07 LBS | RESPIRATION RATE: 20 BRPM | HEART RATE: 72 BPM | TEMPERATURE: 97.8 F | SYSTOLIC BLOOD PRESSURE: 148 MMHG | HEIGHT: 63 IN

## 2023-04-22 PROBLEM — I51.9 LEFT VENTRICULAR DYSFUNCTION: Status: ACTIVE | Noted: 2021-01-17

## 2023-04-22 PROBLEM — G93.41 METABOLIC ENCEPHALOPATHY: Status: RESOLVED | Noted: 2023-04-03 | Resolved: 2023-04-22

## 2023-04-22 PROBLEM — C50.919 BREAST CANCER: Status: ACTIVE | Noted: 2023-03-07

## 2023-04-22 PROBLEM — A04.72 C. DIFFICILE DIARRHEA: Status: ACTIVE | Noted: 2023-03-14

## 2023-04-22 PROBLEM — I48.0 PAROXYSMAL ATRIAL FIBRILLATION: Status: ACTIVE | Noted: 2021-01-16

## 2023-04-22 LAB
ALBUMIN SERPL-MCNC: 2.9 G/DL (ref 3.5–5.2)
ALP SERPL-CCNC: 91 U/L (ref 39–117)
ALT SERPL W P-5'-P-CCNC: 11 U/L (ref 1–33)
ANION GAP SERPL CALCULATED.3IONS-SCNC: 11.7 MMOL/L (ref 5–15)
AST SERPL-CCNC: 15 U/L (ref 1–32)
BASOPHILS # BLD AUTO: 0.04 10*3/MM3 (ref 0–0.2)
BASOPHILS NFR BLD AUTO: 0.4 % (ref 0–1.5)
BILIRUB CONJ SERPL-MCNC: <0.2 MG/DL (ref 0–0.3)
BILIRUB INDIRECT SERPL-MCNC: ABNORMAL MG/DL
BILIRUB SERPL-MCNC: 0.3 MG/DL (ref 0–1.2)
BUN SERPL-MCNC: 51 MG/DL (ref 8–23)
BUN/CREAT SERPL: 25.9 (ref 7–25)
CALCIUM SPEC-SCNC: 8.7 MG/DL (ref 8.6–10.5)
CHLORIDE SERPL-SCNC: 97 MMOL/L (ref 98–107)
CO2 SERPL-SCNC: 25.3 MMOL/L (ref 22–29)
CREAT SERPL-MCNC: 1.97 MG/DL (ref 0.57–1)
DEPRECATED RDW RBC AUTO: 44.1 FL (ref 37–54)
EGFRCR SERPLBLD CKD-EPI 2021: 27.4 ML/MIN/1.73
EOSINOPHIL # BLD AUTO: 0.27 10*3/MM3 (ref 0–0.4)
EOSINOPHIL NFR BLD AUTO: 2.9 % (ref 0.3–6.2)
ERYTHROCYTE [DISTWIDTH] IN BLOOD BY AUTOMATED COUNT: 13.3 % (ref 12.3–15.4)
GLUCOSE BLDC GLUCOMTR-MCNC: 114 MG/DL (ref 70–99)
GLUCOSE BLDC GLUCOMTR-MCNC: 121 MG/DL (ref 70–99)
GLUCOSE BLDC GLUCOMTR-MCNC: 123 MG/DL (ref 70–99)
GLUCOSE SERPL-MCNC: 149 MG/DL (ref 65–99)
HCT VFR BLD AUTO: 27.7 % (ref 34–46.6)
HGB BLD-MCNC: 9.4 G/DL (ref 12–15.9)
IMM GRANULOCYTES # BLD AUTO: 0.02 10*3/MM3 (ref 0–0.05)
IMM GRANULOCYTES NFR BLD AUTO: 0.2 % (ref 0–0.5)
LYMPHOCYTES # BLD AUTO: 1.87 10*3/MM3 (ref 0.7–3.1)
LYMPHOCYTES NFR BLD AUTO: 20.3 % (ref 19.6–45.3)
MAGNESIUM SERPL-MCNC: 2 MG/DL (ref 1.6–2.4)
MCH RBC QN AUTO: 30.9 PG (ref 26.6–33)
MCHC RBC AUTO-ENTMCNC: 33.9 G/DL (ref 31.5–35.7)
MCV RBC AUTO: 91.1 FL (ref 79–97)
MONOCYTES # BLD AUTO: 0.42 10*3/MM3 (ref 0.1–0.9)
MONOCYTES NFR BLD AUTO: 4.6 % (ref 5–12)
NEUTROPHILS NFR BLD AUTO: 6.6 10*3/MM3 (ref 1.7–7)
NEUTROPHILS NFR BLD AUTO: 71.6 % (ref 42.7–76)
NRBC BLD AUTO-RTO: 0 /100 WBC (ref 0–0.2)
PHOSPHATE SERPL-MCNC: 4.4 MG/DL (ref 2.5–4.5)
PLATELET # BLD AUTO: 210 10*3/MM3 (ref 140–450)
PMV BLD AUTO: 9.2 FL (ref 6–12)
POTASSIUM SERPL-SCNC: 4.3 MMOL/L (ref 3.5–5.2)
PROT SERPL-MCNC: 6.4 G/DL (ref 6–8.5)
RBC # BLD AUTO: 3.04 10*6/MM3 (ref 3.77–5.28)
SODIUM SERPL-SCNC: 134 MMOL/L (ref 136–145)
WBC NRBC COR # BLD: 9.22 10*3/MM3 (ref 3.4–10.8)

## 2023-04-22 PROCEDURE — 80076 HEPATIC FUNCTION PANEL: CPT | Performed by: FAMILY MEDICINE

## 2023-04-22 PROCEDURE — 80048 BASIC METABOLIC PNL TOTAL CA: CPT | Performed by: FAMILY MEDICINE

## 2023-04-22 PROCEDURE — 84100 ASSAY OF PHOSPHORUS: CPT | Performed by: FAMILY MEDICINE

## 2023-04-22 PROCEDURE — 85025 COMPLETE CBC W/AUTO DIFF WBC: CPT | Performed by: FAMILY MEDICINE

## 2023-04-22 PROCEDURE — 99239 HOSP IP/OBS DSCHRG MGMT >30: CPT | Performed by: FAMILY MEDICINE

## 2023-04-22 PROCEDURE — 83735 ASSAY OF MAGNESIUM: CPT | Performed by: FAMILY MEDICINE

## 2023-04-22 PROCEDURE — 82962 GLUCOSE BLOOD TEST: CPT

## 2023-04-22 RX ORDER — LACOSAMIDE 10 MG/ML
100 SOLUTION ORAL EVERY 12 HOURS SCHEDULED
Qty: 60 ML | Refills: 0 | Status: SHIPPED | OUTPATIENT
Start: 2023-04-22 | End: 2023-04-25

## 2023-04-22 RX ADMIN — SODIUM BICARBONATE 650 MG TABLET 1300 MG: at 08:49

## 2023-04-22 RX ADMIN — LEVOTHYROXINE SODIUM 12.5 MCG: 0.03 TABLET ORAL at 06:33

## 2023-04-22 RX ADMIN — CARVEDILOL 25 MG: 25 TABLET, FILM COATED ORAL at 08:49

## 2023-04-22 RX ADMIN — FIDAXOMICIN 200 MG: 200 TABLET, FILM COATED ORAL at 08:50

## 2023-04-22 RX ADMIN — FLUTICASONE PROPIONATE 2 SPRAY: 50 SPRAY, METERED NASAL at 08:54

## 2023-04-22 RX ADMIN — CETIRIZINE HYDROCHLORIDE 10 MG: 10 TABLET, FILM COATED ORAL at 08:50

## 2023-04-22 RX ADMIN — NYSTATIN AND TRIAMCINOLONE ACETONIDE 1 APPLICATION: 100000; 1 OINTMENT TOPICAL at 08:54

## 2023-04-22 RX ADMIN — AMLODIPINE BESYLATE 10 MG: 5 TABLET ORAL at 08:49

## 2023-04-22 RX ADMIN — Medication 250 MG: at 08:50

## 2023-04-22 RX ADMIN — CITALOPRAM HYDROBROMIDE 10 MG: 20 TABLET ORAL at 08:52

## 2023-04-22 RX ADMIN — LACOSAMIDE 100 MG: 10 SOLUTION ORAL at 10:42

## 2023-04-22 RX ADMIN — ANASTROZOLE 1 MG: 1 TABLET ORAL at 08:50

## 2023-04-22 NOTE — PLAN OF CARE
Problem: Adult Inpatient Plan of Care  Goal: Plan of Care Review  Outcome: Met  Flowsheets (Taken 4/22/2023 1109)  Progress: improving  Plan of Care Reviewed With: patient  Outcome Evaluation: Patient dishcarging to [place] today.  Goal: Patient-Specific Goal (Individualized)  Outcome: Met  Goal: Absence of Hospital-Acquired Illness or Injury  Outcome: Met  Intervention: Identify and Manage Fall Risk  Recent Flowsheet Documentation  Taken 4/22/2023 1045 by Jyoti Jones RN  Safety Promotion/Fall Prevention: safety round/check completed  Taken 4/22/2023 0849 by Jyoti Jones RN  Safety Promotion/Fall Prevention: safety round/check completed  Taken 4/22/2023 0715 by Jyoti Jones RN  Safety Promotion/Fall Prevention:   assistive device/personal items within reach   clutter free environment maintained   fall prevention program maintained   lighting adjusted   nonskid shoes/slippers when out of bed   room organization consistent   safety round/check completed  Intervention: Prevent and Manage VTE (Venous Thromboembolism) Risk  Recent Flowsheet Documentation  Taken 4/22/2023 0849 by Jyoti Jones RN  Range of Motion: active ROM (range of motion) encouraged  Intervention: Prevent Infection  Recent Flowsheet Documentation  Taken 4/22/2023 0715 by Jyoti Jones RN  Infection Prevention:   cohorting utilized   environmental surveillance performed   hand hygiene promoted   rest/sleep promoted   single patient room provided  Goal: Optimal Comfort and Wellbeing  Outcome: Met  Intervention: Provide Person-Centered Care  Recent Flowsheet Documentation  Taken 4/22/2023 0849 by Jyoti Jones RN  Trust Relationship/Rapport:   care explained   choices provided   emotional support provided   empathic listening provided   questions answered   questions encouraged   reassurance provided   thoughts/feelings acknowledged  Goal: Readiness for Transition of Care  Outcome: Met     Problem: Fall Injury Risk  Goal:  Absence of Fall and Fall-Related Injury  Outcome: Met  Intervention: Identify and Manage Contributors  Recent Flowsheet Documentation  Taken 4/22/2023 0715 by Jyoti Jones, RN  Medication Review/Management:   medications reviewed   high-risk medications identified  Intervention: Promote Injury-Free Environment  Recent Flowsheet Documentation  Taken 4/22/2023 1045 by Jyoti Jones, RN  Safety Promotion/Fall Prevention: safety round/check completed  Taken 4/22/2023 0849 by Jyoti Jones, RN  Safety Promotion/Fall Prevention: safety round/check completed  Taken 4/22/2023 0715 by Jyoti Jones, RN  Safety Promotion/Fall Prevention:   assistive device/personal items within reach   clutter free environment maintained   fall prevention program maintained   lighting adjusted   nonskid shoes/slippers when out of bed   room organization consistent   safety round/check completed     Problem: Skin Injury Risk Increased  Goal: Skin Health and Integrity  Outcome: Met     Problem: Palliative Care  Goal: Enhanced Quality of Life  Outcome: Met  Goal: Enhanced Quality of Life  Outcome: Met   Goal Outcome Evaluation:  Plan of Care Reviewed With: patient        Progress: improving  Outcome Evaluation: Patient dishcarging to [place] today.

## 2023-04-22 NOTE — DISCHARGE SUMMARY
[facility]         HOSPITALIST  DISCHARGE SUMMARY    Patient Name: Lyubov Middleton  : 1955  MRN: [id number]    Date of Admission: 4/3/2023  Date of Discharge:  2023    Primary Care Physician: Ana Huitron MD    Consults     Date and Time Order Name Status Description    2023  6:07 PM Inpatient Gastroenterology Consult      2023  8:47 AM Inpatient Neurology Consult General      4/3/2023  6:23 PM Inpatient Neurology Consult General Completed     4/3/2023  2:02 PM Hospitalist (on-call MD unless specified)            Active and Resolved Hospital Problems:  AMS/acute metabolic encephalopathy   AMS secondary to uti/cdiff, underlying dementia and and 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 difficile diarrhea [A04.72] Yes      Resolved Hospital Problems    Diagnosis POA   • **Metabolic encephalopathy [G93.41] Yes       Hospital Course     Hospital Course:   68 y.o. female with diabetes mellitus, CKD 4, seizure disorder (history of status epilepticus), dysphagia currently undergoing rehab at [facility] and rehab hospitalized with altered mental status, went unresponsive for working with therapy.  Initially ANO x1 in the emergency department.  Treated initially for hypokalemia, AUSTEN on CKD, UTI.  CT head negative for any acute intracranial abnormalities, did demonstrate diffuse atrophy and white matter changes and suspect  patient with underlying dementia.  Chest x-ray negative for any acute abnormalities.  Discovered to have C. difficile treated with p.o. vancomycin and intermittent levels of confusion/encephalopathy, EEGs have been obtained both are negative for acute seizure activity but notable for encephalopathy, neurology is assisting in patient's Vimpat dose was increased.  She is pending LP on 4/17/23 to evaluate for paraneoplastic syndrome/alternative etiologies.  Patient required replacement of PEG tube while inpatient initially and subsequently had another episode of pulling out her PEG tube on 4/14 and GI replaced it with an 18 [place] tube on 4/15/23.  Lumbar puncture unremarkable.  Diarrhea subsiding, mentation back to baseline once C. difficile is treated.  Awaited precertification process to return back to rehab.  Approved.  Discharged back to rehab facility to complete course of fidaxomicin.  High risk for readmission due to recurrence of symptoms.  Vimpat ordered on dispo for seizure disorder.    Day of Discharge     Vital Signs:  Temp:  [97.7 °F (36.5 °C)-99.3 °F (37.4 °C)] 97.8 °F (36.6 °C)  Heart Rate:  [69-77] 72  Resp:  [18-20] 20  BP: (137-155)/(61-77) 148/61  Review of systems:  Unable to obtain review of systems due to confusion     Physical Exam                         Constitutional: Awake, alert, confused, smiling              Eyes: Pupils equal, sclerae anicteric, no conjunctival injection              HENT: NCAT, mucous membranes moist              Neck: Supple, no masses              Respiratory: Clear to auscultation bilaterally, nonlabored respirations, poor inspiratory effort              Cardiovascular: RRR, no murmurs, rubs, or gallops, palpable pedal pulses bilaterally              Gastrointestinal: Positive bowel sounds, soft, nontender, nondistended              Musculoskeletal: No bilateral ankle edema, no clubbing or cyanosis to extremities resting tremor right hand              Psychiatric:  Appropriate affect, cooperative, confused              Neurologic: Oriented to self only, weakness throughout, somewhat garbled speech              Skin: No rashes visible on exposed skin      Discharge Details        Discharge Medications      New Medications      Instructions Start Date   fidaxomicin 200 MG tablet  Commonly known as: DIFICID   200 mg, Oral, Every 12 Hours Scheduled      lacosamide 10 MG/ML solution oral solution  Commonly known as: VIMPAT   100 mg, Oral, Every 12 Hours Scheduled         Continue These Medications      Instructions Start Date   acetaminophen 160 MG/5ML solution  Commonly known as: TYLENOL   320 mg, Per G Tube, Daily      alendronate 70 MG tablet  Commonly known as: FOSAMAX   70 mg, Oral, Every 7 Days, Thursday      amLODIPine 10 MG tablet  Commonly known as: NORVASC   10 mg, Oral, Daily      anastrozole 1 MG tablet  Commonly known as: ARIMIDEX   1 mg, Oral, Daily      Aranesp (Albumin Free) 25 MCG/ML injection  Generic drug: Darbepoetin Jaylen   12.5 mcg, Subcutaneous, Every 30 Days, 26th of Month      Calmoseptine 0.44-20.6 % ointment  Generic drug: Menthol-Zinc Oxide   1 application, Topical, 3 Times Daily      carvedilol 25 MG tablet  Commonly known as: COREG   25 mg, Oral, 2 Times Daily      cholestyramine 4 g packet  Commonly known as: QUESTRAN   1 packet, Oral, 2 Times Daily      citalopram 10 MG tablet  Commonly known as: CeleXA   10 mg, Per G Tube, Daily      Ergocalciferol 200 MCG/ML drops   1,000 mcg, Per G Tube, Daily      ferrous sulfate 300 (60 Fe) MG/5ML syrup   300 mg, Per G Tube, Daily      ipratropium 0.02 % nebulizer solution  Commonly known as: ATROVENT   500 mcg, Nebulization, 4 Times Daily Before Meals & Nightly      levothyroxine 25 MCG tablet  Commonly known as: SYNTHROID, LEVOTHROID   12.5 mcg, Oral, Every Early Morning      Phospha 250 Neutral 155-852-130 MG tablet  Generic drug: phosphorus   1 tablet, Per G Tube, 2 Times Daily      potassium chloride 10 MEQ  CR tablet   10 mEq, Oral, Daily      saccharomyces boulardii 250 MG capsule  Commonly known as: FLORASTOR   250 mg, Oral, 2 Times Daily      sodium bicarbonate 650 MG tablet   650 mg, Oral, 2 Times Daily         Stop These Medications    hydrALAZINE 25 MG tablet  Commonly known as: APRESOLINE     losartan 25 MG tablet  Commonly known as: COZAAR            Allergies   Allergen Reactions   • Artificial Saliva Unknown - High Severity       Discharge Disposition:  Skilled Nursing Facility (DC - External)    Diet:  Hospital:  Diet Order   Procedures   • Diet: Diabetic Diets; Consistent Carbohydrate; No Straw; Texture: Mechanical Ground (NDD 2); Fluid Consistency: Thin (IDDSI 0)       Discharge Activity: as tolerates      CODE STATUS:  Code Status and Medical Interventions:   Ordered at: 04/05/23 1333     Medical Intervention Limits:    NO intubation (DNI)     Code Status (Patient has no pulse and is not breathing):    No CPR (Do Not Attempt to Resuscitate)     Medical Interventions (Patient has pulse or is breathing):    Limited Support     Release to patient:    Routine Release         Future Appointments   Date Time Provider Department Center   5/11/2023  8:30 AM [facility]  [facility]       Additional Instructions for the Follow-ups that You Need to Schedule     Discharge Follow-up with PCP   As directed       Currently Documented PCP:    Ana Huitron MD    PCP Phone Number:    [phone]     Follow Up Details: 3 to 7 days               Pertinent  and/or Most Recent Results     PROCEDURES:   EEG    Result Date: 4/14/2023  Clinical history: 68-year-old woman evaluated for confusion. EEG description: This is a portable 19 channel EEG recording using the 10/20 international classification of electrode placement. EEG report: The underlying background activity consist of a 5 to 6 Hz theta activity amplitude of 30 to 40 µV that is distributed diffusely.  This is intermixed with 2 to 3 Hz delta activity amplitude of  90 to 120 µV.  Triphasic configuration was noted with delta activity.  There was no focal slowing.  There were no epileptiform discharges.  Photic stimulation did not activate any abnormalities. Impression: This EEG is abnormal secondary to slowing of background activity.  Is finding suggestive of a mild to moderate encephalopathy of nonspecific etiology.  There were no epileptiform discharges recorded.    EEG    Result Date: 4/3/2023  Table formatting from the original result was not included. Images from the original result were not included. [address] [place] [address] [phone] ELECTROENCEPHALOGRAPHIC REPORT Patient: Lyubov Middleton EEG # :    ECK-T- : 1955  ID:      [id number]    Age: 68 y.o. female    Primary  Physician: Germain Beasley MD Technician:  Camille Everett Ordering  Physician: Germain Beasley MD    Recording Date:  April 3, 2023 Report  Date: 4/3/2023     History:  Seizures Medications: Darbepoetin chris, ergocalciferol, menthol zinc oxide, acetaminophen, alendronate, amlodipine, anastrozole, carvedilol, cholestyramine, citalopram, ferrous sulfate, hydralazine, ipratropium, levothyroxine, losartan, phosphorus, Saccharomyces boulardii, sodium bicarbonate. Reading: The EEG was obtained portable in her room during the waking state and on photic stimulation with video monitoring.  We do not know how much sleep she has had the night before the testing.  During the EEG, she was quite tense and restless and the EEG was filled with so much movement and muscle artifacts. The dominant background activity consists of symmetric and irregular 30 to 165 µV 6 to 7 cps which were prominent on both parieto-occipital regions.  There were frequent 100 to 235 µV slow activities of 2 cps which were generalized and synchronous more prominent on both anterior quadrants noted intermittently all throughout the recording.  There were no discernible responses on photic stimulation at various  frequencies.  There was no amplitude asymmetry.  No paroxysmal discharges. Impression: Abnormal EEG because of slow background activity mixed with slower waves compatible with moderate diffuse encephalopathy. There were no epileptiform discharges noted today. The EEG may be repeated at a later date when she is more calm and more cooperative. Electronically signed by Hernesto Braxton Jr., MD, 04/03/23, 6:39 PM EDT. Please note that portions of this note were completed with a voice recognition program.  Part of this note is an electric or electronic transcription/translation of spoken language to printed text using the dragon dictating system.    CT Head Without Contrast    Result Date: 4/3/2023  PROCEDURE: CT HEAD WO CONTRAST  COMPARISON:  [facility], CT, CT FACIAL BONES WO CONTRAST, 3/25/2023, 12:31.  INDICATIONS: AMS  PROTOCOL:   Standard imaging protocol performed    RADIATION:   DLP: 1081.2mGy*cm   MA and/or KV was adjusted to minimize radiation dose.    TECHNIQUE: CT images were obtained without non-ionic intravenous contrast material.  FINDINGS:  The ventricles, sulci, and cerebellar folia are moderately and diffusely prominent consistent with atrophy.  Ill-defined diminished density in cerebral white matter is consistent with mild gliosis and/or numerous lacunar infarcts.  There is no CT evidence of acute intracranial hemorrhage, mass, or mass effect.  The orbits have a normal appearance.  The paranasal sinuses, middle ears, and mastoid air cells are well aerated.        CT scan of the head without IV contrast demonstrating diffuse atrophy and white matter changes.  No acute intracranial abnormality is seen.     KULWANT HOLLOWAY MD       Electronically Signed and Approved By: KULWANT HOLLOWAY MD on 4/03/2023 at 12:27             MRI Brain Without Contrast    Result Date: 4/12/2023  PROCEDURE: MRI BRAIN WO CONTRAST  COMPARISONS: 4/3/2023; 3/25/2023.  INDICATIONS: AMS (altered mental status);  previous CT head concerning for gliosis versus numerous lacunar infarcts.  TECHNIQUE: A variety of imaging planes and parameters were utilized for visualization of suspected pathology within the brain.  275 magnetic resonance (MR) images were obtained without contrast.  FINDINGS: No reduced ADC by DWI is identified to suggest an acute infarct or other acute brain pathology. Slight motion artifact is seen on some of the sequences.  FLAIR/T2 hyperintensities are seen throughout the cerebral white matter, especially in the central and periventricular areas but also in subcortical regions. These findings are nonspecific but may represent mild-to-moderate chronic small vessel ischemia/infarction.  There is also involvement of the brainstem, especially the abdullahi.  The hippocampal formations are symmetric in size, signal intensity, and internal architecture. No abnormal susceptibility is seen on the SWI sequence except for the basal ganglia, consistent with mineralization.  Age-indeterminate congestive and/or inflammatory changes involve the mastoid air cell complexes, greater on the right than the left.  These findings may represent age-indeterminate congestive and/or inflammatory change.  Air-fluid levels also involve the bilateral sphenoid sinuses.  To a lesser extent, opacities involve the bilateral posterior ethmoid air cells.  These findings may be related to congestive and/or inflammatory change.  Subacute hemorrhage related to recent trauma is possible.  The extra-axial spaces and the ventricular system are prominent, suggesting global atrophy.        1. No acute brain abnormality is seen. No acute infarct. No acute intracranial hemorrhage. 2. There may be mild-to-moderate chronic small vessel ischemia/infarction. 3. Slight motion artifact obscures detail on some of the sequences.    4. The other findings are as detailed above.     Please note that portions of this note were completed with a voice recognition  program.  DWAIN RAMOS JR, MD       Electronically Signed and Approved By: DWAIN RAMOS JR, MD on 4/12/2023 at 23:26              FL Video Swallow With Speech Single Contrast    Result Date: 4/4/2023  PROCEDURE: FL VIDEO SWALLOW W SPEECH SINGLE-CONTRAST  COMPARISON: None  INDICATIONS: dysphagia/ 5.0 mGy/ 2.3 min  TECHNIQUE: Examination was performed in conjunction with the speech pathologist. The patient was given both thin and nectar thick liquid barium, applesauce with barium, contrast and rohit cracker with Esophotrast. The patient's medication list was reviewed and documented in the medical record.  FINDINGS:  Fluoroscopic examination was performed in conjunction with speech pathology department.  Various consistencies of barium were given to assess the swallow mechanism.  Across all consistencies, no tracheal aspiration was observed on exam.  Patient experienced deep laryngeal penetration with thin liquid barium.  Patient experienced transient laryngeal penetration with applesauce with barium.  The exam was discussed in real-time by myself and the speech pathologist. Please consult speech pathology report for further details regarding exam.         1. Deep laryngeal penetration with thin liquid barium 2. Transient laryngeal penetration with applesauce with barium 3. No tracheal aspiration  Please consult speech pathology report for further details regarding exam and dietary recommendations   THANG BRIONES       Electronically Signed and Approved By: Sohail Fernández M.D. on 4/04/2023 at 16:08             XR Chest 1 View    Result Date: 4/3/2023  PROCEDURE: XR CHEST 1 VW  COMPARISON: None  INDICATIONS: WEAKNESS TODAY  FINDINGS:  The heart is normal in size.  The lungs are well-expanded and free of infiltrates.  Mild degenerative changes are seen in the glenohumeral joints.  Surgical clips are seen in the left axillary region.       No active cardiopulmonary disease is seen.       KULWANT HOLLOWAY MD        Electronically Signed and Approved By: KULWANT HOLLOWAY MD on 4/03/2023 at 11:53             IR LUMBAR PUNCTURE DIAGNOSTIC    Result Date: 4/18/2023  PROCEDURE: IR LUMBAR PUNCTURE DIAG/THERA  COMPARISON:  None INDICATIONS: Confusion/ dementia/ seizures. 1 IMAGE. 1.9 mGy. FLUORO TIME 0.5 MINUTE.   CONSENT:  The patient's medication list was reviewed and documented in medical record.  Due to patient's history of confusion and seizures, patient's RN obtained consent from patient's POA prior to arrival in Radiology Department.  However, risks and benefits were also discussed with the patient including but not limited to risk of bleeding, infection, headache and/or nerve injury. Alternatives were discussed with the patient. The patient verbalized understanding.  TECHNIQUE/FINDINGS:  The patient was placed on the fluoroscopy table in prone position.  Fluoroscopy was used to alesia the skin at the L4-L5 level.  The skin overlying the posterior aspect of the lumbar spine was prepped and draped in normal sterile fashion.  Lidocaine was injected along the anticipated tract of the needle.  A 20 gauge spinal needle was advanced into the spinal canal.  The spinal needle stylet was removed and clear, CSF was returned. Opening pressure was noted at 12 cmH2O. Approximately 10 cc of fluid was collected. Closing pressure was noted at <9 cmH2O (length of the needle). The spinal needle stylet was replaced and the needle was withdrawn. Samples of the collected fluid were sent to the lab for further diagnostic evaluation. The patient was transferred back to their hospital room and was observed.        Fluoroscopic guided lumbar puncture without evidence of complication, patient tolerated procedure.  The patient was provided aftercare instructions.      THANG BRIONES       Electronically Signed and Approved By: Sohail Fernández M.D. on 4/18/2023 at 16:13             CT Facial Bones Without Contrast    Result Date: 3/25/2023  PROCEDURE: CT FACIAL  BONES WO CONTRAST  COMPARISON: None  INDICATIONS: facial trauma  PROTOCOL:   Standard imaging protocol performed    RADIATION:   DLP: 527.8 mGy*cm   Automated exposure control was utilized to minimize radiation dose.  TECHNIQUE: Axial images of the facial bones without contrast. Coronal and sagittal reformatted images were performed.  FINDINGS: There is a mildly comminuted nasal bone fracture.  No other facial bone fractures are identified.  There is mild mucosal thickening in the ethmoid sinuses.  There is a small air-fluid level in the left sphenoid sinus without evidence of significant mucosal thickening.  Questionable subtle fracture of the bony nasal septum.  There incidental middle leslie bullosa.  The ostiomeatal units are patent.  No orbital abnormalities are identified.  There is generalized atrophy in the visualized brain.  No orbital abnormalities are identified.  IMPRESSION: 1. Mildly comminuted nasal bone fracture.  2. Questionable subtle fracture of the bony nasal septum.   FRITZ CARMEN MD       Electronically Signed and Approved By: FRITZ CARMEN MD on 3/25/2023 at 12:46             IR J or G Tube Contrast Eval    Result Date: 4/15/2023  PROCEDURE: IR J OR G TUBE CONTRAST EVAL  COMPARISON: [facility], CR, IR J OR G TUBE CONTRAST EVAL, 4/04/2023, 17:55.  INDICATIONS: PEG tube placement  FINDINGS: A  image and a 3 minutes delayed imags were obtained.  There is a PEG tube in the stomach.  No evidence of contrast extravasation.  Contrast is present in the proximal small bowel on the delayed image.  No evidence of obstruction.       Normal exam.      FRITZ CARMEN MD       Electronically Signed and Approved By: FRITZ CARMEN MD on 4/15/2023 at 11:43             IR J or G Tube Contrast Eval    Result Date: 4/4/2023  PROCEDURE: IR J OR G TUBE CONTRAST EVAL  COMPARISON: [facility], CR, IR J OR G TUBE CONTRAST EVAL, 4/04/2023, 15:06.  INDICATIONS: G TUBE EVAL/ 30  ML INJECTED  FINDINGS: Contrast is present within the stomach.  No definite contrast extravasation identified.  Residual contrast present within the colon.       Contrast within the stomach.  No definite contrast extravasation identified.  Residual previous contrast present within the colon slightly limiting evaluation.  No obvious leak identified..      DARLENE MONTERO MD       Electronically Signed and Approved By: DARLENE MONTERO MD on 4/04/2023 at 19:57             IR J or G Tube Contrast Eval    Result Date: 4/4/2023  PROCEDURE: IR J OR G TUBE CONTRAST EVAL  COMPARISON: None  INDICATIONS: Confirm placement of G-tube/ F.T. .9 MIN/ 19.6 mGy/ 2 IMAGES  FINDINGS:   There is a percutaneous enterostomy tube in the left upper quadrant. Contrast material from previous modified barium swallow observed in stomach and small bowel.    Water soluble contrast agent, Gastrografin, was attempted to be injected into the patient's percutaneous gastrostomy (peg) tube. However, Gastrografin contrast would not advance past the distal portion of the peg tube into the stomach, likely due to obstruction or blockage within the tubing.        Percutaneous gastric tube appears to be in good position, however, unable to pass Gastrografin contrast through the distal portion of the peg tube into the stomach, likely due to obstruction within tubing    THANG BRIONES       Electronically Signed and Approved By: Sohail Fernández M.D. on 4/04/2023 at 16:07               LAB RESULTS:      Lab 04/22/23  0452 04/21/23  0516 04/20/23  0458 04/19/23  0449 04/18/23  0458 04/17/23  0506   WBC 9.22 8.38 8.25 8.15 7.99 7.90   HEMOGLOBIN 9.4* 9.1* 9.2* 9.0* 9.3* 9.2*   HEMATOCRIT 27.7* 26.3* 26.4* 26.2* 27.3* 27.1*   PLATELETS 210 232 226 213 232 215   NEUTROS ABS 6.60 5.54 5.61 5.21 5.04 5.30   IMMATURE GRANS (ABS) 0.02 0.02 0.03 0.03 0.02 0.02   LYMPHS ABS 1.87 1.83 1.74 2.04 2.07 1.92   MONOS ABS 0.42 0.51 0.50 0.43 0.48 0.37   EOS ABS 0.27 0.43* 0.33 0.40 0.34  0.24   MCV 91.1 90.1 89.8 89.1 91.0 92.5   PROTIME  --   --   --   --   --  14.0         Lab 04/22/23  0452 04/21/23  0516 04/20/23 0458 04/19/23 0449 04/18/23  0458   SODIUM 134* 138 136 136 138   POTASSIUM 4.3 4.1 4.4 4.4 4.4   CHLORIDE 97* 99 100 100 103   CO2 25.3 28.9 27.3 26.5 24.1   ANION GAP 11.7 10.1 8.7 9.5 10.9   BUN 51* 52* 23 49* 46*   CREATININE 1.97* 1.97* 1.94* 1.82* 1.80*   EGFR 27.4* 27.4* 27.9* 30.2* 30.6*   GLUCOSE 149* 136* 129* 122* 127*   CALCIUM 8.7 8.7 8.8 8.9 8.9   MAGNESIUM 2.0 2.1 2.5* 1.5* 1.8   PHOSPHORUS 4.4 4.6* 4.5 4.2 4.0         Lab 04/22/23  0452 04/21/23  0516 04/20/23  0458 04/19/23 0449 04/18/23  0458   TOTAL PROTEIN 6.4 6.2 6.3 6.3 6.6   ALBUMIN 2.9* 3.0* 3.0* 3.0* 3.0*   ALT (SGPT) 11 11 11 12 12   AST (SGOT) 15 14 14 15 15   BILIRUBIN 0.3 0.3 0.3 0.3 0.3   BILIRUBIN DIRECT <0.2 <0.2 <0.2 <0.2 <0.2   ALK PHOS 91 91 96 94 93         Lab 04/17/23  0506   PROTIME 14.0   INR 1.07                 Brief Urine Lab Results  (Last result in the past 365 days)      Color   Clarity   Blood   Leuk Est   Nitrite   Protein   CREAT   Urine HCG        04/03/23 1347 Yellow   Turbid   Small (1+)   Large (3+)   Negative   >=300 mg/dL (3+)               Microbiology Results (last 10 days)     Procedure Component Value - Date/Time    Cryptococcal AG, CSF - Cerebrospinal Fluid, Lumbar Puncture [[id number]]  (Normal) Collected: 04/18/23 1209    Lab Status: Final result Specimen: Cerebrospinal Fluid from Lumbar Puncture Updated: 04/18/23 1236     Cryptococcal Antigen, CSF Negative    Meningitis / Encephalitis Panel, PCR - Cerebrospinal Fluid, Lumbar Puncture [[id number]]  (Normal) Collected: 04/18/23 1209    Lab Status: Final result Specimen: Cerebrospinal Fluid from Lumbar Puncture Updated: 04/18/23 1835     ESCHERICHIA COLI K1, PCR Not Detected     HAEMOPHILUS INFLUENZAE, PCR Not Detected     LISTERIA MONOCYTOGENES, PCR Not Detected     NEISSERIA MENINGITIDIS, PCR Not Detected     STREPTOCOCCUS  AGALACTIAE, PCR Not Detected     STREPTOCOCCUS PNEUMONIAE, PCR Not Detected     CYTOMEGALOVIRUS (CMV), PCR Not Detected     ENTEROVIRUS, PCR Not Detected     HERPES SIMPLEX VIRUS 1 (HSV-1), PCR Not Detected     HERPES SIMPLEX VIRUS 2 (HSV-2), PCR Not Detected     HUMAN PARECHOVIRUS, PCR Not Detected     VARICELLA ZOSTER VIRUS (VZV), PCR Not Detected     CRYPTOCOCCUS NEOFORMANS / GATTII, PCR Not Detected     HUMAN HERPES VIRUS 6 PCR Not Detected    Culture, CSF - Cerebrospinal Fluid, Lumbar Puncture [[id number]] Collected: 04/18/23 1209    Lab Status: Final result Specimen: Cerebrospinal Fluid from Lumbar Puncture Updated: 04/21/23 1059     CSF Culture No growth at 3 days     Gram Stain No organisms seen          CT Head Without Contrast    Result Date: 4/3/2023  Impression:   CT scan of the head without IV contrast demonstrating diffuse atrophy and white matter changes.  No acute intracranial abnormality is seen.     KULWANT HOLLOWAY MD       Electronically Signed and Approved By: UKLWANT HOLLOWAY MD on 4/03/2023 at 12:27             MRI Brain Without Contrast    Result Date: 4/12/2023  Impression:   1. No acute brain abnormality is seen. No acute infarct. No acute intracranial hemorrhage. 2. There may be mild-to-moderate chronic small vessel ischemia/infarction. 3. Slight motion artifact obscures detail on some of the sequences.    4. The other findings are as detailed above.     Please note that portions of this note were completed with a voice recognition program.  DWAIN RAMOS JR, MD       Electronically Signed and Approved By: DWAIN RAMOS JR, MD on 4/12/2023 at 23:26              FL Video Swallow With Speech Single Contrast    Result Date: 4/4/2023  Impression:    1. Deep laryngeal penetration with thin liquid barium 2. Transient laryngeal penetration with applesauce with barium 3. No tracheal aspiration  Please consult speech pathology report for further details regarding exam and dietary recommendations    THANG BRIONES       Electronically Signed and Approved By: Sohail Fernández M.D. on 4/04/2023 at 16:08             XR Chest 1 View    Result Date: 4/3/2023  Impression:  No active cardiopulmonary disease is seen.       KULWANT HOLLOWAY MD       Electronically Signed and Approved By: KULWANT HOLLOWAY MD on 4/03/2023 at 11:53             IR LUMBAR PUNCTURE DIAGNOSTIC    Result Date: 4/18/2023  Impression:  Fluoroscopic guided lumbar puncture without evidence of complication, patient tolerated procedure.  The patient was provided aftercare instructions.      THANG BRIONES       Electronically Signed and Approved By: Sohail Fernández M.D. on 4/18/2023 at 16:13             IR J or G Tube Contrast Eval    Result Date: 4/15/2023  Impression:  Normal exam.      FRITZ CARMEN MD       Electronically Signed and Approved By: FRITZ CARMEN MD on 4/15/2023 at 11:43             IR J or G Tube Contrast Eval    Result Date: 4/4/2023  Impression:  Contrast within the stomach.  No definite contrast extravasation identified.  Residual previous contrast present within the colon slightly limiting evaluation.  No obvious leak identified..      DARLENE MONTERO MD       Electronically Signed and Approved By: DARLENE MONTERO MD on 4/04/2023 at 19:57             IR J or G Tube Contrast Eval    Result Date: 4/4/2023  Impression:  Percutaneous gastric tube appears to be in good position, however, unable to pass Gastrografin contrast through the distal portion of the peg tube into the stomach, likely due to obstruction within tubing    THANG BRIONES       Electronically Signed and Approved By: Sohail Fernández M.D. on 4/04/2023 at 16:07                           Labs Pending at Discharge:  Pending Labs     Order Current Status    Fungus Culture - Cerebrospinal Fluid, Lumbar Puncture In process            Time spent on Discharge including face to face service:  35 minutes    Electronically signed by Nenita Whiting MD, 04/22/23, 11:29 AM EDT.    Portions  of this documentation were transcribed electronically from a voice recognition software.  I confirm all data accurately represents the service(s) I performed at today's visit.

## 2023-04-23 LAB — FUNGUS WND CULT: NORMAL

## 2023-04-25 LAB — FUNGUS WND CULT: NORMAL

## 2023-05-02 LAB — FUNGUS WND CULT: NORMAL

## 2023-05-09 LAB — FUNGUS WND CULT: NORMAL

## 2023-05-16 LAB — FUNGUS WND CULT: NORMAL

## 2023-06-02 ENCOUNTER — HOSPITAL ENCOUNTER (EMERGENCY)
Facility: HOSPITAL | Age: 68
Discharge: HOME | End: 2023-06-02
Attending: EMERGENCY MEDICINE
Payer: MEDICARE

## 2023-06-02 VITALS
SYSTOLIC BLOOD PRESSURE: 148 MMHG | RESPIRATION RATE: 12 BRPM | HEART RATE: 76 BPM | WEIGHT: 128.53 LBS | BODY MASS INDEX: 22.77 KG/M2 | HEIGHT: 63 IN | DIASTOLIC BLOOD PRESSURE: 73 MMHG | TEMPERATURE: 98.3 F | OXYGEN SATURATION: 99 %

## 2023-06-02 DIAGNOSIS — E83.42 HYPOMAGNESEMIA: Primary | ICD-10-CM

## 2023-06-02 LAB
ALBUMIN SERPL-MCNC: 3.5 G/DL (ref 3.5–5.2)
ALBUMIN/GLOB SERPL: 1.1 G/DL
ALP SERPL-CCNC: 82 U/L (ref 39–117)
ALT SERPL W P-5'-P-CCNC: 7 U/L (ref 1–33)
ANION GAP SERPL CALCULATED.3IONS-SCNC: 12.4 MMOL/L (ref 5–15)
AST SERPL-CCNC: 15 U/L (ref 1–32)
BASOPHILS # BLD AUTO: 0.04 10*3/MM3 (ref 0–0.2)
BASOPHILS NFR BLD AUTO: 0.4 % (ref 0–1.5)
BILIRUB SERPL-MCNC: 0.3 MG/DL (ref 0–1.2)
BUN SERPL-MCNC: 16 MG/DL (ref 8–23)
BUN/CREAT SERPL: 7.8 (ref 7–25)
CALCIUM SPEC-SCNC: 7.2 MG/DL (ref 8.6–10.5)
CHLORIDE SERPL-SCNC: 112 MMOL/L (ref 98–107)
CO2 SERPL-SCNC: 18.6 MMOL/L (ref 22–29)
CREAT SERPL-MCNC: 2.05 MG/DL (ref 0.57–1)
DEPRECATED RDW RBC AUTO: 44.8 FL (ref 37–54)
EGFRCR SERPLBLD CKD-EPI 2021: 26.1 ML/MIN/1.73
EOSINOPHIL # BLD AUTO: 0.15 10*3/MM3 (ref 0–0.4)
EOSINOPHIL NFR BLD AUTO: 1.6 % (ref 0.3–6.2)
ERYTHROCYTE [DISTWIDTH] IN BLOOD BY AUTOMATED COUNT: 13.4 % (ref 12.3–15.4)
GLOBULIN UR ELPH-MCNC: 3.2 GM/DL
GLUCOSE SERPL-MCNC: 83 MG/DL (ref 65–99)
HCT VFR BLD AUTO: 29.7 % (ref 34–46.6)
HGB BLD-MCNC: 9.8 G/DL (ref 12–15.9)
HOLD SPECIMEN: NORMAL
HOLD SPECIMEN: NORMAL
IMM GRANULOCYTES # BLD AUTO: 0.03 10*3/MM3 (ref 0–0.05)
IMM GRANULOCYTES NFR BLD AUTO: 0.3 % (ref 0–0.5)
LYMPHOCYTES # BLD AUTO: 2.01 10*3/MM3 (ref 0.7–3.1)
LYMPHOCYTES NFR BLD AUTO: 20.8 % (ref 19.6–45.3)
MAGNESIUM SERPL-MCNC: 0.9 MG/DL (ref 1.6–2.4)
MCH RBC QN AUTO: 30.1 PG (ref 26.6–33)
MCHC RBC AUTO-ENTMCNC: 33 G/DL (ref 31.5–35.7)
MCV RBC AUTO: 91.1 FL (ref 79–97)
MONOCYTES # BLD AUTO: 0.37 10*3/MM3 (ref 0.1–0.9)
MONOCYTES NFR BLD AUTO: 3.8 % (ref 5–12)
NEUTROPHILS NFR BLD AUTO: 7.05 10*3/MM3 (ref 1.7–7)
NEUTROPHILS NFR BLD AUTO: 73.1 % (ref 42.7–76)
NRBC BLD AUTO-RTO: 0 /100 WBC (ref 0–0.2)
PLATELET # BLD AUTO: 257 10*3/MM3 (ref 140–450)
PMV BLD AUTO: 9.5 FL (ref 6–12)
POTASSIUM SERPL-SCNC: 3.9 MMOL/L (ref 3.5–5.2)
PROT SERPL-MCNC: 6.7 G/DL (ref 6–8.5)
RBC # BLD AUTO: 3.26 10*6/MM3 (ref 3.77–5.28)
SODIUM SERPL-SCNC: 143 MMOL/L (ref 136–145)
WBC NRBC COR # BLD: 9.65 10*3/MM3 (ref 3.4–10.8)
WHOLE BLOOD HOLD COAG: NORMAL
WHOLE BLOOD HOLD SPECIMEN: NORMAL

## 2023-06-02 PROCEDURE — 99283 EMERGENCY DEPT VISIT LOW MDM: CPT

## 2023-06-02 PROCEDURE — 80053 COMPREHEN METABOLIC PANEL: CPT | Performed by: EMERGENCY MEDICINE

## 2023-06-02 PROCEDURE — 83735 ASSAY OF MAGNESIUM: CPT | Performed by: EMERGENCY MEDICINE

## 2023-06-02 PROCEDURE — 96366 THER/PROPH/DIAG IV INF ADDON: CPT

## 2023-06-02 PROCEDURE — 0 MAGNESIUM SULFATE 4 GM/100ML SOLUTION: Performed by: EMERGENCY MEDICINE

## 2023-06-02 PROCEDURE — 85025 COMPLETE CBC W/AUTO DIFF WBC: CPT | Performed by: EMERGENCY MEDICINE

## 2023-06-02 PROCEDURE — 96365 THER/PROPH/DIAG IV INF INIT: CPT

## 2023-06-02 RX ORDER — MAGNESIUM SULFATE HEPTAHYDRATE 40 MG/ML
4 INJECTION, SOLUTION INTRAVENOUS ONCE
Status: COMPLETED | OUTPATIENT
Start: 2023-06-02 | End: 2023-06-02

## 2023-06-02 RX ORDER — MAGNESIUM OXIDE 400 MG/1
400 TABLET ORAL 2 TIMES DAILY
Qty: 60 TABLET | Refills: 2 | Status: SHIPPED | OUTPATIENT
Start: 2023-06-02

## 2023-06-02 RX ADMIN — MAGNESIUM SULFATE HEPTAHYDRATE 4 G: 4 INJECTION, SOLUTION INTRAVENOUS at 14:36

## 2023-06-02 NOTE — DISCHARGE INSTRUCTIONS
Take the magnesium prescription as prescribed.  Continue with your normal home medications and orders.  Follow-up in 2 weeks to have repeat labs done.  Return to the ER for any other concerns issues that may arise.

## 2023-06-02 NOTE — ED PROVIDER NOTES
Time: 12:54 PM EDT  Date of encounter:  6/2/2023  [facility] Historian/Clinical History and Information was obtained by:   Patient  Chief Complaint: magnesium deficiency    History is limited by: N/A    History of Present Illness:  Patient is a 68 y.o. year old female who presents to the emergency department for evaluation of magnesium deficiency. Denies any pain or tenderness. She needs magnesium because they said it is critically low. She is at [facility] for rehab.  She reports that she has had some diarrhea which could have caused the magnesium deficiency. She had diarrhea for a long time which has recently improved. She has had magnesium deficiency before. She has no other symptoms other than the lab work done by [facility]. She is not taking magnesium on a daily basis.     HPI    Patient Care Team  Primary Care Provider: Provider, No Known    Past Medical History:     Allergies   Allergen Reactions    Artificial Saliva Unknown - High Severity     Past Medical History:   Diagnosis Date    Cancer     left breast removed 2012    Type 2 diabetes mellitus      Past Surgical History:   Procedure Laterality Date    ABDOMINAL SURGERY      BREAST SURGERY Left 2012    removed due to cancer    CHOLECYSTECTOMY      CYSTOSCOPY W/ URETERAL STENT PLACEMENT Right 12/09/2020    Procedure: CYSTOSCOPY, RIGHT RETROGRADE PYELOGRAM, URETEROSCOPY, LASER LITHOTRIPSY, RIGHT URETERAL STENT PLACEMENT;  Surgeon: Rohan Dooley Jr., MD;  Location: [facility];  Service: Urology;  Laterality: Right;    GASTROSTOMY W/ FEEDING TUBE      HERNIA REPAIR      mesh removed     Family History   Problem Relation Age of Onset    Diabetes Mother     Lung disease Father     Cancer Father        Home Medications:  Prior to Admission medications    Medication Sig Start Date End Date Taking? Authorizing Provider   acetaminophen (TYLENOL) 160 MG/5ML solution Administer 320 mg per G tube Daily.    Provider, MD Pancho   alendronate (FOSAMAX)  70 MG tablet Take 1 tablet by mouth Every 7 (Seven) Days. Thursday    Pancho Carpenter MD   amLODIPine (NORVASC) 10 MG tablet Take 1 tablet by mouth Daily.    Pancho Carpenter MD   anastrozole (ARIMIDEX) 1 MG tablet Take 1 tablet by mouth Daily. 3/5/23   Pancho Carpenter MD   carvedilol (COREG) 25 MG tablet Take 1 tablet by mouth 2 (Two) Times a Day.    Pancho Carpenter MD   cholestyramine (QUESTRAN) 4 g packet Take 1 packet by mouth 2 (Two) Times a Day.    Pancho Carpenter MD   citalopram (CeleXA) 10 MG tablet Administer 1 tablet per G tube Daily. 12/2/18   Pancho Carpenter MD   Darbepoetin Jaylen (Aranesp, Albumin Free,) 25 MCG/ML injection Inject 12.5 mcg under the skin into the appropriate area as directed Every 30 (Thirty) Days. 26th of Month    Pancho Carpenter MD   Ergocalciferol 200 MCG/ML drops Administer 5 mL per G tube Daily.    Pancho Carpenter MD   ferrous sulfate 300 (60 Fe) MG/5ML syrup Administer 5 mL per G tube Daily.    Pancho Carpenter MD   ipratropium (ATROVENT) 0.02 % nebulizer solution Take 2.5 mL by nebulization 4 (Four) Times a Day Before Meals & at Bedtime.    Pancho Carpenter MD   lacosamide (VIMPAT) 10 MG/ML solution oral solution Take 10 mL by mouth Every 12 (Twelve) Hours for 3 days. 4/22/23 4/25/23  Nenita Whiting MD   levothyroxine (SYNTHROID, LEVOTHROID) 25 MCG tablet Take 12.5 mcg by mouth Every Morning.    Pancho Carpenter MD   Menthol-Zinc Oxide (Calmoseptine) 0.44-20.6 % ointment Apply 1 application topically to the appropriate area as directed 3 (Three) Times a Day.    Pancho Carpenter MD   phosphorus (Phospha 250 Neutral) 155-852-130 MG tablet Administer 1 tablet per G tube 2 (Two) Times a Day.    Pancho Carpenter MD   potassium chloride 10 MEQ CR tablet Take 1 tablet by mouth Daily.    Pancho Carpenter MD   saccharomyces boulardii (FLORASTOR) 250 MG capsule Take 1 capsule by mouth 2 (Two) Times a Day.     "ProviderPancho MD   sodium bicarbonate 650 MG tablet Take 1 tablet by mouth 2 (Two) Times a Day.    Provider, MD Pancho        Social History:   Social History     Tobacco Use    Smoking status: Former     Types: Cigarettes     Quit date: 1997     Years since quittin.8     Passive exposure: Past    Smokeless tobacco: Never   Vaping Use    Vaping Use: Never used   Substance Use Topics    Alcohol use: No    Drug use: No         Review of Systems:  Review of Systems   Constitutional:  Negative for chills and fever.   HENT:  Negative for congestion, ear pain and sore throat.    Eyes:  Negative for pain.   Respiratory:  Negative for cough, chest tightness and shortness of breath.    Cardiovascular:  Negative for chest pain.   Gastrointestinal:  Negative for abdominal pain, diarrhea, nausea and vomiting.   Genitourinary:  Negative for flank pain and hematuria.   Musculoskeletal:  Negative for joint swelling.   Skin:  Negative for pallor.   Neurological:  Negative for seizures and headaches.   All other systems reviewed and are negative.     Physical Exam:  /73   Pulse 76   Temp 98.3 °F (36.8 °C) (Oral)   Resp 12   Ht 160 cm (63\")   Wt 58.3 kg (128 lb 8.5 oz)   SpO2 99%   BMI 22.77 kg/m²     Physical Exam  Vitals and nursing note reviewed.   Constitutional:       General: She is not in acute distress.     Appearance: Normal appearance. She is not toxic-appearing.   HENT:      Head: Normocephalic and atraumatic.      Mouth/Throat:      Mouth: Mucous membranes are moist.   Eyes:      General: No scleral icterus.  Cardiovascular:      Rate and Rhythm: Normal rate and regular rhythm.      Pulses: Normal pulses.      Heart sounds: Normal heart sounds.   Pulmonary:      Effort: Pulmonary effort is normal. No respiratory distress.      Breath sounds: Normal breath sounds.   Abdominal:      General: Abdomen is flat.      Palpations: Abdomen is soft.      Tenderness: There is no abdominal " tenderness.   Musculoskeletal:         General: Normal range of motion.      Cervical back: Normal range of motion and neck supple.   Skin:     General: Skin is warm and dry.   Neurological:      Mental Status: She is alert and oriented to person, place, and time. Mental status is at baseline.                Procedures:  Procedures      Medical Decision Making:      Comorbidities that affect care:    Cancer, Diabetes    External Notes reviewed:    Hospital Discharge Summary: Discharge hospital summary from 4/22/2023 was reviewed by me.      The following orders were placed and all results were independently analyzed by me:  Orders Placed This Encounter   Procedures    [place] Draw    Comprehensive Metabolic Panel    CBC Auto Differential    Magnesium    CBC & Differential    Green Top (Gel)    Lavender Top    Gold Top - SST    Light Blue Top       Medications Given in the Emergency Department:  Medications   magnesium sulfate 4g/100mL (PREMIX) infusion (0 g Intravenous Stopped 6/2/23 1836)        ED Course:     The patient was seen and evaluated in the ED by me.  The above history and physical examination was performed as documented.  Diagnostic data was obtained.  Results were reviewed.  Discussed with the patient.  Patient denies any physical complaints.  Patient's laboratory data did come back with a significantly low magnesium level.  Patient was given 4 g of magnesium sulfate here in the ER.  I also sent her home with a prescription for continued outpatient replacement.  Patient should have repeat labs done in 7 to 10 days.  Patient safe for discharge back to [facility].    Labs:    Lab Results (last 24 hours)       ** No results found for the last 24 hours. **             Imaging:    No Radiology Exams Resulted Within Past 24 Hours      Differential Diagnosis and Discussion:    Metabolic: Differential diagnosis includes but is not limited to hypertension, hyperglycemia, hyperkalemia, hypocalcemia, metabolic  acidosis, hypokalemia, hypoglycemia, malnutrition, hypothyroidism, hyperthyroidism, and adrenal insufficiency.     All labs were reviewed and interpreted by me.    MDM     Amount and/or Complexity of Data Reviewed  Clinical lab tests: reviewed         Critical Care Note: Total Critical Care time of 35 minutes. Total critical care time documented does not include time spent on separately billed procedures for services of nurses or physician assistants. I personally saw and examined the patient. I have reviewed all diagnostic interpretations and treatment plans as written. I was present for the key portions of any procedures performed and the inclusive time noted in any critical care statement. Critical care time includes patient management by me, time spent at the patients bedside,  time to review lab and imaging results, discussing patient care, documentation in the medical record, and time spent with family or caregiver.    Patient Care Considerations:          Consultants/Shared Management Plan:    None    Social Determinants of Health:    Patient is independent, reliable, and has access to care.       Disposition and Care Coordination:    Discharged: I considered escalation of care by admitting this patient for observation, however the patient has improved and is suitable and  stable for discharge.    I have explained the patient´s condition, diagnoses and treatment plan based on the information available to me at this time. I have answered questions and addressed any concerns. The patient has a good  understanding of the patient´s diagnosis, condition, and treatment plan as can be expected at this point. The vital signs have been stable. The patient´s condition is stable and appropriate for discharge from the emergency department.      The patient will pursue further outpatient evaluation with the primary care physician or other designated or consulting physician as outlined in the discharge instructions. They  are agreeable to this plan of care and follow-up instructions have been explained in detail. The patient has received these instructions in written format and have expressed an understanding of the discharge instructions. The patient is aware that any significant change in condition or worsening of symptoms should prompt an immediate return to this or the closest emergency department or call to 911.    Final diagnoses:   Hypomagnesemia        ED Disposition       ED Disposition   Discharge    Condition   Stable    Comment   --             Documentation assistance provided by Rj Davis acting as scribe for Alin Jhaveri DO. Information recorded by the scribe was done at my direction and has been verified and validated by me.       This medical record created using voice recognition software.             Rj Davis  06/02/23 1302       Alin Jhaveri DO  06/03/23 2513

## 2023-07-13 PROBLEM — Z86.19 HISTORY OF CLOSTRIDIOIDES DIFFICILE INFECTION: Status: ACTIVE | Noted: 2023-07-13

## 2023-07-13 PROBLEM — Z90.49 HISTORY OF COLON RESECTION: Status: ACTIVE | Noted: 2023-07-13

## 2023-07-13 PROBLEM — R19.7 DIARRHEA: Status: ACTIVE | Noted: 2023-07-13

## 2023-07-17 NOTE — PRE-PROCEDURE INSTRUCTIONS
"Instructed on date and arrival time of 1000. Come to entrance \"C\". Must have  over age 18 to drive home.  May have two visitors; however, children under 12 must stay in waiting room.  Discussed clear liquid diet (no red or purple) and bowel prep.  May take medications as usual except for blood thinners, diabetic medications, and weight loss medications.  Bring list of medications.  Verbalized understanding of instructions given.  Phone number given for questions or concerns. Spoke with Jayda Middleton and nurse at [facility].  "

## 2023-07-19 ENCOUNTER — HOSPITAL ENCOUNTER (OUTPATIENT)
Facility: HOSPITAL | Age: 68
Setting detail: HOSPITAL OUTPATIENT SURGERY
Discharge: HOME | End: 2023-07-19
Attending: INTERNAL MEDICINE | Admitting: INTERNAL MEDICINE
Payer: MEDICARE

## 2023-07-19 VITALS
BODY MASS INDEX: 22.03 KG/M2 | SYSTOLIC BLOOD PRESSURE: 162 MMHG | TEMPERATURE: 98.7 F | RESPIRATION RATE: 16 BRPM | WEIGHT: 124.34 LBS | HEART RATE: 70 BPM | DIASTOLIC BLOOD PRESSURE: 79 MMHG | OXYGEN SATURATION: 99 %

## 2023-07-19 DIAGNOSIS — Z86.19 HISTORY OF CLOSTRIDIOIDES DIFFICILE INFECTION: ICD-10-CM

## 2023-07-19 DIAGNOSIS — Z90.49 HISTORY OF COLON RESECTION: ICD-10-CM

## 2023-07-19 DIAGNOSIS — R19.7 DIARRHEA, UNSPECIFIED TYPE: ICD-10-CM

## 2023-07-19 LAB — GLUCOSE BLDC GLUCOMTR-MCNC: 75 MG/DL (ref 70–99)

## 2023-07-19 PROCEDURE — 25010000002 FECAL MICROBIOTA

## 2023-07-19 PROCEDURE — G0455 FECAL MICROBIOTA PREP INSTIL: HCPCS | Performed by: INTERNAL MEDICINE

## 2023-07-19 PROCEDURE — 82948 REAGENT STRIP/BLOOD GLUCOSE: CPT

## 2023-07-19 PROCEDURE — C1889 IMPLANT/INSERT DEVICE, NOC: HCPCS | Performed by: INTERNAL MEDICINE

## 2023-07-19 RX ORDER — SODIUM CHLORIDE, SODIUM LACTATE, POTASSIUM CHLORIDE, CALCIUM CHLORIDE 600; 310; 30; 20 MG/100ML; MG/100ML; MG/100ML; MG/100ML
30 INJECTION, SOLUTION INTRAVENOUS CONTINUOUS
Status: DISCONTINUED | OUTPATIENT
Start: 2023-07-19 | End: 2023-07-19 | Stop reason: HOSPADM

## 2023-07-19 RX ORDER — LOPERAMIDE HYDROCHLORIDE 2 MG/1
4 CAPSULE ORAL ONCE
Status: COMPLETED | OUTPATIENT
Start: 2023-07-19 | End: 2023-07-19

## 2023-07-19 RX ADMIN — SODIUM CHLORIDE, POTASSIUM CHLORIDE, SODIUM LACTATE AND CALCIUM CHLORIDE 30 ML/HR: 600; 310; 30; 20 INJECTION, SOLUTION INTRAVENOUS at 12:37

## 2023-07-19 RX ADMIN — Medication 1 EACH: at 14:54

## 2023-07-19 RX ADMIN — LOPERAMIDE HYDROCHLORIDE 4 MG: 2 CAPSULE ORAL at 12:45

## 2023-07-19 NOTE — H&P
Pre Procedure History & Physical    Chief Complaint:   Recurrent C. difficile infection    Subjective     HPI:   Recurrent C. difficile infection    Past Medical History:   Past Medical History:   Diagnosis Date    Arthritis     Cancer     left breast removed 2012    Type 2 diabetes mellitus        Past Surgical History:  Past Surgical History:   Procedure Laterality Date    ABDOMINAL SURGERY      BREAST SURGERY Left 2012    removed due to cancer    CHOLECYSTECTOMY      CYSTOSCOPY W/ URETERAL STENT PLACEMENT Right 12/09/2020    Procedure: CYSTOSCOPY, RIGHT RETROGRADE PYELOGRAM, URETEROSCOPY, LASER LITHOTRIPSY, RIGHT URETERAL STENT PLACEMENT;  Surgeon: Rohan Dooley Jr., MD;  Location: [facility];  Service: Urology;  Laterality: Right;    GASTROSTOMY W/ FEEDING TUBE      HERNIA REPAIR      mesh removed       Family History:  Family History   Problem Relation Age of Onset    Diabetes Mother     Lung disease Father     Cancer Father        Social History:   reports that she quit smoking about 25 years ago. Her smoking use included cigarettes. She has been exposed to tobacco smoke. She has never used smokeless tobacco. She reports that she does not drink alcohol and does not use drugs.    Medications:   Medications Prior to Admission   Medication Sig Dispense Refill Last Dose    acetaminophen (TYLENOL) 160 MG/5ML solution Administer 320 mg per G tube Daily.   7/18/2023    alendronate (FOSAMAX) 70 MG tablet Take 1 tablet by mouth Every 7 (Seven) Days. Thursday 7/18/2023    amLODIPine (NORVASC) 10 MG tablet Take 1 tablet by mouth Daily.   7/18/2023    anastrozole (ARIMIDEX) 1 MG tablet Take 1 tablet by mouth Daily.   7/18/2023    carvedilol (COREG) 25 MG tablet Take 1 tablet by mouth 2 (Two) Times a Day.   7/18/2023    cholestyramine (QUESTRAN) 4 g packet Take 1 packet by mouth 2 (Two) Times a Day.   7/18/2023    citalopram (CeleXA) 10 MG tablet Administer 1 tablet per G tube Daily.  3 7/18/2023    ferrous  sulfate 300 (60 Fe) MG/5ML syrup Administer 5 mL per G tube Daily.   7/18/2023    ipratropium (ATROVENT) 0.02 % nebulizer solution Take 2.5 mL by nebulization 4 (Four) Times a Day Before Meals & at Bedtime.   7/18/2023    levothyroxine (SYNTHROID, LEVOTHROID) 25 MCG tablet Take 12.5 mcg by mouth Every Morning.   7/18/2023    magnesium oxide (MAG-OX) 400 MG tablet Take 1 tablet by mouth 2 (Two) Times a Day. 60 tablet 2 7/18/2023    Menthol-Zinc Oxide (Calmoseptine) 0.44-20.6 % ointment Apply 1 application  topically to the appropriate area as directed 3 (Three) Times a Day.   7/18/2023    phosphorus (Phospha 250 Neutral) 155-852-130 MG tablet Administer 1 tablet per G tube 2 (Two) Times a Day.   7/18/2023    potassium chloride 10 MEQ CR tablet Take 1 tablet by mouth Daily.   7/18/2023    saccharomyces boulardii (FLORASTOR) 250 MG capsule Take 1 capsule by mouth 2 (Two) Times a Day.   7/18/2023    sodium bicarbonate 650 MG tablet Take 1 tablet by mouth 2 (Two) Times a Day.   7/18/2023    Darbepoetin Jaylen (Aranesp, Albumin Free,) 25 MCG/ML injection Inject 12.5 mcg under the skin into the appropriate area as directed Every 30 (Thirty) Days. 26th of Month   More than a month    Ergocalciferol 200 MCG/ML drops Administer 5 mL per G tube Daily.       fidaxomicin (DIFICID) 200 MG tablet Take 1 tablet by mouth 2 (Two) Times a Day.       lacosamide (VIMPAT) 10 MG/ML solution oral solution Take 10 mL by mouth Every 12 (Twelve) Hours for 3 days. 60 mL 0        Allergies:  Artificial saliva        Objective     Blood pressure 162/79, pulse 70, temperature 98.7 °F (37.1 °C), temperature source Temporal, resp. rate 16, weight 56.4 kg (124 lb 5.4 oz), SpO2 99 %.    Physical Exam   Constitutional: Pt is oriented to person, place, and time and well-developed, well-nourished, and in no distress.   Mouth/Throat: Oropharynx is clear and moist.   Neck: Normal range of motion.   Cardiovascular: Normal rate, regular rhythm and normal  heart sounds.    Pulmonary/Chest: Effort normal and breath sounds normal.   Abdominal: Soft. Nontender  Skin: Skin is warm and dry.   Psychiatric: Mood, memory, affect and judgment normal.     Assessment & Plan     Diagnosis:  Recurrent C. difficile infection    Anticipated Surgical Procedure:  Colonoscopy with fecal transplant    The risks, benefits, and alternatives of this procedure have been discussed with the patient or the responsible party- the patient understands and agrees to proceed.

## 2023-07-25 ENCOUNTER — TELEPHONE (OUTPATIENT)
Dept: GASTROENTEROLOGY | Facility: CLINIC | Age: 68
End: 2023-07-25
Payer: MEDICARE

## 2023-07-25 NOTE — TELEPHONE ENCOUNTER
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  Will need screening colonoscopy in the near future as well.

## 2023-08-25 ENCOUNTER — OFFICE VISIT (OUTPATIENT)
Dept: GASTROENTEROLOGY | Facility: CLINIC | Age: 68
End: 2023-08-25
Payer: MEDICARE

## 2023-08-25 VITALS
HEART RATE: 70 BPM | BODY MASS INDEX: 21.79 KG/M2 | SYSTOLIC BLOOD PRESSURE: 130 MMHG | DIASTOLIC BLOOD PRESSURE: 56 MMHG | OXYGEN SATURATION: 100 % | HEIGHT: 63 IN | WEIGHT: 123 LBS

## 2023-08-25 DIAGNOSIS — Z12.11 ENCOUNTER FOR SCREENING FOR MALIGNANT NEOPLASM OF COLON: ICD-10-CM

## 2023-08-25 DIAGNOSIS — Z92.89 S/P FECAL TRANSPLANT: ICD-10-CM

## 2023-08-25 DIAGNOSIS — R19.4 ALTERED BOWEL HABITS: ICD-10-CM

## 2023-08-25 DIAGNOSIS — Z90.49 HISTORY OF COLON RESECTION: ICD-10-CM

## 2023-08-25 DIAGNOSIS — Z86.19 HISTORY OF CLOSTRIDIOIDES DIFFICILE INFECTION: Primary | ICD-10-CM

## 2023-08-25 RX ORDER — POTASSIUM CHLORIDE 750 MG/1
TABLET, EXTENDED RELEASE ORAL
COMMUNITY
Start: 2023-08-02

## 2023-08-25 RX ORDER — POLYETHYLENE GLYCOL 3350, SODIUM SULFATE ANHYDROUS, SODIUM BICARBONATE, SODIUM CHLORIDE, POTASSIUM CHLORIDE 227.1; 21.5; 6.36; 5.53; .754 G/L; G/L; G/L; G/L; G/L
4000 POWDER, FOR SOLUTION ORAL ONCE
Qty: 1 EACH | Refills: 0 | Status: SHIPPED | OUTPATIENT
Start: 2023-08-25 | End: 2023-08-25

## 2023-11-01 ENCOUNTER — HOSPITAL ENCOUNTER (EMERGENCY)
Facility: HOSPITAL | Age: 68
Discharge: NURSING, REHAB OR LONG-TERM CARE | End: 2023-11-01
Attending: EMERGENCY MEDICINE | Admitting: EMERGENCY MEDICINE
Payer: MEDICARE

## 2023-11-01 VITALS
HEART RATE: 87 BPM | SYSTOLIC BLOOD PRESSURE: 153 MMHG | BODY MASS INDEX: 22.7 KG/M2 | WEIGHT: 128.09 LBS | HEIGHT: 63 IN | DIASTOLIC BLOOD PRESSURE: 79 MMHG | OXYGEN SATURATION: 100 % | RESPIRATION RATE: 16 BRPM | TEMPERATURE: 98.7 F

## 2023-11-01 DIAGNOSIS — E87.6 HYPOKALEMIA: ICD-10-CM

## 2023-11-01 DIAGNOSIS — D64.9 ANEMIA, UNSPECIFIED TYPE: ICD-10-CM

## 2023-11-01 DIAGNOSIS — R19.5 FECAL OCCULT BLOOD TEST POSITIVE: Primary | ICD-10-CM

## 2023-11-01 LAB
ALBUMIN SERPL-MCNC: 2.9 G/DL (ref 3.5–5.2)
ALBUMIN/GLOB SERPL: 0.7 G/DL
ALP SERPL-CCNC: 107 U/L (ref 39–117)
ALT SERPL W P-5'-P-CCNC: 7 U/L (ref 1–33)
ANION GAP SERPL CALCULATED.3IONS-SCNC: 14.1 MMOL/L (ref 5–15)
AST SERPL-CCNC: 11 U/L (ref 1–32)
BASOPHILS # BLD AUTO: 0.04 10*3/MM3 (ref 0–0.2)
BASOPHILS NFR BLD AUTO: 0.4 % (ref 0–1.5)
BILIRUB SERPL-MCNC: 0.2 MG/DL (ref 0–1.2)
BUN SERPL-MCNC: 43 MG/DL (ref 8–23)
BUN/CREAT SERPL: 13.2 (ref 7–25)
CALCIUM SPEC-SCNC: 7 MG/DL (ref 8.6–10.5)
CHLORIDE SERPL-SCNC: 106 MMOL/L (ref 98–107)
CO2 SERPL-SCNC: 21.9 MMOL/L (ref 22–29)
CREAT SERPL-MCNC: 3.26 MG/DL (ref 0.57–1)
DEPRECATED RDW RBC AUTO: 48.6 FL (ref 37–54)
EGFRCR SERPLBLD CKD-EPI 2021: 15 ML/MIN/1.73
EOSINOPHIL # BLD AUTO: 0.14 10*3/MM3 (ref 0–0.4)
EOSINOPHIL NFR BLD AUTO: 1.3 % (ref 0.3–6.2)
ERYTHROCYTE [DISTWIDTH] IN BLOOD BY AUTOMATED COUNT: 15.3 % (ref 12.3–15.4)
GLOBULIN UR ELPH-MCNC: 4 GM/DL
GLUCOSE SERPL-MCNC: 141 MG/DL (ref 65–99)
HCT VFR BLD AUTO: 22.7 % (ref 34–46.6)
HEMOCCULT STL QL IA: POSITIVE
HGB BLD-MCNC: 7.4 G/DL (ref 12–15.9)
HOLD SPECIMEN: NORMAL
HOLD SPECIMEN: NORMAL
IMM GRANULOCYTES # BLD AUTO: 0.08 10*3/MM3 (ref 0–0.05)
IMM GRANULOCYTES NFR BLD AUTO: 0.7 % (ref 0–0.5)
INR PPP: 1.29 (ref 0.86–1.15)
LYMPHOCYTES # BLD AUTO: 1.56 10*3/MM3 (ref 0.7–3.1)
LYMPHOCYTES NFR BLD AUTO: 14.4 % (ref 19.6–45.3)
MCH RBC QN AUTO: 28.4 PG (ref 26.6–33)
MCHC RBC AUTO-ENTMCNC: 32.6 G/DL (ref 31.5–35.7)
MCV RBC AUTO: 87 FL (ref 79–97)
MONOCYTES # BLD AUTO: 0.62 10*3/MM3 (ref 0.1–0.9)
MONOCYTES NFR BLD AUTO: 5.7 % (ref 5–12)
NEUTROPHILS NFR BLD AUTO: 77.5 % (ref 42.7–76)
NEUTROPHILS NFR BLD AUTO: 8.4 10*3/MM3 (ref 1.7–7)
NRBC BLD AUTO-RTO: 0 /100 WBC (ref 0–0.2)
PLATELET # BLD AUTO: 252 10*3/MM3 (ref 140–450)
PMV BLD AUTO: 10 FL (ref 6–12)
POTASSIUM SERPL-SCNC: 2.8 MMOL/L (ref 3.5–5.2)
PROT SERPL-MCNC: 6.9 G/DL (ref 6–8.5)
PROTHROMBIN TIME: 16.1 SECONDS (ref 11.8–14.9)
RBC # BLD AUTO: 2.61 10*6/MM3 (ref 3.77–5.28)
SODIUM SERPL-SCNC: 142 MMOL/L (ref 136–145)
WBC NRBC COR # BLD: 10.84 10*3/MM3 (ref 3.4–10.8)
WHOLE BLOOD HOLD COAG: NORMAL
WHOLE BLOOD HOLD SPECIMEN: NORMAL

## 2023-11-01 PROCEDURE — 99283 EMERGENCY DEPT VISIT LOW MDM: CPT

## 2023-11-01 PROCEDURE — 85610 PROTHROMBIN TIME: CPT

## 2023-11-01 PROCEDURE — 85025 COMPLETE CBC W/AUTO DIFF WBC: CPT

## 2023-11-01 PROCEDURE — 82274 ASSAY TEST FOR BLOOD FECAL: CPT

## 2023-11-01 PROCEDURE — 80053 COMPREHEN METABOLIC PANEL: CPT

## 2023-11-01 RX ORDER — POTASSIUM CHLORIDE 750 MG/1
40 CAPSULE, EXTENDED RELEASE ORAL ONCE
Status: COMPLETED | OUTPATIENT
Start: 2023-11-01 | End: 2023-11-01

## 2023-11-01 RX ADMIN — POTASSIUM CHLORIDE 40 MEQ: 10 CAPSULE, COATED, EXTENDED RELEASE ORAL at 18:52

## 2023-11-01 NOTE — ED PROVIDER NOTES
Time: 6:37 PM EDT  Date of encounter:  11/1/2023  Independent Historian/Clinical History and Information was obtained by:   Patient    History is limited by: N/A    Chief Complaint:  abnormal lab      History of Present Illness:  Patient is a 68 y.o. year old female who presents to the emergency department for evaluation of low hemoglobin per [facility].  Patient denies all symptoms including abdominal pain, vaginal bleeding, and rectal bleeding.  Patient denies weakness, chest pain, and shortness of air.  Patient states she has had anemia in the past.  Patient states she is not on any blood thinners.    HPI    Patient Care Team  Primary Care Provider: Cathleen Stroud APRN    Past Medical History:     Allergies   Allergen Reactions    Artificial Saliva Unknown - High Severity     Past Medical History:   Diagnosis Date    Arthritis     Cancer     left breast removed 2012    Type 2 diabetes mellitus      Past Surgical History:   Procedure Laterality Date    ABDOMINAL SURGERY      BREAST SURGERY Left 2012    removed due to cancer    CHOLECYSTECTOMY      COLONOSCOPY  7/19/2023    Procedure: COLONOSCOPY WITH ENDOSCOPIC FECAL MICROBIOTA TRANSPLANT, GTUBE REMOVAL;  Surgeon: Hamzah Ireland MD;  Location: [facility] ENDOSCOPY;  Service: Gastroenterology;;    CYSTOSCOPY W/ URETERAL STENT PLACEMENT Right 12/09/2020    Procedure: CYSTOSCOPY, RIGHT RETROGRADE PYELOGRAM, URETEROSCOPY, LASER LITHOTRIPSY, RIGHT URETERAL STENT PLACEMENT;  Surgeon: Rohan Dooley Jr., MD;  Location: [facility] OR;  Service: Urology;  Laterality: Right;    GASTROSTOMY W/ FEEDING TUBE      HERNIA REPAIR      mesh removed     Family History   Problem Relation Age of Onset    Diabetes Mother     Lung disease Father     Cancer Father     Colon cancer Neg Hx        Home Medications:  Prior to Admission medications    Medication Sig Start Date End Date Taking? Authorizing Provider   acetaminophen (TYLENOL) 160 MG/5ML solution  Administer 320 mg per G tube Daily.    Pancho Carpenter MD   alendronate (FOSAMAX) 70 MG tablet Take 1 tablet by mouth Every 7 (Seven) Days. Thursday    Pancho Carpenter MD   amLODIPine (NORVASC) 10 MG tablet Take 1 tablet by mouth Daily.    Pancho Carpenter MD   anastrozole (ARIMIDEX) 1 MG tablet Take 1 tablet by mouth Daily. 3/5/23   Pancho Carpenter MD   carvedilol (COREG) 25 MG tablet Take 1 tablet by mouth 2 (Two) Times a Day.    Pancho Carpenter MD   cholestyramine (QUESTRAN) 4 g packet Take 1 packet by mouth 2 (Two) Times a Day.    Pancho Carpenter MD   citalopram (CeleXA) 10 MG tablet Administer 1 tablet per G tube Daily. 12/2/18   Pancho Carpenter MD   Darbepoetin Jaylen (Aranesp, Albumin Free,) 25 MCG/ML injection Inject 0.5 mL under the skin into the appropriate area as directed Every 30 (Thirty) Days. 26th of Month    Pancho Carpenter MD   Ergocalciferol 200 MCG/ML drops Administer 5 mL per G tube Daily.    Pancho Carpenter MD   ferrous sulfate 300 (60 Fe) MG/5ML syrup Administer 5 mL per G tube Daily.    Pancho Carpenter MD   fidaxomicin (DIFICID) 200 MG tablet Take 1 tablet by mouth 2 (Two) Times a Day.    Pancho Carpenter MD   ipratropium (ATROVENT) 0.02 % nebulizer solution Take 2.5 mL by nebulization 4 (Four) Times a Day Before Meals & at Bedtime.    Pancho Carpenter MD   lacosamide (VIMPAT) 10 MG/ML solution oral solution Take 10 mL by mouth Every 12 (Twelve) Hours for 3 days. 4/22/23 4/25/23  Nenita Whiting MD   levothyroxine (SYNTHROID, LEVOTHROID) 25 MCG tablet Take 0.5 tablets by mouth Every Morning.    Pancho Carpenter MD   magnesium oxide (MAG-OX) 400 MG tablet Take 1 tablet by mouth 2 (Two) Times a Day. 6/2/23   Alin Jhaveri DO   Menthol-Zinc Oxide (Calmoseptine) 0.44-20.6 % ointment Apply 1 application  topically to the appropriate area as directed 3 (Three) Times a Day.    Pancho Carpenter MD   phosphorus (Phospha 250  "Neutral) 155-852-130 MG tablet Administer 1 tablet per G tube 2 (Two) Times a Day.    Pancho Carpenter MD   potassium chloride (K-DUR,KLOR-CON) 10 MEQ CR tablet  23   Pancho Carpenter MD   potassium chloride 10 MEQ CR tablet Take 1 tablet by mouth Daily.    Pancho Carpenter MD   saccharomyces boulardii (FLORASTOR) 250 MG capsule Take 1 capsule by mouth 2 (Two) Times a Day.    Pancho Carpenter MD   sodium bicarbonate 650 MG tablet Take 1 tablet by mouth 2 (Two) Times a Day.    Pancho Carpenter MD        Social History:   Social History     Tobacco Use    Smoking status: Former     Types: Cigarettes     Quit date: 1997     Years since quittin.2     Passive exposure: Past    Smokeless tobacco: Never   Vaping Use    Vaping Use: Never used   Substance Use Topics    Alcohol use: No    Drug use: No         Review of Systems:  Review of Systems   Constitutional:  Negative for chills and fever.   HENT:  Negative for congestion, rhinorrhea and sore throat.    Eyes:  Negative for pain and visual disturbance.   Respiratory:  Negative for apnea, cough, chest tightness and shortness of breath.    Cardiovascular:  Negative for chest pain and palpitations.   Gastrointestinal:  Negative for abdominal pain, diarrhea, nausea and vomiting.   Genitourinary:  Negative for difficulty urinating and dysuria.   Musculoskeletal:  Negative for joint swelling and myalgias.   Skin:  Negative for color change.   Neurological:  Negative for seizures and headaches.   Psychiatric/Behavioral: Negative.     All other systems reviewed and are negative.       Physical Exam:  /69   Pulse 80   Temp 98.7 °F (37.1 °C) (Oral)   Resp 16   Ht 160 cm (63\")   Wt 58.1 kg (128 lb 1.4 oz)   SpO2 98%   BMI 22.69 kg/m²     Physical Exam  Vitals and nursing note reviewed.   Constitutional:       General: She is not in acute distress.     Appearance: Normal appearance. She is not toxic-appearing.   HENT:      Head: " Normocephalic and atraumatic.      Jaw: There is normal jaw occlusion.   Eyes:      General: Lids are normal.      Extraocular Movements: Extraocular movements intact.      Conjunctiva/sclera: Conjunctivae normal.      Pupils: Pupils are equal, round, and reactive to light.   Cardiovascular:      Rate and Rhythm: Normal rate and regular rhythm.      Pulses: Normal pulses.      Heart sounds: Normal heart sounds.   Pulmonary:      Effort: Pulmonary effort is normal. No respiratory distress.      Breath sounds: Normal breath sounds. No wheezing or rhonchi.   Abdominal:      General: Abdomen is flat.      Palpations: Abdomen is soft.      Tenderness: There is no abdominal tenderness. There is no guarding or rebound.   Musculoskeletal:         General: Normal range of motion.      Cervical back: Normal range of motion and neck supple.      Right lower leg: No edema.      Left lower leg: No edema.   Skin:     General: Skin is warm and dry.   Neurological:      Mental Status: She is alert and oriented to person, place, and time. Mental status is at baseline.   Psychiatric:         Mood and Affect: Mood normal.                  Procedures:  Procedures      Medical Decision Making:      Comorbidities that affect care:    Type 2 diabetes, cancer    External Notes reviewed:          The following orders were placed and all results were independently analyzed by me:  Orders Placed This Encounter   Procedures    [facility] Draw    Comprehensive Metabolic Panel    Occult Blood, Fecal By Immunoassay - Stool, Per Rectum    Protime-INR    CBC Auto Differential    Ambulatory Referral to Gastroenterology    CBC & Differential    Green Top (Gel)    Lavender Top    Gold Top - SST    Light Blue Top       Medications Given in the Emergency Department:  Medications   potassium chloride (MICRO-K/KLOR-CON) CR capsule (has no administration in time range)        ED Course:         Labs:    Lab Results (last 24 hours)       Procedure Component  Value Units Date/Time    CBC & Differential [[id number]]  (Abnormal) Collected: 11/01/23 1708    Specimen: Blood Updated: 11/01/23 1732    Narrative:      The following orders were created for panel order CBC & Differential.  Procedure                               Abnormality         Status                     ---------                               -----------         ------                     CBC Auto Differential[[id number]]        Abnormal            Final result                 Please view results for these tests on the individual orders.    Comprehensive Metabolic Panel [[id number]]  (Abnormal) Collected: 11/01/23 1708    Specimen: Blood Updated: 11/01/23 1756     Glucose 141 mg/dL      BUN 43 mg/dL      Creatinine 3.26 mg/dL      Sodium 142 mmol/L      Potassium 2.8 mmol/L      Chloride 106 mmol/L      CO2 21.9 mmol/L      Calcium 7.0 mg/dL      Total Protein 6.9 g/dL      Albumin 2.9 g/dL      ALT (SGPT) 7 U/L      AST (SGOT) 11 U/L      Alkaline Phosphatase 107 U/L      Total Bilirubin 0.2 mg/dL      Globulin 4.0 gm/dL      A/G Ratio 0.7 g/dL      BUN/Creatinine Ratio 13.2     Anion Gap 14.1 mmol/L      eGFR 15.0 mL/min/1.73     Narrative:      GFR Normal >60  Chronic Kidney Disease <60  Kidney Failure <15      Protime-INR [[id number]]  (Abnormal) Collected: 11/01/23 1708    Specimen: Blood Updated: 11/01/23 1740     Protime 16.1 Seconds      INR 1.29    Narrative:      Suggested Therapeutic Ranges For Oral Anticoagulant Therapy:  Level of Therapy                      INR Target Range  Standard Dose                            2.0-3.0  High Dose                                2.5-3.5  Patients not receiving anticoagulant  Therapy Normal Range                     0.86-1.15    CBC Auto Differential [[id number]]  (Abnormal) Collected: 11/01/23 1708    Specimen: Blood Updated: 11/01/23 1732     WBC 10.84 10*3/mm3      RBC 2.61 10*6/mm3      Hemoglobin 7.4 g/dL      Hematocrit 22.7 %      MCV 87.0 fL      MCH 28.4  pg      MCHC 32.6 g/dL      RDW 15.3 %      RDW-SD 48.6 fl      MPV 10.0 fL      Platelets 252 10*3/mm3      Neutrophil % 77.5 %      Lymphocyte % 14.4 %      Monocyte % 5.7 %      Eosinophil % 1.3 %      Basophil % 0.4 %      Immature Grans % 0.7 %      Neutrophils, Absolute 8.40 10*3/mm3      Lymphocytes, Absolute 1.56 10*3/mm3      Monocytes, Absolute 0.62 10*3/mm3      Eosinophils, Absolute 0.14 10*3/mm3      Basophils, Absolute 0.04 10*3/mm3      Immature Grans, Absolute 0.08 10*3/mm3      nRBC 0.0 /100 WBC     Occult Blood, Fecal By Immunoassay - Stool, Per Rectum [[id number]]  (Abnormal) Collected: 11/01/23 1758    Specimen: Stool from Per Rectum Updated: 11/01/23 1823     Occult Blood, Fecal by Immunoassay Positive             Imaging:    No Radiology Exams Resulted Within Past 24 Hours      Differential Diagnosis and Discussion:    GI Bleeding: Differential diagnosis includes but is not limited to gastritis, gastric ulcer, stress ulcer, duodenitis, Vanessa-Dowell tears, esophageal varices, angiodysplasia, aortic enteric fistula, hematologic issues including thrombocytopenia, GI neoplasm, ulcerative colitis, Crohn's disease, diverticulosis, diverticulitis, hemorrhoids, aortic aneurysm, and polyps  Metabolic: Differential diagnosis includes but is not limited to hypertension, hyperglycemia, hyperkalemia, hypocalcemia, metabolic acidosis, hypokalemia, hypoglycemia, malnutrition, hypothyroidism, hyperthyroidism, and adrenal insufficiency.     All labs were reviewed and interpreted by me.    MDM     Amount and/or Complexity of Data Reviewed  Decide to obtain previous medical records or to obtain history from someone other than the patient: yes    Patient denies all symptoms at this time.  Patient's hemoglobin is 7.4 not requiring transfusion.  Patient's fecal occult blood was positive.  Patient states she has a colonoscopy scheduled in the next 2 weeks.  Patient states she has chronic kidney disease but is been  trying to stay well-hydrated at rehab.  Patient states she feels fine at this time and wishes to go home.  I instruct the patient to have her labs redrawn at the facility and return to the ED if she develops any worsening symptoms or her hemoglobin falls further.  Patient states she understands agrees plan of care.      I instructed the patient to increase her potassium intake as her potassium was mildly low today.  Patient is asymptomatic.      Patient Care Considerations:          Consultants/Shared Management Plan:    None    Social Determinants of Health:    Patient is independent, reliable, and has access to care.       Disposition and Care Coordination:    Discharged: I considered escalation of care by admitting this patient to the hospital, however the patient has improved and is suitable and stable for discharge.    I have explained the patient´s condition, diagnoses and treatment plan based on the information available to me at this time. I have answered questions and addressed any concerns. The patient has a good  understanding of the patient´s diagnosis, condition, and treatment plan as can be expected at this point. The vital signs have been stable. The patient´s condition is stable and appropriate for discharge from the emergency department.      The patient will pursue further outpatient evaluation with the primary care physician or other designated or consulting physician as outlined in the discharge instructions. They are agreeable to this plan of care and follow-up instructions have been explained in detail. The patient has received these instructions in written format and have expressed an understanding of the discharge instructions. The patient is aware that any significant change in condition or worsening of symptoms should prompt an immediate return to this or the closest emergency department or call to 911.  I have explained discharge medications and the need for follow up with the  patient/caretakers. This was also printed in the discharge instructions. Patient was discharged with the following medications and follow up:      Medication List      No changes were made to your prescriptions during this visit.      Cathleen Stroud APRN  [address] [address]  [phone]    Call in 2 days  As needed       Final diagnoses:   Fecal occult blood test positive   Anemia, unspecified type   Hypokalemia        ED Disposition       ED Disposition   Discharge    Condition   Stable    Comment   --               This medical record created using voice recognition software.             Juan Pablo Zayas PA-C  11/01/23 2531

## 2023-11-02 ENCOUNTER — APPOINTMENT (OUTPATIENT)
Dept: CT IMAGING | Facility: HOSPITAL | Age: 68
End: 2023-11-02
Payer: MEDICARE

## 2023-11-02 ENCOUNTER — HOSPITAL ENCOUNTER (EMERGENCY)
Facility: HOSPITAL | Age: 68
Discharge: HOME | End: 2023-11-03
Attending: EMERGENCY MEDICINE
Payer: MEDICARE

## 2023-11-02 DIAGNOSIS — S00.83XA FOREHEAD CONTUSION, INITIAL ENCOUNTER: ICD-10-CM
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
## 2023-11-02 PROCEDURE — 99284 EMERGENCY DEPT VISIT MOD MDM: CPT

## 2023-11-02 PROCEDURE — 70450 CT HEAD/BRAIN W/O DYE: CPT

## 2023-11-02 PROCEDURE — 72125 CT NECK SPINE W/O DYE: CPT

## 2023-11-03 VITALS
OXYGEN SATURATION: 96 % | SYSTOLIC BLOOD PRESSURE: 156 MMHG | DIASTOLIC BLOOD PRESSURE: 71 MMHG | HEART RATE: 76 BPM | HEIGHT: 63 IN | TEMPERATURE: 97.7 F | RESPIRATION RATE: 16 BRPM | WEIGHT: 127.21 LBS | BODY MASS INDEX: 22.54 KG/M2

## 2023-11-03 NOTE — ED PROVIDER NOTES
Time: 10:07 PM EDT  Date of encounter:  11/2/2023  Independent Historian/Clinical History and Information was obtained by:   Patient and EMS    History is limited by: N/A    Chief Complaint: Fall      History of Present Illness:  Patient is a 68 y.o. year old female who presents to the emergency department for evaluation of fall    Patient presents from nursing home.  She states that she was adjusting the blankets in her bed when she fell out of the bed landing on her right forehead.  She is uncertain if she lost consciousness.  She has mild pain at her forehead but has no other complaints.  She has no numbness or weakness.  She takes no anticoagulants    HPI    Patient Care Team  Primary Care Provider: Cathleen Stroud APRN    Past Medical History:     Allergies   Allergen Reactions    Artificial Saliva Unknown - High Severity     Past Medical History:   Diagnosis Date    Arthritis     Cancer     left breast removed 2012    Dementia     Type 2 diabetes mellitus      Past Surgical History:   Procedure Laterality Date    ABDOMINAL SURGERY      BREAST SURGERY Left 2012    removed due to cancer    CHOLECYSTECTOMY      COLONOSCOPY  7/19/2023    Procedure: COLONOSCOPY WITH ENDOSCOPIC FECAL MICROBIOTA TRANSPLANT, GTUBE REMOVAL;  Surgeon: Hamzah Ireland MD;  Location: [facility] ENDOSCOPY;  Service: Gastroenterology;;    CYSTOSCOPY W/ URETERAL STENT PLACEMENT Right 12/09/2020    Procedure: CYSTOSCOPY, RIGHT RETROGRADE PYELOGRAM, URETEROSCOPY, LASER LITHOTRIPSY, RIGHT URETERAL STENT PLACEMENT;  Surgeon: Rohan Dooley Jr., MD;  Location: [facility] OR;  Service: Urology;  Laterality: Right;    GASTROSTOMY W/ FEEDING TUBE      HERNIA REPAIR      mesh removed     Family History   Problem Relation Age of Onset    Diabetes Mother     Lung disease Father     Cancer Father     Colon cancer Neg Hx        Home Medications:  Prior to Admission medications    Medication Sig Start Date End Date Taking? Authorizing  Provider   acetaminophen (TYLENOL) 160 MG/5ML solution Administer 320 mg per G tube Daily.    Pancho Carpenter MD   alendronate (FOSAMAX) 70 MG tablet Take 1 tablet by mouth Every 7 (Seven) Days. Thursday    Pancho Carpenter MD   amLODIPine (NORVASC) 10 MG tablet Take 1 tablet by mouth Daily.    Pancho Carpenter MD   anastrozole (ARIMIDEX) 1 MG tablet Take 1 tablet by mouth Daily. 3/5/23   Pancho Carpenter MD   carvedilol (COREG) 25 MG tablet Take 1 tablet by mouth 2 (Two) Times a Day.    Pancho Carpenter MD   cholestyramine (QUESTRAN) 4 g packet Take 1 packet by mouth 2 (Two) Times a Day.    Pancho Carpenter MD   citalopram (CeleXA) 10 MG tablet Administer 1 tablet per G tube Daily. 12/2/18   Pancho Carpenter MD   Darbepoetin Jaylen (Aranesp, Albumin Free,) 25 MCG/ML injection Inject 0.5 mL under the skin into the appropriate area as directed Every 30 (Thirty) Days. 26th of Month    Pancho Carpenter MD   Ergocalciferol 200 MCG/ML drops Administer 5 mL per G tube Daily.    Pancho Carpenter MD   ferrous sulfate 300 (60 Fe) MG/5ML syrup Administer 5 mL per G tube Daily.    Pancho Carpenter MD   fidaxomicin (DIFICID) 200 MG tablet Take 1 tablet by mouth 2 (Two) Times a Day.    Pancho Carpenter MD   ipratropium (ATROVENT) 0.02 % nebulizer solution Take 2.5 mL by nebulization 4 (Four) Times a Day Before Meals & at Bedtime.    Pancho Carpenter MD   lacosamide (VIMPAT) 10 MG/ML solution oral solution Take 10 mL by mouth Every 12 (Twelve) Hours for 3 days. 4/22/23 4/25/23  Nenita Whiting MD   levothyroxine (SYNTHROID, LEVOTHROID) 25 MCG tablet Take 0.5 tablets by mouth Every Morning.    Pancho Carpenter MD   magnesium oxide (MAG-OX) 400 MG tablet Take 1 tablet by mouth 2 (Two) Times a Day. 6/2/23   Alin Jhaveri,    Menthol-Zinc Oxide (Calmoseptine) 0.44-20.6 % ointment Apply 1 application  topically to the appropriate area as directed 3 (Three) Times a Day.   "  Pancho Carpenter MD   phosphorus (Phospha 250 Neutral) 155-852-130 MG tablet Administer 1 tablet per G tube 2 (Two) Times a Day.    Pancho Carpenter MD   potassium chloride (K-DUR,KLOR-CON) 10 MEQ CR tablet  23   Pancho Carpenter MD   potassium chloride 10 MEQ CR tablet Take 1 tablet by mouth Daily.    Pancho Carpenter MD   saccharomyces boulardii (FLORASTOR) 250 MG capsule Take 1 capsule by mouth 2 (Two) Times a Day.    Pancho Carpenter MD   sodium bicarbonate 650 MG tablet Take 1 tablet by mouth 2 (Two) Times a Day.    Pancho Carpenter MD        Social History:   Social History     Tobacco Use    Smoking status: Former     Types: Cigarettes     Quit date: 1997     Years since quittin.2     Passive exposure: Past    Smokeless tobacco: Never   Vaping Use    Vaping Use: Never used   Substance Use Topics    Alcohol use: No    Drug use: No         Review of Systems:  Review of Systems   Constitutional:  Negative for chills and fever.   HENT:  Negative for congestion, ear pain and sore throat.    Eyes:  Negative for pain.   Respiratory:  Negative for cough, chest tightness and shortness of breath.    Cardiovascular:  Negative for chest pain.   Gastrointestinal:  Negative for abdominal pain, diarrhea, nausea and vomiting.   Genitourinary:  Negative for flank pain and hematuria.   Musculoskeletal:  Negative for joint swelling.   Skin:  Positive for wound. Negative for pallor.        Right forehead contusion   Neurological:  Negative for seizures and headaches.   All other systems reviewed and are negative.       Physical Exam:  /71 (BP Location: Left arm, Patient Position: Lying)   Pulse 76   Temp 97.7 °F (36.5 °C) (Oral)   Resp 16   Ht 160 cm (63\")   Wt 57.7 kg (127 lb 3.3 oz)   SpO2 96%   BMI 22.53 kg/m²     Physical Exam  Vitals and nursing note reviewed.   Constitutional:       General: She is not in acute distress.     Appearance: Normal appearance. She is " not toxic-appearing.   HENT:      Head: Normocephalic and atraumatic.      Jaw: There is normal jaw occlusion.   Eyes:      General: Lids are normal.      Extraocular Movements: Extraocular movements intact.      Conjunctiva/sclera: Conjunctivae normal.      Pupils: Pupils are equal, round, and reactive to light.   Cardiovascular:      Rate and Rhythm: Normal rate and regular rhythm.      Pulses: Normal pulses.      Heart sounds: Normal heart sounds.   Pulmonary:      Effort: Pulmonary effort is normal. No respiratory distress.      Breath sounds: Normal breath sounds. No wheezing or rhonchi.   Abdominal:      General: Abdomen is flat.      Palpations: Abdomen is soft.      Tenderness: There is no abdominal tenderness. There is no guarding or rebound.   Musculoskeletal:         General: Normal range of motion.      Cervical back: Normal range of motion and neck supple.      Right lower leg: No edema.      Left lower leg: No edema.   Skin:     General: Skin is warm and dry.      Comments: Right forehead hematoma with overlying abrasion.  No laceration.   Neurological:      Mental Status: She is alert and oriented to person, place, and time. Mental status is at baseline.      Comments: NIH SS = 0    GCS = 15   Psychiatric:         Mood and Affect: Mood normal.                Procedures:  Procedures      Medical Decision Making:      Comorbidities that affect care:    Diabetes, arthritis, breast cancer, dementia    External Notes reviewed:    Previous Clinic Note: Outpatient gastroenterology visit August 2023      The following orders were placed and all results were independently analyzed by me:  Orders Placed This Encounter   Procedures    CT Head Without Contrast    CT Cervical Spine Without Contrast       Medications Given in the Emergency Department:  Medications - No data to display     ED Course:         Labs:    Lab Results (last 24 hours)       ** No results found for the last 24 hours. **              Imaging:    CT Cervical Spine Without Contrast    Result Date: 11/2/2023  PROCEDURE: CT CERVICAL SPINE WO CONTRAST  COMPARISON: None  INDICATIONS: Neck trauma, midline tenderness (Age 16-64y)  PROTOCOL:   Standard imaging protocol performed    RADIATION:   DLP: 287.2 mGy*cm   MA and/or KV was adjusted to minimize radiation dose.     TECHNIQUE: After obtaining the patient's consent, multi-planar CT images were created without contrast material.   FINDINGS:  There is normal height and alignment of the cervical vertebral bodies.  There is no evidence of acute fracture.  The craniovertebral junction is within normal limits.  There are degenerative facet changes throughout the cervical spine.  There is degenerative disc disease with disc space narrowing throughout the cervical spine as well.  No definite spinal canal stenosis.  Visualized lung apices are clear.        1. Degenerative changes of the cervical spine.  No acute fracture identified.     PETERSON LAMAS MD       Electronically Signed and Approved By: PETERSON LAMAS MD on 11/02/2023 at 22:01             CT Head Without Contrast    Result Date: 11/2/2023  PROCEDURE: CT HEAD WO CONTRAST  COMPARISON:  [facility], CT, CT HEAD WO CONTRAST, 4/03/2023, 11:56. INDICATIONS: Head trauma, moderate-severe, fall today  PROTOCOL:   Standard imaging protocol performed    RADIATION:   DLP: 1080 mGy*cm   MA and/or KV was adjusted to minimize radiation dose.     TECHNIQUE: After obtaining the patient's consent, CT images were obtained without non-ionic intravenous contrast material.  FINDINGS:  There is mild generalized parenchymal volume loss.  There is no evidence of acute infarct or hemorrhage.  No abnormal extra-axial fluid collections are seen.  No mass effect or hydrocephalus.  There is a small hematoma overlying the right frontal bone.  There is no underlying skull fracture.  There is mucosal thickening within the left sphenoid sinus.  Other paranasal  sinuses and mastoid air cells are clear.  Globes and orbits appear within normal limits.        1. No acute intracranial abnormality 2. Soft tissue swelling and scalp hematoma overlying the right frontal bone.  No underlying skull fracture.     PETERSON LAMAS MD       Electronically Signed and Approved By: PETERSON LAMAS MD on 11/02/2023 at 21:57                Differential Diagnosis and Discussion:    Trauma:  Differential diagnosis considered but not limited to were subarachnoid hemorrhage, intracranial bleeding, pneumothorax, cardiac contusion, lung contusion, intra-abdominal bleeding, and compartment syndrome of any extremity or other significant traumatic pathology    CT scan radiology impression was interpreted by me.    MDM           Patient Care Considerations:    NARCOTICS: I considered prescribing opiate pain medication as an outpatient, however no pain control required in the emergency department.      Consultants/Shared Management Plan:    None    Social Determinants of Health:    Patient is a nursing home/assisted living resident and has reliable access to care.      Disposition and Care Coordination:    Discharged: The patient is suitable and stable for discharge with no need for consideration of observation or admission.    I have explained the patient´s condition, diagnoses and treatment plan based on the information available to me at this time. I have answered questions and addressed any concerns. The patient has a good  understanding of the patient´s diagnosis, condition, and treatment plan as can be expected at this point. The vital signs have been stable. The patient´s condition is stable and appropriate for discharge from the emergency department.      The patient will pursue further outpatient evaluation with the primary care physician or other designated or consulting physician as outlined in the discharge instructions. They are agreeable to this plan of care and follow-up instructions have  been explained in detail. The patient has received these instructions in written format and have expressed an understanding of the discharge instructions. The patient is aware that any significant change in condition or worsening of symptoms should prompt an immediate return to this or the closest emergency department or call to 911.    Final diagnoses:   Fall, initial encounter   Forehead contusion, initial encounter        ED Disposition       ED Disposition   Discharge    Condition   Stable    Comment   --               This medical record created using voice recognition software.             Gaurav Edwards MD  11/03/23 0619

## 2023-11-08 ENCOUNTER — TELEPHONE (OUTPATIENT)
Dept: GASTROENTEROLOGY | Facility: CLINIC | Age: 68
End: 2023-11-08
Payer: MEDICARE

## 2023-11-08 NOTE — TELEPHONE ENCOUNTER
Received a phone call from Susana with [facility] about this patient have coverage with insurance for scheduled scope on 11/10/2023. Upon looking at the chart it appears that this has been cancelled nothing noted advised that it could have been cancelled with PAT at time of that call.  Advised that the hospital is still covered for this insurance however the providers charge might be higher than in network cost.     Susana believes that the friend cancelled this while they were working on getting this matter resolved. They would like to know how quickly they can get this rescheduled due to lab results having low numbers.     Please call [facility] to get this rescheduled. [phone]

## 2023-12-08 ENCOUNTER — APPOINTMENT (OUTPATIENT)
Dept: CT IMAGING | Facility: HOSPITAL | Age: 68
End: 2023-12-08
Payer: MEDICARE

## 2023-12-08 ENCOUNTER — APPOINTMENT (OUTPATIENT)
Facility: HOSPITAL | Age: 68
End: 2023-12-08
Payer: MEDICARE

## 2023-12-08 ENCOUNTER — HOSPITAL ENCOUNTER (INPATIENT)
Facility: HOSPITAL | Age: 68
LOS: 11 days | Discharge: HOSPICE | End: 2023-12-19
Attending: EMERGENCY MEDICINE | Admitting: STUDENT IN AN ORGANIZED HEALTH CARE EDUCATION/TRAINING PROGRAM
Payer: MEDICARE

## 2023-12-08 DIAGNOSIS — Z51.5 COMFORT MEASURES ONLY STATUS: ICD-10-CM

## 2023-12-08 DIAGNOSIS — Z86.19 HISTORY OF CLOSTRIDIOIDES DIFFICILE COLITIS: ICD-10-CM

## 2023-12-08 DIAGNOSIS — R19.7 DIARRHEA, UNSPECIFIED TYPE: Primary | ICD-10-CM

## 2023-12-08 DIAGNOSIS — R26.2 DIFFICULTY WALKING: ICD-10-CM

## 2023-12-08 DIAGNOSIS — N12 PYELONEPHRITIS: ICD-10-CM

## 2023-12-08 DIAGNOSIS — N18.9 CHRONIC KIDNEY DISEASE, UNSPECIFIED CKD STAGE: ICD-10-CM

## 2023-12-08 DIAGNOSIS — Z74.09 IMPAIRED MOBILITY AND ADLS: ICD-10-CM

## 2023-12-08 DIAGNOSIS — Z78.9 IMPAIRED MOBILITY AND ADLS: ICD-10-CM

## 2023-12-08 DIAGNOSIS — M66.0 RUPTURED BAKERS CYST: ICD-10-CM

## 2023-12-08 DIAGNOSIS — D64.9 CHRONIC ANEMIA: ICD-10-CM

## 2023-12-08 DIAGNOSIS — N13.30 HYDRONEPHROSIS, UNSPECIFIED HYDRONEPHROSIS TYPE: ICD-10-CM

## 2023-12-08 LAB
ABO GROUP BLD: NORMAL
ABO GROUP BLD: NORMAL
ALBUMIN SERPL-MCNC: 3.1 G/DL (ref 3.5–5.2)
ALBUMIN/GLOB SERPL: 0.9 G/DL
ALP SERPL-CCNC: 90 U/L (ref 39–117)
ALT SERPL W P-5'-P-CCNC: 10 U/L (ref 1–33)
ANION GAP SERPL CALCULATED.3IONS-SCNC: 14 MMOL/L (ref 5–15)
ARTERIAL PATENCY WRIST A: ABNORMAL
AST SERPL-CCNC: 12 U/L (ref 1–32)
BACTERIA UR QL AUTO: ABNORMAL /HPF
BASE EXCESS BLDA CALC-SCNC: -10.4 MMOL/L (ref -2–2)
BASOPHILS # BLD AUTO: 0.05 10*3/MM3 (ref 0–0.2)
BASOPHILS NFR BLD AUTO: 0.5 % (ref 0–1.5)
BDY SITE: ABNORMAL
BILIRUB SERPL-MCNC: <0.2 MG/DL (ref 0–1.2)
BILIRUB UR QL STRIP: NEGATIVE
BLD GP AB SCN SERPL QL: NEGATIVE
BUN SERPL-MCNC: 33 MG/DL (ref 8–23)
BUN/CREAT SERPL: 10 (ref 7–25)
CALCIUM SPEC-SCNC: 7.3 MG/DL (ref 8.6–10.5)
CHLORIDE SERPL-SCNC: 108 MMOL/L (ref 98–107)
CLARITY UR: ABNORMAL
CO2 SERPL-SCNC: 17 MMOL/L (ref 22–29)
COHGB MFR BLD: 0.3 % (ref 0–1.5)
COLOR UR: YELLOW
CREAT SERPL-MCNC: 3.31 MG/DL (ref 0.57–1)
D-LACTATE SERPL-SCNC: 0.5 MMOL/L (ref 0.5–2)
DEPRECATED RDW RBC AUTO: 47.8 FL (ref 37–54)
EGFRCR SERPLBLD CKD-EPI 2021: 14.7 ML/MIN/1.73
EOSINOPHIL # BLD AUTO: 0.12 10*3/MM3 (ref 0–0.4)
EOSINOPHIL NFR BLD AUTO: 1.3 % (ref 0.3–6.2)
ERYTHROCYTE [DISTWIDTH] IN BLOOD BY AUTOMATED COUNT: 14.8 % (ref 12.3–15.4)
FHHB: 3.5 % (ref 0–5)
GLOBULIN UR ELPH-MCNC: 3.5 GM/DL
GLUCOSE SERPL-MCNC: 154 MG/DL (ref 65–99)
GLUCOSE UR STRIP-MCNC: NEGATIVE MG/DL
HCO3 BLDA-SCNC: 14.2 MMOL/L (ref 22–26)
HCT VFR BLD AUTO: 26.2 % (ref 34–46.6)
HEMOCCULT STL QL IA: NEGATIVE
HGB BLD-MCNC: 8.5 G/DL (ref 12–15.9)
HGB BLDA-MCNC: 8.3 G/DL (ref 11.7–14.6)
HGB UR QL STRIP.AUTO: ABNORMAL
HOLD SPECIMEN: NORMAL
HOLD SPECIMEN: NORMAL
HYALINE CASTS UR QL AUTO: ABNORMAL /LPF
IMM GRANULOCYTES # BLD AUTO: 0.02 10*3/MM3 (ref 0–0.05)
IMM GRANULOCYTES NFR BLD AUTO: 0.2 % (ref 0–0.5)
INHALED O2 CONCENTRATION: 21 %
INR PPP: 1.09 (ref 0.86–1.15)
KETONES UR QL STRIP: NEGATIVE
LACTATE BLDA-SCNC: ABNORMAL MMOL/L
LEUKOCYTE ESTERASE UR QL STRIP.AUTO: ABNORMAL
LYMPHOCYTES # BLD AUTO: 1.57 10*3/MM3 (ref 0.7–3.1)
LYMPHOCYTES NFR BLD AUTO: 16.9 % (ref 19.6–45.3)
MAGNESIUM SERPL-MCNC: 1.6 MG/DL (ref 1.6–2.4)
MCH RBC QN AUTO: 29 PG (ref 26.6–33)
MCHC RBC AUTO-ENTMCNC: 32.4 G/DL (ref 31.5–35.7)
MCV RBC AUTO: 89.4 FL (ref 79–97)
METHGB BLD QL: 0.4 % (ref 0–1.5)
MODALITY: ABNORMAL
MONOCYTES # BLD AUTO: 0.41 10*3/MM3 (ref 0.1–0.9)
MONOCYTES NFR BLD AUTO: 4.4 % (ref 5–12)
NEUTROPHILS NFR BLD AUTO: 7.13 10*3/MM3 (ref 1.7–7)
NEUTROPHILS NFR BLD AUTO: 76.7 % (ref 42.7–76)
NITRITE UR QL STRIP: NEGATIVE
NRBC BLD AUTO-RTO: 0 /100 WBC (ref 0–0.2)
OXYHGB MFR BLDV: 95.8 % (ref 94–99)
PCO2 BLDA: 27.1 MM HG (ref 35–45)
PH BLDA: 7.34 PH UNITS (ref 7.35–7.45)
PH UR STRIP.AUTO: 5.5 [PH] (ref 5–8)
PLATELET # BLD AUTO: 233 10*3/MM3 (ref 140–450)
PMV BLD AUTO: 9.7 FL (ref 6–12)
PO2 BLD: 498 MM[HG] (ref 0–500)
PO2 BLDA: 104.5 MM HG (ref 80–100)
POTASSIUM SERPL-SCNC: 4.3 MMOL/L (ref 3.5–5.2)
PROCALCITONIN SERPL-MCNC: 0.19 NG/ML (ref 0–0.25)
PROT SERPL-MCNC: 6.6 G/DL (ref 6–8.5)
PROT UR QL STRIP: ABNORMAL
PROTHROMBIN TIME: 14.3 SECONDS (ref 11.8–14.9)
RBC # BLD AUTO: 2.93 10*6/MM3 (ref 3.77–5.28)
RBC # UR STRIP: ABNORMAL /HPF
REF LAB TEST METHOD: ABNORMAL
RH BLD: NEGATIVE
RH BLD: NEGATIVE
SAO2 % BLDCOA: 96.5 % (ref 95–99)
SODIUM SERPL-SCNC: 139 MMOL/L (ref 136–145)
SP GR UR STRIP: 1.01 (ref 1–1.03)
SQUAMOUS #/AREA URNS HPF: ABNORMAL /HPF
T&S EXPIRATION DATE: NORMAL
UROBILINOGEN UR QL STRIP: ABNORMAL
WBC # UR STRIP: ABNORMAL /HPF
WBC NRBC COR # BLD AUTO: 9.3 10*3/MM3 (ref 3.4–10.8)
WHOLE BLOOD HOLD COAG: NORMAL
WHOLE BLOOD HOLD SPECIMEN: NORMAL

## 2023-12-08 PROCEDURE — 87086 URINE CULTURE/COLONY COUNT: CPT | Performed by: EMERGENCY MEDICINE

## 2023-12-08 PROCEDURE — 25810000003 LACTATED RINGERS PER 1000 ML: Performed by: FAMILY MEDICINE

## 2023-12-08 PROCEDURE — 84156 ASSAY OF PROTEIN URINE: CPT | Performed by: STUDENT IN AN ORGANIZED HEALTH CARE EDUCATION/TRAINING PROGRAM

## 2023-12-08 PROCEDURE — 36600 WITHDRAWAL OF ARTERIAL BLOOD: CPT | Performed by: FAMILY MEDICINE

## 2023-12-08 PROCEDURE — 93971 EXTREMITY STUDY: CPT

## 2023-12-08 PROCEDURE — 86850 RBC ANTIBODY SCREEN: CPT

## 2023-12-08 PROCEDURE — 36415 COLL VENOUS BLD VENIPUNCTURE: CPT

## 2023-12-08 PROCEDURE — 82274 ASSAY TEST FOR BLOOD FECAL: CPT

## 2023-12-08 PROCEDURE — 87040 BLOOD CULTURE FOR BACTERIA: CPT | Performed by: EMERGENCY MEDICINE

## 2023-12-08 PROCEDURE — 85610 PROTHROMBIN TIME: CPT

## 2023-12-08 PROCEDURE — 99222 1ST HOSP IP/OBS MODERATE 55: CPT | Performed by: FAMILY MEDICINE

## 2023-12-08 PROCEDURE — 99285 EMERGENCY DEPT VISIT HI MDM: CPT

## 2023-12-08 PROCEDURE — 85025 COMPLETE CBC W/AUTO DIFF WBC: CPT

## 2023-12-08 PROCEDURE — 80053 COMPREHEN METABOLIC PANEL: CPT

## 2023-12-08 PROCEDURE — 87077 CULTURE AEROBIC IDENTIFY: CPT | Performed by: EMERGENCY MEDICINE

## 2023-12-08 PROCEDURE — 86900 BLOOD TYPING SEROLOGIC ABO: CPT

## 2023-12-08 PROCEDURE — 83605 ASSAY OF LACTIC ACID: CPT | Performed by: EMERGENCY MEDICINE

## 2023-12-08 PROCEDURE — 74176 CT ABD & PELVIS W/O CONTRAST: CPT

## 2023-12-08 PROCEDURE — 82805 BLOOD GASES W/O2 SATURATION: CPT | Performed by: FAMILY MEDICINE

## 2023-12-08 PROCEDURE — 82043 UR ALBUMIN QUANTITATIVE: CPT | Performed by: STUDENT IN AN ORGANIZED HEALTH CARE EDUCATION/TRAINING PROGRAM

## 2023-12-08 PROCEDURE — 82570 ASSAY OF URINE CREATININE: CPT | Performed by: STUDENT IN AN ORGANIZED HEALTH CARE EDUCATION/TRAINING PROGRAM

## 2023-12-08 PROCEDURE — 86901 BLOOD TYPING SEROLOGIC RH(D): CPT

## 2023-12-08 PROCEDURE — 25010000002 CEFTRIAXONE PER 250 MG: Performed by: EMERGENCY MEDICINE

## 2023-12-08 PROCEDURE — 81001 URINALYSIS AUTO W/SCOPE: CPT | Performed by: EMERGENCY MEDICINE

## 2023-12-08 PROCEDURE — 84145 PROCALCITONIN (PCT): CPT | Performed by: EMERGENCY MEDICINE

## 2023-12-08 PROCEDURE — 83735 ASSAY OF MAGNESIUM: CPT | Performed by: FAMILY MEDICINE

## 2023-12-08 PROCEDURE — 83050 HGB METHEMOGLOBIN QUAN: CPT | Performed by: FAMILY MEDICINE

## 2023-12-08 PROCEDURE — 93971 EXTREMITY STUDY: CPT | Performed by: SURGERY

## 2023-12-08 PROCEDURE — 82375 ASSAY CARBOXYHB QUANT: CPT | Performed by: FAMILY MEDICINE

## 2023-12-08 PROCEDURE — 87186 SC STD MICRODIL/AGAR DIL: CPT | Performed by: EMERGENCY MEDICINE

## 2023-12-08 PROCEDURE — 83036 HEMOGLOBIN GLYCOSYLATED A1C: CPT | Performed by: FAMILY MEDICINE

## 2023-12-08 RX ORDER — CEFTRIAXONE SODIUM 1 G/50ML
1000 INJECTION, SOLUTION INTRAVENOUS ONCE
Status: COMPLETED | OUTPATIENT
Start: 2023-12-08 | End: 2023-12-08

## 2023-12-08 RX ORDER — LANOLIN ALCOHOL/MO/W.PET/CERES
1000 CREAM (GRAM) TOPICAL DAILY
COMMUNITY
End: 2023-12-19 | Stop reason: HOSPADM

## 2023-12-08 RX ORDER — SODIUM CHLORIDE 0.9 % (FLUSH) 0.9 %
10 SYRINGE (ML) INJECTION AS NEEDED
Status: DISCONTINUED | OUTPATIENT
Start: 2023-12-08 | End: 2023-12-19

## 2023-12-08 RX ORDER — INSULIN LISPRO 100 [IU]/ML
2-7 INJECTION, SOLUTION INTRAVENOUS; SUBCUTANEOUS
Status: DISCONTINUED | OUTPATIENT
Start: 2023-12-09 | End: 2023-12-19

## 2023-12-08 RX ORDER — POLYETHYLENE GLYCOL 3350 17 G/17G
17 POWDER, FOR SOLUTION ORAL DAILY PRN
Status: DISCONTINUED | OUTPATIENT
Start: 2023-12-08 | End: 2023-12-09

## 2023-12-08 RX ORDER — CARVEDILOL 25 MG/1
25 TABLET ORAL 2 TIMES DAILY
Status: DISCONTINUED | OUTPATIENT
Start: 2023-12-08 | End: 2023-12-18

## 2023-12-08 RX ORDER — SODIUM CHLORIDE, SODIUM LACTATE, POTASSIUM CHLORIDE, CALCIUM CHLORIDE 600; 310; 30; 20 MG/100ML; MG/100ML; MG/100ML; MG/100ML
75 INJECTION, SOLUTION INTRAVENOUS CONTINUOUS
Status: DISCONTINUED | OUTPATIENT
Start: 2023-12-08 | End: 2023-12-09

## 2023-12-08 RX ORDER — SODIUM CHLORIDE 0.9 % (FLUSH) 0.9 %
10 SYRINGE (ML) INJECTION EVERY 12 HOURS SCHEDULED
Status: DISCONTINUED | OUTPATIENT
Start: 2023-12-08 | End: 2023-12-19

## 2023-12-08 RX ORDER — GUAIFENESIN 200 MG/10ML
200 LIQUID ORAL EVERY 4 HOURS PRN
COMMUNITY
End: 2023-12-19 | Stop reason: HOSPADM

## 2023-12-08 RX ORDER — CEFTRIAXONE SODIUM 1 G/50ML
1000 INJECTION, SOLUTION INTRAVENOUS EVERY 24 HOURS
Qty: 300 ML | Refills: 0 | Status: DISCONTINUED | OUTPATIENT
Start: 2023-12-09 | End: 2023-12-11

## 2023-12-08 RX ORDER — BISACODYL 10 MG
10 SUPPOSITORY, RECTAL RECTAL DAILY PRN
COMMUNITY
End: 2023-12-19 | Stop reason: HOSPADM

## 2023-12-08 RX ORDER — BISACODYL 10 MG
10 SUPPOSITORY, RECTAL RECTAL DAILY PRN
Status: DISCONTINUED | OUTPATIENT
Start: 2023-12-08 | End: 2023-12-09

## 2023-12-08 RX ORDER — NICOTINE POLACRILEX 4 MG
15 LOZENGE BUCCAL
Status: DISCONTINUED | OUTPATIENT
Start: 2023-12-08 | End: 2023-12-19

## 2023-12-08 RX ORDER — LOPERAMIDE HYDROCHLORIDE 2 MG/1
2 CAPSULE ORAL 4 TIMES DAILY PRN
COMMUNITY
End: 2023-12-19 | Stop reason: HOSPADM

## 2023-12-08 RX ORDER — IBUPROFEN 600 MG/1
1 TABLET ORAL
Status: DISCONTINUED | OUTPATIENT
Start: 2023-12-08 | End: 2023-12-19

## 2023-12-08 RX ORDER — BISACODYL 5 MG/1
5 TABLET, DELAYED RELEASE ORAL DAILY PRN
Status: DISCONTINUED | OUTPATIENT
Start: 2023-12-08 | End: 2023-12-09

## 2023-12-08 RX ORDER — AMLODIPINE BESYLATE 5 MG/1
10 TABLET ORAL DAILY
Status: DISCONTINUED | OUTPATIENT
Start: 2023-12-09 | End: 2023-12-15

## 2023-12-08 RX ORDER — ONDANSETRON 2 MG/ML
4 INJECTION INTRAMUSCULAR; INTRAVENOUS EVERY 6 HOURS PRN
Status: DISCONTINUED | OUTPATIENT
Start: 2023-12-08 | End: 2023-12-19 | Stop reason: HOSPADM

## 2023-12-08 RX ORDER — SODIUM CHLORIDE 9 MG/ML
40 INJECTION, SOLUTION INTRAVENOUS AS NEEDED
Status: DISCONTINUED | OUTPATIENT
Start: 2023-12-08 | End: 2023-12-19

## 2023-12-08 RX ORDER — MONTELUKAST SODIUM 4 MG/1
1 TABLET, CHEWABLE ORAL DAILY
COMMUNITY
End: 2023-12-19 | Stop reason: HOSPADM

## 2023-12-08 RX ORDER — DARBEPOETIN ALFA 25 UG/ML
12.5 SOLUTION INTRAVENOUS; SUBCUTANEOUS
COMMUNITY
End: 2023-12-19 | Stop reason: HOSPADM

## 2023-12-08 RX ORDER — DEXTROSE MONOHYDRATE 25 G/50ML
25 INJECTION, SOLUTION INTRAVENOUS
Status: DISCONTINUED | OUTPATIENT
Start: 2023-12-08 | End: 2023-12-19

## 2023-12-08 RX ORDER — SACCHAROMYCES BOULARDII 250 MG
250 CAPSULE ORAL 2 TIMES DAILY
Status: DISCONTINUED | OUTPATIENT
Start: 2023-12-08 | End: 2023-12-19

## 2023-12-08 RX ORDER — AMOXICILLIN 250 MG
2 CAPSULE ORAL 2 TIMES DAILY
Status: DISCONTINUED | OUTPATIENT
Start: 2023-12-08 | End: 2023-12-09

## 2023-12-08 RX ADMIN — CEFTRIAXONE SODIUM 1000 MG: 1 INJECTION, SOLUTION INTRAVENOUS at 20:54

## 2023-12-08 RX ADMIN — SODIUM CHLORIDE, POTASSIUM CHLORIDE, SODIUM LACTATE AND CALCIUM CHLORIDE 75 ML/HR: 600; 310; 30; 20 INJECTION, SOLUTION INTRAVENOUS at 22:44

## 2023-12-08 NOTE — ED PROVIDER NOTES
Time: 3:02 PM EST  Date of encounter:  12/8/2023  Independent Historian/Clinical History and Information was obtained by:   Patient    History is limited by: Dementia    Chief Complaint   Patient presents with    Black or Bloody Stool         History of Present Illness:  Patient is a 68 y.o. year old female who presents to the emergency department for evaluation of bloody stool.  Patient states that she was sent here by the nursing facility for having blood in her stool that started today.  Patient states she is unsure if it was bright red or if she was having dark black stools.  Patient is currently denying any abdominal pain but does state that she feels very nauseous.  Patient admits to history of GI bleeding and small bowel obstruction.  The patient also notes that she has had numerous watery stools today.  She does have a history of C. difficile colitis.  She has not been on antibiotics recently..  She denies any fever, rigors or myalgias.  She has had no nausea or vomiting.  The patient is a very limited historian.  The patient is nonambulatory because of chronic weakness.      Patient Care Team  Primary Care Provider: Cathleen Stroud APRN    Past Medical History:     No Known Allergies  Past Medical History:   Diagnosis Date    Arthritis     Cancer     left breast removed 2012    Dementia     Type 2 diabetes mellitus      Past Surgical History:   Procedure Laterality Date    ABDOMINAL SURGERY      BREAST SURGERY Left 2012    removed due to cancer    CHOLECYSTECTOMY      COLONOSCOPY  7/19/2023    Procedure: COLONOSCOPY WITH ENDOSCOPIC FECAL MICROBIOTA TRANSPLANT, GTUBE REMOVAL;  Surgeon: Hamzah Ireland MD;  Location: [facility] ENDOSCOPY;  Service: Gastroenterology;;    CYSTOSCOPY W/ URETERAL STENT PLACEMENT Right 12/09/2020    Procedure: CYSTOSCOPY, RIGHT RETROGRADE PYELOGRAM, URETEROSCOPY, LASER LITHOTRIPSY, RIGHT URETERAL STENT PLACEMENT;  Surgeon: Rohan Dooley Jr., MD;  Location: [facility]  OR;  Service: Urology;  Laterality: Right;    GASTROSTOMY W/ FEEDING TUBE      HERNIA REPAIR      mesh removed     Family History   Problem Relation Age of Onset    Diabetes Mother     Lung disease Father     Cancer Father     Colon cancer Neg Hx        Home Medications:  Prior to Admission medications    Medication Sig Start Date End Date Taking? Authorizing Provider   acetaminophen (TYLENOL) 160 MG/5ML solution Administer 320 mg per G tube Daily.    Pancho Carpenter MD   alendronate (FOSAMAX) 70 MG tablet Take 1 tablet by mouth Every 7 (Seven) Days. Thursday    Pancho Carpenter MD   amLODIPine (NORVASC) 10 MG tablet Take 1 tablet by mouth Daily.    Pancho Carpenter MD   anastrozole (ARIMIDEX) 1 MG tablet Take 1 tablet by mouth Daily. 3/5/23   Pancho Carpenter MD   carvedilol (COREG) 25 MG tablet Take 1 tablet by mouth 2 (Two) Times a Day.    Pancho Carpenter MD   cholestyramine (QUESTRAN) 4 g packet Take 1 packet by mouth 2 (Two) Times a Day.    Pancho Carpenter MD   citalopram (CeleXA) 10 MG tablet Administer 1 tablet per G tube Daily. 12/2/18   Pancho Carpenter MD   Darbepoetin Jaylen (Aranesp, Albumin Free,) 25 MCG/ML injection Inject 0.5 mL under the skin into the appropriate area as directed Every 30 (Thirty) Days. 26th of Month    Pancho Carpenter MD   Ergocalciferol 200 MCG/ML drops Administer 5 mL per G tube Daily.    Pancho Carpenter MD   ferrous sulfate 300 (60 Fe) MG/5ML syrup Administer 5 mL per G tube Daily.    Pancho Carpenter MD   fidaxomicin (DIFICID) 200 MG tablet Take 1 tablet by mouth 2 (Two) Times a Day.    Pancho Carpenter MD   ipratropium (ATROVENT) 0.02 % nebulizer solution Take 2.5 mL by nebulization 4 (Four) Times a Day Before Meals & at Bedtime.    Pancho Carpenter MD   lacosamide (VIMPAT) 10 MG/ML solution oral solution Take 10 mL by mouth Every 12 (Twelve) Hours for 3 days. 4/22/23 4/25/23  Nenita Whiting MD   levothyroxine  (SYNTHROID, LEVOTHROID) 25 MCG tablet Take 0.5 tablets by mouth Every Morning.    Pancho Carpenter MD   magnesium oxide (MAG-OX) 400 MG tablet Take 1 tablet by mouth 2 (Two) Times a Day. 23   Alin Jhaveri DO   Menthol-Zinc Oxide (Calmoseptine) 0.44-20.6 % ointment Apply 1 application  topically to the appropriate area as directed 3 (Three) Times a Day.    Pancho Carpenter MD   phosphorus (Phospha 250 Neutral) 155-852-130 MG tablet Administer 1 tablet per G tube 2 (Two) Times a Day.    Pancho Carpenter MD   potassium chloride (K-DUR,KLOR-CON) 10 MEQ CR tablet  23   Pancho Carpenter MD   potassium chloride 10 MEQ CR tablet Take 1 tablet by mouth Daily.    Pancho Carpenter MD   saccharomyces boulardii (FLORASTOR) 250 MG capsule Take 1 capsule by mouth 2 (Two) Times a Day.    Pancho Carpenter MD   sodium bicarbonate 650 MG tablet Take 1 tablet by mouth 2 (Two) Times a Day.    Pancho Carpenter MD        Social History:   Social History     Tobacco Use    Smoking status: Former     Types: Cigarettes     Quit date: 1997     Years since quittin.3     Passive exposure: Past    Smokeless tobacco: Never   Vaping Use    Vaping Use: Never used   Substance Use Topics    Alcohol use: No    Drug use: No         Review of Systems:  Review of Systems   Constitutional:  Negative for chills, diaphoresis and fever.   HENT:  Negative for congestion, postnasal drip, rhinorrhea and sore throat.    Eyes:  Negative for photophobia.   Respiratory:  Negative for cough, chest tightness and shortness of breath.    Cardiovascular:  Negative for chest pain, palpitations and leg swelling.   Gastrointestinal:  Positive for blood in stool, diarrhea and nausea. Negative for abdominal pain and vomiting.   Genitourinary:  Negative for difficulty urinating, dysuria, flank pain, frequency, hematuria and urgency.   Musculoskeletal:  Negative for neck pain and neck stiffness.   Skin:  Negative for pallor  "and rash.   Neurological:  Negative for dizziness, syncope, weakness, numbness and headaches.   Hematological:  Negative for adenopathy. Does not bruise/bleed easily.   Psychiatric/Behavioral: Negative.          Physical Exam:  /52 (BP Location: Right arm, Patient Position: Sitting)   Pulse 73   Temp 99.1 °F (37.3 °C) (Oral)   Resp 16   Ht 160 cm (62.99\")   Wt 58.4 kg (128 lb 12 oz)   SpO2 100%   BMI 22.81 kg/m²         Physical Exam  Vitals and nursing note reviewed.   Constitutional:       General: She is not in acute distress.     Appearance: Normal appearance. She is not ill-appearing, toxic-appearing or diaphoretic.   HENT:      Head: Normocephalic and atraumatic.      Mouth/Throat:      Mouth: Mucous membranes are moist.   Eyes:      Pupils: Pupils are equal, round, and reactive to light.   Cardiovascular:      Rate and Rhythm: Normal rate and regular rhythm.      Pulses: Normal pulses.           Carotid pulses are 2+ on the right side and 2+ on the left side.       Radial pulses are 2+ on the right side and 2+ on the left side.        Femoral pulses are 2+ on the right side and 2+ on the left side.       Popliteal pulses are 2+ on the right side and 2+ on the left side.        Dorsalis pedis pulses are 2+ on the right side and 2+ on the left side.        Posterior tibial pulses are 2+ on the right side and 2+ on the left side.      Heart sounds: Normal heart sounds. No murmur heard.  Pulmonary:      Effort: Pulmonary effort is normal. No accessory muscle usage, respiratory distress or retractions.      Breath sounds: Normal breath sounds. No wheezing, rhonchi or rales.   Abdominal:      General: Abdomen is flat. A surgical scar is present. There is no distension.      Palpations: Abdomen is soft. There is no fluid wave, mass or pulsatile mass.      Tenderness: There is no abdominal tenderness. There is no right CVA tenderness, left CVA tenderness, guarding or rebound.      Hernia: A hernia is " present.      Comments: No rigidity  The patient has a large amount of surgical scarring on the abdomen.  The patient does have a surgical incisional hernia that is soft and easily reducible   Musculoskeletal:         General: No swelling, tenderness or deformity.      Cervical back: Neck supple. No tenderness.      Right lower leg: Edema present.      Left lower leg: No edema.   Skin:     General: Skin is warm and dry.      Capillary Refill: Capillary refill takes less than 2 seconds.      Coloration: Skin is not jaundiced or pale.      Findings: No erythema.   Neurological:      General: No focal deficit present.      Mental Status: She is alert and oriented to person, place, and time. Mental status is at baseline.      Cranial Nerves: Cranial nerves 2-12 are intact. No cranial nerve deficit.      Sensory: Sensation is intact. No sensory deficit.      Motor: Motor function is intact. No weakness or pronator drift.      Coordination: Coordination is intact. Coordination normal.   Psychiatric:         Mood and Affect: Mood normal.         Behavior: Behavior normal.         Cognition and Memory: Cognition is impaired. Memory is impaired.                  Procedures:  Procedures      Medical Decision Making:      Comorbidities that affect care:    Diabetes, dementia, hypertension, hypothyroid    External Notes reviewed:    None      The following orders were placed and all results were independently analyzed by me:  Orders Placed This Encounter   Procedures    Urine Culture - Urine,    Blood Culture - Blood,    Blood Culture - Blood,    Clostridioides difficile Toxin - Stool, Per Rectum    Clostridioides difficile Toxin, PCR - Stool, Per Rectum    Enteric Bacterial Panel - Stool, Per Rectum    CT Abdomen Pelvis Without Contrast    Dwight Draw    Comprehensive Metabolic Panel    Protime-INR    Occult Blood, Fecal By Immunoassay - Stool, Per Rectum    CBC Auto Differential    Urinalysis With Culture If Indicated -  Urine, Clean Catch    Urinalysis, Microscopic Only - Urine, Clean Catch    Lactic Acid, Plasma    Procalcitonin    Magnesium    Comprehensive Metabolic Panel    Blood Gas, Arterial -With Co-Ox Panel: Yes    Hemoglobin A1c    Reticulocytes    Iron Profile    Folate    Vitamin B12    Basic Metabolic Panel    Magnesium    Phosphorus    CBC Auto Differential    High Sensitivity Troponin T    Lactic Acid, Plasma    Protime-INR    Magnesium    Phosphorus    Magnesium    Basic Metabolic Panel    CBC Auto Differential    High Sensitivity Troponin T 2Hr    Protein / Creatinine Ratio, Urine - Urine, Clean Catch    Microalbumin / Creatinine Urine Ratio - Urine, Clean Catch    Diet: Regular/House Diet; Texture: Regular Texture (IDDSI 7); Fluid Consistency: Thin (IDDSI 0)    Vital Signs    Straight cath    Vital Signs    Intake & Output    Weigh Patient    Oral Care    Saline Lock & Maintain IV Access    Place Sequential Compression Device    Maintain Sequential Compression Device    Activity - Ad Gladys    Code Status and Medical Interventions:    General MD Inpatient Consult    Inpatient Hospitalist Consult    Inpatient Case Management  Consult    Inpatient Urology Consult    Inpatient Nephrology Consult    Inpatient Gastroenterology Consult    Inpatient Cardiology Consult    OT Consult: Eval & Treat    PT Consult: Eval & Treat Discharge Placement Assessment    Pulse Oximetry    Oxygen Therapy- Nasal Cannula; Titrate 1-6 LPM Per SpO2; 90 - 95%    POC Glucose 4x Daily Before Meals & at Bedtime    POC Glucose Once    POC Glucose Once    POC Glucose Once    ECG 12 Lead Chest Pain    Adult Transthoracic Echo Complete W/ Cont if Necessary Per Protocol    Type & Screen    ABO RH Specimen Verification    Insert Peripheral IV    Insert Peripheral IV    Inpatient Admission    Fall Precautions    CBC & Differential    Green Top (Gel)    Lavender Top    Gold Top - SST    Light Blue Top    CBC & Differential    CBC &  Differential       Medications Given in the Emergency Department:  Medications   sodium chloride 0.9 % flush 10 mL (has no administration in time range)   saccharomyces boulardii (FLORASTOR) capsule 250 mg (250 mg Oral Given 12/9/23 2028)   amLODIPine (NORVASC) tablet 10 mg (10 mg Oral Given 12/9/23 0941)   carvedilol (COREG) tablet 25 mg (25 mg Oral Given 12/9/23 2028)   sodium chloride 0.9 % flush 10 mL (10 mL Intravenous Given 12/9/23 2031)   sodium chloride 0.9 % flush 10 mL (has no administration in time range)   sodium chloride 0.9 % infusion 40 mL (has no administration in time range)   ondansetron (ZOFRAN) injection 4 mg (has no administration in time range)   dextrose (GLUTOSE) oral gel 15 g (has no administration in time range)   dextrose (D50W) (25 g/50 mL) IV injection 25 g (has no administration in time range)   glucagon (GLUCAGEN) injection 1 mg (has no administration in time range)   Insulin Lispro (humaLOG) injection 2-7 Units (2 Units Subcutaneous Given 12/9/23 2138)   cefTRIAXone (ROCEPHIN) IVPB 1,000 mg (1,000 mg Intravenous New Bag 12/9/23 2028)   sodium bicarbonate tablet 1,300 mg (1,300 mg Oral Given 12/9/23 2030)   acetaminophen (TYLENOL) tablet 650 mg (650 mg Oral Given 12/9/23 2029)   pantoprazole (PROTONIX) injection 40 mg (40 mg Intravenous Given 12/9/23 2028)   sennosides-docusate (PERICOLACE) 8.6-50 MG per tablet 2 tablet (2 tablets Oral Not Given 12/9/23 2048)     And   polyethylene glycol (MIRALAX) packet 17 g (17 g Oral Not Given 12/9/23 0941)     And   bisacodyl (DULCOLAX) EC tablet 5 mg (has no administration in time range)     And   bisacodyl (DULCOLAX) suppository 10 mg (has no administration in time range)   metoprolol tartrate (LOPRESSOR) injection 5 mg (5 mg Intravenous Not Given 12/9/23 1136)   epoetin chris (EPOGEN,PROCRIT) injection 20,000 Units (has no administration in time range)   cefTRIAXone (ROCEPHIN) IVPB 1,000 mg (0 mg Intravenous Stopped 12/8/23 2823)   metoprolol  tartrate (LOPRESSOR) injection 5 mg (5 mg Intravenous Given 12/9/23 1117)        ED Course:    The patient was initially evaluated in the triage area where orders were placed. The patient was later dispositioned by Juan Pablo Luna DO.      The patient was advised to stay for completion of workup which includes but is not limited to communication of labs and radiological results, reassessment and plan. The patient was advised that leaving prior to disposition by a provider could result in critical findings that are not communicated to the patient.     ED Course as of 12/10/23 0318   Fri Dec 08, 2023   1502 PROVIDER IN TRIAGE  Patient was evaluated by me in triage, Esau Sanchez PA-C.  Orders were placed and patient is currently awaiting final results and disposition.  [MD]      ED Course User Index  [MD] Esau Sanchez PA-C       Labs:    Lab Results (last 24 hours)       Procedure Component Value Units Date/Time    Comprehensive Metabolic Panel [[id number]]  (Abnormal) Collected: 12/09/23 0438    Specimen: Blood Updated: 12/09/23 0510     Glucose 79 mg/dL      BUN 29 mg/dL      Creatinine 3.24 mg/dL      Sodium 142 mmol/L      Potassium 4.1 mmol/L      Chloride 117 mmol/L      CO2 14.5 mmol/L      Calcium 7.3 mg/dL      Total Protein 5.4 g/dL      Albumin 2.5 g/dL      ALT (SGPT) 9 U/L      AST (SGOT) 10 U/L      Alkaline Phosphatase 73 U/L      Total Bilirubin <0.2 mg/dL      Globulin 2.9 gm/dL      A/G Ratio 0.9 g/dL      BUN/Creatinine Ratio 9.0     Anion Gap 10.5 mmol/L      eGFR 15.1 mL/min/1.73     Narrative:      GFR Normal >60  Chronic Kidney Disease <60  Kidney Failure <15      CBC (No Diff) [[id number]]  (Abnormal) Collected: 12/09/23 0438    Specimen: Blood Updated: 12/09/23 0455     WBC 9.08 10*3/mm3      RBC 2.60 10*6/mm3      Hemoglobin 7.4 g/dL      Hematocrit 23.5 %      MCV 90.4 fL      MCH 28.5 pg      MCHC 31.5 g/dL      RDW 14.7 %      RDW-SD 48.9 fl      MPV 9.5 fL      Platelets 194 10*3/mm3      Reticulocytes [[id number]]  (Normal) Collected: 12/09/23 0438    Specimen: Blood Updated: 12/09/23 0455     Reticulocyte % 1.45 %      Reticulocyte Absolute 0.0377 10*6/mm3     Iron Profile [[id number]]  (Abnormal) Collected: 12/09/23 0438    Specimen: Blood Updated: 12/09/23 0510     Iron 43 mcg/dL      Iron Saturation (TSAT) 22 %      Transferrin 130 mg/dL      TIBC 194 mcg/dL     Folate [[id number]]  (Normal) Collected: 12/09/23 0438    Specimen: Blood Updated: 12/09/23 1318     Folate 8.78 ng/mL     Narrative:      Results may be falsely increased if patient taking Biotin.      Vitamin B12 [[id number]]  (Normal) Collected: 12/09/23 0438    Specimen: Blood Updated: 12/09/23 1318     Vitamin B-12 333 pg/mL     Narrative:      Results may be falsely increased if patient taking Biotin.      CBC & Differential [[id number]]  (Abnormal) Collected: 12/09/23 0438    Specimen: Blood Updated: 12/09/23 0842    Narrative:      The following orders were created for panel order CBC & Differential.  Procedure                               Abnormality         Status                     ---------                               -----------         ------                     CBC Auto Differential[[id number]]        Abnormal            Final result                 Please view results for these tests on the individual orders.    CBC Auto Differential [[id number]]  (Abnormal) Collected: 12/09/23 0438    Specimen: Blood Updated: 12/09/23 0842     WBC 9.13 10*3/mm3      RBC 2.56 10*6/mm3      Hemoglobin 7.4 g/dL      Hematocrit 23.4 %      MCV 91.4 fL      MCH 28.9 pg      MCHC 31.6 g/dL      RDW 14.9 %      RDW-SD 49.9 fl      MPV 10.1 fL      Platelets 201 10*3/mm3      Neutrophil % 74.0 %      Lymphocyte % 18.8 %      Monocyte % 4.5 %      Eosinophil % 1.8 %      Basophil % 0.7 %      Immature Grans % 0.2 %      Neutrophils, Absolute 6.76 10*3/mm3      Lymphocytes, Absolute 1.72 10*3/mm3      Monocytes, Absolute 0.41 10*3/mm3       Eosinophils, Absolute 0.16 10*3/mm3      Basophils, Absolute 0.06 10*3/mm3      Immature Grans, Absolute 0.02 10*3/mm3      nRBC 0.0 /100 WBC     Magnesium [[id number]]  (Normal) Collected: 12/09/23 0438    Specimen: Blood Updated: 12/09/23 0948     Magnesium 1.6 mg/dL     High Sensitivity Troponin T [[id number]]  (Abnormal) Collected: 12/09/23 0936    Specimen: Blood Updated: 12/09/23 1015     HS Troponin T 35 ng/L     Narrative:      High Sensitive Troponin T Reference Range:  <14.0 ng/L- Negative Female for AMI  <22.0 ng/L- Negative Male for AMI  >=14 - Abnormal Female indicating possible myocardial injury.  >=22 - Abnormal Male indicating possible myocardial injury.   Clinicians would have to utilize clinical acumen, EKG, Troponin, and serial changes to determine if it is an Acute Myocardial Infarction or myocardial injury due to an underlying chronic condition.         Magnesium [[id number]]  (Abnormal) Collected: 12/09/23 0936    Specimen: Blood Updated: 12/09/23 1015     Magnesium 1.5 mg/dL     Phosphorus [[id number]]  (Abnormal) Collected: 12/09/23 0936    Specimen: Blood Updated: 12/09/23 1015     Phosphorus 5.6 mg/dL     Basic Metabolic Panel [[id number]]  (Abnormal) Collected: 12/09/23 0936    Specimen: Blood Updated: 12/09/23 1015     Glucose 143 mg/dL      BUN 33 mg/dL      Creatinine 3.10 mg/dL      Sodium 141 mmol/L      Potassium 3.7 mmol/L      Chloride 114 mmol/L      CO2 16.6 mmol/L      Calcium 7.4 mg/dL      BUN/Creatinine Ratio 10.6     Anion Gap 10.4 mmol/L      eGFR 15.9 mL/min/1.73     Narrative:      GFR Normal >60  Chronic Kidney Disease <60  Kidney Failure <15      Lactic Acid, Plasma [[id number]]  (Normal) Collected: 12/09/23 0937    Specimen: Blood Updated: 12/09/23 1010     Lactate 1.0 mmol/L     CBC & Differential [[id number]]  (Abnormal) Collected: 12/09/23 0937    Specimen: Blood Updated: 12/09/23 0944    Narrative:      The following orders were created for panel order CBC &  Differential.  Procedure                               Abnormality         Status                     ---------                               -----------         ------                     CBC Auto Differential[[id number]]        Abnormal            Final result                 Please view results for these tests on the individual orders.    Protime-INR [[id number]]  (Abnormal) Collected: 12/09/23 0937    Specimen: Blood Updated: 12/09/23 0951     Protime 16.0 Seconds      INR 1.26    Narrative:      Suggested Therapeutic Ranges For Oral Anticoagulant Therapy:  Level of Therapy                      INR Target Range  Standard Dose                            2.0-3.0  High Dose                                2.5-3.5  Patients not receiving anticoagulant  Therapy Normal Range                     0.86-1.15    CBC Auto Differential [[id number]]  (Abnormal) Collected: 12/09/23 0937    Specimen: Blood Updated: 12/09/23 0944     WBC 8.91 10*3/mm3      RBC 2.73 10*6/mm3      Hemoglobin 7.7 g/dL      Hematocrit 24.6 %      MCV 90.1 fL      MCH 28.2 pg      MCHC 31.3 g/dL      RDW 14.8 %      RDW-SD 48.9 fl      MPV 9.4 fL      Platelets 212 10*3/mm3      Neutrophil % 82.4 %      Lymphocyte % 12.1 %      Monocyte % 3.1 %      Eosinophil % 1.6 %      Basophil % 0.6 %      Immature Grans % 0.2 %      Neutrophils, Absolute 7.34 10*3/mm3      Lymphocytes, Absolute 1.08 10*3/mm3      Monocytes, Absolute 0.28 10*3/mm3      Eosinophils, Absolute 0.14 10*3/mm3      Basophils, Absolute 0.05 10*3/mm3      Immature Grans, Absolute 0.02 10*3/mm3      nRBC 0.0 /100 WBC     Clostridioides difficile Toxin - Stool, Per Rectum [[id number]] Collected: 12/09/23 0953    Specimen: Stool from Per Rectum Updated: 12/09/23 1329    Narrative:      The following orders were created for panel order Clostridioides difficile Toxin - Stool, Per Rectum.  Procedure                               Abnormality         Status                     ---------                                -----------         ------                     Clostridioides difficile...[[id number]]                      Final result                 Please view results for these tests on the individual orders.    Clostridioides difficile Toxin, PCR - Stool, Per Rectum [[id number]] Collected: 12/09/23 0953    Specimen: Stool from Per Rectum Updated: 12/09/23 1329     Toxigenic C. difficile by PCR Negative     027 Toxin Presumptive Negative    Narrative:      The result indicates the absence of toxigenic C. difficile from stool specimen.     Enteric Bacterial Panel - Stool, Per Rectum [[id number]] Collected: 12/09/23 0953    Specimen: Stool from Per Rectum Updated: 12/09/23 1102    POC Glucose Once [[id number]]  (Abnormal) Collected: 12/09/23 1116    Specimen: Blood Updated: 12/09/23 1120     Glucose 120 mg/dL      Comment: Serial Number: [id number]:  [id number]       High Sensitivity Troponin T 2Hr [[id number]]  (Abnormal) Collected: 12/09/23 1128    Specimen: Blood Updated: 12/09/23 1219     HS Troponin T 36 ng/L      Troponin T Delta 1 ng/L     Narrative:      High Sensitive Troponin T Reference Range:  <14.0 ng/L- Negative Female for AMI  <22.0 ng/L- Negative Male for AMI  >=14 - Abnormal Female indicating possible myocardial injury.  >=22 - Abnormal Male indicating possible myocardial injury.   Clinicians would have to utilize clinical acumen, EKG, Troponin, and serial changes to determine if it is an Acute Myocardial Infarction or myocardial injury due to an underlying chronic condition.         POC Glucose Once [[id number]]  (Abnormal) Collected: 12/09/23 1720    Specimen: Blood Updated: 12/09/23 1723     Glucose 101 mg/dL      Comment: Serial Number: [id number]:  [id number]       POC Glucose Once [[id number]]  (Abnormal) Collected: 12/09/23 2053    Specimen: Blood Updated: 12/09/23 2055     Glucose 154 mg/dL      Comment: Serial Number: [id number]:  [id number]                Imaging:    Adult  Transthoracic Echo Complete W/ Cont if Necessary Per Protocol    Result Date: 12/9/2023    Left ventricular ejection fraction appears to be 56 - 60%.   Left ventricular wall thickness is consistent with mild concentric hypertrophy.   No pericardial effusion        Differential Diagnosis and Discussion:      GI Bleeding: Differential diagnosis includes but is not limited to gastritis, gastric ulcer, stress ulcer, duodenitis, Vanessa-Dowell tears, esophageal varices, angiodysplasia, aortic enteric fistula, hematologic issues including thrombocytopenia, GI neoplasm, ulcerative colitis, Crohn's disease, diverticulosis, diverticulitis, hemorrhoids, aortic aneurysm, and polyps    All labs were reviewed and interpreted by me.  All X-rays impressions were independently interpreted by me.  EKG was interpreted by me.  CT scan radiology impression was interpreted by me.    MDM  Number of Diagnoses or Management Options  Chronic anemia  Chronic kidney disease, unspecified CKD stage  Diarrhea, unspecified type  History of Clostridioides difficile colitis  Hydronephrosis, unspecified hydronephrosis type  Pyelonephritis  Ruptured Bakers cyst  Diagnosis management comments: Sepsis was not present in the emergency department or on arrival. This is supported as the patient does not either meet two out of the four SIRS criteria or has an obvious bacterial infection.      SIRS criteria considered:   1.                     Temperature > 100.4 or <98.6    2.                     Heart Rate > 90    3.                     Respiratory Rate > 22    4.                     WBC > 12K or <4K.     Patient's urinalysis was positive for infection.  Patient had too numerous white blood cells and 4+ bacteria with 0 squamous.  Urine culture was ordered the results are pending at the time of disposition    Blood cultures were ordered and pending at the time of disposition    The patient's UTI was treated with 1 g of Rocephin.    Patient's lactate was  normal at 0.5    The patient's CBC was reviewed and shows no abnormalities of critical concern.  Of note, there is no anemia requiring a blood transfusion and the platelet count is acceptable    CMP demonstrates a glucose of 154, BUN at 33, creatinine 3.31.  The patient's sodium potassium were normal.  Patient had a normal anion gap.  Patient had normal liver enzymes.    Duplex Doppler of the right lower extremity was negative for DVT.  There was what appeared to be a ruptured Baker's cyst    CT scan of the abdomen pelvis demonstrates malrotation of the right kidney, severe right hydronephrosis and right perinephric fat stranding.  Findings were concerning for pyelonephritis and obstruction.  Please see the report for the remainder of the details       Amount and/or Complexity of Data Reviewed  Clinical lab tests: reviewed  Tests in the radiology section of CPT®: reviewed  Tests in the medicine section of CPT®: reviewed  Decide to obtain previous medical records or to obtain history from someone other than the patient: yes  Discuss the patient with other providers: yes (I discussed case with Dr. Powell.  We have reviewed the imaging on the CT.  He is requesting IV antibiotics be started.  He request hospitalist for admission.  He will see the patient in consultation    20:48 EST  I discussed the case with the hospitalist.  We have discussed the patient's presenting symptoms, laboratory values, imaging and condition at the time of admission.  They will evaluate the patient in the emergency room and admit the patient to the hospital    )         Social Determinants of Health:    Patient is a nursing home/assisted living resident and has reliable access to care.      Disposition and Care Coordination:    Admit:   Through independent evaluation of the patient's history, physical, and imperical data, the patient meets criteria for observation/admission to the hospital.        Final diagnoses:   Diarrhea, unspecified  type   History of Clostridioides difficile colitis   Pyelonephritis   Hydronephrosis, unspecified hydronephrosis type   Chronic kidney disease, unspecified CKD stage   Chronic anemia   Ruptured Bakers cyst        ED Disposition       ED Disposition   Decision to Admit    Condition   --    Comment   Level of Care: Telemetry [5]   Diagnosis: Pyelonephritis [559055]   Admitting Physician: AURE MIRELES [[id number]]   Certification: I Certify That Inpatient Hospital Services Are Medically Necessary For Greater Than 2 Midnights                 This medical record created using voice recognition software.             Juan Pablo Luna DO  12/10/23 0318

## 2023-12-09 ENCOUNTER — APPOINTMENT (OUTPATIENT)
Dept: CARDIOLOGY | Facility: HOSPITAL | Age: 68
End: 2023-12-09
Payer: MEDICARE

## 2023-12-09 PROBLEM — N13.30 HYDRONEPHROSIS: Status: ACTIVE | Noted: 2023-12-09

## 2023-12-09 PROBLEM — R10.9 ABDOMINAL PAIN: Status: ACTIVE | Noted: 2023-12-09

## 2023-12-09 PROBLEM — D64.9 CHRONIC ANEMIA: Status: ACTIVE | Noted: 2023-12-09

## 2023-12-09 LAB
027 TOXIN: NORMAL
ALBUMIN SERPL-MCNC: 2.5 G/DL (ref 3.5–5.2)
ALBUMIN UR-MCNC: 119.9 MG/DL
ALBUMIN/GLOB SERPL: 0.9 G/DL
ALP SERPL-CCNC: 73 U/L (ref 39–117)
ALT SERPL W P-5'-P-CCNC: 9 U/L (ref 1–33)
ANION GAP SERPL CALCULATED.3IONS-SCNC: 10.4 MMOL/L (ref 5–15)
ANION GAP SERPL CALCULATED.3IONS-SCNC: 10.5 MMOL/L (ref 5–15)
AST SERPL-CCNC: 10 U/L (ref 1–32)
BASOPHILS # BLD AUTO: 0.05 10*3/MM3 (ref 0–0.2)
BASOPHILS # BLD AUTO: 0.06 10*3/MM3 (ref 0–0.2)
BASOPHILS NFR BLD AUTO: 0.6 % (ref 0–1.5)
BASOPHILS NFR BLD AUTO: 0.7 % (ref 0–1.5)
BH CV ECHO MEAS - AO ROOT DIAM: 3.2 CM
BH CV ECHO MEAS - EDV(CUBED): 89.3 ML
BH CV ECHO MEAS - EDV(MOD-SP2): 64.4 ML
BH CV ECHO MEAS - EDV(MOD-SP4): 64.6 ML
BH CV ECHO MEAS - EF(MOD-BP): 55.1 %
BH CV ECHO MEAS - EF(MOD-SP2): 54.2 %
BH CV ECHO MEAS - EF(MOD-SP4): 52.6 %
BH CV ECHO MEAS - ESV(CUBED): 31 ML
BH CV ECHO MEAS - ESV(MOD-SP2): 29.5 ML
BH CV ECHO MEAS - ESV(MOD-SP4): 30.6 ML
BH CV ECHO MEAS - FS: 29.8 %
BH CV ECHO MEAS - IVS/LVPW: 1.41 CM
BH CV ECHO MEAS - IVSD: 1.07 CM
BH CV ECHO MEAS - LA DIMENSION: 3.5 CM
BH CV ECHO MEAS - LAT PEAK E' VEL: 8.6 CM/SEC
BH CV ECHO MEAS - LV DIASTOLIC VOL/BSA (35-75): 41.4 CM2
BH CV ECHO MEAS - LV MASS(C)D: 134.4 GRAMS
BH CV ECHO MEAS - LV SYSTOLIC VOL/BSA (12-30): 19.6 CM2
BH CV ECHO MEAS - LVIDD: 4.5 CM
BH CV ECHO MEAS - LVIDS: 3.1 CM
BH CV ECHO MEAS - LVOT AREA: 3.1 CM2
BH CV ECHO MEAS - LVOT DIAM: 2 CM
BH CV ECHO MEAS - LVPWD: 0.76 CM
BH CV ECHO MEAS - MED PEAK E' VEL: 7.4 CM/SEC
BH CV ECHO MEAS - MV A MAX VEL: 140 CM/SEC
BH CV ECHO MEAS - MV DEC SLOPE: 709 CM/SEC2
BH CV ECHO MEAS - MV DEC TIME: 0.16 SEC
BH CV ECHO MEAS - MV E MAX VEL: 113 CM/SEC
BH CV ECHO MEAS - MV E/A: 0.81
BH CV ECHO MEAS - RVDD: 2.6 CM
BH CV ECHO MEAS - SI(MOD-SP2): 22.3 ML/M2
BH CV ECHO MEAS - SI(MOD-SP4): 21.8 ML/M2
BH CV ECHO MEAS - SV(MOD-SP2): 34.9 ML
BH CV ECHO MEAS - SV(MOD-SP4): 34 ML
BH CV ECHO MEAS - TAPSE (>1.6): 1.99 CM
BH CV ECHO MEAS - TR MAX PG: 30.7 MMHG
BH CV ECHO MEAS - TR MAX VEL: 277 CM/SEC
BH CV ECHO MEASUREMENTS AVERAGE E/E' RATIO: 14.13
BH CV LOWER VASCULAR LEFT COMMON FEMORAL AUGMENT: NORMAL
BH CV LOWER VASCULAR LEFT COMMON FEMORAL COMPETENT: NORMAL
BH CV LOWER VASCULAR LEFT COMMON FEMORAL COMPRESS: NORMAL
BH CV LOWER VASCULAR LEFT COMMON FEMORAL PHASIC: NORMAL
BH CV LOWER VASCULAR LEFT COMMON FEMORAL SPONT: NORMAL
BH CV LOWER VASCULAR RIGHT COMMON FEMORAL AUGMENT: NORMAL
BH CV LOWER VASCULAR RIGHT COMMON FEMORAL COMPETENT: NORMAL
BH CV LOWER VASCULAR RIGHT COMMON FEMORAL COMPRESS: NORMAL
BH CV LOWER VASCULAR RIGHT COMMON FEMORAL PHASIC: NORMAL
BH CV LOWER VASCULAR RIGHT COMMON FEMORAL SPONT: NORMAL
BH CV LOWER VASCULAR RIGHT DISTAL FEMORAL COMPRESS: NORMAL
BH CV LOWER VASCULAR RIGHT GASTRONEMIUS COMPRESS: NORMAL
BH CV LOWER VASCULAR RIGHT GREATER SAPH AK COMPRESS: NORMAL
BH CV LOWER VASCULAR RIGHT GREATER SAPH BK COMPRESS: NORMAL
BH CV LOWER VASCULAR RIGHT LESSER SAPH COMPRESS: NORMAL
BH CV LOWER VASCULAR RIGHT MID FEMORAL AUGMENT: NORMAL
BH CV LOWER VASCULAR RIGHT MID FEMORAL COMPETENT: NORMAL
BH CV LOWER VASCULAR RIGHT MID FEMORAL COMPRESS: NORMAL
BH CV LOWER VASCULAR RIGHT MID FEMORAL PHASIC: NORMAL
BH CV LOWER VASCULAR RIGHT MID FEMORAL SPONT: NORMAL
BH CV LOWER VASCULAR RIGHT PERONEAL COMPRESS: NORMAL
BH CV LOWER VASCULAR RIGHT POPLITEAL AUGMENT: NORMAL
BH CV LOWER VASCULAR RIGHT POPLITEAL COMPETENT: NORMAL
BH CV LOWER VASCULAR RIGHT POPLITEAL COMPRESS: NORMAL
BH CV LOWER VASCULAR RIGHT POPLITEAL PHASIC: NORMAL
BH CV LOWER VASCULAR RIGHT POPLITEAL SPONT: NORMAL
BH CV LOWER VASCULAR RIGHT POSTERIOR TIBIAL AUGMENT: NORMAL
BH CV LOWER VASCULAR RIGHT POSTERIOR TIBIAL COMPRESS: NORMAL
BH CV LOWER VASCULAR RIGHT PROXIMAL FEMORAL COMPRESS: NORMAL
BH CV VAS POP FLUID COLLECTED: 1
BH CV VAS PRELIMINARY FINDINGS SCRIPTING: 1
BILIRUB SERPL-MCNC: <0.2 MG/DL (ref 0–1.2)
BUN SERPL-MCNC: 29 MG/DL (ref 8–23)
BUN SERPL-MCNC: 33 MG/DL (ref 8–23)
BUN/CREAT SERPL: 10.6 (ref 7–25)
BUN/CREAT SERPL: 9 (ref 7–25)
C DIFF TOX GENS STL QL NAA+PROBE: NEGATIVE
CALCIUM SPEC-SCNC: 7.3 MG/DL (ref 8.6–10.5)
CALCIUM SPEC-SCNC: 7.4 MG/DL (ref 8.6–10.5)
CHLORIDE SERPL-SCNC: 114 MMOL/L (ref 98–107)
CHLORIDE SERPL-SCNC: 117 MMOL/L (ref 98–107)
CO2 SERPL-SCNC: 14.5 MMOL/L (ref 22–29)
CO2 SERPL-SCNC: 16.6 MMOL/L (ref 22–29)
CREAT SERPL-MCNC: 3.1 MG/DL (ref 0.57–1)
CREAT SERPL-MCNC: 3.24 MG/DL (ref 0.57–1)
CREAT UR-MCNC: 51.1 MG/DL
CREAT UR-MCNC: 54.2 MG/DL
D-LACTATE SERPL-SCNC: 1 MMOL/L (ref 0.5–2)
DEPRECATED RDW RBC AUTO: 48.9 FL (ref 37–54)
DEPRECATED RDW RBC AUTO: 48.9 FL (ref 37–54)
DEPRECATED RDW RBC AUTO: 49.9 FL (ref 37–54)
EGFRCR SERPLBLD CKD-EPI 2021: 15.1 ML/MIN/1.73
EGFRCR SERPLBLD CKD-EPI 2021: 15.9 ML/MIN/1.73
EOSINOPHIL # BLD AUTO: 0.14 10*3/MM3 (ref 0–0.4)
EOSINOPHIL # BLD AUTO: 0.16 10*3/MM3 (ref 0–0.4)
EOSINOPHIL NFR BLD AUTO: 1.6 % (ref 0.3–6.2)
EOSINOPHIL NFR BLD AUTO: 1.8 % (ref 0.3–6.2)
ERYTHROCYTE [DISTWIDTH] IN BLOOD BY AUTOMATED COUNT: 14.7 % (ref 12.3–15.4)
ERYTHROCYTE [DISTWIDTH] IN BLOOD BY AUTOMATED COUNT: 14.8 % (ref 12.3–15.4)
ERYTHROCYTE [DISTWIDTH] IN BLOOD BY AUTOMATED COUNT: 14.9 % (ref 12.3–15.4)
FOLATE SERPL-MCNC: 8.78 NG/ML (ref 4.78–24.2)
GEN 5 2HR TROPONIN T REFLEX: 36 NG/L
GLOBULIN UR ELPH-MCNC: 2.9 GM/DL
GLUCOSE BLDC GLUCOMTR-MCNC: 101 MG/DL (ref 70–99)
GLUCOSE BLDC GLUCOMTR-MCNC: 120 MG/DL (ref 70–99)
GLUCOSE BLDC GLUCOMTR-MCNC: 154 MG/DL (ref 70–99)
GLUCOSE SERPL-MCNC: 143 MG/DL (ref 65–99)
GLUCOSE SERPL-MCNC: 79 MG/DL (ref 65–99)
HBA1C MFR BLD: 4.9 % (ref 4.8–5.6)
HCT VFR BLD AUTO: 23.4 % (ref 34–46.6)
HCT VFR BLD AUTO: 23.5 % (ref 34–46.6)
HCT VFR BLD AUTO: 24.6 % (ref 34–46.6)
HGB BLD-MCNC: 7.4 G/DL (ref 12–15.9)
HGB BLD-MCNC: 7.4 G/DL (ref 12–15.9)
HGB BLD-MCNC: 7.7 G/DL (ref 12–15.9)
IMM GRANULOCYTES # BLD AUTO: 0.02 10*3/MM3 (ref 0–0.05)
IMM GRANULOCYTES # BLD AUTO: 0.02 10*3/MM3 (ref 0–0.05)
IMM GRANULOCYTES NFR BLD AUTO: 0.2 % (ref 0–0.5)
IMM GRANULOCYTES NFR BLD AUTO: 0.2 % (ref 0–0.5)
INR PPP: 1.26 (ref 0.86–1.15)
IRON 24H UR-MRATE: 43 MCG/DL (ref 37–145)
IRON SATN MFR SERPL: 22 % (ref 20–50)
IVRT: 63 MS
LEFT ATRIUM VOLUME INDEX: 20.1 ML/M2
LYMPHOCYTES # BLD AUTO: 1.08 10*3/MM3 (ref 0.7–3.1)
LYMPHOCYTES # BLD AUTO: 1.72 10*3/MM3 (ref 0.7–3.1)
LYMPHOCYTES NFR BLD AUTO: 12.1 % (ref 19.6–45.3)
LYMPHOCYTES NFR BLD AUTO: 18.8 % (ref 19.6–45.3)
MAGNESIUM SERPL-MCNC: 1.5 MG/DL (ref 1.6–2.4)
MAGNESIUM SERPL-MCNC: 1.6 MG/DL (ref 1.6–2.4)
MCH RBC QN AUTO: 28.2 PG (ref 26.6–33)
MCH RBC QN AUTO: 28.5 PG (ref 26.6–33)
MCH RBC QN AUTO: 28.9 PG (ref 26.6–33)
MCHC RBC AUTO-ENTMCNC: 31.3 G/DL (ref 31.5–35.7)
MCHC RBC AUTO-ENTMCNC: 31.5 G/DL (ref 31.5–35.7)
MCHC RBC AUTO-ENTMCNC: 31.6 G/DL (ref 31.5–35.7)
MCV RBC AUTO: 90.1 FL (ref 79–97)
MCV RBC AUTO: 90.4 FL (ref 79–97)
MCV RBC AUTO: 91.4 FL (ref 79–97)
MICROALBUMIN/CREAT UR: 2346.4 MG/G (ref 0–29)
MONOCYTES # BLD AUTO: 0.28 10*3/MM3 (ref 0.1–0.9)
MONOCYTES # BLD AUTO: 0.41 10*3/MM3 (ref 0.1–0.9)
MONOCYTES NFR BLD AUTO: 3.1 % (ref 5–12)
MONOCYTES NFR BLD AUTO: 4.5 % (ref 5–12)
NEUTROPHILS NFR BLD AUTO: 6.76 10*3/MM3 (ref 1.7–7)
NEUTROPHILS NFR BLD AUTO: 7.34 10*3/MM3 (ref 1.7–7)
NEUTROPHILS NFR BLD AUTO: 74 % (ref 42.7–76)
NEUTROPHILS NFR BLD AUTO: 82.4 % (ref 42.7–76)
NRBC BLD AUTO-RTO: 0 /100 WBC (ref 0–0.2)
NRBC BLD AUTO-RTO: 0 /100 WBC (ref 0–0.2)
PHOSPHATE SERPL-MCNC: 5.6 MG/DL (ref 2.5–4.5)
PLATELET # BLD AUTO: 194 10*3/MM3 (ref 140–450)
PLATELET # BLD AUTO: 201 10*3/MM3 (ref 140–450)
PLATELET # BLD AUTO: 212 10*3/MM3 (ref 140–450)
PMV BLD AUTO: 10.1 FL (ref 6–12)
PMV BLD AUTO: 9.4 FL (ref 6–12)
PMV BLD AUTO: 9.5 FL (ref 6–12)
POTASSIUM SERPL-SCNC: 3.7 MMOL/L (ref 3.5–5.2)
POTASSIUM SERPL-SCNC: 4.1 MMOL/L (ref 3.5–5.2)
PROT ?TM UR-MCNC: 234.2 MG/DL
PROT SERPL-MCNC: 5.4 G/DL (ref 6–8.5)
PROT/CREAT UR: 4.32 MG/G{CREAT}
PROTHROMBIN TIME: 16 SECONDS (ref 11.8–14.9)
QT INTERVAL: 390 MS
QTC INTERVAL: 428 MS
RBC # BLD AUTO: 2.56 10*6/MM3 (ref 3.77–5.28)
RBC # BLD AUTO: 2.6 10*6/MM3 (ref 3.77–5.28)
RBC # BLD AUTO: 2.73 10*6/MM3 (ref 3.77–5.28)
RETICS # AUTO: 0.04 10*6/MM3 (ref 0.02–0.13)
RETICS/RBC NFR AUTO: 1.45 % (ref 0.7–1.9)
SODIUM SERPL-SCNC: 141 MMOL/L (ref 136–145)
SODIUM SERPL-SCNC: 142 MMOL/L (ref 136–145)
TIBC SERPL-MCNC: 194 MCG/DL (ref 298–536)
TRANSFERRIN SERPL-MCNC: 130 MG/DL (ref 200–360)
TROPONIN T DELTA: 1 NG/L
TROPONIN T SERPL HS-MCNC: 35 NG/L
VIT B12 BLD-MCNC: 333 PG/ML (ref 211–946)
WBC NRBC COR # BLD AUTO: 8.91 10*3/MM3 (ref 3.4–10.8)
WBC NRBC COR # BLD AUTO: 9.08 10*3/MM3 (ref 3.4–10.8)
WBC NRBC COR # BLD AUTO: 9.13 10*3/MM3 (ref 3.4–10.8)

## 2023-12-09 PROCEDURE — 87505 NFCT AGENT DETECTION GI: CPT | Performed by: INTERNAL MEDICINE

## 2023-12-09 PROCEDURE — 82948 REAGENT STRIP/BLOOD GLUCOSE: CPT

## 2023-12-09 PROCEDURE — 83605 ASSAY OF LACTIC ACID: CPT | Performed by: INTERNAL MEDICINE

## 2023-12-09 PROCEDURE — 82746 ASSAY OF FOLIC ACID SERUM: CPT | Performed by: FAMILY MEDICINE

## 2023-12-09 PROCEDURE — 25010000002 CEFTRIAXONE PER 250 MG: Performed by: FAMILY MEDICINE

## 2023-12-09 PROCEDURE — 93306 TTE W/DOPPLER COMPLETE: CPT | Performed by: INTERNAL MEDICINE

## 2023-12-09 PROCEDURE — 82607 VITAMIN B-12: CPT | Performed by: FAMILY MEDICINE

## 2023-12-09 PROCEDURE — 86160 COMPLEMENT ANTIGEN: CPT | Performed by: STUDENT IN AN ORGANIZED HEALTH CARE EDUCATION/TRAINING PROGRAM

## 2023-12-09 PROCEDURE — 85610 PROTHROMBIN TIME: CPT | Performed by: INTERNAL MEDICINE

## 2023-12-09 PROCEDURE — 84466 ASSAY OF TRANSFERRIN: CPT | Performed by: FAMILY MEDICINE

## 2023-12-09 PROCEDURE — 85027 COMPLETE CBC AUTOMATED: CPT | Performed by: FAMILY MEDICINE

## 2023-12-09 PROCEDURE — 86706 HEP B SURFACE ANTIBODY: CPT | Performed by: STUDENT IN AN ORGANIZED HEALTH CARE EDUCATION/TRAINING PROGRAM

## 2023-12-09 PROCEDURE — 99222 1ST HOSP IP/OBS MODERATE 55: CPT | Performed by: UROLOGY

## 2023-12-09 PROCEDURE — 84100 ASSAY OF PHOSPHORUS: CPT | Performed by: INTERNAL MEDICINE

## 2023-12-09 PROCEDURE — 86803 HEPATITIS C AB TEST: CPT | Performed by: STUDENT IN AN ORGANIZED HEALTH CARE EDUCATION/TRAINING PROGRAM

## 2023-12-09 PROCEDURE — 93010 ELECTROCARDIOGRAM REPORT: CPT | Performed by: INTERNAL MEDICINE

## 2023-12-09 PROCEDURE — 85025 COMPLETE CBC W/AUTO DIFF WBC: CPT | Performed by: INTERNAL MEDICINE

## 2023-12-09 PROCEDURE — 84484 ASSAY OF TROPONIN QUANT: CPT | Performed by: INTERNAL MEDICINE

## 2023-12-09 PROCEDURE — 87493 C DIFF AMPLIFIED PROBE: CPT | Performed by: FAMILY MEDICINE

## 2023-12-09 PROCEDURE — 80053 COMPREHEN METABOLIC PANEL: CPT | Performed by: FAMILY MEDICINE

## 2023-12-09 PROCEDURE — 85045 AUTOMATED RETICULOCYTE COUNT: CPT | Performed by: FAMILY MEDICINE

## 2023-12-09 PROCEDURE — 83540 ASSAY OF IRON: CPT | Performed by: FAMILY MEDICINE

## 2023-12-09 PROCEDURE — 93306 TTE W/DOPPLER COMPLETE: CPT

## 2023-12-09 PROCEDURE — 99222 1ST HOSP IP/OBS MODERATE 55: CPT | Performed by: INTERNAL MEDICINE

## 2023-12-09 PROCEDURE — 99233 SBSQ HOSP IP/OBS HIGH 50: CPT | Performed by: INTERNAL MEDICINE

## 2023-12-09 PROCEDURE — 83735 ASSAY OF MAGNESIUM: CPT | Performed by: INTERNAL MEDICINE

## 2023-12-09 PROCEDURE — 86705 HEP B CORE ANTIBODY IGM: CPT | Performed by: STUDENT IN AN ORGANIZED HEALTH CARE EDUCATION/TRAINING PROGRAM

## 2023-12-09 PROCEDURE — 99221 1ST HOSP IP/OBS SF/LOW 40: CPT | Performed by: INTERNAL MEDICINE

## 2023-12-09 PROCEDURE — 63710000001 INSULIN LISPRO (HUMAN) PER 5 UNITS: Performed by: FAMILY MEDICINE

## 2023-12-09 PROCEDURE — 93005 ELECTROCARDIOGRAM TRACING: CPT | Performed by: INTERNAL MEDICINE

## 2023-12-09 RX ORDER — BISACODYL 5 MG/1
5 TABLET, DELAYED RELEASE ORAL DAILY PRN
Status: DISCONTINUED | OUTPATIENT
Start: 2023-12-09 | End: 2023-12-10

## 2023-12-09 RX ORDER — BISACODYL 10 MG
10 SUPPOSITORY, RECTAL RECTAL DAILY PRN
Status: DISCONTINUED | OUTPATIENT
Start: 2023-12-09 | End: 2023-12-10

## 2023-12-09 RX ORDER — AMOXICILLIN 250 MG
2 CAPSULE ORAL 2 TIMES DAILY
Status: DISCONTINUED | OUTPATIENT
Start: 2023-12-09 | End: 2023-12-10

## 2023-12-09 RX ORDER — SODIUM BICARBONATE 650 MG/1
1300 TABLET ORAL 3 TIMES DAILY
Status: DISCONTINUED | OUTPATIENT
Start: 2023-12-09 | End: 2023-12-13

## 2023-12-09 RX ORDER — POLYETHYLENE GLYCOL 3350 17 G/17G
17 POWDER, FOR SOLUTION ORAL DAILY
Status: DISCONTINUED | OUTPATIENT
Start: 2023-12-09 | End: 2023-12-10

## 2023-12-09 RX ORDER — PANTOPRAZOLE SODIUM 40 MG/10ML
40 INJECTION, POWDER, LYOPHILIZED, FOR SOLUTION INTRAVENOUS EVERY 12 HOURS SCHEDULED
Status: DISCONTINUED | OUTPATIENT
Start: 2023-12-09 | End: 2023-12-10

## 2023-12-09 RX ORDER — ACETAMINOPHEN 325 MG/1
650 TABLET ORAL EVERY 4 HOURS PRN
Status: DISCONTINUED | OUTPATIENT
Start: 2023-12-09 | End: 2023-12-19 | Stop reason: HOSPADM

## 2023-12-09 RX ADMIN — Medication 10 ML: at 09:41

## 2023-12-09 RX ADMIN — SODIUM BICARBONATE 650 MG TABLET 1300 MG: at 15:13

## 2023-12-09 RX ADMIN — ACETAMINOPHEN 650 MG: 325 TABLET ORAL at 20:29

## 2023-12-09 RX ADMIN — Medication 10 ML: at 00:01

## 2023-12-09 RX ADMIN — PANTOPRAZOLE SODIUM 40 MG: 40 INJECTION, POWDER, FOR SOLUTION INTRAVENOUS at 20:28

## 2023-12-09 RX ADMIN — METOPROLOL TARTRATE 5 MG: 1 INJECTION, SOLUTION INTRAVENOUS at 11:17

## 2023-12-09 RX ADMIN — CEFTRIAXONE SODIUM 1000 MG: 1 INJECTION, SOLUTION INTRAVENOUS at 20:28

## 2023-12-09 RX ADMIN — PANTOPRAZOLE SODIUM 40 MG: 40 INJECTION, POWDER, FOR SOLUTION INTRAVENOUS at 09:41

## 2023-12-09 RX ADMIN — CARVEDILOL 25 MG: 25 TABLET, FILM COATED ORAL at 09:41

## 2023-12-09 RX ADMIN — Medication 250 MG: at 20:28

## 2023-12-09 RX ADMIN — SODIUM BICARBONATE 650 MG TABLET 1300 MG: at 20:30

## 2023-12-09 RX ADMIN — Medication 250 MG: at 00:00

## 2023-12-09 RX ADMIN — Medication 10 ML: at 20:31

## 2023-12-09 RX ADMIN — CARVEDILOL 25 MG: 25 TABLET, FILM COATED ORAL at 00:00

## 2023-12-09 RX ADMIN — Medication 250 MG: at 09:41

## 2023-12-09 RX ADMIN — INSULIN LISPRO 2 UNITS: 100 INJECTION, SOLUTION INTRAVENOUS; SUBCUTANEOUS at 21:38

## 2023-12-09 RX ADMIN — SODIUM BICARBONATE 650 MG TABLET 1300 MG: at 11:23

## 2023-12-09 RX ADMIN — CARVEDILOL 25 MG: 25 TABLET, FILM COATED ORAL at 20:28

## 2023-12-09 RX ADMIN — AMLODIPINE BESYLATE 10 MG: 5 TABLET ORAL at 09:41

## 2023-12-09 NOTE — CONSULTS
[facility]   UROLOGY Consult Note    Patient Name: Lyubov Middleton  : 1955  MRN: [id number]  Primary Care Physician:  Cathleen Stroud APRN  Referring Physician: No ref. provider found  Date of admission: 2023    Subjective   Subjective     Chief Complaint:     Abnormal right kidney      HPI:    Lyubov Middleton is a 68 y.o. female       Nephrolithiasis  Possible ureteral mass  UTI      In nursing home      medical history of hypertension, hyperlipidemia, diabetes not on medication, breast cancer, arthritis, dementia, and GERD presented from nursing home with complaints of abdominal pain and bloody bowel movements    Some memory impairment      No gross hematuria, no flank pain.  Specifically no pain on the right    History of recurrent UTI    No urinary symptoms currently      History of GI bleed and small bowel obstruction.    History of C. Difficile    Large anterior abdominal wall hernia with bowel      2023 CT abdomen/pelvis without - malrotation right kidney severe right hydro at the kidney level.  Right kidney twice as big as the left kidney.  5 stones nonobstructing 3 mm to 5 mm.  With right perinephric fat stranding/edema.  Suggestion of irregular subtle soft tissue attenuation of the right renal pelvis and right retroperitoneal lymphadenopathy.  Concerning for possible obstructive urothelial malignancy.    2023 UA - nitrite negative, TNTC bacteria, 3-5 RBCs, 4+ yeast, 0-2 epithelial cells    2023   3.2, GFR 15  2023     2.7, GFR 18              1.8, GFR 28      History of stone 2019            Review of Systems     10 systems reviewed and are negative other than what is listed in the HPI    Personal History     Past Medical History:   Diagnosis Date    Arthritis     Cancer     left breast removed     Dementia     Type 2 diabetes mellitus        Past Surgical History:   Procedure Laterality Date    ABDOMINAL SURGERY      BREAST SURGERY Left     removed  due to cancer    CHOLECYSTECTOMY      COLONOSCOPY  7/19/2023    Procedure: COLONOSCOPY WITH ENDOSCOPIC FECAL MICROBIOTA TRANSPLANT, GTUBE REMOVAL;  Surgeon: Hamzah Ireland MD;  Location: [facility] ENDOSCOPY;  Service: Gastroenterology;;    CYSTOSCOPY W/ URETERAL STENT PLACEMENT Right 12/09/2020    Procedure: CYSTOSCOPY, RIGHT RETROGRADE PYELOGRAM, URETEROSCOPY, LASER LITHOTRIPSY, RIGHT URETERAL STENT PLACEMENT;  Surgeon: Rohan Dooley Jr., MD;  Location: [facility] OR;  Service: Urology;  Laterality: Right;    GASTROSTOMY W/ FEEDING TUBE      HERNIA REPAIR      mesh removed       Family History: family history includes Cancer in her father; Diabetes in her mother; Lung disease in her father. Otherwise pertinent FHx was reviewed and not pertinent to current issue.    Social History:  reports that she quit smoking about 26 years ago. Her smoking use included cigarettes. She has been exposed to tobacco smoke. She has never used smokeless tobacco. She reports that she does not drink alcohol and does not use drugs.    Home Medications:  Darbepoetin Jaylen, Menthol-Zinc Oxide, acetaminophen, alendronate, amLODIPine, anastrozole, bisacodyl, carvedilol, cholecalciferol, citalopram, colestipol, ferrous sulfate, guaifenesin, lacosamide, levothyroxine, loperamide, magnesium hydroxide, magnesium oxide, phosphorus, potassium chloride, saccharomyces boulardii, sodium bicarbonate, and vitamin B-12    Allergies:  No Known Allergies    Objective    Objective     Vitals:   Temp:  [97.7 °F (36.5 °C)-98.1 °F (36.7 °C)] 98.1 °F (36.7 °C)  Heart Rate:  [71-86] 74  Resp:  [16-22] 16  BP: (128-154)/(56-95) 135/69    Physical Exam:   Constitutional: Awake, alert    Respiratory: Clear to auscultation bilaterally, nonlabored respirations    Cardiovascular: RRR, no murmurs, rubs, or gallops, palpable pedal pulses bilaterally   Gastrointestinal: Positive bowel sounds, soft, nontender, nondistended   Musculoskeletal: No bilateral ankle  edema, no clubbing or cyanosis to extremities        Result Review    Result Review:  I have personally reviewed the results from the time of this admission to 12/9/2023 06:56 EST and agree with these findings:  []  Laboratory  []  Microbiology  []  Radiology  []  EKG/Telemetry   []  Cardiology/Vascular   []  Pathology  []  Old records  []  Other:      Assessment & Plan   Assessment / Plan     Brief Patient Summary:  Lyubov Middleton is a 68 y.o. female     Active Hospital Problems:  Active Hospital Problems    Diagnosis     **Abdominal pain     Chronic anemia     Hydronephrosis     Pyelonephritis     History of Clostridioides difficile infection     Stage 3a chronic kidney disease     Type 2 diabetes mellitus     Essential hypertension        Plan:     CT images and read reviewed and discussed with the patient    Records reviewed and summarized in chart    Patient with abnormal right kidney.  With no pain on the right looks to be a chronic or at least slow-moving process.  Not acutely in pain or sick from this    I did discuss with the patient she does have a few nonobstructing stones and also the possibility of mass or pathology in her right upper ureter.  We discussed there is some increased lymphadenopathy.    Patient will need cystoscopy with right ureteroscopy possible biopsy possible laser of stones in the right ureteral stent placement, left retrograde pyelogram as an outpatient in 1 to 2 weeks.  She will need treatment of her UTI first.  This was discussed with the patient    Patient understands we are ruling out malignancy and she must follow-up on this as outpatient.  Could be detriment to her health.    Did try to reach family today but could not to discuss further with him also    40 minutes used in counseling, coordination of care and review of images discussion with family                    Electronically signed by Joseph Powell MD, 12/09/23, 6:56 AM EST.

## 2023-12-09 NOTE — NURSING NOTE
RRT called to patients room following frequent runs of Vtach on heart monitor with heart rate reaching 217 bpm. Patient awake, alert and able to follow commands and make needs known at this time. Complained of mid sternal chest pain that resolved quickly on its own. No s/s of distress. No c/o shortness of air. Vital signs obtained. RRT nurse at bedside. Labs ordered. Dr. Guzman notified. Cardiology consulted and at bedside. Orders placed.

## 2023-12-09 NOTE — PLAN OF CARE
Problem: Adult Inpatient Plan of Care  Goal: Plan of Care Review  Outcome: Ongoing, Progressing  Goal: Patient-Specific Goal (Individualized)  Outcome: Ongoing, Progressing  Goal: Absence of Hospital-Acquired Illness or Injury  Outcome: Ongoing, Progressing  Goal: Optimal Comfort and Wellbeing  Outcome: Ongoing, Progressing  Goal: Readiness for Transition of Care  Outcome: Ongoing, Progressing     Problem: UTI (Urinary Tract Infection)  Goal: Improved Infection Symptoms  Outcome: Ongoing, Progressing     Problem: Skin Injury Risk Increased  Goal: Skin Health and Integrity  Outcome: Ongoing, Progressing   Goal Outcome Evaluation:            Patient denies pain and says she is comfortable and does not need anything right now.

## 2023-12-09 NOTE — H&P
[facility]   HISTORY AND PHYSICAL    Patient Name: Lyubov Middleton  : 1955  MRN: [id number]  Primary Care Physician:  Cathleen Stroud APRN  Date of admission: 2023    Subjective   Subjective     Chief Complaint: Abdominal pain with bloody bowel movements    HPI:    Lyubov Middleton is a 68 y.o. female with past medical history of hypertension, hyperlipidemia, diabetes not on medication, breast cancer, arthritis, dementia, and GERD presented from nursing home with complaints of abdominal pain and bloody bowel movements.  When seen patient did have some memory impairment but was able to answer questions when asked.  Staff at her nursing facility was concerned after she had possible bloody bowel movements along with lower abdominal pain and nausea.  Patient does admit episodes of prior GI bleed as well as SBO's.  Patient also has history of prior C. difficile infections but has not been on antibiotics recently.  In the ED patient's vitals were all within normal limits on arrival.  Urinalysis was positive for UTI although occult blood stool was negative.  Labs showed normal lactic acid and procalcitonin level with anemia on CBC.  CT of the abdomen showed multiple abnormalities including abnormal rotation of the right kidney and severe right hydronephrosis and right perinephric fat stranding, multiple renal calculi and right retroperitoneal lymphadenopathy which is suspicious for malignancy.  Abdominal hernia with bowel loops without obstruction also seen.  When asked she denied any recent fevers, chills, headaches, focal weakness, chest pain, palpitation, constipation, dysuria, hematuria, or anxiety.  Patient admitted for further evaluation and treatment.    Review of Systems   All systems were reviewed and negative except for: As stated in HPI    Personal History     Past Medical History:   Diagnosis Date   • Arthritis    • Cancer     left breast removed    • Dementia    • Type 2 diabetes  mellitus        Past Surgical History:   Procedure Laterality Date   • ABDOMINAL SURGERY     • BREAST SURGERY Left 2012    removed due to cancer   • CHOLECYSTECTOMY     • COLONOSCOPY  7/19/2023    Procedure: COLONOSCOPY WITH ENDOSCOPIC FECAL MICROBIOTA TRANSPLANT, GTUBE REMOVAL;  Surgeon: Hamzah Ireland MD;  Location: [facility] ENDOSCOPY;  Service: Gastroenterology;;   • CYSTOSCOPY W/ URETERAL STENT PLACEMENT Right 12/09/2020    Procedure: CYSTOSCOPY, RIGHT RETROGRADE PYELOGRAM, URETEROSCOPY, LASER LITHOTRIPSY, RIGHT URETERAL STENT PLACEMENT;  Surgeon: Rohan Dooley Jr., MD;  Location: [facility] OR;  Service: Urology;  Laterality: Right;   • GASTROSTOMY W/ FEEDING TUBE     • HERNIA REPAIR      mesh removed       Family History: family history includes Cancer in her father; Diabetes in her mother; Lung disease in her father. Otherwise pertinent FHx was reviewed and not pertinent to current issue.    Social History:  reports that she quit smoking about 26 years ago. Her smoking use included cigarettes. She has been exposed to tobacco smoke. She has never used smokeless tobacco. She reports that she does not drink alcohol and does not use drugs.    Home Medications:  Darbepoetin Jaylen, Menthol-Zinc Oxide, acetaminophen, alendronate, amLODIPine, anastrozole, bisacodyl, carvedilol, cholecalciferol, citalopram, colestipol, ferrous sulfate, guaifenesin, lacosamide, levothyroxine, loperamide, magnesium hydroxide, magnesium oxide, phosphorus, potassium chloride, saccharomyces boulardii, sodium bicarbonate, and vitamin B-12      Allergies:  No Known Allergies    Objective   Objective     Vitals:   Temp:  [97.7 °F (36.5 °C)-98 °F (36.7 °C)] 98 °F (36.7 °C)  Heart Rate:  [71-86] 72  Resp:  [18-22] 22  BP: (128-154)/(56-95) 154/69  Physical Exam    Constitutional: Awake, alert, frail, memory impairment   Eyes: PERRLA, sclerae anicteric, no conjunctival injection   HENT: NCAT, mucous membranes   Neck: Supple, no  thyromegaly, no lymphadenopathy, trachea midline   Respiratory: Clear to auscultation bilaterally, nonlabored respirations    Cardiovascular: RRR, no murmurs, rubs, or gallops, palpable pedal pulses bilaterally   Gastrointestinal: Abdominal Tenderness, positive bowel sounds, soft   Musculoskeletal: RLE edema, no clubbing or cyanosis to extremities   Psychiatric: Appropriate affect, cooperative   Neurologic: Oriented x 3, strength symmetric in all extremities, Cranial Nerves grossly intact to confrontation, speech clear   Skin: No rashes     Result Review    Result Review:  I have personally reviewed the results from the time of this admission to 12/8/2023 23:14 EST and agree with these findings:  [x]  Laboratory list / accordion  []  Microbiology  [x]  Radiology  [x]  EKG/Telemetry   []  Cardiology/Vascular   []  Pathology  []  Old records  []  Other:  Most notable findings include: UTI, anemia, negative stool occult blood, severe hydronephrosis, malrotation of right kidney       Assessment & Plan   Assessment / Plan     Brief Patient Summary:  Lyubov Middleton is a 68 y.o. female with past medical history of hypertension, hyperlipidemia, diabetes not on medication, breast cancer, arthritis, dementia, and GERD presented from nursing home with complaints of abdominal pain and bloody bowel movements    Active Hospital Problems:  Active Hospital Problems    Diagnosis    • **Abdominal pain    • Chronic anemia    • Hydronephrosis    • Pyelonephritis    • History of Clostridioides difficile infection    • Stage 3a chronic kidney disease    • Type 2 diabetes mellitus    • Essential hypertension      Plan:    Abdominal Pain  -Admit to telemetry  -UA reviewed  -CT abdomen reviewed   -Patient with UTI, malrotation of right kidney with severe hydronephrosis and perinephritic fat stranding. Pyelonephritis vs obstruction  -Right retroperitoneal lymphadenopathy suspicious for possible malignancy  -Pain meds PRN  -Empiric  Abx  -Blood and urine Clx  -Urology consulted  -Hx C-diff. PCR ordered   -Supportive care    Anemia  -Staff at nursing home noted possible bloody BM  -Fecal occult negative  -Anemia panel  -Transfuse as needed  -follow labs    HTN  -Currently well controlled  -PRN BP meds  -Resume home meds when available  -Titrate if needed    DM2  -ISS  -A1C    Debility     GI ppx  DVT ppx    DVT prophylaxis:  Mechanical DVT prophylaxis orders are present.    CODE STATUS:  Full Code      Admission Status:  I believe this patient meets Inpatient  status.      Electronically signed by Kiet Flowers MD, 12/08/23, 11:14 PM EST.

## 2023-12-09 NOTE — PLAN OF CARE
Goal Outcome Evaluation:  Plan of Care Reviewed With: patient         VSS. Able to answer all questions appropriately, but does have intermittent confusion. Uses call light and makes needs known.

## 2023-12-09 NOTE — CONSULTS
[facility]   Consult Note    Patient Name: Lyubov Middleton  : 1955  MRN: [id number]  Primary Care Physician:  Cathleen Stroud APRN  Referring Physician: No ref. provider found  Date of admission: 2023    Subjective   Subjective     Reason for Consult/ Chief Complaint: AUSTEN    HPI:  Lyubov Middleton is a 68 y.o. female 68-year female with past medical history of CKD stage IV with baseline creatinine of 1.8-2 who was noted to have progressively worsening kidney function over the last 6 months, hypothyroidism, hypertension, osteoporosis, history of breast cancer, GERD, dementia who was sent from the nursing home to the ER due to concerns for bloody bowel movements.  Patient does have some memory impairment at baseline.  Per records it appears that she was admitted and last half of  due to GI bleed as well as C. difficile.  She has not recently been on any antibiotics.    In the ER patient's vitals have been stable.  Creatinine was noted to be elevated at 3.2 with GFR of 15 up from her baseline though in the last 6 months her renal function has progressively been getting worse.  Her hemoglobin was noted to be at 7.4.  Her iron profile was normal.  Her bicarb was noted to be at 14.  Calcium level is low at 7.3.  Urine analysis with large amount of proteinuria many WBCs and bacteria concerning for UTI.  CT scan of the abdomen showed some lymphadenopathy, large hydronephrosis on the right side, some stones which do not appear to be obstructive and in conjunction with lymphadenopathy concerning for some malignancy.  Nephrology has been consulted for management of AUSTEN    Review of Systems  Constitutional:        Weakness tiredness fatigue  Eyes:                       No blurry vision, eye discharge, eye irritation, eye pain  HEENT:                   No acute hair loss, earache and discharge, nasal congestion or discharge, sore throat, postnasal drip  Respiratory:           No shortness of breath  coughing sputum production wheezing hemoptysis pleuritic chest pain  Cardiovascular:     No chest pain, orthopnea, PND, dizziness, palpitation, lower extremity edema  Gastrointestinal:   No nausea vomiting diarrhea abdominal pain constipation  Genitourinary:       No urinary incontinence, hesitancy, frequency, urgency, dysuria  Neurological:        No confusion, headache, focal weakness, numbness, dysphasia  Hematologic:         No bruising, bleeding, pallor, lymphadenopathy  Endocrine:            No coldness, hot flashes, polyuria, abnormal hair growth  Musculoskeletal:  No body pains, aches, arthritic pains, muscle pain ,muscle wasting  Psychiatric:          No low or high mood, anxiety, hallucinations, delusions  Skin.                      No rash, ulcers, bruising, itching    Personal History     Past Medical History:   Diagnosis Date    Arthritis     Cancer     left breast removed 2012    Dementia     Type 2 diabetes mellitus        Past Surgical History:   Procedure Laterality Date    ABDOMINAL SURGERY      BREAST SURGERY Left 2012    removed due to cancer    CHOLECYSTECTOMY      COLONOSCOPY  7/19/2023    Procedure: COLONOSCOPY WITH ENDOSCOPIC FECAL MICROBIOTA TRANSPLANT, GTUBE REMOVAL;  Surgeon: Hamzah Ireland MD;  Location: [facility];  Service: Gastroenterology;;    CYSTOSCOPY W/ URETERAL STENT PLACEMENT Right 12/09/2020    Procedure: CYSTOSCOPY, RIGHT RETROGRADE PYELOGRAM, URETEROSCOPY, LASER LITHOTRIPSY, RIGHT URETERAL STENT PLACEMENT;  Surgeon: Rohan Dooley Jr., MD;  Location: [facility];  Service: Urology;  Laterality: Right;    GASTROSTOMY W/ FEEDING TUBE      HERNIA REPAIR      mesh removed       Family History: family history includes Cancer in her father; Diabetes in her mother; Lung disease in her father. Otherwise pertinent FHx was reviewed and not pertinent to current issue.    Social History:  reports that she quit smoking about 26 years ago. Her smoking use included  cigarettes. She has been exposed to tobacco smoke. She has never used smokeless tobacco. She reports that she does not drink alcohol and does not use drugs.    Home Medications:  Darbepoetin Jaylen, Menthol-Zinc Oxide, acetaminophen, alendronate, amLODIPine, anastrozole, bisacodyl, carvedilol, cholecalciferol, citalopram, colestipol, ferrous sulfate, guaifenesin, lacosamide, levothyroxine, loperamide, magnesium hydroxide, magnesium oxide, phosphorus, potassium chloride, saccharomyces boulardii, sodium bicarbonate, and vitamin B-12    Allergies:  No Known Allergies    Objective    Objective     Vitals:   Temp:  [97.7 °F (36.5 °C)-98.1 °F (36.7 °C)] 98.1 °F (36.7 °C)  Heart Rate:  [71-86] 74  Resp:  [16-22] 16  BP: (128-154)/(56-95) 135/69    Physical Exam:             Constitutional:         Awake, alert responsive, conversant, no obvious distress   Eyes:                       PERRLA, sclerae anicteric, no conjunctival injection   HEENT:                   Moist mucous membranes, no nasal or eye discharge, no throat congestion   Neck:                      Supple, no thyromegaly, no lymphadenopathy, trachea midline, no elevated JVD   Respiratory:           Clear to auscultation bilaterally, nonlabored respirations    Cardiovascular:     RRR, no murmurs, rubs, or gallops, palpable pedal pulses bilaterally, No bilateral ankle edema   Gastrointestinal:   Positive bowel sounds, soft, nontender, non-distended, no organomegaly   Musculoskeletal:  No clubbing or cyanosis to extremities, muscle wasting, joint swelling, muscle weakness   Psychiatric:              Appropriate affect, cooperative   Neurologic:            Awake alert, oriented x 3, strength symmetric in all extremities, Cranial Nerves grossly intact to confrontation, speech clear   Skin:                      No rashes, bruising, skin ulcers, petechiae or ecchymosis    Result Review    Result Review:  I have personally reviewed the results from the time of this  admission to 12/9/2023 08:32 EST and agree with these findings:  []  Laboratory  []  Microbiology  []  Radiology  []  EKG/Telemetry   []  Cardiology/Vascular   []  Pathology  []  Old records  []  Other:      Assessment & Plan   Assessment / Plan     Active Hospital Problems:  Active Hospital Problems    Diagnosis     **Abdominal pain     Chronic anemia     Hydronephrosis     Pyelonephritis     History of Clostridioides difficile infection     Stage 3a chronic kidney disease     Type 2 diabetes mellitus     Essential hypertension      68-year female with past medical history of CKD stage IV with baseline creatinine of 1.8-2 who was noted to have progressively worsening kidney function over the last 6 months, hypothyroidism, hypertension, osteoporosis, history of breast cancer, GERD, dementia who was sent from the nursing home to the ER due to concerns for bloody bowel movements.  Hemoglobin noted to be at 7 with normal iron profile.  Creatinine has been uptrending over the last 6 months and currently at 3.24 with bicarb of 14 and BUN of 29 with low calcium of 7.3.  CT scan concerning for hydronephrosis and lymphadenopathy with possible underlying malignancy.  Urine analysis concerning for UTI and does have large amount of proteinuria.  AUSTEN most likely due to obstruction though unclear if there is a competent of progression of her renal disease    Plan  Will get UA, UPCR and UACR  Noted urology has been consulted for hydronephrosis  Will start EPO 20 K units  Antibiotics for UTI  Continue with amlodipine 10 mg and carvedilol 25 twice daily for hypertension  Agree with bicarb tabs 1300 3 times daily  Acidosis most likely due to diarrhea.  Will monitor      Thank you for involving me in the care of the patient.  Will continue to follow along    Electronically signed by Minesh Jiang MD, 12/09/23, 8:32 AM EST.

## 2023-12-09 NOTE — PROGRESS NOTES
[facility]   Hospitalist Progress Note  Date: 2023  Patient Name: Lyubov Middleton  : 1955  MRN: [id number]  Date of admission: 2023      Subjective   Subjective     Chief Complaint: Abdominal pain, hematochezia    Summary: 68 y.o. female with past medical history of hypertension, hyperlipidemia, diabetes not on medication, breast cancer, arthritis, dementia, and GERD presented from nursing home with complaints of abdominal pain and bloody bowel movements.  When seen patient did have some memory impairment but was able to answer questions when asked.  Staff at her nursing facility was concerned after she had possible bloody bowel movements along with lower abdominal pain and nausea.       Interval Followup: No acute events overnight.  Patient had bowel movement this morning with no evidence of blood.  An RRT was called due to what appeared to be torsades on her monitor.  Stat EKG showed normal sinus rhythm, deemed to be artifact from upper extremity tremor.  Patient states she feels weak and fatigued but otherwise no new complaints.    Objective   Objective     Vitals:   Temp:  [97.7 °F (36.5 °C)-98.1 °F (36.7 °C)] 98.1 °F (36.7 °C)  Heart Rate:  [] 66  Resp:  [16-22] 16  BP: (128-154)/(56-95) 135/81  Physical Exam    Constitutional: Elderly, chronically ill-appearing female, awake, alert, no acute distress   Respiratory: Clear to auscultation bilaterally, nonlabored respirations    Cardiovascular: RRR, no MRG   Gastrointestinal: Positive bowel sounds, soft, nontender, nondistended   Neurologic: Oriented x 3, globally weak but strength symmetric in all extremities, Cranial Nerves grossly intact to confrontation, speech clear    Result Review    Result Review:  I have personally reviewed the results below:  [x]  Laboratory personally reviewed BMP, CBC, INR, lactate, iron panel  []  Microbiology  []  Radiology  [x]  EKG/Telemetry telemetry reviewed showing normal sinus rhythm with  intermittent episodes of artifact that looks like torsades  []  Cardiology/Vascular   []  Pathology  []  Old records  []  Other:  CBC          11/1/2023    17:08 12/8/2023    14:34 12/9/2023    04:38 12/9/2023    09:37   CBC   WBC 10.84  9.30  9.08     9.13  8.91    RBC 2.61  2.93  2.60     2.56  2.73    Hemoglobin 7.4  8.5  7.4     7.4  7.7    Hematocrit 22.7  26.2  23.5     23.4  24.6    MCV 87.0  89.4  90.4     91.4  90.1    MCH 28.4  29.0  28.5     28.9  28.2    MCHC 32.6  32.4  31.5     31.6  31.3    RDW 15.3  14.8  14.7     14.9  14.8    Platelets 252  233  194     201  212      CMP          11/1/2023    17:08 12/8/2023    14:34 12/9/2023    04:38 12/9/2023    09:36   CMP   Glucose 141  154  79  143    BUN 43  33  29  33    Creatinine 3.26  3.31  3.24  3.10    EGFR 15.0  14.7  15.1  15.9    Sodium 142  139  142  141    Potassium 2.8  4.3  4.1  3.7    Chloride 106  108  117  114    Calcium 7.0  7.3  7.3  7.4    Total Protein 6.9  6.6  5.4     Albumin 2.9  3.1  2.5     Globulin 4.0  3.5  2.9     Total Bilirubin 0.2  <0.2  <0.2     Alkaline Phosphatase 107  90  73     AST (SGOT) 11  12  10     ALT (SGPT) 7  10  9     Albumin/Globulin Ratio 0.7  0.9  0.9     BUN/Creatinine Ratio 13.2  10.0  9.0  10.6    Anion Gap 14.1  14.0  10.5  10.4        Assessment & Plan   Assessment / Plan     Assessment/Plan:  Concern for renal malignancy  Hydronephrosis  Metabolic acidosis  Hypocalcemia  Proteinuria  Concern for UTI due to unknown bacterial source  Hematochezia  Constipation  Anemia  AUSTEN on CKD    Continue to monitor in the hospital for management of the above  Urology following, appreciate assistance  Discussed with urology, they plan to treat her UTI, follow-up on outpatient basis for cystoscopy with biopsy to rule out malignancy definitively  Consulted nephrology due to AUSTEN on CKD, discussed with nephrology-will obtain UPCR and UACR  Start bicarb 1300 mg 3 times daily  Acidosis is favored to be due to  diarrhea  However, patient was constipated on admission, will continue bowel regimen for now but DC if evidence of diarrhea  Stool studies obtained and pending  Rocephin 1 g daily for UTI, follow-up blood and urine cultures which are currently pending  C. difficile negative  EPO started per nephrology recommendations for anemia  Trend CBC this afternoon, transfuse for hemoglobin less than 7  Discussed with gastroenterology, currently no indication for endoscopy  Iron panel reviewed, no evidence of iron deficiency  PPI IV twice daily until no further evidence of bleeding  Replace magnesium IV  Discussed with cardiology, no indication for ischemic evaluation in setting of renal dysfunction  Dose metoprolol 5 mg IV once and continue Coreg 25 mg twice daily  Continue appropriate home medications  PT/OT consulted  Trend renal function and electrolytes with a.m. BMP, magnesium   Trend Hgb and WBC with a.m. CBC     Discussed plan with RN, cardiology, nephrology, urology, gastroenterology    DVT prophylaxis:  Mechanical DVT prophylaxis orders are present.    CODE STATUS:   Level Of Support Discussed With: Patient  Code Status (Patient has no pulse and is not breathing): CPR (Attempt to Resuscitate)  Medical Interventions (Patient has pulse or is breathing): Full Support        Electronically signed by Jef Robertson MD, 12/09/23, 1:41 PM EST.

## 2023-12-09 NOTE — CONSULTS
CARDIOLOGY CONSULTATION NOTE    Referring Provider: No ref. provider found    Reason for Consultation: SVT    Lyubov Middleton  1955  68 y.o. female      HPI   68 y.o. female with past medical history of hypertension, hyperlipidemia, diabetes not on medication, breast cancer, arthritis, dementia, and GERD presented from nursing home with complaints of abdominal pain and bloody bowel movements.  When seen patient did have some memory impairment but was able to answer questions when asked.  Staff at her nursing facility was concerned after she had possible bloody bowel movements along with lower abdominal pain and nausea.  Patient does admit episodes of prior GI bleed as well as SBO's.  Patient also has history of prior C. difficile infections but has not been on antibiotics recently.  In the ED patient's vitals were all within normal limits on arrival.  Urinalysis was positive for UTI although occult blood stool was negative.  Labs showed normal lactic acid and procalcitonin level with anemia on CBC.  CT of the abdomen showed multiple abnormalities including abnormal rotation of the right kidney and severe right hydronephrosis and right perinephric fat stranding, multiple renal calculi and right retroperitoneal lymphadenopathy which is suspicious for malignancy.     Pt went into SVT   No chest pain  Has elevated creatinine  No prior afib    SUBJECTIVE    Past Medical History:   Diagnosis Date    Arthritis     Cancer     left breast removed 2012    Dementia     Type 2 diabetes mellitus          Past Surgical History:   Procedure Laterality Date    ABDOMINAL SURGERY      BREAST SURGERY Left 2012    removed due to cancer    CHOLECYSTECTOMY      COLONOSCOPY  7/19/2023    Procedure: COLONOSCOPY WITH ENDOSCOPIC FECAL MICROBIOTA TRANSPLANT, GTUBE REMOVAL;  Surgeon: Hamzah Ireland MD;  Location: [facility] ENDOSCOPY;  Service: Gastroenterology;;    CYSTOSCOPY W/ URETERAL STENT PLACEMENT Right 12/09/2020    Procedure:  CYSTOSCOPY, RIGHT RETROGRADE PYELOGRAM, URETEROSCOPY, LASER LITHOTRIPSY, RIGHT URETERAL STENT PLACEMENT;  Surgeon: Rohan Dooley Jr., MD;  Location: [facility] OR;  Service: Urology;  Laterality: Right;    GASTROSTOMY W/ FEEDING TUBE      HERNIA REPAIR      mesh removed         Family History   Problem Relation Age of Onset    Diabetes Mother     Lung disease Father     Cancer Father     Colon cancer Neg Hx          Social History     Socioeconomic History    Marital status:    Tobacco Use    Smoking status: Former     Types: Cigarettes     Quit date: 1997     Years since quittin.3     Passive exposure: Past    Smokeless tobacco: Never   Vaping Use    Vaping Use: Never used   Substance and Sexual Activity    Alcohol use: No    Drug use: No    Sexual activity: Never         Prior to Admission medications    Medication Sig Start Date End Date Taking? Authorizing Provider   acetaminophen (TYLENOL) 325 MG tablet Take 2 tablets by mouth Every 4 (Four) Hours As Needed.   Yes Pancho Carpenter MD   alendronate (FOSAMAX) 70 MG tablet Take 1 tablet by mouth Every 7 (Seven) Days. Thursday   Yes Pancho Carpenter MD   amLODIPine (NORVASC) 10 MG tablet Take 1 tablet by mouth Daily.   Yes Pancho Carpenter MD   anastrozole (ARIMIDEX) 1 MG tablet Take 1 tablet by mouth Daily. 3/5/23  Yes Pancho Carpenter MD   bisacodyl (DULCOLAX) 10 MG suppository Insert 1 suppository into the rectum Daily As Needed for Constipation.   Yes Pancho Carpenter MD   carvedilol (COREG) 25 MG tablet Take 1 tablet by mouth 2 (Two) Times a Day.   Yes Pancho Carpenter MD   cholecalciferol (VITAMIN D3) 1.25 MG (85953 UT) capsule Take 1 capsule by mouth Every 7 (Seven) Days.    Yes Pancho Carpenter MD   citalopram (CeleXA) 10 MG tablet Administer 0.5 tablets per G tube Daily. 18  Yes Pancho Carpenter MD   colestipol (COLESTID) 1 g tablet Take 1 tablet by mouth Daily.   Yes Pauline  MD Pancho   Darbepoetin Jaylen (Aranesp, Albumin Free,) 25 MCG/ML injection Inject 0.5 mL under the skin into the appropriate area as directed Every 30 (Thirty) Days.   Yes Pancho Carpenter MD   ferrous sulfate 325 (65 FE) MG tablet Take 300 mg by mouth 3 (Three) Times a Day.   Yes Pancho Carpenter MD   guaifenesin (ROBITUSSIN) 100 MG/5ML liquid Take 10 mL by mouth Every 4 (Four) Hours As Needed for Cough.   Yes Pancho Carpenter MD   lacosamide (VIMPAT) 10 MG/ML solution oral solution Take 10 mL by mouth Every 12 (Twelve) Hours for 3 days. 4/22/23 12/8/23 Yes Nenita Whiting MD   levothyroxine (SYNTHROID, LEVOTHROID) 25 MCG tablet Take 0.5 tablets by mouth Every Morning.   Yes Pancho Carpenter MD   loperamide (IMODIUM) 2 MG capsule Take 1 capsule by mouth 4 (Four) Times a Day As Needed for Diarrhea.   Yes Pancho Carpenter MD   magnesium hydroxide (MILK OF MAGNESIA) 400 MG/5ML suspension Take 30 mL by mouth Daily As Needed for Constipation.   Yes Pancho Carpenter MD   magnesium oxide (MAG-OX) 400 MG tablet Take 1 tablet by mouth 2 (Two) Times a Day. 6/2/23  Yes Alin Jhaveri,    Menthol-Zinc Oxide (Calmoseptine) 0.44-20.6 % ointment Apply 1 application  topically to the appropriate area as directed 3 (Three) Times a Day.   Yes Pancho Carpenter MD   phosphorus (Phospha 250 Neutral) 155-852-130 MG tablet Take 1 tablet by mouth 2 (Two) Times a Day.   Yes Pancho Carpenter MD   potassium chloride (K-DUR,KLOR-CON) 10 MEQ CR tablet Take 1 tablet by mouth Daily. 8/2/23  Yes Pancho Carpenter MD   saccharomyces boulardii (FLORASTOR) 250 MG capsule Take 1 capsule by mouth 2 (Two) Times a Day.   Yes Pancho Carpenter MD   sodium bicarbonate 650 MG tablet Take 1 tablet by mouth 2 (Two) Times a Day.   Yes Pancho Carpenter MD   vitamin B-12 (CYANOCOBALAMIN) 1000 MCG tablet Take 1 tablet by mouth Daily.   Yes Pancho Carpenter MD         OBJECTIVE    /55 (BP  "Location: Right arm, Patient Position: Lying)   Pulse 69   Temp 98.6 °F (37 °C) (Oral)   Resp 16   Ht 160 cm (62.99\")   Wt 58.4 kg (128 lb 12 oz)   SpO2 99%   BMI 22.81 kg/m²       Review of Systems   palpitations.        Physical Exam     Constitutional: Cooperative, alert and oriented, well-developed, well-nourished, in no acute distress.     HENT:   Head: Normocephalic, normal hair patterns, no masses or tenderness.  Ears, Nose, and Throat: No gross abnormalities. No pallor or cyanosis. Dentition good.   Eyes: EOMS intact, PERRL, conjunctivae and lids unremarkable. Fundoscopic exam and visual fields not performed.   Neck: No palpable masses or adenopathy, no thyromegaly, no JVD, carotid pulses are full and equal bilaterally and without  Bruits.     Cardiovascular: Regular rhythm, S1 and S2 normal, no S3 or S4. Apical impulse not displaced. No murmurs, gallops, or rubs detected.     Pulmonary/Chest: Chest: normal symmetry, no tenderness to palpation, normal respiratory excursion, no intercostal retraction, no use of accessory muscles.            Pulmonary: Normal breath sounds. No rales or ronchi.    Abdominal: Abdomen soft, bowel sounds normoactive, no masses, no hepatosplenomegaly, non-tender, no bruits.     Musculoskeletal: No deformities, clubbing, cyanosis, erythema, or edema observed. There are no spinal abnormalities noted. Normal muscle strength and tone. Pulses full and equal in all extremities, no bruits auscultated.     Neurological: No gross motor or sensory deficits noted, Cranial nerves 2-12 normal. affect appropriate, oriented to time, person, place.     Skin: Warm and dry to the touch, no apparent skin lesions or masses noted.     Psychiatric: Normal mood and affect. Behavior is normal. Judgment and thought content normal.     RESULTS  Lab Results (last 24 hours)       Procedure Component Value Units Date/Time    Clostridioides difficile Toxin - Stool, Per Rectum [[id number]] Collected: " 12/09/23 0953    Specimen: Stool from Per Rectum Updated: 12/09/23 1329    Narrative:      The following orders were created for panel order Clostridioides difficile Toxin - Stool, Per Rectum.  Procedure                               Abnormality         Status                     ---------                               -----------         ------                     Clostridioides difficile...[[id number]]                      Final result                 Please view results for these tests on the individual orders.    Clostridioides difficile Toxin, PCR - Stool, Per Rectum [[id number]] Collected: 12/09/23 0953    Specimen: Stool from Per Rectum Updated: 12/09/23 1329     Toxigenic C. difficile by PCR Negative     027 Toxin Presumptive Negative    Narrative:      The result indicates the absence of toxigenic C. difficile from stool specimen.     Folate [[id number]]  (Normal) Collected: 12/09/23 0438    Specimen: Blood Updated: 12/09/23 1318     Folate 8.78 ng/mL     Narrative:      Results may be falsely increased if patient taking Biotin.      Vitamin B12 [[id number]]  (Normal) Collected: 12/09/23 0438    Specimen: Blood Updated: 12/09/23 1318     Vitamin B-12 333 pg/mL     Narrative:      Results may be falsely increased if patient taking Biotin.      Hemoglobin A1c [[id number]]  (Normal) Collected: 12/08/23 1434    Specimen: Blood Updated: 12/09/23 1303     Hemoglobin A1C 4.90 %     Narrative:      Hemoglobin A1C Ranges:    Increased Risk for Diabetes  5.7% to 6.4%  Diabetes                     >= 6.5%  Diabetic Goal                < 7.0%    High Sensitivity Troponin T 2Hr [[id number]]  (Abnormal) Collected: 12/09/23 1128    Specimen: Blood Updated: 12/09/23 1219     HS Troponin T 36 ng/L      Troponin T Delta 1 ng/L     Narrative:      High Sensitive Troponin T Reference Range:  <14.0 ng/L- Negative Female for AMI  <22.0 ng/L- Negative Male for AMI  >=14 - Abnormal Female indicating possible myocardial injury.  >=22 -  Abnormal Male indicating possible myocardial injury.   Clinicians would have to utilize clinical acumen, EKG, Troponin, and serial changes to determine if it is an Acute Myocardial Infarction or myocardial injury due to an underlying chronic condition.         POC Glucose Once [[id number]]  (Abnormal) Collected: 12/09/23 1116    Specimen: Blood Updated: 12/09/23 1120     Glucose 120 mg/dL      Comment: Serial Number: [id number]:  [id number]       Enteric Bacterial Panel - Stool, Per Rectum [[id number]] Collected: 12/09/23 0953    Specimen: Stool from Per Rectum Updated: 12/09/23 1102    Protein / Creatinine Ratio, Urine - Urine, Clean Catch [[id number]] Collected: 12/08/23 1902    Specimen: Urine, Clean Catch Updated: 12/09/23 1058     Creatinine, Urine 54.2 mg/dL      Total Protein, Urine 234.2 mg/dL      Protein/Creatinine Ratio, Urine 4.32    Microalbumin / Creatinine Urine Ratio - Urine, Clean Catch [[id number]] Collected: 12/08/23 1902    Specimen: Urine, Clean Catch Updated: 12/09/23 1025    Magnesium [[id number]]  (Abnormal) Collected: 12/09/23 0936    Specimen: Blood Updated: 12/09/23 1015     Magnesium 1.5 mg/dL     Basic Metabolic Panel [[id number]]  (Abnormal) Collected: 12/09/23 0936    Specimen: Blood Updated: 12/09/23 1015     Glucose 143 mg/dL      BUN 33 mg/dL      Creatinine 3.10 mg/dL      Sodium 141 mmol/L      Potassium 3.7 mmol/L      Chloride 114 mmol/L      CO2 16.6 mmol/L      Calcium 7.4 mg/dL      BUN/Creatinine Ratio 10.6     Anion Gap 10.4 mmol/L      eGFR 15.9 mL/min/1.73     Narrative:      GFR Normal >60  Chronic Kidney Disease <60  Kidney Failure <15      High Sensitivity Troponin T [[id number]]  (Abnormal) Collected: 12/09/23 0936    Specimen: Blood Updated: 12/09/23 1015     HS Troponin T 35 ng/L     Narrative:      High Sensitive Troponin T Reference Range:  <14.0 ng/L- Negative Female for AMI  <22.0 ng/L- Negative Male for AMI  >=14 - Abnormal Female indicating possible myocardial  injury.  >=22 - Abnormal Male indicating possible myocardial injury.   Clinicians would have to utilize clinical acumen, EKG, Troponin, and serial changes to determine if it is an Acute Myocardial Infarction or myocardial injury due to an underlying chronic condition.         Phosphorus [[id number]]  (Abnormal) Collected: 12/09/23 0936    Specimen: Blood Updated: 12/09/23 1015     Phosphorus 5.6 mg/dL     Lactic Acid, Plasma [[id number]]  (Normal) Collected: 12/09/23 0937    Specimen: Blood Updated: 12/09/23 1010     Lactate 1.0 mmol/L     Protime-INR [[id number]]  (Abnormal) Collected: 12/09/23 0937    Specimen: Blood Updated: 12/09/23 0951     Protime 16.0 Seconds      INR 1.26    Narrative:      Suggested Therapeutic Ranges For Oral Anticoagulant Therapy:  Level of Therapy                      INR Target Range  Standard Dose                            2.0-3.0  High Dose                                2.5-3.5  Patients not receiving anticoagulant  Therapy Normal Range                     0.86-1.15    Magnesium [[id number]]  (Normal) Collected: 12/09/23 0438    Specimen: Blood Updated: 12/09/23 0948     Magnesium 1.6 mg/dL     CBC & Differential [[id number]]  (Abnormal) Collected: 12/09/23 0937    Specimen: Blood Updated: 12/09/23 0944    Narrative:      The following orders were created for panel order CBC & Differential.  Procedure                               Abnormality         Status                     ---------                               -----------         ------                     CBC Auto Differential[[id number]]        Abnormal            Final result                 Please view results for these tests on the individual orders.    CBC Auto Differential [[id number]]  (Abnormal) Collected: 12/09/23 0937    Specimen: Blood Updated: 12/09/23 0944     WBC 8.91 10*3/mm3      RBC 2.73 10*6/mm3      Hemoglobin 7.7 g/dL      Hematocrit 24.6 %      MCV 90.1 fL      MCH 28.2 pg      MCHC 31.3 g/dL      RDW 14.8 %       RDW-SD 48.9 fl      MPV 9.4 fL      Platelets 212 10*3/mm3      Neutrophil % 82.4 %      Lymphocyte % 12.1 %      Monocyte % 3.1 %      Eosinophil % 1.6 %      Basophil % 0.6 %      Immature Grans % 0.2 %      Neutrophils, Absolute 7.34 10*3/mm3      Lymphocytes, Absolute 1.08 10*3/mm3      Monocytes, Absolute 0.28 10*3/mm3      Eosinophils, Absolute 0.14 10*3/mm3      Basophils, Absolute 0.05 10*3/mm3      Immature Grans, Absolute 0.02 10*3/mm3      nRBC 0.0 /100 WBC     CBC & Differential [[id number]]  (Abnormal) Collected: 12/09/23 0438    Specimen: Blood Updated: 12/09/23 0842    Narrative:      The following orders were created for panel order CBC & Differential.  Procedure                               Abnormality         Status                     ---------                               -----------         ------                     CBC Auto Differential[[id number]]        Abnormal            Final result                 Please view results for these tests on the individual orders.    CBC Auto Differential [[id number]]  (Abnormal) Collected: 12/09/23 0438    Specimen: Blood Updated: 12/09/23 0842     WBC 9.13 10*3/mm3      RBC 2.56 10*6/mm3      Hemoglobin 7.4 g/dL      Hematocrit 23.4 %      MCV 91.4 fL      MCH 28.9 pg      MCHC 31.6 g/dL      RDW 14.9 %      RDW-SD 49.9 fl      MPV 10.1 fL      Platelets 201 10*3/mm3      Neutrophil % 74.0 %      Lymphocyte % 18.8 %      Monocyte % 4.5 %      Eosinophil % 1.8 %      Basophil % 0.7 %      Immature Grans % 0.2 %      Neutrophils, Absolute 6.76 10*3/mm3      Lymphocytes, Absolute 1.72 10*3/mm3      Monocytes, Absolute 0.41 10*3/mm3      Eosinophils, Absolute 0.16 10*3/mm3      Basophils, Absolute 0.06 10*3/mm3      Immature Grans, Absolute 0.02 10*3/mm3      nRBC 0.0 /100 WBC     Comprehensive Metabolic Panel [[id number]]  (Abnormal) Collected: 12/09/23 0438    Specimen: Blood Updated: 12/09/23 0510     Glucose 79 mg/dL      BUN 29 mg/dL      Creatinine 3.24  mg/dL      Sodium 142 mmol/L      Potassium 4.1 mmol/L      Chloride 117 mmol/L      CO2 14.5 mmol/L      Calcium 7.3 mg/dL      Total Protein 5.4 g/dL      Albumin 2.5 g/dL      ALT (SGPT) 9 U/L      AST (SGOT) 10 U/L      Alkaline Phosphatase 73 U/L      Total Bilirubin <0.2 mg/dL      Globulin 2.9 gm/dL      A/G Ratio 0.9 g/dL      BUN/Creatinine Ratio 9.0     Anion Gap 10.5 mmol/L      eGFR 15.1 mL/min/1.73     Narrative:      GFR Normal >60  Chronic Kidney Disease <60  Kidney Failure <15      Iron Profile [[id number]]  (Abnormal) Collected: 12/09/23 0438    Specimen: Blood Updated: 12/09/23 0510     Iron 43 mcg/dL      Iron Saturation (TSAT) 22 %      Transferrin 130 mg/dL      TIBC 194 mcg/dL     CBC (No Diff) [[id number]]  (Abnormal) Collected: 12/09/23 0438    Specimen: Blood Updated: 12/09/23 0455     WBC 9.08 10*3/mm3      RBC 2.60 10*6/mm3      Hemoglobin 7.4 g/dL      Hematocrit 23.5 %      MCV 90.4 fL      MCH 28.5 pg      MCHC 31.5 g/dL      RDW 14.7 %      RDW-SD 48.9 fl      MPV 9.5 fL      Platelets 194 10*3/mm3     Reticulocytes [[id number]]  (Normal) Collected: 12/09/23 0438    Specimen: Blood Updated: 12/09/23 0455     Reticulocyte % 1.45 %      Reticulocyte Absolute 0.0377 10*6/mm3     Blood Gas, Arterial -With Co-Ox Panel: Yes [[id number]]  (Abnormal) Collected: 12/08/23 2324    Specimen: Arterial Blood from Arm, Right Updated: 12/08/23 2326     pH, Arterial 7.338 pH units      pCO2, Arterial 27.1 mm Hg      pO2, Arterial 104.5 mm Hg      HCO3, Arterial 14.2 mmol/L      Base Excess, Arterial -10.4 mmol/L      O2 Saturation, Arterial 96.5 %      Hemoglobin, Blood Gas 8.3 g/dL      Carboxyhemoglobin 0.3 %      Methemoglobin 0.40 %      Oxyhemoglobin 95.8 %      FHHB 3.5 %      Site Arterial: right brachial     Modality Room Air     FIO2 21 %      PO2/FIO2 498     Gabe's Test N/A     Lactate, Arterial --    Magnesium [[id number]]  (Normal) Collected: 12/08/23 1434    Specimen: Blood Updated: 12/08/23  "2303     Magnesium 1.6 mg/dL     Lactic Acid, Plasma [[id number]]  (Normal) Collected: 12/08/23 2027    Specimen: Blood from Arm, Right Updated: 12/08/23 2052     Lactate 0.5 mmol/L     Blood Culture - Blood, Arm, Right [[id number]] Collected: 12/08/23 2030    Specimen: Blood from Arm, Right Updated: 12/08/23 2046    Blood Culture - Blood, Arm, Right [[id number]] Collected: 12/08/23 2027    Specimen: Blood from Arm, Right Updated: 12/08/23 2033    Procalcitonin [[id number]]  (Normal) Collected: 12/08/23 1434    Specimen: Blood Updated: 12/08/23 2017     Procalcitonin 0.19 ng/mL     Narrative:      As a Marker for Sepsis (Non-Neonates):    1. <0.5 ng/mL represents a low risk of severe sepsis and/or septic shock.  2. >2 ng/mL represents a high risk of severe sepsis and/or septic shock.    As a Marker for Lower Respiratory Tract Infections that require antibiotic therapy:    PCT on Admission    Antibiotic Therapy       6-12 Hrs later    >0.5                Strongly Recommended  >0.25 - <0.5        Recommended  0.1 - 0.25          Discouraged              Remeasure/reassess PCT  <0.1                Strongly Discouraged     Remeasure/reassess PCT    As 28 day mortality risk marker: \"Change in Procalcitonin Result\" (>80% or <=80%) if Day 0 (or Day 1) and Day 4 values are available. Refer to http://[email or web address]-pct-calculator.com    Change in PCT <=80%  A decrease of PCT levels below or equal to 80% defines a positive change in PCT test result representing a higher risk for 28-day all-cause mortality of patients diagnosed with severe sepsis for septic shock.    Change in PCT >80%  A decrease of PCT levels of more than 80% defines a negative change in PCT result representing a lower risk for 28-day all-cause mortality of patients diagnosed with severe sepsis or septic shock.    This test is Prognostic not Diagnostic, if elevated correlate with clinical findings before administering antibiotic treatment.        Urinalysis With " Culture If Indicated - Urine, Clean Catch [[id number]]  (Abnormal) Collected: 12/08/23 1902    Specimen: Urine, Clean Catch Updated: 12/08/23 1916     Color, UA Yellow     Appearance, UA Cloudy     pH, UA 5.5     Specific Gravity, UA 1.011     Glucose, UA Negative     Ketones, UA Negative     Bilirubin, UA Negative     Blood, UA Small (1+)     Protein, UA >=300 mg/dL (3+)     Leuk Esterase, UA Moderate (2+)     Nitrite, UA Negative     Urobilinogen, UA 0.2 E.U./dL    Narrative:      In absence of clinical symptoms, the presence of pyuria, bacteria, and/or nitrites on the urinalysis result does not correlate with infection.    Urinalysis, Microscopic Only - Urine, Clean Catch [[id number]]  (Abnormal) Collected: 12/08/23 1902    Specimen: Urine, Clean Catch Updated: 12/08/23 1916     RBC, UA 3-5 /HPF      WBC, UA Too Numerous to Count /HPF      Bacteria, UA 4+ /HPF      Squamous Epithelial Cells, UA 0-2 /HPF      Hyaline Casts, UA None Seen /LPF      Methodology Automated Microscopy    Urine Culture - Urine, Urine, Clean Catch [[id number]] Collected: 12/08/23 1902    Specimen: Urine, Clean Catch Updated: 12/08/23 1915          Imaging Results (Last 24 Hours)       Procedure Component Value Units Date/Time    CT Abdomen Pelvis Without Contrast [[id number]] Collected: 12/08/23 1622     Updated: 12/08/23 1625    Narrative:      PROCEDURE: CT ABDOMEN PELVIS WO CONTRAST     COMPARISON: None     INDICATIONS: GI bleed, lower     TECHNIQUE: CT images were created without intravenous contrast.       PROTOCOL:   Standard imaging protocol performed      RADIATION:   DLP: 196 mGy*cm    Automated exposure control was utilized to minimize radiation dose.      FINDINGS:   The heart does not appear enlarged.  There is calcific atherosclerosis of the coronary arteries.    No pleural or pericardial effusion.  There appears to be mild emphysema in the visualized lower   lungs.  No suspicious pulmonary infiltrate.     No definite ectopic  bowel gas.  There is a small hiatal hernia.  The stomach is mildly distended.    Small bowel loops appear nondilated.  There appears to have been right colectomy with anastomosis   in the right upper abdomen.  There is a broad-based right anterior-lateral abdominal wall hernia   containing the anastomosis, proximal transverse colon, and fat and small bowel loops.  There does   not appear to be significant proximal small bowel dilatation.     There is an above average amount of formed stool in the colon and rectum.  There is redundant   sigmoid colon.  There are some short segments of nondistended colon seen in the mid transverse   colon and in the sigmoid colon.  The wall appears thickened in these segments but no pericolonic   fat stranding is seen in these locations.  These could be due to peristalsis, however, a short   segment constricting lesion cannot be excluded.  No significant rectal abnormality is seen.     No hepatic, splenic, adrenal, or pancreatic abnormality is seen on this limited evaluation.    Gallbladder is not visualized.  No biliary ductal dilatation is seen.     There appear to be punctate calculi in the left kidney.  No left hydronephrosis or suspicious left   renal lesion is seen.     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  There appears to be severe right hydronephrosis.  There   is right perinephric fat stranding/edema.  Multiple right renal calculi are seen.  There may be   some punctate calculi in the area of the right ureteral pelvic junction seen on axial image 81 and   on coronal image 54. There is also some subtle irregular soft tissue attenuation in the area of the   renal pelvis seen on axial images 80-83. The right ureter does not appear significant dilated.  No   significant right ureteral calculus is seen.  There are enlarged lymph nodes in the   retroperitoneum.  And aortic caval lymph node measures 1.6 x 1.2 cm on image 102. There are   additional enlarged right  retroperitoneal lymph nodes measuring up to 2 x 1.3 cm on axial image 64.   These findings may indicate an obstructing malignant mass at the right renal pelvis with dameon   metastatic disease.     There is atherosclerosis of the aorta.  No definite aneurysm.  No significant pelvic   lymphadenopathy.  No definite suspicious bladder abnormality is identified.  The uterus and adnexa   appear grossly unremarkable on this limited evaluation.     No aggressive osseous lesion.  There is osseous demineralization and degenerative changes in the   thoracolumbar spine.         Impression:         1. 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  2. Multiple renal calculi, but no distal ureteral calculus or dilatation is seen.  There is   suggestion of irregular subtle soft tissue attenuation at the right renal pelvis and there is right   retroperitoneal lymphadenopathy.  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  Recommend urology consultation and further evaluation   with multiphase contrast enhanced CT.  3. There appears to have been right colectomy with anastomosis in the right upper abdomen.  There   is an above average amount of stool in the colon and rectum.  There are some short segments of   nondistended transverse and sigmoid colon with wall thickening but no pericolonic changes to   suggest acute inflammation.  These could be due to peristalsis, but a short-segment constricting   lesion cannot be excluded on this exam.  Recommend colonoscopy follow-up given patient's symptoms.  4. Broad-based right abdominal hernia containing bowel loops without evidence of bowel obstruction.            OBDULIA ALTAMIRANO MD         Electronically Signed and Approved By: OBDULIA ALTAMIRANO MD on 12/08/2023 at 16:22                               ASSESSMENT AND  PLAN    SVT  R/o renal malignancy  Renal failure  UTI    PLAN:  Echo for Ef  Continue coreg  No indication for any ischemia eval now.    OP FU cardiology        Jose Alejandro Aaron MD  12/9/2023  16:21 EST

## 2023-12-09 NOTE — CONSULTS
[facility]  Initial Inpatient Consult Note    Referring Provider: Hospitalist    Reason for Consultation: Abdominal pain, rectal bleeding    Subjective     History of present illness:    68 y.o. female admitted overnight from nursing home with complaints of intermittent abdominal pains and possible lower GI bleeding.  Today the patient states her pain is very intermittent has been ongoing for some time.  She denies any rectal bleeding and her hemoglobin is relatively stable.  She has a history of recurrent C. difficile and actually had colonoscopy with stool transplant performed in July of this year with Dr. Ireland.  She has reportedly a right hemicolectomy that was also seen by CT scan of the abdomen pelvis overnight with above average amount of stool in the colon and rectum.  Additional CT findings were irregular soft tissue attenuation in the right renal pelvis with possible retroperitoneal lymphadenopathy concerning for obstructing urothelial malignancy and dameon metastatic disease.  Nephrology and urology have been Consulted    Past Medical History:  Past Medical History:   Diagnosis Date    Arthritis     Cancer     left breast removed 2012    Dementia     Type 2 diabetes mellitus      Past Surgical History:  Past Surgical History:   Procedure Laterality Date    ABDOMINAL SURGERY      BREAST SURGERY Left 2012    removed due to cancer    CHOLECYSTECTOMY      COLONOSCOPY  7/19/2023    Procedure: COLONOSCOPY WITH ENDOSCOPIC FECAL MICROBIOTA TRANSPLANT, GTUBE REMOVAL;  Surgeon: Hamzah Ireland MD;  Location: [facility] ENDOSCOPY;  Service: Gastroenterology;;    CYSTOSCOPY W/ URETERAL STENT PLACEMENT Right 12/09/2020    Procedure: CYSTOSCOPY, RIGHT RETROGRADE PYELOGRAM, URETEROSCOPY, LASER LITHOTRIPSY, RIGHT URETERAL STENT PLACEMENT;  Surgeon: Rohan Dooley Jr., MD;  Location: [facility];  Service: Urology;  Laterality: Right;    GASTROSTOMY W/ FEEDING TUBE      HERNIA REPAIR       mesh removed      Social History:   Social History     Tobacco Use    Smoking status: Former     Types: Cigarettes     Quit date: 1997     Years since quittin.3     Passive exposure: Past    Smokeless tobacco: Never   Substance Use Topics    Alcohol use: No      Family History:  Family History   Problem Relation Age of Onset    Diabetes Mother     Lung disease Father     Cancer Father     Colon cancer Neg Hx        Home Meds:  Medications Prior to Admission   Medication Sig Dispense Refill Last Dose    acetaminophen (TYLENOL) 325 MG tablet Take 2 tablets by mouth Every 4 (Four) Hours As Needed.       alendronate (FOSAMAX) 70 MG tablet Take 1 tablet by mouth Every 7 (Seven) Days. Thursday       amLODIPine (NORVASC) 10 MG tablet Take 1 tablet by mouth Daily.       anastrozole (ARIMIDEX) 1 MG tablet Take 1 tablet by mouth Daily.       bisacodyl (DULCOLAX) 10 MG suppository Insert 1 suppository into the rectum Daily As Needed for Constipation.       carvedilol (COREG) 25 MG tablet Take 1 tablet by mouth 2 (Two) Times a Day.       cholecalciferol (VITAMIN D3) 1.25 MG (31147 UT) capsule Take 1 capsule by mouth Every 7 (Seven) Days.        citalopram (CeleXA) 10 MG tablet Administer 0.5 tablets per G tube Daily.  3     colestipol (COLESTID) 1 g tablet Take 1 tablet by mouth Daily.       Darbepoetin Jaylen (Aranesp, Albumin Free,) 25 MCG/ML injection Inject 0.5 mL under the skin into the appropriate area as directed Every 30 (Thirty) Days.       ferrous sulfate 325 (65 FE) MG tablet Take 300 mg by mouth 3 (Three) Times a Day.       guaifenesin (ROBITUSSIN) 100 MG/5ML liquid Take 10 mL by mouth Every 4 (Four) Hours As Needed for Cough.       lacosamide (VIMPAT) 10 MG/ML solution oral solution Take 10 mL by mouth Every 12 (Twelve) Hours for 3 days. 60 mL 0     levothyroxine (SYNTHROID, LEVOTHROID) 25 MCG tablet Take 0.5 tablets by mouth Every Morning.       loperamide (IMODIUM) 2 MG capsule Take 1 capsule by  mouth 4 (Four) Times a Day As Needed for Diarrhea.       magnesium hydroxide (MILK OF MAGNESIA) 400 MG/5ML suspension Take 30 mL by mouth Daily As Needed for Constipation.       magnesium oxide (MAG-OX) 400 MG tablet Take 1 tablet by mouth 2 (Two) Times a Day. 60 tablet 2     Menthol-Zinc Oxide (Calmoseptine) 0.44-20.6 % ointment Apply 1 application  topically to the appropriate area as directed 3 (Three) Times a Day.       phosphorus (Phospha 250 Neutral) 155-852-130 MG tablet Take 1 tablet by mouth 2 (Two) Times a Day.       potassium chloride (K-DUR,KLOR-CON) 10 MEQ CR tablet Take 1 tablet by mouth Daily.       saccharomyces boulardii (FLORASTOR) 250 MG capsule Take 1 capsule by mouth 2 (Two) Times a Day.       sodium bicarbonate 650 MG tablet Take 1 tablet by mouth 2 (Two) Times a Day.       vitamin B-12 (CYANOCOBALAMIN) 1000 MCG tablet Take 1 tablet by mouth Daily.        Current Meds:   amLODIPine, 10 mg, Oral, Daily  carvedilol, 25 mg, Oral, BID  cefTRIAXone, 1,000 mg, Intravenous, Q24H  [START ON 12/11/2023] epoetin chris/chris-epbx, 20,000 Units, Subcutaneous, Once per day on Mon Wed Fri  insulin lispro, 2-7 Units, Subcutaneous, 4x Daily AC & at Bedtime  metoprolol tartrate, 5 mg, Intravenous, Once  pantoprazole, 40 mg, Intravenous, Q12H  senna-docusate sodium, 2 tablet, Oral, BID   And  polyethylene glycol, 17 g, Oral, Daily  saccharomyces boulardii, 250 mg, Oral, BID  sodium bicarbonate, 1,300 mg, Oral, TID  sodium chloride, 10 mL, Intravenous, Q12H      Allergies:  No Known Allergies  Review of Systems  Pertinent items are noted in HPI, all other systems reviewed and negative         Vital Signs  Temp:  [97.7 °F (36.5 °C)-98.1 °F (36.7 °C)] 98.1 °F (36.7 °C)  Heart Rate:  [] 66  Resp:  [16-22] 16  BP: (128-154)/(56-95) 135/81  Physical Exam:  General Appearance:    Alert, cooperative, in no acute distress   Head:    Normocephalic, without obvious abnormality, atraumatic   Eyes:          conjunctivae  and sclerae normal, no   icterus   Throat:   no thrush, oral mucosa moist   Neck:   Supple, no adenopathy   Lungs:     Clear to auscultation bilaterally    Heart:    Regular rhythm and normal rate    Chest Wall:    No abnormalities observed   Abdomen:     Soft, nondistended, nontender; normal bowel sounds   Extremities:   no edema, no redness   Skin:   No bruising or rash   Psychiatric:  normal mood and insight     Results Review:  [x]  Laboratory   [x]  Radiology  []  Pathology      I reviewed the patient's new clinical results.    Results from last 7 days   Lab Units 12/09/23  0937 12/09/23  0438 12/08/23  1434   WBC 10*3/mm3 8.91 9.13  9.08 9.30   HEMOGLOBIN g/dL 7.7* 7.4*  7.4* 8.5*   HEMATOCRIT % 24.6* 23.4*  23.5* 26.2*   PLATELETS 10*3/mm3 212 201  194 233     Results from last 7 days   Lab Units 12/09/23  0936 12/09/23  0438 12/08/23  1434   SODIUM mmol/L 141 142 139   POTASSIUM mmol/L 3.7 4.1 4.3   CHLORIDE mmol/L 114* 117* 108*   CO2 mmol/L 16.6* 14.5* 17.0*   BUN mg/dL 33* 29* 33*   CREATININE mg/dL 3.10* 3.24* 3.31*   CALCIUM mg/dL 7.4* 7.3* 7.3*   BILIRUBIN mg/dL  --  <0.2 <0.2   ALK PHOS U/L  --  73 90   ALT (SGPT) U/L  --  9 10   AST (SGOT) U/L  --  10 12   GLUCOSE mg/dL 143* 79 154*     Results from last 7 days   Lab Units 12/09/23  0937 12/08/23  1434   INR  1.26* 1.09     Lab Results   Lab Value Date/Time    LIPASE 26 12/08/2020 1506       Radiology:  CT Abdomen Pelvis Without Contrast   Final Result       1. Malrotation of the right kidney with severe right hydronephrosis and right perinephric fat    stranding/edema which could be due to obstruction or pyelonephritis.   2. Multiple renal calculi, but no distal ureteral calculus or dilatation is seen.  There is    suggestion of irregular subtle soft tissue attenuation at the right renal pelvis and there is right    retroperitoneal lymphadenopathy.  These findings are concerning for obstructing urothelial    malignancy and dameon metastatic  disease.  Recommend urology consultation and further evaluation    with multiphase contrast enhanced CT.   3. There appears to have been right colectomy with anastomosis in the right upper abdomen.  There    is an above average amount of stool in the colon and rectum.  There are some short segments of    nondistended transverse and sigmoid colon with wall thickening but no pericolonic changes to    suggest acute inflammation.  These could be due to peristalsis, but a short-segment constricting    lesion cannot be excluded on this exam.  Recommend colonoscopy follow-up given patient's symptoms.   4. Broad-based right abdominal hernia containing bowel loops without evidence of bowel obstruction.                OBDULIA ALTAMIRANO MD          Electronically Signed and Approved By: OBDULIA ALTAMIRANO MD on 12/08/2023 at 16:22                                Assessment & Plan       Abdominal pain    Stage 3a chronic kidney disease    Type 2 diabetes mellitus    Essential hypertension    History of Clostridioides difficile infection    Pyelonephritis    Chronic anemia    Hydronephrosis       Plan:  Patient does not appear to have acute GI issues and therefore I would favor nephrology and urology evaluation over any GI intervention.  She has had recent colonoscopy with stool transplant for recurrent C. difficile several months ago and overall seems to have improved since that time with regards to recurrent diarrhea.  In fact her CT scan is suggestive of more of stool retention and she may therefore need an aggressive bowel regimen.  Her hemoglobin appears more chronically low she is on darbepoetin as well.  GI will therefore follow along peripherally and not plan any further GI intervention at this time      I discussed the patients findings and my recommendations with patient and nursing staff.    Alber Cool MD

## 2023-12-09 NOTE — NURSING NOTE
RRT called to room 502. Primary nurse stated that patient heart monitor appeared to be V-Tach. Once primary nurse entered the room, she said that patient heart rate returned to 70s sinus rhythm. Pt VSS.  Pt had no complaints of chest pain or racing heart rate. Labs were drawn and EKG was completed. Cardiology arrived at bedside and ordered an echo to be completed today. No further orders were placed. Lindy Stahl RN

## 2023-12-10 LAB
ALBUMIN SERPL-MCNC: 2.5 G/DL (ref 3.5–5.2)
ALBUMIN/GLOB SERPL: 0.8 G/DL
ALP SERPL-CCNC: 72 U/L (ref 39–117)
ALT SERPL W P-5'-P-CCNC: 7 U/L (ref 1–33)
ANION GAP SERPL CALCULATED.3IONS-SCNC: 10 MMOL/L (ref 5–15)
AST SERPL-CCNC: 10 U/L (ref 1–32)
BACTERIA SPEC AEROBE CULT: ABNORMAL
BASOPHILS # BLD AUTO: 0.04 10*3/MM3 (ref 0–0.2)
BASOPHILS NFR BLD AUTO: 0.3 % (ref 0–1.5)
BILIRUB SERPL-MCNC: 0.2 MG/DL (ref 0–1.2)
BUN SERPL-MCNC: 42 MG/DL (ref 8–23)
BUN/CREAT SERPL: 11.4 (ref 7–25)
C COLI+JEJ+UPSA DNA STL QL NAA+NON-PROBE: NOT DETECTED
C3 SERPL-MCNC: 89 MG/DL (ref 82–167)
C4 SERPL-MCNC: 32 MG/DL (ref 14–44)
CALCIUM SPEC-SCNC: 7.1 MG/DL (ref 8.6–10.5)
CHLORIDE SERPL-SCNC: 115 MMOL/L (ref 98–107)
CO2 SERPL-SCNC: 16 MMOL/L (ref 22–29)
CREAT SERPL-MCNC: 3.69 MG/DL (ref 0.57–1)
DEPRECATED RDW RBC AUTO: 48.6 FL (ref 37–54)
EC STX1+STX2 GENES STL QL NAA+NON-PROBE: NOT DETECTED
EGFRCR SERPLBLD CKD-EPI 2021: 12.9 ML/MIN/1.73
EOSINOPHIL # BLD AUTO: 0.15 10*3/MM3 (ref 0–0.4)
EOSINOPHIL NFR BLD AUTO: 1.3 % (ref 0.3–6.2)
ERYTHROCYTE [DISTWIDTH] IN BLOOD BY AUTOMATED COUNT: 14.7 % (ref 12.3–15.4)
GLOBULIN UR ELPH-MCNC: 3 GM/DL
GLUCOSE BLDC GLUCOMTR-MCNC: 119 MG/DL (ref 70–99)
GLUCOSE BLDC GLUCOMTR-MCNC: 161 MG/DL (ref 70–99)
GLUCOSE BLDC GLUCOMTR-MCNC: 82 MG/DL (ref 70–99)
GLUCOSE BLDC GLUCOMTR-MCNC: 99 MG/DL (ref 70–99)
GLUCOSE SERPL-MCNC: 81 MG/DL (ref 65–99)
HBV CORE IGM SERPL QL IA: NORMAL
HBV SURFACE AB SER RIA-ACNC: NORMAL
HCT VFR BLD AUTO: 26.4 % (ref 34–46.6)
HCV AB SER DONR QL: NORMAL
HGB BLD-MCNC: 8.3 G/DL (ref 12–15.9)
IMM GRANULOCYTES # BLD AUTO: 0.03 10*3/MM3 (ref 0–0.05)
IMM GRANULOCYTES NFR BLD AUTO: 0.3 % (ref 0–0.5)
LYMPHOCYTES # BLD AUTO: 1.8 10*3/MM3 (ref 0.7–3.1)
LYMPHOCYTES NFR BLD AUTO: 15.3 % (ref 19.6–45.3)
MAGNESIUM SERPL-MCNC: 1.4 MG/DL (ref 1.6–2.4)
MCH RBC QN AUTO: 28.3 PG (ref 26.6–33)
MCHC RBC AUTO-ENTMCNC: 31.4 G/DL (ref 31.5–35.7)
MCV RBC AUTO: 90.1 FL (ref 79–97)
MONOCYTES # BLD AUTO: 0.41 10*3/MM3 (ref 0.1–0.9)
MONOCYTES NFR BLD AUTO: 3.5 % (ref 5–12)
NEUTROPHILS NFR BLD AUTO: 79.3 % (ref 42.7–76)
NEUTROPHILS NFR BLD AUTO: 9.33 10*3/MM3 (ref 1.7–7)
NRBC BLD AUTO-RTO: 0 /100 WBC (ref 0–0.2)
PHOSPHATE SERPL-MCNC: 5.2 MG/DL (ref 2.5–4.5)
PLATELET # BLD AUTO: 227 10*3/MM3 (ref 140–450)
PMV BLD AUTO: 9.8 FL (ref 6–12)
POTASSIUM SERPL-SCNC: 4.1 MMOL/L (ref 3.5–5.2)
PROT SERPL-MCNC: 5.5 G/DL (ref 6–8.5)
RBC # BLD AUTO: 2.93 10*6/MM3 (ref 3.77–5.28)
S ENT+BONG DNA STL QL NAA+NON-PROBE: NOT DETECTED
SHIGELLA SP+EIEC IPAH ST NAA+NON-PROBE: NOT DETECTED
SODIUM SERPL-SCNC: 141 MMOL/L (ref 136–145)
WBC NRBC COR # BLD AUTO: 11.76 10*3/MM3 (ref 3.4–10.8)

## 2023-12-10 PROCEDURE — 25010000002 MAGNESIUM SULFATE 4 GM/100ML SOLUTION: Performed by: INTERNAL MEDICINE

## 2023-12-10 PROCEDURE — 97165 OT EVAL LOW COMPLEX 30 MIN: CPT

## 2023-12-10 PROCEDURE — 0 DEXTROSE 5 % SOLUTION: Performed by: STUDENT IN AN ORGANIZED HEALTH CARE EDUCATION/TRAINING PROGRAM

## 2023-12-10 PROCEDURE — 85025 COMPLETE CBC W/AUTO DIFF WBC: CPT | Performed by: INTERNAL MEDICINE

## 2023-12-10 PROCEDURE — 63710000001 INSULIN LISPRO (HUMAN) PER 5 UNITS: Performed by: FAMILY MEDICINE

## 2023-12-10 PROCEDURE — 99233 SBSQ HOSP IP/OBS HIGH 50: CPT | Performed by: INTERNAL MEDICINE

## 2023-12-10 PROCEDURE — 84100 ASSAY OF PHOSPHORUS: CPT | Performed by: INTERNAL MEDICINE

## 2023-12-10 PROCEDURE — 25010000002 CEFTRIAXONE PER 250 MG: Performed by: FAMILY MEDICINE

## 2023-12-10 PROCEDURE — 82948 REAGENT STRIP/BLOOD GLUCOSE: CPT

## 2023-12-10 PROCEDURE — 80053 COMPREHEN METABOLIC PANEL: CPT | Performed by: FAMILY MEDICINE

## 2023-12-10 PROCEDURE — 83735 ASSAY OF MAGNESIUM: CPT | Performed by: INTERNAL MEDICINE

## 2023-12-10 RX ORDER — LOPERAMIDE HYDROCHLORIDE 2 MG/1
2 CAPSULE ORAL 4 TIMES DAILY PRN
Status: DISCONTINUED | OUTPATIENT
Start: 2023-12-10 | End: 2023-12-19

## 2023-12-10 RX ORDER — PANTOPRAZOLE SODIUM 40 MG/1
40 TABLET, DELAYED RELEASE ORAL
Status: DISCONTINUED | OUTPATIENT
Start: 2023-12-11 | End: 2023-12-19

## 2023-12-10 RX ORDER — MAGNESIUM SULFATE HEPTAHYDRATE 40 MG/ML
4 INJECTION, SOLUTION INTRAVENOUS ONCE
Status: COMPLETED | OUTPATIENT
Start: 2023-12-10 | End: 2023-12-10

## 2023-12-10 RX ADMIN — MAGNESIUM SULFATE HEPTAHYDRATE 4 G: 40 INJECTION, SOLUTION INTRAVENOUS at 09:00

## 2023-12-10 RX ADMIN — PANTOPRAZOLE SODIUM 40 MG: 40 INJECTION, POWDER, FOR SOLUTION INTRAVENOUS at 08:20

## 2023-12-10 RX ADMIN — ACETAMINOPHEN 650 MG: 325 TABLET ORAL at 15:28

## 2023-12-10 RX ADMIN — CEFTRIAXONE SODIUM 1000 MG: 1 INJECTION, SOLUTION INTRAVENOUS at 21:19

## 2023-12-10 RX ADMIN — INSULIN LISPRO 2 UNITS: 100 INJECTION, SOLUTION INTRAVENOUS; SUBCUTANEOUS at 17:41

## 2023-12-10 RX ADMIN — CARVEDILOL 25 MG: 25 TABLET, FILM COATED ORAL at 08:20

## 2023-12-10 RX ADMIN — Medication 10 ML: at 08:21

## 2023-12-10 RX ADMIN — Medication 250 MG: at 08:20

## 2023-12-10 RX ADMIN — Medication 250 MG: at 21:19

## 2023-12-10 RX ADMIN — Medication 10 ML: at 21:24

## 2023-12-10 RX ADMIN — SODIUM BICARBONATE 650 MG TABLET 1300 MG: at 08:22

## 2023-12-10 RX ADMIN — AMLODIPINE BESYLATE 10 MG: 5 TABLET ORAL at 08:20

## 2023-12-10 RX ADMIN — SODIUM BICARBONATE 650 MG TABLET 1300 MG: at 21:19

## 2023-12-10 RX ADMIN — SODIUM BICARBONATE 650 MG TABLET 1300 MG: at 16:25

## 2023-12-10 RX ADMIN — CARVEDILOL 25 MG: 25 TABLET, FILM COATED ORAL at 21:19

## 2023-12-10 NOTE — PLAN OF CARE
Goal Outcome Evaluation:      Pt did well overnight. Rested well. Vitals stable. New PIV started and antibiotics administered. Pt oriented x3 with intermittent confusion. Encouraged patient to turn. Got up to BSC x2 assist. No bloody bowel movements overnight.     Nasrin Barrientos RN

## 2023-12-10 NOTE — PROGRESS NOTES
[facility]   Hospitalist Progress Note  Date: 12/10/2023  Patient Name: Lyubov Middleton  : 1955  MRN: [id number]  Date of admission: 2023      Subjective   Subjective     Chief Complaint: Abdominal pain, hematochezia    Summary: 68 y.o. female with past medical history of hypertension, hyperlipidemia, diabetes not on medication, breast cancer, arthritis, dementia, and GERD presented from nursing home with complaints of abdominal pain and bloody bowel movements.  When seen patient did have some memory impairment but was able to answer questions when asked.  Staff at her nursing facility was concerned after she had possible bloody bowel movements along with lower abdominal pain and nausea.   She was admitted for further care, GI, nephrology, urology consulted.  Seems to have a new baseline creatinine due to worsening diabetic nephropathy.  Urology would like to treat her UTI before performing outpatient cystoscopy with possible biopsy.  No indication for endoscopy as hemoglobin remained stable and no further bleeding during hospital stay.    Interval Followup: No acute events overnight.  States she is feeling a bit weak and fatigued today.  Denies chest pain or shortness of air.  Denies palpitations.  No further bloody bowel movements since admission.    Objective   Objective     Vitals:   Temp:  [97.6 °F (36.4 °C)-99.1 °F (37.3 °C)] 98.1 °F (36.7 °C)  Heart Rate:  [] 100  Resp:  [16-20] 16  BP: (101-144)/(52-81) 101/66  Physical Exam    Constitutional: Chronically ill female, resting comfortably on arrival, NAD   Respiratory: Clear to auscultation bilaterally, nonlabored respirations    Cardiovascular: RRR, no MRG   Gastrointestinal: Positive bowel sounds, soft, nontender, nondistended   Neurologic: Oriented x 3, strength symmetric in all extremities, Cranial Nerves grossly intact to confrontation, speech clear    Result Review    Result Review:  I have personally reviewed the results  below:  [x]  Laboratory personally reviewed CBC, CMP, magnesium, phosphorus  []  Microbiology  []  Radiology  [x]  EKG/Telemetry telemetry reviewed showing normal sinus rhythm  []  Cardiology/Vascular   []  Pathology  []  Old records  []  Other:  CBC          11/1/2023    17:08 12/8/2023    14:34 12/9/2023    04:38 12/9/2023    09:37   CBC   WBC 10.84  9.30  9.08     9.13  8.91    RBC 2.61  2.93  2.60     2.56  2.73    Hemoglobin 7.4  8.5  7.4     7.4  7.7    Hematocrit 22.7  26.2  23.5     23.4  24.6    MCV 87.0  89.4  90.4     91.4  90.1    MCH 28.4  29.0  28.5     28.9  28.2    MCHC 32.6  32.4  31.5     31.6  31.3    RDW 15.3  14.8  14.7     14.9  14.8    Platelets 252  233  194     201  212      CMP          12/8/2023    14:34 12/9/2023    04:38 12/9/2023    09:36 12/10/2023    04:22   CMP   Glucose 154  79  143  81    BUN 33  29  33  42    Creatinine 3.31  3.24  3.10  3.69    EGFR 14.7  15.1  15.9  12.9    Sodium 139  142  141  141    Potassium 4.3  4.1  3.7  4.1    Chloride 108  117  114  115    Calcium 7.3  7.3  7.4  7.1    Total Protein 6.6  5.4   5.5    Albumin 3.1  2.5   2.5    Globulin 3.5  2.9   3.0    Total Bilirubin <0.2  <0.2   0.2    Alkaline Phosphatase 90  73   72    AST (SGOT) 12  10   10    ALT (SGPT) 10  9   7    Albumin/Globulin Ratio 0.9  0.9   0.8    BUN/Creatinine Ratio 10.0  9.0  10.6  11.4    Anion Gap 14.0  10.5  10.4  10.0        Assessment & Plan   Assessment / Plan     Assessment/Plan:  Concern for renal malignancy  UTI due to gram-negative bacilli, present on admission  Hydronephrosis  Metabolic acidosis  Hypocalcemia  Proteinuria  Hematochezia  Constipation  Anemia  AUSTEN on CKD    Continue to monitor in the hospital for management of the above  Nephrology following, appreciate assistance  Plan to complete antibiotics for UTI, she will then follow-up with urology on outpatient basis for cystoscopy and further workup and concern for renal malignancy  Discussed with nephrology, concerned  that she has progression of her diabetic nephropathy and may have a new baseline  Continue 1300 mg 3 times daily, add stop bicarb drip at 75 cc/h  Acidosis is favored to be due to diarrhea  Imodium if needed for diarrhea, however, she is only had 1 liquid bowel movement since admission  C. difficile negative, stool culture pending  Rocephin 1 g daily for UTI, urine culture growing gram-negative bacilli, will follow-up and de-escalate antibiotics accordingly  Continue EPO per nephrology recommendations  Currently no indication for endoscopy, will continue to trend H&H and if less than 7 transfuse  Transition to oral PPI daily  Replace 4 g magnesium IV today  Continue Coreg 25 mg twice daily  Continue appropriate home medications  PT/OT consulted  Trend renal function and electrolytes with a.m. BMP, magnesium   Trend Hgb and WBC with a.m. CBC     Discussed plan with RN, nephrology    Total of 53 minutes spent reviewing labs and imaging, counseling and educating the patient and family, ordering medications, discussing with specialty services, documenting clinical information in the electronic medical record, and care coordination.    DVT prophylaxis:  Mechanical DVT prophylaxis orders are present.    CODE STATUS:   Level Of Support Discussed With: Patient  Code Status (Patient has no pulse and is not breathing): CPR (Attempt to Resuscitate)  Medical Interventions (Patient has pulse or is breathing): Full Support

## 2023-12-10 NOTE — PROGRESS NOTES
[facility]     Progress Note    Patient Name: Lyubov Middleton  : 1955  MRN: [id number]  Primary Care Physician:  Cathleen Stroud APRN  Date of admission: 2023    Subjective   Patient's renal function slowly continues to worsen  Noted to have large amount of proteinuria  Has been seen and evaluated by GI, urology and cardiology as well  No major acute events overnight.  Patient does have some intermittent confusion episodes    Scheduled Meds:amLODIPine, 10 mg, Oral, Daily  carvedilol, 25 mg, Oral, BID  cefTRIAXone, 1,000 mg, Intravenous, Q24H  [START ON 2023] epoetin chris/chris-epbx, 20,000 Units, Subcutaneous, Once per day on   insulin lispro, 2-7 Units, Subcutaneous, 4x Daily AC & at Bedtime  magnesium sulfate, 4 g, Intravenous, Once  metoprolol tartrate, 5 mg, Intravenous, Once  pantoprazole, 40 mg, Intravenous, Q12H  senna-docusate sodium, 2 tablet, Oral, BID   And  polyethylene glycol, 17 g, Oral, Daily  saccharomyces boulardii, 250 mg, Oral, BID  sodium bicarbonate, 1,300 mg, Oral, TID  sodium chloride, 10 mL, Intravenous, Q12H      Continuous Infusions:sodium bicarbonate, 150 mEq      PRN Meds:.  acetaminophen    senna-docusate sodium **AND** polyethylene glycol **AND** bisacodyl **AND** bisacodyl    dextrose    dextrose    glucagon (human recombinant)    ondansetron    sodium chloride    sodium chloride    sodium chloride       Review of Systems  Constitutional:        Weakness tiredness fatigue  Eyes:                       No blurry vision, eye discharge, eye irritation, eye pain  HEENT:                   No acute hair loss, earache and discharge, nasal congestion or discharge, sore throat, postnasal drip  Respiratory:           No shortness of breath coughing sputum production wheezing hemoptysis pleuritic chest pain  Cardiovascular:     No chest pain, orthopnea, PND, dizziness, palpitation, lower extremity edema  Gastrointestinal:   No nausea vomiting diarrhea  abdominal pain constipation  Genitourinary:       No urinary incontinence, hesitancy, frequency, urgency, dysuria  Hematologic:         No bruising, bleeding, pallor, lymphadenopathy  Endocrine:            No coldness, hot flashes, polyuria, abnormal hair growth  Musculoskeletal:    No body pains, aches, arthritic pains, muscle pain ,muscle wasting  Psychiatric:          No low or high mood, anxiety, hallucinations, delusions  Skin.                      No rash, ulcers, bruising, itching  Neurological:        No confusion, headache, focal weakness, numbness, dysphasia    Objective   Objective     Vitals:   Temp:  [97.6 °F (36.4 °C)-99.1 °F (37.3 °C)] 98.3 °F (36.8 °C)  Heart Rate:  [] 76  Resp:  [16-20] 20  BP: (124-147)/(52-81) 143/57  Physical Exam    Constitutional: Awake, alert responsive, conversant, no obvious distress              Psychiatric:  Appropriate affect, cooperative   Neurologic:  Awake alert ,oriented x 3, strength symmetric in all extremities, Cranial Nerves grossly intact to confrontation, speech clear   Eyes:   PERRLA, sclerae anicteric, no conjunctival injection   HEENT:  Moist mucous membranes, no nasal or eye discharge, no throat congestion   Neck:   Supple, no thyromegaly, no lymphadenopathy, trachea midline, no elevated JVD   Respiratory:  Clear to auscultation bilaterally, nonlabored respirations    Cardiovascular: RRR, no murmurs, rubs, or gallops, palpable pedal pulses bilaterally, No bilateral ankle edema   Gastrointestinal: Positive bowel sounds, soft, nontender, nondistended, no organomegaly   Musculoskeletal:  No clubbing or cyanosis to extremities,muscle wasting, joint swelling, muscle weakness             Skin:                      No rashes, bruising, skin ulcers, petechiae or ecchymosis    Result Review    Result Review:  I have personally reviewed the results from the time of this admission to 12/10/2023 08:49 EST and agree with these findings:  []  Laboratory  []   Microbiology  []  Radiology  []  EKG/Telemetry   []  Cardiology/Vascular   []  Pathology  []  Old records  []  Other:    Assessment & Plan   Assessment / Plan       Active Hospital Problems:  Active Hospital Problems    Diagnosis     **Abdominal pain     Chronic anemia     Hydronephrosis     Pyelonephritis     History of Clostridioides difficile infection     Stage 3a chronic kidney disease     Type 2 diabetes mellitus     Essential hypertension      68-year female with past medical history of CKD stage IV with baseline creatinine of 1.8-2 who was noted to have progressively worsening kidney function over the last 6 months, longstanding diabetes with diabetic nephropathy hypothyroidism, hypertension, osteoporosis, history of breast cancer, GERD, dementia who was sent from the nursing home to the ER due to concerns for bloody bowel movements.  Hemoglobin noted to be at 7 with normal iron profile.  Creatinine has been uptrending over the last 6 months and currently at 3.24 with bicarb of 14 and BUN of 29 with low calcium of 7.3.  CT scan concerning for hydronephrosis and lymphadenopathy with possible underlying malignancy.  Urine analysis concerning for UTI and does have large amount of proteinuria.  Proteinuria noted to be 4 g of UPCR and 2 g of UACR.  Suspect with her degree of renal dysfunction that she has progressive renal disease secondary to diabetic nephropathy.  She has a nephrologist she sees in [place]    Plan  Noted urology wishes to follow-up in outpatient to assess the obstruction  Continue EPO 20 K units  Antibiotics for UTI  Continue with amlodipine 10 mg and carvedilol 25 twice daily for hypertension  Agree with bicarb tabs 1300 3 times daily  Acidosis most likely due to diarrhea.  will start bicarb drip at 75 cc/h  Discussed with the patient, she states that she lives in [place] and gets her care over there and has a nephrologist which she will confirm their name and let me know.  It is unclear  why her  brought her to [place]    Thank you for involving me in the care of the patient.  Will continue to follow along    Electronically signed by Minesh Jiang MD, 12/10/23, 8:49 AM EST.

## 2023-12-10 NOTE — THERAPY EVALUATION
Patient Name: Lyubov Middleton  : 1955    MRN: [id number]                              Today's Date: 12/10/2023       Admit Date: 2023    Visit Dx:     ICD-10-CM ICD-9-CM   1. Diarrhea, unspecified type  R19.7 787.91   2. History of Clostridioides difficile colitis  Z86.19 V12.79   3. Pyelonephritis  N12 590.80   4. Hydronephrosis, unspecified hydronephrosis type  N13.30 591   5. Chronic kidney disease, unspecified CKD stage  N18.9 585.9   6. Chronic anemia  D64.9 285.9   7. Ruptured Bakers cyst  M66.0 727.51   8. Impaired mobility and ADLs  Z74.09 V49.89    Z78.9      Patient Active Problem List   Diagnosis    Renal stone    Renal abscess    Stage 3a chronic kidney disease    Type 2 diabetes mellitus    Seizure disorder    Breast cancer    C. difficile diarrhea    Depressive disorder    Essential hypertension    Left ventricular dysfunction    Paroxysmal atrial fibrillation    History of Clostridioides difficile infection    History of colon resection    Diarrhea    Encounter for screening for malignant neoplasm of colon    Pyelonephritis    Chronic anemia    Abdominal pain    Hydronephrosis     Past Medical History:   Diagnosis Date    Arthritis     Cancer     left breast removed     Dementia     Type 2 diabetes mellitus      Past Surgical History:   Procedure Laterality Date    ABDOMINAL SURGERY      BREAST SURGERY Left     removed due to cancer    CHOLECYSTECTOMY      COLONOSCOPY  2023    Procedure: COLONOSCOPY WITH ENDOSCOPIC FECAL MICROBIOTA TRANSPLANT, GTUBE REMOVAL;  Surgeon: Hamzah Ireland MD;  Location: [facility] ENDOSCOPY;  Service: Gastroenterology;;    CYSTOSCOPY W/ URETERAL STENT PLACEMENT Right 2020    Procedure: CYSTOSCOPY, RIGHT RETROGRADE PYELOGRAM, URETEROSCOPY, LASER LITHOTRIPSY, RIGHT URETERAL STENT PLACEMENT;  Surgeon: Rohan Dooley Jr., MD;  Location: [facility] OR;  Service: Urology;  Laterality: Right;    GASTROSTOMY W/ FEEDING TUBE      HERNIA  REPAIR      mesh removed      General Information       Row Name 12/10/23 0756          OT Time and Intention    Document Type evaluation  -AC     Mode of Treatment individual therapy;occupational therapy  -       Row Name 12/10/23 [facility]          General Information    Patient Profile Reviewed yes  -     Prior Level of Function --  patient with some confusion although she states she required some assist with adls and transfers to wheelchair at long term care.  -     Existing Precautions/Restrictions fall  -     Barriers to Rehab none identified  -       Row Name 12/10/23 [facility]          Occupational Profile    Reason for Services/Referral (Occupational Profile) Pt. is a 68year old female admitted for the above diagnosis. Pt. referred to OT services to assess independence with ADLs and adl transfers/fx'l mobility. No previous OT services for current condition.  -       Row Name 12/10/23 [facility]          Living Environment    People in Home facility resident  -       Row Name 12/10/23 [facility]          Cognition    Orientation Status (Cognition) oriented to;person;situation  -       Row Name 12/10/23 [facility]          Safety Issues, Functional Mobility    Impairments Affecting Function (Mobility) balance;cognition;endurance/activity tolerance  -               User Key  (r) = Recorded By, (t) = Taken By, (c) = Cosigned By      Initials Name Provider Type    AC Supriya Coker, OT Occupational Therapist                     Mobility/ADL's       Row Name 12/10/23 0757          Bed Mobility    Bed Mobility bed mobility (all) activities  -     All Activities, [place] (Bed Mobility) standby assist  -       Row Name 12/10/23 0757          Transfers    Transfers sit-stand transfer  -       Row Name 12/10/23 0757          Sit-Stand Transfer    Sit-Stand [place] (Transfers) minimum assist (75% patient effort)  -     Comment, (Sit-Stand Transfer) patient was min A for standing at EOB although BLE knees  slightly contracted and will not fully extend.  -       Row Name 12/10/23 [facility]          Activities of Daily Living    BADL Assessment/Intervention --  patient is setup for self-feeding, spv for grooming, spv for upper body bathing/dressing, min/mod A for lower body bathing/dressing, min/mod A for toileting.  -               User Key  (r) = Recorded By, (t) = Taken By, (c) = Cosigned By      Initials Name Provider Type    Supriya Loaiza OT Occupational Therapist                   Obj/Interventions       Row Name 12/10/23 [facility]          Sensory Assessment (Somatosensory)    Sensory Assessment (Somatosensory) UE sensation intact  -       Row Name 12/10/23 [facility]          Vision Assessment/Intervention    Visual Impairment/Limitations corrective lenses full-time  -       Row Name 12/10/23 [facility]          Range of Motion Comprehensive    General Range of Motion bilateral upper extremity ROM WNL  -       Row Name 12/10/23 [facility]          Strength Comprehensive (MMT)    General Manual Muscle Testing (MMT) Assessment no strength deficits identified  -       Row Name 12/10/23 [facility]          Motor Skills    Motor Skills coordination;functional endurance  -     Coordination WFL  -     Functional Endurance fair-  -       Row Name 12/10/23 [facility]          Balance    Balance Assessment sitting static balance  -     Static Sitting Balance standby assist  -               User Key  (r) = Recorded By, (t) = Taken By, (c) = Cosigned By      Initials Name Provider Type    Supriya Loaiza OT Occupational Therapist                   Goals/Plan    No documentation.                  Clinical Impression       Row Name 12/10/23 0800          Pain Assessment    Pretreatment Pain Rating 0/10 - no pain  -     Posttreatment Pain Rating 0/10 - no pain  -       Row Name 12/10/23 0800          Plan of Care Review    Plan of Care Reviewed With patient  -BREE     Progress no change  -     Outcome Evaluation Patient not  appropriate for skilled OT services at this time as patient appears to be at prior level. patient requires assist for adls and assist for transfers to wheelchair at long term care facility at baseline.  patient safe to d/c to previous setting when medically appropriate. d/c occupational therapy services.  -       Row Name 12/10/23 0800          Therapy Assessment/Plan (OT)    Patient/Family Therapy Goal Statement (OT) NA  -     Rehab Potential (OT) --  NA  -     Criteria for Skilled Therapeutic Interventions Met (OT) no;no problems identified which require skilled intervention  -     Therapy Frequency (OT) evaluation only  -       Row Name 12/10/23 0800          Therapy Plan Review/Discharge Plan (OT)    Anticipated Discharge Disposition (OT) extended care facility  -       Row Name 12/10/23 0800          Positioning and Restraints    Pre-Treatment Position in bed  -     Post Treatment Position bed  -AC     In Bed fowlers;call light within reach;encouraged to call for assist;exit alarm on  -AC               User Key  (r) = Recorded By, (t) = Taken By, (c) = Cosigned By      Initials Name Provider Type     Supriya Coker, OT Occupational Therapist                   Outcome Measures       Row Name 12/10/23 0802          How much help from another is currently needed...    Putting on and taking off regular lower body clothing? 3  -AC     Bathing (including washing, rinsing, and drying) 3  -AC     Toileting (which includes using toilet bed pan or urinal) 3  -AC     Putting on and taking off regular upper body clothing 3  -AC     Taking care of personal grooming (such as brushing teeth) 3  -AC     Eating meals 4  -AC     AM-PAC 6 Clicks Score (OT) 19  -       Row Name 12/10/23 0802          Functional Assessment    Outcome Measure Options AM-PAC 6 Clicks Daily Activity (OT);Optimal Instrument  -       Row Name 12/10/23 0802          Optimal Instrument    Optimal Instrument Optimal - 3  -AC      Bending/Stooping 2  -AC     Standing 2  -AC     Reaching 1  -AC     From the list, choose the 3 activities you would most like to be able to do without any difficulty Bending/stooping;Standing;Reaching  -AC     Total Score Optimal - 3 5  -AC               User Key  (r) = Recorded By, (t) = Taken By, (c) = Cosigned By      Initials Name Provider Type     Supriya Coker OT Occupational Therapist                    Occupational Therapy Education       Title: PT OT SLP Therapies (Done)       Topic: Occupational Therapy (Done)       Point: ADL training (Done)       Description:   Instruct learner(s) on proper safety adaptation and remediation techniques during self care or transfers.   Instruct in proper use of assistive devices.                  Learning Progress Summary             Patient Acceptance, E, VU,NR by  at 12/10/2023 0804                         Point: Home exercise program (Done)       Description:   Instruct learner(s) on appropriate technique for monitoring, assisting and/or progressing therapeutic exercises/activities.                  Learning Progress Summary             Patient Acceptance, E, VU,NR by  at 12/10/2023 0804                         Point: Precautions (Done)       Description:   Instruct learner(s) on prescribed precautions during self-care and functional transfers.                  Learning Progress Summary             Patient Acceptance, E, VU,NR by  at 12/10/2023 0804                         Point: Body mechanics (Done)       Description:   Instruct learner(s) on proper positioning and spine alignment during self-care, functional mobility activities and/or exercises.                  Learning Progress Summary             Patient Acceptance, E, VU,NR by  at 12/10/2023 0804                                         User Key       Initials Effective Dates Name Provider Type Discipline     06/16/21 -  Supriya Coker OT Occupational Therapist OT                  OT Recommendation  and Plan  Therapy Frequency (OT): evaluation only  Plan of Care Review  Plan of Care Reviewed With: patient  Progress: no change  Outcome Evaluation: Patient not appropriate for skilled OT services at this time as patient appears to be at prior level. patient requires assist for adls and assist for transfers to wheelchair at long term care facility at baseline.  patient safe to d/c to previous setting when medically appropriate. d/c occupational therapy services.     Time Calculation:   Evaluation Complexity (OT)  Review Occupational Profile/Medical/Therapy History Complexity: brief/low complexity  Assessment, Occupational Performance/Identification of Deficit Complexity: 1-3 performance deficits  Clinical Decision Making Complexity (OT): problem focused assessment/low complexity  Overall Complexity of Evaluation (OT): low complexity     Time Calculation- OT       Row Name 12/10/23 0805             Time Calculation- OT    OT Received On 12/10/23  -AC         Untimed Charges    OT Eval/Re-eval Minutes 27  -AC         Total Minutes    Untimed Charges Total Minutes 27  -AC       Total Minutes 27  -AC                User Key  (r) = Recorded By, (t) = Taken By, (c) = Cosigned By      Initials Name Provider Type    AC Supriya Coker OT Occupational Therapist                  Therapy Charges for Today       Code Description Service Date Service Provider Modifiers Qty    [id number]  OT EVAL LOW COMPLEXITY 2 12/10/2023 Supriya Coker OT GO 1                 Supriya Coker OT  12/10/2023

## 2023-12-10 NOTE — PLAN OF CARE
Goal Outcome Evaluation:  Plan of Care Reviewed With: patient        Progress: improving          VSS, room air. Up with x2 assist and a walker to chair and BSC. No c/o pain. Skin care provided throughout shift. Continued IV antibiotics for UTI. Diarrhea improving, two episodes this shift. Sodium bicarb PO and IV given per MAR. Alert, oriented and able to make needs known. Resting comfortably throughout shift. No complaints at this time. Will CTM and provide care.

## 2023-12-11 LAB
ALBUMIN SERPL-MCNC: 2.4 G/DL (ref 3.5–5.2)
ALBUMIN/GLOB SERPL: 0.8 G/DL
ALP SERPL-CCNC: 72 U/L (ref 39–117)
ALT SERPL W P-5'-P-CCNC: 5 U/L (ref 1–33)
ANION GAP SERPL CALCULATED.3IONS-SCNC: 11.7 MMOL/L (ref 5–15)
AST SERPL-CCNC: 9 U/L (ref 1–32)
BILIRUB SERPL-MCNC: <0.2 MG/DL (ref 0–1.2)
BUN SERPL-MCNC: 32 MG/DL (ref 8–23)
BUN/CREAT SERPL: 8.2 (ref 7–25)
CALCIUM SPEC-SCNC: 7.1 MG/DL (ref 8.6–10.5)
CHLORIDE SERPL-SCNC: 109 MMOL/L (ref 98–107)
CO2 SERPL-SCNC: 20.3 MMOL/L (ref 22–29)
CREAT SERPL-MCNC: 3.89 MG/DL (ref 0.57–1)
EGFRCR SERPLBLD CKD-EPI 2021: 12.1 ML/MIN/1.73
GLOBULIN UR ELPH-MCNC: 3 GM/DL
GLUCOSE BLDC GLUCOMTR-MCNC: 120 MG/DL (ref 70–99)
GLUCOSE BLDC GLUCOMTR-MCNC: 121 MG/DL (ref 70–99)
GLUCOSE BLDC GLUCOMTR-MCNC: 84 MG/DL (ref 70–99)
GLUCOSE BLDC GLUCOMTR-MCNC: 96 MG/DL (ref 70–99)
GLUCOSE SERPL-MCNC: 102 MG/DL (ref 65–99)
MAGNESIUM SERPL-MCNC: 2.1 MG/DL (ref 1.6–2.4)
PHOSPHATE SERPL-MCNC: 4.5 MG/DL (ref 2.5–4.5)
POTASSIUM SERPL-SCNC: 3.4 MMOL/L (ref 3.5–5.2)
PROT SERPL-MCNC: 5.4 G/DL (ref 6–8.5)
SODIUM SERPL-SCNC: 141 MMOL/L (ref 136–145)
WHOLE BLOOD HOLD SPECIMEN: NORMAL

## 2023-12-11 PROCEDURE — 99232 SBSQ HOSP IP/OBS MODERATE 35: CPT | Performed by: UROLOGY

## 2023-12-11 PROCEDURE — 25010000002 CEFAZOLIN 1-4 GM/50ML-% SOLUTION: Performed by: INTERNAL MEDICINE

## 2023-12-11 PROCEDURE — 0 DEXTROSE 5 % SOLUTION: Performed by: STUDENT IN AN ORGANIZED HEALTH CARE EDUCATION/TRAINING PROGRAM

## 2023-12-11 PROCEDURE — 82948 REAGENT STRIP/BLOOD GLUCOSE: CPT

## 2023-12-11 PROCEDURE — 97161 PT EVAL LOW COMPLEX 20 MIN: CPT

## 2023-12-11 PROCEDURE — 83735 ASSAY OF MAGNESIUM: CPT | Performed by: INTERNAL MEDICINE

## 2023-12-11 PROCEDURE — 84100 ASSAY OF PHOSPHORUS: CPT | Performed by: INTERNAL MEDICINE

## 2023-12-11 PROCEDURE — 25010000002 EPOETIN ALFA PER 1000 UNITS: Performed by: STUDENT IN AN ORGANIZED HEALTH CARE EDUCATION/TRAINING PROGRAM

## 2023-12-11 PROCEDURE — 80053 COMPREHEN METABOLIC PANEL: CPT | Performed by: FAMILY MEDICINE

## 2023-12-11 PROCEDURE — 99233 SBSQ HOSP IP/OBS HIGH 50: CPT | Performed by: INTERNAL MEDICINE

## 2023-12-11 RX ORDER — POTASSIUM CHLORIDE 750 MG/1
40 CAPSULE, EXTENDED RELEASE ORAL ONCE
Status: COMPLETED | OUTPATIENT
Start: 2023-12-11 | End: 2023-12-11

## 2023-12-11 RX ORDER — CEFAZOLIN SODIUM 1 G/50ML
1000 INJECTION, SOLUTION INTRAVENOUS EVERY 24 HOURS
Status: COMPLETED | OUTPATIENT
Start: 2023-12-11 | End: 2023-12-12

## 2023-12-11 RX ADMIN — CARVEDILOL 25 MG: 25 TABLET, FILM COATED ORAL at 08:47

## 2023-12-11 RX ADMIN — SODIUM BICARBONATE 150 MEQ: 84 INJECTION, SOLUTION INTRAVENOUS at 02:31

## 2023-12-11 RX ADMIN — ERYTHROPOIETIN 20000 UNITS: 20000 INJECTION, SOLUTION INTRAVENOUS; SUBCUTANEOUS at 08:47

## 2023-12-11 RX ADMIN — SODIUM BICARBONATE 650 MG TABLET 1300 MG: at 16:33

## 2023-12-11 RX ADMIN — Medication 250 MG: at 08:47

## 2023-12-11 RX ADMIN — SODIUM BICARBONATE 150 MEQ: 84 INJECTION, SOLUTION INTRAVENOUS at 20:21

## 2023-12-11 RX ADMIN — SODIUM BICARBONATE 650 MG TABLET 1300 MG: at 20:59

## 2023-12-11 RX ADMIN — Medication 10 ML: at 08:49

## 2023-12-11 RX ADMIN — SODIUM BICARBONATE 650 MG TABLET 1300 MG: at 08:47

## 2023-12-11 RX ADMIN — Medication 250 MG: at 20:28

## 2023-12-11 RX ADMIN — POTASSIUM CHLORIDE 40 MEQ: 10 CAPSULE, COATED, EXTENDED RELEASE ORAL at 08:47

## 2023-12-11 RX ADMIN — AMLODIPINE BESYLATE 10 MG: 5 TABLET ORAL at 08:47

## 2023-12-11 RX ADMIN — PANTOPRAZOLE SODIUM 40 MG: 40 TABLET, DELAYED RELEASE ORAL at 05:14

## 2023-12-11 RX ADMIN — CARVEDILOL 25 MG: 25 TABLET, FILM COATED ORAL at 20:28

## 2023-12-11 RX ADMIN — CEFAZOLIN SODIUM 1000 MG: 1 INJECTION, SOLUTION INTRAVENOUS at 20:28

## 2023-12-11 NOTE — PROGRESS NOTES
[facility]     Progress Note    Patient Name: Lyubov Middleton  : 1955  MRN: [id number]  Primary Care Physician:  Cathleen Stroud APRN  Date of admission: 2023    Subjective   Patient's renal function continues to worsen slowly and steadily  No major acute events overnight    Scheduled Meds:amLODIPine, 10 mg, Oral, Daily  carvedilol, 25 mg, Oral, BID  cefTRIAXone, 1,000 mg, Intravenous, Q24H  epoetin chris/chris-epbx, 20,000 Units, Subcutaneous, Once per day on   insulin lispro, 2-7 Units, Subcutaneous, 4x Daily AC & at Bedtime  metoprolol tartrate, 5 mg, Intravenous, Once  pantoprazole, 40 mg, Oral, Q AM  saccharomyces boulardii, 250 mg, Oral, BID  sodium bicarbonate, 1,300 mg, Oral, TID  sodium chloride, 10 mL, Intravenous, Q12H      Continuous Infusions:sodium bicarbonate, 150 mEq, Last Rate: 150 mEq (23 0231)      PRN Meds:.  acetaminophen    dextrose    dextrose    glucagon (human recombinant)    loperamide    ondansetron    sodium chloride    sodium chloride    sodium chloride       Review of Systems  Constitutional:        Weakness tiredness fatigue  Eyes:                       No blurry vision, eye discharge, eye irritation, eye pain  HEENT:                   No acute hair loss, earache and discharge, nasal congestion or discharge, sore throat, postnasal drip  Respiratory:           No shortness of breath coughing sputum production wheezing hemoptysis pleuritic chest pain  Cardiovascular:     No chest pain, orthopnea, PND, dizziness, palpitation, lower extremity edema  Gastrointestinal:   No nausea vomiting diarrhea abdominal pain constipation  Genitourinary:       No urinary incontinence, hesitancy, frequency, urgency, dysuria  Hematologic:         No bruising, bleeding, pallor, lymphadenopathy  Endocrine:            No coldness, hot flashes, polyuria, abnormal hair growth  Musculoskeletal:    No body pains, aches, arthritic pains, muscle pain ,muscle  wasting  Psychiatric:          No low or high mood, anxiety, hallucinations, delusions  Skin.                      No rash, ulcers, bruising, itching  Neurological:        No confusion, headache, focal weakness, numbness, dysphasia    Objective   Objective     Vitals:   Temp:  [97.6 °F (36.4 °C)-98.5 °F (36.9 °C)] 97.8 °F (36.6 °C)  Heart Rate:  [] 80  Resp:  [16] 16  BP: (101-148)/(55-75) 141/63  Physical Exam    Constitutional: Awake, alert responsive, conversant, no obvious distress              Psychiatric:  Appropriate affect, cooperative   Neurologic:  Awake alert ,oriented x 3, strength symmetric in all extremities, Cranial Nerves grossly intact to confrontation, speech clear   Eyes:   PERRLA, sclerae anicteric, no conjunctival injection   HEENT:  Moist mucous membranes, no nasal or eye discharge, no throat congestion   Neck:   Supple, no thyromegaly, no lymphadenopathy, trachea midline, no elevated JVD   Respiratory:  Clear to auscultation bilaterally, nonlabored respirations    Cardiovascular: RRR, no murmurs, rubs, or gallops, palpable pedal pulses bilaterally, No bilateral ankle edema   Gastrointestinal: Positive bowel sounds, soft, nontender, nondistended, no organomegaly   Musculoskeletal:  No clubbing or cyanosis to extremities,muscle wasting, joint swelling, muscle weakness             Skin:                      No rashes, bruising, skin ulcers, petechiae or ecchymosis    Result Review    Result Review:  I have personally reviewed the results from the time of this admission to 12/11/2023 08:38 EST and agree with these findings:  []  Laboratory  []  Microbiology  []  Radiology  []  EKG/Telemetry   []  Cardiology/Vascular   []  Pathology  []  Old records  []  Other:    Assessment & Plan   Assessment / Plan       Active Hospital Problems:  Active Hospital Problems    Diagnosis     **Abdominal pain     Chronic anemia     Hydronephrosis     Pyelonephritis     History of Clostridioides difficile  infection     Stage 3a chronic kidney disease     Type 2 diabetes mellitus     Essential hypertension      68-year female with past medical history of CKD stage IV with baseline creatinine of 1.8-2 who was noted to have progressively worsening kidney function over the last 6 months, longstanding diabetes with diabetic nephropathy hypothyroidism, hypertension, osteoporosis, history of breast cancer, GERD, dementia who was sent from the nursing home to the ER due to concerns for bloody bowel movements.  Hemoglobin noted to be at 7 with normal iron profile.  Creatinine has been uptrending over the last 6 months and currently at 3.24 with bicarb of 14 and BUN of 29 with low calcium of 7.3.  CT scan concerning for hydronephrosis and lymphadenopathy with possible underlying malignancy.  Urine analysis concerning for UTI and does have large amount of proteinuria.  Proteinuria noted to be 4 g of UPCR and 2 g of UACR.  Suspect with her degree of renal dysfunction that she has progressive renal disease secondary to diabetic nephropathy.  She has a nephrologist she sees in [place]    Plan  Continue EPO 20 K units  Antibiotics for UTI  Continue with amlodipine 10 mg and carvedilol 25 twice daily for hypertension  Agree with bicarb tabs 1300 3 times daily  Acidosis most likely due to diarrhea.  Decrease bicarb drip to 50 cc/h    Had detailed discussion with patient's POA with her ex- was taken care of for her since March of this year that she has had progressively worsening decline in her general health over the last 1 year and is currently in the nursing home in [facility].  At this time her renal function is continuing to worsen.  I have discussed with POA that she is at very high risk of ending up on dialysis.  However before finalizing that decision, ideally would benefit from patient getting urological procedure to determine cause of obstruction, if there will be any improvement in renal function and possibly overall  prognosis.  Noted urology may do an intervention later this week    Thank you for involving me in the care of the patient.  Will continue to follow along

## 2023-12-11 NOTE — SIGNIFICANT NOTE
12/11/23 0753   Plan   Plan Comments Patient has a bed hold at [facility] and can return when medically ready.

## 2023-12-11 NOTE — PLAN OF CARE
Goal Outcome Evaluation:  Plan of Care Reviewed With: patient VITALS STABLE , X1 ASSIST TO BSC , DENIES NEED FOR PAIN MEDICATION . TOLERATING DIET , LOOSE STOOL NOTED .Nora Greenfield RN

## 2023-12-11 NOTE — CONSULTS
Purpose of the visit was to evaluate for: goals of care/advanced care planning. Spoke with RN and discussed palliative care, goals of care, care options, [facility], clarify code status, and determination of decision maker.      Assessment: visited with Ms Middleton in her room on 5 [address].  She is lying quietly in the bed. She is alert and oriented x 4. She is on room air. In no apparent acute distress.  We reviewed her living will on file. She tells me she is supposed to be a DNR/DNI. I told her I would contact the MD to have these changes made. She tells me she has been told she will need to have dialysis soon. She tells me her experience with her brother declining dialysis.she said he was miserable however she feels the same as he did. She does not want to have dialysis. She understands the ramifications of refusing.   We discussed the transition to hospice care for symptom management and support. She has requested an EOS. She tells me to have them contact her POA to set the time and date for the meeting.   I notified Keven Middleton about the above. He said he wants to be involved in any care planning and decision making.         Recommendations/Plan: [facility] referral.    Tasks Completed: Code Status clarification and Emotional Support.    Palliative care to continue to follow and support as needed    Radha ADKINS RN, BSN  Palliative Care

## 2023-12-11 NOTE — THERAPY EVALUATION
Acute Care - Physical Therapy Initial Evaluation   [place]     Patient Name: Lyubov Middleton  : 1955  MRN: [id number]  Today's Date: 2023      Visit Dx:     ICD-10-CM ICD-9-CM   1. Diarrhea, unspecified type  R19.7 787.91   2. History of Clostridioides difficile colitis  Z86.19 V12.79   3. Pyelonephritis  N12 590.80   4. Hydronephrosis, unspecified hydronephrosis type  N13.30 591   5. Chronic kidney disease, unspecified CKD stage  N18.9 585.9   6. Chronic anemia  D64.9 285.9   7. Ruptured Bakers cyst  M66.0 727.51   8. Impaired mobility and ADLs  Z74.09 V49.89    Z78.9    9. Difficulty walking  R26.2 719.7     Patient Active Problem List   Diagnosis    Renal stone    Renal abscess    Stage 3a chronic kidney disease    Type 2 diabetes mellitus    Seizure disorder    Breast cancer    C. difficile diarrhea    Depressive disorder    Essential hypertension    Left ventricular dysfunction    Paroxysmal atrial fibrillation    History of Clostridioides difficile infection    History of colon resection    Diarrhea    Encounter for screening for malignant neoplasm of colon    Pyelonephritis    Chronic anemia    Abdominal pain    Hydronephrosis     Past Medical History:   Diagnosis Date    Arthritis     Cancer     left breast removed     Dementia     Type 2 diabetes mellitus      Past Surgical History:   Procedure Laterality Date    ABDOMINAL SURGERY      BREAST SURGERY Left     removed due to cancer    CHOLECYSTECTOMY      COLONOSCOPY  2023    Procedure: COLONOSCOPY WITH ENDOSCOPIC FECAL MICROBIOTA TRANSPLANT, GTUBE REMOVAL;  Surgeon: Hamzah Ireland MD;  Location: [facility] ENDOSCOPY;  Service: Gastroenterology;;    CYSTOSCOPY W/ URETERAL STENT PLACEMENT Right 2020    Procedure: CYSTOSCOPY, RIGHT RETROGRADE PYELOGRAM, URETEROSCOPY, LASER LITHOTRIPSY, RIGHT URETERAL STENT PLACEMENT;  Surgeon: Rohan Dooley Jr., MD;  Location: [facility] OR;  Service: Urology;  Laterality: Right;     GASTROSTOMY W/ FEEDING TUBE      HERNIA REPAIR      mesh removed     PT Assessment (last 12 hours)       PT Evaluation and Treatment       Row Name 12/11/23 1100          Physical Therapy Time and Intention    Document Type evaluation  -AV     Mode of Treatment individual therapy;physical therapy  -AV       Row Name 12/11/23 1100          General Information    Patient Profile Reviewed yes  -AV     Prior Level of Function --  Patient is a LT resident of [facility]. Shw reports (I) with ADLs and pivot transfers to/from her w/c. No home O2.  -AV     Equipment Currently Used at Home wheelchair  -AV     Existing Precautions/Restrictions fall  -AV       Row Name 12/11/23 1100          Living Environment    Current Living Arrangements extended care facility  -AV     People in Home facility resident  LT resident of [facility]  -AV       Row Name 12/11/23 1100          Cognition    Orientation Status (Cognition) oriented x 3  -AV       Row Name 12/11/23 1100          Range of Motion (ROM)    Range of Motion bilateral lower extremities;ROM is WFL  -AV       Row Name 12/11/23 1100          Strength (Manual Muscle Testing)    Strength (Manual Muscle Testing) bilateral lower extremities  3+/5  -AV       Row Name 12/11/23 1100          Bed Mobility    Bed Mobility supine-sit  -AV     Supine-Sit [place] (Bed Mobility) standby assist  -AV       Row Name 12/11/23 1100          Transfers    Transfers sit-stand transfer;stand-sit transfer  -AV       Row Name 12/11/23 1100          Sit-Stand Transfer    Sit-Stand [place] (Transfers) contact guard  -AV     Assistive Device (Sit-Stand Transfers) walker, front-wheeled  -AV       Row Name 12/11/23 1100          Stand-Sit Transfer    Stand-Sit [place] (Transfers) contact guard  -AV     Assistive Device (Stand-Sit Transfers) walker, front-wheeled  -AV       Row Name 12/11/23 1100          Gait/Stairs (Locomotion)    Comment, (Gait/Stairs) 3 steps bed>recliner  with RW and CGA  -AV       Row Name 12/11/23 1100          Safety Issues, Functional Mobility    Impairments Affecting Function (Mobility) balance;endurance/activity tolerance;strength  -AV       Row Name 12/11/23 1100          Balance    Balance Assessment standing dynamic balance  -AV     Dynamic Standing Balance contact guard  -AV     Position/Device Used, Standing Balance supported;walker, front-wheeled  -AV       Row Name 12/11/23 1100          Plan of Care Review    Plan of Care Reviewed With patient  -AV     Progress no change  -AV     Outcome Evaluation Patient presents with deficits in balance, endurance, strength, and functional transfers. Patient will benefit from skilled PT services to address these mobility deficits and decrease risk of falls.  -AV       Row Name 12/11/23 1100          Positioning and Restraints    Pre-Treatment Position in bed  -AV     Post Treatment Position chair  -AV     In Chair reclined;call light within reach;encouraged to call for assist;exit alarm on  -AV       Row Name 12/11/23 1100          Therapy Assessment/Plan (PT)    Rehab Potential (PT) good, to achieve stated therapy goals  -AV     Criteria for Skilled Interventions Met (PT) yes;meets criteria  -AV     Therapy Frequency (PT) daily  -AV     Predicted Duration of Therapy Intervention (PT) 10 days  -AV     Problem List (PT) problems related to;balance;mobility;strength  -AV     Activity Limitations Related to Problem List (PT) unable to transfer safely  -AV       Row Name 12/11/23 1100          PT Evaluation Complexity    History, PT Evaluation Complexity 1-2 personal factors and/or comorbidities  -AV     Examination of Body Systems (PT Eval Complexity) total of 4 or more elements  -AV     Clinical Presentation (PT Evaluation Complexity) stable  -AV     Clinical Decision Making (PT Evaluation Complexity) low complexity  -AV     Overall Complexity (PT Evaluation Complexity) low complexity  -AV       Row Name 12/11/23 1100           Therapy Plan Review/Discharge Plan (PT)    Therapy Plan Review (PT) evaluation/treatment results reviewed;patient  -AV       Row Name 12/11/23 1100          Physical Therapy Goals    Bed Mobility Goal Selection (PT) bed mobility, PT goal 1  -AV     Transfer Goal Selection (PT) transfer, PT goal 1  -AV       Row Name 12/11/23 1100          Bed Mobility Goal 1 (PT)    Activity/Assistive Device (Bed Mobility Goal 1, PT) sit to supine/supine to sit  -AV     [place] Level/Cues Needed (Bed Mobility Goal 1, PT) independent  -AV     Time Frame (Bed Mobility Goal 1, PT) 10 days  -AV       Row Name 12/11/23 1100          Transfer Goal 1 (PT)    Activity/Assistive Device (Transfer Goal 1, PT) sit-to-stand/stand-to-sit;bed-to-chair/chair-to-bed  -AV     [place] Level/Cues Needed (Transfer Goal 1, PT) independent  -AV     Time Frame (Transfer Goal 1, PT) 10 days  -AV               User Key  (r) = Recorded By, (t) = Taken By, (c) = Cosigned By      Initials Name Provider Type    AV Anant Yen, PT Physical Therapist                    Physical Therapy Education       Title: PT OT SLP Therapies (In Progress)       Topic: Physical Therapy (In Progress)       Point: Mobility training (Done)       Learning Progress Summary             Patient Acceptance, E,TB, VU by  at 12/11/2023 1143                         Point: Home exercise program (Not Started)       Learner Progress:  Not documented in this visit.              Point: Body mechanics (Done)       Learning Progress Summary             Patient Acceptance, E,TB, VU by AV at 12/11/2023 1143                         Point: Precautions (Done)       Learning Progress Summary             Patient Acceptance, E,TB, VU by AV at 12/11/2023 1143                                         User Key       Initials Effective Dates Name Provider Type Discipline     06/11/21 -  Anant Yen, PT Physical Therapist PT                  PT Recommendation and  Plan  Anticipated Discharge Disposition (PT): sub acute care setting  Planned Therapy Interventions (PT): balance training, bed mobility training, home exercise program, neuromuscular re-education, strengthening, transfer training  Therapy Frequency (PT): daily  Plan of Care Reviewed With: patient  Progress: no change  Outcome Evaluation: Patient presents with deficits in balance, endurance, strength, and functional transfers. Patient will benefit from skilled PT services to address these mobility deficits and decrease risk of falls.   Outcome Measures       Row Name 12/11/23 1100             How much help from another person do you currently need...    Turning from your back to your side while in flat bed without using bedrails? 4  -AV      Moving from lying on back to sitting on the side of a flat bed without bedrails? 3  -AV      Moving to and from a bed to a chair (including a wheelchair)? 3  -AV      Standing up from a chair using your arms (e.g., wheelchair, bedside chair)? 3  -AV      Climbing 3-5 steps with a railing? 1  -AV      To walk in hospital room? 2  -AV      AM-PAC 6 Clicks Score (PT) 16  -AV      Highest Level of Mobility Goal 5 --> Static standing  -AV         Functional Assessment    Outcome Measure Options AM-PAC 6 Clicks Basic Mobility (PT)  -AV                User Key  (r) = Recorded By, (t) = Taken By, (c) = Cosigned By      Initials Name Provider Type    Anant Mitchell, PT Physical Therapist                     Time Calculation:    PT Charges       Row Name 12/11/23 1142             Time Calculation    PT Received On 12/11/23  -AV      PT Goal Re-Cert Due Date 12/20/23  -AV         Untimed Charges    PT Eval/Re-eval Minutes 34  -AV         Total Minutes    Untimed Charges Total Minutes 34  -AV       Total Minutes 34  -AV                User Key  (r) = Recorded By, (t) = Taken By, (c) = Cosigned By      Initials Name Provider Type    Anant Mitchell, PT Physical Therapist                   Therapy Charges for Today       Code Description Service Date Service Provider Modifiers Qty    42451898330 HC PT EVAL LOW COMPLEXITY 3 12/11/2023 Anant Yen, PT GP 1            PT G-Codes  Outcome Measure Options: AM-PAC 6 Clicks Basic Mobility (PT)  AM-PAC 6 Clicks Score (PT): 16  AM-PAC 6 Clicks Score (OT): 19    Anant Yen, PT  12/11/2023

## 2023-12-11 NOTE — PLAN OF CARE
Goal Outcome Evaluation:         Care plan ongoing, patient with some intermittent confusion, sugars were WDL, patient did not need insulin to correct sugars today, Kidney labs have not improved, patient expressed desire to not undergo dialysis if it were to come to that, MD discussed with patient and family and placed a palliative care consult today. No complaints of pain, no signs of distress noted, call light in reach, bed in lowest locked position.

## 2023-12-11 NOTE — PLAN OF CARE
Goal Outcome Evaluation:  Plan of Care Reviewed With: patient        Progress: no change  Outcome Evaluation: Patient presents with deficits in balance, endurance, strength, and functional transfers. Patient will benefit from skilled PT services to address these mobility deficits and decrease risk of falls.      Anticipated Discharge Disposition (PT): sub acute care setting

## 2023-12-11 NOTE — PROGRESS NOTES
[facility]   Urology Progress Note    Patient Name: Lyubov Middleton  : 1955  MRN: [id number]  Primary Care Physician:  Cathleen Stroud APRN  Date of admission: 2023    Subjective   Subjective       No pain, no events.  Voiding okay no gross hematuria.  No flank pain      Objective   Objective     Vitals:   Temp:  [97.6 °F (36.4 °C)-98.5 °F (36.9 °C)] 97.6 °F (36.4 °C)  Heart Rate:  [] 75  Resp:  [16-20] 16  BP: (101-148)/(55-75) 144/64  Physical Exam     Alert and oriented x3  No acute distress  Unlabored respirations  Nontender/nondistended       Result Review    Result Review:  I have personally reviewed the results from the time of this admission to 2023 06:49 EST and agree with these findings:  []  Laboratory  []  Microbiology  []  Radiology  []  EKG/Telemetry   []  Cardiology/Vascular   []  Pathology  []  Old records  []  Other:      Assessment & Plan   Assessment / Plan     Brief Patient Summary:  Lyubov Middleton is a 68 y.o. female     Active Hospital Problems:  Active Hospital Problems    Diagnosis     **Abdominal pain     Chronic anemia     Hydronephrosis     Pyelonephritis     History of Clostridioides difficile infection     Stage 3a chronic kidney disease     Type 2 diabetes mellitus     Essential hypertension      Plan:      Discussed again with the patient CT findings of abnormal right kidney.  With no pain on the right looks to be a chronic or at least slow-moving process.  Not acutely in pain or sick from this     Once patient over acute UTI is in need ofl need cystoscopy with right ureteroscopy possible biopsy possible laser of stones in the right ureteral stent placement, left retrograde pyelogram       Discussed with patient's family.  Because of insurance with [facility] I think at this time patient would not be able to see me as outpatient.  I will have to discuss further with my  today.    If patient continues to be inpatient I could do ureteroscopy  as an inpatient after she been on antibiotics a week which which would be Thursday could consider ureteroscopy.  If patient goes out I will get her follow-up with [facility] if I am not able to see her as an outpatient     Patient and family aware they must follow-up on this or could be detrimental to her health.    Call with questions         Electronically signed by Joseph Powell MD, 12/11/23, 6:49 AM EST.

## 2023-12-11 NOTE — PROGRESS NOTES
[facility]   Hospitalist Progress Note  Date: 2023  Patient Name: Lyubov Middleton  : 1955  MRN: [id number]  Date of admission: 2023      Subjective   Subjective     Chief Complaint: Abdominal pain, hematochezia    Summary: 68 y.o. female with past medical history of hypertension, hyperlipidemia, diabetes not on medication, breast cancer, arthritis, dementia, and GERD presented from nursing home with complaints of abdominal pain and bloody bowel movements.  When seen patient did have some memory impairment but was able to answer questions when asked.  Staff at her nursing facility was concerned after she had possible bloody bowel movements along with lower abdominal pain and nausea.   She was admitted for further care, GI, nephrology, urology consulted.  Seems to have a new baseline creatinine due to worsening diabetic nephropathy.  Urology would like to treat her UTI before performing outpatient cystoscopy with possible biopsy.  If she is still in the hospital by Thursday, urology may take her for cystoscopy.  No indication for endoscopy as hemoglobin remained stable and no further bleeding during hospital stay.    Interval Followup: No acute events overnight.  Renal function slightly worsened again.  She overall feels a little better today, feels like her energy levels are better.  Diarrhea improving.    Objective   Objective     Vitals:   Temp:  [97.6 °F (36.4 °C)-98.5 °F (36.9 °C)] 98.4 °F (36.9 °C)  Heart Rate:  [71-80] 77  Resp:  [16] 16  BP: (114-148)/(55-75) 122/58  Physical Exam    Constitutional: Chronically ill female, seen on bedside commode, NAD   Respiratory: Clear to auscultation bilaterally   Cardiovascular: RRR, no MRG   Gastrointestinal: Positive bowel sounds, soft, nontender, nondistended   Neurologic: Oriented x 3, globally weak, strength symmetric in all extremities, cranial nerves grossly intact, speech clear    Result Review    Result Review:  I have personally reviewed  the results below:  [x]  Laboratory personally reviewed CMP, CBC, magnesium, phosphorus, blood sugars  []  Microbiology  []  Radiology  [x]  EKG/Telemetry telemetry reviewed showing normal sinus rhythm  []  Cardiology/Vascular   []  Pathology  []  Old records  []  Other:  CBC          12/8/2023    14:34 12/9/2023    04:38 12/9/2023    09:37 12/10/2023    11:29   CBC   WBC 9.30  9.08     9.13  8.91  11.76    RBC 2.93  2.60     2.56  2.73  2.93    Hemoglobin 8.5  7.4     7.4  7.7  8.3    Hematocrit 26.2  23.5     23.4  24.6  26.4    MCV 89.4  90.4     91.4  90.1  90.1    MCH 29.0  28.5     28.9  28.2  28.3    MCHC 32.4  31.5     31.6  31.3  31.4    RDW 14.8  14.7     14.9  14.8  14.7    Platelets 233  194     201  212  227      CMP          12/9/2023    04:38 12/9/2023    09:36 12/10/2023    04:22 12/11/2023    03:09   CMP   Glucose 79  143  81  102    BUN 29  33  42  32    Creatinine 3.24  3.10  3.69  3.89    EGFR 15.1  15.9  12.9  12.1    Sodium 142  141  141  141    Potassium 4.1  3.7  4.1  3.4    Chloride 117  114  115  109    Calcium 7.3  7.4  7.1  7.1    Total Protein 5.4   5.5  5.4    Albumin 2.5   2.5  2.4    Globulin 2.9   3.0  3.0    Total Bilirubin <0.2   0.2  <0.2    Alkaline Phosphatase 73   72  72    AST (SGOT) 10   10  9    ALT (SGPT) 9   7  5    Albumin/Globulin Ratio 0.9   0.8  0.8    BUN/Creatinine Ratio 9.0  10.6  11.4  8.2    Anion Gap 10.5  10.4  10.0  11.7        Assessment & Plan   Assessment / Plan     Assessment/Plan:  Concern for renal malignancy  UTI due to E. coli, present on admission  Hydronephrosis  Metabolic acidosis  Hypocalcemia  Proteinuria  Hematochezia  Constipation  Anemia  AUSTEN on CKD    Continue to monitor in the hospital for management of the above  Nephrology following, appreciate assistance  Plan to complete antibiotics for UTI, she will then follow-up with urology on outpatient basis for cystoscopy and further workup and concern for renal malignancy  Discussed with  nephrology, the patient has had significant decline over the last year and currently resides in a nursing home.  Neurology discussed with the POA that she is at high risk of ending up on dialysis.  I have consulted palliative care for goals of care discussion  Discussed with urology, if the patient is still here Thursday, they will likely proceed with cystoscopy while inpatient  Continue 1300 mg 3 times daily, decrease sodium bicarb drip to 50 cc/h  Imodium if needed for diarrhea  C. difficile and stool culture negative  Rocephin 1 g daily for UTI x 5 days, urine culture reviewed growing E. coli resistant to Unasyn  Continue EPO per nephrology recommendations  Currently no indication for endoscopy, will continue to trend H&H and if less than 7 transfuse  Continue PPI daily  Magnesium and phosphorus improved today  Continue Coreg 25 mg twice daily  Continue appropriate home medications  PT/OT following  Trend renal function and electrolytes with a.m. BMP, magnesium   Trend Hgb and WBC with a.m. CBC     Discussed plan with RN, nephrology, urology    Total of 54 minutes spent reviewing labs and imaging, counseling and educating the patient and family, ordering medications, discussing with specialty services, documenting clinical information in the electronic medical record, and care coordination.    DVT prophylaxis:  Mechanical DVT prophylaxis orders are present.    CODE STATUS:   Level Of Support Discussed With: Patient  Code Status (Patient has no pulse and is not breathing): CPR (Attempt to Resuscitate)  Medical Interventions (Patient has pulse or is breathing): Full Support

## 2023-12-12 LAB
ALBUMIN SERPL-MCNC: 2.5 G/DL (ref 3.5–5.2)
ALBUMIN/GLOB SERPL: 0.9 G/DL
ALP SERPL-CCNC: 64 U/L (ref 39–117)
ALT SERPL W P-5'-P-CCNC: 7 U/L (ref 1–33)
ANION GAP SERPL CALCULATED.3IONS-SCNC: 9.3 MMOL/L (ref 5–15)
AST SERPL-CCNC: 9 U/L (ref 1–32)
BILIRUB SERPL-MCNC: <0.2 MG/DL (ref 0–1.2)
BUN SERPL-MCNC: 30 MG/DL (ref 8–23)
BUN/CREAT SERPL: 8.4 (ref 7–25)
CALCIUM SPEC-SCNC: 7.1 MG/DL (ref 8.6–10.5)
CHLORIDE SERPL-SCNC: 108 MMOL/L (ref 98–107)
CO2 SERPL-SCNC: 25.7 MMOL/L (ref 22–29)
CREAT SERPL-MCNC: 3.56 MG/DL (ref 0.57–1)
EGFRCR SERPLBLD CKD-EPI 2021: 13.5 ML/MIN/1.73
GLOBULIN UR ELPH-MCNC: 2.9 GM/DL
GLUCOSE BLDC GLUCOMTR-MCNC: 125 MG/DL (ref 70–99)
GLUCOSE BLDC GLUCOMTR-MCNC: 160 MG/DL (ref 70–99)
GLUCOSE BLDC GLUCOMTR-MCNC: 170 MG/DL (ref 70–99)
GLUCOSE BLDC GLUCOMTR-MCNC: 88 MG/DL (ref 70–99)
GLUCOSE SERPL-MCNC: 88 MG/DL (ref 65–99)
MAGNESIUM SERPL-MCNC: 1.9 MG/DL (ref 1.6–2.4)
PHOSPHATE SERPL-MCNC: 4 MG/DL (ref 2.5–4.5)
POTASSIUM SERPL-SCNC: 3.7 MMOL/L (ref 3.5–5.2)
PROT SERPL-MCNC: 5.4 G/DL (ref 6–8.5)
SODIUM SERPL-SCNC: 143 MMOL/L (ref 136–145)
WHOLE BLOOD HOLD SPECIMEN: NORMAL

## 2023-12-12 PROCEDURE — 99232 SBSQ HOSP IP/OBS MODERATE 35: CPT | Performed by: UROLOGY

## 2023-12-12 PROCEDURE — 80053 COMPREHEN METABOLIC PANEL: CPT | Performed by: FAMILY MEDICINE

## 2023-12-12 PROCEDURE — 25010000002 ONDANSETRON PER 1 MG: Performed by: FAMILY MEDICINE

## 2023-12-12 PROCEDURE — 99232 SBSQ HOSP IP/OBS MODERATE 35: CPT | Performed by: INTERNAL MEDICINE

## 2023-12-12 PROCEDURE — 84100 ASSAY OF PHOSPHORUS: CPT | Performed by: INTERNAL MEDICINE

## 2023-12-12 PROCEDURE — 25010000002 CEFAZOLIN 1-4 GM/50ML-% SOLUTION: Performed by: INTERNAL MEDICINE

## 2023-12-12 PROCEDURE — 82948 REAGENT STRIP/BLOOD GLUCOSE: CPT

## 2023-12-12 PROCEDURE — 83735 ASSAY OF MAGNESIUM: CPT | Performed by: INTERNAL MEDICINE

## 2023-12-12 PROCEDURE — 63710000001 INSULIN LISPRO (HUMAN) PER 5 UNITS: Performed by: FAMILY MEDICINE

## 2023-12-12 RX ADMIN — AMLODIPINE BESYLATE 10 MG: 5 TABLET ORAL at 08:44

## 2023-12-12 RX ADMIN — ACETAMINOPHEN 650 MG: 325 TABLET ORAL at 20:05

## 2023-12-12 RX ADMIN — CEFAZOLIN SODIUM 1000 MG: 1 INJECTION, SOLUTION INTRAVENOUS at 20:05

## 2023-12-12 RX ADMIN — ACETAMINOPHEN 650 MG: 325 TABLET ORAL at 15:03

## 2023-12-12 RX ADMIN — SODIUM BICARBONATE 650 MG TABLET 1300 MG: at 08:33

## 2023-12-12 RX ADMIN — ONDANSETRON 4 MG: 2 INJECTION INTRAMUSCULAR; INTRAVENOUS at 17:13

## 2023-12-12 RX ADMIN — PANTOPRAZOLE SODIUM 40 MG: 40 TABLET, DELAYED RELEASE ORAL at 05:08

## 2023-12-12 RX ADMIN — CARVEDILOL 25 MG: 25 TABLET, FILM COATED ORAL at 20:05

## 2023-12-12 RX ADMIN — CARVEDILOL 25 MG: 25 TABLET, FILM COATED ORAL at 08:33

## 2023-12-12 RX ADMIN — SODIUM BICARBONATE 650 MG TABLET 1300 MG: at 20:05

## 2023-12-12 RX ADMIN — Medication 250 MG: at 20:05

## 2023-12-12 RX ADMIN — Medication 10 ML: at 08:34

## 2023-12-12 RX ADMIN — Medication 10 ML: at 20:05

## 2023-12-12 RX ADMIN — SODIUM BICARBONATE 650 MG TABLET 1300 MG: at 15:04

## 2023-12-12 RX ADMIN — INSULIN LISPRO 2 UNITS: 100 INJECTION, SOLUTION INTRAVENOUS; SUBCUTANEOUS at 20:05

## 2023-12-12 RX ADMIN — Medication 250 MG: at 08:33

## 2023-12-12 NOTE — NURSING NOTE
Patient is alert and oriented x 3. She has no complaints of pain or discomfort at this time. She has an EOS with hospice this am. She is more comfortable at this time knowing she has more options.  She has decided to proceed with her bx of the her kidney so that she will know exactly what she is dealing with. If it is cancer, she has opted to not do hemodialysis.   Palliative care to continue to follow and support.   Radha ADKINS RN, BSN  Palliative Care

## 2023-12-12 NOTE — PROGRESS NOTES
[facility]   Hospitalist Progress Note  Date: 2023  Patient Name: Lyubov Middleton  : 1955  MRN: [id number]  Date of admission: 2023      Subjective   Subjective     Chief Complaint: Abdominal pain, hematochezia    Summary: 68 y.o. female with past medical history of hypertension, hyperlipidemia, diabetes not on medication, breast cancer, arthritis, dementia, and GERD presented from nursing home with complaints of abdominal pain and bloody bowel movements.  When seen patient did have some memory impairment but was able to answer questions when asked.  Staff at her nursing facility was concerned after she had possible bloody bowel movements along with lower abdominal pain and nausea.   She was admitted for further care, GI, nephrology, urology consulted.  Seems to have a new baseline creatinine due to worsening diabetic nephropathy.  Urology would like to treat her UTI before performing outpatient cystoscopy with possible biopsy.  If she is still in the hospital by Thursday, urology may take her for cystoscopy.  No indication for endoscopy as hemoglobin remained stable and no further bleeding during hospital stay.    Interval Followup:   Patient is agreeable to have cystoscopy with right ureteroscopy and possible biopsy which will be done on Thursday.  Patient has met with hospice and is still thinking if that is an option for her.  Patient continues to remain adamant that she does not want dialysis at this time  Renal function is stable         Objective   Objective     Vitals:   Temp:  [98.4 °F (36.9 °C)-99.5 °F (37.5 °C)] 98.4 °F (36.9 °C)  Heart Rate:  [70-81] 73  Resp:  [16] 16  BP: (122-160)/(56-71) 160/71  Physical Exam    Constitutional: Chronically ill female, resting in bed looking at her hospice paperwork    Respiratory: Clear to auscultation bilaterally   Cardiovascular: RRR, no MRG   Gastrointestinal: Positive bowel sounds, soft, nontender, nondistended   Neurologic: Oriented x 3,  "globally weak, strength symmetric in all extremities, cranial nerves grossly intact, speech clear    Result Review    Result Review:  I have personally reviewed the results below:  [x]  Laboratory personally reviewed CMP, CBC, magnesium, phosphorus, blood sugars  [x]  Microbiology  Blood Culture   Date Value Ref Range Status   12/08/2023 No growth at 3 days  Preliminary     No results found for: \"BCIDPCR\", \"CXREFLEX\", \"CSFCX\", \"CULTURETIS\"  No results found for: \"CULTURES\", \"HSVCX\", \"URCX\"  No results found for: \"EYECULTURE\", \"GCCX\", \"HSVCULTURE\", \"LABHSV\"  No results found for: \"LEGIONELLA\", \"MRSACX\", \"MUMPSCX\", \"MYCOPLASCX\"  No results found for: \"NOCARDIACX\", \"STOOLCX\"  Urine Culture   Date Value Ref Range Status   12/08/2023 >100,000 CFU/mL Escherichia coli (A)  Final     No results found for: \"VIRALCULTU\", \"WOUNDCX\"    [x]  Radiology       [x]  EKG/Telemetry telemetry reviewed showing normal sinus rhythm  []  Cardiology/Vascular   []  Pathology  []  Old records  []  Other:  CBC          12/8/2023    14:34 12/9/2023    04:38 12/9/2023    09:37 12/10/2023    11:29   CBC   WBC 9.30  9.08     9.13  8.91  11.76    RBC 2.93  2.60     2.56  2.73  2.93    Hemoglobin 8.5  7.4     7.4  7.7  8.3    Hematocrit 26.2  23.5     23.4  24.6  26.4    MCV 89.4  90.4     91.4  90.1  90.1    MCH 29.0  28.5     28.9  28.2  28.3    MCHC 32.4  31.5     31.6  31.3  31.4    RDW 14.8  14.7     14.9  14.8  14.7    Platelets 233  194     201  212  227      CMP          12/10/2023    04:22 12/11/2023    03:09 12/12/2023    04:18   CMP   Glucose 81  102  88    BUN 42  32  30    Creatinine 3.69  3.89  3.56    EGFR 12.9  12.1  13.5    Sodium 141  141  143    Potassium 4.1  3.4  3.7    Chloride 115  109  108    Calcium 7.1  7.1  7.1    Total Protein 5.5  5.4  5.4    Albumin 2.5  2.4  2.5    Globulin 3.0  3.0  2.9    Total Bilirubin 0.2  <0.2  <0.2    Alkaline Phosphatase 72  72  64    AST (SGOT) 10  9  9    ALT (SGPT) 7  5  7  "   Albumin/Globulin Ratio 0.8  0.8  0.9    BUN/Creatinine Ratio 11.4  8.2  8.4    Anion Gap 10.0  11.7  9.3        Assessment & Plan   Assessment / Plan     Assessment/Plan:  Concern for renal malignancy  UTI due to E. coli, present on admission  Hydronephrosis  Metabolic acidosis  Hypocalcemia  Proteinuria  Hematochezia  Constipation  Anemia  AUSTEN on CKD    PLAN   Nephrology following, appreciate assistance  Continue antibiotics for UTI,  Plan for urology procedure tomorrow  Patient has met with hospice but still deciding on plan of care   Continue oral bicarb   Imodium if needed for diarrhea  C. difficile and stool culture negative  Continue EPO per nephrology recommendations  Currently no indication for endoscopy, will continue to trend H&H and if less than 7 transfuse  Continue PPI daily  Continue Coreg 25 mg twice daily  Continue appropriate home medications  PT/OT following  Trend renal function and electrolytes with a.m. BMP, magnesium   Trend Hgb and WBC with a.m. CBC     Discussed plan with RN, nephrology, urology        DVT prophylaxis:  Mechanical DVT prophylaxis orders are present.    CODE STATUS:   Medical Intervention Limits: NO intubation (DNI); NO cardioversion  Level Of Support Discussed With: Patient  Code Status (Patient has no pulse and is not breathing): No CPR (Do Not Attempt to Resuscitate)  Medical Interventions (Patient has pulse or is breathing): Limited Support

## 2023-12-12 NOTE — PROGRESS NOTES
[facility]   Urology Progress Note    Patient Name: Lyubov Middleton  : 1955  MRN: [id number]  Primary Care Physician:  Cathleen Stroud APRN  Date of admission: 2023    Subjective   Subjective     No events  No pain,   palliative care did see patient yesterday      Objective   Objective     Vitals:   Temp:  [97.8 °F (36.6 °C)-99.5 °F (37.5 °C)] 98.5 °F (36.9 °C)  Heart Rate:  [70-81] 72  Resp:  [16] 16  BP: (122-142)/(56-63) 142/59  Physical Exam     Alert and oriented x3  No acute distress  Unlabored respirations  Nontender/nondistended       Result Review    Result Review:  I have personally reviewed the results from the time of this admission to 2023 06:33 EST and agree with these findings:  []  Laboratory  []  Microbiology  []  Radiology  []  EKG/Telemetry   []  Cardiology/Vascular   []  Pathology  []  Old records  []  Other:      Assessment & Plan   Assessment / Plan     Brief Patient Summary:  Lyubov Middleton is a 68 y.o. female     Active Hospital Problems:  Active Hospital Problems    Diagnosis     **Abdominal pain     Chronic anemia     Hydronephrosis     Pyelonephritis     History of Clostridioides difficile infection     Stage 3a chronic kidney disease     Type 2 diabetes mellitus     Essential hypertension      Plan:        Discussed patient's palliative care discussion and her possible upcoming surgery.    I did discuss with patient if I did find a malignancy in her right kidney treatment would be removal which would cause her to need to be on dialysis which she is stating she is not interested in.  We did discuss for this reason if kidney was removed that would result in death.    I did also discussed with patient that there is some possibility if we unobstruct the right kidney she could have some improvement in renal function.  There is no way to know for sure.    After discussion patient does want to proceed with       cystoscopy with right ureteroscopy possible biopsy  possible laser of stones in the right ureteral stent placement, left retrograde pyelogram   Risks and benefits were discussed including bleeding, infection and damage to the urinary system.  We also discussed the risk of anesthesia up to and including death.  Patient voiced understanding and would like to proceed.    Patient wanting to have more information about her right kidney and also see if decompression with ureteral stent would improve her renal function.  I did discuss specific problems including worsening UTI or sepsis, gross hematuria with clots.  Patient voiced understanding      I will plan on Thursday       25 minutes used in counseling coordination of care

## 2023-12-12 NOTE — PROGRESS NOTES
[facility]     Progress Note    Patient Name: Lyubov Middleton  : 1955  MRN: [id number]  Primary Care Physician:  Cathleen Stroud APRN  Date of admission: 2023    Subjective   Patient's renal function appears to have plateaued  Plan for urological intervention tomorrow  Clinically overall patient is stable    Scheduled Meds:amLODIPine, 10 mg, Oral, Daily  carvedilol, 25 mg, Oral, BID  ceFAZolin, 1,000 mg, Intravenous, Q24H  epoetin chris/chris-epbx, 20,000 Units, Subcutaneous, Once per day on   insulin lispro, 2-7 Units, Subcutaneous, 4x Daily AC & at Bedtime  metoprolol tartrate, 5 mg, Intravenous, Once  pantoprazole, 40 mg, Oral, Q AM  saccharomyces boulardii, 250 mg, Oral, BID  sodium bicarbonate, 1,300 mg, Oral, TID  sodium chloride, 10 mL, Intravenous, Q12H      Continuous Infusions:sodium bicarbonate, 150 mEq, Last Rate: 150 mEq (23)      PRN Meds:.  acetaminophen    dextrose    dextrose    glucagon (human recombinant)    loperamide    ondansetron    sodium chloride    sodium chloride    sodium chloride       Review of Systems  Constitutional:        Weakness tiredness fatigue  Eyes:                       No blurry vision, eye discharge, eye irritation, eye pain  HEENT:                   No acute hair loss, earache and discharge, nasal congestion or discharge, sore throat, postnasal drip  Respiratory:           No shortness of breath coughing sputum production wheezing hemoptysis pleuritic chest pain  Cardiovascular:     No chest pain, orthopnea, PND, dizziness, palpitation, lower extremity edema  Gastrointestinal:   No nausea vomiting diarrhea abdominal pain constipation  Genitourinary:       No urinary incontinence, hesitancy, frequency, urgency, dysuria  Hematologic:         No bruising, bleeding, pallor, lymphadenopathy  Endocrine:            No coldness, hot flashes, polyuria, abnormal hair growth  Musculoskeletal:    No body pains, aches, arthritic pains, muscle  pain ,muscle wasting  Psychiatric:          No low or high mood, anxiety, hallucinations, delusions  Skin.                      No rash, ulcers, bruising, itching  Neurological:        No confusion, headache, focal weakness, numbness, dysphasia    Objective   Objective     Vitals:   Temp:  [98.4 °F (36.9 °C)-99.5 °F (37.5 °C)] 98.4 °F (36.9 °C)  Heart Rate:  [70-81] 73  Resp:  [16] 16  BP: (122-160)/(56-71) 160/71  Physical Exam    Constitutional: Awake, alert responsive, conversant, no obvious distress              Psychiatric:  Appropriate affect, cooperative   Neurologic:  Awake alert ,oriented x 3, strength symmetric in all extremities, Cranial Nerves grossly intact to confrontation, speech clear   Eyes:   PERRLA, sclerae anicteric, no conjunctival injection   HEENT:  Moist mucous membranes, no nasal or eye discharge, no throat congestion   Neck:   Supple, no thyromegaly, no lymphadenopathy, trachea midline, no elevated JVD   Respiratory:  Clear to auscultation bilaterally, nonlabored respirations    Cardiovascular: RRR, no murmurs, rubs, or gallops, palpable pedal pulses bilaterally, No bilateral ankle edema   Gastrointestinal: Positive bowel sounds, soft, nontender, nondistended, no organomegaly   Musculoskeletal:  No clubbing or cyanosis to extremities,muscle wasting, joint swelling, muscle weakness             Skin:                      No rashes, bruising, skin ulcers, petechiae or ecchymosis    Result Review    Result Review:  I have personally reviewed the results from the time of this admission to 12/12/2023 07:16 EST and agree with these findings:  []  Laboratory  []  Microbiology  []  Radiology  []  EKG/Telemetry   []  Cardiology/Vascular   []  Pathology  []  Old records  []  Other:    Assessment & Plan   Assessment / Plan       Active Hospital Problems:  Active Hospital Problems    Diagnosis     **Abdominal pain     Chronic anemia     Hydronephrosis     Pyelonephritis     History of Clostridioides  difficile infection     Stage 3a chronic kidney disease     Type 2 diabetes mellitus     Essential hypertension      68-year female with past medical history of CKD stage IV with baseline creatinine of 1.8-2 who was noted to have progressively worsening kidney function over the last 6 months, longstanding diabetes with diabetic nephropathy hypothyroidism, hypertension, osteoporosis, history of breast cancer, GERD, dementia who was sent from the nursing home to the ER due to concerns for bloody bowel movements.  Hemoglobin noted to be at 7 with normal iron profile.  Creatinine has been uptrending over the last 6 months and currently at 3.24 with bicarb of 14 and BUN of 29 with low calcium of 7.3.  CT scan concerning for hydronephrosis and lymphadenopathy with possible underlying malignancy.  Urine analysis concerning for UTI and does have large amount of proteinuria.  Proteinuria noted to be 4 g of UPCR and 2 g of UACR.  Suspect with her degree of renal dysfunction that she has progressive renal disease secondary to diabetic nephropathy.      Plan  Continue EPO 20 K units  Antibiotics for UTI  Continue with amlodipine 10 mg and carvedilol 25 twice daily for hypertension  Agree with bicarb tabs 1300 3 times daily  We will DC bicarb IV  Noted urology is planning for further intervention.  Creatinine is stable at this time and appears to have plateaued around 3.5    Thank you for involving me in the care of the patient.  Will continue to follow along

## 2023-12-12 NOTE — PLAN OF CARE
Goal Outcome Evaluation:         Patient cooperative overnight with intermittent confusion. Pulled IVs out, two new placed. VSS. Voiding bedside commode.

## 2023-12-13 LAB
ALBUMIN SERPL-MCNC: 2.7 G/DL (ref 3.5–5.2)
ALBUMIN/GLOB SERPL: 1 G/DL
ALP SERPL-CCNC: 66 U/L (ref 39–117)
ALT SERPL W P-5'-P-CCNC: <5 U/L (ref 1–33)
ANION GAP SERPL CALCULATED.3IONS-SCNC: 10 MMOL/L (ref 5–15)
AST SERPL-CCNC: 10 U/L (ref 1–32)
BACTERIA SPEC AEROBE CULT: NORMAL
BACTERIA SPEC AEROBE CULT: NORMAL
BILIRUB SERPL-MCNC: <0.2 MG/DL (ref 0–1.2)
BUN SERPL-MCNC: 29 MG/DL (ref 8–23)
BUN/CREAT SERPL: 7.9 (ref 7–25)
CALCIUM SPEC-SCNC: 6.9 MG/DL (ref 8.6–10.5)
CHLORIDE SERPL-SCNC: 105 MMOL/L (ref 98–107)
CO2 SERPL-SCNC: 27 MMOL/L (ref 22–29)
CREAT SERPL-MCNC: 3.65 MG/DL (ref 0.57–1)
EGFRCR SERPLBLD CKD-EPI 2021: 13.1 ML/MIN/1.73
GLOBULIN UR ELPH-MCNC: 2.8 GM/DL
GLUCOSE BLDC GLUCOMTR-MCNC: 151 MG/DL (ref 70–99)
GLUCOSE BLDC GLUCOMTR-MCNC: 182 MG/DL (ref 70–99)
GLUCOSE BLDC GLUCOMTR-MCNC: 96 MG/DL (ref 70–99)
GLUCOSE SERPL-MCNC: 86 MG/DL (ref 65–99)
MAGNESIUM SERPL-MCNC: 1.7 MG/DL (ref 1.6–2.4)
PHOSPHATE SERPL-MCNC: 4.1 MG/DL (ref 2.5–4.5)
POTASSIUM SERPL-SCNC: 3.6 MMOL/L (ref 3.5–5.2)
PROT SERPL-MCNC: 5.5 G/DL (ref 6–8.5)
SODIUM SERPL-SCNC: 142 MMOL/L (ref 136–145)

## 2023-12-13 PROCEDURE — 99232 SBSQ HOSP IP/OBS MODERATE 35: CPT | Performed by: STUDENT IN AN ORGANIZED HEALTH CARE EDUCATION/TRAINING PROGRAM

## 2023-12-13 PROCEDURE — 84100 ASSAY OF PHOSPHORUS: CPT | Performed by: INTERNAL MEDICINE

## 2023-12-13 PROCEDURE — 99232 SBSQ HOSP IP/OBS MODERATE 35: CPT | Performed by: UROLOGY

## 2023-12-13 PROCEDURE — 80053 COMPREHEN METABOLIC PANEL: CPT | Performed by: FAMILY MEDICINE

## 2023-12-13 PROCEDURE — 82948 REAGENT STRIP/BLOOD GLUCOSE: CPT

## 2023-12-13 PROCEDURE — 83735 ASSAY OF MAGNESIUM: CPT | Performed by: INTERNAL MEDICINE

## 2023-12-13 PROCEDURE — 25010000002 EPOETIN ALFA PER 1000 UNITS: Performed by: STUDENT IN AN ORGANIZED HEALTH CARE EDUCATION/TRAINING PROGRAM

## 2023-12-13 PROCEDURE — 63710000001 INSULIN LISPRO (HUMAN) PER 5 UNITS: Performed by: FAMILY MEDICINE

## 2023-12-13 RX ADMIN — PANTOPRAZOLE SODIUM 40 MG: 40 TABLET, DELAYED RELEASE ORAL at 05:37

## 2023-12-13 RX ADMIN — AMLODIPINE BESYLATE 10 MG: 5 TABLET ORAL at 08:47

## 2023-12-13 RX ADMIN — ERYTHROPOIETIN 20000 UNITS: 20000 INJECTION, SOLUTION INTRAVENOUS; SUBCUTANEOUS at 08:46

## 2023-12-13 RX ADMIN — CARVEDILOL 25 MG: 25 TABLET, FILM COATED ORAL at 21:09

## 2023-12-13 RX ADMIN — Medication 10 ML: at 08:47

## 2023-12-13 RX ADMIN — ACETAMINOPHEN 650 MG: 325 TABLET ORAL at 05:37

## 2023-12-13 RX ADMIN — CARVEDILOL 25 MG: 25 TABLET, FILM COATED ORAL at 08:47

## 2023-12-13 RX ADMIN — Medication 250 MG: at 08:46

## 2023-12-13 RX ADMIN — INSULIN LISPRO 2 UNITS: 100 INJECTION, SOLUTION INTRAVENOUS; SUBCUTANEOUS at 21:09

## 2023-12-13 RX ADMIN — Medication 10 ML: at 21:09

## 2023-12-13 RX ADMIN — Medication 250 MG: at 21:09

## 2023-12-13 NOTE — PROGRESS NOTES
[facility]   Urology Progress Note    Patient Name: Lyubov Middleton  : 1955  MRN: [id number]  Primary Care Physician:  Cathleen Stroud APRN  Date of admission: 2023    Subjective   Subjective     No events  No pain,        Objective   Objective     Vitals:   Temp:  [97.9 °F (36.6 °C)-98.7 °F (37.1 °C)] 97.9 °F (36.6 °C)  Heart Rate:  [61-76] 76  Resp:  [16] 16  BP: (125-146)/(59-74) 132/69  Physical Exam     Alert and oriented x3  No acute distress  Unlabored respirations  Nontender/nondistended       Result Review    Result Review:  I have personally reviewed the results from the time of this admission to 2023 12:30 EST and agree with these findings:  []  Laboratory  []  Microbiology  []  Radiology  []  EKG/Telemetry   []  Cardiology/Vascular   []  Pathology  []  Old records  []  Other:      Assessment & Plan   Assessment / Plan     Brief Patient Summary:  Lyubov Middleton is a 68 y.o. female     Active Hospital Problems:  Active Hospital Problems    Diagnosis     **Abdominal pain     Chronic anemia     Hydronephrosis     Pyelonephritis     History of Clostridioides difficile infection     Stage 3a chronic kidney disease     Type 2 diabetes mellitus     Essential hypertension      Plan:          Discussed surgery with patient and her son today.    We discussed the below again    Discussed patient's palliative care discussion and her possible upcoming surgery.    I did discuss with patient if I did find a malignancy in her right kidney treatment would be removal which would cause her to need to be on dialysis which she is stating she is not interested in.  We did discuss for this reason if kidney was removed that would result in death.    I did also discussed with patient that there is some possibility if we unobstruct the right kidney she could have some improvement in renal function.  There is no way to know for sure.    We will proceed  tomorrow with     cystoscopy with right  ureteroscopy possible biopsy possible laser of stones in the right ureteral stent placement, left retrograde pyelogram   Risks and benefits were discussed including bleeding, infection and damage to the urinary system.  We also discussed the risk of anesthesia up to and including death.  Patient voiced understanding and would like to proceed.    Patient wanting to have more information about her right kidney and also see if decompression with ureteral stent would improve her renal function.  I did discuss specific problems including worsening UTI or sepsis, gross hematuria with clots.  Patient voiced understanding      N.p.o. after midnight    Consent

## 2023-12-13 NOTE — PLAN OF CARE
Goal Outcome Evaluation:           Progress: no change  Outcome Evaluation: Patient up to BSC with walker and minimal assistance. Voiding spontaneously. Tylenol given for complaint of pain in knees. Vital signs stable, will continue to monitor.

## 2023-12-13 NOTE — PROGRESS NOTES
[facility]   Hospitalist Progress Note  Date: 2023  Patient Name: Lyubov Middleton  : 1955  MRN: [id number]  Date of admission: 2023  Room/Bed: [facility]/      Subjective   Subjective     Chief Complaint: Abdominal pain, hematochezia    Summary:Lyubov Middleton is a 68 y.o. female with past medical history of hypertension, hyperlipidemia, diabetes not on medication, breast cancer, arthritis, dementia, and GERD who presented from nursing home with complaints of abdominal pain and bloody bowel movements.  She was admitted for further evaluation.  CT abdomen and pelvis without contrast showed malrotation of right kidney with severe right hydronephrosis and right perinephric fat stranding/edema which could be obstruction or pyelonephritis; multiple renal calculi.  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  nephrology and neurology were consulted and following the patient.  GI also following the patient.    Interval Followup: No acute events overnight.  Patient is lying comfortably on the bed, not in acute distress.  She stated she feels fine and denied any fever, chills, rigors, abdominal pain, chest pain or difficulty breathing.      Review of Systems    All systems reviewed and negative except for what is outlined above.      Objective   Objective     Vitals:   Temp:  [97.9 °F (36.6 °C)-98.7 °F (37.1 °C)] 97.9 °F (36.6 °C)  Heart Rate:  [61-76] 76  Resp:  [16] 16  BP: (125-146)/(59-74) 132/69    Physical Exam   General: Awake, alert, NAD  Cardiovascular: RRR, no murmurs   Pulmonary: CTA bilaterally; no wheezes; no conversational dyspnea  Gastrointestinal: S/ND/NT, +BS  Neuro: Alert, awake, oriented x 3; speech clear; no tremor  : No Fernandez catheter; no suprapubic tenderness    Result Review    Result Review:  I have personally reviewed these  results:  [x]  Laboratory      Lab 12/10/23  1129 12/09/23  0937 12/09/23 0438 12/08/23 2027 12/08/23  1434   WBC 11.76* 8.91 9.13  9.08  --  9.30   HEMOGLOBIN 8.3* 7.7* 7.4*  7.4*  --  8.5*   HEMATOCRIT 26.4* 24.6* 23.4*  23.5*  --  26.2*   PLATELETS 227 212 201  194  --  233   NEUTROS ABS 9.33* 7.34* 6.76  --  7.13*   IMMATURE GRANS (ABS) 0.03 0.02 0.02  --  0.02   LYMPHS ABS 1.80 1.08 1.72  --  1.57   MONOS ABS 0.41 0.28 0.41  --  0.41   EOS ABS 0.15 0.14 0.16  --  0.12   MCV 90.1 90.1 91.4  90.4  --  89.4   PROCALCITONIN  --   --   --   --  0.19   LACTATE  --  1.0  --  0.5  --    PROTIME  --  16.0*  --   --  14.3         Lab 12/13/23  0336 12/12/23 0418 12/11/23 0309 12/09/23 0438 12/08/23  1434   SODIUM 142 143 141   < > 139   POTASSIUM 3.6 3.7 3.4*   < > 4.3   CHLORIDE 105 108* 109*   < > 108*   CO2 27.0 25.7 20.3*   < > 17.0*   ANION GAP 10.0 9.3 11.7   < > 14.0   BUN 29* 30* 32*   < > 33*   CREATININE 3.65* 3.56* 3.89*   < > 3.31*   EGFR 13.1* 13.5* 12.1*   < > 14.7*   GLUCOSE 86 88 102*   < > 154*   CALCIUM 6.9* 7.1* 7.1*   < > 7.3*   MAGNESIUM 1.7 1.9 2.1   < > 1.6   PHOSPHORUS 4.1 4.0 4.5   < >  --    HEMOGLOBIN A1C  --   --   --   --  4.90    < > = values in this interval not displayed.         Lab 12/13/23 0336 12/12/23 0418 12/11/23  0309   TOTAL PROTEIN 5.5* 5.4* 5.4*   ALBUMIN 2.7* 2.5* 2.4*   GLOBULIN 2.8 2.9 3.0   ALT (SGPT) <5 7 5   AST (SGOT) 10 9 9   BILIRUBIN <0.2 <0.2 <0.2   ALK PHOS 66 64 72         Lab 12/09/23  1128 12/09/23  0937 12/09/23  0936 12/08/23  1434   HSTROP T 36*  --  35*  --    PROTIME  --  16.0*  --  14.3   INR  --  1.26*  --  1.09             Lab 12/09/23  0438 12/08/23  1709 12/08/23  1434   IRON 43  --   --    IRON SATURATION (TSAT) 22  --   --    TIBC 194*  --   --    TRANSFERRIN 130*  --   --    FOLATE 8.78  --   --    VITAMIN B 12 333  --   --    ABO TYPING  --  O O   RH TYPING  --  Negative Negative   ANTIBODY SCREEN  --   --  Negative         Lab  12/08/23  2324   PH, ARTERIAL 7.338*   PCO2, ARTERIAL 27.1*   PO2 .5*   O2 SATURATION ART 96.5   FIO2 21   HCO3 ART 14.2*   BASE EXCESS ART -10.4*   CARBOXYHEMOGLOBIN 0.3     Brief Urine Lab Results  (Last result in the past 365 days)        Color   Clarity   Blood   Leuk Est   Nitrite   Protein   CREAT   Urine HCG        12/08/23 1902             51.1         12/08/23 1902             54.2         12/08/23 1902 Yellow   Cloudy   Small (1+)   Moderate (2+)   Negative   >=300 mg/dL (3+)                 [x]  Microbiology   Microbiology Results (last 10 days)       Procedure Component Value - Date/Time    Clostridioides difficile Toxin - Stool, Per Rectum [[id number]] Collected: 12/09/23 0953    Lab Status: Final result Specimen: Stool from Per Rectum Updated: 12/09/23 1329    Narrative:      The following orders were created for panel order Clostridioides difficile Toxin - Stool, Per Rectum.  Procedure                               Abnormality         Status                     ---------                               -----------         ------                     Clostridioides difficile...[[id number]]                      Final result                 Please view results for these tests on the individual orders.    Clostridioides difficile Toxin, PCR - Stool, Per Rectum [[id number]] Collected: 12/09/23 0953    Lab Status: Final result Specimen: Stool from Per Rectum Updated: 12/09/23 1329     Toxigenic C. difficile by PCR Negative     027 Toxin Presumptive Negative    Narrative:      The result indicates the absence of toxigenic C. difficile from stool specimen.     Enteric Bacterial Panel - Stool, Per Rectum [[id number]]  (Normal) Collected: 12/09/23 0953    Lab Status: Final result Specimen: Stool from Per Rectum Updated: 12/10/23 1633     Salmonella Not Detected     Campylobacter Not Detected     Shigella/Enteroinvasive E. coli (EIEC) Not Detected     Shiga-like toxin-producing E. coli (STEC) stx1/stx2 Not  Detected    Blood Culture - Blood, Arm, Right [[id number]]  (Normal) Collected: 12/08/23 2030    Lab Status: Preliminary result Specimen: Blood from Arm, Right Updated: 12/12/23 2100     Blood Culture No growth at 4 days    Blood Culture - Blood, Arm, Right [[id number]]  (Normal) Collected: 12/08/23 2027    Lab Status: Preliminary result Specimen: Blood from Arm, Right Updated: 12/12/23 2045     Blood Culture No growth at 4 days    Urine Culture - Urine, Urine, Clean Catch [[id number]]  (Abnormal)  (Susceptibility) Collected: 12/08/23 1902    Lab Status: Final result Specimen: Urine, Clean Catch Updated: 12/10/23 1139     Urine Culture >100,000 CFU/mL Escherichia coli    Narrative:      Colonization of the urinary tract without infection is common. Treatment is discouraged unless the patient is symptomatic, pregnant, or undergoing an invasive urologic procedure.    Susceptibility        Escherichia coli      MARKO      Ampicillin Resistant      Ampicillin + Sulbactam Resistant      Cefazolin Susceptible      Cefepime Susceptible      Ceftazidime Susceptible      Ceftriaxone Susceptible      Gentamicin Susceptible      Levofloxacin Susceptible      Nitrofurantoin Susceptible      Piperacillin + Tazobactam Susceptible      Trimethoprim + Sulfamethoxazole Susceptible                                 [x]  Radiology  CT Abdomen Pelvis Without Contrast    Result Date: 12/8/2023    1. Malrotation of the right kidney with severe right hydronephrosis and right perinephric fat stranding/edema which could be due to obstruction or pyelonephritis. 2. Multiple renal calculi, but no distal ureteral calculus or dilatation is seen.  There is suggestion of irregular subtle soft tissue attenuation at the right renal pelvis and there is right retroperitoneal lymphadenopathy.  These findings are concerning for obstructing urothelial malignancy and dameon metastatic disease.  Recommend urology consultation and further evaluation with multiphase  contrast enhanced CT. 3. There appears to have been right colectomy with anastomosis in the right upper abdomen.  There is an above average amount of stool in the colon and rectum.  There are some short segments of nondistended transverse and sigmoid colon with wall thickening but no pericolonic changes to suggest acute inflammation.  These could be due to peristalsis, but a short-segment constricting lesion cannot be excluded on this exam.  Recommend colonoscopy follow-up given patient's symptoms. 4. Broad-based right abdominal hernia containing bowel loops without evidence of bowel obstruction.     OBDULIA ALTAMIRANO MD       Electronically Signed and Approved By: OBDULIA ALTAMIRANO MD on 12/08/2023 at 16:22            []  EKG/Telemetry   []  Cardiology/Vascular   []  Pathology  []  Old records  []  Other:    Assessment & Plan   Assessment / Plan     Assessment:  Concern for renal malignancy  UTI due to E. coli, present on admission  Hydronephrosis  Metabolic acidosis  Hypocalcemia  Proteinuria  Hematochezia  Constipation  Anemia  AUSTEN on CKD    Plan:  Patient currently being managed in hospitalist service.  Completed 5-day course of antibiotic for E. coli UTI.  Urology following the patient and planning cystoscopy tomorrow on 12/14 as there is concern for malignancy in the right kidney.  Creatinine 2.65 today.  Stable.  Nephrology followed the patient, appreciate further input.  Continue put 20 K units.  Bicarb tablet discontinued by nephrology today.  Continue amlodipine and carvedilol for hypertension.  GI was consulted this admission; given patient had recent colonoscopy with stool transplant for recurrent C. difficile several months ago with overall improvement of her symptoms; decided to follow peripherally with no any further plan of GI intervention this admission.  Will place patient n.p.o. after midnight for possible cystoscopy tomorrow.  Appreciate further recommendation by nephrology and urology.  Continue rest of  the current management.      DVT prophylaxis:  Mechanical DVT prophylaxis orders are present.    CODE STATUS:   Medical Intervention Limits: NO intubation (DNI); NO cardioversion  Level Of Support Discussed With: Patient  Code Status (Patient has no pulse and is not breathing): No CPR (Do Not Attempt to Resuscitate)  Medical Interventions (Patient has pulse or is breathing): Limited Support      Electronically signed by April Jackson MD, 12/13/23, 1:11 PM EST.

## 2023-12-13 NOTE — PROGRESS NOTES
[facility]     Progress Note    Patient Name: Lyubov Middleton  : 1955  MRN: [id number]  Primary Care Physician:  Cathleen Stroud APRN  Date of admission: 2023    Subjective   Patient's renal function appears to be stable with GFR around 13  Plan for urological procedure on Thursday  No major acute events overnight    Scheduled Meds:amLODIPine, 10 mg, Oral, Daily  carvedilol, 25 mg, Oral, BID  epoetin chris/chris-epbx, 20,000 Units, Subcutaneous, Once per day on   insulin lispro, 2-7 Units, Subcutaneous, 4x Daily AC & at Bedtime  metoprolol tartrate, 5 mg, Intravenous, Once  pantoprazole, 40 mg, Oral, Q AM  saccharomyces boulardii, 250 mg, Oral, BID  sodium bicarbonate, 1,300 mg, Oral, TID  sodium chloride, 10 mL, Intravenous, Q12H      Continuous Infusions:     PRN Meds:.  acetaminophen    dextrose    dextrose    glucagon (human recombinant)    loperamide    ondansetron    sodium chloride    sodium chloride    sodium chloride       Review of Systems  Constitutional:        Weakness tiredness fatigue  Eyes:                       No blurry vision, eye discharge, eye irritation, eye pain  HEENT:                   No acute hair loss, earache and discharge, nasal congestion or discharge, sore throat, postnasal drip  Respiratory:           No shortness of breath coughing sputum production wheezing hemoptysis pleuritic chest pain  Cardiovascular:     No chest pain, orthopnea, PND, dizziness, palpitation, lower extremity edema  Gastrointestinal:   No nausea vomiting diarrhea abdominal pain constipation  Genitourinary:       No urinary incontinence, hesitancy, frequency, urgency, dysuria  Hematologic:         No bruising, bleeding, pallor, lymphadenopathy  Endocrine:            No coldness, hot flashes, polyuria, abnormal hair growth  Musculoskeletal:    No body pains, aches, arthritic pains, muscle pain ,muscle wasting  Psychiatric:          No low or high mood, anxiety, hallucinations,  delusions  Skin.                      No rash, ulcers, bruising, itching  Neurological:        No confusion, headache, focal weakness, numbness, dysphasia    Objective   Objective     Vitals:   Temp:  [98.2 °F (36.8 °C)-98.7 °F (37.1 °C)] 98.3 °F (36.8 °C)  Heart Rate:  [61-70] 61  Resp:  [16] 16  BP: (125-143)/(59-74) 143/62  Physical Exam    Constitutional: Awake, alert responsive, conversant, no obvious distress              Psychiatric:  Appropriate affect, cooperative   Neurologic:  Awake alert ,oriented x 3, strength symmetric in all extremities, Cranial Nerves grossly intact to confrontation, speech clear   Eyes:   PERRLA, sclerae anicteric, no conjunctival injection   HEENT:  Moist mucous membranes, no nasal or eye discharge, no throat congestion   Neck:   Supple, no thyromegaly, no lymphadenopathy, trachea midline, no elevated JVD   Respiratory:  Clear to auscultation bilaterally, nonlabored respirations    Cardiovascular: RRR, no murmurs, rubs, or gallops, palpable pedal pulses bilaterally, No bilateral ankle edema   Gastrointestinal: Positive bowel sounds, soft, nontender, nondistended, no organomegaly   Musculoskeletal:  No clubbing or cyanosis to extremities,muscle wasting, joint swelling, muscle weakness             Skin:                      No rashes, bruising, skin ulcers, petechiae or ecchymosis    Result Review    Result Review:  I have personally reviewed the results from the time of this admission to 12/13/2023 06:50 EST and agree with these findings:  []  Laboratory  []  Microbiology  []  Radiology  []  EKG/Telemetry   []  Cardiology/Vascular   []  Pathology  []  Old records  []  Other:    Assessment & Plan   Assessment / Plan       Active Hospital Problems:  Active Hospital Problems    Diagnosis     **Abdominal pain     Chronic anemia     Hydronephrosis     Pyelonephritis     History of Clostridioides difficile infection     Stage 3a chronic kidney disease     Type 2 diabetes mellitus      Essential hypertension      68-year female with past medical history of CKD stage IV with baseline creatinine of 1.8-2 who was noted to have progressively worsening kidney function over the last 6 months, longstanding diabetes with diabetic nephropathy hypothyroidism, hypertension, osteoporosis, history of breast cancer, GERD, dementia who was sent from the nursing home to the ER due to concerns for bloody bowel movements.  Hemoglobin noted to be at 7 with normal iron profile.  Creatinine has been uptrending over the last 6 months and currently at 3.24 with bicarb of 14 and BUN of 29 with low calcium of 7.3.  CT scan concerning for hydronephrosis and lymphadenopathy with possible underlying malignancy.  Urine analysis concerning for UTI and does have large amount of proteinuria.  Proteinuria noted to be 4 g of UPCR and 2 g of UACR.  Suspect with her degree of renal dysfunction that she has progressive renal disease secondary to diabetic nephropathy.      Plan  Continue EPO 20 K units  Antibiotics for UTI  Continue with amlodipine 10 mg and carvedilol 25 twice daily for hypertension  Will DC bicarb tabs  Noted urology is planning for further intervention on Thursday  Creatinine is stable at this time and appears to have plateaued around 3.5  Based on the results of intervention tomorrow, will assess further recommendations    Thank you for involving me in the care of the patient.  Will continue to follow along

## 2023-12-14 ENCOUNTER — APPOINTMENT (OUTPATIENT)
Dept: GENERAL RADIOLOGY | Facility: HOSPITAL | Age: 68
End: 2023-12-14
Payer: MEDICARE

## 2023-12-14 ENCOUNTER — ANESTHESIA (OUTPATIENT)
Dept: PERIOP | Facility: HOSPITAL | Age: 68
End: 2023-12-14
Payer: MEDICARE

## 2023-12-14 ENCOUNTER — ANESTHESIA EVENT (OUTPATIENT)
Dept: PERIOP | Facility: HOSPITAL | Age: 68
End: 2023-12-14
Payer: MEDICARE

## 2023-12-14 LAB
ALBUMIN SERPL-MCNC: 2.6 G/DL (ref 3.5–5.2)
ALBUMIN/GLOB SERPL: 0.9 G/DL
ALP SERPL-CCNC: 70 U/L (ref 39–117)
ALT SERPL W P-5'-P-CCNC: <5 U/L (ref 1–33)
ANION GAP SERPL CALCULATED.3IONS-SCNC: 11 MMOL/L (ref 5–15)
AST SERPL-CCNC: 10 U/L (ref 1–32)
BASOPHILS # BLD AUTO: 0.05 10*3/MM3 (ref 0–0.2)
BASOPHILS NFR BLD AUTO: 0.7 % (ref 0–1.5)
BILIRUB SERPL-MCNC: <0.2 MG/DL (ref 0–1.2)
BUN SERPL-MCNC: 26 MG/DL (ref 8–23)
BUN/CREAT SERPL: 7.6 (ref 7–25)
CALCIUM SPEC-SCNC: 7.2 MG/DL (ref 8.6–10.5)
CHLORIDE SERPL-SCNC: 108 MMOL/L (ref 98–107)
CO2 SERPL-SCNC: 24 MMOL/L (ref 22–29)
CREAT SERPL-MCNC: 3.41 MG/DL (ref 0.57–1)
DEPRECATED RDW RBC AUTO: 47.7 FL (ref 37–54)
EGFRCR SERPLBLD CKD-EPI 2021: 14.2 ML/MIN/1.73
EOSINOPHIL # BLD AUTO: 0.16 10*3/MM3 (ref 0–0.4)
EOSINOPHIL NFR BLD AUTO: 2.2 % (ref 0.3–6.2)
ERYTHROCYTE [DISTWIDTH] IN BLOOD BY AUTOMATED COUNT: 14.3 % (ref 12.3–15.4)
GLOBULIN UR ELPH-MCNC: 2.8 GM/DL
GLUCOSE BLDC GLUCOMTR-MCNC: 77 MG/DL (ref 70–99)
GLUCOSE BLDC GLUCOMTR-MCNC: 79 MG/DL (ref 70–99)
GLUCOSE BLDC GLUCOMTR-MCNC: 88 MG/DL (ref 70–99)
GLUCOSE BLDC GLUCOMTR-MCNC: 89 MG/DL (ref 70–99)
GLUCOSE BLDC GLUCOMTR-MCNC: 91 MG/DL (ref 70–99)
GLUCOSE SERPL-MCNC: 80 MG/DL (ref 65–99)
HCT VFR BLD AUTO: 25.3 % (ref 34–46.6)
HGB BLD-MCNC: 8 G/DL (ref 12–15.9)
IMM GRANULOCYTES # BLD AUTO: 0.02 10*3/MM3 (ref 0–0.05)
IMM GRANULOCYTES NFR BLD AUTO: 0.3 % (ref 0–0.5)
INR PPP: 1.14 (ref 0.86–1.15)
LYMPHOCYTES # BLD AUTO: 1.57 10*3/MM3 (ref 0.7–3.1)
LYMPHOCYTES NFR BLD AUTO: 21.4 % (ref 19.6–45.3)
MAGNESIUM SERPL-MCNC: 1.6 MG/DL (ref 1.6–2.4)
MCH RBC QN AUTO: 29 PG (ref 26.6–33)
MCHC RBC AUTO-ENTMCNC: 31.6 G/DL (ref 31.5–35.7)
MCV RBC AUTO: 91.7 FL (ref 79–97)
MONOCYTES # BLD AUTO: 0.41 10*3/MM3 (ref 0.1–0.9)
MONOCYTES NFR BLD AUTO: 5.6 % (ref 5–12)
NEUTROPHILS NFR BLD AUTO: 5.14 10*3/MM3 (ref 1.7–7)
NEUTROPHILS NFR BLD AUTO: 69.8 % (ref 42.7–76)
NRBC BLD AUTO-RTO: 0 /100 WBC (ref 0–0.2)
PHOSPHATE SERPL-MCNC: 4 MG/DL (ref 2.5–4.5)
PLATELET # BLD AUTO: 191 10*3/MM3 (ref 140–450)
PMV BLD AUTO: 9.7 FL (ref 6–12)
POTASSIUM SERPL-SCNC: 3.7 MMOL/L (ref 3.5–5.2)
PROT SERPL-MCNC: 5.4 G/DL (ref 6–8.5)
PROTHROMBIN TIME: 14.9 SECONDS (ref 11.8–14.9)
RBC # BLD AUTO: 2.76 10*6/MM3 (ref 3.77–5.28)
SODIUM SERPL-SCNC: 143 MMOL/L (ref 136–145)
WBC NRBC COR # BLD AUTO: 7.35 10*3/MM3 (ref 3.4–10.8)

## 2023-12-14 PROCEDURE — 85610 PROTHROMBIN TIME: CPT | Performed by: STUDENT IN AN ORGANIZED HEALTH CARE EDUCATION/TRAINING PROGRAM

## 2023-12-14 PROCEDURE — C1894 INTRO/SHEATH, NON-LASER: HCPCS | Performed by: UROLOGY

## 2023-12-14 PROCEDURE — 25010000002 FENTANYL CITRATE (PF) 50 MCG/ML SOLUTION: Performed by: NURSE ANESTHETIST, CERTIFIED REGISTERED

## 2023-12-14 PROCEDURE — 76000 FLUOROSCOPY <1 HR PHYS/QHP: CPT

## 2023-12-14 PROCEDURE — 52351 CYSTOURETERO & OR PYELOSCOPE: CPT | Performed by: UROLOGY

## 2023-12-14 PROCEDURE — 52332 CYSTOSCOPY AND TREATMENT: CPT | Performed by: UROLOGY

## 2023-12-14 PROCEDURE — C1758 CATHETER, URETERAL: HCPCS | Performed by: UROLOGY

## 2023-12-14 PROCEDURE — 25010000002 SUGAMMADEX 200 MG/2ML SOLUTION

## 2023-12-14 PROCEDURE — 25510000001 IOPAMIDOL PER 1 ML: Performed by: UROLOGY

## 2023-12-14 PROCEDURE — 80053 COMPREHEN METABOLIC PANEL: CPT | Performed by: FAMILY MEDICINE

## 2023-12-14 PROCEDURE — C1769 GUIDE WIRE: HCPCS | Performed by: UROLOGY

## 2023-12-14 PROCEDURE — 99232 SBSQ HOSP IP/OBS MODERATE 35: CPT | Performed by: UROLOGY

## 2023-12-14 PROCEDURE — 99232 SBSQ HOSP IP/OBS MODERATE 35: CPT | Performed by: INTERNAL MEDICINE

## 2023-12-14 PROCEDURE — 25810000003 LACTATED RINGERS PER 1000 ML: Performed by: NURSE ANESTHETIST, CERTIFIED REGISTERED

## 2023-12-14 PROCEDURE — BT141ZZ FLUOROSCOPY OF KIDNEYS, URETERS AND BLADDER USING LOW OSMOLAR CONTRAST: ICD-10-PCS | Performed by: UROLOGY

## 2023-12-14 PROCEDURE — C1747: HCPCS | Performed by: UROLOGY

## 2023-12-14 PROCEDURE — 83735 ASSAY OF MAGNESIUM: CPT | Performed by: INTERNAL MEDICINE

## 2023-12-14 PROCEDURE — 25010000002 PROPOFOL 10 MG/ML EMULSION: Performed by: NURSE ANESTHETIST, CERTIFIED REGISTERED

## 2023-12-14 PROCEDURE — 0T768DZ DILATION OF RIGHT URETER WITH INTRALUMINAL DEVICE, VIA NATURAL OR ARTIFICIAL OPENING ENDOSCOPIC: ICD-10-PCS | Performed by: UROLOGY

## 2023-12-14 PROCEDURE — 25010000002 CEFAZOLIN PER 500 MG: Performed by: NURSE ANESTHETIST, CERTIFIED REGISTERED

## 2023-12-14 PROCEDURE — 85025 COMPLETE CBC W/AUTO DIFF WBC: CPT | Performed by: STUDENT IN AN ORGANIZED HEALTH CARE EDUCATION/TRAINING PROGRAM

## 2023-12-14 PROCEDURE — C2617 STENT, NON-COR, TEM W/O DEL: HCPCS | Performed by: UROLOGY

## 2023-12-14 PROCEDURE — 25010000002 ONDANSETRON PER 1 MG: Performed by: NURSE ANESTHETIST, CERTIFIED REGISTERED

## 2023-12-14 PROCEDURE — 25010000002 ONDANSETRON PER 1 MG: Performed by: UROLOGY

## 2023-12-14 PROCEDURE — 84100 ASSAY OF PHOSPHORUS: CPT | Performed by: INTERNAL MEDICINE

## 2023-12-14 PROCEDURE — 74420 UROGRAPHY RTRGR +-KUB: CPT | Performed by: UROLOGY

## 2023-12-14 PROCEDURE — 82948 REAGENT STRIP/BLOOD GLUCOSE: CPT

## 2023-12-14 PROCEDURE — 25010000002 DEXAMETHASONE PER 1 MG: Performed by: NURSE ANESTHETIST, CERTIFIED REGISTERED

## 2023-12-14 DEVICE — STNT URETRL QUADRA/COIL M/LEN BR/NYLON 6F 22TO28CM: Type: IMPLANTABLE DEVICE | Site: URETER | Status: FUNCTIONAL

## 2023-12-14 RX ORDER — DEXMEDETOMIDINE HYDROCHLORIDE 100 UG/ML
INJECTION, SOLUTION INTRAVENOUS AS NEEDED
Status: DISCONTINUED | OUTPATIENT
Start: 2023-12-14 | End: 2023-12-14 | Stop reason: SURG

## 2023-12-14 RX ORDER — CEFAZOLIN SODIUM 1 G/3ML
INJECTION, POWDER, FOR SOLUTION INTRAMUSCULAR; INTRAVENOUS AS NEEDED
Status: DISCONTINUED | OUTPATIENT
Start: 2023-12-14 | End: 2023-12-14 | Stop reason: SURG

## 2023-12-14 RX ORDER — GLYCOPYRROLATE 0.2 MG/ML
INJECTION INTRAMUSCULAR; INTRAVENOUS AS NEEDED
Status: DISCONTINUED | OUTPATIENT
Start: 2023-12-14 | End: 2023-12-14 | Stop reason: SURG

## 2023-12-14 RX ORDER — PROPOFOL 10 MG/ML
VIAL (ML) INTRAVENOUS AS NEEDED
Status: DISCONTINUED | OUTPATIENT
Start: 2023-12-14 | End: 2023-12-14 | Stop reason: SURG

## 2023-12-14 RX ORDER — PROMETHAZINE HYDROCHLORIDE 25 MG/1
25 SUPPOSITORY RECTAL ONCE AS NEEDED
Status: DISCONTINUED | OUTPATIENT
Start: 2023-12-14 | End: 2023-12-14 | Stop reason: HOSPADM

## 2023-12-14 RX ORDER — ONDANSETRON 2 MG/ML
4 INJECTION INTRAMUSCULAR; INTRAVENOUS ONCE AS NEEDED
Status: DISCONTINUED | OUTPATIENT
Start: 2023-12-14 | End: 2023-12-14 | Stop reason: HOSPADM

## 2023-12-14 RX ORDER — FENTANYL CITRATE 50 UG/ML
INJECTION, SOLUTION INTRAMUSCULAR; INTRAVENOUS AS NEEDED
Status: DISCONTINUED | OUTPATIENT
Start: 2023-12-14 | End: 2023-12-14 | Stop reason: SURG

## 2023-12-14 RX ORDER — ROCURONIUM BROMIDE 10 MG/ML
INJECTION, SOLUTION INTRAVENOUS AS NEEDED
Status: DISCONTINUED | OUTPATIENT
Start: 2023-12-14 | End: 2023-12-14 | Stop reason: SURG

## 2023-12-14 RX ORDER — MAGNESIUM HYDROXIDE 1200 MG/15ML
LIQUID ORAL AS NEEDED
Status: DISCONTINUED | OUTPATIENT
Start: 2023-12-14 | End: 2023-12-14 | Stop reason: HOSPADM

## 2023-12-14 RX ORDER — PROMETHAZINE HYDROCHLORIDE 25 MG/1
25 TABLET ORAL ONCE AS NEEDED
Status: DISCONTINUED | OUTPATIENT
Start: 2023-12-14 | End: 2023-12-14 | Stop reason: HOSPADM

## 2023-12-14 RX ORDER — SODIUM CHLORIDE, SODIUM LACTATE, POTASSIUM CHLORIDE, CALCIUM CHLORIDE 600; 310; 30; 20 MG/100ML; MG/100ML; MG/100ML; MG/100ML
INJECTION, SOLUTION INTRAVENOUS CONTINUOUS PRN
Status: DISCONTINUED | OUTPATIENT
Start: 2023-12-14 | End: 2023-12-14 | Stop reason: SURG

## 2023-12-14 RX ORDER — ONDANSETRON 2 MG/ML
INJECTION INTRAMUSCULAR; INTRAVENOUS AS NEEDED
Status: DISCONTINUED | OUTPATIENT
Start: 2023-12-14 | End: 2023-12-14 | Stop reason: SURG

## 2023-12-14 RX ORDER — DEXAMETHASONE SODIUM PHOSPHATE 4 MG/ML
INJECTION, SOLUTION INTRA-ARTICULAR; INTRALESIONAL; INTRAMUSCULAR; INTRAVENOUS; SOFT TISSUE AS NEEDED
Status: DISCONTINUED | OUTPATIENT
Start: 2023-12-14 | End: 2023-12-14 | Stop reason: SURG

## 2023-12-14 RX ORDER — OXYCODONE HYDROCHLORIDE 5 MG/1
5 TABLET ORAL
Status: DISCONTINUED | OUTPATIENT
Start: 2023-12-14 | End: 2023-12-14 | Stop reason: HOSPADM

## 2023-12-14 RX ORDER — LIDOCAINE HYDROCHLORIDE 20 MG/ML
INJECTION, SOLUTION EPIDURAL; INFILTRATION; INTRACAUDAL; PERINEURAL AS NEEDED
Status: DISCONTINUED | OUTPATIENT
Start: 2023-12-14 | End: 2023-12-14 | Stop reason: SURG

## 2023-12-14 RX ADMIN — CARVEDILOL 25 MG: 25 TABLET, FILM COATED ORAL at 08:58

## 2023-12-14 RX ADMIN — CEFAZOLIN 2 G: 1 INJECTION, POWDER, FOR SOLUTION INTRAMUSCULAR; INTRAVENOUS at 14:22

## 2023-12-14 RX ADMIN — SODIUM CHLORIDE, POTASSIUM CHLORIDE, SODIUM LACTATE AND CALCIUM CHLORIDE: 600; 310; 30; 20 INJECTION, SOLUTION INTRAVENOUS at 14:21

## 2023-12-14 RX ADMIN — SUGAMMADEX 200 MG: 100 INJECTION, SOLUTION INTRAVENOUS at 15:30

## 2023-12-14 RX ADMIN — GLYCOPYRROLATE 0.1 MG: 0.2 INJECTION INTRAMUSCULAR; INTRAVENOUS at 15:08

## 2023-12-14 RX ADMIN — CARVEDILOL 25 MG: 25 TABLET, FILM COATED ORAL at 20:43

## 2023-12-14 RX ADMIN — FENTANYL CITRATE 25 MCG: 50 INJECTION, SOLUTION INTRAMUSCULAR; INTRAVENOUS at 15:12

## 2023-12-14 RX ADMIN — ONDANSETRON 4 MG: 2 INJECTION INTRAMUSCULAR; INTRAVENOUS at 14:27

## 2023-12-14 RX ADMIN — GLYCOPYRROLATE 0.1 MG: 0.2 INJECTION INTRAMUSCULAR; INTRAVENOUS at 15:04

## 2023-12-14 RX ADMIN — ROCURONIUM BROMIDE 20 MG: 50 INJECTION INTRAVENOUS at 14:27

## 2023-12-14 RX ADMIN — Medication 10 ML: at 20:43

## 2023-12-14 RX ADMIN — DEXAMETHASONE SODIUM PHOSPHATE 4 MG: 4 INJECTION, SOLUTION INTRAMUSCULAR; INTRAVENOUS at 14:27

## 2023-12-14 RX ADMIN — FENTANYL CITRATE 25 MCG: 50 INJECTION, SOLUTION INTRAMUSCULAR; INTRAVENOUS at 14:27

## 2023-12-14 RX ADMIN — Medication 10 ML: at 08:59

## 2023-12-14 RX ADMIN — LIDOCAINE HYDROCHLORIDE 50 MG: 20 INJECTION, SOLUTION EPIDURAL; INFILTRATION; INTRACAUDAL; PERINEURAL at 14:27

## 2023-12-14 RX ADMIN — ONDANSETRON 4 MG: 2 INJECTION INTRAMUSCULAR; INTRAVENOUS at 17:00

## 2023-12-14 RX ADMIN — Medication 250 MG: at 20:43

## 2023-12-14 RX ADMIN — AMLODIPINE BESYLATE 10 MG: 5 TABLET ORAL at 08:58

## 2023-12-14 RX ADMIN — FENTANYL CITRATE 25 MCG: 50 INJECTION, SOLUTION INTRAMUSCULAR; INTRAVENOUS at 15:25

## 2023-12-14 RX ADMIN — PROPOFOL 110 MG: 10 INJECTION, EMULSION INTRAVENOUS at 14:27

## 2023-12-14 RX ADMIN — DEXMEDETOMIDINE 8 MCG: 100 INJECTION, SOLUTION, CONCENTRATE INTRAVENOUS at 15:19

## 2023-12-14 RX ADMIN — DEXMEDETOMIDINE 8 MCG: 100 INJECTION, SOLUTION, CONCENTRATE INTRAVENOUS at 15:11

## 2023-12-14 NOTE — PLAN OF CARE
Goal Outcome Evaluation:  Plan of Care Reviewed With: patient        Progress: improving  Outcome Evaluation: VSS. Pt NPO this shift until surgery this evening. Post op cysto w/ r ureteral stent placement, no string. Per PACU, pt took a while to wake up in recovery. When arrived to the floor pt reported nasuea, Zofran administered which she states helped. She is still drowsy but answers all questions appropriately. On room air, oxygen saturation is above 95. Cont. Pulse ox in place. Blood glucose have all been WNL this shift. She has been pleasant. Called Keven HILTON to update him after sx. Will cont POC

## 2023-12-14 NOTE — PLAN OF CARE
Goal Outcome Evaluation:  Plan of Care Reviewed With: patient        Progress: no change  Outcome Evaluation: VSS. No complaint of pain. Alert and oriented x4. Had x2 incontinent bowel movement. No blood noted. NPO at midnight for procedure today.

## 2023-12-14 NOTE — ANESTHESIA POSTPROCEDURE EVALUATION
Patient: Lyubov Middleton    Procedure Summary       Date: 12/14/23 Room / Location: [facility] OR 02 / [facility] MAIN OR    Anesthesia Start: 1421 Anesthesia Stop: 1541    Procedure: cystoscopy with right ureteroscopy, right ureteral stent placement, BILATERAL retrograde pyelograms (Right) Diagnosis:       Hydronephrosis, unspecified hydronephrosis type      (Hydronephrosis, unspecified hydronephrosis type [N13.30])    Surgeons: Joseph Powell MD Provider: Jordan Bonilla MD    Anesthesia Type: general ASA Status: 3            Anesthesia Type: general    Vitals  Vitals Value Taken Time   /65 12/14/23 1550   Temp 36.4 °C (97.6 °F) 12/14/23 1536   Pulse 83 12/14/23 1637   Resp 15 12/14/23 1550   SpO2 100 % 12/14/23 1550   Vitals shown include unfiled device data.        Post Anesthesia Care and Evaluation    Patient location during evaluation: bedside  Patient participation: complete - patient participated  Level of consciousness: awake  Pain score: 2  Pain management: adequate    Airway patency: patent  PONV Status: none  Cardiovascular status: acceptable and stable  Respiratory status: acceptable  Hydration status: acceptable    Comments: An Anesthesiologist personally participated in the most demanding procedures (including induction and emergence if applicable) in the anesthesia plan, monitored the course of anesthesia administration at frequent intervals and remained physically present and available for immediate diagnosis and treatment of emergencies.

## 2023-12-14 NOTE — ANESTHESIA PREPROCEDURE EVALUATION
Anesthesia Evaluation     Patient summary reviewed and Nursing notes reviewed   no history of anesthetic complications:   NPO Solid Status: > 8 hours  NPO Liquid Status: > 2 hours           Airway   Mallampati: II  TM distance: >3 FB  Neck ROM: full  No difficulty expected  Dental          Pulmonary - negative pulmonary ROS and normal exam    breath sounds clear to auscultation  Cardiovascular - normal exam  Exercise tolerance: good (4-7 METS)    ECG reviewed  Rhythm: regular  Rate: normal    (+) hypertension well controlled less than 2 medications, dysrhythmias Paroxysmal Atrial Fib      Neuro/Psych  (+) seizures  GI/Hepatic/Renal/Endo    (+) renal disease-, diabetes mellitus type 2    Musculoskeletal     Abdominal    Substance History - negative use     OB/GYN negative ob/gyn ROS         Other   arthritis,   history of cancer (L side) remission    ROS/Med Hx Other: PAT Nursing Notes unavailable.                 Anesthesia Plan    ASA 3     general     (Patient understands anesthesia not responsible for dental damage.    Ex- listed asPOA, but pt awake and alert, understands procedure.  Pt has given her consent during this admission per Rns.    We discussed DNR.  Pt agrees to be FULL CODE during OR and PACU, resume DNR when d/c PACU.)  intravenous induction     Anesthetic plan, risks, benefits, and alternatives have been provided, discussed and informed consent has been obtained with: patient.    Plan discussed with CRNA.      CODE STATUS:    Medical Intervention Limits: NO intubation (DNI); NO cardioversion  Level Of Support Discussed With: Patient  Code Status (Patient has no pulse and is not breathing): No CPR (Do Not Attempt to Resuscitate)  Medical Interventions (Patient has pulse or is breathing): Limited Support

## 2023-12-14 NOTE — PROGRESS NOTES
[facility]     Progress Note    Patient Name: Lyubov Middleton  : 1955  MRN: [id number]  Primary Care Physician:  Cathleen Stroud APRN  Date of admission: 2023    Subjective   Patient's renal function is stable with GFR of 14-15  Plan for surgical intervention today  No major acute events overnight    Scheduled Meds:amLODIPine, 10 mg, Oral, Daily  carvedilol, 25 mg, Oral, BID  epoetin chris/chris-epbx, 20,000 Units, Subcutaneous, Once per day on   insulin lispro, 2-7 Units, Subcutaneous, 4x Daily AC & at Bedtime  metoprolol tartrate, 5 mg, Intravenous, Once  pantoprazole, 40 mg, Oral, Q AM  saccharomyces boulardii, 250 mg, Oral, BID  sodium chloride, 10 mL, Intravenous, Q12H      Continuous Infusions:     PRN Meds:.  acetaminophen    dextrose    dextrose    glucagon (human recombinant)    loperamide    ondansetron    sodium chloride    sodium chloride    sodium chloride       Review of Systems  Constitutional:        Weakness tiredness fatigue  Eyes:                       No blurry vision, eye discharge, eye irritation, eye pain  HEENT:                   No acute hair loss, earache and discharge, nasal congestion or discharge, sore throat, postnasal drip  Respiratory:           No shortness of breath coughing sputum production wheezing hemoptysis pleuritic chest pain  Cardiovascular:     No chest pain, orthopnea, PND, dizziness, palpitation, lower extremity edema  Gastrointestinal:   No nausea vomiting diarrhea abdominal pain constipation  Genitourinary:       No urinary incontinence, hesitancy, frequency, urgency, dysuria  Hematologic:         No bruising, bleeding, pallor, lymphadenopathy  Endocrine:            No coldness, hot flashes, polyuria, abnormal hair growth  Musculoskeletal:    No body pains, aches, arthritic pains, muscle pain ,muscle wasting  Psychiatric:          No low or high mood, anxiety, hallucinations, delusions  Skin.                      No rash, ulcers,  bruising, itching  Neurological:        No confusion, headache, focal weakness, numbness, dysphasia    Objective   Objective     Vitals:   Temp:  [97.7 °F (36.5 °C)-98.8 °F (37.1 °C)] 97.7 °F (36.5 °C)  Heart Rate:  [72-84] 74  Resp:  [16] 16  BP: (128-155)/(57-69) 148/68  Physical Exam    Constitutional: Awake, alert responsive, conversant, no obvious distress              Psychiatric:  Appropriate affect, cooperative   Neurologic:  Awake alert ,oriented x 3, strength symmetric in all extremities, Cranial Nerves grossly intact to confrontation, speech clear   Eyes:   PERRLA, sclerae anicteric, no conjunctival injection   HEENT:  Moist mucous membranes, no nasal or eye discharge, no throat congestion   Neck:   Supple, no thyromegaly, no lymphadenopathy, trachea midline, no elevated JVD   Respiratory:  Clear to auscultation bilaterally, nonlabored respirations    Cardiovascular: RRR, no murmurs, rubs, or gallops, palpable pedal pulses bilaterally, No bilateral ankle edema   Gastrointestinal: Positive bowel sounds, soft, nontender, nondistended, no organomegaly   Musculoskeletal:  No clubbing or cyanosis to extremities,muscle wasting, joint swelling, muscle weakness             Skin:                      No rashes, bruising, skin ulcers, petechiae or ecchymosis    Result Review    Result Review:  I have personally reviewed the results from the time of this admission to 12/14/2023 08:11 EST and agree with these findings:  []  Laboratory  []  Microbiology  []  Radiology  []  EKG/Telemetry   []  Cardiology/Vascular   []  Pathology  []  Old records  []  Other:    Assessment & Plan   Assessment / Plan       Active Hospital Problems:  Active Hospital Problems    Diagnosis     **Abdominal pain     Chronic anemia     Hydronephrosis     Pyelonephritis     History of Clostridioides difficile infection     Stage 3a chronic kidney disease     Type 2 diabetes mellitus     Essential hypertension      68-year female with past  medical history of CKD stage IV with baseline creatinine of 1.8-2 who was noted to have progressively worsening kidney function over the last 6 months, longstanding diabetes with diabetic nephropathy hypothyroidism, hypertension, osteoporosis, history of breast cancer, GERD, dementia who was sent from the nursing home to the ER due to concerns for bloody bowel movements.  Hemoglobin noted to be at 7 with normal iron profile.  Creatinine has been uptrending over the last 6 months and currently at 3.24 with bicarb of 14 and BUN of 29 with low calcium of 7.3.  CT scan concerning for hydronephrosis and lymphadenopathy with possible underlying malignancy.  Urine analysis concerning for UTI and does have large amount of proteinuria.  Proteinuria noted to be 4 g of UPCR and 2 g of UACR.  Suspect with her degree of renal dysfunction that she has progressive renal disease secondary to diabetic nephropathy.      Plan  Continue EPO 20 K units  Antibiotics for UTI  Continue with amlodipine 10 mg and carvedilol 25 twice daily for hypertension  Noted urology is planning for further intervention on 12/14  Creatinine is stable at this time and appears to have plateaued around 3.5  Based on the results of intervention today, will assess further recommendations    Thank you for involving me in the care of the patient.  Will continue to follow along

## 2023-12-14 NOTE — PROGRESS NOTES
[facility]   Urology Progress Note    Patient Name: Lyubov Middleton  : 1955  MRN: [id number]  Primary Care Physician:  Cathleen Stroud APRN  Date of admission: 2023    Subjective   Subjective     No change      Objective   Objective     Vitals:   Temp:  [97.9 °F (36.6 °C)-98.8 °F (37.1 °C)] 97.9 °F (36.6 °C)  Heart Rate:  [72-84] 72  Resp:  [16] 16  BP: (128-155)/(57-69) 150/69  Physical Exam     Alert and oriented x3  No acute distress  Unlabored respirations  Nontender/nondistended       Result Review    Result Review:  I have personally reviewed the results from the time of this admission to 2023 06:06 EST and agree with these findings:  []  Laboratory  []  Microbiology  []  Radiology  []  EKG/Telemetry   []  Cardiology/Vascular   []  Pathology  []  Old records  []  Other:      Assessment & Plan   Assessment / Plan     Brief Patient Summary:  Lyubov Middleton is a 68 y.o. female     Active Hospital Problems:  Active Hospital Problems    Diagnosis     **Abdominal pain     Chronic anemia     Hydronephrosis     Pyelonephritis     History of Clostridioides difficile infection     Stage 3a chronic kidney disease     Type 2 diabetes mellitus     Essential hypertension      Plan:      OR today    Cont NPO     cystoscopy with right ureteroscopy possible biopsy possible laser of stones in the right ureteral stent placement, left retrograde pyelogram   Risks and benefits were discussed including bleeding, infection and damage to the urinary system.  We also discussed the risk of anesthesia up to and including death.  Patient voiced understanding and would like to proceed.

## 2023-12-14 NOTE — PROGRESS NOTES
[facility]   Hospitalist Progress Note  Date: 2023  Patient Name: Lyubov Middleton  : 1955  MRN: [id number]  Date of admission: 2023  Room/Bed: [facility]/      Subjective   Subjective     Chief Complaint: Abdominal pain, hematochezia    Summary:Lyubov Middleton is a 68 y.o. female with past medical history of hypertension, hyperlipidemia, diabetes not on medication, breast cancer, arthritis, dementia, and GERD who presented from nursing home with complaints of abdominal pain and bloody bowel movements.  She was admitted for further evaluation.  CT abdomen and pelvis without contrast showed malrotation of right kidney with severe right hydronephrosis and right perinephric fat stranding/edema which could be obstruction or pyelonephritis; multiple renal calculi.  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  nephrology and neurology were consulted and following the patient.  GI also following the patient.    Interval Followup: No acute events overnight.  Patient is lying comfortably on the bed, not in acute distress.  She stated she feels fine and denied any fever, chills, rigors, abdominal pain, chest pain or difficulty breathing.    Currently awaiting for her procedure today     Review of Systems    All systems reviewed and negative except for what is outlined above.      Objective   Objective     Vitals:   Temp:  [97.7 °F (36.5 °C)-98.8 °F (37.1 °C)] 97.7 °F (36.5 °C)  Heart Rate:  [72-84] 74  Resp:  [16] 16  BP: (128-155)/(57-69) 148/68    Physical Exam   General: Awake, alert, NAD  Cardiovascular: RRR, no murmurs   Pulmonary: CTA bilaterally; no wheezes; no conversational dyspnea  Gastrointestinal: S/ND/NT, +BS  Neuro: Alert, awake, oriented x 3; speech clear; no tremor  : No Fernandez catheter; no suprapubic tenderness    Result Review    Result  Review:  I have personally reviewed these results:  [x]  Laboratory      Lab 12/14/23  0421 12/10/23  1129 12/09/23  0937 12/09/23  0438 12/08/23 2027 12/08/23  1434   WBC 7.35 11.76* 8.91   < >  --  9.30   HEMOGLOBIN 8.0* 8.3* 7.7*   < >  --  8.5*   HEMATOCRIT 25.3* 26.4* 24.6*   < >  --  26.2*   PLATELETS 191 227 212   < >  --  233   NEUTROS ABS 5.14 9.33* 7.34*   < >  --  7.13*   IMMATURE GRANS (ABS) 0.02 0.03 0.02   < >  --  0.02   LYMPHS ABS 1.57 1.80 1.08   < >  --  1.57   MONOS ABS 0.41 0.41 0.28   < >  --  0.41   EOS ABS 0.16 0.15 0.14   < >  --  0.12   MCV 91.7 90.1 90.1   < >  --  89.4   PROCALCITONIN  --   --   --   --   --  0.19   LACTATE  --   --  1.0  --  0.5  --    PROTIME 14.9  --  16.0*  --   --  14.3    < > = values in this interval not displayed.         Lab 12/14/23  0421 12/13/23  0336 12/12/23 0418 12/09/23 0438 12/08/23  1434   SODIUM 143 142 143   < > 139   POTASSIUM 3.7 3.6 3.7   < > 4.3   CHLORIDE 108* 105 108*   < > 108*   CO2 24.0 27.0 25.7   < > 17.0*   ANION GAP 11.0 10.0 9.3   < > 14.0   BUN 26* 29* 30*   < > 33*   CREATININE 3.41* 3.65* 3.56*   < > 3.31*   EGFR 14.2* 13.1* 13.5*   < > 14.7*   GLUCOSE 80 86 88   < > 154*   CALCIUM 7.2* 6.9* 7.1*   < > 7.3*   MAGNESIUM 1.6 1.7 1.9   < > 1.6   PHOSPHORUS 4.0 4.1 4.0   < >  --    HEMOGLOBIN A1C  --   --   --   --  4.90    < > = values in this interval not displayed.         Lab 12/14/23  0421 12/13/23  0336 12/12/23  0418   TOTAL PROTEIN 5.4* 5.5* 5.4*   ALBUMIN 2.6* 2.7* 2.5*   GLOBULIN 2.8 2.8 2.9   ALT (SGPT) <5 <5 7   AST (SGOT) 10 10 9   BILIRUBIN <0.2 <0.2 <0.2   ALK PHOS 70 66 64         Lab 12/14/23  0421 12/09/23  1128 12/09/23  0937 12/09/23  0936 12/08/23  1434   HSTROP T  --  36*  --  35*  --    PROTIME 14.9  --  16.0*  --  14.3   INR 1.14  --  1.26*  --  1.09             Lab 12/09/23  0438 12/08/23  1709 12/08/23  1434   IRON 43  --   --    IRON SATURATION (TSAT) 22  --   --    TIBC 194*  --   --    TRANSFERRIN 130*  --    --    FOLATE 8.78  --   --    VITAMIN B 12 333  --   --    ABO TYPING  --  O O   RH TYPING  --  Negative Negative   ANTIBODY SCREEN  --   --  Negative         Lab 12/08/23  2324   PH, ARTERIAL 7.338*   PCO2, ARTERIAL 27.1*   PO2 .5*   O2 SATURATION ART 96.5   FIO2 21   HCO3 ART 14.2*   BASE EXCESS ART -10.4*   CARBOXYHEMOGLOBIN 0.3     Brief Urine Lab Results  (Last result in the past 365 days)        Color   Clarity   Blood   Leuk Est   Nitrite   Protein   CREAT   Urine HCG        12/08/23 1902             51.1         12/08/23 1902             54.2         12/08/23 1902 Yellow   Cloudy   Small (1+)   Moderate (2+)   Negative   >=300 mg/dL (3+)                 [x]  Microbiology   Microbiology Results (last 10 days)       Procedure Component Value - Date/Time    Clostridioides difficile Toxin - Stool, Per Rectum [[id number]] Collected: 12/09/23 0953    Lab Status: Final result Specimen: Stool from Per Rectum Updated: 12/09/23 1329    Narrative:      The following orders were created for panel order Clostridioides difficile Toxin - Stool, Per Rectum.  Procedure                               Abnormality         Status                     ---------                               -----------         ------                     Clostridioides difficile...[[id number]]                      Final result                 Please view results for these tests on the individual orders.    Clostridioides difficile Toxin, PCR - Stool, Per Rectum [[id number]] Collected: 12/09/23 0953    Lab Status: Final result Specimen: Stool from Per Rectum Updated: 12/09/23 1329     Toxigenic C. difficile by PCR Negative     027 Toxin Presumptive Negative    Narrative:      The result indicates the absence of toxigenic C. difficile from stool specimen.     Enteric Bacterial Panel - Stool, Per Rectum [[id number]]  (Normal) Collected: 12/09/23 0953    Lab Status: Final result Specimen: Stool from Per Rectum Updated: 12/10/23 1633     Salmonella  Not Detected     Campylobacter Not Detected     Shigella/Enteroinvasive E. coli (EIEC) Not Detected     Shiga-like toxin-producing E. coli (STEC) stx1/stx2 Not Detected    Blood Culture - Blood, Arm, Right [[id number]]  (Normal) Collected: 12/08/23 2030    Lab Status: Final result Specimen: Blood from Arm, Right Updated: 12/13/23 2100     Blood Culture No growth at 5 days    Blood Culture - Blood, Arm, Right [[id number]]  (Normal) Collected: 12/08/23 2027    Lab Status: Final result Specimen: Blood from Arm, Right Updated: 12/13/23 2045     Blood Culture No growth at 5 days    Urine Culture - Urine, Urine, Clean Catch [[id number]]  (Abnormal)  (Susceptibility) Collected: 12/08/23 1902    Lab Status: Final result Specimen: Urine, Clean Catch Updated: 12/10/23 1139     Urine Culture >100,000 CFU/mL Escherichia coli    Narrative:      Colonization of the urinary tract without infection is common. Treatment is discouraged unless the patient is symptomatic, pregnant, or undergoing an invasive urologic procedure.    Susceptibility        Escherichia coli      MARKO      Ampicillin Resistant      Ampicillin + Sulbactam Resistant      Cefazolin Susceptible      Cefepime Susceptible      Ceftazidime Susceptible      Ceftriaxone Susceptible      Gentamicin Susceptible      Levofloxacin Susceptible      Nitrofurantoin Susceptible      Piperacillin + Tazobactam Susceptible      Trimethoprim + Sulfamethoxazole Susceptible                                 [x]  Radiology  CT Abdomen Pelvis Without Contrast    Result Date: 12/8/2023    1. Malrotation of the right kidney with severe right hydronephrosis and right perinephric fat stranding/edema which could be due to obstruction or pyelonephritis. 2. Multiple renal calculi, but no distal ureteral calculus or dilatation is seen.  There is suggestion of irregular subtle soft tissue attenuation at the right renal pelvis and there is right retroperitoneal lymphadenopathy.  These findings are  concerning for obstructing urothelial malignancy and dameon metastatic disease.  Recommend urology consultation and further evaluation with multiphase contrast enhanced CT. 3. There appears to have been right colectomy with anastomosis in the right upper abdomen.  There is an above average amount of stool in the colon and rectum.  There are some short segments of nondistended transverse and sigmoid colon with wall thickening but no pericolonic changes to suggest acute inflammation.  These could be due to peristalsis, but a short-segment constricting lesion cannot be excluded on this exam.  Recommend colonoscopy follow-up given patient's symptoms. 4. Broad-based right abdominal hernia containing bowel loops without evidence of bowel obstruction.     OBDULIA ALTAMIRANO MD       Electronically Signed and Approved By: OBDULIA ALTAMIRANO MD on 12/08/2023 at 16:22            []  EKG/Telemetry   []  Cardiology/Vascular   []  Pathology  []  Old records  []  Other:    Assessment & Plan   Assessment / Plan     Assessment:  Concern for renal malignancy  UTI due to E. coli, present on admission  Hydronephrosis  Metabolic acidosis  Hypocalcemia  Proteinuria  Hematochezia  Constipation  Anemia  AUSTEN on CKD    Plan:  Patient currently being managed in hospitalist service.  Completed 5-day course of antibiotic for E. coli UTI.  Urology following the patient and planning cystoscopy today as there is concern for malignancy in the right kidney.  Creatinine up to 3.4 today, worsening   Nephrology followed the patient, appreciate further input.  Continue put 20 K units.  Bicarb tablet discontinued by nephrology   Continue amlodipine and carvedilol for hypertension.  GI was consulted this admission; given patient had recent colonoscopy with stool transplant for recurrent C. difficile several months ago with overall improvement of her symptoms; decided to follow peripherally with no any further plan of GI intervention this admission.  Appreciate  further recommendation by nephrology and urology.  Continue rest of the current management.      DVT prophylaxis:  Mechanical DVT prophylaxis orders are present.    CODE STATUS:   Medical Intervention Limits: NO intubation (DNI); NO cardioversion  Level Of Support Discussed With: Patient  Code Status (Patient has no pulse and is not breathing): No CPR (Do Not Attempt to Resuscitate)  Medical Interventions (Patient has pulse or is breathing): Limited Support

## 2023-12-14 NOTE — OP NOTE
URETEROSCOPY LASER LITHOTRIPSY WITH STENT INSERTION  Procedure Report    Patient Name:  Lyubov Middleton  YOB: 1955    Date of Surgery:  12/14/2023      Pre-op Diagnosis:   Hydronephrosis, unspecified hydronephrosis type [N13.30]       Postop diagnosis:    Same    Procedure/CPT® Codes:      Procedure(s):      Cystoscopy   right ureteroscopy   right ureteral stent placement 6 [place]amese MultiLink   BILATERAL retrograde pyelograms    Staff:  Surgeon(s):  Jospeh Powell MD         Anesthesia: General    Estimated Blood Loss: minimal    Implants:    Implant Name Type Inv. Item Serial No.  Lot No. LRB No. Used Action   STNT URETRL QUADRA/COIL M/SYDNIE BR/NYLON [id number] - [id number] Stent STNT URETRL QUADRA/COIL M/SYDNIE BR/NYLON [id number]  [facility] [id number] Right 1 Implanted       Specimen:          None        Findings:     Bladder normal other than some cystitis cystica covering the trigone.    Left retrograde normal    Right retrograde showed moderate hydronephrosis with tortuous/J-hook ureter at the UPJ.  Very enlarged abnormal looking pelvis and calyceal system.  No extravasation    Right ureter normal up to the UPJ.  Very tight could not get a ureteroscope through this after trying multiple times even over wire.    Rest of the ureter below this was normal    After multiple attempts to keep the wire above this to get a stent in I was finally able to get a 6 [place] MultiLink in with good curl in the right renal pelvis.    Purulence started draining around and through the stent once it was placed        Complications: none    Description of Procedure:     After informed consent patient taken to the operating room.  Patient was laid supine and placed under general anesthesia by the anesthesia team.  At this point patient was placed in dorsal lithotomy position and prepped and draped in normal sterile fashion.  A multidisciplinary timeout was undertaken documenting the correct patient  site and procedure.  At this point a 22 rigid cystoscope was placed into the urethra . Bladder was normal.      At this point we shot a left retrograde pyelogram no filling defects, no hydronephrosis and this drained well.  This was done with a cone-tip catheter and contrast    At this point I then did a right retrograde pyelogram see findings      At this point a Glidewire was placed up the right   ureter without any issue under fluoroscopic guidance.      Very difficult to get the wire past the right UPJ.  Had a J-hook that the wire keep wanting to fall below.  Finally I was able to get a Pollick passed this and placed a stiff wire up I then placed dual-lumen and a second wire in the form of a Glidewire and placed a ureteral access sheath under fluoroscopic guidance.  Obturator and wire were removed.    Disposable ureteroscope was taken up and I did try for several minutes but the area at the UVJ was too tight to get past with the ureteroscope.    I did finally end up losing my wire this past this area.  I took the flexible ureteroscope out under direct vision and checked the rest the ureter looked good no signs of pathology        I then went ahead and placed a Glidewire up again through a Pollack catheter and was able to get this into the upper right collecting system.  I did shoot another retrograde with the Pollick to make sure I had the wire in appropriate position the retrograde was very abnormal with large odd shape calyces.    I did finally get a MultiLink stent up over a Glidewire under fluoroscopic guidance with no string.    Three-quarter curl past the UPJ and a good curl in the bladder.    Purulent started draining immediately from the right kidney.     Bladder was drained.  Patient tolerated the procedure well, he was taken to the postanesthesia care unit without issue.          Joseph Powell MD     Date: 12/14/2023  Time: 15:33 EST

## 2023-12-15 LAB
ALBUMIN SERPL-MCNC: 2.3 G/DL (ref 3.5–5.2)
ALBUMIN/GLOB SERPL: 0.8 G/DL
ALP SERPL-CCNC: 76 U/L (ref 39–117)
ALT SERPL W P-5'-P-CCNC: <5 U/L (ref 1–33)
ANION GAP SERPL CALCULATED.3IONS-SCNC: 12.4 MMOL/L (ref 5–15)
ARTERIAL PATENCY WRIST A: POSITIVE
AST SERPL-CCNC: 14 U/L (ref 1–32)
BASE EXCESS BLDA CALC-SCNC: -1 MMOL/L (ref -2–2)
BDY SITE: ABNORMAL
BILIRUB SERPL-MCNC: 0.2 MG/DL (ref 0–1.2)
BUN SERPL-MCNC: 31 MG/DL (ref 8–23)
BUN/CREAT SERPL: 7.9 (ref 7–25)
CA-I BLDA-SCNC: 0.98 MMOL/L (ref 1.13–1.32)
CALCIUM SPEC-SCNC: 7.3 MG/DL (ref 8.6–10.5)
CHLORIDE BLDA-SCNC: 110 MMOL/L (ref 98–106)
CHLORIDE SERPL-SCNC: 111 MMOL/L (ref 98–107)
CO2 SERPL-SCNC: 21.6 MMOL/L (ref 22–29)
COHGB MFR BLD: 0.3 % (ref 0–1.5)
CREAT SERPL-MCNC: 3.92 MG/DL (ref 0.57–1)
EGFRCR SERPLBLD CKD-EPI 2021: 12 ML/MIN/1.73
FHHB: 5 % (ref 0–5)
GAS FLOW AIRWAY: ABNORMAL L/MIN
GLOBULIN UR ELPH-MCNC: 2.9 GM/DL
GLUCOSE BLDA-MCNC: 106 MG/DL (ref 65–99)
GLUCOSE BLDC GLUCOMTR-MCNC: 103 MG/DL (ref 70–99)
GLUCOSE BLDC GLUCOMTR-MCNC: 106 MG/DL (ref 70–99)
GLUCOSE BLDC GLUCOMTR-MCNC: 128 MG/DL (ref 70–99)
GLUCOSE BLDC GLUCOMTR-MCNC: 129 MG/DL (ref 70–99)
GLUCOSE SERPL-MCNC: 114 MG/DL (ref 65–99)
HCO3 BLDA-SCNC: 22.3 MMOL/L (ref 22–26)
HGB BLDA-MCNC: 11.6 G/DL (ref 11.7–14.6)
INHALED O2 CONCENTRATION: 21 %
LACTATE BLDA-SCNC: 1.13 MMOL/L (ref 0.5–2)
MAGNESIUM SERPL-MCNC: 1.5 MG/DL (ref 1.6–2.4)
METHGB BLD QL: 0.2 % (ref 0–1.5)
MODALITY: ABNORMAL
NOTE: ABNORMAL
OXYHGB MFR BLDV: 94.5 % (ref 94–99)
PCO2 BLDA: 33.6 MM HG (ref 35–45)
PH BLDA: 7.44 PH UNITS (ref 7.35–7.45)
PHOSPHATE SERPL-MCNC: 5.5 MG/DL (ref 2.5–4.5)
PO2 BLD: 378 MM[HG] (ref 0–500)
PO2 BLDA: 79.3 MM HG (ref 80–100)
POTASSIUM BLDA-SCNC: 4.12 MMOL/L (ref 3.5–5)
POTASSIUM SERPL-SCNC: 4.4 MMOL/L (ref 3.5–5.2)
PROT SERPL-MCNC: 5.2 G/DL (ref 6–8.5)
SAO2 % BLDCOA: 94 % (ref 95–99)
SODIUM BLDA-SCNC: 141.5 MMOL/L (ref 136–146)
SODIUM SERPL-SCNC: 145 MMOL/L (ref 136–145)

## 2023-12-15 PROCEDURE — 99232 SBSQ HOSP IP/OBS MODERATE 35: CPT | Performed by: INTERNAL MEDICINE

## 2023-12-15 PROCEDURE — 25010000002 EPOETIN ALFA PER 1000 UNITS: Performed by: INTERNAL MEDICINE

## 2023-12-15 PROCEDURE — 84100 ASSAY OF PHOSPHORUS: CPT | Performed by: UROLOGY

## 2023-12-15 PROCEDURE — 36600 WITHDRAWAL OF ARTERIAL BLOOD: CPT | Performed by: PHYSICIAN ASSISTANT

## 2023-12-15 PROCEDURE — 97530 THERAPEUTIC ACTIVITIES: CPT

## 2023-12-15 PROCEDURE — 97116 GAIT TRAINING THERAPY: CPT

## 2023-12-15 PROCEDURE — 25810000003 LACTATED RINGERS PER 1000 ML: Performed by: NURSE PRACTITIONER

## 2023-12-15 PROCEDURE — 80053 COMPREHEN METABOLIC PANEL: CPT | Performed by: UROLOGY

## 2023-12-15 PROCEDURE — 83050 HGB METHEMOGLOBIN QUAN: CPT | Performed by: PHYSICIAN ASSISTANT

## 2023-12-15 PROCEDURE — 99231 SBSQ HOSP IP/OBS SF/LOW 25: CPT | Performed by: UROLOGY

## 2023-12-15 PROCEDURE — 82375 ASSAY CARBOXYHB QUANT: CPT | Performed by: PHYSICIAN ASSISTANT

## 2023-12-15 PROCEDURE — 82948 REAGENT STRIP/BLOOD GLUCOSE: CPT

## 2023-12-15 PROCEDURE — 82805 BLOOD GASES W/O2 SATURATION: CPT | Performed by: PHYSICIAN ASSISTANT

## 2023-12-15 PROCEDURE — 83735 ASSAY OF MAGNESIUM: CPT | Performed by: UROLOGY

## 2023-12-15 RX ORDER — CEPHALEXIN 500 MG/1
500 CAPSULE ORAL DAILY
Status: DISCONTINUED | OUTPATIENT
Start: 2023-12-15 | End: 2023-12-15

## 2023-12-15 RX ORDER — SODIUM CHLORIDE, SODIUM LACTATE, POTASSIUM CHLORIDE, CALCIUM CHLORIDE 600; 310; 30; 20 MG/100ML; MG/100ML; MG/100ML; MG/100ML
75 INJECTION, SOLUTION INTRAVENOUS CONTINUOUS
Status: DISCONTINUED | OUTPATIENT
Start: 2023-12-15 | End: 2023-12-15

## 2023-12-15 RX ORDER — CEPHALEXIN 500 MG/1
500 CAPSULE ORAL DAILY
Status: DISCONTINUED | OUTPATIENT
Start: 2023-12-15 | End: 2023-12-19

## 2023-12-15 RX ADMIN — SODIUM CHLORIDE, POTASSIUM CHLORIDE, SODIUM LACTATE AND CALCIUM CHLORIDE 75 ML/HR: 600; 310; 30; 20 INJECTION, SOLUTION INTRAVENOUS at 11:36

## 2023-12-15 RX ADMIN — AMLODIPINE BESYLATE 10 MG: 5 TABLET ORAL at 08:37

## 2023-12-15 RX ADMIN — Medication 10 ML: at 08:37

## 2023-12-15 RX ADMIN — CEPHALEXIN 500 MG: 500 CAPSULE ORAL at 17:14

## 2023-12-15 RX ADMIN — Medication 250 MG: at 20:53

## 2023-12-15 RX ADMIN — ERYTHROPOIETIN 20000 UNITS: 10000 INJECTION, SOLUTION INTRAVENOUS; SUBCUTANEOUS at 17:44

## 2023-12-15 RX ADMIN — Medication 250 MG: at 08:37

## 2023-12-15 RX ADMIN — PANTOPRAZOLE SODIUM 40 MG: 40 TABLET, DELAYED RELEASE ORAL at 06:13

## 2023-12-15 RX ADMIN — CARVEDILOL 25 MG: 25 TABLET, FILM COATED ORAL at 20:53

## 2023-12-15 RX ADMIN — Medication 10 ML: at 20:53

## 2023-12-15 RX ADMIN — CARVEDILOL 25 MG: 25 TABLET, FILM COATED ORAL at 08:37

## 2023-12-15 NOTE — PLAN OF CARE
Goal Outcome Evaluation:  Plan of Care Reviewed With: patient        Progress: no change  Outcome Evaluation: VSS. Pt was confused and alert to person and time only. Pt was very lethargic and sleepy at beginning of shift. Pt more alert and awake towards end of shift. Hospitalist notified. ABG ordered. Had incontinent urine and bowel movement. BGL monitored.

## 2023-12-15 NOTE — PROGRESS NOTES
[facility]   Hospitalist Progress Note  Date: 12/15/2023  Patient Name: Lyubov Middleton  : 1955  MRN: [id number]  Date of admission: 2023  Room/Bed: [facility]/      Subjective   Subjective     Chief Complaint: Abdominal pain, hematochezia    Summary:Lyubov Middleton is a 68 y.o. female with past medical history of hypertension, hyperlipidemia, diabetes not on medication, breast cancer, arthritis, dementia, and GERD who presented from nursing home with complaints of abdominal pain and bloody bowel movements.  She was admitted for further evaluation.  CT abdomen and pelvis without contrast showed malrotation of right kidney with severe right hydronephrosis and right perinephric fat stranding/edema which could be obstruction or pyelonephritis; multiple renal calculi.  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  nephrology and neurology were consulted and following the patient.  GI also following the patient.    Interval Followup:   Patient is s/p  right ureteral stent in place.  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   Cr continues to remain abnormal and increasing           Review of Systems    All systems reviewed and negative except for what is outlined above.      Objective   Objective     Vitals:   Temp:  [97.4 °F (36.3 °C)-99.1 °F (37.3 °C)] 98.1 °F (36.7 °C)  Heart Rate:  [69-88] 73  Resp:  [12-16] 14  BP: (100-145)/(46-67) 100/46  Flow (L/min):  [4-8] 4  FiO2 (%):  [53 %-99 %] 60 %    Physical Exam   General: Awake, alert, NAD  Cardiovascular: RRR, no murmurs   Pulmonary: CTA bilaterally; no wheezes; no conversational dyspnea  Gastrointestinal: S/ND/NT, +BS  Neuro: Alert, awake, oriented x 3; speech clear; no tremor  : No Fernandez catheter; no suprapubic  tenderness    Result Review    Result Review:  I have personally reviewed these results:  [x]  Laboratory      Lab 12/15/23  0428 12/14/23  0421 12/10/23  1129 12/09/23  0937 12/08/23 2027 12/08/23  1434   WBC  --  7.35 11.76* 8.91   < > 9.30   HEMOGLOBIN  --  8.0* 8.3* 7.7*   < > 8.5*   HEMATOCRIT  --  25.3* 26.4* 24.6*   < > 26.2*   PLATELETS  --  191 227 212   < > 233   NEUTROS ABS  --  5.14 9.33* 7.34*   < > 7.13*   IMMATURE GRANS (ABS)  --  0.02 0.03 0.02   < > 0.02   LYMPHS ABS  --  1.57 1.80 1.08   < > 1.57   MONOS ABS  --  0.41 0.41 0.28   < > 0.41   EOS ABS  --  0.16 0.15 0.14   < > 0.12   MCV  --  91.7 90.1 90.1   < > 89.4   PROCALCITONIN  --   --   --   --   --  0.19   LACTATE  --   --   --  1.0   < >  --    LACTATE, ARTERIAL 1.13  --   --   --   --   --    PROTIME  --  14.9  --  16.0*  --  14.3    < > = values in this interval not displayed.         Lab 12/15/23  0503 12/15/23  0428 12/14/23  0421 12/13/23  0336 12/09/23 0438 12/08/23  1434   SODIUM 145  --  143 142   < > 139   SODIUM, ARTERIAL  --  141.5  --   --   --   --    POTASSIUM 4.4  --  3.7 3.6   < > 4.3   CHLORIDE 111*  --  108* 105   < > 108*   CO2 21.6*  --  24.0 27.0   < > 17.0*   ANION GAP 12.4  --  11.0 10.0   < > 14.0   BUN 31*  --  26* 29*   < > 33*   CREATININE 3.92*  --  3.41* 3.65*   < > 3.31*   EGFR 12.0*  --  14.2* 13.1*   < > 14.7*   GLUCOSE 114*  --  80 86   < > 154*   GLUCOSE, ARTERIAL  --  106*  --   --   --   --    CALCIUM 7.3*  --  7.2* 6.9*   < > 7.3*   IONIZED CALCIUM  --  0.98*  --   --   --   --    MAGNESIUM 1.5*  --  1.6 1.7   < > 1.6   PHOSPHORUS 5.5*  --  4.0 4.1   < >  --    HEMOGLOBIN A1C  --   --   --   --   --  4.90    < > = values in this interval not displayed.         Lab 12/15/23  0503 12/14/23  0421 12/13/23  0336   TOTAL PROTEIN 5.2* 5.4* 5.5*   ALBUMIN 2.3* 2.6* 2.7*   GLOBULIN 2.9 2.8 2.8   ALT (SGPT) <5 <5 <5   AST (SGOT) 14 10 10   BILIRUBIN 0.2 <0.2 <0.2   ALK PHOS 76 70 66         Lab 12/14/23  0421  12/09/23  1128 12/09/23  0937 12/09/23  0936 12/08/23  1434   HSTROP T  --  36*  --  35*  --    PROTIME 14.9  --  16.0*  --  14.3   INR 1.14  --  1.26*  --  1.09             Lab 12/09/23  0438 12/08/23  1709 12/08/23  1434   IRON 43  --   --    IRON SATURATION (TSAT) 22  --   --    TIBC 194*  --   --    TRANSFERRIN 130*  --   --    FOLATE 8.78  --   --    VITAMIN B 12 333  --   --    ABO TYPING  --  O O   RH TYPING  --  Negative Negative   ANTIBODY SCREEN  --   --  Negative         Lab 12/15/23  0428 12/08/23  2324   PH, ARTERIAL 7.440 7.338*   PCO2, ARTERIAL 33.6* 27.1*   PO2 ART 79.3* 104.5*   O2 SATURATION ART 94.0* 96.5   FIO2 21 21   HCO3 ART 22.3 14.2*   BASE EXCESS ART -1.0 -10.4*   CARBOXYHEMOGLOBIN 0.3 0.3     Brief Urine Lab Results  (Last result in the past 365 days)        Color   Clarity   Blood   Leuk Est   Nitrite   Protein   CREAT   Urine HCG        12/08/23 1902             51.1         12/08/23 1902             54.2         12/08/23 1902 Yellow   Cloudy   Small (1+)   Moderate (2+)   Negative   >=300 mg/dL (3+)                 [x]  Microbiology   Microbiology Results (last 10 days)       Procedure Component Value - Date/Time    Clostridioides difficile Toxin - Stool, Per Rectum [[id number]] Collected: 12/09/23 0953    Lab Status: Final result Specimen: Stool from Per Rectum Updated: 12/09/23 1329    Narrative:      The following orders were created for panel order Clostridioides difficile Toxin - Stool, Per Rectum.  Procedure                               Abnormality         Status                     ---------                               -----------         ------                     Clostridioides difficile...[[id number]]                      Final result                 Please view results for these tests on the individual orders.    Clostridioides difficile Toxin, PCR - Stool, Per Rectum [[id number]] Collected: 12/09/23 0953    Lab Status: Final result Specimen: Stool from Per Rectum Updated:  12/09/23 1329     Toxigenic C. difficile by PCR Negative     027 Toxin Presumptive Negative    Narrative:      The result indicates the absence of toxigenic C. difficile from stool specimen.     Enteric Bacterial Panel - Stool, Per Rectum [[id number]]  (Normal) Collected: 12/09/23 0953    Lab Status: Final result Specimen: Stool from Per Rectum Updated: 12/10/23 1633     Salmonella Not Detected     Campylobacter Not Detected     Shigella/Enteroinvasive E. coli (EIEC) Not Detected     Shiga-like toxin-producing E. coli (STEC) stx1/stx2 Not Detected    Blood Culture - Blood, Arm, Right [[id number]]  (Normal) Collected: 12/08/23 2030    Lab Status: Final result Specimen: Blood from Arm, Right Updated: 12/13/23 2100     Blood Culture No growth at 5 days    Blood Culture - Blood, Arm, Right [[id number]]  (Normal) Collected: 12/08/23 2027    Lab Status: Final result Specimen: Blood from Arm, Right Updated: 12/13/23 2045     Blood Culture No growth at 5 days    Urine Culture - Urine, Urine, Clean Catch [[id number]]  (Abnormal)  (Susceptibility) Collected: 12/08/23 1902    Lab Status: Final result Specimen: Urine, Clean Catch Updated: 12/10/23 1139     Urine Culture >100,000 CFU/mL Escherichia coli    Narrative:      Colonization of the urinary tract without infection is common. Treatment is discouraged unless the patient is symptomatic, pregnant, or undergoing an invasive urologic procedure.    Susceptibility        Escherichia coli      MARKO      Ampicillin Resistant      Ampicillin + Sulbactam Resistant      Cefazolin Susceptible      Cefepime Susceptible      Ceftazidime Susceptible      Ceftriaxone Susceptible      Gentamicin Susceptible      Levofloxacin Susceptible      Nitrofurantoin Susceptible      Piperacillin + Tazobactam Susceptible      Trimethoprim + Sulfamethoxazole Susceptible                                 [x]  Radiology  CT Abdomen Pelvis Without Contrast    Result Date: 12/8/2023    1. Malrotation of the  right kidney with severe right hydronephrosis and right perinephric fat stranding/edema which could be due to obstruction or pyelonephritis. 2. Multiple renal calculi, but no distal ureteral calculus or dilatation is seen.  There is suggestion of irregular subtle soft tissue attenuation at the right renal pelvis and there is right retroperitoneal lymphadenopathy.  These findings are concerning for obstructing urothelial malignancy and dameon metastatic disease.  Recommend urology consultation and further evaluation with multiphase contrast enhanced CT. 3. There appears to have been right colectomy with anastomosis in the right upper abdomen.  There is an above average amount of stool in the colon and rectum.  There are some short segments of nondistended transverse and sigmoid colon with wall thickening but no pericolonic changes to suggest acute inflammation.  These could be due to peristalsis, but a short-segment constricting lesion cannot be excluded on this exam.  Recommend colonoscopy follow-up given patient's symptoms. 4. Broad-based right abdominal hernia containing bowel loops without evidence of bowel obstruction.     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   UTI due to E. coli, present on admission  Hydronephrosis  Metabolic acidosis  Hypocalcemia  Proteinuria  Hematochezia  Constipation  Anemia  AUSTEN on CKD    Plan:  Patient currently being managed in hospitalist service.  Completed 5-day course of antibiotic for E. coli UTI.  Urology following and patient is s/p right UPJ stricture with purulent drainage, s/p stent placement   Creatinine up to 3.9  today, worsening   Nephrology followed the patient, appreciate further input.  Continue put 20 K units.  Bicarb tablet discontinued by nephrology   Continue amlodipine and carvedilol for hypertension.  GI was consulted this admission; given patient had recent colonoscopy with stool transplant for recurrent C. difficile several months ago with overall improvement of her symptoms; decided to follow peripherally with no any further plan of GI intervention this admission.  Appreciate further recommendation by nephrology and urology.  Continue rest of the current management.      DVT prophylaxis:  Mechanical DVT prophylaxis orders are present.    CODE STATUS:   Medical Intervention Limits: NO intubation (DNI); NO cardioversion  Level Of Support Discussed With: Patient  Code Status (Patient has no pulse and is not breathing): No CPR (Do Not Attempt to Resuscitate)  Medical Interventions (Patient has pulse or is breathing): Limited Support

## 2023-12-15 NOTE — PLAN OF CARE
Goal Outcome Evaluation:            Patient pleasant through shift. Oriented to time and place. Q2 turns maintained. No complaints of pain or nausea. Blood glucose monitored. Bed in lowest locked position. Call light and table within reach. No concerns per patient.  Lenin Bates RN

## 2023-12-15 NOTE — PROGRESS NOTES
[facility]   Urology Progress Note    Patient Name: Lyubov Middleton  : 1955  MRN: [id number]  Primary Care Physician:  Cathleen Stroud APRN  Date of admission: 2023    Subjective   Subjective     No pain.  No events      Objective   Objective     Vitals:   Temp:  [97.4 °F (36.3 °C)-99.1 °F (37.3 °C)] 98 °F (36.7 °C)  Heart Rate:  [69-88] 78  Resp:  [12-16] 14  BP: (104-148)/(48-68) 104/48  Flow (L/min):  [4-8] 4  FiO2 (%):  [53 %-99 %] 60 %  Physical Exam     Alert and oriented x3  No acute distress  Unlabored respirations  Nontender/nondistended       Result Review    Result Review:  I have personally reviewed the results from the time of this admission to 12/15/2023 06:40 EST and agree with these findings:  []  Laboratory  []  Microbiology  []  Radiology  []  EKG/Telemetry   []  Cardiology/Vascular   []  Pathology  []  Old records  []  Other:      Assessment & Plan   Assessment / Plan     Brief Patient Summary:  Lyubov Middleton is a 68 y.o. female     Active Hospital Problems:  Active Hospital Problems    Diagnosis     **Abdominal pain     Chronic anemia     Hydronephrosis     Pyelonephritis     History of Clostridioides difficile infection     Stage 3a chronic kidney disease     Type 2 diabetes mellitus     Essential hypertension      Plan:      Postop day 1  cystoscopy with right ureteroscopy possible right ureteral stent placement, left retrograde pyelogram         Patient does have a right ureteral stent in place.  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    I have discussed this with her family and patient.  They understand the stent is only temporizing measure and if left in place more than 3 months could cause loss of damage of kidney    From a urologic standpoint patient can be discharged at any point.      Secondary to [facility] insurance policies I do not think she can follow-up me as an  outpatient so I will have my staff at the office get her in with [facility] for follow-up                Risks and benefits were discussed including bleeding, infection and damage to the urinary system.  We also discussed the risk of anesthesia up to and including death.  Patient voiced understanding and would like to proceed.

## 2023-12-15 NOTE — PROGRESS NOTES
[facility]     Progress Note    Patient Name: Lyubov Middleton  : 1955  MRN: [id number]  Primary Care Physician:  Cathleen Stroud APRN  Date of admission: 2023  2:21 PM       Subjective     Yesterday she underwent right ureteroscopy with right ureteral stent placement   She is confused this morning to place and time and slow to answer questions.  She is still drowsy from anesthesia.  ABGs drawn this a.m. with pH 7.4.  Sodium 145, potassium 4.4, creatinine 3.9, magnesium 1.5, albumin 2.3.  Urine output 300 mL / 24 hours.      Review of Systems:    Review of Systems   Constitutional:  Positive for activity change, appetite change and fatigue. Negative for fever.   Neurological:  Positive for weakness.   Psychiatric/Behavioral:  Positive for confusion.    All other systems reviewed and are negative.          Objective   Objective     Vitals:   Vitals:    23 1912 23 2239 12/15/23 0313 12/15/23 0745   BP: 122/51 108/56 104/48 100/46   BP Location: Right arm Right arm Right arm Right arm   Patient Position: Lying Lying Lying Sitting   Pulse:  88 78 73   Resp: 16 16 14 14   Temp: 98.1 °F (36.7 °C) 99.1 °F (37.3 °C) 98 °F (36.7 °C) 98.1 °F (36.7 °C)   TempSrc: Oral Oral Oral Oral   SpO2: 93% 93% 94% 96%   Weight:       Height:              Physical Exam:  Vitals and nursing note reviewed.     Constitutional:      Alert, cooperative, responsive, and conversant.  No acute distress.     HENT:      Normocephalic and atraumatic, no nasal congestion, discharge, mucous membranes are moist, no OP erythema  Eyes:      PERRLA, no scleral icterus, no conjunctival injection, no eye discharge  Neck:     Supple, no thyromegaly, no lymphadenopathy, trachea midline, no elevated JVD  Cardiovascular:      RRR, no murmurs, rubs or gallops. Palpable pedal pulses bilaterally. Trace BLE edema.   Pulmonary:      CTAB. Pulmonary effort is normal and unlabored. No respiratory distress.    Abdominal:      Bowel  sounds active, soft, nontender, non distended, no organomegaly  Musculoskeletal:         No muscle wasting, tenderness or joint swelling.  Generalized weakness.  Skin:     Warm and dry, cap refill less than 3 seconds. No clubbing, cyanosis, jaundice, erythema, rashes or ecchymosis.   Neurological:      AAOx1, speech clear, no focal deficit, strength equal bilaterally in all extremities, cranial nerves grossly intact  Psychiatric:         Mood and affect normal.  Behavior normal.         Result Review    Result Review:  I have personally reviewed the results from the time of this admission to 9/20/2021 18:13 EDT and agree with these findings:  []  Laboratory  []  Microbiology  []  Radiology  []  EKG/Telemetry   []  Cardiology/Vascular   []  Pathology  []  Old records  []  Other:     Assessment/Plan   Assessment / Plan       Active Hospital Problems:  Active Hospital Problems    Diagnosis     **Abdominal pain     Chronic anemia     Hydronephrosis     Pyelonephritis     History of Clostridioides difficile infection     Stage 3a chronic kidney disease     Type 2 diabetes mellitus     Essential hypertension          68-year female with past medical history of CKD stage IV with baseline creatinine of 1.8-2 who was noted to have progressively worsening kidney function over the last 6 months, longstanding diabetes with diabetic nephropathy hypothyroidism, hypertension, osteoporosis, history of breast cancer, GERD, dementia who was sent from the nursing home to the ER due to concerns for bloody bowel movements.  Hemoglobin noted to be at 7 with normal iron profile.  Creatinine has been uptrending over the last 6 months and currently at 3.24 with bicarb of 14 and BUN of 29 with low calcium of 7.3.  CT scan concerning for hydronephrosis and lymphadenopathy with possible underlying malignancy.  Urine analysis concerning for UTI and does have large amount of proteinuria.  Proteinuria noted to be 4 g of UPCR and 2 g of UACR.   Suspect with her degree of renal dysfunction that she has progressive renal disease secondary to diabetic nephropathy.       Plan  Continue EPO 20 K units  Antibiotics for UTI  Hold amlodipine for now due to soft blood pressure.   Bladder scan   Gentle IV hydration LR at 75     Thank you for involving me in the care of the patient.  Will continue to follow along       Electronically signed by BECKY Huang, 12/15/23, 10:01 AM EST.

## 2023-12-15 NOTE — NURSING NOTE
Ms Middleton remains on 5stu. She is alert to self only. She tells me she is comfortable. No complaints of pain at this time. She is on room air. IV fluids continue with monitoring of renal function.

## 2023-12-15 NOTE — THERAPY TREATMENT NOTE
Acute Care - Physical Therapy Progress Note   Sherry     Patient Name: Lyubov Middleton  : 1955  MRN: [id number]  Today's Date: 12/15/2023      Visit Dx:     ICD-10-CM ICD-9-CM   1. Diarrhea, unspecified type  R19.7 787.91   2. History of Clostridioides difficile colitis  Z86.19 V12.79   3. Pyelonephritis  N12 590.80   4. Hydronephrosis, unspecified hydronephrosis type  N13.30 591   5. Chronic kidney disease, unspecified CKD stage  N18.9 585.9   6. Chronic anemia  D64.9 285.9   7. Ruptured Bakers cyst  M66.0 727.51   8. Impaired mobility and ADLs  Z74.09 V49.89    Z78.9    9. Difficulty walking  R26.2 719.7     Patient Active Problem List   Diagnosis    Renal stone    Renal abscess    Stage 3a chronic kidney disease    Type 2 diabetes mellitus    Seizure disorder    Breast cancer    C. difficile diarrhea    Depressive disorder    Essential hypertension    Left ventricular dysfunction    Paroxysmal atrial fibrillation    History of Clostridioides difficile infection    History of colon resection    Diarrhea    Encounter for screening for malignant neoplasm of colon    Pyelonephritis    Chronic anemia    Abdominal pain    Hydronephrosis     Past Medical History:   Diagnosis Date    Arthritis     Cancer     left breast removed     Dementia     Type 2 diabetes mellitus      Past Surgical History:   Procedure Laterality Date    ABDOMINAL SURGERY      BREAST SURGERY Left     removed due to cancer    CHOLECYSTECTOMY      COLONOSCOPY  2023    Procedure: COLONOSCOPY WITH ENDOSCOPIC FECAL MICROBIOTA TRANSPLANT, GTUBE REMOVAL;  Surgeon: Hamzah Ireland MD;  Location: [facility] ENDOSCOPY;  Service: Gastroenterology;;    CYSTOSCOPY W/ URETERAL STENT PLACEMENT Right 2020    Procedure: CYSTOSCOPY, RIGHT RETROGRADE PYELOGRAM, URETEROSCOPY, LASER LITHOTRIPSY, RIGHT URETERAL STENT PLACEMENT;  Surgeon: Rohan Dooley Jr., MD;  Location: [facility] OR;  Service: Urology;  Laterality: Right;     GASTROSTOMY W/ FEEDING TUBE      HERNIA REPAIR      mesh removed    URETEROSCOPY LASER LITHOTRIPSY WITH STENT INSERTION Right 12/14/2023    Procedure: cystoscopy with right ureteroscopy, right ureteral stent placement, BILATERAL retrograde pyelograms;  Surgeon: Joseph Powell MD;  Location: [facility] MAIN OR;  Service: Urology;  Laterality: Right;     PT Assessment (last 12 hours)       PT Evaluation and Treatment       Row Name 12/15/23 1500          Physical Therapy Time and Intention    Subjective Information complains of;fatigue  -CS     Document Type therapy note (daily note)  -CS     Mode of Treatment individual therapy;physical therapy  -CS     Patient Effort good  -CS     Symptoms Noted During/After Treatment fatigue  -CS       Row Name 12/15/23 1500          Pain    Pretreatment Pain Rating 0/10 - no pain  -CS     Posttreatment Pain Rating 0/10 - no pain  -CS       Row Name 12/15/23 1500          Bed Mobility    Bed Mobility supine-sit-supine;scooting/bridging  -CS     Scooting/Bridging Hutch[place] (Bed Mobility) verbal cues;moderate assist (50% patient effort);1 person assist  -CS     Supine-Sit-Supine [place] (Bed Mobility) contact guard;1 person assist  -CS     Bed Mobility, Safety Issues decreased use of arms for pushing/pulling;decreased use of legs for bridging/pushing  -CS     Assistive Device (Bed Mobility) bed rails;head of bed elevated  -CS       Row Name 12/15/23 1500          Sit-Stand Transfer    Sit-Stand [place] (Transfers) verbal cues;1 person assist;moderate assist (50% patient effort)  -CS     Assistive Device (Sit-Stand Transfers) walker, front-wheeled  -CS       Row Name 12/15/23 1500          Stand-Sit Transfer    Stand-Sit [place] (Transfers) minimum assist (75% patient effort);1 person assist  -CS     Assistive Device (Stand-Sit Transfers) walker, front-wheeled  -CS       Row Name 12/15/23 1500          Gait/Stairs (Locomotion)    Hutchinson Level (Gait) verbal  cues;contact guard;1 person to manage equipment  -CS     Assistive Device (Gait) walker, front-wheeled  -CS     Distance in Feet (Gait) 50  -CS     Pattern (Gait) 4-point;step-to  -CS     Deviations/Abnormal Patterns (Gait) base of support, narrow;festinating/shuffling;gait speed decreased;stride length decreased  -CS     Bilateral Gait Deviations forward flexed posture  -CS       Row Name             Wound 12/14/23 1444 urethral meatus Incision    Wound - Properties Group Placement Date: 12/14/23 - Placement Time: 1444 - Location: urethral meatus  -[facility] Primary Wound Type: Incision  -MH, point of entry for procedure     Retired Wound - Properties Group Placement Date: 12/14/23 - Placement Time: 1444 - Location: urethral meatus  -[facility] Primary Wound Type: Incision  -MH, point of entry for procedure     Retired Wound - Properties Group Date first assessed: 12/14/23 - Time first assessed: 1444 - Location: urethral meatus  -[facility] Primary Wound Type: Incision  -MH, point of entry for procedure       Row Name 12/15/23 1500          Positioning and Restraints    Pre-Treatment Position in bed  -CS     Post Treatment Position bed  -CS     In Bed fowlers;call light within reach;encouraged to call for assist;exit alarm on  -CS       Row Name 12/15/23 1500          Progress Summary (PT)    Progress Toward Functional Goals (PT) progress toward functional goals is fair  -CS               User Key  (r) = Recorded By, (t) = Taken By, (c) = Cosigned By      Initials Name Provider Type     Juni Baker, RN Registered Nurse    Farzad Calderón PTA Physical Therapist Assistant                    Physical Therapy Education       Title: PT OT SLP Therapies (In Progress)       Topic: Physical Therapy (In Progress)       Point: Mobility training (Done)       Learning Progress Summary             Patient Acceptance, E,TB, VU by AV at 12/11/2023 1143                         Point: Home exercise program (Not Started)        Learner Progress:  Not documented in this visit.              Point: Body mechanics (Done)       Learning Progress Summary             Patient Acceptance, E,TB, VU by AV at 12/11/2023 1143                         Point: Precautions (Done)       Learning Progress Summary             Patient Acceptance, E,TB, VU by AV at 12/11/2023 1143                                         User Key       Initials Effective Dates Name Provider Type Discipline     06/11/21 -  Anant Yen, PT Physical Therapist PT                  PT Recommendation and Plan     Progress Summary (PT)  Progress Toward Functional Goals (PT): progress toward functional goals is fair   Outcome Measures       Row Name 12/15/23 1500             How much help from another person do you currently need...    Turning from your back to your side while in flat bed without using bedrails? 4  -CS      Moving from lying on back to sitting on the side of a flat bed without bedrails? 3  -CS      Moving to and from a bed to a chair (including a wheelchair)? 3  -CS      Standing up from a chair using your arms (e.g., wheelchair, bedside chair)? 3  -CS      Climbing 3-5 steps with a railing? 2  -CS      To walk in hospital room? 3  -CS      AM-PAC 6 Clicks Score (PT) 18  -CS      Highest Level of Mobility Goal 6 --> Walk 10 steps or more  -CS         Functional Assessment    Outcome Measure Options AM-PAC 6 Clicks Basic Mobility (PT)  -CS                User Key  (r) = Recorded By, (t) = Taken By, (c) = Cosigned By      Initials Name Provider Type    CS Farzad Jang, PTA Physical Therapist Assistant                     Time Calculation:    PT Charges       Row Name 12/15/23 1507             Time Calculation    Start Time 1405  -CS      PT Received On 12/15/23  -CS         Timed Charges    89196 - Gait Training Minutes  12  -CS      74483 - PT Therapeutic Activity Minutes 12  -CS         Total Minutes    Timed Charges Total Minutes 24  -CS       Total  Minutes 24  -CS                User Key  (r) = Recorded By, (t) = Taken By, (c) = Cosigned By      Initials Name Provider Type    CS Farzad Jang PTA Physical Therapist Assistant                  Therapy Charges for Today       Code Description Service Date Service Provider Modifiers Qty    81020213582 HC GAIT TRAINING EA 15 MIN 12/15/2023 Farzad Jang PTA GP 1    45586017793 HC PT THERAPEUTIC ACT EA 15 MIN 12/15/2023 Farzad Jang PTA GP 1            PT G-Codes  Outcome Measure Options: AM-PAC 6 Clicks Basic Mobility (PT)  AM-PAC 6 Clicks Score (PT): 18  AM-PAC 6 Clicks Score (OT): 19    Farzad Jang PTA  12/15/2023

## 2023-12-16 LAB
ALBUMIN SERPL-MCNC: 2.3 G/DL (ref 3.5–5.2)
ALBUMIN/GLOB SERPL: 0.8 G/DL
ALP SERPL-CCNC: 82 U/L (ref 39–117)
ALT SERPL W P-5'-P-CCNC: <5 U/L (ref 1–33)
ANION GAP SERPL CALCULATED.3IONS-SCNC: 11 MMOL/L (ref 5–15)
AST SERPL-CCNC: 11 U/L (ref 1–32)
BASOPHILS # BLD AUTO: 0.05 10*3/MM3 (ref 0–0.2)
BASOPHILS NFR BLD AUTO: 0.3 % (ref 0–1.5)
BILIRUB SERPL-MCNC: 0.2 MG/DL (ref 0–1.2)
BUN SERPL-MCNC: 45 MG/DL (ref 8–23)
BUN/CREAT SERPL: 10 (ref 7–25)
CALCIUM SPEC-SCNC: 7.2 MG/DL (ref 8.6–10.5)
CHLORIDE SERPL-SCNC: 106 MMOL/L (ref 98–107)
CO2 SERPL-SCNC: 22 MMOL/L (ref 22–29)
CREAT SERPL-MCNC: 4.51 MG/DL (ref 0.57–1)
DEPRECATED RDW RBC AUTO: 49.9 FL (ref 37–54)
EGFRCR SERPLBLD CKD-EPI 2021: 10.2 ML/MIN/1.73
EOSINOPHIL # BLD AUTO: 0.17 10*3/MM3 (ref 0–0.4)
EOSINOPHIL NFR BLD AUTO: 1.1 % (ref 0.3–6.2)
ERYTHROCYTE [DISTWIDTH] IN BLOOD BY AUTOMATED COUNT: 15 % (ref 12.3–15.4)
GLOBULIN UR ELPH-MCNC: 3 GM/DL
GLUCOSE BLDC GLUCOMTR-MCNC: 135 MG/DL (ref 70–99)
GLUCOSE BLDC GLUCOMTR-MCNC: 201 MG/DL (ref 70–99)
GLUCOSE BLDC GLUCOMTR-MCNC: 209 MG/DL (ref 70–99)
GLUCOSE BLDC GLUCOMTR-MCNC: 219 MG/DL (ref 70–99)
GLUCOSE SERPL-MCNC: 105 MG/DL (ref 65–99)
HCT VFR BLD AUTO: 23.5 % (ref 34–46.6)
HGB BLD-MCNC: 7.2 G/DL (ref 12–15.9)
IMM GRANULOCYTES # BLD AUTO: 0.14 10*3/MM3 (ref 0–0.05)
IMM GRANULOCYTES NFR BLD AUTO: 0.9 % (ref 0–0.5)
LYMPHOCYTES # BLD AUTO: 1.57 10*3/MM3 (ref 0.7–3.1)
LYMPHOCYTES NFR BLD AUTO: 9.8 % (ref 19.6–45.3)
MAGNESIUM SERPL-MCNC: 1.5 MG/DL (ref 1.6–2.4)
MCH RBC QN AUTO: 28.6 PG (ref 26.6–33)
MCHC RBC AUTO-ENTMCNC: 30.6 G/DL (ref 31.5–35.7)
MCV RBC AUTO: 93.3 FL (ref 79–97)
MONOCYTES # BLD AUTO: 0.75 10*3/MM3 (ref 0.1–0.9)
MONOCYTES NFR BLD AUTO: 4.7 % (ref 5–12)
NEUTROPHILS NFR BLD AUTO: 13.26 10*3/MM3 (ref 1.7–7)
NEUTROPHILS NFR BLD AUTO: 83.2 % (ref 42.7–76)
NRBC BLD AUTO-RTO: 0 /100 WBC (ref 0–0.2)
PHOSPHATE SERPL-MCNC: 5.1 MG/DL (ref 2.5–4.5)
PLATELET # BLD AUTO: 164 10*3/MM3 (ref 140–450)
PMV BLD AUTO: 10.8 FL (ref 6–12)
POTASSIUM SERPL-SCNC: 4.7 MMOL/L (ref 3.5–5.2)
PROT SERPL-MCNC: 5.3 G/DL (ref 6–8.5)
RBC # BLD AUTO: 2.52 10*6/MM3 (ref 3.77–5.28)
SODIUM SERPL-SCNC: 139 MMOL/L (ref 136–145)
WBC NRBC COR # BLD AUTO: 15.94 10*3/MM3 (ref 3.4–10.8)

## 2023-12-16 PROCEDURE — 83735 ASSAY OF MAGNESIUM: CPT | Performed by: UROLOGY

## 2023-12-16 PROCEDURE — 63710000001 INSULIN LISPRO (HUMAN) PER 5 UNITS: Performed by: UROLOGY

## 2023-12-16 PROCEDURE — 84100 ASSAY OF PHOSPHORUS: CPT | Performed by: UROLOGY

## 2023-12-16 PROCEDURE — 85025 COMPLETE CBC W/AUTO DIFF WBC: CPT | Performed by: NURSE PRACTITIONER

## 2023-12-16 PROCEDURE — 99232 SBSQ HOSP IP/OBS MODERATE 35: CPT | Performed by: INTERNAL MEDICINE

## 2023-12-16 PROCEDURE — 80053 COMPREHEN METABOLIC PANEL: CPT | Performed by: UROLOGY

## 2023-12-16 PROCEDURE — 82948 REAGENT STRIP/BLOOD GLUCOSE: CPT

## 2023-12-16 RX ADMIN — Medication 250 MG: at 20:37

## 2023-12-16 RX ADMIN — INSULIN LISPRO 3 UNITS: 100 INJECTION, SOLUTION INTRAVENOUS; SUBCUTANEOUS at 12:06

## 2023-12-16 RX ADMIN — INSULIN LISPRO 3 UNITS: 100 INJECTION, SOLUTION INTRAVENOUS; SUBCUTANEOUS at 20:36

## 2023-12-16 RX ADMIN — CARVEDILOL 25 MG: 25 TABLET, FILM COATED ORAL at 20:37

## 2023-12-16 RX ADMIN — CARVEDILOL 25 MG: 25 TABLET, FILM COATED ORAL at 08:32

## 2023-12-16 RX ADMIN — INSULIN LISPRO 3 UNITS: 100 INJECTION, SOLUTION INTRAVENOUS; SUBCUTANEOUS at 17:38

## 2023-12-16 RX ADMIN — CEPHALEXIN 500 MG: 500 CAPSULE ORAL at 08:32

## 2023-12-16 RX ADMIN — Medication 250 MG: at 08:32

## 2023-12-16 RX ADMIN — Medication 10 ML: at 20:36

## 2023-12-16 RX ADMIN — PANTOPRAZOLE SODIUM 40 MG: 40 TABLET, DELAYED RELEASE ORAL at 06:31

## 2023-12-16 RX ADMIN — Medication 10 ML: at 08:32

## 2023-12-16 NOTE — PLAN OF CARE
Goal Outcome Evaluation:         Patient pleasant through shift. VSS. Intermittent confusion. Up to bedside commode with one person assist. No complaints of pain or nausea. Blood sugar monitored. Bed in lowest locked position. Call light and table within reach. No concerns per patient at this time.   Lenin Bates RN

## 2023-12-16 NOTE — PROGRESS NOTES
[facility]     Progress Note    Patient Name: Lyubov Middleton  : 1955  MRN: [id number]  Primary Care Physician:  aCthleen Stroud APRN  Date of admission: 2023  2:21 PM       Subjective     Patient was seen at the bedside today.  There were no acute events overnight.  Creatinine is up trended to 4.5 today.        Objective   Objective     Vitals:   Vitals:    12/15/23 1900 12/15/23 2240 23 0424 23 0752   BP: 116/60 112/52 115/60 116/54   BP Location: Right arm Right arm Right arm Right arm   Patient Position: Lying Lying Lying Lying   Pulse: 72 74 66 73   Resp:    Temp: 98.3 °F (36.8 °C) 97.8 °F (36.6 °C) 97.8 °F (36.6 °C) 98.3 °F (36.8 °C)   TempSrc: Oral Oral Oral Oral   SpO2: 95% 95% 94% 97%   Weight:       Height:              Physical Exam:  Vitals and nursing note reviewed.     Constitutional:      Alert, cooperative, responsive, and conversant.  No acute distress.     HENT:      Normocephalic and atraumatic, no nasal congestion, discharge, mucous membranes are moist, no OP erythema  Eyes:      PERRLA, no scleral icterus, no conjunctival injection, no eye discharge  Neck:     Supple, no thyromegaly, no lymphadenopathy, trachea midline, no elevated JVD  Cardiovascular:      RRR, no murmurs, rubs or gallops. Palpable pedal pulses bilaterally. Trace BLE edema.   Pulmonary:      CTAB. Pulmonary effort is normal and unlabored. No respiratory distress.    Abdominal:      Bowel sounds active, soft, nontender, non distended, no organomegaly  Musculoskeletal:         No muscle wasting, tenderness or joint swelling.  Generalized weakness.  Skin:     Warm and dry, cap refill less than 3 seconds. No clubbing, cyanosis, jaundice, erythema, rashes or ecchymosis.   Neurological:      AAOx1, speech clear, no focal deficit, strength equal bilaterally in all extremities, cranial nerves grossly intact  Psychiatric:         Mood and affect normal.  Behavior normal.         Result  Review    Result Review:  I have personally reviewed the results from the time of this admission to 9/20/2021 18:13 EDT and agree with these findings:  [x]  Laboratory  []  Microbiology  []  Radiology  []  EKG/Telemetry   []  Cardiology/Vascular   []  Pathology  []  Old records  []  Other:     Assessment/Plan   Assessment / Plan       Active Hospital Problems:  Active Hospital Problems    Diagnosis     **Abdominal pain     Chronic anemia     Hydronephrosis     Pyelonephritis     History of Clostridioides difficile infection     Stage 3a chronic kidney disease     Type 2 diabetes mellitus     Essential hypertension          68-year female with past medical history of CKD stage IV with baseline creatinine of 1.8-2 who was noted to have progressively worsening kidney function over the last 6 months, longstanding diabetes with diabetic nephropathy hypothyroidism, hypertension, osteoporosis, history of breast cancer, GERD, dementia who was sent from the nursing home to the ER due to concerns for bloody bowel movements.  Hemoglobin noted to be at 7 with normal iron profile.  Creatinine has been uptrending over the last 6 months and currently at 3.24 with bicarb of 14 and BUN of 29 with low calcium of 7.3.  CT scan concerning for hydronephrosis and lymphadenopathy with possible underlying malignancy.  Urine analysis concerning for UTI and does have large amount of proteinuria.  Proteinuria noted to be 4 g of UPCR and 2 g of UACR.  Suspect with her degree of renal dysfunction that she has progressive renal disease secondary to diabetic nephropathy.       Plan  Creatinine is up trended to 4.5 today.  This can be progression of CKD versus AUSTEN on CKD  I will recheck urinalysis.   Bladder scan to assess for any retention  Patient has good urine output at this point  She is at high risk of needing dialysis if renal function continues to worsen however, patient has clearly expressed that she would not need dialysis at any point  no matter what the indication is  Continue EPO 20 K units  Antibiotics for UTI  Hold amlodipine for now due to soft blood pressure.   S/p maintenance LR 75 ml per hour

## 2023-12-16 NOTE — PROGRESS NOTES
[facility]   Hospitalist Progress Note  Date: 2023  Patient Name: Lyubov Middleton  : 1955  MRN: [id number]  Date of admission: 2023  Room/Bed: [facility]/      Subjective   Subjective     Chief Complaint: Abdominal pain, hematochezia    Summary:Lyubov Middleton is a 68 y.o. female with past medical history of hypertension, hyperlipidemia, diabetes not on medication, breast cancer, arthritis, dementia, and GERD who presented from nursing home with complaints of abdominal pain and bloody bowel movements.  She was admitted for further evaluation.  CT abdomen and pelvis without contrast showed malrotation of right kidney with severe right hydronephrosis and right perinephric fat stranding/edema which could be obstruction or pyelonephritis; multiple renal calculi.  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  nephrology and neurology were consulted and following the patient.  GI also following the patient.    Interval Followup:   Patient is s/p  right ureteral stent in place.  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   Cr continues to remain abnormal and increasing   Restarted on oral antibiotics given patient had significant purulence with her procedure  Patient is still adamant that she does not want dialysis.  Patient is a long-term nursing home resident.  Will speak with palliative care on Monday regarding her going back to the nursing home with comfort care as she does not want anything further done      Review of Systems    All systems reviewed and negative except for what is outlined above.      Objective   Objective     Vitals:   Temp:  [97.8 °F (36.6 °C)-98.3 °F (36.8 °C)] 97.8 °F (36.6 °C)  Heart Rate:  [66-74] 73  Resp:  [14-18] 18  BP: (109-116)/(52-60)  111/56    Physical Exam   General: Awake, alert, NAD resting in bed with son at the bedside   Cardiovascular: RRR, no murmurs   Pulmonary: CTA bilaterally; no wheezes; no conversational dyspnea  Gastrointestinal: S/ND/NT, +BS  Neuro: Alert, awake, oriented x 3; speech clear; no tremor  : No Fernandez catheter; no suprapubic tenderness    Result Review    Result Review:  I have personally reviewed these results:  [x]  Laboratory      Lab 12/16/23  0404 12/15/23  0428 12/14/23  0421 12/10/23  1129   WBC 15.94*  --  7.35 11.76*   HEMOGLOBIN 7.2*  --  8.0* 8.3*   HEMATOCRIT 23.5*  --  25.3* 26.4*   PLATELETS 164  --  191 227   NEUTROS ABS 13.26*  --  5.14 9.33*   IMMATURE GRANS (ABS) 0.14*  --  0.02 0.03   LYMPHS ABS 1.57  --  1.57 1.80   MONOS ABS 0.75  --  0.41 0.41   EOS ABS 0.17  --  0.16 0.15   MCV 93.3  --  91.7 90.1   LACTATE, ARTERIAL  --  1.13  --   --    PROTIME  --   --  14.9  --          Lab 12/16/23  0404 12/15/23  0503 12/15/23  0428 12/14/23  0421   SODIUM 139 145  --  143   SODIUM, ARTERIAL  --   --  141.5  --    POTASSIUM 4.7 4.4  --  3.7   CHLORIDE 106 111*  --  108*   CO2 22.0 21.6*  --  24.0   ANION GAP 11.0 12.4  --  11.0   BUN 45* 31*  --  26*   CREATININE 4.51* 3.92*  --  3.41*   EGFR 10.2* 12.0*  --  14.2*   GLUCOSE 105* 114*  --  80   GLUCOSE, ARTERIAL  --   --  106*  --    CALCIUM 7.2* 7.3*  --  7.2*   IONIZED CALCIUM  --   --  0.98*  --    MAGNESIUM 1.5* 1.5*  --  1.6   PHOSPHORUS 5.1* 5.5*  --  4.0         Lab 12/16/23  0404 12/15/23  0503 12/14/23  0421   TOTAL PROTEIN 5.3* 5.2* 5.4*   ALBUMIN 2.3* 2.3* 2.6*   GLOBULIN 3.0 2.9 2.8   ALT (SGPT) <5 <5 <5   AST (SGOT) 11 14 10   BILIRUBIN 0.2 0.2 <0.2   ALK PHOS 82 76 70         Lab 12/14/23  0421   PROTIME 14.9   INR 1.14                   Lab 12/15/23  0428   PH, ARTERIAL 7.440   PCO2, ARTERIAL 33.6*   PO2 ART 79.3*   O2 SATURATION ART 94.0*   FIO2 21   HCO3 ART 22.3   BASE EXCESS ART -1.0   CARBOXYHEMOGLOBIN 0.3     Brief Urine Lab Results   (Last result in the past 365 days)        Color   Clarity   Blood   Leuk Est   Nitrite   Protein   CREAT   Urine HCG        12/08/23 1902             51.1         12/08/23 1902             54.2         12/08/23 1902 Yellow   Cloudy   Small (1+)   Moderate (2+)   Negative   >=300 mg/dL (3+)                 [x]  Microbiology   Microbiology Results (last 10 days)       Procedure Component Value - Date/Time    Clostridioides difficile Toxin - Stool, Per Rectum [[id number]] Collected: 12/09/23 0953    Lab Status: Final result Specimen: Stool from Per Rectum Updated: 12/09/23 1329    Narrative:      The following orders were created for panel order Clostridioides difficile Toxin - Stool, Per Rectum.  Procedure                               Abnormality         Status                     ---------                               -----------         ------                     Clostridioides difficile...[[id number]]                      Final result                 Please view results for these tests on the individual orders.    Clostridioides difficile Toxin, PCR - Stool, Per Rectum [[id number]] Collected: 12/09/23 0953    Lab Status: Final result Specimen: Stool from Per Rectum Updated: 12/09/23 1329     Toxigenic C. difficile by PCR Negative     027 Toxin Presumptive Negative    Narrative:      The result indicates the absence of toxigenic C. difficile from stool specimen.     Enteric Bacterial Panel - Stool, Per Rectum [[id number]]  (Normal) Collected: 12/09/23 0953    Lab Status: Final result Specimen: Stool from Per Rectum Updated: 12/10/23 1633     Salmonella Not Detected     Campylobacter Not Detected     Shigella/Enteroinvasive E. coli (EIEC) Not Detected     Shiga-like toxin-producing E. coli (STEC) stx1/stx2 Not Detected    Blood Culture - Blood, Arm, Right [[id number]]  (Normal) Collected: 12/08/23 2030    Lab Status: Final result Specimen: Blood from Arm, Right Updated: 12/13/23 2100     Blood Culture No growth at 5  days    Blood Culture - Blood, Arm, Right [[id number]]  (Normal) Collected: 12/08/23 2027    Lab Status: Final result Specimen: Blood from Arm, Right Updated: 12/13/23 2045     Blood Culture No growth at 5 days    Urine Culture - Urine, Urine, Clean Catch [[id number]]  (Abnormal)  (Susceptibility) Collected: 12/08/23 1902    Lab Status: Final result Specimen: Urine, Clean Catch Updated: 12/10/23 1139     Urine Culture >100,000 CFU/mL Escherichia coli    Narrative:      Colonization of the urinary tract without infection is common. Treatment is discouraged unless the patient is symptomatic, pregnant, or undergoing an invasive urologic procedure.    Susceptibility        Escherichia coli      MARKO      Ampicillin Resistant      Ampicillin + Sulbactam Resistant      Cefazolin Susceptible      Cefepime Susceptible      Ceftazidime Susceptible      Ceftriaxone Susceptible      Gentamicin Susceptible      Levofloxacin Susceptible      Nitrofurantoin Susceptible      Piperacillin + Tazobactam Susceptible      Trimethoprim + Sulfamethoxazole Susceptible                                 [x]  Radiology  CT Abdomen Pelvis Without Contrast    Result Date: 12/8/2023    1. Malrotation of the right kidney with severe right hydronephrosis and right perinephric fat stranding/edema which could be due to obstruction or pyelonephritis. 2. Multiple renal calculi, but no distal ureteral calculus or dilatation is seen.  There is suggestion of irregular subtle soft tissue attenuation at the right renal pelvis and there is right retroperitoneal lymphadenopathy.  These findings are concerning for obstructing urothelial malignancy and dameon metastatic disease.  Recommend urology consultation and further evaluation with multiphase contrast enhanced CT. 3. There appears to have been right colectomy with anastomosis in the right upper abdomen.  There is an above average amount of stool in the colon and rectum.  There are some short segments of  nondistended transverse and sigmoid colon with wall thickening but no pericolonic changes to suggest acute inflammation.  These could be due to peristalsis, but a short-segment constricting lesion cannot be excluded on this exam.  Recommend colonoscopy follow-up given patient's symptoms. 4. Broad-based right abdominal hernia containing bowel loops without evidence of bowel obstruction.     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   UTI due to E. coli, present on admission  Hydronephrosis  Metabolic acidosis  Hypocalcemia  Proteinuria  Hematochezia  Constipation  Anemia  AUSTEN on CKD    Plan:  Patient currently being managed in hospitalist service.  Patient has been restarted on antibiotics given her purulent drainage on her urology procedure.  Will complete another 10 days total  Urology following and patient is s/p right UPJ stricture with purulent drainage, s/p stent placement   Creatinine continues to worsen and patient continues to state that she does not want dialysis  Nephrology followed the patient, appreciate further input.  Continue put 20 K units.  Bicarb tablet discontinued by nephrology   Continue amlodipine and carvedilol for hypertension.  GI was consulted this admission; given patient had recent colonoscopy with stool transplant for recurrent C. difficile several months ago with overall improvement of her symptoms; decided to follow peripherally with no any further plan of GI intervention this admission.  Appreciate further recommendation by nephrology and urology.  Continue rest of the current management.      DVT prophylaxis:  Mechanical DVT  prophylaxis orders are present.    CODE STATUS:   Medical Intervention Limits: NO intubation (DNI); NO cardioversion  Level Of Support Discussed With: Patient  Code Status (Patient has no pulse and is not breathing): No CPR (Do Not Attempt to Resuscitate)  Medical Interventions (Patient has pulse or is breathing): Limited Support

## 2023-12-17 LAB
ALBUMIN SERPL-MCNC: 2.3 G/DL (ref 3.5–5.2)
ALBUMIN/GLOB SERPL: 0.7 G/DL
ALP SERPL-CCNC: 79 U/L (ref 39–117)
ALT SERPL W P-5'-P-CCNC: <5 U/L (ref 1–33)
ANION GAP SERPL CALCULATED.3IONS-SCNC: 10.2 MMOL/L (ref 5–15)
AST SERPL-CCNC: 10 U/L (ref 1–32)
BILIRUB SERPL-MCNC: 0.2 MG/DL (ref 0–1.2)
BUN SERPL-MCNC: 47 MG/DL (ref 8–23)
BUN/CREAT SERPL: 10.2 (ref 7–25)
CALCIUM SPEC-SCNC: 7.3 MG/DL (ref 8.6–10.5)
CHLORIDE SERPL-SCNC: 110 MMOL/L (ref 98–107)
CO2 SERPL-SCNC: 20.8 MMOL/L (ref 22–29)
CREAT SERPL-MCNC: 4.62 MG/DL (ref 0.57–1)
EGFRCR SERPLBLD CKD-EPI 2021: 9.9 ML/MIN/1.73
GLOBULIN UR ELPH-MCNC: 3.3 GM/DL
GLUCOSE BLDC GLUCOMTR-MCNC: 112 MG/DL (ref 70–99)
GLUCOSE BLDC GLUCOMTR-MCNC: 141 MG/DL (ref 70–99)
GLUCOSE BLDC GLUCOMTR-MCNC: 144 MG/DL (ref 70–99)
GLUCOSE BLDC GLUCOMTR-MCNC: 86 MG/DL (ref 70–99)
GLUCOSE SERPL-MCNC: 105 MG/DL (ref 65–99)
MAGNESIUM SERPL-MCNC: 1.4 MG/DL (ref 1.6–2.4)
PHOSPHATE SERPL-MCNC: 2.4 MG/DL (ref 2.5–4.5)
POTASSIUM SERPL-SCNC: 5.1 MMOL/L (ref 3.5–5.2)
PROT SERPL-MCNC: 5.6 G/DL (ref 6–8.5)
SODIUM SERPL-SCNC: 141 MMOL/L (ref 136–145)

## 2023-12-17 PROCEDURE — 99232 SBSQ HOSP IP/OBS MODERATE 35: CPT | Performed by: INTERNAL MEDICINE

## 2023-12-17 PROCEDURE — 84100 ASSAY OF PHOSPHORUS: CPT | Performed by: UROLOGY

## 2023-12-17 PROCEDURE — 83735 ASSAY OF MAGNESIUM: CPT | Performed by: UROLOGY

## 2023-12-17 PROCEDURE — 80053 COMPREHEN METABOLIC PANEL: CPT | Performed by: UROLOGY

## 2023-12-17 PROCEDURE — 82948 REAGENT STRIP/BLOOD GLUCOSE: CPT

## 2023-12-17 RX ADMIN — LOPERAMIDE HYDROCHLORIDE 2 MG: 2 CAPSULE ORAL at 16:54

## 2023-12-17 RX ADMIN — Medication 10 ML: at 20:04

## 2023-12-17 RX ADMIN — Medication 10 ML: at 09:32

## 2023-12-17 RX ADMIN — CEPHALEXIN 500 MG: 500 CAPSULE ORAL at 09:32

## 2023-12-17 RX ADMIN — CARVEDILOL 25 MG: 25 TABLET, FILM COATED ORAL at 09:32

## 2023-12-17 RX ADMIN — Medication 250 MG: at 20:04

## 2023-12-17 RX ADMIN — Medication 250 MG: at 09:32

## 2023-12-17 RX ADMIN — PANTOPRAZOLE SODIUM 40 MG: 40 TABLET, DELAYED RELEASE ORAL at 05:56

## 2023-12-17 NOTE — PROGRESS NOTES
[facility]     Progress Note    Patient Name: Lyubov Middleton  : 1955  MRN: [id number]  Primary Care Physician:  Cathleen Stroud APRN  Date of admission: 2023  2:21 PM       Subjective     Patient was seen at the bedside today.  There were no acute events overnight.  Creatinine is plateauing around 4.6        Objective   Objective     Vitals:   Vitals:    23 1853 23 2248 23 0337 23 0720   BP: 128/55 125/57 141/68 135/62   BP Location: Left arm Right arm Right arm Right arm   Patient Position: Lying Lying Lying Lying   Pulse: 83 76 79    Resp: 17 15 15 16   Temp: 97.7 °F (36.5 °C) 98.4 °F (36.9 °C) 98.4 °F (36.9 °C) 98.5 °F (36.9 °C)   TempSrc: Oral Oral Oral Oral   SpO2: 100% 96% 97% 97%   Weight:       Height:              Physical Exam:  Vitals and nursing note reviewed.     Constitutional:      Alert, cooperative, responsive, and conversant.  No acute distress.     HENT:      Normocephalic and atraumatic, no nasal congestion, discharge, mucous membranes are moist, no OP erythema  Eyes:      PERRLA, no scleral icterus, no conjunctival injection, no eye discharge  Neck:     Supple, no thyromegaly, no lymphadenopathy, trachea midline, no elevated JVD  Cardiovascular:      RRR, no murmurs, rubs or gallops. Palpable pedal pulses bilaterally. Trace BLE edema.   Pulmonary:      CTAB. Pulmonary effort is normal and unlabored. No respiratory distress.    Abdominal:      Bowel sounds active, soft, nontender, non distended, no organomegaly  Musculoskeletal:         No muscle wasting, tenderness or joint swelling.  Generalized weakness.  Skin:     Warm and dry, cap refill less than 3 seconds. No clubbing, cyanosis, jaundice, erythema, rashes or ecchymosis.   Neurological:      AAOx1, speech clear, no focal deficit, strength equal bilaterally in all extremities, cranial nerves grossly intact  Psychiatric:         Mood and affect normal.  Behavior normal.         Result Review     Result Review:  I have personally reviewed the results from the time of this admission to 9/20/2021 18:13 EDT and agree with these findings:  [x]  Laboratory  []  Microbiology  []  Radiology  []  EKG/Telemetry   []  Cardiology/Vascular   []  Pathology  []  Old records  []  Other:     Assessment/Plan   Assessment / Plan       Active Hospital Problems:  Active Hospital Problems    Diagnosis     **Abdominal pain     Chronic anemia     Hydronephrosis     Pyelonephritis     History of Clostridioides difficile infection     Stage 3a chronic kidney disease     Type 2 diabetes mellitus     Essential hypertension          68-year female with past medical history of CKD stage IV with baseline creatinine of 1.8-2 who was noted to have progressively worsening kidney function over the last 6 months, longstanding diabetes with diabetic nephropathy hypothyroidism, hypertension, osteoporosis, history of breast cancer, GERD, dementia who was sent from the nursing home to the ER due to concerns for bloody bowel movements.  Hemoglobin noted to be at 7 with normal iron profile.  Creatinine has been uptrending over the last 6 months and currently at 3.24 with bicarb of 14 and BUN of 29 with low calcium of 7.3.  CT scan concerning for hydronephrosis and lymphadenopathy with possible underlying malignancy.  Urine analysis concerning for UTI and does have large amount of proteinuria.  Proteinuria noted to be 4 g of UPCR and 2 g of UACR.  Suspect with her degree of renal dysfunction that she has progressive renal disease secondary to diabetic nephropathy.       Plan  Creatinine is plateauing around 4.6 today.  This can be progression of CKD versus AUSTEN on CKD  Repeat urinalysis suggestive of UTI  Patient does not have any significant PVR  She has good urine output at this point  She is at high risk of needing dialysis if renal function continues to worsen however, patient has clearly expressed that she would not need dialysis at any point  no matter what the indication is  Continue EPO 20 K units  Antibiotics for UTI  Blood pressures are better today.  Amlodipine can be resumed potentially tomorrow  S/p maintenance LR 75 ml per hour

## 2023-12-17 NOTE — PROGRESS NOTES
[facility]   Hospitalist Progress Note  Date: 2023  Patient Name: Lyubov Middleton  : 1955  MRN: [id number]  Date of admission: 2023  Room/Bed: [facility]/      Subjective   Subjective     Chief Complaint: Abdominal pain, hematochezia    Summary:Lyubov Middleton is a 68 y.o. female with past medical history of hypertension, hyperlipidemia, diabetes not on medication, breast cancer, arthritis, dementia, and GERD who presented from nursing home with complaints of abdominal pain and bloody bowel movements.  She was admitted for further evaluation.  CT abdomen and pelvis without contrast showed malrotation of right kidney with severe right hydronephrosis and right perinephric fat stranding/edema which could be obstruction or pyelonephritis; multiple renal calculi.  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  nephrology and neurology were consulted and following the patient.  GI also following the patient.    Interval Followup:   Patient is s/p  right ureteral stent in place.  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   Cr continues to remain abnormal and increasing   Patient is still adamant that she does not want dialysis.  Patient is a long-term nursing home resident.  Will speak with palliative care on Monday regarding her going back to the nursing home with comfort care as she does not want anything further done      Review of Systems    All systems reviewed and negative except for what is outlined above.      Objective   Objective     Vitals:   Temp:  [97.7 °F (36.5 °C)-98.5 °F (36.9 °C)] 98.5 °F (36.9 °C)  Heart Rate:  [76-83] 79  Resp:  [15-17] 16  BP: (125-141)/(55-68) 135/62    Physical Exam   General: Awake, alert, NAD resting in bed with son at the bedside    Cardiovascular: RRR, no murmurs   Pulmonary: CTA bilaterally; no wheezes; no conversational dyspnea  Gastrointestinal: S/ND/NT, +BS  Neuro: Alert, awake, oriented x 3; speech clear; no tremor  : No Fernandez catheter; no suprapubic tenderness    Result Review    Result Review:  I have personally reviewed these results:  [x]  Laboratory      Lab 12/16/23 0404 12/15/23  0428 12/14/23  0421   WBC 15.94*  --  7.35   HEMOGLOBIN 7.2*  --  8.0*   HEMATOCRIT 23.5*  --  25.3*   PLATELETS 164  --  191   NEUTROS ABS 13.26*  --  5.14   IMMATURE GRANS (ABS) 0.14*  --  0.02   LYMPHS ABS 1.57  --  1.57   MONOS ABS 0.75  --  0.41   EOS ABS 0.17  --  0.16   MCV 93.3  --  91.7   LACTATE, ARTERIAL  --  1.13  --    PROTIME  --   --  14.9         Lab 12/17/23 0546 12/16/23  0404 12/15/23  0503 12/15/23  0428   SODIUM 141 139 145  --    SODIUM, ARTERIAL  --   --   --  141.5   POTASSIUM 5.1 4.7 4.4  --    CHLORIDE 110* 106 111*  --    CO2 20.8* 22.0 21.6*  --    ANION GAP 10.2 11.0 12.4  --    BUN 47* 45* 31*  --    CREATININE 4.62* 4.51* 3.92*  --    EGFR 9.9* 10.2* 12.0*  --    GLUCOSE 105* 105* 114*  --    GLUCOSE, ARTERIAL  --   --   --  106*   CALCIUM 7.3* 7.2* 7.3*  --    IONIZED CALCIUM  --   --   --  0.98*   MAGNESIUM 1.4* 1.5* 1.5*  --    PHOSPHORUS 2.4* 5.1* 5.5*  --          Lab 12/17/23  0546 12/16/23  0404 12/15/23  0503   TOTAL PROTEIN 5.6* 5.3* 5.2*   ALBUMIN 2.3* 2.3* 2.3*   GLOBULIN 3.3 3.0 2.9   ALT (SGPT) <5 <5 <5   AST (SGOT) 10 11 14   BILIRUBIN 0.2 0.2 0.2   ALK PHOS 79 82 76         Lab 12/14/23  0421   PROTIME 14.9   INR 1.14                   Lab 12/15/23  0428   PH, ARTERIAL 7.440   PCO2, ARTERIAL 33.6*   PO2 ART 79.3*   O2 SATURATION ART 94.0*   FIO2 21   HCO3 ART 22.3   BASE EXCESS ART -1.0   CARBOXYHEMOGLOBIN 0.3     Brief Urine Lab Results  (Last result in the past 365 days)        Color   Clarity   Blood   Leuk Est   Nitrite   Protein   CREAT   Urine HCG        12/08/23 1902             51.1          12/08/23 1902             54.2         12/08/23 1902 Yellow   Cloudy   Small (1+)   Moderate (2+)   Negative   >=300 mg/dL (3+)                 [x]  Microbiology   Microbiology Results (last 10 days)       Procedure Component Value - Date/Time    Clostridioides difficile Toxin - Stool, Per Rectum [[id number]] Collected: 12/09/23 0953    Lab Status: Final result Specimen: Stool from Per Rectum Updated: 12/09/23 1329    Narrative:      The following orders were created for panel order Clostridioides difficile Toxin - Stool, Per Rectum.  Procedure                               Abnormality         Status                     ---------                               -----------         ------                     Clostridioides difficile...[[id number]]                      Final result                 Please view results for these tests on the individual orders.    Clostridioides difficile Toxin, PCR - Stool, Per Rectum [[id number]] Collected: 12/09/23 0953    Lab Status: Final result Specimen: Stool from Per Rectum Updated: 12/09/23 1329     Toxigenic C. difficile by PCR Negative     027 Toxin Presumptive Negative    Narrative:      The result indicates the absence of toxigenic C. difficile from stool specimen.     Enteric Bacterial Panel - Stool, Per Rectum [[id number]]  (Normal) Collected: 12/09/23 0953    Lab Status: Final result Specimen: Stool from Per Rectum Updated: 12/10/23 1633     Salmonella Not Detected     Campylobacter Not Detected     Shigella/Enteroinvasive E. coli (EIEC) Not Detected     Shiga-like toxin-producing E. coli (STEC) stx1/stx2 Not Detected    Blood Culture - Blood, Arm, Right [[id number]]  (Normal) Collected: 12/08/23 2030    Lab Status: Final result Specimen: Blood from Arm, Right Updated: 12/13/23 2100     Blood Culture No growth at 5 days    Blood Culture - Blood, Arm, Right [[id number]]  (Normal) Collected: 12/08/23 2027    Lab Status: Final result Specimen: Blood from Arm, Right Updated: 12/13/23  2045     Blood Culture No growth at 5 days    Urine Culture - Urine, Urine, Clean Catch [[id number]]  (Abnormal)  (Susceptibility) Collected: 12/08/23 1902    Lab Status: Final result Specimen: Urine, Clean Catch Updated: 12/10/23 1139     Urine Culture >100,000 CFU/mL Escherichia coli    Narrative:      Colonization of the urinary tract without infection is common. Treatment is discouraged unless the patient is symptomatic, pregnant, or undergoing an invasive urologic procedure.    Susceptibility        Escherichia coli      MARKO      Ampicillin Resistant      Ampicillin + Sulbactam Resistant      Cefazolin Susceptible      Cefepime Susceptible      Ceftazidime Susceptible      Ceftriaxone Susceptible      Gentamicin Susceptible      Levofloxacin Susceptible      Nitrofurantoin Susceptible      Piperacillin + Tazobactam Susceptible      Trimethoprim + Sulfamethoxazole Susceptible                                 [x]  Radiology  No radiology results for the last 7 days  []  EKG/Telemetry   []  Cardiology/Vascular   []  Pathology  []  Old records  []  Other:    Assessment & Plan   Assessment / Plan     Assessment:  Concern for renal malignancy  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   UTI due to E. coli, present on admission  Hydronephrosis  Metabolic acidosis  Hypocalcemia  Proteinuria  Hematochezia  Constipation  Anemia  AUSTEN on CKD    Plan:  Patient currently being managed in hospitalist service.  Patient has been restarted on antibiotics given her purulent drainage on her urology procedure. Continue 10 days total   Urology following and patient is s/p right UPJ stricture with purulent drainage, s/p stent placement   Creatinine continues to worsen and patient continues to state that she does not want dialysis  Nephrology followed the patient, appreciate further input.  Continue put 20 K units.  Bicarb tablet discontinued by nephrology   Continue  amlodipine and carvedilol for hypertension.  GI was consulted this admission; given patient had recent colonoscopy with stool transplant for recurrent C. difficile several months ago with overall improvement of her symptoms; decided to follow peripherally with no any further plan of GI intervention this admission.  Appreciate further recommendation by nephrology and urology.  Continue rest of the current management.      DVT prophylaxis:  Mechanical DVT prophylaxis orders are present.    CODE STATUS:   Medical Intervention Limits: NO intubation (DNI); NO cardioversion  Level Of Support Discussed With: Patient  Code Status (Patient has no pulse and is not breathing): No CPR (Do Not Attempt to Resuscitate)  Medical Interventions (Patient has pulse or is breathing): Limited Support

## 2023-12-17 NOTE — PLAN OF CARE
Goal Outcome Evaluation:  Plan of Care Reviewed With: patient        Progress: improving  Outcome Evaluation: Pt A&O x 3, some intermittent confusion. Pt pleasant and cooperative with care. VSS. No c/o pain. Pt is incontinent of bladder and bowel. Has had loose stools, administered imodium.

## 2023-12-18 PROBLEM — Z86.2 HISTORY OF ANEMIA DUE TO CHRONIC KIDNEY DISEASE: Status: ACTIVE | Noted: 2023-12-18

## 2023-12-18 PROBLEM — N18.5 CHRONIC KIDNEY DISEASE, STAGE V: Status: ACTIVE | Noted: 2023-12-18

## 2023-12-18 PROBLEM — N18.9 HISTORY OF ANEMIA DUE TO CHRONIC KIDNEY DISEASE: Status: ACTIVE | Noted: 2023-12-18

## 2023-12-18 LAB
ALBUMIN SERPL-MCNC: 2.5 G/DL (ref 3.5–5.2)
ALBUMIN/GLOB SERPL: 0.8 G/DL
ALP SERPL-CCNC: 80 U/L (ref 39–117)
ALT SERPL W P-5'-P-CCNC: <5 U/L (ref 1–33)
ANION GAP SERPL CALCULATED.3IONS-SCNC: 10.4 MMOL/L (ref 5–15)
AST SERPL-CCNC: 12 U/L (ref 1–32)
BILIRUB SERPL-MCNC: 0.3 MG/DL (ref 0–1.2)
BUN SERPL-MCNC: 45 MG/DL (ref 8–23)
BUN/CREAT SERPL: 10.5 (ref 7–25)
CALCIUM SPEC-SCNC: 7.4 MG/DL (ref 8.6–10.5)
CHLORIDE SERPL-SCNC: 109 MMOL/L (ref 98–107)
CO2 SERPL-SCNC: 20.6 MMOL/L (ref 22–29)
CREAT SERPL-MCNC: 4.3 MG/DL (ref 0.57–1)
EGFRCR SERPLBLD CKD-EPI 2021: 10.7 ML/MIN/1.73
GLOBULIN UR ELPH-MCNC: 3.3 GM/DL
GLUCOSE BLDC GLUCOMTR-MCNC: 106 MG/DL (ref 70–99)
GLUCOSE BLDC GLUCOMTR-MCNC: 120 MG/DL (ref 70–99)
GLUCOSE BLDC GLUCOMTR-MCNC: 69 MG/DL (ref 70–99)
GLUCOSE BLDC GLUCOMTR-MCNC: 80 MG/DL (ref 70–99)
GLUCOSE BLDC GLUCOMTR-MCNC: 85 MG/DL (ref 70–99)
GLUCOSE SERPL-MCNC: 74 MG/DL (ref 65–99)
MAGNESIUM SERPL-MCNC: 1.3 MG/DL (ref 1.6–2.4)
PHOSPHATE SERPL-MCNC: 2.3 MG/DL (ref 2.5–4.5)
POTASSIUM SERPL-SCNC: 5 MMOL/L (ref 3.5–5.2)
PROT SERPL-MCNC: 5.8 G/DL (ref 6–8.5)
SODIUM SERPL-SCNC: 140 MMOL/L (ref 136–145)
WHOLE BLOOD HOLD SPECIMEN: NORMAL

## 2023-12-18 PROCEDURE — 82948 REAGENT STRIP/BLOOD GLUCOSE: CPT

## 2023-12-18 PROCEDURE — 25010000002 EPOETIN ALFA PER 1000 UNITS: Performed by: INTERNAL MEDICINE

## 2023-12-18 PROCEDURE — 80053 COMPREHEN METABOLIC PANEL: CPT | Performed by: UROLOGY

## 2023-12-18 PROCEDURE — 99232 SBSQ HOSP IP/OBS MODERATE 35: CPT | Performed by: INTERNAL MEDICINE

## 2023-12-18 PROCEDURE — 97110 THERAPEUTIC EXERCISES: CPT

## 2023-12-18 PROCEDURE — 84100 ASSAY OF PHOSPHORUS: CPT | Performed by: UROLOGY

## 2023-12-18 PROCEDURE — 83735 ASSAY OF MAGNESIUM: CPT | Performed by: UROLOGY

## 2023-12-18 PROCEDURE — 97164 PT RE-EVAL EST PLAN CARE: CPT

## 2023-12-18 RX ORDER — CARVEDILOL 12.5 MG/1
12.5 TABLET ORAL 2 TIMES DAILY
Status: DISCONTINUED | OUTPATIENT
Start: 2023-12-18 | End: 2023-12-19

## 2023-12-18 RX ORDER — LISINOPRIL 10 MG/1
10 TABLET ORAL
Status: DISCONTINUED | OUTPATIENT
Start: 2023-12-18 | End: 2023-12-19

## 2023-12-18 RX ORDER — CYANOCOBALAMIN (VITAMIN B-12) 5000 MCG
5000 TABLET,DISINTEGRATING ORAL
Status: DISCONTINUED | OUTPATIENT
Start: 2023-12-18 | End: 2023-12-19

## 2023-12-18 RX ADMIN — ERYTHROPOIETIN 20000 UNITS: 20000 INJECTION, SOLUTION INTRAVENOUS; SUBCUTANEOUS at 08:42

## 2023-12-18 RX ADMIN — Medication 5000 MCG: at 11:15

## 2023-12-18 RX ADMIN — Medication 250 MG: at 20:16

## 2023-12-18 RX ADMIN — CEPHALEXIN 500 MG: 500 CAPSULE ORAL at 08:34

## 2023-12-18 RX ADMIN — LOPERAMIDE HYDROCHLORIDE 2 MG: 2 CAPSULE ORAL at 20:16

## 2023-12-18 RX ADMIN — Medication 250 MG: at 08:34

## 2023-12-18 RX ADMIN — CARVEDILOL 25 MG: 25 TABLET, FILM COATED ORAL at 08:34

## 2023-12-18 RX ADMIN — PANTOPRAZOLE SODIUM 40 MG: 40 TABLET, DELAYED RELEASE ORAL at 05:21

## 2023-12-18 RX ADMIN — CARVEDILOL 12.5 MG: 12.5 TABLET, FILM COATED ORAL at 20:16

## 2023-12-18 RX ADMIN — Medication 10 ML: at 08:42

## 2023-12-18 RX ADMIN — LISINOPRIL 10 MG: 10 TABLET ORAL at 11:15

## 2023-12-18 NOTE — CONSULTS
"Nutrition Services    Patient Name: Lyubov Middleton  YOB: 1955  MRN: [id number]  Admission date: 12/8/2023      CLINICAL NUTRITION ASSESSMENT      Reason for Assessment  LOS   H&P:  Past Medical History:   Diagnosis Date    Arthritis     Cancer     left breast removed 2012    Dementia     Type 2 diabetes mellitus         Current Problems:   Active Hospital Problems    Diagnosis     **Abdominal pain     Chronic kidney disease, stage V     History of anemia due to chronic kidney disease     Chronic anemia     Hydronephrosis     Pyelonephritis     History of Clostridioides difficile infection     Type 2 diabetes mellitus     Essential hypertension         Nutrition/Diet History         Narrative     Patient assessed by RD for LOS. Patient is at low risk per nutrition risk screening (NRS-2002).     No acute nutrition concerns or interventions at this time.      Anthropometrics        Current Height, Weight Height: 160 cm (62.99\")  Weight: 58.4 kg (128 lb 12 oz)   Current BMI Body mass index is 22.81 kg/m².   BMI Classification Normal range   % %   Adjusted Body Weight (ABW)    Weight Hx  Wt Readings from Last 30 Encounters:   12/08/23 2230 58.4 kg (128 lb 12 oz)   12/08/23 1424 58.4 kg (128 lb 12 oz)   11/02/23 2119 57.7 kg (127 lb 3.3 oz)   11/01/23 1617 58.1 kg (128 lb 1.4 oz)   08/25/23 0826 55.8 kg (123 lb)   07/19/23 1003 56.4 kg (124 lb 5.4 oz)   07/13/23 1102 54.3 kg (119 lb 9.6 oz)   06/02/23 1230 58.3 kg (128 lb 8.5 oz)   04/13/23 1349 59 kg (130 lb 1.1 oz)   04/13/23 1231 59 kg (130 lb 1.1 oz)   04/03/23 1058 61.8 kg (136 lb 3.9 oz)   03/25/23 0828 61.8 kg (136 lb 3.9 oz)   02/11/23 1519 72.6 kg (160 lb)   12/17/20 0610 73.5 kg (162 lb 0.6 oz)   12/16/20 0330 73.8 kg (162 lb 9.6 oz)   12/15/20 0349 75.1 kg (165 lb 9.6 oz)   12/14/20 0518 80.1 kg (176 lb 9.6 oz)   12/13/20 0509 78.2 kg (172 lb 4.8 oz)   12/12/20 0550 73.4 kg (161 lb 14.4 oz)   12/11/20 0424 71.4 kg (157 lb 8 oz)   12/10/20 " 0237 69.6 kg (153 lb 8 oz)   12/09/20 0620 68.5 kg (151 lb 1.6 oz)   12/08/20 1453 64.9 kg (143 lb)            Wt Change Observation Stable x 6 months     Estimated/Assessed Needs  Estimated Needs based on: Current Body Weight       Energy Requirements 22-25 kcal/kg    EST Needs (kcal/day) 3051-8907 kcal       Protein Requirements 1.0 g/kg   EST Daily Needs (g/day) 58 g       Fluid Requirements 25-30 ml/kg    Estimated Needs (mL/day) 9838-6850 ml     Labs/Medications         Pertinent Labs Reviewed.   Results from last 7 days   Lab Units 12/18/23  0420 12/17/23  0546 12/16/23  0404   SODIUM mmol/L 140 141 139   POTASSIUM mmol/L 5.0 5.1 4.7   CHLORIDE mmol/L 109* 110* 106   CO2 mmol/L 20.6* 20.8* 22.0   BUN mg/dL 45* 47* 45*   CREATININE mg/dL 4.30* 4.62* 4.51*   CALCIUM mg/dL 7.4* 7.3* 7.2*   BILIRUBIN mg/dL 0.3 0.2 0.2   ALK PHOS U/L 80 79 82   ALT (SGPT) U/L <5 <5 <5   AST (SGOT) U/L 12 10 11   GLUCOSE mg/dL 74 105* 105*     Results from last 7 days   Lab Units 12/18/23 0420 12/17/23  0546 12/16/23  0404   MAGNESIUM mg/dL 1.3* 1.4* 1.5*   PHOSPHORUS mg/dL 2.3* 2.4* 5.1*   HEMOGLOBIN g/dL  --   --  7.2*   HEMATOCRIT %  --   --  23.5*     SARS-CoV-2, KATHLEEN   Date Value Ref Range Status   12/25/2022 NEGATIVE Negative Final     Comment:     The 2019-CoV rRT-PCR Assay is only for use under a Food and Drug Administration Emergency Use Authorization. The performance characteristics of the assay were verified by the Clinical Laboratory at [facility]. Results should be used in   conjunction with the patient's clinical symptoms, medical history, and other clinical/laboratory findings to determine an overall clinical diagnosis. Negative results do not preclude infection with SARS-CoV-2 (COVID-19).    Test parameters have not been validated for screening asymptomatic patients.     Lab Results   Component Value Date    HGBA1C 4.90 12/08/2023         Pertinent Medications Reviewed.     Malnutrition Severity Assessment               Nutrition Diagnosis         Nutrition Dx Problem 1 No nutrition diagnosis at this time.       Nutrition Intervention          Current Nutrition Orders & Evaluation of Intake     Current Nutrition Orders & Evaluation of Intake       Current PO Diet Diet: Regular/House Diet; Texture: Regular Texture (IDDSI 7); Fluid Consistency: Thin (IDDSI 0)   Supplement No active supplement orders       Nutrition Intervention/Prescription        No further nutrition intervention indicated.       Medical Nutrition Therapy/Nutrition Education          Learner     Readiness Patient  Education not indicated.      Method     Response N/A  N/A     Monitor/Evaluation        Monitor Per protocol     Nutrition Discharge Plan         No discharge nutrition needs identified.      Electronically signed by:  Radha Gan RD  12/18/23 13:33 EST

## 2023-12-18 NOTE — THERAPY RE-EVALUATION
Acute Care - Physical Therapy Re-Assessment   Sherry     Patient Name: Lyubov Middleton  : 1955  MRN: [id number]  Today's Date: 2023      Visit Dx:     ICD-10-CM ICD-9-CM   1. Diarrhea, unspecified type  R19.7 787.91   2. History of Clostridioides difficile colitis  Z86.19 V12.79   3. Pyelonephritis  N12 590.80   4. Hydronephrosis, unspecified hydronephrosis type  N13.30 591   5. Chronic kidney disease, unspecified CKD stage  N18.9 585.9   6. Chronic anemia  D64.9 285.9   7. Ruptured Bakers cyst  M66.0 727.51   8. Impaired mobility and ADLs  Z74.09 V49.89    Z78.9    9. Difficulty walking  R26.2 719.7     Patient Active Problem List   Diagnosis    Renal stone    Renal abscess    Stage 3a chronic kidney disease    Type 2 diabetes mellitus    Seizure disorder    Breast cancer    C. difficile diarrhea    Depressive disorder    Essential hypertension    Left ventricular dysfunction    Paroxysmal atrial fibrillation    History of Clostridioides difficile infection    History of colon resection    Diarrhea    Encounter for screening for malignant neoplasm of colon    Pyelonephritis    Chronic anemia    Abdominal pain    Hydronephrosis    Chronic kidney disease, stage V    History of anemia due to chronic kidney disease     Past Medical History:   Diagnosis Date    Arthritis     Cancer     left breast removed     Dementia     Type 2 diabetes mellitus      Past Surgical History:   Procedure Laterality Date    ABDOMINAL SURGERY      BREAST SURGERY Left     removed due to cancer    CHOLECYSTECTOMY      COLONOSCOPY  2023    Procedure: COLONOSCOPY WITH ENDOSCOPIC FECAL MICROBIOTA TRANSPLANT, GTUBE REMOVAL;  Surgeon: Hamzah Ireland MD;  Location: [facility] ENDOSCOPY;  Service: Gastroenterology;;    CYSTOSCOPY W/ URETERAL STENT PLACEMENT Right 2020    Procedure: CYSTOSCOPY, RIGHT RETROGRADE PYELOGRAM, URETEROSCOPY, LASER LITHOTRIPSY, RIGHT URETERAL STENT PLACEMENT;  Surgeon: Rohan Dooley  Doyle Silva MD;  Location: [facility] MAIN OR;  Service: Urology;  Laterality: Right;    GASTROSTOMY W/ FEEDING TUBE      HERNIA REPAIR      mesh removed    URETEROSCOPY LASER LITHOTRIPSY WITH STENT INSERTION Right 12/14/2023    Procedure: cystoscopy with right ureteroscopy, right ureteral stent placement, BILATERAL retrograde pyelograms;  Surgeon: Joseph Powell MD;  Location: [facility] MAIN OR;  Service: Urology;  Laterality: Right;     PT Assessment (last 12 hours)       PT Evaluation and Treatment       Row Name 12/18/23 1500 12/18/23 0831       Physical Therapy Time and Intention    Subjective Information complains of;weakness  -DP complains of;weakness;fatigue  -DK    Document Type re-evaluation  -DP therapy note (daily note)  -DK    Mode of Treatment individual therapy;physical therapy  -DP individual therapy;physical therapy  -DK    Patient Effort good  -DP good  -DK    Symptoms Noted During/After Treatment -- fatigue  -DK    Comment -- Per RN, pt's blood sugar was low.  Pt had a BM in the bed. RN was advised.  -DK      Row Name 12/18/23 0831          Pain    Pretreatment Pain Rating 0/10 - no pain  -DK     Posttreatment Pain Rating 0/10 - no pain  -DK       Row Name 12/18/23 0831          Cognition    Affect/Mental Status (Cognition) confused;low arousal/lethargic  -DK     Orientation Status (Cognition) oriented to;person  -DK     Follows Commands (Cognition) WFL  -DK     Cognitive Function WFL  -DK     Personal Safety Interventions gait belt;nonskid shoes/slippers when out of bed;supervised activity  -DK       Row Name 12/18/23 1500          Bed Mobility    Bed Mobility supine-sit-supine  -DP     Supine-Sit-Supine [place] (Bed Mobility) 1 person assist;minimum assist (75% patient effort)  -DP     Bed Mobility, Safety Issues decreased use of arms for pushing/pulling;decreased use of legs for bridging/pushing  -DP     Assistive Device (Bed Mobility) bed rails;head of bed elevated  -DP       Row Name  12/18/23 1500          Transfers    Transfers sit-stand transfer;stand-sit transfer  -DP       Row Name 12/18/23 1500          Sit-Stand Transfer    Sit-Stand [place] (Transfers) moderate assist (50% patient effort)  -DP     Assistive Device (Sit-Stand Transfers) walker, front-wheeled  -DP       Row Name 12/18/23 1500          Gait/Stairs (Locomotion)    [place] Level (Gait) minimum assist (75% patient effort)  -DP     Assistive Device (Gait) walker, front-wheeled  -DP     Distance in Feet (Gait) 12  -DP     Deviations/Abnormal Patterns (Gait) base of support, narrow;festinating/shuffling;gait speed decreased;stride length decreased  -DP     Bilateral Gait Deviations forward flexed posture  -DP       Row Name 12/18/23 1500          Balance    Dynamic Standing Balance contact guard  -DP       Row Name 12/18/23 0831          Motor Skills    Motor Skills --  therapeutic exercises  -     Coordination WFL  -     Therapeutic Exercise hip;knee;ankle  -     Additional Documentation --  No transfers due to a large BM in the bed.  -DK       Row Name 12/18/23 0831          Hip (Therapeutic Exercise)    Hip (Therapeutic Exercise) AAROM (active assistive range of motion)  -     Hip AAROM (Therapeutic Exercise) bilateral;flexion;extension;aBduction;aDduction;supine;10 repetitions;2 sets  -DK       Row Name 12/18/23 0831          Knee (Therapeutic Exercise)    Knee (Therapeutic Exercise) AAROM (active assistive range of motion)  -     Knee AAROM (Therapeutic Exercise) bilateral;flexion;extension;supine;10 repetitions;2 sets  -DK       Row Name 12/18/23 0831          Ankle (Therapeutic Exercise)    Ankle (Therapeutic Exercise) AAROM (active assistive range of motion)  -     Ankle AAROM (Therapeutic Exercise) bilateral;dorsiflexion;plantarflexion;supine;10 repetitions;2 sets  -       Row Name             Wound 12/14/23 1444 urethral meatus Incision    Wound - Properties Group Placement Date: 12/14/23  [facility]  Placement Time: 1444 -MH Location: urethral meatus  -MH Primary Wound Type: Incision  -MH, point of entry for procedure     Retired Wound - Properties Group Placement Date: 12/14/23 - Placement Time: 1444 -MH Location: urethral meatus  -MH Primary Wound Type: Incision  -MH, point of entry for procedure     Retired Wound - Properties Group Date first assessed: 12/14/23  - Time first assessed: 1444 -MH Location: urethral meatus  -MH Primary Wound Type: Incision  -MH, point of entry for procedure       Row Name 12/18/23 0831          Plan of Care Review    Plan of Care Reviewed With patient  -DK     Progress no change  -DK       Row Name 12/18/23 0831          Positioning and Restraints    Pre-Treatment Position in bed  -DK     Post Treatment Position bed  -DK     In Bed supine;call light within reach;encouraged to call for assist;exit alarm on;side rails up x2;legs elevated  -DK       Row Name 12/18/23 1500 12/18/23 0831       Therapy Assessment/Plan (PT)    Rehab Potential (PT) good, to achieve stated therapy goals  -DP good, to achieve stated therapy goals  -DK    Criteria for Skilled Interventions Met (PT) skilled treatment is necessary  -DP skilled treatment is necessary  -DK    Therapy Frequency (PT) daily  -DP daily  -DK    Predicted Duration of Therapy Intervention (PT) 10 days  -DP --    Problem List (PT) problems related to;balance;cognition;mobility;strength  -DP problems related to;balance;cognition;mobility;strength  -DK    Activity Limitations Related to Problem List (PT) unable to ambulate safely;unable to transfer safely  -DP unable to ambulate safely;unable to transfer safely  -DK      Row Name 12/18/23 0831          Progress Summary (PT)    Progress Toward Functional Goals (PT) progress toward functional goals is good  -DK       Row Name 12/18/23 1500          Physical Therapy Goals    Bed Mobility Goal Selection (PT) bed mobility, PT goal 1  -DP     Transfer Goal Selection (PT) transfer, PT  goal 1  -DP     Gait Training Goal Selection (PT) gait training, PT goal 1  -DP       Row Name 12/18/23 1500          Bed Mobility Goal 1 (PT)    Activity/Assistive Device (Bed Mobility Goal 1, PT) sit to supine/supine to sit  -DP     [place] Level/Cues Needed (Bed Mobility Goal 1, PT) independent  -DP     Time Frame (Bed Mobility Goal 1, PT) 10 days  -DP       Row Name 12/18/23 1500          Transfer Goal 1 (PT)    Activity/Assistive Device (Transfer Goal 1, PT) sit-to-stand/stand-to-sit;bed-to-chair/chair-to-bed  -DP     [place] Level/Cues Needed (Transfer Goal 1, PT) independent  -DP     Time Frame (Transfer Goal 1, PT) 10 days  -DP       Row Name 12/18/23 1500          Gait Training Goal 1 (PT)    Activity/Assistive Device (Gait Training Goal 1, PT) assistive device use;walker, rolling  -DP     [place] Level (Gait Training Goal 1, PT) standby assist  -DP     Distance (Gait Training Goal 1, PT) 50  -DP     Time Frame (Gait Training Goal 1, PT) 10 days  -DP               User Key  (r) = Recorded By, (t) = Taken By, (c) = Cosigned By      Initials Name Provider Type    Jimena Ambriz PTA Physical Therapist Assistant    Juni Camara RN Registered Nurse    Shawna Holcomb, PT Physical Therapist                    Physical Therapy Education       Title: PT OT SLP Therapies (In Progress)       Topic: Physical Therapy (In Progress)       Point: Mobility training (Done)       Learning Progress Summary             Patient Acceptance, E,TB, VU by AV at 12/11/2023 1143                         Point: Home exercise program (Not Started)       Learner Progress:  Not documented in this visit.              Point: Body mechanics (Done)       Learning Progress Summary             Patient Acceptance, E,TB, VU by AV at 12/11/2023 1143                         Point: Precautions (Done)       Learning Progress Summary             Patient Acceptance, E,TB, VU by AV at 12/11/2023 1143                                          User Key       Initials Effective Dates Name Provider Type Discipline    AV 06/11/21 -  Anant Yen, PT Physical Therapist PT                  PT Recommendation and Plan  Anticipated Discharge Disposition (PT): sub acute care setting  Planned Therapy Interventions (PT): balance training, bed mobility training, gait training, transfer training, strengthening  Therapy Frequency (PT): daily      Outcome Measures       Row Name 12/18/23 1500 12/18/23 0831          How much help from another person do you currently need...    Turning from your back to your side while in flat bed without using bedrails? 4  -DP 4  -DK     Moving from lying on back to sitting on the side of a flat bed without bedrails? 3  -DP 3  -DK     Moving to and from a bed to a chair (including a wheelchair)? 2  -DP 3  -DK     Standing up from a chair using your arms (e.g., wheelchair, bedside chair)? 2  -DP 3  -DK     Climbing 3-5 steps with a railing? 2  -DP 3  -DK     To walk in hospital room? 2  -DP 3  -DK     AM-PAC 6 Clicks Score (PT) 15  -DP 19  -DK     Highest Level of Mobility Goal 4 --> Transfer to chair/commode  -DP 6 --> Walk 10 steps or more  -DK        Functional Assessment    Outcome Measure Options AM-PAC 6 Clicks Basic Mobility (PT)  -DP AM-PAC 6 Clicks Basic Mobility (PT)  -DK               User Key  (r) = Recorded By, (t) = Taken By, (c) = Cosigned By      Initials Name Provider Type    Jimena Ambriz, PTA Physical Therapist Assistant    Shawna Holcomb, PT Physical Therapist                     Time Calculation:    PT Charges       Row Name 12/18/23 1555 12/18/23 0834          Time Calculation    PT Received On 12/18/23  -DP 12/18/23  -DK     PT Goal Re-Cert Due Date 12/27/23  -DP 12/20/23  -DK        Timed Charges    81353 - PT Therapeutic Exercise Minutes -- 14  -DK     77266 - PT Therapeutic Activity Minutes -- 5  -DK        Untimed Charges    PT Eval/Re-eval Minutes 30  -DP --        Total  Minutes    Timed Charges Total Minutes -- 19  -DK     Untimed Charges Total Minutes 30  -DP --      Total Minutes 30  -DP 19  -DK               User Key  (r) = Recorded By, (t) = Taken By, (c) = Cosigned By      Initials Name Provider Type    Jimena Ambriz, PTA Physical Therapist Assistant    Shawna Holcomb PT Physical Therapist                      PT G-Codes  Outcome Measure Options: AM-PAC 6 Clicks Basic Mobility (PT)  AM-PAC 6 Clicks Score (PT): 15  AM-PAC 6 Clicks Score (OT): 19    Shawna Berg, RICH  12/18/2023

## 2023-12-18 NOTE — THERAPY TREATMENT NOTE
Acute Care - Physical Therapy Treatment Note   Sherry     Patient Name: Lyubov Middleton  : 1955  MRN: [id number]  Today's Date: 2023      Visit Dx:     ICD-10-CM ICD-9-CM   1. Diarrhea, unspecified type  R19.7 787.91   2. History of Clostridioides difficile colitis  Z86.19 V12.79   3. Pyelonephritis  N12 590.80   4. Hydronephrosis, unspecified hydronephrosis type  N13.30 591   5. Chronic kidney disease, unspecified CKD stage  N18.9 585.9   6. Chronic anemia  D64.9 285.9   7. Ruptured Bakers cyst  M66.0 727.51   8. Impaired mobility and ADLs  Z74.09 V49.89    Z78.9    9. Difficulty walking  R26.2 719.7     Patient Active Problem List   Diagnosis    Renal stone    Renal abscess    Stage 3a chronic kidney disease    Type 2 diabetes mellitus    Seizure disorder    Breast cancer    C. difficile diarrhea    Depressive disorder    Essential hypertension    Left ventricular dysfunction    Paroxysmal atrial fibrillation    History of Clostridioides difficile infection    History of colon resection    Diarrhea    Encounter for screening for malignant neoplasm of colon    Pyelonephritis    Chronic anemia    Abdominal pain    Hydronephrosis     Past Medical History:   Diagnosis Date    Arthritis     Cancer     left breast removed     Dementia     Type 2 diabetes mellitus      Past Surgical History:   Procedure Laterality Date    ABDOMINAL SURGERY      BREAST SURGERY Left     removed due to cancer    CHOLECYSTECTOMY      COLONOSCOPY  2023    Procedure: COLONOSCOPY WITH ENDOSCOPIC FECAL MICROBIOTA TRANSPLANT, GTUBE REMOVAL;  Surgeon: Hamzah Ireland MD;  Location: [facility] ENDOSCOPY;  Service: Gastroenterology;;    CYSTOSCOPY W/ URETERAL STENT PLACEMENT Right 2020    Procedure: CYSTOSCOPY, RIGHT RETROGRADE PYELOGRAM, URETEROSCOPY, LASER LITHOTRIPSY, RIGHT URETERAL STENT PLACEMENT;  Surgeon: Rohan Dooley Jr., MD;  Location: [facility] OR;  Service: Urology;  Laterality: Right;     GASTROSTOMY W/ FEEDING TUBE      HERNIA REPAIR      mesh removed    URETEROSCOPY LASER LITHOTRIPSY WITH STENT INSERTION Right 12/14/2023    Procedure: cystoscopy with right ureteroscopy, right ureteral stent placement, BILATERAL retrograde pyelograms;  Surgeon: Joseph Powell MD;  Location: [facility] MAIN OR;  Service: Urology;  Laterality: Right;     PT Assessment (last 12 hours)       PT Evaluation and Treatment       Row Name 12/18/23 0831          Physical Therapy Time and Intention    Subjective Information complains of;weakness;fatigue  -DK     Document Type therapy note (daily note)  -DK     Mode of Treatment individual therapy;physical therapy  -DK     Patient Effort good  -DK     Symptoms Noted During/After Treatment fatigue  -DK     Comment Per RN, pt's blood sugar was low.  Pt had a BM in the bed. RN was advised.  -       Row Name 12/18/23 0831          Pain    Pretreatment Pain Rating 0/10 - no pain  -DK     Posttreatment Pain Rating 0/10 - no pain  -       Row Name 12/18/23 0831          Cognition    Affect/Mental Status (Cognition) confused;low arousal/lethargic  -DK     Orientation Status (Cognition) oriented to;person  -DK     Follows Commands (Cognition) WFL  -DK     Cognitive Function WFL  -DK     Personal Safety Interventions gait belt;nonskid shoes/slippers when out of bed;supervised activity  -       Row Name 12/18/23 0831          Motor Skills    Motor Skills --  therapeutic exercises  -DK     Coordination WFL  -DK     Therapeutic Exercise hip;knee;ankle  -DK     Additional Documentation --  No transfers due to a large BM in the bed.  -       Row Name 12/18/23 0831          Hip (Therapeutic Exercise)    Hip (Therapeutic Exercise) AAROM (active assistive range of motion)  -     Hip AAROM (Therapeutic Exercise) bilateral;flexion;extension;aBduction;aDduction;supine;10 repetitions;2 sets  -       Row Name 12/18/23 0831          Knee (Therapeutic Exercise)    Knee (Therapeutic  Exercise) AAROM (active assistive range of motion)  -DK     Knee AAROM (Therapeutic Exercise) bilateral;flexion;extension;supine;10 repetitions;2 sets  -DK       Row Name 12/18/23 0831          Ankle (Therapeutic Exercise)    Ankle (Therapeutic Exercise) AAROM (active assistive range of motion)  -DK     Ankle AAROM (Therapeutic Exercise) bilateral;dorsiflexion;plantarflexion;supine;10 repetitions;2 sets  -DK       Row Name             Wound 12/14/23 1444 urethral meatus Incision    Wound - Properties Group Placement Date: 12/14/23 - Placement Time: 1444 -MH Location: urethral meatus  -MH Primary Wound Type: Incision  -MH, point of entry for procedure     Retired Wound - Properties Group Placement Date: 12/14/23 - Placement Time: 1444 -MH Location: urethral meatus  -MH Primary Wound Type: Incision  -MH, point of entry for procedure     Retired Wound - Properties Group Date first assessed: 12/14/23 - Time first assessed: 1444  -MH Location: urethral meatus  -MH Primary Wound Type: Incision  -MH, point of entry for procedure       Row Name 12/18/23 0831          Plan of Care Review    Plan of Care Reviewed With patient  -DK     Progress no change  -DK       Row Name 12/18/23 0831          Positioning and Restraints    Pre-Treatment Position in bed  -DK     Post Treatment Position bed  -DK     In Bed supine;call light within reach;encouraged to call for assist;exit alarm on;side rails up x2;legs elevated  -DK       Row Name 12/18/23 0831          Therapy Assessment/Plan (PT)    Rehab Potential (PT) good, to achieve stated therapy goals  -DK     Criteria for Skilled Interventions Met (PT) skilled treatment is necessary  -DK     Therapy Frequency (PT) daily  -DK     Problem List (PT) problems related to;balance;cognition;mobility;strength  -DK     Activity Limitations Related to Problem List (PT) unable to ambulate safely;unable to transfer safely  -DK       Row Name 12/18/23 0831          Progress Summary (PT)     Progress Toward Functional Goals (PT) progress toward functional goals is good  -DK               User Key  (r) = Recorded By, (t) = Taken By, (c) = Cosigned By      Initials Name Provider Type    Jimena Ambriz PTA Physical Therapist Assistant    Juni Camara RN Registered Nurse                    Physical Therapy Education       Title: PT OT SLP Therapies (In Progress)       Topic: Physical Therapy (In Progress)       Point: Mobility training (Done)       Learning Progress Summary             Patient Acceptance, E,TB, VU by AV at 12/11/2023 1143                         Point: Home exercise program (Not Started)       Learner Progress:  Not documented in this visit.              Point: Body mechanics (Done)       Learning Progress Summary             Patient Acceptance, E,TB, VU by AV at 12/11/2023 1143                         Point: Precautions (Done)       Learning Progress Summary             Patient Acceptance, E,TB, VU by AV at 12/11/2023 1143                                         User Key       Initials Effective Dates Name Provider Type Discipline     06/11/21 -  Anant Yen, PT Physical Therapist PT                  PT Recommendation and Plan  Planned Therapy Interventions (PT): balance training, bed mobility training, gait training, strengthening, transfer training  Therapy Frequency (PT): daily  Progress Summary (PT)  Progress Toward Functional Goals (PT): progress toward functional goals is good  Plan of Care Reviewed With: patient  Progress: no change   Outcome Measures       Row Name 12/18/23 0831 12/15/23 1500          How much help from another person do you currently need...    Turning from your back to your side while in flat bed without using bedrails? 4  -DK 4  -CS     Moving from lying on back to sitting on the side of a flat bed without bedrails? 3  -DK 3  -CS     Moving to and from a bed to a chair (including a wheelchair)? 3  -DK 3  -CS     Standing up from a chair  using your arms (e.g., wheelchair, bedside chair)? 3  -DK 3  -CS     Climbing 3-5 steps with a railing? 3  -DK 2  -CS     To walk in hospital room? 3  -DK 3  -CS     AM-PAC 6 Clicks Score (PT) 19  -DK 18  -CS     Highest Level of Mobility Goal 6 --> Walk 10 steps or more  -DK 6 --> Walk 10 steps or more  -CS        Functional Assessment    Outcome Measure Options AM-PAC 6 Clicks Basic Mobility (PT)  -DK AM-PAC 6 Clicks Basic Mobility (PT)  -CS               User Key  (r) = Recorded By, (t) = Taken By, (c) = Cosigned By      Initials Name Provider Type    Jimena Ambriz PTA Physical Therapist Assistant    Farzad Calderón PTA Physical Therapist Assistant                     Time Calculation:    PT Charges       Row Name 12/18/23 0834             Time Calculation    PT Received On 12/18/23  -DK      PT Goal Re-Cert Due Date 12/20/23  -DK         Timed Charges    86418 - PT Therapeutic Exercise Minutes 14  -DK      95846 - PT Therapeutic Activity Minutes 5  -DK         Total Minutes    Timed Charges Total Minutes 19  -DK       Total Minutes 19  -DK                User Key  (r) = Recorded By, (t) = Taken By, (c) = Cosigned By      Initials Name Provider Type    Jimena Ambriz PTA Physical Therapist Assistant                  Therapy Charges for Today       Code Description Service Date Service Provider Modifiers Qty    [id number] HC PT THER PROC EA 15 MIN 12/18/2023 Jimena Patino PTA GP 1            PT G-Codes  Outcome Measure Options: AM-PAC 6 Clicks Basic Mobility (PT)  AM-PAC 6 Clicks Score (PT): 19  AM-PAC 6 Clicks Score (OT): 19    Jimena Patino PTA  12/18/2023

## 2023-12-18 NOTE — PLAN OF CARE
Goal Outcome Evaluation:      Patient remains stable. Awaiting palliative consult for placement. Patient continues to have diarrhea. Will continue to monitor. Vanessa Solano RN

## 2023-12-18 NOTE — PROGRESS NOTES
[facility]   Hospitalist Progress Note  Date: 2023  Patient Name: Lyubov Middleton  : 1955  MRN: [id number]  Date of admission: 2023  Room/Bed: [facility]/      Subjective   Subjective     Chief Complaint: Abdominal pain, hematochezia    Summary:Lyubov Middleton is a 68 y.o. female with past medical history of hypertension, hyperlipidemia, diabetes not on medication, breast cancer, arthritis, dementia, and GERD who presented from nursing home with complaints of abdominal pain and bloody bowel movements.  She was admitted for further evaluation.  CT abdomen and pelvis without contrast showed malrotation of right kidney with severe right hydronephrosis and right perinephric fat stranding/edema which could be obstruction or pyelonephritis; multiple renal calculi.  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  nephrology and neurology were consulted and following the patient.  GI also following the patient.    Interval Followup:   Patient is s/p  right ureteral stent in place.  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   Cr continues to remain abnormal and increasing   Patient is still adamant that she does not want dialysis.  Patient is a long-term nursing home resident.  Will speak with palliative care on Monday regarding her going back to the nursing home with comfort care as she does not want anything further done      Review of Systems    All systems reviewed and negative except for what is outlined above.      Objective   Objective     Vitals:   Temp:  [97.6 °F (36.4 °C)-98.5 °F (36.9 °C)] 98.5 °F (36.9 °C)  Resp:  [16-18] 16  BP: (108-154)/(52-92) 110/57    Physical Exam   General: Awake, alert, NAD resting in bed. No family at bedside   Cardiovascular: RRR, no  murmurs   Pulmonary: CTA bilaterally; no wheezes; no conversational dyspnea  Gastrointestinal: S/ND/NT, +BS  Neuro: Alert, awake, oriented x 3; speech clear; no tremor  : No Fernandez catheter; no suprapubic tenderness    Result Review    Result Review:  I have personally reviewed these results:  [x]  Laboratory      Lab 12/16/23  0404 12/15/23  0428 12/14/23  0421   WBC 15.94*  --  7.35   HEMOGLOBIN 7.2*  --  8.0*   HEMATOCRIT 23.5*  --  25.3*   PLATELETS 164  --  191   NEUTROS ABS 13.26*  --  5.14   IMMATURE GRANS (ABS) 0.14*  --  0.02   LYMPHS ABS 1.57  --  1.57   MONOS ABS 0.75  --  0.41   EOS ABS 0.17  --  0.16   MCV 93.3  --  91.7   LACTATE, ARTERIAL  --  1.13  --    PROTIME  --   --  14.9         Lab 12/18/23 0420 12/17/23 0546 12/16/23  0404 12/15/23  0503 12/15/23  0428   SODIUM 140 141 139   < >  --    SODIUM, ARTERIAL  --   --   --   --  141.5   POTASSIUM 5.0 5.1 4.7   < >  --    CHLORIDE 109* 110* 106   < >  --    CO2 20.6* 20.8* 22.0   < >  --    ANION GAP 10.4 10.2 11.0   < >  --    BUN 45* 47* 45*   < >  --    CREATININE 4.30* 4.62* 4.51*   < >  --    EGFR 10.7* 9.9* 10.2*   < >  --    GLUCOSE 74 105* 105*   < >  --    GLUCOSE, ARTERIAL  --   --   --   --  106*   CALCIUM 7.4* 7.3* 7.2*   < >  --    IONIZED CALCIUM  --   --   --   --  0.98*   MAGNESIUM 1.3* 1.4* 1.5*   < >  --    PHOSPHORUS 2.3* 2.4* 5.1*   < >  --     < > = values in this interval not displayed.         Lab 12/18/23 0420 12/17/23 0546 12/16/23 0404   TOTAL PROTEIN 5.8* 5.6* 5.3*   ALBUMIN 2.5* 2.3* 2.3*   GLOBULIN 3.3 3.3 3.0   ALT (SGPT) <5 <5 <5   AST (SGOT) 12 10 11   BILIRUBIN 0.3 0.2 0.2   ALK PHOS 80 79 82         Lab 12/14/23  0421   PROTIME 14.9   INR 1.14                   Lab 12/15/23  0428   PH, ARTERIAL 7.440   PCO2, ARTERIAL 33.6*   PO2 ART 79.3*   O2 SATURATION ART 94.0*   FIO2 21   HCO3 ART 22.3   BASE EXCESS ART -1.0   CARBOXYHEMOGLOBIN 0.3     Brief Urine Lab Results  (Last result in the past 365 days)        Color    Clarity   Blood   Leuk Est   Nitrite   Protein   CREAT   Urine HCG        12/08/23 1902             51.1         12/08/23 1902             54.2         12/08/23 1902 Yellow   Cloudy   Small (1+)   Moderate (2+)   Negative   >=300 mg/dL (3+)                 [x]  Microbiology   Microbiology Results (last 10 days)       Procedure Component Value - Date/Time    Clostridioides difficile Toxin - Stool, Per Rectum [[id number]] Collected: 12/09/23 0953    Lab Status: Final result Specimen: Stool from Per Rectum Updated: 12/09/23 1329    Narrative:      The following orders were created for panel order Clostridioides difficile Toxin - Stool, Per Rectum.  Procedure                               Abnormality         Status                     ---------                               -----------         ------                     Clostridioides difficile...[[id number]]                      Final result                 Please view results for these tests on the individual orders.    Clostridioides difficile Toxin, PCR - Stool, Per Rectum [[id number]] Collected: 12/09/23 0953    Lab Status: Final result Specimen: Stool from Per Rectum Updated: 12/09/23 1329     Toxigenic C. difficile by PCR Negative     027 Toxin Presumptive Negative    Narrative:      The result indicates the absence of toxigenic C. difficile from stool specimen.     Enteric Bacterial Panel - Stool, Per Rectum [[id number]]  (Normal) Collected: 12/09/23 0953    Lab Status: Final result Specimen: Stool from Per Rectum Updated: 12/10/23 1633     Salmonella Not Detected     Campylobacter Not Detected     Shigella/Enteroinvasive E. coli (EIEC) Not Detected     Shiga-like toxin-producing E. coli (STEC) stx1/stx2 Not Detected    Blood Culture - Blood, Arm, Right [[id number]]  (Normal) Collected: 12/08/23 2030    Lab Status: Final result Specimen: Blood from Arm, Right Updated: 12/13/23 2100     Blood Culture No growth at 5 days    Blood Culture - Blood, Arm, Right  [[id number]]  (Normal) Collected: 12/08/23 2027    Lab Status: Final result Specimen: Blood from Arm, Right Updated: 12/13/23 2045     Blood Culture No growth at 5 days    Urine Culture - Urine, Urine, Clean Catch [[id number]]  (Abnormal)  (Susceptibility) Collected: 12/08/23 1902    Lab Status: Final result Specimen: Urine, Clean Catch Updated: 12/10/23 1139     Urine Culture >100,000 CFU/mL Escherichia coli    Narrative:      Colonization of the urinary tract without infection is common. Treatment is discouraged unless the patient is symptomatic, pregnant, or undergoing an invasive urologic procedure.    Susceptibility        Escherichia coli      MARKO      Ampicillin Resistant      Ampicillin + Sulbactam Resistant      Cefazolin Susceptible      Cefepime Susceptible      Ceftazidime Susceptible      Ceftriaxone Susceptible      Gentamicin Susceptible      Levofloxacin Susceptible      Nitrofurantoin Susceptible      Piperacillin + Tazobactam Susceptible      Trimethoprim + Sulfamethoxazole Susceptible                                 [x]  Radiology  No radiology results for the last 7 days  []  EKG/Telemetry   []  Cardiology/Vascular   []  Pathology  []  Old records  []  Other:    Assessment & Plan   Assessment / Plan     Assessment:  Concern for renal malignancy  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   UTI due to E. coli, present on admission  Hydronephrosis  Metabolic acidosis  Hypocalcemia  Proteinuria  Hematochezia  Constipation  Anemia  AUSTEN on CKD    Plan:  Patient currently being managed in hospitalist service.  Patient has been restarted on antibiotics given her purulent drainage on her urology procedure. Continue 10 days total   Urology following and patient is s/p right UPJ stricture with purulent drainage, s/p stent placement   Creatinine continues to worsen and patient continues to state that she does not want dialysis  I HAVE ASKED  PALLIATIVE TO TALK WITH PATIENT AGAIN AS SHE CAN GO BACK TO NURSING HOME IF SHE DOES NOT WANT DIALYSIS BUT WOULD HAVE TO BE UNDER COMFORT MEASURES   Nephrology followed the patient, appreciate further input.  Continue put 20 K units.  Bicarb tablet discontinued by nephrology   Continue amlodipine and carvedilol for hypertension.  GI was consulted this admission; given patient had recent colonoscopy with stool transplant for recurrent C. difficile several months ago with overall improvement of her symptoms; decided to follow peripherally with no any further plan of GI intervention this admission.  Appreciate further recommendation by nephrology and urology.        DVT prophylaxis:  Mechanical DVT prophylaxis orders are present.    CODE STATUS:   Medical Intervention Limits: NO intubation (DNI); NO cardioversion  Level Of Support Discussed With: Patient  Code Status (Patient has no pulse and is not breathing): No CPR (Do Not Attempt to Resuscitate)  Medical Interventions (Patient has pulse or is breathing): Limited Support

## 2023-12-18 NOTE — PROGRESS NOTES
[facility]     Progress Note    Patient Name: Lyubov Middleton  : 1955  MRN: [id number]  Primary Care Physician:  Cathleen Stroud APRN  Date of admission: 2023    Subjective patient is fully awake alert responsive but does not seem to be fully oriented    Review of Systems  Constitutional:        Weakness tiredness fatigue  Eyes:                       No blurry vision, eye discharge, eye irritation, eye pain  HEENT:                   No acute hair loss, earache and discharge, nasal congestion or discharge, sore throat, postnasal drip  Respiratory:           No shortness of breath coughing sputum production wheezing hemoptysis pleuritic chest pain  Cardiovascular:     No chest pain, orthopnea, PND, dizziness, palpitation, lower extremity edema  Gastrointestinal:   No nausea vomiting diarrhea abdominal pain constipation  Genitourinary:       No urinary incontinence, hesitancy, frequency, urgency, dysuria  Hematologic:         No bruising, bleeding, pallor, lymphadenopathy  Endocrine:            No coldness, hot flashes, polyuria, abnormal hair growth  Musculoskeletal:    No body pains, aches, arthritic pains, muscle pain ,muscle wasting  Psychiatric:          No low or high mood, anxiety, hallucinations, delusions  Skin.                      No rash, ulcers, bruising, itching  Neurological:        No confusion, headache, focal weakness, numbness, dysphasia    Objective   Objective     Vitals:   Temp:  [97.6 °F (36.4 °C)-98.4 °F (36.9 °C)] 97.6 °F (36.4 °C)  Heart Rate:  [72] 72  Resp:  [16-18] 18  BP: (108-154)/(52-92) 136/92  Physical Exam    Constitutional: Awake, alert responsive, conversant, no obvious distress              Psychiatric:  Appropriate affect, cooperative   Neurologic:  Awake alert ,oriented x 3, strength symmetric in all extremities, Cranial Nerves grossly intact to confrontation, speech clear   Eyes:   PERRLA, sclerae anicteric, no conjunctival injection   HEENT:  Moist mucous  membranes, no nasal or eye discharge, no throat congestion   Neck:   Supple, no thyromegaly, no lymphadenopathy, trachea midline, no elevated JVD   Respiratory:  Clear to auscultation bilaterally, nonlabored respirations    Cardiovascular: RRR, no murmurs, rubs, or gallops, palpable pedal pulses bilaterally, No bilateral ankle edema   Gastrointestinal: Positive bowel sounds, soft, nontender, nondistended, no organomegaly   Musculoskeletal:  No clubbing or cyanosis to extremities,muscle wasting, joint swelling, muscle weakness             Skin:                      No rashes, bruising, skin ulcers, petechiae or ecchymosis    Result Review    Result Review:  I have personally reviewed the results from the time of this admission to 12/18/2023 09:48 EST and agree with these findings:  []  Laboratory  []  Microbiology  []  Radiology  []  EKG/Telemetry   []  Cardiology/Vascular   []  Pathology  []  Old records  []  Other:    Assessment & Plan   Assessment / Plan       Active Hospital Problems:    Active Hospital Problems    Diagnosis  POA   • **Abdominal pain [R10.9]  Unknown   • Chronic kidney disease, stage V [N18.5]  Unknown     Most likely secondary to diabetic nephropathy as patient has nephrotic range proteinuria  Status post ureteral stent because of hydronephrosis.       • History of anemia due to chronic kidney disease [N18.9, Z86.2]  Not Applicable     She is on Epogen     • Chronic anemia [D64.9]  Unknown   • Hydronephrosis [N13.30]  Unknown   • Pyelonephritis [N12]  Yes   • History of Clostridioides difficile infection [Z86.19]  Yes     Added automatically from request for surgery [id number]     • Type 2 diabetes mellitus [E11.9]  Yes   • Essential hypertension [I10]  Yes   Patient's had she will consider dialysis as last resort    Plan:   Clinically patient looks on the dry side  P.o. vitamin B12  Start low-dose ACE inhibitors  Start supplement    Electronically signed by Alyse Hawkins MD, 12/18/23, 9:46 AM  EST.

## 2023-12-19 VITALS
TEMPERATURE: 97.9 F | HEART RATE: 76 BPM | SYSTOLIC BLOOD PRESSURE: 145 MMHG | DIASTOLIC BLOOD PRESSURE: 76 MMHG | HEIGHT: 63 IN | WEIGHT: 128.75 LBS | RESPIRATION RATE: 18 BRPM | OXYGEN SATURATION: 98 % | BODY MASS INDEX: 22.81 KG/M2

## 2023-12-19 LAB
ALBUMIN SERPL-MCNC: 2.7 G/DL (ref 3.5–5.2)
ALBUMIN/GLOB SERPL: 0.8 G/DL
ALP SERPL-CCNC: 83 U/L (ref 39–117)
ALT SERPL W P-5'-P-CCNC: <5 U/L (ref 1–33)
ANION GAP SERPL CALCULATED.3IONS-SCNC: 13.1 MMOL/L (ref 5–15)
AST SERPL-CCNC: 10 U/L (ref 1–32)
BILIRUB SERPL-MCNC: 0.3 MG/DL (ref 0–1.2)
BUN SERPL-MCNC: 44 MG/DL (ref 8–23)
BUN/CREAT SERPL: 10.4 (ref 7–25)
CALCIUM SPEC-SCNC: 7.5 MG/DL (ref 8.6–10.5)
CHLORIDE SERPL-SCNC: 108 MMOL/L (ref 98–107)
CO2 SERPL-SCNC: 18.9 MMOL/L (ref 22–29)
CREAT SERPL-MCNC: 4.25 MG/DL (ref 0.57–1)
DEPRECATED RDW RBC AUTO: 49.3 FL (ref 37–54)
EGFRCR SERPLBLD CKD-EPI 2021: 10.9 ML/MIN/1.73
ERYTHROCYTE [DISTWIDTH] IN BLOOD BY AUTOMATED COUNT: 14.7 % (ref 12.3–15.4)
GLOBULIN UR ELPH-MCNC: 3.2 GM/DL
GLUCOSE BLDC GLUCOMTR-MCNC: 144 MG/DL (ref 70–99)
GLUCOSE SERPL-MCNC: 83 MG/DL (ref 65–99)
HCT VFR BLD AUTO: 26.4 % (ref 34–46.6)
HGB BLD-MCNC: 8 G/DL (ref 12–15.9)
MAGNESIUM SERPL-MCNC: 1.3 MG/DL (ref 1.6–2.4)
MCH RBC QN AUTO: 28.5 PG (ref 26.6–33)
MCHC RBC AUTO-ENTMCNC: 30.3 G/DL (ref 31.5–35.7)
MCV RBC AUTO: 94 FL (ref 79–97)
PHOSPHATE SERPL-MCNC: 2.7 MG/DL (ref 2.5–4.5)
PLATELET # BLD AUTO: 157 10*3/MM3 (ref 140–450)
PMV BLD AUTO: 10.7 FL (ref 6–12)
POTASSIUM SERPL-SCNC: 5.4 MMOL/L (ref 3.5–5.2)
PROT SERPL-MCNC: 5.9 G/DL (ref 6–8.5)
QT INTERVAL: 368 MS
QTC INTERVAL: 427 MS
RBC # BLD AUTO: 2.81 10*6/MM3 (ref 3.77–5.28)
SODIUM SERPL-SCNC: 140 MMOL/L (ref 136–145)
WBC NRBC COR # BLD AUTO: 8.37 10*3/MM3 (ref 3.4–10.8)

## 2023-12-19 PROCEDURE — 99239 HOSP IP/OBS DSCHRG MGMT >30: CPT | Performed by: STUDENT IN AN ORGANIZED HEALTH CARE EDUCATION/TRAINING PROGRAM

## 2023-12-19 PROCEDURE — 25010000002 MAGNESIUM SULFATE 2 GM/50ML SOLUTION: Performed by: STUDENT IN AN ORGANIZED HEALTH CARE EDUCATION/TRAINING PROGRAM

## 2023-12-19 PROCEDURE — 80053 COMPREHEN METABOLIC PANEL: CPT | Performed by: UROLOGY

## 2023-12-19 PROCEDURE — 85027 COMPLETE CBC AUTOMATED: CPT | Performed by: INTERNAL MEDICINE

## 2023-12-19 PROCEDURE — 83735 ASSAY OF MAGNESIUM: CPT | Performed by: UROLOGY

## 2023-12-19 PROCEDURE — 93005 ELECTROCARDIOGRAM TRACING: CPT | Performed by: STUDENT IN AN ORGANIZED HEALTH CARE EDUCATION/TRAINING PROGRAM

## 2023-12-19 PROCEDURE — 84100 ASSAY OF PHOSPHORUS: CPT | Performed by: UROLOGY

## 2023-12-19 PROCEDURE — 82948 REAGENT STRIP/BLOOD GLUCOSE: CPT

## 2023-12-19 RX ORDER — LORAZEPAM 2 MG/ML
1 INJECTION INTRAMUSCULAR EVERY 4 HOURS PRN
Qty: 30 ML | Refills: 0 | Status: SHIPPED | OUTPATIENT
Start: 2023-12-19 | End: 2023-12-26

## 2023-12-19 RX ORDER — MORPHINE SULFATE 10 MG/.5ML
10 SOLUTION ORAL EVERY 4 HOURS PRN
Status: DISCONTINUED | OUTPATIENT
Start: 2023-12-19 | End: 2023-12-19 | Stop reason: HOSPADM

## 2023-12-19 RX ORDER — MAGNESIUM SULFATE HEPTAHYDRATE 40 MG/ML
2 INJECTION, SOLUTION INTRAVENOUS ONCE
Status: COMPLETED | OUTPATIENT
Start: 2023-12-19 | End: 2023-12-19

## 2023-12-19 RX ORDER — CALCIUM GLUCONATE 20 MG/ML
2000 INJECTION, SOLUTION INTRAVENOUS ONCE
Status: DISCONTINUED | OUTPATIENT
Start: 2023-12-19 | End: 2023-12-19

## 2023-12-19 RX ORDER — INSULIN LISPRO 100 [IU]/ML
5 INJECTION, SOLUTION INTRAVENOUS; SUBCUTANEOUS ONCE
Status: DISCONTINUED | OUTPATIENT
Start: 2023-12-19 | End: 2023-12-19

## 2023-12-19 RX ORDER — MORPHINE SULFATE 10 MG/.5ML
10 SOLUTION ORAL EVERY 4 HOURS PRN
Qty: 30 ML | Refills: 0 | Status: SHIPPED | OUTPATIENT
Start: 2023-12-19 | End: 2023-12-26

## 2023-12-19 RX ORDER — NALOXONE HYDROCHLORIDE 4 MG/.1ML
SPRAY NASAL
Qty: 2 EACH | Refills: 0 | Status: SHIPPED | OUTPATIENT
Start: 2023-12-19

## 2023-12-19 RX ORDER — DEXTROSE MONOHYDRATE 25 G/50ML
50 INJECTION, SOLUTION INTRAVENOUS ONCE
Status: DISCONTINUED | OUTPATIENT
Start: 2023-12-19 | End: 2023-12-19

## 2023-12-19 RX ORDER — LORAZEPAM 2 MG/ML
1 INJECTION INTRAMUSCULAR EVERY 4 HOURS PRN
Status: DISCONTINUED | OUTPATIENT
Start: 2023-12-19 | End: 2023-12-19 | Stop reason: HOSPADM

## 2023-12-19 RX ADMIN — PANTOPRAZOLE SODIUM 40 MG: 40 TABLET, DELAYED RELEASE ORAL at 05:48

## 2023-12-19 RX ADMIN — Medication 250 MG: at 08:20

## 2023-12-19 RX ADMIN — MAGNESIUM SULFATE HEPTAHYDRATE 2 G: 40 INJECTION, SOLUTION INTRAVENOUS at 09:32

## 2023-12-19 RX ADMIN — MORPHINE SULFATE 10 MG: 10 SOLUTION ORAL at 14:05

## 2023-12-19 RX ADMIN — LISINOPRIL 10 MG: 10 TABLET ORAL at 08:20

## 2023-12-19 RX ADMIN — CARVEDILOL 12.5 MG: 12.5 TABLET, FILM COATED ORAL at 08:20

## 2023-12-19 RX ADMIN — CEPHALEXIN 500 MG: 500 CAPSULE ORAL at 08:21

## 2023-12-19 RX ADMIN — Medication 10 ML: at 08:21

## 2023-12-19 NOTE — PLAN OF CARE
Goal Outcome Evaluation:  Plan of Care Reviewed With: patient, durable power of    Pt to discharge to [facility].

## 2023-12-19 NOTE — DISCHARGE SUMMARY
[facility]         HOSPITALIST  DISCHARGE SUMMARY    Patient Name: Lyubov Middleton  : 1955  MRN: [id number]    Date of Admission: 2023  Date of Discharge:  2023  Primary Care Physician: Cathleen Stroud APRN    Consults       Date and Time Order Name Status Description    2023  9:30 AM Inpatient Cardiology Consult      2023  8:27 AM Inpatient Gastroenterology Consult Completed     2023  8:26 AM Inpatient Nephrology Consult Completed     2023  6:55 AM Inpatient Urology Consult Completed     2023  8:40 PM Inpatient Hospitalist Consult      2023  7:27 PM General MD Inpatient Consult              Active and Resolved Hospital Problems:  Active Hospital Problems    Diagnosis POA   • **Abdominal pain [R10.9] Unknown   • Chronic kidney disease, stage V [N18.5] Unknown   • History of anemia due to chronic kidney disease [N18.9, Z86.2] Not Applicable   • Chronic anemia [D64.9] Unknown   • Hydronephrosis [N13.30] Unknown   • Pyelonephritis [N12] Yes   • History of Clostridioides difficile infection [Z86.19] Yes   • Type 2 diabetes mellitus [E11.9] Yes   • Essential hypertension [I10] Yes      Resolved Hospital Problems   No resolved problems to display.       Hospital Course     Hospital Course:  Lyubov Middleton is a 68 y.o. female with past medical history of hypertension, hyperlipidemia, diabetes not on medication, breast cancer, arthritis, dementia, and GERD who presented from nursing home with complaints of abdominal pain and bloody bowel movements.  She was admitted for further evaluation.  CT abdomen and pelvis without contrast showed malrotation of right kidney with severe right hydronephrosis and right perinephric fat stranding/edema which could be obstruction or pyelonephritis; multiple renal calculi.  There is also irregular subtle soft tissue attenuation in right renal pelvis and right retroperitoneal lymphadenopathy concerning obstructing  urothelial malignancy with dameon metastatic disease.  nephrology and neurology were consulted and followed the patient.  GI also followed the patient this hospitalization. GI was consulted this admission; given patient had recent colonoscopy with stool transplant for recurrent C. difficile several months ago with overall improvement of her symptoms; decided to follow peripherally with no any further plan of GI intervention this admission.  For right UPJ stricture with purulent drainage s/p stent placement.  Patient was started on antibiotic given purulent drainage on the urology procedure.  Patient's renal function continued to worsen but she refused dialysis.  Patient family was at bedside today and decided on transitioning her to Comfort measures only today.  All her chronic medical management medications were discontinued and she was transitioned to comfort measures only status.  Fernandez was placed for comfort given patient is complaining of periurethral pain and for comfort.    Patient is being transferred to [facility] on comfort measures.  She is being discharged on as needed morphine.  Pain and dyspnea.  Also on as needed Ativan for agitation.  She is stable at the time of discharge.  No readmission.        DISCHARGE Follow Up Recommendations for labs and diagnostics:   As mentioned above.    Day of Discharge     Vital Signs:  Temp:  [97.4 °F (36.3 °C)-98 °F (36.7 °C)] 97.6 °F (36.4 °C)  Heart Rate:  [78] 78  Resp:  [16] 16  BP: (137-153)/(58-69) 153/66  Physical Exam:   General: Awake, alert, NAD resting in bed.   Cardiovascular: RRR, no murmurs   Pulmonary: CTA bilaterally; no wheezes; no conversational dyspnea  Gastrointestinal: S/ND/NT, +BS  Neuro: Alert, awake, oriented x 3; speech clear; no tremor  : No Fernandez catheter; no suprapubic tenderness     Discharge Details        Discharge Medications        New Medications        Instructions Start Date   LORazepam 2 MG/ML injection  Commonly known as:  ATIVAN   1 mg, Intravenous, Every 4 Hours PRN      morphine sulfate (concentrate) 10 MG/0.5ML solution oral solution   10 mg, Oral, Every 4 Hours PRN      naloxone 4 MG/0.1ML nasal spray  Commonly known as: NARCAN   Call 911. Don't prime. Nickerson in 1 nostril for overdose. Repeat in 2-3 minutes in other nostril if no or minimal breathing/responsiveness.             Stop These Medications      acetaminophen 325 MG tablet  Commonly known as: TYLENOL     alendronate 70 MG tablet  Commonly known as: FOSAMAX     amLODIPine 10 MG tablet  Commonly known as: NORVASC     anastrozole 1 MG tablet  Commonly known as: ARIMIDEX     Aranesp (Albumin Free) 25 MCG/ML injection  Generic drug: Darbepoetin Jaylen     bisacodyl 10 MG suppository  Commonly known as: DULCOLAX     Calmoseptine 0.44-20.6 % ointment  Generic drug: Menthol-Zinc Oxide     carvedilol 25 MG tablet  Commonly known as: COREG     cholecalciferol 1.25 MG (99016 UT) capsule  Commonly known as: VITAMIN D3     citalopram 10 MG tablet  Commonly known as: CeleXA     colestipol 1 g tablet  Commonly known as: COLESTID     ferrous sulfate 325 (65 FE) MG tablet     guaifenesin 100 MG/5ML liquid  Commonly known as: ROBITUSSIN     lacosamide 10 MG/ML solution oral solution  Commonly known as: VIMPAT     levothyroxine 25 MCG tablet  Commonly known as: SYNTHROID, LEVOTHROID     loperamide 2 MG capsule  Commonly known as: IMODIUM     magnesium hydroxide 400 MG/5ML suspension  Commonly known as: MILK OF MAGNESIA     magnesium oxide 400 MG tablet  Commonly known as: MAG-OX     Phospha 250 Neutral 155-852-130 MG tablet  Generic drug: phosphorus     potassium chloride 10 MEQ CR tablet  Commonly known as: K-DUR,KLOR-CON     saccharomyces boulardii 250 MG capsule  Commonly known as: FLORASTOR     sodium bicarbonate 650 MG tablet     vitamin B-12 1000 MCG tablet  Commonly known as: CYANOCOBALAMIN              No Known Allergies    Discharge Disposition:  Hospice/Medical Facility (DC -  External)    Diet:  Hospital:  Diet Order   Procedures   • Diet: Regular/House Diet; Texture: Regular Texture (IDDSI 7); Fluid Consistency: Thin (IDDSI 0)       Discharge Activity:       CODE STATUS:  Code Status and Medical Interventions:   Ordered at: 12/19/23 1307     Code Status (Patient has no pulse and is not breathing):    No CPR (Do Not Attempt to Resuscitate)     Medical Interventions (Patient has pulse or is breathing):    Comfort Measures       Future Appointments   Date Time Provider Department Center   3/1/2024  8:15 AM Tonya Isidro APRN [facility]           Pertinent  and/or Most Recent Results     PROCEDURES:   N/A    LAB RESULTS:      Lab 12/19/23  0415 12/16/23  0404 12/15/23  0428 12/14/23  0421   WBC 8.37 15.94*  --  7.35   HEMOGLOBIN 8.0* 7.2*  --  8.0*   HEMATOCRIT 26.4* 23.5*  --  25.3*   PLATELETS 157 164  --  191   NEUTROS ABS  --  13.26*  --  5.14   IMMATURE GRANS (ABS)  --  0.14*  --  0.02   LYMPHS ABS  --  1.57  --  1.57   MONOS ABS  --  0.75  --  0.41   EOS ABS  --  0.17  --  0.16   MCV 94.0 93.3  --  91.7   LACTATE, ARTERIAL  --   --  1.13  --    PROTIME  --   --   --  14.9         Lab 12/19/23  0415 12/18/23  0420 12/17/23  0546 12/16/23  0404 12/15/23  0503 12/15/23  0428   SODIUM 140 140 141 139 145  --    SODIUM, ARTERIAL  --   --   --   --   --  141.5   POTASSIUM 5.4* 5.0 5.1 4.7 4.4  --    CHLORIDE 108* 109* 110* 106 111*  --    CO2 18.9* 20.6* 20.8* 22.0 21.6*  --    ANION GAP 13.1 10.4 10.2 11.0 12.4  --    BUN 44* 45* 47* 45* 31*  --    CREATININE 4.25* 4.30* 4.62* 4.51* 3.92*  --    EGFR 10.9* 10.7* 9.9* 10.2* 12.0*  --    GLUCOSE 83 74 105* 105* 114*  --    GLUCOSE, ARTERIAL  --   --   --   --   --  106*   CALCIUM 7.5* 7.4* 7.3* 7.2* 7.3*  --    IONIZED CALCIUM  --   --   --   --   --  0.98*   MAGNESIUM 1.3* 1.3* 1.4* 1.5* 1.5*  --    PHOSPHORUS 2.7 2.3* 2.4* 5.1* 5.5*  --          Lab 12/19/23  0415 12/18/23  0420 12/17/23  0546 12/16/23  0404 12/15/23  0503    TOTAL PROTEIN 5.9* 5.8* 5.6* 5.3* 5.2*   ALBUMIN 2.7* 2.5* 2.3* 2.3* 2.3*   GLOBULIN 3.2 3.3 3.3 3.0 2.9   ALT (SGPT) <5 <5 <5 <5 <5   AST (SGOT) 10 12 10 11 14   BILIRUBIN 0.3 0.3 0.2 0.2 0.2   ALK PHOS 83 80 79 82 76         Lab 12/14/23  0421   PROTIME 14.9   INR 1.14                 Lab 12/15/23  0428   PH, ARTERIAL 7.440   PCO2, ARTERIAL 33.6*   PO2 ART 79.3*   O2 SATURATION ART 94.0*   FIO2 21   HCO3 ART 22.3   BASE EXCESS ART -1.0   CARBOXYHEMOGLOBIN 0.3     Brief Urine Lab Results  (Last result in the past 365 days)        Color   Clarity   Blood   Leuk Est   Nitrite   Protein   CREAT   Urine HCG        12/08/23 1902             51.1         12/08/23 1902             54.2         12/08/23 1902 Yellow   Cloudy   Small (1+)   Moderate (2+)   Negative   >=300 mg/dL (3+)                 Microbiology Results (last 10 days)       ** No results found for the last 240 hours. **            No radiology results for the last 7 days     Results for orders placed during the hospital encounter of 12/08/23    Duplex Venous Lower Extremity - Right CAR    Interpretation Summary  •  Normal right lower extremity venous duplex scan.  •  Right popliteal fossa fluid collection.      Results for orders placed during the hospital encounter of 12/08/23    Duplex Venous Lower Extremity - Right CAR    Interpretation Summary  •  Normal right lower extremity venous duplex scan.  •  Right popliteal fossa fluid collection.      Results for orders placed during the hospital encounter of 12/08/23    Adult Transthoracic Echo Complete W/ Cont if Necessary Per Protocol    Interpretation Summary  •  Left ventricular ejection fraction appears to be 56 - 60%.  •  Left ventricular wall thickness is consistent with mild concentric hypertrophy.  •  No pericardial effusion      Labs Pending at Discharge:        Time spent on Discharge including face to face service:  35 minutes    Electronically signed by April Jackson MD, 12/19/23, 2:56 PM  EST.

## 2023-12-20 ENCOUNTER — TELEPHONE (OUTPATIENT)
Dept: UROLOGY | Facility: CLINIC | Age: 68
End: 2023-12-20
Payer: MEDICARE

## 2023-12-20 NOTE — TELEPHONE ENCOUNTER
Called Jayda to discuss things with her. I answered all of her questions and asked they call back if needed in the future.

## 2023-12-20 NOTE — PROGRESS NOTES
[facility]     Progress Note    Patient Name: Lyubov Middleton  : 1955  MRN: [id number]  Primary Care Physician:  Cathleen Stroud APRN  Date of admission: 2023    Subjective   Pt is feeling much better.no new complaints.Pt is agreeable to HD.K is 5.4  Review of Systems  Constitutional:        Weakness tiredness fatigue  Eyes:                       No blurry vision, eye discharge, eye irritation, eye pain  HEENT:                   No acute hair loss, earache and discharge, nasal congestion or discharge, sore throat, postnasal drip  Respiratory:           No shortness of breath coughing sputum production wheezing hemoptysis pleuritic chest pain  Cardiovascular:     No chest pain, orthopnea, PND, dizziness, palpitation, lower extremity edema  Gastrointestinal:   No nausea vomiting diarrhea abdominal pain constipation  Genitourinary:       No urinary incontinence, hesitancy, frequency, urgency, dysuria  Hematologic:         No bruising, bleeding, pallor, lymphadenopathy  Endocrine:            No coldness, hot flashes, polyuria, abnormal hair growth  Musculoskeletal:    No body pains, aches, arthritic pains, muscle pain ,muscle wasting  Psychiatric:          No low or high mood, anxiety, hallucinations, delusions  Skin.                      No rash, ulcers, bruising, itching  Neurological:        No confusion, headache, focal weakness, numbness, dysphasia    Objective   Objective     Vitals:   Temp:  [97.4 °F (36.3 °C)-98 °F (36.7 °C)] 97.5 °F (36.4 °C)  Heart Rate:  [67-78] 67  Resp:  [16] 16  BP: (129-153)/(58-69) 129/65  Physical Exam    Constitutional: Awake, alert responsive, conversant, no obvious distress              Psychiatric:  Appropriate affect, cooperative   Neurologic:  Awake alert ,oriented x 3, strength symmetric in all extremities, Cranial Nerves grossly intact to confrontation, speech clear   Eyes:   PERRLA, sclerae anicteric, no conjunctival injection   HEENT:  Moist mucous  membranes, no nasal or eye discharge, no throat congestion   Neck:   Supple, no thyromegaly, no lymphadenopathy, trachea midline, no elevated JVD   Respiratory:  Clear to auscultation bilaterally, nonlabored respirations    Cardiovascular: RRR, no murmurs, rubs, or gallops, palpable pedal pulses bilaterally, No bilateral ankle edema   Gastrointestinal: Positive bowel sounds, soft, nontender, nondistended, no organomegaly   Musculoskeletal:  No clubbing or cyanosis to extremities,muscle wasting, joint swelling, muscle weakness             Skin:                      No rashes, bruising, skin ulcers, petechiae or ecchymosis    Result Review    Result Review:  I have personally reviewed the results from the time of this admission to 12/19/2023 20:14 EST and agree with these findings:  []  Laboratory  []  Microbiology  []  Radiology  []  EKG/Telemetry   []  Cardiology/Vascular   []  Pathology  []  Old records  []  Other:    Assessment & Plan   Assessment / Plan       Active Hospital Problems:    Active Hospital Problems    Diagnosis  POA    **Abdominal pain [R10.9]  Unknown    Chronic kidney disease, stage V [N18.5]  Unknown     Most likely secondary to diabetic nephropathy as patient has nephrotic range proteinuria  Status post ureteral stent because of hydronephrosis.        History of anemia due to chronic kidney disease [N18.9, Z86.2]  Not Applicable     She is on Epogen      Chronic anemia [D64.9]  Unknown    Hydronephrosis [N13.30]  Unknown    Pyelonephritis [N12]  Yes    History of Clostridioides difficile infection [Z86.19]  Yes     Added automatically from request for surgery [id number]      Type 2 diabetes mellitus [E11.9]  Yes    Essential hypertension [I10]  Yes       Plan:   PO lokelma  Add sodium bicarb also  May consider HD       Electronically signed by Alyse Hawkins MD, 12/19/23, 8:14 PM EST.

## 2023-12-20 NOTE — TELEPHONE ENCOUNTER
Name: Jose Lieberman (Can also reach Keven on this number)    Relationship: Emergency Contact    Best Callback Number: [phone]    HUB PROVIDED THE RELAY MESSAGE FROM THE OFFICE   PATIENT HAS FURTHER QUESTIONS AND WOULD LIKE A CALL BACK AT THE FOLLOWING PHONE NUMBER [phone]    ADDITIONAL INFORMATION: JOSE ASKS IF THIS APPOINTMENT WILL BE TO REMOVE THE TUBE FROM [facility]. SHE STATES THAT THEY WERE TOLD THAT THE TUBE CANNOT BE IN FOR MORE THAN A COUPLE OF MONTHS AND THAT IT WAS JUST PUT IN THE OTHER DAY.

## 2023-12-20 NOTE — NURSING NOTE
Patient left by EMS going to [facility] in place, belongings sent with patient, Keven HILTON was called and informed of transfer.

## 2023-12-20 NOTE — TELEPHONE ENCOUNTER
I have referred pt to  Urology. She has an appt with Dr Gaxiola on 01/15/24 at 1130, she needs to arrive around 1115. This will be at [facility] [address]. Phone number is [phone].     HARRYM on Jayda's phone. Asked that she call back to receive the above appt information.    Hub/answering service okay to relay above information

## 2023-12-21 ENCOUNTER — TELEPHONE (OUTPATIENT)
Dept: UROLOGY | Facility: CLINIC | Age: 68
End: 2023-12-21

## 2023-12-21 NOTE — TELEPHONE ENCOUNTER
Caller: Lyubov Middleton    Relationship: Self    Best call back number: [phone]    What is the best time to reach you: ANYTIME    Who are you requesting to speak with (clinical staff, provider,  specific staff member): NINA FU    Do you know the name of the person who called: PT    What was the call regarding: RECEIVED A CALL FROM PT. SHE CALLED BECAUSE HER INSURANCE WILL BE CHANGING STARTING 1/1/24 AND WILL NO LONGER BE [employer]. SHE WANTED TO KNOW IF SHE CAN CONTINUE CARE WITH  AND WOULD LIKE TO GO AHEAD AND MAKE AN APPT.

## 2024-01-01 LAB
QT INTERVAL: 368 MS
QTC INTERVAL: 427 MS

## (undated) DEVICE — Y-TYPE TUR/BLADDER IRRIGATION SET, REGULATING CLAMP

## (undated) DEVICE — URETERAL ACCESS SHEATH SET: Brand: NAVIGATOR HD

## (undated) DEVICE — SOL IRR NACL 0.9PCT 3000ML

## (undated) DEVICE — GW ZIPWIRE STD/SHFT STR TPR/3CM .038IN 150CM

## (undated) DEVICE — TIDISHIELD UROLOGY DRAIN BAGS FROSTY VINYL STERILE FITS SIEMENS UROSKOP ACCESS 20 PER CASE: Brand: TIDISHIELD

## (undated) DEVICE — LOU CYSTO: Brand: MEDLINE INDUSTRIES, INC.

## (undated) DEVICE — URETERAL STENT
Type: IMPLANTABLE DEVICE | Status: NON-FUNCTIONAL
Brand: PERCUFLEX™ PLUS

## (undated) DEVICE — PRT BIOP SEALS

## (undated) DEVICE — URETERAL STENT
Type: IMPLANTABLE DEVICE | Site: URETER | Status: NON-FUNCTIONAL
Brand: ASCERTA™
Removed: 2023-12-14

## (undated) DEVICE — Device

## (undated) DEVICE — SYR LUERLOK 20CC BX/50

## (undated) DEVICE — CYSTO PACK: Brand: MEDLINE INDUSTRIES, INC.

## (undated) DEVICE — NITINOL WIRE WITH HYDROPHILIC TIP: Brand: SENSOR

## (undated) DEVICE — GW SUREGLIDE FLXTIP ANG/TP .038 3X150CM EA/5

## (undated) DEVICE — CATH 2L URETRL HC 6F 50CM

## (undated) DEVICE — CATH URETRL FLXITP POLLACK STD 5F 70CM

## (undated) DEVICE — TOWEL,OR,DSP,ST,BLUE,STD,4/PK,20PK/CS: Brand: MEDLINE

## (undated) DEVICE — GW PTFE/COAT STR/TP STFF/BDY .038 150CM STRL EA/5

## (undated) DEVICE — GLV SURG SENSICARE SLT PF LF 8 STRL

## (undated) DEVICE — SOL IRRG H2O PL/BG 1000ML STRL

## (undated) DEVICE — SKIN PREP TRAY W/CHG: Brand: MEDLINE INDUSTRIES, INC.

## (undated) DEVICE — Device: Brand: DEFENDO AIR/WATER/SUCTION AND BIOPSY VALVE

## (undated) DEVICE — SINGLE-USE DIGITAL FLEXIBLE URETEROSCOPE: Brand: LITHOVUE

## (undated) DEVICE — SYS IRR PUMP SGL ACTN VAC SYR 10CC

## (undated) DEVICE — FIBR LASR HOLMIUM ACCUMAX 200 1PT USE

## (undated) DEVICE — GLV SURG BIOGEL LTX PF 7

## (undated) DEVICE — CATH URETRL CONE/TIP 4.8F70CM LT

## (undated) DEVICE — GW ZIPWIRE STD/SHFT STR TPR/3CM .035IN 150CM

## (undated) DEVICE — SOLIDIFIER LIQLOC PLS 1500CC BT

## (undated) DEVICE — BASIC SINGLE BASIN-LF: Brand: MEDLINE INDUSTRIES, INC.

## (undated) DEVICE — ENDOSCOPIC VALVE WITH ADAPTER.: Brand: SURSEAL® II